# Patient Record
Sex: FEMALE | Race: BLACK OR AFRICAN AMERICAN | NOT HISPANIC OR LATINO | Employment: PART TIME | URBAN - METROPOLITAN AREA
[De-identification: names, ages, dates, MRNs, and addresses within clinical notes are randomized per-mention and may not be internally consistent; named-entity substitution may affect disease eponyms.]

---

## 2017-03-21 ENCOUNTER — TRANSFERRED RECORDS (OUTPATIENT)
Dept: HEALTH INFORMATION MANAGEMENT | Facility: CLINIC | Age: 32
End: 2017-03-21

## 2017-08-22 ENCOUNTER — OFFICE VISIT (OUTPATIENT)
Dept: FAMILY MEDICINE | Facility: CLINIC | Age: 32
End: 2017-08-22

## 2017-08-22 VITALS
SYSTOLIC BLOOD PRESSURE: 123 MMHG | OXYGEN SATURATION: 98 % | WEIGHT: 170 LBS | HEIGHT: 68 IN | TEMPERATURE: 98.4 F | BODY MASS INDEX: 25.76 KG/M2 | HEART RATE: 70 BPM | DIASTOLIC BLOOD PRESSURE: 85 MMHG

## 2017-08-22 DIAGNOSIS — J45.20 MILD INTERMITTENT ASTHMA WITHOUT COMPLICATION: ICD-10-CM

## 2017-08-22 DIAGNOSIS — Z01.419 ENCOUNTER FOR GYNECOLOGICAL EXAMINATION WITHOUT ABNORMAL FINDING: ICD-10-CM

## 2017-08-22 DIAGNOSIS — Z97.5 IUD CONTRACEPTION: ICD-10-CM

## 2017-08-22 DIAGNOSIS — F41.1 GAD (GENERALIZED ANXIETY DISORDER): ICD-10-CM

## 2017-08-22 DIAGNOSIS — F33.1 MAJOR DEPRESSIVE DISORDER, RECURRENT EPISODE, MODERATE (H): ICD-10-CM

## 2017-08-22 DIAGNOSIS — Z12.4 SCREENING FOR CERVICAL CANCER: ICD-10-CM

## 2017-08-22 DIAGNOSIS — I10 BENIGN ESSENTIAL HYPERTENSION: Primary | ICD-10-CM

## 2017-08-22 PROBLEM — E55.9 VITAMIN D DEFICIENCY: Status: ACTIVE | Noted: 2017-08-22

## 2017-08-22 RX ORDER — ALBUTEROL SULFATE 90 UG/1
2 AEROSOL, METERED RESPIRATORY (INHALATION) EVERY 6 HOURS
Status: ON HOLD | COMMUNITY
End: 2019-12-10

## 2017-08-22 RX ORDER — LISINOPRIL/HYDROCHLOROTHIAZIDE 10-12.5 MG
1 TABLET ORAL DAILY
Qty: 90 TABLET | Refills: 1 | Status: SHIPPED | OUTPATIENT
Start: 2017-08-22 | End: 2017-09-29

## 2017-08-22 RX ORDER — CITALOPRAM HYDROBROMIDE 20 MG/1
20 TABLET ORAL DAILY
Qty: 90 TABLET | Refills: 1 | Status: SHIPPED | OUTPATIENT
Start: 2017-08-22 | End: 2017-09-29

## 2017-08-22 RX ORDER — LISINOPRIL AND HYDROCHLOROTHIAZIDE 12.5; 2 MG/1; MG/1
1 TABLET ORAL DAILY
COMMUNITY
End: 2017-08-22

## 2017-08-22 ASSESSMENT — ANXIETY QUESTIONNAIRES
5. BEING SO RESTLESS THAT IT IS HARD TO SIT STILL: NOT AT ALL
GAD7 TOTAL SCORE: 5
IF YOU CHECKED OFF ANY PROBLEMS ON THIS QUESTIONNAIRE, HOW DIFFICULT HAVE THESE PROBLEMS MADE IT FOR YOU TO DO YOUR WORK, TAKE CARE OF THINGS AT HOME, OR GET ALONG WITH OTHER PEOPLE: NOT DIFFICULT AT ALL
3. WORRYING TOO MUCH ABOUT DIFFERENT THINGS: SEVERAL DAYS
6. BECOMING EASILY ANNOYED OR IRRITABLE: MORE THAN HALF THE DAYS
1. FEELING NERVOUS, ANXIOUS, OR ON EDGE: SEVERAL DAYS
7. FEELING AFRAID AS IF SOMETHING AWFUL MIGHT HAPPEN: SEVERAL DAYS
2. NOT BEING ABLE TO STOP OR CONTROL WORRYING: NOT AT ALL

## 2017-08-22 ASSESSMENT — PAIN SCALES - GENERAL: PAINLEVEL: NO PAIN (0)

## 2017-08-22 ASSESSMENT — PATIENT HEALTH QUESTIONNAIRE - PHQ9
5. POOR APPETITE OR OVEREATING: NOT AT ALL
SUM OF ALL RESPONSES TO PHQ QUESTIONS 1-9: 4

## 2017-08-22 NOTE — LETTER
August 29, 2017       TO: Vivivishal Govea  [address] [facility] [address]       Hi Vivi,    Below are your recent PAP and HPV results and everything look GREAT!! The PAP indicated a small amount of yeast cells.  This does not need to be treated unless you are having symptoms of a yeast infection.  You can use OTC vaginal yeast cream for this.  If you are having symptoms and would like an oral pill prescribed for this please call the clinic and speak with the nurse.    Your next PAP test can be in 5 years.  Please let me know if you have any questions.    Please let me know if you have any questions.  Thank you for allowing me to participate in your care.  It was my pleasure meeting you.    All the best!        Nicole Mcclellan PA-C          Results for orders placed or performed in visit on 08/22/17   Pap imaged thin layer screen with HPV - recommended age 30 - 65   Result Value Ref Range    PAP NIL     Copath Report         Acc#: [id number]   Signed: 8/25/2017 08:49   MR#: [id number]    SPECIMEN/STAIN PROCESS:  Pap imaged thin layer prep screening (Surepath, FocalPoint with guided  screening)       Pap-Cyto x 1, HPV ordered x 1    SOURCE: Cervical, endocervical  ----------------------------------------------------------------   Pap imaged thin layer prep screening (Surepath, FocalPoint with guided  screening)  SPECIMEN ADEQUACY:  Satisfactory for evaluation.  -Transformation zone component present.    CYTOLOGIC INTERPRETATION:    Negative for intraepithelial lesion or malignancy         Organism(s):  -Fungal organisms morphologically consistent with Candida spp.    Electronically signed by:  CRISTOBAL Vasques (ASCP)    CLINICAL HISTORY:  LMP: 7/29/2017    Papanicolaou Test Limitations:  Cervical cytology is a screening test  with limited sensitivity; regular screening is critical for cancer  prevention; Pap tests are primarily effective for the  diagnosis/prevention of squamous cell carcinoma, not  adenoca rcinomas or  other cancers.  TESTING LAB LOCATION:  [address] [address], [phone]  Processed and screened at [facility]     HPV High Risk Types DNA Cervical   Result Value Ref Range    HPV 16 DNA Negative NEG^Negative    HPV 18 DNA Negative NEG^Negative    Other HR HPV Negative NEG^Negative    Final Diagnosis This patient's sample is negative for HPV DNA.     Specimen Description Cervical Cells

## 2017-08-22 NOTE — LETTER
My Asthma Action Plan  Name: Vivi Govea   YOB: 1985  Date: 8/22/2017   My doctor: Jaycee Mcclellan PA-C   My clinic: [facility]        My Control Medicine: none  My Rescue Medicine: Albuterol (Proair/Ventolin/Proventil) inhaler 2 puffs every 4-6 PRN    Has used symbicort intermittently with exacerbation My Asthma Severity: intermittent  Avoid your asthma triggers: upper respiratory infections, pollens and mold               GREEN ZONE   Good Control    I feel good    No cough or wheeze    Can work, sleep and play without asthma symptoms       Take your asthma control medicine every day.     1. If exercise triggers your asthma, take your rescue medication    15 minutes before exercise or sports, and    During exercise if you have asthma symptoms  2. Spacer to use with inhaler: If you have a spacer, make sure to use it with your inhaler             YELLOW ZONE Getting Worse  I have ANY of these:    I do not feel good    Cough or wheeze    Chest feels tight    Wake up at night   1. Keep taking your Green Zone medications  2. Start taking your rescue medicine:    every 20 minutes for up to 1 hour. Then every 4 hours for 24-48 hours.  3. If you stay in the Yellow Zone for more than 12-24 hours, contact your doctor.  4. If you do not return to the Green Zone in 12-24 hours or you get worse, start taking your oral steroid medicine if prescribed by your provider.           RED ZONE Medical Alert - Get Help  I have ANY of these:    I feel awful    Medicine is not helping    Breathing getting harder    Trouble walking or talking    Nose opens wide to breathe       1. Take your rescue medicine NOW  2. If your provider has prescribed an oral steroid medicine, start taking it NOW  3. Call your doctor NOW  4. If you are still in the Red Zone after 20 minutes and you have not reached your doctor:    Take your rescue medicine again and    Call 911 or go to the emergency room right away    See your  regular doctor within 2 weeks of an Emergency Room or Urgent Care visit for follow-up treatment.        Electronically signed by: Jaycee Mcclellan, August 22, 2017    Annual Reminders:  Meet with Asthma Educator,  Flu Shot in the Fall, consider Pneumonia Vaccination for patients with asthma (aged 19 and older).    Pharmacy: [facility] [id number] [place] [address]                    Asthma Triggers  How To Control Things That Make Your Asthma Worse    Triggers are things that make your asthma worse.  Look at the list below to help you find your triggers and what you can do about them.  You can help prevent asthma flare-ups by staying away from your triggers.      Trigger                                                          What you can do   Cigarette Smoke  Tobacco smoke can make asthma worse. Do not allow smoking in your home, car or around you.  Be sure no one smokes at a child s day care or school.  If you smoke, ask your health care provider for ways to help you quit.  Ask family members to quit too.  Ask your health care provider for a referral to Quit Plan to help you quit smoking, or call 1-939-096-PLAN.     Colds, Flu, Bronchitis  These are common triggers of asthma. Wash your hands often.  Don t touch your eyes, nose or mouth.  Get a flu shot every year.     Dust Mites  These are tiny bugs that live in cloth or carpet. They are too small to see. Wash sheets and blankets in hot water every week.   Encase pillows and mattress in dust mite proof covers.  Avoid having carpet if you can. If you have carpet, vacuum weekly.   Use a dust mask and HEPA vacuum.   Pollen and Outdoor Mold  Some people are allergic to trees, grass, or weed pollen, or molds. Try to keep your windows closed.  Limit time out doors when pollen count is high.   Ask you health care provider about taking medicine during allergy season.     Animal Dander  Some people are allergic to skin flakes, urine or saliva from  pets with fur or feathers. Keep pets with fur or feathers out of your home.    If you can t keep the pet outdoors, then keep the pet out of your bedroom.  Keep the bedroom door closed.  Keep pets off cloth furniture and away from stuffed toys.     Mice, Rats, and Cockroaches  Some people are allergic to the waste from these pests.   Cover food and garbage.  Clean up spills and food crumbs.  Store grease in the refrigerator.   Keep food out of the bedroom.   Indoor Mold  This can be a trigger if your home has high moisture. Fix leaking faucets, pipes, or other sources of water.   Clean moldy surfaces.  Dehumidify basement if it is damp and smelly.   Smoke, Strong Odors, and Sprays  These can reduce air quality. Stay away from strong odors and sprays, such as perfume, powder, hair spray, paints, smoke incense, paint, cleaning products, candles and new carpet.   Exercise or Sports  Some people with asthma have this trigger. Be active!  Ask your doctor about taking medicine before sports or exercise to prevent symptoms.    Warm up for 5-10 minutes before and after sports or exercise.     Other Triggers of Asthma  Cold air:  Cover your nose and mouth with a scarf.  Sometimes laughing or crying can be a trigger.  Some medicines and food can trigger asthma.

## 2017-08-22 NOTE — PROGRESS NOTES
SUBJECTIVE:   Vivi Govea is a 32 year old female new patient to our clinic who presents to clinic today for the following health issues:    Establish care with primary care clinic    Cervical cancer screening:  She had a physical 2017 in [place] but a PAP and breast exam  was not done and patient is due.    Periods: regular, lasting 7days.  Flow described as heavy with the PARAGARD present for @6 years  yes dysmenorrhea, No Dysparuneia  Currently sexually active: yes    Contraception: IUD  Patient's last menstrual period was 2017 (exact date).   Vaginal symptoms: no  Concern for STD: no    Accepts/requests STD testing: declined      Hypertension Follow-up:  Has been on medication for the past 8 years. Tolerating well.  Denies symptoms of headache, blurred vision, chest pain and palpitations.  Had labs done 3/2017.  She brings in reports and all are normal.  Will be scanned to EMR.      Outpatient blood pressures are being checked at home.  Results are normal in the 120/80 range.    Low Salt Diet: no added salt    Depression and Anxiety Follow-Up:  Doing well on citalopram.  Long history of depression and anxiety. Has been on medication for the past several years.  She feels under good control.  She has never been hospitalized and denies homicidal and suicidal ideation now nor in the past.  Strong family history of  Depression.  Mom tried to commit suicide .  Patient has been in counseling in the past.    Status since last visit: NA    Other associated symptoms:None    Complicating factors: family hx    Significant life event: Yes-  Recent move from [facility].     Current substance abuse: None    PHQ-9 SCORE 2017   Total Score 4     ROCHELLE-7 SCORE 2017   Total Score 5       PHQ-9  English  PHQ-9   Any Language  GAD7  Asthma Follow-Up:  Mild Intermittent.  Uses albuterol only as needed. AAP updated.    Was ACT completed today?  Yes    ACT Total Scores 2017   ACT TOTAL SCORE (Goal  Greater than or Equal to 20) 25   In the past 12 months, how many times did you visit the emergency room for your asthma without being admitted to the hospital? 0   In the past 12 months, how many times were you hospitalized overnight because of your asthma? 0       Recent asthma triggers that patient is dealing with: None      Amount of exercise or physical activity: 4-5 days/week for an average of greater than 60 minutes    Problems taking medications regularly: No    Medication side effects: none  Diet: low salt    Problem list and histories reviewed & adjusted, as indicated.  Additional history: as documented    Patient Active Problem List    Diagnosis Date Noted     Benign essential hypertension 08/22/2017     Priority: Medium     ROCHELLE (generalized anxiety disorder) 08/22/2017     Priority: Medium     Major depressive disorder, recurrent episode, moderate (H) 08/22/2017     Priority: Medium     Mild intermittent asthma without complication 08/22/2017     Priority: Medium     Vitamin D deficiency 08/22/2017     Priority: Medium     IUD contraception 08/22/2017     Priority: Medium     Paragard         Past Medical History:   Diagnosis Date     Asthma      C. difficile diarrhea 2013     Depression      Generalized anxiety disorder      Hypertension        Past Surgical History:   Procedure Laterality Date     GYN SURGERY  2003    Laser treatment of HPV     ORTHOPEDIC SURGERY Left 07/21/2017    ganglion cyst removal     SURGICAL HISTORY OF -       Nerve entrapment release       Family History   Problem Relation Age of Onset     DIABETES Mother      Type 2 Diabetes Maternal Grandfather        Social History   Substance Use Topics     Smoking status: Never Smoker     Smokeless tobacco: Never Used     Alcohol use Yes      Comment: 6 drinks a week mainly wine       Social History     Social History Narrative    Lives with fiance. One dog.  Recently moved from [place].  Has performed at the [employer] in the past. Is getting   in @ 1 year.        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          Has a good support system.    Feels safe in all environments.    Wears seatbelt 100% of the time    Wears helmet while biking.    Denies history of abuse, past or present, physical, sexual or emotional.    08/22/17    Nicole Mcclellan PA-C           Current Outpatient Prescriptions   Medication Sig Dispense Refill     VITAMIN D, CHOLECALCIFEROL, PO Take 5,000 Units by mouth daily       LORazepam (ATIVAN PO) Take 1 mg by mouth       albuterol (PROAIR HFA/PROVENTIL HFA/VENTOLIN HFA) 108 (90 BASE) MCG/ACT Inhaler Inhale 2 puffs into the lungs every 6 hours       lisinopril-hydrochlorothiazide (PRINZIDE/ZESTORETIC) 10-12.5 MG per tablet Take 1 tablet by mouth daily 90 tablet 1     citalopram (CELEXA) 20 MG tablet Take 1 tablet (20 mg) by mouth daily 90 tablet 1     [DISCONTINUED] lisinopril-hydrochlorothiazide (PRINZIDE/ZESTORETIC) 10-6.25 mg per half-tab Take 1 tablet by mouth daily       [DISCONTINUED] CITALOPRAM HYDROBROMIDE PO Take 20 mg by mouth daily           Reviewed and updated as needed this visit by clinical staff  Tobacco  Allergies  Meds  Problems  Med Hx  Surg Hx  Fam Hx  Soc Hx        Reviewed and updated as needed this visit by Provider  Tobacco  Allergies  Meds  Problems  Med Hx  Surg Hx  Fam Hx  Soc Hx          ROS:  C: NEGATIVE for fever, chills, change in weight  I: NEGATIVE for worrisome rashes, moles or lesions  E: NEGATIVE for vision changes or irritation  E/M: NEGATIVE for ear, mouth and throat problems  R: NEGATIVE for significant cough or SOB  B: NEGATIVE for masses, tenderness or discharge  CV: NEGATIVE for chest pain, palpitations or peripheral edema  GI: NEGATIVE for nausea, abdominal pain, heartburn, or change in bowel habits  : NEGATIVE for frequency, dysuria, or hematuria  M: NEGATIVE for significant arthralgias or myalgia  N: NEGATIVE for weakness, dizziness or paresthesias  E:  "NEGATIVE for temperature intolerance, skin/hair changes  H: NEGATIVE for bleeding problems  P: NEGATIVE for changes in mood or affect, as above    OBJECTIVE:     /85 (BP Location: Left arm, Patient Position: Chair, Cuff Size: Adult Regular)  Pulse 70  Temp 98.4  F (36.9  C) (Oral)  Ht 5' 8.27\" (173.4 cm)  Wt 170 lb (77.1 kg)  LMP 07/29/2017 (Exact Date)  SpO2 98%  Breastfeeding? No  BMI 25.65 kg/m2  Body mass index is 25.65 kg/(m^2).  GENERAL: healthy, alert and no distress  EYES: Eyes grossly normal to inspection, PERRL and conjunctivae and sclerae normal  HENT: ear canals and TM's normal, nose and mouth without ulcers or lesions  NECK: no adenopathy, no asymmetry, masses, or scars and thyroid normal to palpation.  No bruits  RESP: lungs clear to auscultation - no rales, rhonchi or wheezes  BREAST: normal without masses, tenderness or nipple discharge and no palpable axillary masses or adenopathy  CV: regular rate and rhythm, normal S1 S2, no S3 or S4, no murmur, click or rub, no peripheral edema and peripheral pulses strong  ABDOMEN: soft, nontender, no hepatosplenomegaly, no masses and bowel sounds normal   (female): normal female external genitalia, normal urethral meatus, vaginal mucosa pink, moist, well rugated, and normal cervix/adnexa/uterus without masses or discharge  MS: no gross musculoskeletal defects noted, no edema. no clubbing, edema or cyanosis of extremities.  Pulses = and appropriate bilaterally to DP and PT  SKIN: no suspicious lesions or rashes  NEURO: Normal strength and tone, mentation intact and speech normal  PSYCH: well dressed and groomed.  Good eye contact and is cooperative. Thoughts linear.  No delusions, compulsions or paranoia.  Affect flat.  Patient denies homicidal and suicidal ideation as well as no thoughts or actions of self-harm.    LYMPH: no cervical, supraclavicular, axillary, or inguinal adenopathy      ASSESSMENT/PLAN:       ICD-10-CM    1. Benign essential " hypertension I10 lisinopril-hydrochlorothiazide (PRINZIDE/ZESTORETIC) 10-12.5 MG per tablet   2. Encounter for gynecological examination without abnormal finding Z01.419 Pap imaged thin layer screen with HPV - recommended age 30 - 65     HPV High Risk Types DNA Cervical   3. Major depressive disorder, recurrent episode, moderate (H) F33.1 citalopram (CELEXA) 20 MG tablet   4. ROCHELLE (generalized anxiety disorder) F41.1 LORazepam (ATIVAN PO)     citalopram (CELEXA) 20 MG tablet   5. Screening for cervical cancer Z12.4 Pap imaged thin layer screen with HPV - recommended age 30 - 65     HPV High Risk Types DNA Cervical   6. Mild intermittent asthma without complication J45.20 albuterol (PROAIR HFA/PROVENTIL HFA/VENTOLIN HFA) 108 (90 BASE) MCG/ACT Inhaler     Asthma Action Plan (AAP)   7. IUD contraception Z97.5      Labs reviewed and will be scanned to local EMR.  PAP and GYN exam done today    meds refilled for 6 months  Continue checking BP at home.  Make appointment when consistently above 1140/90.   DASH diet    Follow up appointment in 6 months we will recheck labs at that time.    Jaycee Mcclellan PA-C  [facility]

## 2017-08-22 NOTE — LETTER
[employer] Customer Service  [facility]  [address], [address]  [place] [address]  Fax: [phone]  Phone: [phone]      2017      Vivi Govea  [address] [place] [id number]        Dear Vivi,    Thank you for your interest in becoming a [employer] user!    Your access code is: [email or web address]  Expires: 2017 10:46 AM     Please access the [employer] website at [email or web address].  Below the ID and password fields, select the  Sign Up Now  as New User.  You will be prompted to enter the access code listed above as well as additional personal information.  Please follow the directions carefully when creating your username and password.    If you allow your access code to , or if you have any questions please call a [employer] Representative at [phone] during normal clinic hours.     Sincerely,      [employer] Customer Service  [facility]

## 2017-08-22 NOTE — PATIENT INSTRUCTIONS
Labs reviewed and will be scanned to local EMR.    meds refilled for 6 months  Continue checking BP at home.  Make appointment when consistently above 1140/90.     Follow up appointment in 6 months we will recheck labs at that time.    Please let me know if you have any questions.  Thank you for allowing me to participate in your care.  It was my pleasure meeting you.  Nicole Mcclellan PA-C        Preventive Health Recommendations  Female Ages 26 - 39  Yearly exam:   See your health care provider every year in order to    Review health changes.     Discuss preventive care.      Review your medicines if you your doctor has prescribed any.    Until age 30: Get a Pap test every three years (more often if you have had an abnormal result).    After age 30: Talk to your doctor about whether you should have a Pap test every 3 years or have a Pap test with HPV screening every 5 years.   You do not need a Pap test if your uterus was removed (hysterectomy) and you have not had cancer.  You should be tested each year for STDs (sexually transmitted diseases), if you're at risk.   Talk to your provider about how often to have your cholesterol checked.  If you are at risk for diabetes, you should have a diabetes test (fasting glucose).  Shots: Get a flu shot each year. Get a tetanus shot every 10 years.   Nutrition:     Eat at least 5 servings of fruits and vegetables each day.    Eat whole-grain bread, whole-wheat pasta and brown rice instead of white grains and rice.    Talk to your provider about Calcium and Vitamin D.     Lifestyle    Exercise at least 150 minutes a week (30 minutes a day, 5 days of the week). This will help you control your weight and prevent disease.    Limit alcohol to one drink per day.    No smoking.     Wear sunscreen to prevent skin cancer.    See your dentist every six months for an exam and cleaning.

## 2017-08-22 NOTE — MR AVS SNAPSHOT
After Visit Summary   8/22/2017    Vivi Govea    MRN: [id number]           Patient Information     Date Of Birth          1985        Visit Information        Provider Department      8/22/2017 8:00 AM Jaycee Mcclellan PA-C [facility]        Today's Diagnoses     Benign essential hypertension    -  1    Major depressive disorder, recurrent episode, moderate (H)        ROCHELLE (generalized anxiety disorder)        Screening for cervical cancer        Screening for lipid disorders        Mild intermittent asthma without complication        Encounter for gynecological examination without abnormal finding        Vitamin D deficiency          Care Instructions      Preventive Health Recommendations  Female Ages 26 - 39  Yearly exam:   See your health care provider every year in order to    Review health changes.     Discuss preventive care.      Review your medicines if you your doctor has prescribed any.    Until age 30: Get a Pap test every three years (more often if you have had an abnormal result).    After age 30: Talk to your doctor about whether you should have a Pap test every 3 years or have a Pap test with HPV screening every 5 years.   You do not need a Pap test if your uterus was removed (hysterectomy) and you have not had cancer.  You should be tested each year for STDs (sexually transmitted diseases), if you're at risk.   Talk to your provider about how often to have your cholesterol checked.  If you are at risk for diabetes, you should have a diabetes test (fasting glucose).  Shots: Get a flu shot each year. Get a tetanus shot every 10 years.   Nutrition:     Eat at least 5 servings of fruits and vegetables each day.    Eat whole-grain bread, whole-wheat pasta and brown rice instead of white grains and rice.    Talk to your provider about Calcium and Vitamin D.     Lifestyle    Exercise at least 150 minutes a week (30 minutes a day, 5 days of the week). This will help you  "control your weight and prevent disease.    Limit alcohol to one drink per day.    No smoking.     Wear sunscreen to prevent skin cancer.    See your dentist every six months for an exam and cleaning.            Follow-ups after your visit        Who to contact     Please call your clinic at [phone] to:    Ask questions about your health    Make or cancel appointments    Discuss your medicines    Learn about your test results    Speak to your doctor   If you have compliments or concerns about an experience at your clinic, or if you wish to file a complaint, please contact [facility] Patient Relations at [phone] or email us at [email or web address]         Additional Information About Your Visit        "[employer]hart Information     Branching [employer]s is an electronic gateway that provides easy, online access to your medical records. With [employer], you can request a clinic appointment, read your test results, renew a prescription or communicate with your care team.     To sign up for [employer] visit the website at [email or web address]   You will be asked to enter the access code listed below, as well as some personal information. Please follow the directions to create your username and password.     Your access code is: [email or web address]  Expires: 2017 10:46 AM     Your access code will  in 90 days. If you need help or a new code, please contact your [facility] or call [phone] for assistance.        Care EveryWhere ID     This is your Care EveryWhere ID. This could be used by other organizations to access your [place] medical records  [id number]        Your Vitals Were     Pulse Temperature Height Last Period Pulse Oximetry Breastfeeding?    70 98.4  F (36.9  C) (Oral) 5' 8.27\" (173.4 cm) 2017 (Exact Date) 98% No    BMI (Body Mass Index)                   25.65 kg/m2            Blood Pressure from Last 3 Encounters:   17 123/85 "    Weight from Last 3 Encounters:   08/22/17 170 lb (77.1 kg)              We Performed the Following     Asthma Action Plan (AAP)     HPV High Risk Types DNA Cervical     Pap imaged thin layer screen with HPV - recommended age 30 - 65          Today's Medication Changes          These changes are accurate as of: 8/22/17  9:40 AM.  If you have any questions, ask your nurse or doctor.               These medicines have changed or have updated prescriptions.        Dose/Directions    citalopram 20 MG tablet   Commonly known as:  celeXA   This may have changed:  medication strength   Used for:  Major depressive disorder, recurrent episode, moderate (H), ROCHELLE (generalized anxiety disorder)   Changed by:  Jaycee Mcclellan PA-C        Dose:  20 mg   Take 1 tablet (20 mg) by mouth daily   Quantity:  90 tablet   Refills:  1       lisinopril-hydrochlorothiazide 10-12.5 MG per tablet   Commonly known as:  PRINZIDE/ZESTORETIC   This may have changed:  medication strength   Used for:  Benign essential hypertension   Changed by:  aJycee Mcclellan PA-C        Dose:  1 tablet   Take 1 tablet by mouth daily   Quantity:  90 tablet   Refills:  1            Where to get your medicines      These medications were sent to [place] [address] [place] [address]  [address] [facility] [address]     Phone:  [phone]     citalopram 20 MG tablet    lisinopril-hydrochlorothiazide 10-12.5 MG per tablet                Primary Care Provider    None       No address on file        Equal Access to Services     [facility] : Hadii heydi eason hadleesao Soann, waaxda luqadaha, qaybta kaalmada adeegyada, hiro delgado . So [facility] [phone].    ATENCIÓN: Si habla español, tiene a damon disposición servicios gratuitos de asistencia lingüística. Llame al [phone].    We comply with applicable federal civil rights laws and [place] laws. We do not discriminate on the basis of race, color,  national origin, age, disability sex, sexual orientation or gender identity.            Thank you!     Thank you for choosing [facility]  for your care. Our goal is always to provide you with excellent care. Hearing back from our patients is one way we can continue to improve our services. Please take a few minutes to complete the written survey that you may receive in the mail after your visit with us. Thank you!             Your Updated Medication List - Protect others around you: Learn how to safely use, store and throw away your medicines at www.disposemymeds.org.          This list is accurate as of: 8/22/17  9:40 AM.  Always use your most recent med list.                   Brand Name Dispense Instructions for use Diagnosis    albuterol 108 (90 BASE) MCG/ACT Inhaler    PROAIR HFA/PROVENTIL HFA/VENTOLIN HFA     Inhale 2 puffs into the lungs every 6 hours    Mild intermittent asthma without complication       ATIVAN PO      Take 1 mg by mouth    ROCHELLE (generalized anxiety disorder)       citalopram 20 MG tablet    celeXA    90 tablet    Take 1 tablet (20 mg) by mouth daily    Major depressive disorder, recurrent episode, moderate (H), ROCHELLE (generalized anxiety disorder)       lisinopril-hydrochlorothiazide 10-12.5 MG per tablet    PRINZIDE/ZESTORETIC    90 tablet    Take 1 tablet by mouth daily    Benign essential hypertension       VITAMIN D (CHOLECALCIFEROL) PO      Take 5,000 Units by mouth daily

## 2017-08-23 ASSESSMENT — ASTHMA QUESTIONNAIRES: ACT_TOTALSCORE: 25

## 2017-08-23 ASSESSMENT — ANXIETY QUESTIONNAIRES: GAD7 TOTAL SCORE: 5

## 2017-08-25 LAB
COPATH REPORT: NORMAL
PAP: NORMAL

## 2017-08-28 LAB
FINAL DIAGNOSIS: NORMAL
HPV HR 12 DNA CVX QL NAA+PROBE: NEGATIVE
HPV16 DNA SPEC QL NAA+PROBE: NEGATIVE
HPV18 DNA SPEC QL NAA+PROBE: NEGATIVE
SPECIMEN DESCRIPTION: NORMAL

## 2017-09-27 ENCOUNTER — OFFICE VISIT (OUTPATIENT)
Dept: FAMILY MEDICINE | Facility: CLINIC | Age: 32
End: 2017-09-27

## 2017-09-27 VITALS
DIASTOLIC BLOOD PRESSURE: 81 MMHG | WEIGHT: 172 LBS | SYSTOLIC BLOOD PRESSURE: 123 MMHG | OXYGEN SATURATION: 98 % | BODY MASS INDEX: 25.95 KG/M2 | HEART RATE: 76 BPM

## 2017-09-27 DIAGNOSIS — M25.472 LEFT ANKLE SWELLING: ICD-10-CM

## 2017-09-27 DIAGNOSIS — Z23 NEEDS FLU SHOT: Primary | ICD-10-CM

## 2017-09-27 ASSESSMENT — PAIN SCALES - GENERAL: PAINLEVEL: MILD PAIN (2)

## 2017-09-27 NOTE — NURSING NOTE
Chief Complaint   Patient presents with     Foot Pain     left foot from an injury in June, flairing up again     32 year old      Blood pressure 123/81, pulse 76, weight 172 lb (78 kg), SpO2 98 %, not currently breastfeeding. Body mass index is 25.95 kg/(m^2).    Wt Readings from Last 2 Encounters:   09/27/17 172 lb (78 kg)   08/22/17 170 lb (77.1 kg)     BP Readings from Last 3 Encounters:   09/27/17 123/81   08/22/17 123/85       Patient Active Problem List   Diagnosis     Benign essential hypertension     ROCHELLE (generalized anxiety disorder)     Major depressive disorder, recurrent episode, moderate (H)     Mild intermittent asthma without complication     Vitamin D deficiency     IUD contraception       Current Outpatient Prescriptions   Medication Sig Dispense Refill     VITAMIN D, CHOLECALCIFEROL, PO Take 5,000 Units by mouth daily       LORazepam (ATIVAN PO) Take 1 mg by mouth       albuterol (PROAIR HFA/PROVENTIL HFA/VENTOLIN HFA) 108 (90 BASE) MCG/ACT Inhaler Inhale 2 puffs into the lungs every 6 hours       lisinopril-hydrochlorothiazide (PRINZIDE/ZESTORETIC) 10-12.5 MG per tablet Take 1 tablet by mouth daily 90 tablet 1     citalopram (CELEXA) 20 MG tablet Take 1 tablet (20 mg) by mouth daily 90 tablet 1       Social History   Substance Use Topics     Smoking status: Never Smoker     Smokeless tobacco: Never Used     Alcohol use Yes      Comment: 6 drinks a week mainly wine         Health Maintenance Due   Topic Date Due     TETANUS IMMUNIZATION (SYSTEM ASSIGNED)  07/14/2002     INFLUENZA VACCINE (SYSTEM ASSIGNED)  09/01/2017       ELIECER HEREDIA CMA  September 27, 2017 4:40 PM

## 2017-09-27 NOTE — PATIENT INSTRUCTIONS
"ASSESSMENT:  -LEFT ankle lateral pain ? Chronic sprain vs talar injury    PLAN:  - MRI of LEFT ankle. I will send results via 'EMR\"  - Referral to PT. Asked Vivi to look into where insurance covers. Specific names given  - Return if no improvement.   "

## 2017-09-27 NOTE — PROGRESS NOTES
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   Had an X-ray in July, 2 months ago and that was reportedly negative. She was put in a post-surgery shoe.     In the interim, had left wrist ganglion wrist surgery and so was resting.     Recently has tried to get back into dance, but is developing LEFT dorsal hindfoot pain on lateral aspect when ankledorsiflexes and externally rotates.     Review Of Systems:  Has otherwise been in usual state of health, e.g.   Cardiovascular: negative  Respiratory: No shortness of breath, dyspnea on exertion, cough, or hemoptysis  Gastrointestinal: negative  Genitourinary: negative    Problem list per EMR:  Patient Active Problem List   Diagnosis     Benign essential hypertension     ROCHELLE (generalized anxiety disorder)     Major depressive disorder, recurrent episode, moderate (H)     Mild intermittent asthma without complication     Vitamin D deficiency     IUD contraception       Current Outpatient Prescriptions   Medication Sig Dispense Refill     VITAMIN D, CHOLECALCIFEROL, PO Take 5,000 Units by mouth daily       LORazepam (ATIVAN PO) Take 1 mg by mouth       albuterol (PROAIR HFA/PROVENTIL HFA/VENTOLIN HFA) 108 (90 BASE) MCG/ACT Inhaler Inhale 2 puffs into the lungs every 6 hours       lisinopril-hydrochlorothiazide (PRINZIDE/ZESTORETIC) 10-12.5 MG per tablet Take 1 tablet by mouth daily 90 tablet 1     citalopram (CELEXA) 20 MG tablet Take 1 tablet (20 mg) by mouth daily 90 tablet 1       Allergies   Allergen Reactions     Amoxicillin      Codeine      Penicillins         Social:   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 Moved to [facility] from [facility] within the past year.     EXAM    Vitals: /81 (BP Location: Right arm, Patient  "Position: Sitting, Cuff Size: Adult Regular)  Pulse 76  Wt 172 lb (78 kg)  SpO2 98%  BMI 25.95 kg/m2  BMI= Body mass index is 25.95 kg/(m^2).  Appears well. Gait is normal.  LEFT foot/ankle has pain over the lateral ankle joint. Small amount of swelling there also. No pain over base of the 5th MT, nor over the tibial side of the foot.   Normal pulses.   Normal strength of peroneal tendons.         ASSESSMENT:  -LEFT ankle lateral pain ? Chronic sprain vs talar injury in a dancer. Symptoms present for several months. Has had x-rays.     PLAN:  - MRI of LEFT ankle. I will send results via 'EMR\"  - Referral to PT. Asked Vivi to look into where insurance covers. Specific names given  - Return if no improvement.         --Alfred Diez MD  [facility], Department of Family Medicine and Community Health    "

## 2017-09-27 NOTE — MR AVS SNAPSHOT
"              After Visit Summary   9/27/2017    Vivi Govea    MRN: [id number]           Patient Information     Date Of Birth          1985        Visit Information        Provider Department      9/27/2017 4:40 PM Alfred Diez MD [facility]        Today's Diagnoses     Needs flu shot    -  1    Left ankle swelling          Care Instructions    ASSESSMENT:  -LEFT ankle lateral pain ? Chronic sprain vs talar injury    PLAN:  - MRI of LEFT ankle. I will send results via 'EMR\"  - Referral to PT. Asked Vivi to look into where insurance covers. Specific names given  - Return if no improvement.           Follow-ups after your visit        Additional Services     PHYSICAL THERAPY REFERRAL       PT Referral for LEFT ankle pain in a dancer      Treatment: Evaluation & Treatment      Please be aware that coverage of these services is subject to the terms and limitations of your health insurance plan.  Call member services at your health plan with any benefit or coverage questions.      **Note to Provider:  If you are referring outside of [place] for the therapy appointment, please list the name of the location in the \"special instructions\" above, print the referral and give to the patient to schedule the appointment.   Call for dance specialists                  Future tests that were ordered for you today     Open Future Orders        Priority Expected Expires Ordered    MR Low Ext Joint Lt w/o Contrast Routine  9/27/2018 9/27/2017            Who to contact     Please call your clinic at [phone] to:    Ask questions about your health    Make or cancel appointments    Discuss your medicines    Learn about your test results    Speak to your doctor   If you have compliments or concerns about an experience at your clinic, or if you wish to file a complaint, please contact [facility] Patient Relations at [phone] or email us at [email or web address]   "       Additional Information About Your Visit        [facility] Information     Acc[employer]ade gives you secure access to your electronic health record. If you see a primary care provider, you can also send messages to your care team and make appointments. If you have questions, please call your primary care clinic.  If you do not have a primary care provider, please call [phone] and they will assist you.      Acc[employer] is an electronic gateway that provides easy, online access to your medical records. With [employer], you can request a clinic appointment, read your test results, renew a prescription or communicate with your care team.     To access your existing account, please contact your [facility] or call [phone] for assistance.        Care EveryWhere ID     This is your Care EveryWhere ID. This could be used by other organizations to access your [place] medical records  [id number]        Your Vitals Were     Pulse Pulse Oximetry BMI (Body Mass Index)             76 98% 25.95 kg/m2          Blood Pressure from Last 3 Encounters:   09/27/17 123/81   08/22/17 123/85    Weight from Last 3 Encounters:   09/27/17 172 lb (78 kg)   08/22/17 170 lb (77.1 kg)              We Performed the Following     HC FLU VAC PRESRV FREE QUAD SPLIT VIR 3+YRS IM     INJECTION INTRAMUSCULAR OR SUB-Q     PHYSICAL THERAPY REFERRAL        Primary Care Provider Office Phone # Fax #    Jaycee Mcclellan PA-C [phone]       [address] [place] [address]        Equal Access to Services     [employer] : Hadii heydi medranoo Escobar, waaxda luqadaha, qaybta kaalmada dawna, hiro delgado . So [facility] [phone].    ATENCIÓN: Si habla español, tiene a damon disposición servicios gratuitos de asistencia lingüística. Llame al [phone].    We comply with applicable federal civil rights laws and [place] laws. We do not discriminate on the basis of race,  color, national origin, age, disability sex, sexual orientation or gender identity.            Thank you!     Thank you for choosing [facility]  for your care. Our goal is always to provide you with excellent care. Hearing back from our patients is one way we can continue to improve our services. Please take a few minutes to complete the written survey that you may receive in the mail after your visit with us. Thank you!             Your Updated Medication List - Protect others around you: Learn how to safely use, store and throw away your medicines at www.disposemymeds.org.          This list is accurate as of: 9/27/17  5:05 PM.  Always use your most recent med list.                   Brand Name Dispense Instructions for use Diagnosis    albuterol 108 (90 BASE) MCG/ACT Inhaler    PROAIR HFA/PROVENTIL HFA/VENTOLIN HFA     Inhale 2 puffs into the lungs every 6 hours    Mild intermittent asthma without complication       ATIVAN PO      Take 1 mg by mouth    ROCHELLE (generalized anxiety disorder)       citalopram 20 MG tablet    celeXA    90 tablet    Take 1 tablet (20 mg) by mouth daily    Major depressive disorder, recurrent episode, moderate (H), ROCHELLE (generalized anxiety disorder)       lisinopril-hydrochlorothiazide 10-12.5 MG per tablet    PRINZIDE/ZESTORETIC    90 tablet    Take 1 tablet by mouth daily    Benign essential hypertension       VITAMIN D (CHOLECALCIFEROL) PO      Take 5,000 Units by mouth daily

## 2017-09-29 DIAGNOSIS — F33.1 MAJOR DEPRESSIVE DISORDER, RECURRENT EPISODE, MODERATE (H): ICD-10-CM

## 2017-09-29 DIAGNOSIS — I10 BENIGN ESSENTIAL HYPERTENSION: ICD-10-CM

## 2017-09-29 DIAGNOSIS — F41.1 GAD (GENERALIZED ANXIETY DISORDER): ICD-10-CM

## 2017-09-29 RX ORDER — CITALOPRAM HYDROBROMIDE 20 MG/1
20 TABLET ORAL DAILY
Qty: 90 TABLET | Refills: 1 | Status: SHIPPED | OUTPATIENT
Start: 2017-09-29 | End: 2018-02-20

## 2017-09-29 RX ORDER — LISINOPRIL/HYDROCHLOROTHIAZIDE 10-12.5 MG
1 TABLET ORAL DAILY
Qty: 90 TABLET | Refills: 1 | Status: SHIPPED | OUTPATIENT
Start: 2017-09-29 | End: 2018-02-26

## 2017-09-29 ASSESSMENT — ANXIETY QUESTIONNAIRES
GAD7 TOTAL SCORE: 3
5. BEING SO RESTLESS THAT IT IS HARD TO SIT STILL: NOT AT ALL
3. WORRYING TOO MUCH ABOUT DIFFERENT THINGS: NOT AT ALL
1. FEELING NERVOUS, ANXIOUS, OR ON EDGE: SEVERAL DAYS
7. FEELING AFRAID AS IF SOMETHING AWFUL MIGHT HAPPEN: NOT AT ALL
2. NOT BEING ABLE TO STOP OR CONTROL WORRYING: NOT AT ALL
6. BECOMING EASILY ANNOYED OR IRRITABLE: SEVERAL DAYS
IF YOU CHECKED OFF ANY PROBLEMS ON THIS QUESTIONNAIRE, HOW DIFFICULT HAVE THESE PROBLEMS MADE IT FOR YOU TO DO YOUR WORK, TAKE CARE OF THINGS AT HOME, OR GET ALONG WITH OTHER PEOPLE: NOT DIFFICULT AT ALL

## 2017-09-29 ASSESSMENT — PATIENT HEALTH QUESTIONNAIRE - PHQ9: 5. POOR APPETITE OR OVEREATING: SEVERAL DAYS

## 2017-09-29 NOTE — TELEPHONE ENCOUNTER
To provider to auth, mail order.      agnes Rivera had to change to mail order per her insurance( see Mychart) , so basically just transferring these over to you,  You refilled for 6 months from your est care visit with her in August 22, 2017.  Her labs are good, BP good, and PHQ9 of 4      Sloane Anthony RN  September 29, 2017 11:36 AM

## 2017-09-30 ASSESSMENT — ANXIETY QUESTIONNAIRES: GAD7 TOTAL SCORE: 3

## 2017-10-20 ENCOUNTER — ALLIED HEALTH/NURSE VISIT (OUTPATIENT)
Dept: FAMILY MEDICINE | Facility: CLINIC | Age: 32
End: 2017-10-20

## 2017-10-20 DIAGNOSIS — Z23 NEED FOR TDAP VACCINATION: Primary | ICD-10-CM

## 2017-10-20 DIAGNOSIS — Z23 NEEDS FLU SHOT: ICD-10-CM

## 2017-10-20 NOTE — MR AVS SNAPSHOT
After Visit Summary   10/20/2017    Vivi Govea    MRN: [id number]           Patient Information     Date Of Birth          1985        Visit Information        Provider Department      10/20/2017 3:15 PM Nurse, Mi [facility]        Today's Diagnoses     Need for Tdap vaccination    -  1    Needs flu shot           Follow-ups after your visit        Who to contact     Please call your clinic at [phone] to:    Ask questions about your health    Make or cancel appointments    Discuss your medicines    Learn about your test results    Speak to your doctor   If you have compliments or concerns about an experience at your clinic, or if you wish to file a complaint, please contact [facility] Patient Relations at [phone] or email us at [email or web address]         Additional Information About Your Visit        MyChart Information     Mobile [employer] gives you secure access to your electronic health record. If you see a primary care provider, you can also send messages to your care team and make appointments. If you have questions, please call your primary care clinic.  If you do not have a primary care provider, please call [phone] and they will assist you.      Dabble is an electronic gateway that provides easy, online access to your medical records. With D[employer]ble, you can request a clinic appointment, read your test results, renew a prescription or communicate with your care team.     To access your existing account, please contact your [facility] or call [phone] for assistance.        Care EveryWhere ID     This is your Care EveryWhere ID. This could be used by other organizations to access your [place] medical records  [id number]         Blood Pressure from Last 3 Encounters:   09/27/17 123/81   08/22/17 123/85    Weight from Last 3 Encounters:   09/27/17 172 lb (78 kg)   08/22/17 170 lb (77.1 kg)               We Performed the Following     ADMIN INFLUENZA VIRUS VACCINE     HC FLU VAC PRESRV FREE QUAD SPLIT VIR 3+YRS IM     TDAP VACCINE (BOOSTRIX)     VACCINE ADMINISTRATION, INITIAL        Primary Care Provider Office Phone # Fax #    Jaycee Mcclellan PA-C [phone]       [address] [place] [address]        Equal Access to Services     [facility]: Hadii aad ku hadasho Soomaali, waaxda luqadaha, qaybta kaalmada adeegyada, waxay alexandrain hayaan adedaniella vidalkiarajoey delgado . So [facility] [phone].    ATENCIÓN: Si habla español, tiene a damon disposición servicios gratuitos de asistencia lingüística. [facility] [phone].    We comply with applicable federal civil rights laws and [place] laws. We do not discriminate on the basis of race, color, national origin, age, disability, sex, sexual orientation, or gender identity.            Thank you!     Thank you for choosing [facility]  for your care. Our goal is always to provide you with excellent care. Hearing back from our patients is one way we can continue to improve our services. Please take a few minutes to complete the written survey that you may receive in the mail after your visit with us. Thank you!             Your Updated Medication List - Protect others around you: Learn how to safely use, store and throw away your medicines at www.disposemymeds.org.          This list is accurate as of: 10/20/17  3:50 PM.  Always use your most recent med list.                   Brand Name Dispense Instructions for use Diagnosis    albuterol 108 (90 BASE) MCG/ACT Inhaler    PROAIR HFA/PROVENTIL HFA/VENTOLIN HFA     Inhale 2 puffs into the lungs every 6 hours    Mild intermittent asthma without complication       ATIVAN PO      Take 1 mg by mouth    ROCHELLE (generalized anxiety disorder)       citalopram 20 MG tablet    celeXA    90 tablet    Take 1 tablet (20 mg) by mouth daily    Major depressive disorder, recurrent episode, moderate (H), ROCHELLE  (generalized anxiety disorder)       lisinopril-hydrochlorothiazide 10-12.5 MG per tablet    PRINZIDE/ZESTORETIC    90 tablet    Take 1 tablet by mouth daily    Benign essential hypertension       VITAMIN D (CHOLECALCIFEROL) PO      Take 5,000 Units by mouth daily

## 2017-10-20 NOTE — NURSING NOTE
Vivi Govea comes into clinic today at the request of Dr. Diez Ordering Provider for Flu shot and Tdap.          Rula Skinner    Screening Questionnaire for Adult Immunization    Are you sick today?   No   Do you have allergies to medications, food, a vaccine component or latex?   No   Have you ever had a serious reaction after receiving a vaccination?   No   Do you have a long-term health problem with heart disease, lung disease, asthma, kidney disease, metabolic disease (e.g. diabetes), anemia, or other blood disorder?   No   Do you have cancer, leukemia, HIV/AIDS, or any other immune system problem?   No   In the past 3 months, have you taken medications that affect  your immune system, such as prednisone, other steroids, or anticancer drugs; drugs for the treatment of rheumatoid arthritis, Crohn s disease, or psoriasis; or have you had radiation treatments?   No   Have you had a seizure, or a brain or other nervous system problem?   No   During the past year, have you received a transfusion of blood or blood     products, or been given immune (gamma) globulin or antiviral drug?   No   For women: Are you pregnant or is there a chance you could become        pregnant during the next month?   No   Have you received any vaccinations in the past 4 weeks?   No     Immunization questionnaire answers were all negative.        Per orders of Dr. Diez, injection of Tdap given by Rula Skinner. Patient instructed to remain in clinic for 15 minutes afterwards, and to report any adverse reaction to me immediately.       Screening performed by Rula Skinner on 10/20/2017 at 3:49 PM.    Injectable Influenza Immunization Documentation    1.  Has the patient received the information for the injectable influenza vaccine? YES     2. Is the patient 6 months of age or older? YES     3. Does the patient have any of the following contraindications?         Severe allergy to eggs? No     Severe allergic reaction to previous influenza  "vaccines? No   Severe allergy to latex? No       History of Guillain-[place] syndrome? No     Currently have a temperature greater than 100.4F? No        4.  Severely egg allergic patients should have flu vaccine eligibility assessed by an MD, RN, or pharmacist, and those who received flu vaccine should be observed for 15 min by an MD, RN, Pharmacist, Medical Technician, or member of clinic staff.\": YES    5. Latex-allergic patients should be given latex-free influenza vaccine Yes. Please reference the Vaccine latex table to determine if your clinic s product is latex-containing.       Vaccination given by Rula Skinner CMA  October 20, 2017 3:50 PM                "

## 2018-02-20 DIAGNOSIS — F41.1 GAD (GENERALIZED ANXIETY DISORDER): ICD-10-CM

## 2018-02-20 DIAGNOSIS — I10 BENIGN ESSENTIAL HYPERTENSION: ICD-10-CM

## 2018-02-20 DIAGNOSIS — F33.1 MAJOR DEPRESSIVE DISORDER, RECURRENT EPISODE, MODERATE (H): ICD-10-CM

## 2018-02-20 NOTE — TELEPHONE ENCOUNTER
Last office visit: 09/27/2017, future appointment 02/26/2018.    Medication is being filled for 1 time refill only due to:  Patient needs to be seen because needs PHQ9.

## 2018-02-21 RX ORDER — CITALOPRAM HYDROBROMIDE 20 MG/1
20 TABLET ORAL DAILY
Qty: 90 TABLET | Refills: 0 | Status: SHIPPED | OUTPATIENT
Start: 2018-02-21 | End: 2018-02-26

## 2018-02-26 ENCOUNTER — OFFICE VISIT (OUTPATIENT)
Dept: FAMILY MEDICINE | Facility: CLINIC | Age: 33
End: 2018-02-26
Payer: COMMERCIAL

## 2018-02-26 VITALS
DIASTOLIC BLOOD PRESSURE: 78 MMHG | SYSTOLIC BLOOD PRESSURE: 124 MMHG | BODY MASS INDEX: 25.76 KG/M2 | HEIGHT: 68 IN | HEART RATE: 80 BPM | TEMPERATURE: 98.8 F | OXYGEN SATURATION: 97 % | WEIGHT: 170 LBS

## 2018-02-26 DIAGNOSIS — F33.1 MAJOR DEPRESSIVE DISORDER, RECURRENT EPISODE, MODERATE (H): ICD-10-CM

## 2018-02-26 DIAGNOSIS — D17.1 BENIGN LIPOMATOUS NEOPLASM OF SKIN AND SUBCUTANEOUS TISSUE OF TRUNK: ICD-10-CM

## 2018-02-26 DIAGNOSIS — F41.1 GAD (GENERALIZED ANXIETY DISORDER): ICD-10-CM

## 2018-02-26 DIAGNOSIS — I10 BENIGN ESSENTIAL HYPERTENSION: ICD-10-CM

## 2018-02-26 DIAGNOSIS — J45.20 MILD INTERMITTENT ASTHMA WITHOUT COMPLICATION: ICD-10-CM

## 2018-02-26 DIAGNOSIS — Z31.69 PRE-CONCEPTION COUNSELING: Primary | ICD-10-CM

## 2018-02-26 RX ORDER — CITALOPRAM HYDROBROMIDE 20 MG/1
20 TABLET ORAL DAILY
Qty: 90 TABLET | Refills: 2 | Status: SHIPPED | OUTPATIENT
Start: 2018-02-26 | End: 2019-03-08

## 2018-02-26 RX ORDER — ACETAMINOPHEN 160 MG
2000 TABLET,DISINTEGRATING ORAL
Status: ON HOLD | COMMUNITY
Start: 2017-07-17 | End: 2019-12-30

## 2018-02-26 RX ORDER — LISINOPRIL/HYDROCHLOROTHIAZIDE 10-12.5 MG
1 TABLET ORAL DAILY
Qty: 90 TABLET | Refills: 2 | Status: SHIPPED | OUTPATIENT
Start: 2018-02-26 | End: 2018-11-26

## 2018-02-26 NOTE — PATIENT INSTRUCTIONS
Lipoma, No Treatment  A lipoma is a local overgrowth of fatty tissue. It appears as a soft raised area, usually less than 2 inches across. It is a benign condition (not cancer).   Home care  General information regarding lipoma includes:  1. No special care is needed for a lipoma.  2. You can consider removal for cosmetic reasons.  3. Sometimes lipomas are uncomfortable because they put pressure on surrounding tissues. This is also a reason to have a lipoma removed.  Follow-up care  Follow up with your healthcare provider, or as advised if you want to have the lipoma removed at a later time.  When to seek medical advice  Call your healthcare provider right away if any of the following occur:    Redness, pain, tenderness, or drainage from the lipoma    Lipoma begins to enlarge or change shape    Changes in the color of the skin over the lipoma  Date Last Reviewed: 6/1/2016 2000-2017 [employer]. [address] [place] [id number]. All rights reserved. This information is not intended as a substitute for professional medical care. Always follow your healthcare professional's instructions.

## 2018-02-26 NOTE — PROGRESS NOTES
SUBJECTIVE:   Vivi Govea is a 32 year old female who presents to clinic today for the following health issues:    Skin problem:  About two weeks ago patient noted a soft nodule in the upper lateral right thigh. There is no associated redness, pain or swelling. And no discoloration of the overlying skin. She feels like the nodule has actually gotten a bit smaller in the past week.      Description:   Location: Upper lateral right thigh  Character: Round and soft  Itching (Pruritis): no     Progression of Symptoms:  improving    Accompanying Signs & Symptoms:  Fever: no   Body aches or joint pain: no   Sore throat symptoms: no   Recent cold symptoms: no     History:   Previous similar issue: no      Alleviating factors:  na    Therapies Tried and outcome: nONE    Pre-conception counseling: Ele would like to talk about preconception planning. She will be getting  in September 2018 to her longtime partner. And she is interested in trying preconception soon after the wedding. She currently is using an IUD for contraception.   She has questions regarding the use of her citalopram for control of anxiety and depression. See below   Additionally she has a history of hypertension and currently is taking lisinopril - HCTZ for control. See below  She eats a generally healthy diet and exercises on a regular basis.    Hypertension Follow-up: Patient is been on medication for the past eight years. Generally doing well. No symptoms associated with hypertension. She occasionally checks her blood pressure on an outpatient basis and her readings are usually below 120/80.    Outpatient blood pressures are being checked at home.  Results are  >120/80.    Low Salt Diet: no added salt    BP Readings from Last 6 Encounters:   02/26/18 124/78   09/27/17 123/81   08/22/17 123/85     Wt Readings from Last 2 Encounters:   02/26/18 170 lb (77.1 kg)   09/27/17 172 lb (78 kg)       Depression and Anxiety Follow-Up    Status  since last visit: No change    Other associated symptoms:None    Complicating factors:     Significant life event: No     Current substance abuse: None    PHQ-9 8/22/2017 9/29/2017 2/27/2018   Total Score 4 - 3   Q9: Suicide Ideation Not at all Not at all Not at all     ROCHELLE-7 SCORE 8/22/2017 9/29/2017 2/27/2018   Total Score 5 3 0     In the past two weeks have you had thoughts of suicide or self-harm?  No.    Do you have concerns about your personal safety or the safety of others?   No  PHQ-9  English  PHQ-9   Any Language  ROCHELLE-7  Suicide Assessment Five-step Evaluation and Treatment (SAFE-T)    Problem list and histories reviewed & adjusted, as indicated.  Additional history: as documented    Patient Active Problem List   Diagnosis     Benign essential hypertension     ROCHELLE (generalized anxiety disorder)     Major depressive disorder, recurrent episode, moderate (H)     Mild intermittent asthma without complication     Vitamin D deficiency     IUD contraception     Past Surgical History:   Procedure Laterality Date     GYN SURGERY  2003    Laser treatment of HPV     ORTHOPEDIC SURGERY Left 07/21/2017    ganglion cyst removal     SURGICAL HISTORY OF -       Nerve entrapment release       Social History   Substance Use Topics     Smoking status: Never Smoker     Smokeless tobacco: Never Used     Alcohol use Yes      Comment: 6 drinks a week mainly wine     Family History   Problem Relation Age of Onset     DIABETES Mother      Type 2 Diabetes Maternal Grandfather          Current Outpatient Prescriptions   Medication Sig Dispense Refill     Cholecalciferol (VITAMIN D3) 2000 UNITS CAPS Take 2,000 Units by mouth       citalopram (CELEXA) 20 MG tablet Take 1 tablet (20 mg) by mouth daily 90 tablet 2     lisinopril-hydrochlorothiazide (PRINZIDE/ZESTORETIC) 10-12.5 MG per tablet Take 1 tablet by mouth daily 90 tablet 2     albuterol (PROAIR HFA/PROVENTIL HFA/VENTOLIN HFA) 108 (90 BASE) MCG/ACT Inhaler Inhale 2 puffs into  "the lungs every 6 hours       Allergies   Allergen Reactions     Amoxicillin      Codeine      Penicillins        Reviewed and updated as needed this visit by clinical staff  Tobacco  Allergies  Meds  Problems  Med Hx  Surg Hx  Soc Hx      Reviewed and updated as needed this visit by Provider  Tobacco  Allergies  Meds  Problems  Med Hx  Surg Hx  Soc Hx        ROS:  Constitutional, HEENT, cardiovascular, pulmonary, gi and gu systems are negative, except as otherwise noted.    OBJECTIVE:     /78  Pulse 80  Temp 98.8  F (37.1  C) (Oral)  Ht 5' 8.27\" (173.4 cm)  Wt 170 lb (77.1 kg)  SpO2 97%  BMI 25.65 kg/m2  Body mass index is 25.65 kg/(m^2).  GENERAL: healthy, alert and no distress  NECK: no adenopathy, no asymmetry, masses, or scars and thyroid normal to palpation  RESP: lungs clear to auscultation - no rales, rhonchi or wheezes  CV: regular rate and rhythm, normal S1 S2, no S3 or S4, no murmur, click or rub, no peripheral edema and peripheral pulses strong  MS: no gross musculoskeletal defects noted, no edema  SKIN: 1-2cm soft subcutaneous nodule right upper outer thigh.  No tenderness.  PSYCH: well dressed and groomed.  Good eye contact and is cooperative. Thoughts linear.  No delusions, compulsions or paranoia.  Affect appropriate.  Patient denies homicidal and suicidal ideation as well as no thoughts or actions of self-harm.        ASSESSMENT/PLAN:      Diagnosis Comments   1. Pre-conception counseling  Reviewed physiology of conception.  Prenatal vits started 3 months prior to attempting conception.  Can normally take 6-12 months to conceive.  Return for IUD removal when ready.  Continue with current medication BUT  change to a different BP medication when the IUD is removed  Consider DC SSRI but can be continued if needed.  To be discussed with OB.  Diet and exercise discussed. 1200mg calcium daily to increase to 1500mg with pregnancy   2. Benign essential hypertension  " lisinopril-hydrochlorothiazide (PRINZIDE/ZESTORETIC) 10-12.5 MG per tablet Well controlled continue for now   3. Major depressive disorder, recurrent episode, moderate (H)  DEPRESSION ACTION PLAN (DAP), citalopram (CELEXA) 20 MG tablet   Well controlled.  Continue for now.  Consider DC when pregnant     4. Benign lipomatous neoplasm of skin and subcutaneous tissue of trunk  Lipomatous subcutaneous nodule.  Continue to monitor. Benign nature discussed.   5. ROCHELLE (generalized anxiety disorder)  citalopram (CELEXA) 20 MG tablet    6. Mild intermittent asthma without complication  Well controlled       Follow up as needed.    LUIS NuñezC  [facility]

## 2018-02-26 NOTE — MR AVS SNAPSHOT
After Visit Summary   2/26/2018    Vivi Govea    MRN: [id number]           Patient Information     Date Of Birth          1985        Visit Information        Provider Department      2/26/2018 11:00 AM Jaycee Mcclellan PA-C [facility]        Today's Diagnoses     Skin nodule    -  1    Benign essential hypertension        ROCHELLE (generalized anxiety disorder)        Major depressive disorder, recurrent episode, moderate (H)        Mild intermittent asthma without complication          Care Instructions      Lipoma, No Treatment  A lipoma is a local overgrowth of fatty tissue. It appears as a soft raised area, usually less than 2 inches across. It is a benign condition (not cancer).   Home care  General information regarding lipoma includes:  1. No special care is needed for a lipoma.  2. You can consider removal for cosmetic reasons.  3. Sometimes lipomas are uncomfortable because they put pressure on surrounding tissues. This is also a reason to have a lipoma removed.  Follow-up care  Follow up with your healthcare provider, or as advised if you want to have the lipoma removed at a later time.  When to seek medical advice  Call your healthcare provider right away if any of the following occur:    Redness, pain, tenderness, or drainage from the lipoma    Lipoma begins to enlarge or change shape    Changes in the color of the skin over the lipoma  Date Last Reviewed: 6/1/2016 2000-2017 [employer]. [address] [place] [address]. All rights reserved. This information is not intended as a substitute for professional medical care. Always follow your healthcare professional's instructions.                Follow-ups after your visit        Who to contact     Please call your clinic at [phone] to:    Ask questions about your health    Make or cancel appointments    Discuss your medicines    Learn about your test results    Speak to your doctor             "Additional Information About Your Visit        [employer] Information     [employer] gives you secure access to your electronic health record. If you see a primary care provider, you can also send messages to your care team and make appointments. If you have questions, please call your primary care clinic.  If you do not have a primary care provider, please call [phone] and they will assist you.      [employer] is an electronic gateway that provides easy, online access to your medical records. With [employer], you can request a clinic appointment, read your test results, renew a prescription or communicate with your care team.     To access your existing account, please contact your [facility] or call [phone] for assistance.        Care EveryWhere ID     This is your Care EveryWhere ID. This could be used by other organizations to access your [facility] medical records  [id number]        Your Vitals Were     Pulse Temperature Height Pulse Oximetry BMI (Body Mass Index)       80 98.8  F (37.1  C) (Oral) 5' 8.27\" (173.4 cm) 97% 25.65 kg/m2        Blood Pressure from Last 3 Encounters:   02/26/18 124/78   09/27/17 123/81   08/22/17 123/85    Weight from Last 3 Encounters:   02/26/18 170 lb (77.1 kg)   09/27/17 172 lb (78 kg)   08/22/17 170 lb (77.1 kg)              We Performed the Following     Asthma Action Plan (AAP)     DEPRESSION ACTION PLAN (DAP)        Primary Care Provider Office Phone # Fax #    Jaycee Mcclellan PA-C [phone]        [address] [place] [id number]        Equal Access to Services     [facility] campusSUSIE : Hadii aad ku hadasho Soomaali, waaxda luqadaha, qaybta kaalmada dawna, hiro delgado . So [facility] [phone].    ATENCIÓN: Si habla español, tiene a damon disposición servicios gratuitos de asistencia lingüística. Llame al [phone].    We comply with applicable federal civil rights laws and [place] laws. We do " not discriminate on the basis of race, color, national origin, age, disability, sex, sexual orientation, or gender identity.            Thank you!     Thank you for choosing [facility]  for your care. Our goal is always to provide you with excellent care. Hearing back from our patients is one way we can continue to improve our services. Please take a few minutes to complete the written survey that you may receive in the mail after your visit with us. Thank you!             Your Updated Medication List - Protect others around you: Learn how to safely use, store and throw away your medicines at www.disposemymeds.org.          This list is accurate as of 2/26/18 11:33 AM.  Always use your most recent med list.                   Brand Name Dispense Instructions for use Diagnosis    albuterol 108 (90 BASE) MCG/ACT Inhaler    PROAIR HFA/PROVENTIL HFA/VENTOLIN HFA     Inhale 2 puffs into the lungs every 6 hours    Mild intermittent asthma without complication       ATIVAN PO      Take 1 mg by mouth    ROCHELLE (generalized anxiety disorder)       citalopram 20 MG tablet    celeXA    90 tablet    Take 1 tablet (20 mg) by mouth daily    Major depressive disorder, recurrent episode, moderate (H), ROCHELLE (generalized anxiety disorder)       lisinopril-hydrochlorothiazide 10-12.5 MG per tablet    PRINZIDE/ZESTORETIC    90 tablet    Take 1 tablet by mouth daily    Benign essential hypertension       * VITAMIN D (CHOLECALCIFEROL) PO      Take 5,000 Units by mouth daily        * vitamin D3 2000 UNITS Caps      Take 2,000 Units by mouth        * Notice:  This list has 2 medication(s) that are the same as other medications prescribed for you. Read the directions carefully, and ask your doctor or other care provider to review them with you.

## 2018-02-26 NOTE — NURSING NOTE
"32 year old  Chief Complaint   Patient presents with     Mass     pt reports a lump on her right upper thight by her bottom area       Blood pressure 124/78, pulse 80, temperature 98.8  F (37.1  C), temperature source Oral, height 5' 8.27\" (173.4 cm), weight 170 lb (77.1 kg), SpO2 97 %, not currently breastfeeding. Body mass index is 25.65 kg/(m^2).  Patient Active Problem List   Diagnosis     Benign essential hypertension     ROCHELLE (generalized anxiety disorder)     Major depressive disorder, recurrent episode, moderate (H)     Mild intermittent asthma without complication     Vitamin D deficiency     IUD contraception       Wt Readings from Last 2 Encounters:   02/26/18 170 lb (77.1 kg)   09/27/17 172 lb (78 kg)     BP Readings from Last 3 Encounters:   02/26/18 124/78   09/27/17 123/81   08/22/17 123/85         Current Outpatient Prescriptions   Medication     citalopram (CELEXA) 20 MG tablet     lisinopril-hydrochlorothiazide (PRINZIDE/ZESTORETIC) 10-12.5 MG per tablet     VITAMIN D, CHOLECALCIFEROL, PO     LORazepam (ATIVAN PO)     albuterol (PROAIR HFA/PROVENTIL HFA/VENTOLIN HFA) 108 (90 BASE) MCG/ACT Inhaler     No current facility-administered medications for this visit.        Social History   Substance Use Topics     Smoking status: Never Smoker     Smokeless tobacco: Never Used     Alcohol use Yes      Comment: 6 drinks a week mainly wine       Health Maintenance Due   Topic Date Due     DEPRESSION ACTION PLAN Q1 YR  07/14/2002     ASTHMA CONTROL TEST Q6 MOS  02/22/2018     PHQ-9 Q6 MONTHS  02/22/2018       Lab Results   Component Value Date    PAP NIL 08/22/2017 February 26, 2018 10:58 AM  "

## 2018-02-26 NOTE — NURSING NOTE
"32 year old  Chief Complaint   Patient presents with     Mass     pt reports a lump on her right upper thight by her bottom area     Medication Question     pt is wanting to get pregnant soon and was wondering if celexa would contradict that in any way       Blood pressure 124/78, pulse 80, temperature 98.8  F (37.1  C), temperature source Oral, height 5' 8.27\" (173.4 cm), weight 170 lb (77.1 kg), SpO2 97 %, not currently breastfeeding. Body mass index is 25.65 kg/(m^2).  Patient Active Problem List   Diagnosis     Benign essential hypertension     ROCHELLE (generalized anxiety disorder)     Major depressive disorder, recurrent episode, moderate (H)     Mild intermittent asthma without complication     Vitamin D deficiency     IUD contraception       Wt Readings from Last 2 Encounters:   02/26/18 170 lb (77.1 kg)   09/27/17 172 lb (78 kg)     BP Readings from Last 3 Encounters:   02/26/18 124/78   09/27/17 123/81   08/22/17 123/85         Current Outpatient Prescriptions   Medication     citalopram (CELEXA) 20 MG tablet     lisinopril-hydrochlorothiazide (PRINZIDE/ZESTORETIC) 10-12.5 MG per tablet     VITAMIN D, CHOLECALCIFEROL, PO     LORazepam (ATIVAN PO)     albuterol (PROAIR HFA/PROVENTIL HFA/VENTOLIN HFA) 108 (90 BASE) MCG/ACT Inhaler     No current facility-administered medications for this visit.        Social History   Substance Use Topics     Smoking status: Never Smoker     Smokeless tobacco: Never Used     Alcohol use Yes      Comment: 6 drinks a week mainly wine       Health Maintenance Due   Topic Date Due     DEPRESSION ACTION PLAN Q1 YR  07/14/2002     ASTHMA CONTROL TEST Q6 MOS  02/22/2018     PHQ-9 Q6 MONTHS  02/22/2018       Lab Results   Component Value Date    PAP NIL 08/22/2017 February 26, 2018 11:00 AM  "

## 2018-02-26 NOTE — LETTER
My Depression Action Plan  Name: Vivi Govea   Date of Birth 1985  Date: 2/26/2018    My doctor: Jaycee Mcclellan   My clinic: [address] [address]  [phone]          GREEN    ZONE   Good Control    What it looks like:     Things are going generally well. You have normal up s and down s. You may even feel depressed from time to time, but bad moods usually last less than a day.   What you need to do:  1. Continue to care for yourself (see self care plan)  2. Check your depression survival kit and update it as needed  3. Follow your physician s recommendations including any medication.  4. Do not stop taking medication unless you consult with your physician first.           YELLOW         ZONE Getting Worse    What it looks like:     Depression is starting to interfere with your life.     It may be hard to get out of bed; you may be starting to isolate yourself from others.    Symptoms of depression are starting to last most all day and this has happened for several days.     You may have suicidal thoughts but they are not constant.   What you need to do:     1. Call your care team, your response to treatment will improve if you keep your care team informed of your progress. Yellow periods are signs an adjustment may need to be made.     2. Continue your self-care, even if you have to fake it!    3. Talk to someone in your support network    4. Open up your depression survival kit           RED    ZONE Medical Alert - Get Help    What it looks like:     Depression is seriously interfering with your life.     You may experience these or other symptoms: You can t get out of bed most days, can t work or engage in other necessary activities, you have trouble taking care of basic hygiene, or basic responsibilities, thoughts of suicide or death that will not go away, self-injurious behavior.     What you need to  do:  1. Call your care team and request a same-day appointment. If they are not available (weekends or after hours) call your local crisis line, emergency room or 911.      Electronically signed by: Jaycee Mcclellan, February 26, 2018    Depression Self Care Plan / Survival Kit    Self-Care for Depression  Here s the deal. Your body and mind are really not as separate as most people think.  What you do and think affects how you feel and how you feel influences what you do and think. This means if you do things that people who feel good do, it will help you feel better.  Sometimes this is all it takes.  There is also a place for medication and therapy depending on how severe your depression is, so be sure to consult with your medical provider and/ or Behavioral Health Consultant if your symptoms are worsening or not improving.     In order to better manage my stress, I will:    Exercise  Get some form of exercise, every day. This will help reduce pain and release endorphins, the  feel good  chemicals in your brain. This is almost as good as taking antidepressants!  This is not the same as joining a gym and then never going! (they count on that by the way ) It can be as simple as just going for a walk or doing some gardening, anything that will get you moving.      Hygiene   Maintain good hygiene (Get out of bed in the morning, Make your bed, Brush your teeth, Take a shower, and Get dressed like you were going to work, even if you are unemployed).  If your clothes don't fit try to get ones that do.    Diet  I will strive to eat foods that are good for me, drink plenty of water, and avoid excessive sugar, caffeine, alcohol, and other mood-altering substances.  Some foods that are helpful in depression are: complex carbohydrates, B vitamins, flaxseed, fish or fish oil, fresh fruits and vegetables.    Psychotherapy  I agree to participate in Individual Therapy (if recommended).    Medication  If prescribed  medications, I agree to take them.  Missing doses can result in serious side effects.  I understand that drinking alcohol, or other illicit drug use, may cause potential side effects.  I will not stop my medication abruptly without first discussing it with my provider.    Staying Connected With Others  I will stay in touch with my friends, family members, and my primary care provider/team.    Use your imagination  Be creative.  We all have a creative side; it doesn t matter if it s oil painting, sand castles, or mud pies! This will also kick up the endorphins.    Witness Beauty  (AKA stop and smell the roses) Take a look outside, even in mid-winter. Notice colors, textures. Watch the squirrels and birds.     Service to others  Be of service to others.  There is always someone else in need.  By helping others we can  get out of ourselves  and remember the really important things.  This also provides opportunities for practicing all the other parts of the program.    Humor  Laugh and be silly!  Adjust your TV habits for less news and crime-drama and more comedy.    Control your stress  Try breathing deep, massage therapy, biofeedback, and meditation. Find time to relax each day.     My support system    Clinic Contact:  Phone number:    Contact 1:  Phone number:    Contact 2:  Phone number:    [facility]/:  Phone number:    Therapist:  Phone number:    [facility] crisis center:    Phone number:    Other community support:  Phone number:

## 2018-02-27 ASSESSMENT — ANXIETY QUESTIONNAIRES
2. NOT BEING ABLE TO STOP OR CONTROL WORRYING: NOT AT ALL
3. WORRYING TOO MUCH ABOUT DIFFERENT THINGS: NOT AT ALL
GAD7 TOTAL SCORE: 0
7. FEELING AFRAID AS IF SOMETHING AWFUL MIGHT HAPPEN: NOT AT ALL
IF YOU CHECKED OFF ANY PROBLEMS ON THIS QUESTIONNAIRE, HOW DIFFICULT HAVE THESE PROBLEMS MADE IT FOR YOU TO DO YOUR WORK, TAKE CARE OF THINGS AT HOME, OR GET ALONG WITH OTHER PEOPLE: NOT DIFFICULT AT ALL
5. BEING SO RESTLESS THAT IT IS HARD TO SIT STILL: NOT AT ALL
1. FEELING NERVOUS, ANXIOUS, OR ON EDGE: NOT AT ALL
6. BECOMING EASILY ANNOYED OR IRRITABLE: NOT AT ALL

## 2018-02-27 ASSESSMENT — PATIENT HEALTH QUESTIONNAIRE - PHQ9: 5. POOR APPETITE OR OVEREATING: NOT AT ALL

## 2018-02-28 ASSESSMENT — ASTHMA QUESTIONNAIRES: ACT_TOTALSCORE: 25

## 2018-02-28 ASSESSMENT — ANXIETY QUESTIONNAIRES: GAD7 TOTAL SCORE: 0

## 2018-02-28 ASSESSMENT — PATIENT HEALTH QUESTIONNAIRE - PHQ9: SUM OF ALL RESPONSES TO PHQ QUESTIONS 1-9: 3

## 2018-07-30 ENCOUNTER — OFFICE VISIT (OUTPATIENT)
Dept: FAMILY MEDICINE | Facility: CLINIC | Age: 33
End: 2018-07-30
Payer: COMMERCIAL

## 2018-07-30 VITALS
HEART RATE: 70 BPM | SYSTOLIC BLOOD PRESSURE: 110 MMHG | TEMPERATURE: 97.9 F | WEIGHT: 176 LBS | RESPIRATION RATE: 14 BRPM | DIASTOLIC BLOOD PRESSURE: 72 MMHG | HEIGHT: 68 IN | OXYGEN SATURATION: 99 % | BODY MASS INDEX: 26.67 KG/M2

## 2018-07-30 DIAGNOSIS — F33.1 MAJOR DEPRESSIVE DISORDER, RECURRENT EPISODE, MODERATE (H): ICD-10-CM

## 2018-07-30 DIAGNOSIS — Z31.69 ENCOUNTER FOR PRECONCEPTION CONSULTATION: ICD-10-CM

## 2018-07-30 DIAGNOSIS — F41.1 GAD (GENERALIZED ANXIETY DISORDER): ICD-10-CM

## 2018-07-30 DIAGNOSIS — I10 BENIGN ESSENTIAL HYPERTENSION: Primary | ICD-10-CM

## 2018-07-30 DIAGNOSIS — Z97.5 IUD CONTRACEPTION: ICD-10-CM

## 2018-07-30 DIAGNOSIS — E55.9 VITAMIN D DEFICIENCY: ICD-10-CM

## 2018-07-30 DIAGNOSIS — J45.20 MILD INTERMITTENT ASTHMA WITHOUT COMPLICATION: ICD-10-CM

## 2018-07-30 PROCEDURE — 99214 OFFICE O/P EST MOD 30 MIN: CPT | Performed by: FAMILY MEDICINE

## 2018-07-30 RX ORDER — LORAZEPAM 1 MG/1
1 TABLET ORAL EVERY 6 HOURS PRN
COMMUNITY
End: 2018-07-30

## 2018-07-30 RX ORDER — LORAZEPAM 1 MG/1
1 TABLET ORAL EVERY 8 HOURS PRN
Qty: 30 TABLET | Refills: 0 | Status: SHIPPED | OUTPATIENT
Start: 2018-07-30 | End: 2019-05-29

## 2018-07-30 ASSESSMENT — PATIENT HEALTH QUESTIONNAIRE - PHQ9
5. POOR APPETITE OR OVEREATING: NOT AT ALL
10. IF YOU CHECKED OFF ANY PROBLEMS, HOW DIFFICULT HAVE THESE PROBLEMS MADE IT FOR YOU TO DO YOUR WORK, TAKE CARE OF THINGS AT HOME, OR GET ALONG WITH OTHER PEOPLE: NOT DIFFICULT AT ALL
SUM OF ALL RESPONSES TO PHQ QUESTIONS 1-9: 4
SUM OF ALL RESPONSES TO PHQ QUESTIONS 1-9: 4

## 2018-07-30 ASSESSMENT — ANXIETY QUESTIONNAIRES
2. NOT BEING ABLE TO STOP OR CONTROL WORRYING: NOT AT ALL
3. WORRYING TOO MUCH ABOUT DIFFERENT THINGS: SEVERAL DAYS
6. BECOMING EASILY ANNOYED OR IRRITABLE: SEVERAL DAYS
GAD7 TOTAL SCORE: 4
1. FEELING NERVOUS, ANXIOUS, OR ON EDGE: SEVERAL DAYS
5. BEING SO RESTLESS THAT IT IS HARD TO SIT STILL: NOT AT ALL
IF YOU CHECKED OFF ANY PROBLEMS ON THIS QUESTIONNAIRE, HOW DIFFICULT HAVE THESE PROBLEMS MADE IT FOR YOU TO DO YOUR WORK, TAKE CARE OF THINGS AT HOME, OR GET ALONG WITH OTHER PEOPLE: NOT DIFFICULT AT ALL
7. FEELING AFRAID AS IF SOMETHING AWFUL MIGHT HAPPEN: SEVERAL DAYS

## 2018-07-30 NOTE — PROGRESS NOTES
SUBJECTIVE:   Vivi Mccall is a 33 year old female who presents to clinic today for the following health issues:        Hypertension Follow-up      Outpatient blood pressures are not being checked.    Low Salt Diet: no added salt    Depression and Anxiety Follow-Up    Status since last visit: Stable    Other associated symptoms:Anxiety    Complicating factors:     Significant life event: Yes-  Marriage in September 2018     Current substance abuse: None    PHQ-9 9/29/2017 2/27/2018 7/30/2018   Total Score - 3 4   Q9: Suicide Ideation Not at all Not at all Not at all     ROCHELLE-7 SCORE 9/29/2017 2/27/2018 7/30/2018   Total Score 3 0 4       PHQ-9  English  PHQ-9   Any Language  ROCHELLE-7  Suicide Assessment Five-step Evaluation and Treatment (SAFE-T)    Amount of exercise or physical activity: 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      Problem list and histories reviewed & adjusted, as indicated.  Additional history: as documented    Labs reviewed in EPIC    Reviewed and updated as needed this visit by clinical staff  Tobacco  Allergies  Meds  Problems  Med Hx  Surg Hx  Fam Hx  Soc Hx        Reviewed and updated as needed this visit by Provider  Allergies  Meds  Problems         ROS:  CONSTITUTIONAL: NEGATIVE for fever, chills, change in weight  INTEGUMENTARY/SKIN: NEGATIVE for worrisome rashes, moles or lesions  EYES: NEGATIVE for vision changes or irritation  ENT/MOUTH: NEGATIVE for ear, mouth and throat problems  RESP: NEGATIVE for significant cough or SOB  CV: NEGATIVE for chest pain, palpitations or peripheral edema  GI: NEGATIVE for nausea, abdominal pain, heartburn, or change in bowel habits  : NEGATIVE for frequency, dysuria, or hematuria  MUSCULOSKELETAL: NEGATIVE for significant arthralgias or myalgia  NEURO: NEGATIVE for weakness, dizziness or  "paresthesias  HEME: NEGATIVE for bleeding problems  PSYCHIATRIC: NEGATIVE for changes in mood or affect    OBJECTIVE:     /72 (BP Location: Right arm, Patient Position: Sitting, Cuff Size: Adult Regular)  Pulse 70  Temp 97.9  F (36.6  C) (Tympanic)  Resp 14  Ht 5' 8.25\" (1.734 m)  Wt 176 lb (79.8 kg)  LMP 07/16/2018  SpO2 99%  Breastfeeding? No  BMI 26.57 kg/m2  Body mass index is 26.57 kg/(m^2).   GENERAL: healthy, alert and no distress  EYES: Eyes grossly normal to inspection, PERRL and conjunctivae and sclerae normal  HENT: ear canals and TM's normal, nose and mouth without ulcers or lesions  NECK: no adenopathy, no asymmetry, masses, or scars and thyroid normal to palpation  RESP: lungs clear to auscultation - no rales, rhonchi or wheezes  CV: regular rate and rhythm, normal S1 S2, no S3 or S4, no murmur, click or rub, no peripheral edema and peripheral pulses strong  ABDOMEN: soft, nontender, no hepatosplenomegaly, no masses and bowel sounds normal  MS: no gross musculoskeletal defects noted, no edema  SKIN: no suspicious lesions or rashes  NEURO: Normal strength and tone, mentation intact and speech normal  PSYCH: mentation appears normal, affect normal/bright    Diagnostic Test Results:  none     ASSESSMENT/PLAN:     Problem List Items Addressed This Visit        SPECIALTY PROBLEM LIST    IUD contraception       Other    Benign essential hypertension - Primary    Relevant Orders    OB/GYN REFERRAL    ROCHELLE (generalized anxiety disorder)    Relevant Medications    LORazepam (ATIVAN) 1 MG tablet    Other Relevant Orders    OB/GYN REFERRAL    Major depressive disorder, recurrent episode, moderate (H)    Relevant Medications    LORazepam (ATIVAN) 1 MG tablet    Mild intermittent asthma without complication    Relevant Orders    OB/GYN REFERRAL    Vitamin D deficiency      Other Visit Diagnoses     Encounter for preconception consultation        Relevant Orders    OB/GYN REFERRAL         Referral " ordered to OBGYN for pre-conception counseling and the need to transition meds with less teratogenic effects.  Currently has IUD placed and would like to take hat out as well.  PHQ-9 and ROCHELLE-7 shows that mood is very well controlled on Celexa but may need to switch to Zoloft.  Uses Lorazepam sparingly--typically before flying on airplanes and when she cannot calm herself down due to panic attacks; Knows that she will need to avoid using Lorazepam during pregnancy.  Will defer to OBGYN  BP well controlled on Lisinopril-hydrochlorothiazide though may even do well on a lower dose.  May consider switching to Labetalol or Methyldopa.  Will defer to OBGYN as well.   Asthma (mild-intermittent with seasonal allergies) is very well-controlled.  ACT completed today.  Will RTC as needed.      Jaycee Vences, DO  [facility]  Answers for HPI/ROS submitted by the patient on 7/30/2018   If you checked off any problems, how difficult have these problems made it for you to do your work, take care of things at home, or get along with other people?: Not difficult at all  PHQ9 TOTAL SCORE: 4

## 2018-07-30 NOTE — MR AVS SNAPSHOT
After Visit Summary   7/30/2018    Vivi Mccall    MRN: [id number]           Patient Information     Date Of Birth          1985        Visit Information        Provider Department      7/30/2018 11:30 AM Jaycee Vences DO [facility]        Today's Diagnoses     Benign essential hypertension    -  1    ROCHELLE (generalized anxiety disorder)        Major depressive disorder, recurrent episode, moderate (H)        Mild intermittent asthma without complication        Vitamin D deficiency        IUD contraception        Encounter for preconception consultation           Follow-ups after your visit        Additional Services     OB/GYN REFERRAL       Your provider has referred you to:  FM[facility]: [facility] [phone]   [email or web address]  [facility]: [facility] [phone]  [email or web address]    Please be aware that coverage of these services is subject to the terms and limitations of your health insurance plan.  Call member services at your health plan with any benefit or coverage questions.      Please bring the following with you to your appointment:    (1) Any X-Rays, CTs or MRIs which have been performed.  Contact the facility where they were done to arrange for  prior to your scheduled appointment.   (2) List of current medications   (3) This referral request   (4) Any documents/labs given to you for this referral                  Follow-up notes from your care team     Return if symptoms worsen or fail to improve.      Who to contact     If you have questions or need follow up information about today's clinic visit or your schedule please contact [facility] directly at [phone].  Normal or non-critical lab and imaging results will be communicated to you by MyChart, letter or phone within 4 business  "days after the clinic has received the results. If you do not hear from us within 7 days, please contact the clinic through Trov or phone. If you have a critical or abnormal lab result, we will notify you by phone as soon as possible.  Submit refill requests through Trov or call your pharmacy and they will forward the refill request to us. Please allow 3 business days for your refill to be completed.          Additional Information About Your Visit        Trov Information     Trov gives you secure access to your electronic health record. If you see a primary care provider, you can also send messages to your care team and make appointments. If you have questions, please call your primary care clinic.  If you do not have a primary care provider, please call [phone] and they will assist you.        Care EveryWhere ID     This is your Care EveryWhere ID. This could be used by other organizations to access your [facility] medical records  [id number]        Your Vitals Were     Pulse Temperature Respirations Height Last Period Pulse Oximetry    70 97.9  F (36.6  C) (Tympanic) 14 5' 8.25\" (1.734 m) 07/16/2018 99%    Breastfeeding? BMI (Body Mass Index)                No 26.57 kg/m2           Blood Pressure from Last 3 Encounters:   07/30/18 110/72   02/26/18 124/78   09/27/17 123/81    Weight from Last 3 Encounters:   07/30/18 176 lb (79.8 kg)   02/26/18 170 lb (77.1 kg)   09/27/17 172 lb (78 kg)              We Performed the Following     OB/GYN REFERRAL          Today's Medication Changes          These changes are accurate as of 7/30/18 12:02 PM.  If you have any questions, ask your nurse or doctor.               These medicines have changed or have updated prescriptions.        Dose/Directions    LORazepam 1 MG tablet   Commonly known as:  ATIVAN   This may have changed:  when to take this   Used for:  ROCHELLE (generalized anxiety disorder)   Changed by:  Jaycee Vences, DO        Dose:  1 mg   Take " 1 tablet (1 mg) by mouth every 8 hours as needed for anxiety (Takes 1/2 to 1 tab prn)   Quantity:  30 tablet   Refills:  0            Where to get your medicines      Some of these will need a paper prescription and others can be bought over the counter.  Ask your nurse if you have questions.     Bring a paper prescription for each of these medications     LORazepam 1 MG tablet                Primary Care Provider Office Phone # Fax #    [facility] [phone] [address] [facility] [place] [address]        Equal Access to Services     LONG CONNORS : Hadii aad ku hadasho Soomaali, waaxda luqadaha, qaybta kaalmada adeegyada, ihro delgado . So [facility] [phone].    ATENCIÓN: Si habla español, tiene a damon disposición servicios gratuitos de asistencia lingüística. Llame al [phone].    We comply with applicable federal civil rights laws and [place] laws. We do not discriminate on the basis of race, color, national origin, age, disability, sex, sexual orientation, or gender identity.            Thank you!     Thank you for choosing [facility]  for your care. Our goal is always to provide you with excellent care. Hearing back from our patients is one way we can continue to improve our services. Please take a few minutes to complete the written survey that you may receive in the mail after your visit with us. Thank you!             Your Updated Medication List - Protect others around you: Learn how to safely use, store and throw away your medicines at www.disposemymeds.org.          This list is accurate as of 7/30/18 12:02 PM.  Always use your most recent med list.                   Brand Name Dispense Instructions for use Diagnosis    albuterol 108 (90 Base) MCG/ACT Inhaler    PROAIR HFA/PROVENTIL HFA/VENTOLIN HFA     Inhale 2 puffs into the lungs every 6 hours    Mild intermittent asthma without  complication       citalopram 20 MG tablet    celeXA    90 tablet    Take 1 tablet (20 mg) by mouth daily    Major depressive disorder, recurrent episode, moderate (H), ROCHELLE (generalized anxiety disorder)       lisinopril-hydrochlorothiazide 10-12.5 MG per tablet    PRINZIDE/ZESTORETIC    90 tablet    Take 1 tablet by mouth daily    Benign essential hypertension       LORazepam 1 MG tablet    ATIVAN    30 tablet    Take 1 tablet (1 mg) by mouth every 8 hours as needed for anxiety (Takes 1/2 to 1 tab prn)    ROCHELLE (generalized anxiety disorder)       vitamin D3 2000 units Caps      Take 2,000 Units by mouth

## 2018-07-31 ASSESSMENT — PATIENT HEALTH QUESTIONNAIRE - PHQ9: SUM OF ALL RESPONSES TO PHQ QUESTIONS 1-9: 4

## 2018-07-31 ASSESSMENT — ANXIETY QUESTIONNAIRES: GAD7 TOTAL SCORE: 4

## 2018-07-31 ASSESSMENT — ASTHMA QUESTIONNAIRES: ACT_TOTALSCORE: 24

## 2018-08-07 ENCOUNTER — OFFICE VISIT (OUTPATIENT)
Dept: OBGYN | Facility: CLINIC | Age: 33
End: 2018-08-07
Payer: COMMERCIAL

## 2018-08-07 VITALS
BODY MASS INDEX: 26.46 KG/M2 | HEIGHT: 68 IN | WEIGHT: 174.6 LBS | DIASTOLIC BLOOD PRESSURE: 72 MMHG | SYSTOLIC BLOOD PRESSURE: 110 MMHG

## 2018-08-07 DIAGNOSIS — Z31.69 ENCOUNTER FOR PRECONCEPTION CONSULTATION: Primary | ICD-10-CM

## 2018-08-07 DIAGNOSIS — Z30.432 ENCOUNTER FOR IUD REMOVAL: ICD-10-CM

## 2018-08-07 PROCEDURE — 99202 OFFICE O/P NEW SF 15 MIN: CPT | Mod: 25 | Performed by: OBSTETRICS & GYNECOLOGY

## 2018-08-07 PROCEDURE — 58301 REMOVE INTRAUTERINE DEVICE: CPT | Performed by: OBSTETRICS & GYNECOLOGY

## 2018-08-07 NOTE — PROGRESS NOTES
SUBJECTIVE:                                                   Vivi Mccall is a 33 year old female who presents to clinic today for the following health issue(s):  Patient presents with:  IUD: patient has had IUD Paragard for 7 years now, just got  and would like to start a family.        HPI:  Patient wants iud removed, done today  Has family history of breast cancer in [facility] and possible ovarian cancer in another relative, none have had genetic testing    Plans to conceive, starting pnv  History of htN, will need to change once pregnant      Patient's last menstrual period was 2018 (exact date)..   Patient is sexually active, .  Using IUD for contraception.    reports that she has never smoked. She has never used smokeless tobacco.    STD testing offered?  Declined    Health maintenance updated:  yes    Today's PHQ-2 Score:   PHQ-2 (  [employer]) 2017   Q1: Little interest or pleasure in doing things 0   Q2: Feeling down, depressed or hopeless 1   PHQ-2 Score 1     Today's PHQ-9 Score:   PHQ-9 SCORE 2018   Total Score MyChart 4 (Minimal depression)   Total Score 4     Today's ROCHELLE-7 Score:   ROCHELLE-7 SCORE 2018   Total Score 4       Problem list and histories reviewed & adjusted, as indicated.  Additional history: as documented.    Patient Active Problem List   Diagnosis     Benign essential hypertension     ROCHELLE (generalized anxiety disorder)     Major depressive disorder, recurrent episode, moderate (H)     Mild intermittent asthma without complication     Vitamin D deficiency     IUD contraception     Past Surgical History:   Procedure Laterality Date     GYN SURGERY      Laser treatment of HPV     ORTHOPEDIC SURGERY Left 2017    ganglion cyst removal     SURGICAL HISTORY OF -       Nerve entrapment release      Social History   Substance Use Topics     Smoking status: Never Smoker     Smokeless tobacco: Never Used     Alcohol use Yes      Comment: 6 drinks a week  "mainly wine      Problem (# of Occurrences) Relation (Name,Age of Onset)    Bipolar Disorder (1) Mother    Diabetes (1) Mother    Type 2 Diabetes (1) Maternal Grandfather    Unknown/Adopted (1) Father            Current Outpatient Prescriptions   Medication Sig     albuterol (PROAIR HFA/PROVENTIL HFA/VENTOLIN HFA) 108 (90 BASE) MCG/ACT Inhaler Inhale 2 puffs into the lungs every 6 hours     Cholecalciferol (VITAMIN D3) 2000 UNITS CAPS Take 2,000 Units by mouth     citalopram (CELEXA) 20 MG tablet Take 1 tablet (20 mg) by mouth daily     lisinopril-hydrochlorothiazide (PRINZIDE/ZESTORETIC) 10-12.5 MG per tablet Take 1 tablet by mouth daily     LORazepam (ATIVAN) 1 MG tablet Take 1 tablet (1 mg) by mouth every 8 hours as needed for anxiety (Takes 1/2 to 1 tab prn)     No current facility-administered medications for this visit.      Allergies   Allergen Reactions     Amoxicillin      Codeine      Penicillins        ROS:  12 point review of systems negative other than symptoms noted below.  Psychiatric: Anxiety    OBJECTIVE:     /72  Ht 5' 8\" (1.727 m)  Wt 174 lb 9.6 oz (79.2 kg)  LMP 07/14/2018 (Exact Date)  BMI 26.55 kg/m2  Body mass index is 26.55 kg/(m^2).    Exam:  Constitutional:  Appearance: Well nourished, well developed alert, in no acute distress  Chest:  Respiratory Effort:  Breathing unlabored  Neurologic/Psychiatric:  Mental Status:  Oriented X3   Pelvic Exam:  External Genitalia:     Normal appearance for age, no discharge present, no tenderness present, no inflammatory lesions present, color normal  Vagina:    Normal vaginal vault without central or paravaginal defects, no discharge present, no inflammatory lesions present, no masses present  Bladder:     Nontender to palpation  Urethra:   Urethral Body:  Urethra palpation normal, urethra structural support normal   Urethral Meatus:  No erythema or lesions present  Cervix:     Appearance healthy, no lesions present, nontender to palpation, no " bleeding present, string present  Uterus:     Nontender to palpation, no masses present, position anteflexed, mobility: normal  Adnexa:     No adnexal tenderness present, no adnexal masses present  Perineum:     Perineum within normal limits, no evidence of trauma, no rashes or skin lesions present  Anus:     Anus within normal limits, no hemorrhoids present  Inguinal Lymph Nodes:     No lymphadenopathy present  Pubic Hair:     Normal pubic hair distribution for age  Genitalia and Groin:     No rashes present, no lesions present, no areas of discoloration, no masses present       In-Clinic Test Results:  No results found for this or any previous visit (from the past 24 hour(s)).    ASSESSMENT/PLAN:                                                        ICD-10-CM    1. Encounter for IUD removal Z30.432 REMOVE INTRAUTERINE DEVICE       There are no Patient Instructions on file for this visit.    iud out  Discussed pnv and keeping track of menses  Have internist change her htn med with pregnancy  Baby ASA after first trimester  Discussed family history   Discussed risks of celexa in pregnancy, patient wants to continue, was much more tired on zoloft  Face to face 20 minute, greater than 50% counceling    Tata Moncada MD  [facility] [place]          INDICATIONS:                                                      Is a pregnancy test required: No.  Was a consent obtained?  Yes    Vivi Mccall is a 33 year old female,, Patient's last menstrual period was 2018 (exact date). who presents today for IUD removal. Her current IUD was placed 7 years ago. She has not had problems with the IUD. She requests removal of the IUD because she desires to conceive    Today's PHQ-2 Score:   PHQ-2 (  [employer]) 2017   Q1: Little interest or pleasure in doing things 0   Q2: Feeling down, depressed or hopeless 1   PHQ-2 Score 1       PROCEDURE:                                                      A  speculum exam was performed and the cervix was visualized. The IUD string was visualized. Using ring forceps, the string  was grasped and the IUD removed intact.    POST PROCEDURE:                                                      The patient tolerated the procedure well with minimal discomfort. Patient was discharged in stable condition.    Call if bleeding, pain or fever occur. and Birth control counseling given.    Tata Moncada MD

## 2018-08-07 NOTE — MR AVS SNAPSHOT
"              After Visit Summary   8/7/2018    Vivi Mccall    MRN: [id number]           Patient Information     Date Of Birth          1985        Visit Information        Provider Department      8/7/2018 3:40 PM Tata Moncada MD [facility]         Follow-ups after your visit        Who to contact     If you have questions or need follow up information about today's clinic visit or your schedule please contact [facility] directly at [phone].  Normal or non-critical lab and imaging results will be communicated to you by MyChart, letter or phone within 4 business days after the clinic has received the results. If you do not hear from us within 7 days, please contact the clinic through [employer] or phone. If you have a critical or abnormal lab result, we will notify you by phone as soon as possible.  Submit refill requests through [employer] or call your pharmacy and they will forward the refill request to us. Please allow 3 business days for your refill to be completed.          Additional Information About Your Visit        MyChart Information     [employer] gives you secure access to your electronic health record. If you see a primary care provider, you can also send messages to your care team and make appointments. If you have questions, please call your primary care clinic.  If you do not have a primary care provider, please call [phone] and they will assist you.        Care EveryWhere ID     This is your Care EveryWhere ID. This could be used by other organizations to access your [place] medical records  [id number]        Your Vitals Were     Height Last Period BMI (Body Mass Index)             5' 8\" (1.727 m) 07/14/2018 (Exact Date) 26.55 kg/m2          Blood Pressure from Last 3 Encounters:   08/07/18 110/72   07/30/18 110/72   02/26/18 124/78    Weight from Last 3 Encounters:   08/07/18 174 lb 9.6 oz (79.2 kg)   07/30/18 176 lb (79.8 kg) "   02/26/18 170 lb (77.1 kg)              Today, you had the following     No orders found for display       Primary Care Provider Office Phone # Fax #    [place] [facility] [phone] [address] [facility] [address]        Equal Access to Services     LONG CONNORS : Hadii aad ku hadasho Soomaali, waaxda luqadaha, qaybta kaalmada adeegyada, waxay idiin hayaan adeeg kharash la'aan . So [facility] [phone].    ATENCIÓN: Si habla español, tiene a damon disposición servicios gratuitos de asistencia lingüística. Llame al [phone].    We comply with applicable federal civil rights laws and [place] laws. We do not discriminate on the basis of race, color, national origin, age, disability, sex, sexual orientation, or gender identity.            Thank you!     Thank you for choosing [facility] [place]  for your care. Our goal is always to provide you with excellent care. Hearing back from our patients is one way we can continue to improve our services. Please take a few minutes to complete the written survey that you may receive in the mail after your visit with us. Thank you!             Your Updated Medication List - Protect others around you: Learn how to safely use, store and throw away your medicines at www.disposemymeds.org.          This list is accurate as of 8/7/18  4:27 PM.  Always use your most recent med list.                   Brand Name Dispense Instructions for use Diagnosis    albuterol 108 (90 Base) MCG/ACT Inhaler    PROAIR HFA/PROVENTIL HFA/VENTOLIN HFA     Inhale 2 puffs into the lungs every 6 hours    Mild intermittent asthma without complication       citalopram 20 MG tablet    celeXA    90 tablet    Take 1 tablet (20 mg) by mouth daily    Major depressive disorder, recurrent episode, moderate (H), ROCHELLE (generalized anxiety disorder)       lisinopril-hydrochlorothiazide 10-12.5 MG per tablet    PRINZIDE/ZESTORETIC    90 tablet    Take 1  tablet by mouth daily    Benign essential hypertension       LORazepam 1 MG tablet    ATIVAN    30 tablet    Take 1 tablet (1 mg) by mouth every 8 hours as needed for anxiety (Takes 1/2 to 1 tab prn)    ROCHELLE (generalized anxiety disorder)       vitamin D3 2000 units Caps      Take 2,000 Units by mouth

## 2018-10-22 ENCOUNTER — HOSPITAL ENCOUNTER (EMERGENCY)
Facility: CLINIC | Age: 33
Discharge: HOME | End: 2018-10-22
Attending: EMERGENCY MEDICINE | Admitting: EMERGENCY MEDICINE
Payer: COMMERCIAL

## 2018-10-22 ENCOUNTER — APPOINTMENT (OUTPATIENT)
Dept: CT IMAGING | Facility: CLINIC | Age: 33
End: 2018-10-22
Attending: EMERGENCY MEDICINE
Payer: COMMERCIAL

## 2018-10-22 VITALS
BODY MASS INDEX: 26.37 KG/M2 | SYSTOLIC BLOOD PRESSURE: 135 MMHG | OXYGEN SATURATION: 99 % | DIASTOLIC BLOOD PRESSURE: 86 MMHG | HEIGHT: 68 IN | WEIGHT: 174 LBS | RESPIRATION RATE: 16 BRPM | TEMPERATURE: 98.8 F

## 2018-10-22 DIAGNOSIS — K52.9 GASTROENTERITIS: ICD-10-CM

## 2018-10-22 LAB
ALBUMIN SERPL-MCNC: 3.9 G/DL (ref 3.4–5)
ALP SERPL-CCNC: 60 U/L (ref 40–150)
ALT SERPL W P-5'-P-CCNC: 22 U/L (ref 0–50)
ANION GAP SERPL CALCULATED.3IONS-SCNC: 7 MMOL/L (ref 3–14)
AST SERPL W P-5'-P-CCNC: 21 U/L (ref 0–45)
BASOPHILS # BLD AUTO: 0 10E9/L (ref 0–0.2)
BASOPHILS NFR BLD AUTO: 0.3 %
BILIRUB SERPL-MCNC: 0.8 MG/DL (ref 0.2–1.3)
BUN SERPL-MCNC: 9 MG/DL (ref 7–30)
CALCIUM SERPL-MCNC: 8.4 MG/DL (ref 8.5–10.1)
CHLORIDE SERPL-SCNC: 101 MMOL/L (ref 94–109)
CO2 SERPL-SCNC: 30 MMOL/L (ref 20–32)
CREAT SERPL-MCNC: 0.81 MG/DL (ref 0.52–1.04)
DIFFERENTIAL METHOD BLD: NORMAL
EOSINOPHIL # BLD AUTO: 0.2 10E9/L (ref 0–0.7)
EOSINOPHIL NFR BLD AUTO: 1.9 %
ERYTHROCYTE [DISTWIDTH] IN BLOOD BY AUTOMATED COUNT: 12.7 % (ref 10–15)
GFR SERPL CREATININE-BSD FRML MDRD: 81 ML/MIN/1.7M2
GLUCOSE SERPL-MCNC: 95 MG/DL (ref 70–99)
HCG SERPL QL: NEGATIVE
HCT VFR BLD AUTO: 40.1 % (ref 35–47)
HGB BLD-MCNC: 13.7 G/DL (ref 11.7–15.7)
IMM GRANULOCYTES # BLD: 0 10E9/L (ref 0–0.4)
IMM GRANULOCYTES NFR BLD: 0.1 %
LIPASE SERPL-CCNC: 99 U/L (ref 73–393)
LYMPHOCYTES # BLD AUTO: 2.7 10E9/L (ref 0.8–5.3)
LYMPHOCYTES NFR BLD AUTO: 35.1 %
MCH RBC QN AUTO: 31.3 PG (ref 26.5–33)
MCHC RBC AUTO-ENTMCNC: 34.2 G/DL (ref 31.5–36.5)
MCV RBC AUTO: 92 FL (ref 78–100)
MONOCYTES # BLD AUTO: 0.5 10E9/L (ref 0–1.3)
MONOCYTES NFR BLD AUTO: 7 %
NEUTROPHILS # BLD AUTO: 4.3 10E9/L (ref 1.6–8.3)
NEUTROPHILS NFR BLD AUTO: 55.6 %
PLATELET # BLD AUTO: 302 10E9/L (ref 150–450)
POTASSIUM SERPL-SCNC: 3.3 MMOL/L (ref 3.4–5.3)
PROT SERPL-MCNC: 8.1 G/DL (ref 6.8–8.8)
RBC # BLD AUTO: 4.38 10E12/L (ref 3.8–5.2)
SODIUM SERPL-SCNC: 138 MMOL/L (ref 133–144)
WBC # BLD AUTO: 7.7 10E9/L (ref 4–11)

## 2018-10-22 PROCEDURE — 96361 HYDRATE IV INFUSION ADD-ON: CPT

## 2018-10-22 PROCEDURE — 83690 ASSAY OF LIPASE: CPT | Performed by: EMERGENCY MEDICINE

## 2018-10-22 PROCEDURE — 25000125 ZZHC RX 250: Performed by: EMERGENCY MEDICINE

## 2018-10-22 PROCEDURE — 25000128 H RX IP 250 OP 636: Performed by: EMERGENCY MEDICINE

## 2018-10-22 PROCEDURE — 99285 EMERGENCY DEPT VISIT HI MDM: CPT | Mod: 25

## 2018-10-22 PROCEDURE — 96375 TX/PRO/DX INJ NEW DRUG ADDON: CPT

## 2018-10-22 PROCEDURE — 96374 THER/PROPH/DIAG INJ IV PUSH: CPT | Mod: 59

## 2018-10-22 PROCEDURE — 74177 CT ABD & PELVIS W/CONTRAST: CPT

## 2018-10-22 PROCEDURE — 84703 CHORIONIC GONADOTROPIN ASSAY: CPT | Performed by: EMERGENCY MEDICINE

## 2018-10-22 PROCEDURE — 25000132 ZZH RX MED GY IP 250 OP 250 PS 637: Performed by: EMERGENCY MEDICINE

## 2018-10-22 PROCEDURE — 80053 COMPREHEN METABOLIC PANEL: CPT | Performed by: EMERGENCY MEDICINE

## 2018-10-22 PROCEDURE — 85025 COMPLETE CBC W/AUTO DIFF WBC: CPT | Performed by: EMERGENCY MEDICINE

## 2018-10-22 RX ORDER — IOPAMIDOL 755 MG/ML
85 INJECTION, SOLUTION INTRAVASCULAR ONCE
Status: COMPLETED | OUTPATIENT
Start: 2018-10-22 | End: 2018-10-22

## 2018-10-22 RX ORDER — ONDANSETRON 4 MG/1
4 TABLET, ORALLY DISINTEGRATING ORAL EVERY 6 HOURS PRN
Qty: 10 TABLET | Refills: 0 | Status: SHIPPED | OUTPATIENT
Start: 2018-10-22 | End: 2018-10-25

## 2018-10-22 RX ORDER — HYOSCYAMINE SULFATE 0.125 MG
0.125-0.25 TABLET ORAL EVERY 6 HOURS PRN
Qty: 40 TABLET | Refills: 1 | Status: SHIPPED | OUTPATIENT
Start: 2018-10-22 | End: 2018-11-26

## 2018-10-22 RX ORDER — HYOSCYAMINE SULFATE 0.38 MG/1
375 TABLET, EXTENDED RELEASE ORAL ONCE
Status: COMPLETED | OUTPATIENT
Start: 2018-10-22 | End: 2018-10-22

## 2018-10-22 RX ORDER — HYDROMORPHONE HYDROCHLORIDE 1 MG/ML
0.5 INJECTION, SOLUTION INTRAMUSCULAR; INTRAVENOUS; SUBCUTANEOUS
Status: DISCONTINUED | OUTPATIENT
Start: 2018-10-22 | End: 2018-10-22 | Stop reason: HOSPADM

## 2018-10-22 RX ORDER — LOPERAMIDE HCL 2 MG
4 CAPSULE ORAL ONCE
Status: COMPLETED | OUTPATIENT
Start: 2018-10-22 | End: 2018-10-22

## 2018-10-22 RX ORDER — ONDANSETRON 2 MG/ML
4 INJECTION INTRAMUSCULAR; INTRAVENOUS ONCE
Status: COMPLETED | OUTPATIENT
Start: 2018-10-22 | End: 2018-10-22

## 2018-10-22 RX ADMIN — Medication 0.5 MG: at 00:59

## 2018-10-22 RX ADMIN — SODIUM CHLORIDE 1000 ML: 9 INJECTION, SOLUTION INTRAVENOUS at 02:30

## 2018-10-22 RX ADMIN — LOPERAMIDE HYDROCHLORIDE 4 MG: 2 CAPSULE ORAL at 00:59

## 2018-10-22 RX ADMIN — SODIUM CHLORIDE 1000 ML: 9 INJECTION, SOLUTION INTRAVENOUS at 00:43

## 2018-10-22 RX ADMIN — ONDANSETRON 4 MG: 2 INJECTION INTRAMUSCULAR; INTRAVENOUS at 00:46

## 2018-10-22 RX ADMIN — IOPAMIDOL 85 ML: 755 INJECTION, SOLUTION INTRAVENOUS at 01:55

## 2018-10-22 RX ADMIN — SODIUM CHLORIDE, PRESERVATIVE FREE 66 ML: 5 INJECTION INTRAVENOUS at 01:55

## 2018-10-22 RX ADMIN — HYOSCYAMINE SULFATE 375 MCG: 0.38 TABLET, EXTENDED RELEASE ORAL at 02:41

## 2018-10-22 ASSESSMENT — ENCOUNTER SYMPTOMS
VOMITING: 1
ABDOMINAL PAIN: 1
NAUSEA: 1
FEVER: 0
DIARRHEA: 1

## 2018-10-22 NOTE — DISCHARGE INSTRUCTIONS
Viral Gastroenteritis (Adult)    Gastroenteritis is commonly called the stomach flu. It is most often caused by a virus that affects the stomach and intestinal tract and usually lasts from 2 to 7 days. Common viruses causing gastroenteritis include norovirus, rotavirus, and hepatitis A. Non-viral causes of gastroenteritis include bacteria, parasites, and toxins.  The danger from repeated vomiting or diarrhea is dehydration. This is the loss of too much fluid from the body. When this occurs, body fluids must be replaced. Antibiotics do not help with this illness because it is usually viral.Simple home treatment will be helpful.  Symptoms of viral gastroenteritis may include:    Watery, loose stools    Stomach pain or abdominal cramps    Fever and chills    Nausea and vomiting    Loss of bowel control    Headache  Home care  Gastroenteritis is transmitted by contact with the stool or vomit of an infected person. This can occur from person to person or from contact with a contaminated surface.  Follow these guidelines when caring for yourself at home:    If symptoms are severe, rest at home for the next 24 hours or until you are feeling better.    Wash your hands with soap and water or use alcohol-based  to prevent the spread of infection. Wash your hands after touching anyone who is sick.    Wash your hands or use alcohol-based  after using the toilet and before meals. Clean the toilet after each use.  Remember these tips when preparing food:    People with diarrhea should not prepare or serve food to others. When preparing foods, wash your hands before and after.    Wash your hands after using cutting boards, countertops, knives, or utensils that have been in contact with raw food.    Keep uncooked meats away from cooked and ready-to-eat foods.  Medicine  You may use acetaminophen or NSAID medicines like ibuprofen or naproxen to control fever unless another medicine was given. If you have chronic  liver or kidney disease, talk with your healthcare provider before using these medicines. Also talk with your provider if you've had a stomach ulcer or gastrointestinal bleeding. Don't give aspirin to anyone under 18 years of age who is ill with a fever. It may cause severe liver damage. Don't use NSAIDS is you are already taking one for another condition (like arthritis) or are on aspirin (such as for heart disease or after a stroke).  If medicine for vomiting or diarrhea are prescribed, take these only as directed. Do not take over-the-counter medicines for vomiting or diarrhea unless instructed by your healthcare provider.  Diet  Follow these guidelines for food:    Water and liquids are important so you don't get dehydrated. Drink a small amount at a time or suck on ice chips if you are vomiting.    If you eat, avoid fatty, greasy, spicy, or fried foods.    Don't eat dairy if you have diarrhea. This can make diarrhea worse.    Avoid tobacco, alcohol, and caffeine which may worsen symptoms.  During the first 24 hours (the first full day), follow the diet below:    Beverages. Sports drinks, soft drinks without caffeine, ginger ale, mineral water (plain or flavored), decaffeinated tea and coffee. If you are very dehydrated, sports drinks aren't a good choice. They have too much sugar and not enough electrolytes. In this case, commercially available products called oral rehydration solutions, are best.    Soups. Eat clear broth, consommé, and bouillon.    Desserts. Eat gelatin, popsicles, and fruit juice bars.  During the next 24 hours (the second day), you may add the following to the above:    Hot cereal, plain toast, bread, rolls, and crackers    Plain noodles, rice, mashed potatoes, chicken noodle or rice soup    Unsweetened canned fruit (avoid pineapple), bananas    Limit fat intake to less than 15 grams per day. Do this by avoiding margarine, butter, oils, mayonnaise, sauces, gravies, fried foods, peanut  butter, meat, poultry, and fish.    Limit fiber and avoid raw or cooked vegetables, fresh fruits (except bananas), and bran cereals.    Limit caffeine and chocolate. Don't use spices or seasonings other than salt.    Limit dairy products.    Avoid alcohol.  During the next 24 hours:    Gradually resume a normal diet as you feel better and your symptoms improve.    If at any time it starts getting worse again, go back to clear liquids until you feel better.  Follow-up care  Follow up with your healthcare provider, or as advised. Call your provider if you don't get better within 24 hours or if diarrhea lasts more than a week. Also follow up if you are unable to keep down liquids and get dehydrated. If a stool (diarrhea) sample was taken, call as directed for the results.  Call 911  Call 911 if any of these occur:    Trouble breathing    Chest pain    Confused    Severe drowsiness or trouble awakening    Fainting or loss of consciousness    Rapid heart rate    Seizure    Stiff neck  When to seek medical advice  Call your healthcare provider right away if any of these occur:    Abdominal pain that gets worse    Continued vomiting (unable to keep liquids down)    Frequent diarrhea (more than 5 times a day)    Blood in vomit or stool (black or red color)    Dark urine, reduced urine output, or extreme thirst    Weakness or dizziness    Drowsiness    Fever of 100.4 F (38 C) or higher, or as directed by your healthcare provider    New rash  Date Last Reviewed: 1/3/2016    8452-1820 [employer]. [address] [place] [address], [place] [address]. All rights reserved. This information is not intended as a substitute for professional medical care. Always follow your healthcare professional's instructions.

## 2018-10-22 NOTE — ED AVS SNAPSHOT
Emergency Department    [address] [facility] [address]    Phone:  [phone]    Fax:  [phone]                                       Vivi Mccall   MRN: [id number]    Department:   Emergency Department   Date of Visit:  10/22/2018           Patient Information     Date Of Birth          1985        Your diagnoses for this visit were:     Gastroenteritis        You were seen by Laura Alexis MD.      Follow-up Information     Follow up with Clinic, [facility].    Why:  As needed, If symptoms worsen    Contact information:    [address]  [facility] [id number] [phone]          Follow up with  Emergency Department.    Specialty:  EMERGENCY MEDICINE    Why:  As needed, If symptoms worsen    Contact information:    [address] [facility] [id number] [phone]        Discharge Instructions         Viral Gastroenteritis (Adult)    Gastroenteritis is commonly called the stomach flu. It is most often caused by a virus that affects the stomach and intestinal tract and usually lasts from 2 to 7 days. Common viruses causing gastroenteritis include norovirus, rotavirus, and hepatitis A. Non-viral causes of gastroenteritis include bacteria, parasites, and toxins.  The danger from repeated vomiting or diarrhea is dehydration. This is the loss of too much fluid from the body. When this occurs, body fluids must be replaced. Antibiotics do not help with this illness because it is usually viral.Simple home treatment will be helpful.  Symptoms of viral gastroenteritis may include:    Watery, loose stools    Stomach pain or abdominal cramps    Fever and chills    Nausea and vomiting    Loss of bowel control    Headache  Home care  Gastroenteritis is transmitted by contact with the stool or vomit of an infected person. This can occur from person to person or from contact with a contaminated surface.  Follow these guidelines  when caring for yourself at home:    If symptoms are severe, rest at home for the next 24 hours or until you are feeling better.    Wash your hands with soap and water or use alcohol-based  to prevent the spread of infection. Wash your hands after touching anyone who is sick.    Wash your hands or use alcohol-based  after using the toilet and before meals. Clean the toilet after each use.  Remember these tips when preparing food:    People with diarrhea should not prepare or serve food to others. When preparing foods, wash your hands before and after.    Wash your hands after using cutting boards, countertops, knives, or utensils that have been in contact with raw food.    Keep uncooked meats away from cooked and ready-to-eat foods.  Medicine  You may use acetaminophen or NSAID medicines like ibuprofen or naproxen to control fever unless another medicine was given. If you have chronic liver or kidney disease, talk with your healthcare provider before using these medicines. Also talk with your provider if you've had a stomach ulcer or gastrointestinal bleeding. Don't give aspirin to anyone under 18 years of age who is ill with a fever. It may cause severe liver damage. Don't use NSAIDS is you are already taking one for another condition (like arthritis) or are on aspirin (such as for heart disease or after a stroke).  If medicine for vomiting or diarrhea are prescribed, take these only as directed. Do not take over-the-counter medicines for vomiting or diarrhea unless instructed by your healthcare provider.  Diet  Follow these guidelines for food:    Water and liquids are important so you don't get dehydrated. Drink a small amount at a time or suck on ice chips if you are vomiting.    If you eat, avoid fatty, greasy, spicy, or fried foods.    Don't eat dairy if you have diarrhea. This can make diarrhea worse.    Avoid tobacco, alcohol, and caffeine which may worsen symptoms.  During the first 24  hours (the first full day), follow the diet below:    Beverages. Sports drinks, soft drinks without caffeine, ginger ale, mineral water (plain or flavored), decaffeinated tea and coffee. If you are very dehydrated, sports drinks aren't a good choice. They have too much sugar and not enough electrolytes. In this case, commercially available products called oral rehydration solutions, are best.    Soups. Eat clear broth, consommé, and bouillon.    Desserts. Eat gelatin, popsicles, and fruit juice bars.  During the next 24 hours (the second day), you may add the following to the above:    Hot cereal, plain toast, bread, rolls, and crackers    Plain noodles, rice, mashed potatoes, chicken noodle or rice soup    Unsweetened canned fruit (avoid pineapple), bananas    Limit fat intake to less than 15 grams per day. Do this by avoiding margarine, butter, oils, mayonnaise, sauces, gravies, fried foods, peanut butter, meat, poultry, and fish.    Limit fiber and avoid raw or cooked vegetables, fresh fruits (except bananas), and bran cereals.    Limit caffeine and chocolate. Don't use spices or seasonings other than salt.    Limit dairy products.    Avoid alcohol.  During the next 24 hours:    Gradually resume a normal diet as you feel better and your symptoms improve.    If at any time it starts getting worse again, go back to clear liquids until you feel better.  Follow-up care  Follow up with your healthcare provider, or as advised. Call your provider if you don't get better within 24 hours or if diarrhea lasts more than a week. Also follow up if you are unable to keep down liquids and get dehydrated. If a stool (diarrhea) sample was taken, call as directed for the results.  Call 911  Call 911 if any of these occur:    Trouble breathing    Chest pain    Confused    Severe drowsiness or trouble awakening    Fainting or loss of consciousness    Rapid heart rate    Seizure    Stiff neck  When to seek medical advice  Call your  healthcare provider right away if any of these occur:    Abdominal pain that gets worse    Continued vomiting (unable to keep liquids down)    Frequent diarrhea (more than 5 times a day)    Blood in vomit or stool (black or red color)    Dark urine, reduced urine output, or extreme thirst    Weakness or dizziness    Drowsiness    Fever of 100.4 F (38 C) or higher, or as directed by your healthcare provider    New rash  Date Last Reviewed: 1/3/2016    2002-9973 [employer]. [address] [place] [address]. All rights reserved. This information is not intended as a substitute for professional medical care. Always follow your healthcare professional's instructions.          24 Hour Appointment Hotline       To make an appointment at any [facility], call [phone] ([phone]). If you don't have a family doctor or clinic, we will help you find one. [facility] are conveniently located to serve the needs of you and your family.             Review of your medicines      START taking        Dose / Directions Last dose taken    hyoscyamine 0.125 MG tablet   Commonly known as:  ANASPAZ/LEVSIN   Dose:  0.125-0.25 mg   Quantity:  40 tablet        Take 1-2 tablets (125-250 mcg) by mouth every 6 hours as needed for cramping   Refills:  1        ondansetron 4 MG ODT tab   Commonly known as:  ZOFRAN ODT   Dose:  4 mg   Quantity:  10 tablet        Take 1 tablet (4 mg) by mouth every 6 hours as needed for nausea   Refills:  0          Our records show that you are taking the medicines listed below. If these are incorrect, please call your family doctor or clinic.        Dose / Directions Last dose taken    albuterol 108 (90 Base) MCG/ACT inhaler   Commonly known as:  PROAIR HFA/PROVENTIL HFA/VENTOLIN HFA   Dose:  2 puff        Inhale 2 puffs into the lungs every 6 hours   Refills:  0        citalopram 20 MG tablet   Commonly known as:  celeXA   Dose:  20 mg   Quantity:  90 tablet         Take 1 tablet (20 mg) by mouth daily   Refills:  2        lisinopril-hydrochlorothiazide 10-12.5 MG per tablet   Commonly known as:  PRINZIDE/ZESTORETIC   Dose:  1 tablet   Quantity:  90 tablet        Take 1 tablet by mouth daily   Refills:  2        LORazepam 1 MG tablet   Commonly known as:  ATIVAN   Dose:  1 mg   Quantity:  30 tablet        Take 1 tablet (1 mg) by mouth every 8 hours as needed for anxiety (Takes 1/2 to 1 tab prn)   Refills:  0        vitamin D3 2000 units Caps   Dose:  2000 Units        Take 2,000 Units by mouth   Refills:  0                Prescriptions were sent or printed at these locations (2 Prescriptions)                   Other Prescriptions                Printed at Department/Unit printer (2 of 2)         hyoscyamine (ANASPAZ/LEVSIN) 0.125 MG tablet               ondansetron (ZOFRAN ODT) 4 MG ODT tab                Procedures and tests performed during your visit     CBC with platelets differential    CT Abdomen Pelvis w Contrast    Comprehensive metabolic panel    HCG QUALitative pregnancy (blood)    Lipase      Orders Needing Specimen Collection     None      Pending Results     No orders found from 10/20/2018 to 10/23/2018.            Pending Culture Results     No orders found from 10/20/2018 to 10/23/2018.            Pending Results Instructions     If you had any lab results that were not finalized at the time of your Discharge, you can call the ED Lab Result RN at [phone]. You will be contacted by this team for any positive Lab results or changes in treatment. The nurses are available 7 days a week from 10A to 6:30P.  You can leave a message 24 hours per day and they will return your call.        Test Results From Your Hospital Stay        10/22/2018 12:57 AM      Component Results     Component Value Ref Range & Units Status    WBC 7.7 4.0 - 11.0 10e9/L Final    RBC Count 4.38 3.8 - 5.2 10e12/L Final    Hemoglobin 13.7 11.7 - 15.7 g/dL Final    Hematocrit 40.1 35.0 - 47.0 %  Final    MCV 92 78 - 100 fl Final    MCH 31.3 26.5 - 33.0 pg Final    MCHC 34.2 31.5 - 36.5 g/dL Final    RDW 12.7 10.0 - 15.0 % Final    Platelet Count 302 150 - 450 10e9/L Final    Diff Method Automated Method  Final    % Neutrophils 55.6 % Final    % Lymphocytes 35.1 % Final    % Monocytes 7.0 % Final    % Eosinophils 1.9 % Final    % Basophils 0.3 % Final    % Immature Granulocytes 0.1 % Final    Absolute Neutrophil 4.3 1.6 - 8.3 10e9/L Final    Absolute Lymphocytes 2.7 0.8 - 5.3 10e9/L Final    Absolute Monocytes 0.5 0.0 - 1.3 10e9/L Final    Absolute Eosinophils 0.2 0.0 - 0.7 10e9/L Final    Absolute Basophils 0.0 0.0 - 0.2 10e9/L Final    Abs Immature Granulocytes 0.0 0 - 0.4 10e9/L Final         10/22/2018  1:14 AM      Component Results     Component Value Ref Range & Units Status    Sodium 138 133 - 144 mmol/L Final    Potassium 3.3 (L) 3.4 - 5.3 mmol/L Final    Chloride 101 94 - 109 mmol/L Final    Carbon Dioxide 30 20 - 32 mmol/L Final    Anion Gap 7 3 - 14 mmol/L Final    Glucose 95 70 - 99 mg/dL Final    Urea Nitrogen 9 7 - 30 mg/dL Final    Creatinine 0.81 0.52 - 1.04 mg/dL Final    GFR Estimate 81 >60 mL/min/1.7m2 Final    Non  GFR Calc    GFR Estimate If Black >90 >60 mL/min/1.7m2 Final    African American GFR Calc    Calcium 8.4 (L) 8.5 - 10.1 mg/dL Final    Bilirubin Total 0.8 0.2 - 1.3 mg/dL Final    Albumin 3.9 3.4 - 5.0 g/dL Final    Protein Total 8.1 6.8 - 8.8 g/dL Final    Alkaline Phosphatase 60 40 - 150 U/L Final    ALT 22 0 - 50 U/L Final    AST 21 0 - 45 U/L Final         10/22/2018  1:12 AM      Component Results     Component Value Ref Range & Units Status    Lipase 99 73 - 393 U/L Final         10/22/2018  1:18 AM      Component Results     Component Value Ref Range & Units Status    HCG Qualitative Serum Negative NEG^Negative Final    This test is for screening purposes.  Results should be interpreted along with   the clinical picture.  Confirmation testing is  available if warranted by   ordering QDC428, HCG Quantitative Pregnancy.           10/22/2018  2:15 AM      Narrative     CT ABDOMEN/PELVIS WITH CONTRAST  10/22/2018 2:00 AM     HISTORY: Right upper quadrant and right lower quadrant TTP.      TECHNIQUE: Volumetric acquisition through abdomen and pelvis with  IV  contrast,  85 mL Isovue-370.  Radiation dose for this scan was reduced  using automated exposure control, adjustment of the mA and/or kV  according to patient size, or iterative reconstruction technique.    COMPARISON: None.    FINDINGS: The liver, gallbladder, spleen, pancreas, adrenal glands and  kidneys demonstrate no worrisome findings. Stomach is mildly distended  with fluid and gas.    Visualized lung bases are clear.      Uterus is present. Small amount of nonspecific free pelvic fluid. No  suspicious adnexal masses. Moderate fluid in distal small bowel loops,  which are nondilated. No appendiceal enlargement. No bowel wall  thickening or focal inflammatory changes demonstrated. No free air.        Impression     IMPRESSION:  1. Trace free fluid in the pelvis, within normal physiologic limits.  2. Moderate small bowel fluid, but no evidence of bowel obstruction or  other acute findings.    ILYA DELGADILLO MD                Clinical Quality Measure: Blood Pressure Screening     Your blood pressure was checked while you were in the emergency department today. The last reading we obtained was  BP: 135/86 . Please read the guidelines below about what these numbers mean and what you should do about them.  If your systolic blood pressure (the top number) is less than 120 and your diastolic blood pressure (the bottom number) is less than 80, then your blood pressure is normal. There is nothing more that you need to do about it.  If your systolic blood pressure (the top number) is 120-139 or your diastolic blood pressure (the bottom number) is 80-89, your blood pressure may be higher than it should be. You  should have your blood pressure rechecked within a year by a primary care provider.  If your systolic blood pressure (the top number) is 140 or greater or your diastolic blood pressure (the bottom number) is 90 or greater, you may have high blood pressure. High blood pressure is treatable, but if left untreated over time it can put you at risk for heart attack, stroke, or kidney failure. You should have your blood pressure rechecked by a primary care provider within the next 4 weeks.  If your provider in the emergency department today gave you specific instructions to follow-up with your doctor or provider even sooner than that, you should follow that instruction and not wait for up to 4 weeks for your follow-up visit.        Thank you for choosing [facility]       Thank you for choosing [facility] for your care. Our goal is always to provide you with excellent care. Hearing back from our patients is one way we can continue to improve our services. Please take a few minutes to complete the written survey that you may receive in the mail after you visit with us. Thank you!        "[employer] Information     [employer] gives you secure access to your electronic health record. If you see a primary care provider, you can also send messages to your care team and make appointments. If you have questions, please call your primary care clinic.  If you do not have a primary care provider, please call [phone] and they will assist you.        Care EveryWhere ID     This is your Care EveryWhere ID. This could be used by other organizations to access your [facility] medical records  [id number]        Equal Access to Services     LONG CONNORS : Hadii heydi Cody, waaxda luquique, qaybta kaalmada hiro toney . So [facility] [place] [phone].    ATENCIÓN: Si habla español, tiene a damon disposición servicios gratuitos de asistencia lingüística. Llame al [phone].    We comply with applicable  federal civil rights laws and [place] laws. We do not discriminate on the basis of race, color, national origin, age, disability, sex, sexual orientation, or gender identity.            After Visit Summary       This is your record. Keep this with you and show to your community pharmacist(s) and doctor(s) at your next visit.

## 2018-10-22 NOTE — ED AVS SNAPSHOT
Emergency Department    [address] [place] [address]    Phone:  [phone]    Fax:  [phone]                                       Vivi Mccall   MRN: [id number]    Department:   Emergency Department   Date of Visit:  10/22/2018           After Visit Summary Signature Page     I have received my discharge instructions, and my questions have been answered. I have discussed any challenges I see with this plan with the nurse or doctor.    ..........................................................................................................................................  Patient/Patient Representative Signature      ..........................................................................................................................................  Patient Representative Print Name and Relationship to Patient    ..................................................               ................................................  Date                                   Time    ..........................................................................................................................................  Reviewed by Signature/Title    ...................................................              ..............................................  Date                                               Time          22EPIC Rev 08/18

## 2018-10-22 NOTE — LETTER
October 22, 2018      To Whom It May Concern:      Vivi Mccall was seen in our Emergency Department today, 10/22/18.  I expect her condition to improve over the next one day.  She may return to work/school when improved.    Sincerely,        NICO Payton

## 2018-10-22 NOTE — ED PROVIDER NOTES
"  History     Chief Complaint:    Nausea, Vomiting, & Diarrhea       HPI   Vivi Mccall is a 33 year old female with a history of hypertension who presents to the ED for evaluation of nausea, vomiting, and diarrhea. The patient states that she went to bed tonight and woke up shortly after with diffuse epigastric abdominal pain. She states that she then went to the bathroom and had an episode of diarrhea which exacerbated her abdominal pain. She then became nauseous and started vomiting and subsequently presented to the ED. She is afebrile here. Of note, she states that her period is almost a week late.     Allergies:  Amoxicillin  Codeine  Penicillins     Medications:    Celexa  Prinzide  Albuterol     Past Medical History:    HTN  ROCHELLE  Depression  Asthma    Past Surgical History:    Laser treatment of HPV  Ganglion cyst removal  Nerve entrapment release    Family History:    Diabetes  Bipolar disorder    Social History:  Negative for tobacco use.  Positive for alcohol use.   Marital Status:   [2]     Review of Systems   Constitutional: Negative for fever.   Gastrointestinal: Positive for abdominal pain, diarrhea, nausea and vomiting.   All other systems reviewed and are negative.      Physical Exam   First Vitals:  BP: (!) 161/96  Heart Rate: 100  Temp: 98.8  F (37.1  C)  Resp: 16  Height: 172.7 cm (5' 8\")  Weight: 78.9 kg (174 lb)  SpO2: 100 %      Physical Exam  General/Appearance: appears stated age, well-groomed, appears to be in moderate pain  Eyes: EOMI, no scleral injection, no icterus  ENT: MMM  Neck: supple, nl ROM, no stiffness  Cardiovascular: tachy but regular, nl S1S2, no m/r/g, 2+ pulses in all 4 extremities, cap refill <2sec  Respiratory: CTAB, good air movement throughout, no wheezes/rhonchi/rales, no increased WOB, no retractions  Back: no lesions  GI: abd soft, non-distended, significant epigastric/RUQ/RLQ ttp,  no HSM, no rebound, no guarding, nl BS  MSK: PARADA, good tone, no bony " abnormality  Skin: warm and well-perfused, no rash, no edema, no ecchymosis, nl turgor  Neuro: GCS 15, alert and oriented, no gross focal neuro deficits  Psych: interacts appropriately  Heme: no petechia, no purpura, no active bleeding        Emergency Department Course   Imaging:  Radiographic findings were communicated with the patient who voiced understanding of the findings.  CT Abdomen pelvis with contrast:   1. Trace free fluid in the pelvis, within normal physiologic limits.  2. Moderate small bowel fluid, but no evidence of bowel obstruction or  other acute findings as per radiology.      Laboratory:  CBC: WBC: 7.7, HGB: 13.7, PLT: 302  CMP: Glucose 95, Potassium 3.3 (L), Calcium 8.4 (L), o/w WNL (Creatinine: 0.81)    Lipase: 99  HCG: negative      Interventions:  0043 NS 1,000 mLs IV  0046 Zofran 4 mg IV  0059 Imodium 4 mg PO   Dilaudid 0.5 mg IV  0230 NS 1,000 mLs IV  0241 Levbid 375 mcg PO      Emergency Department Course:  Nursing notes and vitals reviewed. (0047) I performed an exam of the patient as documented above.     IV inserted. Medicine administered as documented above. Blood drawn. This was sent to the lab for further testing, results above.     The patient was sent for a abdomen pelvis CT while in the emergency department, findings above.     0300 I rechecked the patient and discussed the results of her workup thus far.     Findings and plan explained to the Patient. Patient discharged home with instructions regarding supportive care, medications, and reasons to return. The importance of close follow-up was reviewed. The patient was prescribed Zofran and Levsin.    I personally reviewed the laboratory results with the Patient and answered all related questions prior to discharge.     Impression & Plan    Medical Decision Making:  This pt presents for evaluation of nausea, vomiting and diarrhea with minimal abdominal pain in a nonfocal abdominal exam. The differential diagnosis of vomiting and  diarrhea is broad and includes such etiologies as viral gastroenteritis, bacterial infection of the large intestine (salmonella, shigella, campylobacter, e coli, etc), bowel obstruction, intra-abdominal infection such as colitis, cholecystitis, UTI, pyelonephritis, appendicitis, etc.  There are no signs of worrisome intra-abdominal pathologies detected during the visit today, especially with neg CT.  The patient has a completely benign abdominal exam without rebound, guarding, or marked tenderness to palpation.  Laboratory studies were unremarkable.  The patient improved with iv fluids and anti-emetics. Supportive outpatient management is therefore indicated.  Abdominal pain precautions are given for home.   No indication for stool studies at this time.  It was discussed with the patient to return to the ED for blood in stool, increasing pain, or fevers more than 102.   Feels much improved after interventions in ED.         Diagnosis:    ICD-10-CM    1. Gastroenteritis K52.9        Disposition:  discharged to home    Discharge Medications:  New Prescriptions    HYOSCYAMINE (ANASPAZ/LEVSIN) 0.125 MG TABLET    Take 1-2 tablets (125-250 mcg) by mouth every 6 hours as needed for cramping    ONDANSETRON (ZOFRAN ODT) 4 MG ODT TAB    Take 1 tablet (4 mg) by mouth every 6 hours as needed for nausea     Scribe Disclosure:  I,  Selvin Key, am serving as a scribe on 10/22/2018 at 12:47 AM to personally document services performed by Laura Alexis* based on my observations and the provider's statements to me.          Selvin Key  10/22/2018    EMERGENCY DEPARTMENT       Laura Alexis MD  10/22/18 0312

## 2018-11-26 ENCOUNTER — TELEPHONE (OUTPATIENT)
Dept: OBGYN | Facility: CLINIC | Age: 33
End: 2018-11-26

## 2018-11-26 ENCOUNTER — OFFICE VISIT (OUTPATIENT)
Dept: FAMILY MEDICINE | Facility: CLINIC | Age: 33
End: 2018-11-26
Payer: COMMERCIAL

## 2018-11-26 VITALS
BODY MASS INDEX: 27.14 KG/M2 | OXYGEN SATURATION: 100 % | RESPIRATION RATE: 16 BRPM | SYSTOLIC BLOOD PRESSURE: 112 MMHG | HEART RATE: 115 BPM | WEIGHT: 178.5 LBS | TEMPERATURE: 98.7 F | DIASTOLIC BLOOD PRESSURE: 88 MMHG

## 2018-11-26 DIAGNOSIS — I10 BENIGN ESSENTIAL HYPERTENSION: Primary | ICD-10-CM

## 2018-11-26 DIAGNOSIS — F33.1 MAJOR DEPRESSIVE DISORDER, RECURRENT EPISODE, MODERATE (H): ICD-10-CM

## 2018-11-26 DIAGNOSIS — F41.1 GAD (GENERALIZED ANXIETY DISORDER): ICD-10-CM

## 2018-11-26 DIAGNOSIS — J45.20 MILD INTERMITTENT ASTHMA WITHOUT COMPLICATION: ICD-10-CM

## 2018-11-26 DIAGNOSIS — N91.2 AMENORRHEA: ICD-10-CM

## 2018-11-26 LAB — BETA HCG QUAL IFA URINE: POSITIVE

## 2018-11-26 PROCEDURE — 84703 CHORIONIC GONADOTROPIN ASSAY: CPT | Performed by: FAMILY MEDICINE

## 2018-11-26 PROCEDURE — 99214 OFFICE O/P EST MOD 30 MIN: CPT | Performed by: FAMILY MEDICINE

## 2018-11-26 RX ORDER — METHYLDOPA 250 MG/1
125 TABLET, FILM COATED ORAL 3 TIMES DAILY
Qty: 90 TABLET | Refills: 0 | Status: SHIPPED | OUTPATIENT
Start: 2018-11-26 | End: 2018-11-27

## 2018-11-26 RX ORDER — METHYLDOPA 250 MG/1
250 TABLET, FILM COATED ORAL 3 TIMES DAILY
Qty: 90 TABLET | Refills: 0 | Status: SHIPPED | OUTPATIENT
Start: 2018-11-26 | End: 2018-11-26

## 2018-11-26 NOTE — MR AVS SNAPSHOT
After Visit Summary   11/26/2018    Vivi Mccall    MRN: [id number]           Patient Information     Date Of Birth          1985        Visit Information        Provider Department      11/26/2018 12:50 PM Jaycee Vences DO [facility]        Today's Diagnoses     Benign essential hypertension    -  1    Amenorrhea        Mild intermittent asthma without complication        Major depressive disorder, recurrent episode, moderate (H)        ROCHELLE (generalized anxiety disorder)           Follow-ups after your visit        Follow-up notes from your care team     Return in about 4 weeks (around 12/24/2018) for med check.      Who to contact     If you have questions or need follow up information about today's clinic visit or your schedule please contact [facility] directly at [phone].  Normal or non-critical lab and imaging results will be communicated to you by MyChart, letter or phone within 4 business days after the clinic has received the results. If you do not hear from us within 7 days, please contact the clinic through [employer] or phone. If you have a critical or abnormal lab result, we will notify you by phone as soon as possible.  Submit refill requests through [employer] or call your pharmacy and they will forward the refill request to us. Please allow 3 business days for your refill to be completed.          Additional Information About Your Visit        MyChart Information     [employer] gives you secure access to your electronic health record. If you see a primary care provider, you can also send messages to your care team and make appointments. If you have questions, please call your primary care clinic.  If you do not have a primary care provider, please call [phone] and they will assist you.        Care EveryWhere ID     This is your Care EveryWhere ID. This could be used by other organizations to access your  [facility] medical records  [id number]        Your Vitals Were     Pulse Temperature Respirations Last Period Pulse Oximetry Breastfeeding?    115 98.7  F (37.1  C) (Temporal) 16 10/27/2018 (Exact Date) 100% No    BMI (Body Mass Index)                   27.14 kg/m2            Blood Pressure from Last 3 Encounters:   11/26/18 112/88   10/22/18 135/86   08/07/18 110/72    Weight from Last 3 Encounters:   11/26/18 178 lb 8 oz (81 kg)   10/22/18 174 lb (78.9 kg)   08/07/18 174 lb 9.6 oz (79.2 kg)              We Performed the Following     Beta HCG Qual, Urine - [facility] ([id number])          Today's Medication Changes          These changes are accurate as of 11/26/18  1:19 PM.  If you have any questions, ask your nurse or doctor.               Start taking these medicines.        Dose/Directions    methyldopa 250 MG tablet   Commonly known as:  ALDOMET   Used for:  Benign essential hypertension   Started by:  Jaycee Vences DO        Dose:  250 mg   Take 1 tablet (250 mg) by mouth 3 times daily   Quantity:  90 tablet   Refills:  0         Stop taking these medicines if you haven't already. Please contact your care team if you have questions.     hyoscyamine 0.125 MG tablet   Commonly known as:  ANASPAZ/LEVSIN   Stopped by:  Jaycee Vences DO           lisinopril-hydrochlorothiazide 10-12.5 MG per tablet   Commonly known as:  PRINZIDE/ZESTORETIC   Stopped by:  Jaycee Vences DO                Where to get your medicines      These medications were sent to [facility] [address] [place] [address] AT [address] [place] [id number]     Phone:  [phone]     methyldopa 250 MG tablet                Primary Care Provider Office Phone # Fax #    [facility] [phone] [address] [facility] [address]        Equal Access to Services     LONG CONNORS AH: Julia eason  eliane Cody, waleobardoda luqadaha, qaybta kaalmada johnjamey, hiro alexandrain hayaanunu lopezdaniella youngkiarajoey delgado jesus. So [facility] [phone].    ATENCIÓN: Si rogeriola matt, tiene a damon disposición servicios gratuitos de asistencia lingüística. Antonia al [phone].    We comply with applicable federal civil rights laws and [place] laws. We do not discriminate on the basis of race, color, national origin, age, disability, sex, sexual orientation, or gender identity.            Thank you!     Thank you for choosing [facility]  for your care. Our goal is always to provide you with excellent care. Hearing back from our patients is one way we can continue to improve our services. Please take a few minutes to complete the written survey that you may receive in the mail after your visit with us. Thank you!             Your Updated Medication List - Protect others around you: Learn how to safely use, store and throw away your medicines at www.disposemymeds.org.          This list is accurate as of 11/26/18  1:19 PM.  Always use your most recent med list.                   Brand Name Dispense Instructions for use Diagnosis    albuterol 108 (90 Base) MCG/ACT inhaler    PROAIR HFA/PROVENTIL HFA/VENTOLIN HFA     Inhale 2 puffs into the lungs every 6 hours    Mild intermittent asthma without complication       citalopram 20 MG tablet    celeXA    90 tablet    Take 1 tablet (20 mg) by mouth daily    Major depressive disorder, recurrent episode, moderate (H), ROCHELLE (generalized anxiety disorder)       LORazepam 1 MG tablet    ATIVAN    30 tablet    Take 1 tablet (1 mg) by mouth every 8 hours as needed for anxiety (Takes 1/2 to 1 tab prn)    ROCHELLE (generalized anxiety disorder)       methyldopa 250 MG tablet    ALDOMET    90 tablet    Take 1 tablet (250 mg) by mouth 3 times daily    Benign essential hypertension       vitamin D3 2000 units Caps      Take 2,000 Units by mouth

## 2018-11-26 NOTE — PROGRESS NOTES
SUBJECTIVE:   Vivi Mccall is a 33 year old female who presents to clinic today for the following health issues:      Concern - Pregnancy Test  Onset: NA    Description:   Took 2 tests this morning. One came up positive, the other one somewhat unclear. LMP 10/27/2018    Intensity: mild    Progression of Symptoms:  NA    Accompanying Signs & Symptoms:  Congestion, ear pain    Previous history of similar problem:   none    Precipitating factors:   Worsened by: NA    Alleviating factors:  Improved by: NA    Therapies Tried and outcome: none        Problem list and histories reviewed & adjusted, as indicated.  Additional history: as documented    Labs reviewed in EPIC    Reviewed and updated as needed this visit by clinical staff  Tobacco  Allergies  Meds  Problems  Med Hx  Surg Hx  Fam Hx  Soc Hx        Reviewed and updated as needed this visit by Provider  Allergies  Meds  Problems         ROS:  CONSTITUTIONAL: NEGATIVE for fever, chills.  INTEGUMENTARY/SKIN: NEGATIVE for worrisome rashes, moles or lesions  EYES: NEGATIVE for vision changes or irritation  ENT/MOUTH: NEGATIVE for ear, mouth and throat problems  RESP: NEGATIVE for significant cough or SOB  CV: NEGATIVE for chest pain, palpitations or peripheral edema  GI: NEGATIVE for nausea, abdominal pain, heartburn, or change in bowel habits  : NEGATIVE for frequency, dysuria, or hematuria  MUSCULOSKELETAL: NEGATIVE for significant arthralgias or myalgia  NEURO: NEGATIVE for weakness, dizziness or paresthesias  HEME: NEGATIVE for bleeding problems    OBJECTIVE:     /88 (BP Location: Left arm, Patient Position: Sitting, Cuff Size: Adult Regular)  Pulse 115  Temp 98.7  F (37.1  C) (Temporal)  Resp 16  Wt 178 lb 8 oz (81 kg)  LMP 10/27/2018 (Exact Date)  SpO2 100%  Breastfeeding? No  BMI 27.14 kg/m2  Body mass index is 27.14 kg/(m^2).   GENERAL: healthy, alert and no distress  EYES: Eyes grossly normal to inspection, PERRL and  conjunctivae and sclerae normal  HENT:Nose and mouth without ulcers or lesions  NECK: no adenopathy, no asymmetry, masses, or scars and thyroid normal to palpation  RESP: lungs clear to auscultation - no rales, rhonchi or wheezes  CV: regular rate and rhythm, normal S1 S2, no S3 or S4, no murmur, click or rub, no peripheral edema and peripheral pulses strong  ABDOMEN: soft, nontender, and bowel sounds normal  MS: no gross musculoskeletal defects noted, no edema  SKIN: no suspicious lesions or rashes  NEURO: Normal strength and tone, mentation intact and speech normal  PSYCH: mentation appears normal, affect normal/bright     Diagnostic Test Results:  Urine pregnancy    ASSESSMENT/PLAN:     Problem List Items Addressed This Visit     Benign essential hypertension - Primary    Relevant Medications    methyldopa (ALDOMET) 125 mg half-tab    ROCHELLE (generalized anxiety disorder)    Major depressive disorder, recurrent episode, moderate (H)    Mild intermittent asthma without complication      Other Visit Diagnoses     Amenorrhea        Relevant Orders    Beta HCG Qual, Urine - [facility] ([id number]) (Completed)         --Referral ordered to OBGYN after last OV for pre-conception counseling and the need to transition meds with less teratogenic effects.    IUD was taken out but no medication changes were made.  Urine Pregnancy test is POSITIVE today.  LMP 10/27/18  --PHQ-9 and ROCHELLE-7 last OV showed that mood was very well controlled on Celexa.  Agreeable to consider switching to Zoloft, possibly to Zoloft  mg daily.  Uses Lorazepam only sparingly--typically before flying on airplanes and when she cannot calm herself down due to panic attacks; Knows that she will need to avoid using Lorazepam during pregnancy.  --BP has been well controlled on Lisinopril-Hydrochlorothiazide but now that she is pregnant we are going to switch her to Methyldopa starting at 125 mg TID and will adjust dose as needed.  --Asthma  (mild-intermittent with seasonal allergies) is very well-controlled.   --Continue PNV's    Will RTC in 1-2 weeks for BP and med re-check (or may re-check with OBGYN).        Jaycee Vences, DO  [facility]

## 2018-11-26 NOTE — TELEPHONE ENCOUNTER
Pt calling with LMP: 10/27/18 @ 4w2d  Saw her PCP today and she recommended pt be seen asap by the OB  HTN meds changed today to Methyldopa (Aldomet) and there was a concern of pt taking Celexa in pregnancy.  Pt asking if she needs to be seen earlier or when she should start OB care    Consulted Dr. Moncada about questions above.  Aldomet is safe in pregnancy and is not concerned about the celexa in pregnancy. If pt wants to discuss Celexa and make changes, she recommend pt come in to discuss medication change but she can wait until 8 weeks to be seen for her 1st OB visit.    Pt is comfortable with the above recommendations per Dr Moncada. She will make apt for 8 weeks.    Transferred to scheduling for her 1st OB    Jael Nelson RN on 11/26/2018 at 3:00 PM

## 2018-11-27 ENCOUNTER — TELEPHONE (OUTPATIENT)
Dept: FAMILY MEDICINE | Facility: CLINIC | Age: 33
End: 2018-11-27

## 2018-11-27 DIAGNOSIS — I10 ESSENTIAL HYPERTENSION: Primary | ICD-10-CM

## 2018-11-27 RX ORDER — LABETALOL 100 MG/1
100 TABLET, FILM COATED ORAL 2 TIMES DAILY
Qty: 60 TABLET | Refills: 1 | Status: SHIPPED | OUTPATIENT
Start: 2018-11-27 | End: 2019-01-17

## 2018-11-27 NOTE — TELEPHONE ENCOUNTER
Patient has not been able to get the new high blood pressure pill as she has tried several pharmacies and was told that it is not being made anymore. She is very concerned about her elevated b/p and being pregnant.Would like something else prescribed asap.

## 2018-11-27 NOTE — TELEPHONE ENCOUNTER
[facility] pharmacy and they are not able to get aldomet.  They said that pt have been switched to labetalol. I informed pt that I was sending the message to Dr Vences

## 2018-11-27 NOTE — TELEPHONE ENCOUNTER
Reason for Call:  Other     Detailed comments: in 11/26/18 to see Dr. Vences.  Blood pressure medication she put her on methyldopa (ALDOMET) 125 mg half-tab is being discontinued.  What is the next plan    Phone Number Patient can be reached at: Home number on file [phone] (home)    Best Time: today    Can we leave a detailed message on this number? YES    Call taken on 11/27/2018 at 12:09 PM by INOCENCIO SKINNER

## 2018-11-27 NOTE — TELEPHONE ENCOUNTER
Reason for Call:  Other call back    Detailed comments: Due to being out of blood pressure medication, Vivi says her blood pressure is increasing.  Today it was 126/92. She is 4 weeks pregnant and is concerned that her BP is increasing.  She requests a nurse call and advise her.    Phone Number Patient can be reached at: Cell number on file:    Telephone Information:   Mobile [phone]       Best Time: any    Can we leave a detailed message on this number? YES    Call taken on 11/27/2018 at 2:36 PM by Christelle Oconnor

## 2018-11-29 ENCOUNTER — APPOINTMENT (OUTPATIENT)
Dept: ULTRASOUND IMAGING | Facility: CLINIC | Age: 33
End: 2018-11-29
Attending: EMERGENCY MEDICINE
Payer: COMMERCIAL

## 2018-11-29 ENCOUNTER — HOSPITAL ENCOUNTER (EMERGENCY)
Facility: CLINIC | Age: 33
Discharge: HOME | End: 2018-11-29
Attending: EMERGENCY MEDICINE | Admitting: EMERGENCY MEDICINE
Payer: COMMERCIAL

## 2018-11-29 ENCOUNTER — NURSE TRIAGE (OUTPATIENT)
Dept: NURSING | Facility: CLINIC | Age: 33
End: 2018-11-29

## 2018-11-29 ENCOUNTER — TELEPHONE (OUTPATIENT)
Dept: OBGYN | Facility: CLINIC | Age: 33
End: 2018-11-29

## 2018-11-29 ENCOUNTER — TELEPHONE (OUTPATIENT)
Dept: FAMILY MEDICINE | Facility: CLINIC | Age: 33
End: 2018-11-29

## 2018-11-29 VITALS
RESPIRATION RATE: 18 BRPM | HEART RATE: 70 BPM | HEIGHT: 68 IN | DIASTOLIC BLOOD PRESSURE: 89 MMHG | TEMPERATURE: 98.2 F | BODY MASS INDEX: 26.8 KG/M2 | SYSTOLIC BLOOD PRESSURE: 132 MMHG | WEIGHT: 176.8 LBS | OXYGEN SATURATION: 99 %

## 2018-11-29 DIAGNOSIS — O03.9 COMPLETE MISCARRIAGE: ICD-10-CM

## 2018-11-29 LAB
ALBUMIN UR-MCNC: 10 MG/DL
ANION GAP SERPL CALCULATED.3IONS-SCNC: 6 MMOL/L (ref 3–14)
APPEARANCE UR: ABNORMAL
B-HCG SERPL-ACNC: 2 IU/L (ref 0–5)
BASOPHILS # BLD AUTO: 0 10E9/L (ref 0–0.2)
BASOPHILS NFR BLD AUTO: 0.6 %
BILIRUB UR QL STRIP: NEGATIVE
BUN SERPL-MCNC: 7 MG/DL (ref 7–30)
CALCIUM SERPL-MCNC: 7.9 MG/DL (ref 8.5–10.1)
CHLORIDE SERPL-SCNC: 107 MMOL/L (ref 94–109)
CO2 SERPL-SCNC: 26 MMOL/L (ref 20–32)
COLOR UR AUTO: YELLOW
CREAT SERPL-MCNC: 0.63 MG/DL (ref 0.52–1.04)
DIFFERENTIAL METHOD BLD: NORMAL
EOSINOPHIL # BLD AUTO: 0.3 10E9/L (ref 0–0.7)
EOSINOPHIL NFR BLD AUTO: 3.9 %
ERYTHROCYTE [DISTWIDTH] IN BLOOD BY AUTOMATED COUNT: 12.6 % (ref 10–15)
GFR SERPL CREATININE-BSD FRML MDRD: >90 ML/MIN/1.7M2
GLUCOSE SERPL-MCNC: 97 MG/DL (ref 70–99)
GLUCOSE UR STRIP-MCNC: NEGATIVE MG/DL
HCT VFR BLD AUTO: 36.7 % (ref 35–47)
HGB BLD-MCNC: 12.5 G/DL (ref 11.7–15.7)
HGB UR QL STRIP: ABNORMAL
IMM GRANULOCYTES # BLD: 0 10E9/L (ref 0–0.4)
IMM GRANULOCYTES NFR BLD: 0.1 %
KETONES UR STRIP-MCNC: NEGATIVE MG/DL
LEUKOCYTE ESTERASE UR QL STRIP: ABNORMAL
LYMPHOCYTES # BLD AUTO: 2.5 10E9/L (ref 0.8–5.3)
LYMPHOCYTES NFR BLD AUTO: 37 %
MCH RBC QN AUTO: 31.4 PG (ref 26.5–33)
MCHC RBC AUTO-ENTMCNC: 34.1 G/DL (ref 31.5–36.5)
MCV RBC AUTO: 92 FL (ref 78–100)
MONOCYTES # BLD AUTO: 0.7 10E9/L (ref 0–1.3)
MONOCYTES NFR BLD AUTO: 9.7 %
MUCOUS THREADS #/AREA URNS LPF: PRESENT /LPF
NEUTROPHILS # BLD AUTO: 3.2 10E9/L (ref 1.6–8.3)
NEUTROPHILS NFR BLD AUTO: 48.7 %
NITRATE UR QL: NEGATIVE
NRBC # BLD AUTO: 0 10*3/UL
NRBC BLD AUTO-RTO: 0 /100
PH UR STRIP: 5.5 PH (ref 5–7)
PLATELET # BLD AUTO: 335 10E9/L (ref 150–450)
POTASSIUM SERPL-SCNC: 4 MMOL/L (ref 3.4–5.3)
RBC # BLD AUTO: 3.98 10E12/L (ref 3.8–5.2)
RBC #/AREA URNS AUTO: 2 /HPF (ref 0–2)
SODIUM SERPL-SCNC: 139 MMOL/L (ref 133–144)
SOURCE: ABNORMAL
SP GR UR STRIP: 1.02 (ref 1–1.03)
SQUAMOUS #/AREA URNS AUTO: 5 /HPF (ref 0–1)
UROBILINOGEN UR STRIP-MCNC: NORMAL MG/DL (ref 0–2)
WBC # BLD AUTO: 6.7 10E9/L (ref 4–11)
WBC #/AREA URNS AUTO: 8 /HPF (ref 0–5)

## 2018-11-29 PROCEDURE — 81001 URINALYSIS AUTO W/SCOPE: CPT | Performed by: EMERGENCY MEDICINE

## 2018-11-29 PROCEDURE — 80048 BASIC METABOLIC PNL TOTAL CA: CPT | Performed by: EMERGENCY MEDICINE

## 2018-11-29 PROCEDURE — 84702 CHORIONIC GONADOTROPIN TEST: CPT | Performed by: EMERGENCY MEDICINE

## 2018-11-29 PROCEDURE — 76801 OB US < 14 WKS SINGLE FETUS: CPT

## 2018-11-29 PROCEDURE — 99284 EMERGENCY DEPT VISIT MOD MDM: CPT | Mod: 25

## 2018-11-29 PROCEDURE — 85025 COMPLETE CBC W/AUTO DIFF WBC: CPT | Performed by: EMERGENCY MEDICINE

## 2018-11-29 PROCEDURE — 87086 URINE CULTURE/COLONY COUNT: CPT | Performed by: EMERGENCY MEDICINE

## 2018-11-29 NOTE — TELEPHONE ENCOUNTER
"Pt calling with two concerns one is her BP which has been high. Pt's PCP is managing this and switched pt to labetolol 100 mg BID (pt has CHTN)   Pt states she had a HA yesterday that has now improved and BP's today are 130/86 and 129/88. Advised pt at this time her PCP should continue to manage her CHTN.    Second concern: pt is 4w 5d pregnant. Noticed bright red spotting today and is having some dull, aching, abd cramping. Pt has had a panty liner on for 30 minutes and has noticed \"a few drops\" of bright red spotting.   Informed pt to stay hydrated, rest and monitor bleeding. Pt will call back if spotting increases, she is passing clots, or her cramping gets worse.     Will route to Dr. Moncada as FYI or to advise if any further action is needed.     Soco Barbosa RN on 11/29/2018 at 3:24 PM               "

## 2018-11-29 NOTE — ED AVS SNAPSHOT
Emergency Department    [address] [place] [id number]    Phone:  [phone]    Fax:  [phone]                                       Vivi Mccall   MRN: [id number]    Department:   Emergency Department   Date of Visit:  11/29/2018           After Visit Summary Signature Page     I have received my discharge instructions, and my questions have been answered. I have discussed any challenges I see with this plan with the nurse or doctor.    ..........................................................................................................................................  Patient/Patient Representative Signature      ..........................................................................................................................................  Patient Representative Print Name and Relationship to Patient    ..................................................               ................................................  Date                                   Time    ..........................................................................................................................................  Reviewed by Signature/Title    ...................................................              ..............................................  Date                                               Time          22EPIC Rev 08/18

## 2018-11-29 NOTE — ED AVS SNAPSHOT
Emergency Department    [address] [facility] [address]    Phone:  [phone]    Fax:  [phone]                                       Vivi Mccall   MRN: [id number]    Department:   Emergency Department   Date of Visit:  11/29/2018           Patient Information     Date Of Birth          1985        Your diagnoses for this visit were:     Complete miscarriage        You were seen by Fabiana Renae MD.      Follow-up Information     Follow up with Jaycee Vences DO. Schedule an appointment as soon as possible for a visit in 2 days.    Specialty:  Family Practice    Contact information:    [address]  [facility] [address] [phone]          Follow up with  Emergency Department.    Specialty:  EMERGENCY MEDICINE    Why:  As needed, If symptoms worsen    Contact information:    [address] [facility] [address] [phone]        Discharge Instructions       Taking Care of Yourself after Miscarriage     Call your provider right away if:    You soak a pad (sanitary napkin) with blood within 1 hour. Or you see blood clots larger than a golf ball.    You have discharge from your vagina that smells bad.    You have a fever of 100.4  F (38 C) and above, with or without chills.    You have severe pain, cramping or tenderness in your lower belly.    You have redness, swelling or pain around a blood vessel in your leg.    You have problems coping with sadness, anxiety, or depression.    You have chest pain and a cough or are gasping for air.    You have questions or concerns.    The blues and grief   You may have hormone changes and strong emotions. You may find yourself easily upset, tearful or angry. In addition, you will be grieving for your own loss. You may feel terribly tired and may not want to face friends, family or new babies.   Your feelings will be hard to predict during this time. You may have many ups and downs. Be kind and  patient with yourself.  No two people grieve the same way. This is even true of spouses and partners. Try to have open communication, but don't depend solely on each other for support. Reach out to friends, family, clergy and your health care providers. You don't have to handle this alone.  Normal grief after a pregnancyloss often lasts for weeks or months. Over time, you will have more good days than bad ones.  At first, your sadness or anxiety may keep you from sleeping, eating, being with others or getting out of the house. If, after a week, you aren't able to take care of basic daily tasks, please call your care provider right away. It could be more serious than the blues or grief.        Your next 10 appointments already scheduled     Jan 02, 2019  2:30 PM CST   US OB TRANSVAGINAL ONLY with WEUS1   [facility] ([facility])    [address] [place] [address] [phone]           How do I prepare for my exam? (Food and drink instructions) Drink four 8-ounce glasses of fluid. Finish drinking an hour before your exam, so you have a full bladder. If you need to empty your bladder before your exam, try to release only a little urine. Then, drink another glass of fluid.  How do I prepare for my exam? (Other instructions) You may have up to two family members in the exam room. If you bring a small child, an adult must be there to care for him or her. No video or camera photography during the procedure.  What should I wear: Wear comfortable clothes.  How long does the exam take: Most ultrasounds take 30 to 60 minutes.  What should I bring: Bring a list of your medicines, including vitamins, minerals and over-the-counter drugs. It is safest to leave personal items at home.  Do I need a :  No  is needed.  What do I need to tell my doctor: Tell your doctor about any allergies you may have.  What should I do after the exam: No restrictions,  you may resume normal activities.  What is this test: An ultrasound uses sound waves to make pictures of the body. Sound waves do not cause pain. The only discomfort may be the pressure of the wand against your skin or full bladder.  Who should I call with questions: If you have any questions, please call the Imaging Department where you will have your exam. Directions, parking instructions, and other information are available on our website, [email or web address].            Jan 02, 2019  3:00 PM CST   New Prenatal with Tata Moncada MD, WE TRIAGE   [facility] ([facility])    [address] [address] [phone]              24 Hour Appointment Hotline       To make an appointment at any [facility] clinic, call [phone] ([phone]). If you don't have a family doctor or clinic, we will help you find one. [facility] clinics are conveniently located to serve the needs of you and your family.             Review of your medicines      Our records show that you are taking the medicines listed below. If these are incorrect, please call your family doctor or clinic.        Dose / Directions Last dose taken    albuterol 108 (90 Base) MCG/ACT inhaler   Commonly known as:  PROAIR HFA/PROVENTIL HFA/VENTOLIN HFA   Dose:  2 puff        Inhale 2 puffs into the lungs every 6 hours   Refills:  0        citalopram 20 MG tablet   Commonly known as:  celeXA   Dose:  20 mg   Quantity:  90 tablet        Take 1 tablet (20 mg) by mouth daily   Refills:  2        labetalol 100 MG tablet   Commonly known as:  NORMODYNE   Dose:  100 mg   Quantity:  60 tablet        Take 1 tablet (100 mg) by mouth 2 times daily   Refills:  1        LORazepam 1 MG tablet   Commonly known as:  ATIVAN   Dose:  1 mg   Quantity:  30 tablet        Take 1 tablet (1 mg) by mouth every 8 hours as needed for anxiety (Takes 1/2 to 1 tab prn)   Refills:  0        vitamin D3 2000 units  Caps   Dose:  2000 Units        Take 2,000 Units by mouth   Refills:  0                Procedures and tests performed during your visit     Basic metabolic panel    CBC with platelets differential    HCG quantitative pregnancy (blood)    UA with Microscopic    US OB <14 Weeks W Transvaginal    Urine Culture      Orders Needing Specimen Collection     None      Pending Results     Date and Time Order Name Status Description    11/29/2018 2038 Urine Culture In process             Pending Culture Results     Date and Time Order Name Status Description    11/29/2018 2038 Urine Culture In process             Pending Results Instructions     If you had any lab results that were not finalized at the time of your Discharge, you can call the ED Lab Result RN at [phone]. You will be contacted by this team for any positive Lab results or changes in treatment. The nurses are available 7 days a week from 10A to 6:30P.  You can leave a message 24 hours per day and they will return your call.        Test Results From Your Hospital Stay        11/29/2018  9:49 PM      Component Results     Component Value Ref Range & Units Status    WBC 6.7 4.0 - 11.0 10e9/L Final    RBC Count 3.98 3.8 - 5.2 10e12/L Final    Hemoglobin 12.5 11.7 - 15.7 g/dL Final    Hematocrit 36.7 35.0 - 47.0 % Final    MCV 92 78 - 100 fl Final    MCH 31.4 26.5 - 33.0 pg Final    MCHC 34.1 31.5 - 36.5 g/dL Final    RDW 12.6 10.0 - 15.0 % Final    Platelet Count 335 150 - 450 10e9/L Final    Diff Method Automated Method  Final    % Neutrophils 48.7 % Final    % Lymphocytes 37.0 % Final    % Monocytes 9.7 % Final    % Eosinophils 3.9 % Final    % Basophils 0.6 % Final    % Immature Granulocytes 0.1 % Final    Nucleated RBCs 0 0 /100 Final    Absolute Neutrophil 3.2 1.6 - 8.3 10e9/L Final    Absolute Lymphocytes 2.5 0.8 - 5.3 10e9/L Final    Absolute Monocytes 0.7 0.0 - 1.3 10e9/L Final    Absolute Eosinophils 0.3 0.0 - 0.7 10e9/L Final    Absolute Basophils 0.0  0.0 - 0.2 10e9/L Final    Abs Immature Granulocytes 0.0 0 - 0.4 10e9/L Final    Absolute Nucleated RBC 0.0  Final         11/29/2018 10:05 PM      Component Results     Component Value Ref Range & Units Status    Sodium 139 133 - 144 mmol/L Final    Potassium 4.0 3.4 - 5.3 mmol/L Final    Chloride 107 94 - 109 mmol/L Final    Carbon Dioxide 26 20 - 32 mmol/L Final    Anion Gap 6 3 - 14 mmol/L Final    Glucose 97 70 - 99 mg/dL Final    Urea Nitrogen 7 7 - 30 mg/dL Final    Creatinine 0.63 0.52 - 1.04 mg/dL Final    GFR Estimate >90 >60 mL/min/1.7m2 Final    Non  GFR Calc    GFR Estimate If Black >90 >60 mL/min/1.7m2 Final    African American GFR Calc    Calcium 7.9 (L) 8.5 - 10.1 mg/dL Final         11/29/2018 10:09 PM      Component Results     Component Value Ref Range & Units Status    HCG Quantitative Serum 2 0 - 5 IU/L Final         11/29/2018  9:41 PM      Component Results     Component Value Ref Range & Units Status    Color Urine Yellow  Final    Appearance Urine Slightly Cloudy  Final    Glucose Urine Negative NEG^Negative mg/dL Final    Bilirubin Urine Negative NEG^Negative Final    Ketones Urine Negative NEG^Negative mg/dL Final    Specific Gravity Urine 1.016 1.003 - 1.035 Final    Blood Urine Large (A) NEG^Negative Final    pH Urine 5.5 5.0 - 7.0 pH Final    Protein Albumin Urine 10 (A) NEG^Negative mg/dL Final    Urobilinogen mg/dL Normal 0.0 - 2.0 mg/dL Final    Nitrite Urine Negative NEG^Negative Final    Leukocyte Esterase Urine Large (A) NEG^Negative Final    Source Midstream Urine  Final    WBC Urine 8 (H) 0 - 5 /HPF Final    RBC Urine 2 0 - 2 /HPF Final    Squamous Epithelial /HPF Urine 5 (H) 0 - 1 /HPF Final    Mucous Urine Present (A) NEG^Negative /LPF Final         11/29/2018  9:34 PM         11/29/2018 10:59 PM      Narrative     ULTRASOUND OBSTETRIC FIRST TRIMESTER WITH TRANSVAGINAL  11/29/2018  10:52 PM    HISTORY: Pregnant, vaginal bleeding.    TECHNIQUE: Transabdominal and  transvaginal imaging were performed.  Transvaginal imaging was performed to better evaluate the uterus and  gestational sac.    COMPARISON:  None.    FINDINGS:      No intrauterine products of conception demonstrated.  Endometrial stripe: 0.8 cm. Incidental note is made of small fibroids  measuring 1.2 and 0.9 cm in the posterior myometrium and subserosal  anteriorly.    Right ovary: Unremarkable.  Left ovary: Unremarkable.  Adnexal mass: None.  Free pelvic fluid: None.        Impression     IMPRESSION: No convincing evidence of intrauterine products of  conception. Differential includes spontaneous  and normal  pregnancy with incorrect dates. Ectopic pregnancy is not excluded.  Followup as clinically indicated. No evidence for hemorrhage.    CHELE GASCA MD                Clinical Quality Measure: Blood Pressure Screening     Your blood pressure was checked while you were in the emergency department today. The last reading we obtained was  BP: (!) 156/108 . Please read the guidelines below about what these numbers mean and what you should do about them.  If your systolic blood pressure (the top number) is less than 120 and your diastolic blood pressure (the bottom number) is less than 80, then your blood pressure is normal. There is nothing more that you need to do about it.  If your systolic blood pressure (the top number) is 120-139 or your diastolic blood pressure (the bottom number) is 80-89, your blood pressure may be higher than it should be. You should have your blood pressure rechecked within a year by a primary care provider.  If your systolic blood pressure (the top number) is 140 or greater or your diastolic blood pressure (the bottom number) is 90 or greater, you may have high blood pressure. High blood pressure is treatable, but if left untreated over time it can put you at risk for heart attack, stroke, or kidney failure. You should have your blood pressure rechecked by a primary care  provider within the next 4 weeks.  If your provider in the emergency department today gave you specific instructions to follow-up with your doctor or provider even sooner than that, you should follow that instruction and not wait for up to 4 weeks for your follow-up visit.        Thank you for choosing [facility]       Thank you for choosing [facility] for your care. Our goal is always to provide you with excellent care. Hearing back from our patients is one way we can continue to improve our services. Please take a few minutes to complete the written survey that you may receive in the mail after you visit with us. Thank you!        [facility] Information     [employer] gives you secure access to your electronic health record. If you see a primary care provider, you can also send messages to your care team and make appointments. If you have questions, please call your primary care clinic.  If you do not have a primary care provider, please call [phone] and they will assist you.        Care EveryWhere ID     This is your Care EveryWhere ID. This could be used by other organizations to access your [facility] medical records  [id number]        Equal Access to Services     LONG CONNORS : Hadii heydi medranoo Soann, waaxda luqadaha, qaybta kaalmada adejamey, hiro delgado . So [facility] [phone].    ATENCIÓN: Si habla español, tiene a damon disposición servicios gratuitos de asistencia lingüística. Llame al [phone].    We comply with applicable federal civil rights laws and [place] laws. We do not discriminate on the basis of race, color, national origin, age, disability, sex, sexual orientation, or gender identity.            After Visit Summary       This is your record. Keep this with you and show to your community pharmacist(s) and doctor(s) at your next visit.

## 2018-11-29 NOTE — TELEPHONE ENCOUNTER
Pt called reporting unusual vaginal bleeding (spotting). Denies pelvic pain, blood,clots, soaking pads, fever or dizziness. She expressed concern because recently had a positive pregnancy test. She is also concerned of elevated BP reading 130/86, 129/88 while on new BP medication. Pt has seen OB. Advised she contact OB/gyn with symptom update and asked further directives. Explained that during implantation small spotting may be normal but it is best that she check with OB/Gyn if they recommend evaluation at their office or ED. Pt verbalized understanding and agreement with plan.

## 2018-11-30 LAB
BACTERIA SPEC CULT: NORMAL
BACTERIA SPEC CULT: NORMAL
Lab: NORMAL
SPECIMEN SOURCE: NORMAL

## 2018-11-30 NOTE — DISCHARGE INSTRUCTIONS
Taking Care of Yourself after Miscarriage     Call your provider right away if:    You soak a pad (sanitary napkin) with blood within 1 hour. Or you see blood clots larger than a golf ball.    You have discharge from your vagina that smells bad.    You have a fever of 100.4  F (38 C) and above, with or without chills.    You have severe pain, cramping or tenderness in your lower belly.    You have redness, swelling or pain around a blood vessel in your leg.    You have problems coping with sadness, anxiety, or depression.    You have chest pain and a cough or are gasping for air.    You have questions or concerns.    The blues and grief   You may have hormone changes and strong emotions. You may find yourself easily upset, tearful or angry. In addition, you will be grieving for your own loss. You may feel terribly tired and may not want to face friends, family or new babies.   Your feelings will be hard to predict during this time. You may have many ups and downs. Be kind and patient with yourself.  No two people grieve the same way. This is even true of spouses and partners. Try to have open communication, but don't depend solely on each other for support. Reach out to friends, family, clergy and your health care providers. You don't have to handle this alone.  Normal grief after a pregnancyloss often lasts for weeks or months. Over time, you will have more good days than bad ones.  At first, your sadness or anxiety may keep you from sleeping, eating, being with others or getting out of the house. If, after a week, you aren't able to take care of basic daily tasks, please call your care provider right away. It could be more serious than the blues or grief.

## 2018-11-30 NOTE — TELEPHONE ENCOUNTER
"Pt went to the ED yesterday evening (see chart review). Diagnosed with complete miscarriage. Pt states her bleeding this morning is similar to her \"heavy day\" during her period. Discussed s/s that would warrant evaluation. Pt is tearful, she has support from her  Bud who is home with her today. Encouraged pt to call with any questions or concerns.   Soco Barbosa RN on 11/30/2018 at 9:08 AM      "

## 2018-11-30 NOTE — TELEPHONE ENCOUNTER
Reason for Disposition    [1] Constant abdominal pain AND [2] present > 2 hours    Additional Information    Negative: Shock suspected (e.g., cold/pale/clammy skin, too weak to stand, low BP, rapid pulse)    Negative: Difficult to awaken or acting confused  (e.g., disoriented, slurred speech)    Negative: Passed out (i.e., lost consciousness, collapsed and was not responding)    Negative: Sounds like a life-threatening emergency to the triager    Negative: [1] Vaginal bleeding AND [2] pregnant > 20 weeks    Negative: Not pregnant or pregnancy status unknown    Negative: SEVERE abdominal pain    Negative: [1] SEVERE vaginal bleeding (i.e., soaking 2 pads / hour, large blood clots) AND [2] present 2 or more hours    Negative: [1] MODERATE vaginal bleeding (i.e., soaking 1 pad / hour; clots) AND [2] present > 6 hours    Negative: [1] MODERATE vaginal bleeding (i.e., soaking 1 pad / hour; clots) AND [2] pregnant > 12 weeks    Negative: Passed tissue (e.g., gray-white)    Negative: Shoulder pain    Negative: Pale skin (pallor) of new onset or worsening    Negative: Patient sounds very sick or weak to the triager    Protocols used: PREGNANCY - VAGINAL BLEEDING LESS THAN 20 WEEKS EGA-ADULT-  Caller states she is 4 weeks pregnant and is having some vaginal bleeding and spotting. Caller states she is passing some small clots. Triage guidelines recommend to see provider within 4 hours. Caller verbalized and understands directives.

## 2018-11-30 NOTE — ED PROVIDER NOTES
"  History     Chief Complaint:  Vaginal Bleeding       HPI   Vivi Mccall is a 33 year old female, who underwent a positive pregnancy test two days ago, who presents with vaginal bleeding which began as spotting around 1430 today. She now has some lower abdominal cramping which she reports as a 4/10 on the pain scale. Tonight she noted clots around 1815. She was told by the nurse line to come into the ED. Last known menstrual period was 10/27. Patient notes the pregnancy was intended and no hormonal therapy was used. She has not been pregnant before. Patient reports she was recently struggling with her blood pressure while changing up medications since getting news of her positive pregnancy test. Her pharmacy stopped carrying her blood pressure medication. Her highest blood pressure was about 156/100 which is very high for her. Patient denies nausea, leg pain and leg swelling.     Allergies:  Amoxicillin  Codeine  Penicillin     Medications:    Vitamin D  Celexa  Labetalol  Albuterol  Ativan     Past Medical History:    Asthma  C. Diff   Depression  ROCHELLE  HTN    Past Surgical History:    Laser treatment of HPV  Ganglion cyst removal   Nerve entrapment release    Family History:    Diabetes  Bipolar disorder    Social History:  Presents to the ED with her .   Tobacco Use: Never  Alcohol Use: 6 drinks weekly; Not while pregnant  PCP: Jaycee Vences   Patient is a dance teacher.     Review of Systems  10 point review of systems performed and is negative except as above and in HPI.    Physical Exam   First Vitals:  BP: (!) 156/108  Pulse: 73  Heart Rate: 73  Temp: 98.2  F (36.8  C)  Resp: 18  Height: 172.7 cm (5' 8\")  Weight: 80.2 kg (176 lb 12.8 oz)  SpO2: 98 %      Physical Exam  General: Resting on the gurney, appears comfortable, appropriately tearful.   Head:  The scalp, face, and head appear normal  Mouth/Throat: Mucus membranes are moist  CV:  Regular rate    Normal S1 and S2  No pathological " murmur   Resp:  Breath sounds clear and equal bilaterally    Non-labored, no retractions or accessory muscle use    No coarseness    No wheezing   GI:  Abdomen is soft, no rigidity    Minimal suprapubic tenderness to palpation. No guarding or rebound.   MS:  Normal motor assessment of all extremities.    Good capillary refill noted.  Skin:  No rash or lesions noted.  Neuro:  Speech is normal and fluent. No apparent deficit.  Psych:  Awake. Alert.  Normal affect.      Appropriate interactions.    Emergency Department Course     Imaging:  Radiographic findings were communicated with the patient who voiced understanding of the findings.    US OB <14 Weeks W Transvaginal   Final Result   IMPRESSION: No convincing evidence of intrauterine products of   conception. Differential includes spontaneous  and normal   pregnancy with incorrect dates. Ectopic pregnancy is not excluded.   Followup as clinically indicated. No evidence for hemorrhage.      CHELE GASCA MD        Laboratory:  CBC:  WBC 6.7, HGB 12.5, , otherwise WNL  BMP: Calcium 7.9 (L), otherwise WNL (Creatinine 0.63)  HCG blood: 2  UA: Slightly cloudy yellow urine, large blood, protein 10, large leukocyte esterase, WBC 8 (H), squamous epithelial 5 (H), mucous present, otherwise WNL  Urine culture: Pending    Emergency Department Course:  The patient arrived in triage where vitals were measured and recorded.   The patient was then escorted back to the emergency department.   The patient's medical records were reviewed.  Nursing notes and vitals were reviewed.  2124: I performed an exam of the patient as documented above.  The above workup was undertaken.  2245: I rechecked the patient and discussed results.    Findings and plan explained to the Patient. Patient discharged home, status improved, with instructions regarding supportive care, medications, and reasons to return as well as the importance of close follow-up was reviewed.     Impression &  Plan      Medical Decision Making:  Vivi Mccall is a 33 year old  who presents to the ED for evaluation of vaginal bleeding and abdominal cramping.  The patient is currently pregnant based on pregnancy test done two days ago. Ultrasound was obtained and shows no convincing evidence of IUP.  Ectopic pregnancy highly unlikely as HCG negatve. These findings were discussed with the patient and miscarriage precautions and instructions were provided.  There is no evidence of retained products of conception.  The patient has established care with OB and will follow up as instructed.  Return precautions given.       Diagnosis:    ICD-10-CM    1. Complete miscarriage O03.9        Disposition:  Discharged to home.         Mellisa GASPAR, am serving as a scribe on 2018 at 9:24 PM to personally document services performed by Dr. Renae based on my observations and the provider's statements to me.    EMERGENCY DEPARTMENT       Fabiana Renae MD  [phone]

## 2018-12-02 ENCOUNTER — MYC MEDICAL ADVICE (OUTPATIENT)
Dept: FAMILY MEDICINE | Facility: CLINIC | Age: 33
End: 2018-12-02

## 2018-12-03 ENCOUNTER — OFFICE VISIT (OUTPATIENT)
Dept: OBGYN | Facility: CLINIC | Age: 33
End: 2018-12-03
Payer: COMMERCIAL

## 2018-12-03 VITALS
HEART RATE: 88 BPM | SYSTOLIC BLOOD PRESSURE: 118 MMHG | BODY MASS INDEX: 26.37 KG/M2 | WEIGHT: 173.4 LBS | DIASTOLIC BLOOD PRESSURE: 86 MMHG

## 2018-12-03 DIAGNOSIS — O03.9 COMPLETE ABORTION WITHOUT COMPLICATION: Primary | ICD-10-CM

## 2018-12-03 PROCEDURE — 99213 OFFICE O/P EST LOW 20 MIN: CPT | Performed by: OBSTETRICS & GYNECOLOGY

## 2018-12-03 NOTE — PROGRESS NOTES
SUBJECTIVE:                                                   Vivi Mccall is a 33 year old female who presents to clinic today for the following health issue(s):  Patient presents with:  Follow Up For: ER visit due to miscarriage        HPI:  Patient had recent complete AB, was seen in Emergency Room  Was very early, only abou 4 1/2 wks  QHCG was back to negative by time in Emergency Room  Bleeding has stopped    No LMP recorded..   Patient is sexually active, .  Using none for contraception.    reports that she has never smoked. She has never used smokeless tobacco.    STD testing offered?  Declined    Health maintenance updated:  yes    Today's PHQ-2 Score:   PHQ-2 (  [employer]) 2017   Q1: Little interest or pleasure in doing things 0   Q2: Feeling down, depressed or hopeless 1   PHQ-2 Score 1     Today's PHQ-9 Score:   PHQ-9 SCORE 2018   PHQ-9 Total Score MyChart 4 (Minimal depression)   PHQ-9 Total Score 4     Today's ROCHELLE-7 Score:   ROCHELLE-7 SCORE 2018   Total Score 4       Problem list and histories reviewed & adjusted, as indicated.  Additional history: as documented.    Patient Active Problem List   Diagnosis     Benign essential hypertension     ROCHELLE (generalized anxiety disorder)     Major depressive disorder, recurrent episode, moderate (H)     Mild intermittent asthma without complication     Vitamin D deficiency     IUD contraception     Past Surgical History:   Procedure Laterality Date     GYN SURGERY      Laser treatment of HPV     ORTHOPEDIC SURGERY Left 2017    ganglion cyst removal     SURGICAL HISTORY OF -       Nerve entrapment release      Social History   Substance Use Topics     Smoking status: Never Smoker     Smokeless tobacco: Never Used     Alcohol use No      Comment: 6 drinks a week mainly wine      Problem (# of Occurrences) Relation (Name,Age of Onset)    Bipolar Disorder (1) Mother    Diabetes (1) Mother    Type 2 Diabetes (1) Maternal  Grandfather    Unknown/Adopted (1) Father            Current Outpatient Prescriptions   Medication Sig     albuterol (PROAIR HFA/PROVENTIL HFA/VENTOLIN HFA) 108 (90 BASE) MCG/ACT Inhaler Inhale 2 puffs into the lungs every 6 hours     Cholecalciferol (VITAMIN D3) 2000 UNITS CAPS Take 2,000 Units by mouth     citalopram (CELEXA) 20 MG tablet Take 1 tablet (20 mg) by mouth daily     labetalol (NORMODYNE) 100 MG tablet Take 1 tablet (100 mg) by mouth 2 times daily     LORazepam (ATIVAN) 1 MG tablet Take 1 tablet (1 mg) by mouth every 8 hours as needed for anxiety (Takes 1/2 to 1 tab prn)     No current facility-administered medications for this visit.      Allergies   Allergen Reactions     Amoxicillin      Codeine      Penicillins        ROS:  12 point review of systems negative other than symptoms noted below.  Head: Nasal Congestion  Breast: Tenderness  Gastrointestinal: Bloating    OBJECTIVE:     /86 (BP Location: Right arm, Patient Position: Sitting, Cuff Size: Adult Regular)  Pulse 88  Wt 173 lb 6.4 oz (78.7 kg)  Breastfeeding? No  BMI 26.37 kg/m2  Body mass index is 26.37 kg/(m^2).    Exam:  Constitutional:  Appearance: Well nourished, well developed alert, in no acute distress  Chest:  Respiratory Effort:  Breathing unlabored  Neurologic/Psychiatric:  Mental Status:  Oriented X3      In-Clinic Test Results:  No results found for this or any previous visit (from the past 24 hour(s)).    ASSESSMENT/PLAN:                                                        ICD-10-CM    1. Complete  without complication O03.9        There are no Patient Instructions on file for this visit.    Keep track of periods  Continue pnv  Plan ultrasound at 6 1/2 wks with next pregnancy    Tata Moncada MD  [facility] [place]

## 2018-12-03 NOTE — MR AVS SNAPSHOT
After Visit Summary   12/3/2018    Vivi Mccall    MRN: [id number]           Patient Information     Date Of Birth          1985        Visit Information        Provider Department      12/3/2018 9:50 AM Tata Moncada MD [facility]        Today's Diagnoses     Complete  without complication    -  1       Follow-ups after your visit        Who to contact     If you have questions or need follow up information about today's clinic visit or your schedule please contact [facility] directly at [phone].  Normal or non-critical lab and imaging results will be communicated to you by Eqalixhart, letter or phone within 4 business days after the clinic has received the results. If you do not hear from us within 7 days, please contact the clinic through ClickFoxt or phone. If you have a critical or abnormal lab result, we will notify you by phone as soon as possible.  Submit refill requests through [email or web address] or call your pharmacy and they will forward the refill request to us. Please allow 3 business days for your refill to be completed.          Additional Information About Your Visit        MyChart Information     [email or web address] gives you secure access to your electronic health record. If you see a primary care provider, you can also send messages to your care team and make appointments. If you have questions, please call your primary care clinic.  If you do not have a primary care provider, please call [phone] and they will assist you.        Care EveryWhere ID     This is your Care EveryWhere ID. This could be used by other organizations to access your [facility] medical records  [id number]        Your Vitals Were     Pulse Breastfeeding? BMI (Body Mass Index)             88 No 26.37 kg/m2          Blood Pressure from Last 3 Encounters:   18 118/86   18 132/89   18 112/88    Weight from Last 3 Encounters:   18 173 lb 6.4 oz  (78.7 kg)   11/29/18 176 lb 12.8 oz (80.2 kg)   11/26/18 178 lb 8 oz (81 kg)              Today, you had the following     No orders found for display       Primary Care Provider Office Phone # Fax #    Jaycee Vences [phone]       [address]  [facility] [address]        Equal Access to Services     LONG CONNORS : Hadii aad ku hadasho Soomaali, waaxda luqadaha, qaybta kaalmada adeegyada, waxay idiin hayaan adeeg kharash la'raúl . So [facility] [phone].    ATENCIÓN: Si habla español, tiene a damon disposición servicios gratuitos de asistencia lingüística. Llame al [phone].    We comply with applicable federal civil rights laws and [place] laws. We do not discriminate on the basis of race, color, national origin, age, disability, sex, sexual orientation, or gender identity.            Thank you!     Thank you for choosing [facility]  for your care. Our goal is always to provide you with excellent care. Hearing back from our patients is one way we can continue to improve our services. Please take a few minutes to complete the written survey that you may receive in the mail after your visit with us. Thank you!             Your Updated Medication List - Protect others around you: Learn how to safely use, store and throw away your medicines at www.disposemymeds.org.          This list is accurate as of 12/3/18 10:17 AM.  Always use your most recent med list.                   Brand Name Dispense Instructions for use Diagnosis    albuterol 108 (90 Base) MCG/ACT inhaler    PROAIR HFA/PROVENTIL HFA/VENTOLIN HFA     Inhale 2 puffs into the lungs every 6 hours    Mild intermittent asthma without complication       citalopram 20 MG tablet    celeXA    90 tablet    Take 1 tablet (20 mg) by mouth daily    Major depressive disorder, recurrent episode, moderate (H), ROCHELLE (generalized anxiety disorder)       labetalol 100 MG tablet    NORMODYNE    60 tablet    Take 1  tablet (100 mg) by mouth 2 times daily    Essential hypertension       LORazepam 1 MG tablet    ATIVAN    30 tablet    Take 1 tablet (1 mg) by mouth every 8 hours as needed for anxiety (Takes 1/2 to 1 tab prn)    ROCHELLE (generalized anxiety disorder)       vitamin D3 2000 units Caps      Take 2,000 Units by mouth

## 2019-01-17 ENCOUNTER — OFFICE VISIT (OUTPATIENT)
Dept: FAMILY MEDICINE | Facility: CLINIC | Age: 34
End: 2019-01-17
Payer: COMMERCIAL

## 2019-01-17 VITALS
WEIGHT: 178 LBS | HEART RATE: 77 BPM | BODY MASS INDEX: 26.98 KG/M2 | DIASTOLIC BLOOD PRESSURE: 82 MMHG | SYSTOLIC BLOOD PRESSURE: 134 MMHG | HEIGHT: 68 IN | OXYGEN SATURATION: 98 % | RESPIRATION RATE: 14 BRPM | TEMPERATURE: 99 F

## 2019-01-17 DIAGNOSIS — F33.1 MAJOR DEPRESSIVE DISORDER, RECURRENT EPISODE, MODERATE (H): ICD-10-CM

## 2019-01-17 DIAGNOSIS — J45.20 MILD INTERMITTENT ASTHMA WITHOUT COMPLICATION: ICD-10-CM

## 2019-01-17 DIAGNOSIS — F41.1 GAD (GENERALIZED ANXIETY DISORDER): ICD-10-CM

## 2019-01-17 DIAGNOSIS — I10 BENIGN ESSENTIAL HYPERTENSION: Primary | ICD-10-CM

## 2019-01-17 PROCEDURE — 84443 ASSAY THYROID STIM HORMONE: CPT | Performed by: FAMILY MEDICINE

## 2019-01-17 PROCEDURE — 99214 OFFICE O/P EST MOD 30 MIN: CPT | Performed by: FAMILY MEDICINE

## 2019-01-17 PROCEDURE — 36415 COLL VENOUS BLD VENIPUNCTURE: CPT | Performed by: FAMILY MEDICINE

## 2019-01-17 RX ORDER — LABETALOL 200 MG/1
200 TABLET, FILM COATED ORAL 2 TIMES DAILY
Qty: 60 TABLET | Refills: 1 | Status: SHIPPED | OUTPATIENT
Start: 2019-01-17 | End: 2019-04-11

## 2019-01-17 ASSESSMENT — PATIENT HEALTH QUESTIONNAIRE - PHQ9
SUM OF ALL RESPONSES TO PHQ QUESTIONS 1-9: 3
10. IF YOU CHECKED OFF ANY PROBLEMS, HOW DIFFICULT HAVE THESE PROBLEMS MADE IT FOR YOU TO DO YOUR WORK, TAKE CARE OF THINGS AT HOME, OR GET ALONG WITH OTHER PEOPLE: NOT DIFFICULT AT ALL
SUM OF ALL RESPONSES TO PHQ QUESTIONS 1-9: 3

## 2019-01-17 ASSESSMENT — MIFFLIN-ST. JEOR: SCORE: 1555.9

## 2019-01-17 NOTE — PATIENT INSTRUCTIONS
Patient Education     Established High Blood Pressure    High blood pressure (hypertension) is a chronic disease. Often, healthcare providers don t know what causes it. But it can be caused by certain health conditions and medicines.  If you have high blood pressure, you may not have any symptoms. If you do have symptoms, they may include headache, dizziness, changes in your vision, chest pain, and shortness of breath. But even without symptoms, high blood pressure that s not treated raises your risk for heart attack, heart failure, and stroke. High blood pressure is a serious health risk and shouldn t be ignored.  Blood pressure measurements are given as 2 numbers. Systolic blood pressure is the upper number. This is the pressure when the heart contracts. Diastolic blood pressure is the lower number. This is the pressure when the heart relaxes between beats. You will see your blood pressure readings written together. For example, a person with a systolic pressure of 118 and a diastolic pressure of 78 will have 118/78 written in the medical record.  Blood pressure is categorized as normal, elevated, or stage 1 or stage 2 high blood pressure:    Normal blood pressure is systolic of less than 120 and diastolic of less than 80 (120/80)    Elevated blood pressure is systolic of 120 to 129 and diastolic less than 80    Stage 1 high blood pressure is systolic is 130 to 139 or diastolic between 80 to 89    Stage 2 high blood pressure is when systolic is 140 or higher or the diastolic is 90 or higher  Home care  If you have high blood pressure, follow these home care guidelines to help lower your blood pressure. If you are taking medicines for high blood pressure, these methods may reduce or end your need for medicines in the future.    Start a weight-loss program if you are overweight.    Cut back on how much salt you get in your diet. Here s how to do this:  ? Don t eat foods that have a lot of salt. These include  olives, pickles, smoked meats, and salted potato chips.  ? Don t add salt to your food at the table.  ? Use only small amounts of salt when cooking.    Start an exercise program. Talk with your healthcare provider about the type of exercise program that would be best for you. It doesn't have to be hard. Even brisk walking for 20 minutes 3 times a week is a good form of exercise.    Don t take medicines that stimulate the heart. This includes many over-the-counter cold and sinus decongestant pills and sprays, as well as diet pills. Check the warnings about high blood pressure on the label. Before buying any over-the-counter medicines or supplements, always ask the pharmacist about the product's potential interaction with your high blood pressure and your high blood pressure medicines.    Stimulants such as amphetamine or cocaine could be deadly for someone with high blood pressure. Never take these.    Limit how much caffeine you get in your diet. Switch to caffeine-free products.    Stop smoking. If you are a long-time smoker, this can be hard. Talk to your healthcare provider about medicines and nicotine replacement options to help you. Also, enroll in a stop-smoking program to make it more likely that you will quit for good.    Learn how to handle stress. This is an important part of any program to lower blood pressure. Learn about relaxation methods like meditation, yoga, or biofeedback.    If your provider prescribed medicines, take them exactly as directed. Missing doses may cause your blood pressure get out of control.    If you miss a dose or doses, check with your healthcare provider or pharmacist about what to do.    Consider buying an automatic blood pressure machine to check your blood pressure at home. Ask your provider for a recommendation. You can get one of these at most pharmacies.     The American Heart Association recommends the following guidelines for home blood pressure monitoring:    Don't  smoke or drink coffee for 30 minutes before taking your blood pressure.    Go to the bathroom before the test.    Relax for 5 minutes before taking the measurement.    Sit with your back supported (don't sit on a couch or soft chair); keep your feet on the floor uncrossed. Place your arm on a solid flat surface (like a table) with the upper part of the arm at heart level. Place the middle of the cuff directly above the bend of the elbow. Check the monitor's instruction manual for an illustration.    Take multiple readings. When you measure, take 2 to 3 readings one minute apart and record all of the results.    Take your blood pressure at the same time every day, or as your healthcare provider recommends.    Record the date, time, and blood pressure reading.    Take the record with you to your next medical appointment. If your blood pressure monitor has a built-in memory, simply take the monitor with you to your next appointment.    Call your provider if you have several high readings. Don't be frightened by a single high blood pressure reading, but if you get several high readings, check in with your healthcare provider.    Note: When blood pressure reaches a systolic (top number) of 180 or higher OR diastolic (bottom number) of 110 or higher, seek emergency medical treatment.  Follow-up care  You will need to see your healthcare provider regularly. This is to check your blood pressure and to make changes to your medicines. Make a follow-up appointment as directed. Bring the record of your home blood pressure readings to the appointment.  When to seek medical advice  Call your healthcare provider right away if any of these occur:    Blood pressure reaches a systolic (upper number) of 180 or higher OR a diastolic (bottom number) of 110 or higher    Chest pain or shortness of breath    Severe headache    Throbbing or rushing sound in the ears    Nosebleed    Sudden severe pain in your belly (abdomen)    Extreme  drowsiness, confusion, or fainting    Dizziness or spinning sensation (vertigo)    Weakness of an arm or leg or one side of the face    You have problems speaking or seeing   Date Last Reviewed: 12/1/2016 2000-2018 [employer]. [address] [place] [address]. All rights reserved. This information is not intended as a substitute for professional medical care. Always follow your healthcare professional's instructions.

## 2019-01-17 NOTE — PROGRESS NOTES
SUBJECTIVE:   Vivi Mccall is a 33 year old female who presents to clinic today for the following health issues:      Hypertension Follow-up      Outpatient blood pressures are being checked at home.  Results are see patient flow sheet. Reading at home are consistently high    Low Salt Diet: low salt    Amount of exercise or physical activity: 6-7 days/week for an average of greater than 60 minutes    Problems taking medications regularly: No    Medication side effects: shaky, nauseated, nervous    Diet: low salt      Has BP cuff at home.  BP seems to be reading high at home.   Still taking labetalol 100 mg twice daily.   Not currently pregnant.  LMP 12/27/18.  Was told that she could start trying to get pregnant again after one normal cycle following her recent miscarriage earlier this year.       Answers for HPI/ROS submitted by the patient on 1/17/2019   If you checked off any problems, how difficult have these problems made it for you to do your work, take care of things at home, or get along with other people?: Not difficult at all  PHQ9 TOTAL SCORE: 3      Problem list and histories reviewed & adjusted, as indicated.  Additional history: as documented    Labs reviewed in EPIC    Reviewed and updated as needed this visit by clinical staff  Tobacco  Allergies  Meds  Problems  Med Hx  Surg Hx  Fam Hx  Soc Hx        Reviewed and updated as needed this visit by Provider  Tobacco  Allergies  Meds  Problems  Med Hx  Surg Hx  Fam Hx         ROS:  CONSTITUTIONAL: NEGATIVE for fever, chills, change in weight  INTEGUMENTARY/SKIN: NEGATIVE for worrisome rashes, moles or lesions  EYES: NEGATIVE for vision changes or irritation  ENT/MOUTH: NEGATIVE for ear, mouth and throat problems  RESP: NEGATIVE for significant cough or SOB  CV: NEGATIVE for chest pain, palpitations or peripheral edema  GI: NEGATIVE for nausea, abdominal pain, heartburn, or change in bowel habits  : NEGATIVE for frequency, dysuria,  "or hematuria  MUSCULOSKELETAL: NEGATIVE for significant arthralgias or myalgia  NEURO: NEGATIVE for weakness, dizziness or paresthesias  HEME: NEGATIVE for bleeding problems    OBJECTIVE:     /82   Pulse 77   Temp 99  F (37.2  C) (Tympanic)   Resp 14   Ht 1.727 m (5' 8\")   Wt 80.7 kg (178 lb)   LMP 12/27/2018   SpO2 98%   Breastfeeding? No   BMI 27.06 kg/m    Body mass index is 27.06 kg/m .   GENERAL: healthy, alert and no distress  EYES: Eyes grossly normal to inspection, PERRL and conjunctivae and sclerae normal  HENT: Nose and mouth without ulcers or lesions  NECK: no adenopathy, no asymmetry, masses, or scars and thyroid normal to palpation  RESP: lungs clear to auscultation - no rales, rhonchi or wheezes  CV: regular rate and rhythm, normal S1 S2, no S3 or S4, no murmur, click or rub, no peripheral edema and peripheral pulses strong  ABDOMEN: soft, nontender, no hepatosplenomegaly, no masses and bowel sounds normal  MS: no gross musculoskeletal defects noted, no edema  SKIN: no suspicious lesions or rashes  NEURO: Normal strength and tone, mentation intact and speech normal  PSYCH: mentation appears normal, affect normal/bright    Diagnostic Test Results:  TSH/T4  ASSESSMENT/PLAN:     Problem List Items Addressed This Visit     Benign essential hypertension - Primary    Relevant Medications    labetalol (NORMODYNE) 200 MG tablet    Other Relevant Orders    TSH with free T4 reflex (Completed)    ROCHELLE (generalized anxiety disorder)    Major depressive disorder, recurrent episode, moderate (H)    Mild intermittent asthma without complication         --We are going to see if increasing her Labetalol will help control her BP better.  TSH/T4 ordered to help r/o thyroid issue.   --Will continue Celexa.  Mood is well controlled.   --ACT completed today as well; score 25.  --Will RTC in about 4 weeks or sooner if needed to re-check BP.  Will also bring in BP log and machine at f/u exam     Jaycee COLLADO" Ru, DO  [facility]  Answers for HPI/ROS submitted by the patient on 1/17/2019   If you checked off any problems, how difficult have these problems made it for you to do your work, take care of things at home, or get along with other people?: Not difficult at all  PHQ9 TOTAL SCORE: 3

## 2019-01-17 NOTE — LETTER
My Asthma Action Plan  Name: Vivi Mccall   YOB: 1985  Date: 1/17/2019   My doctor: Jaycee Vences, DO   My clinic: [facility]        My Control Medicine: { :325554}  My Rescue Medicine: { :669910}  {AAP include Oral Steroid:582131} My Asthma Severity: { :364171}  Avoid your asthma triggers: { :982407}  None     {Is patient a child or adult?:290214}       GREEN ZONE   Good Control    I feel good    No cough or wheeze    Can work, sleep and play without asthma symptoms       Take your asthma control medicine every day.     1. If exercise triggers your asthma, take your rescue medication    15 minutes before exercise or sports, and    During exercise if you have asthma symptoms  2. Spacer to use with inhaler: If you have a spacer, make sure to use it with your inhaler             YELLOW ZONE Getting Worse  I have ANY of these:    I do not feel good    Cough or wheeze    Chest feels tight    Wake up at night   1. Keep taking your Green Zone medications  2. Start taking your rescue medicine:    every 20 minutes for up to 1 hour. Then every 4 hours for 24-48 hours.  3. If you stay in the Yellow Zone for more than 12-24 hours, contact your doctor.  4. If you do not return to the Green Zone in 12-24 hours or you get worse, start taking your oral steroid medicine if prescribed by your provider.           RED ZONE Medical Alert - Get Help  I have ANY of these:    I feel awful    Medicine is not helping    Breathing getting harder    Trouble walking or talking    Nose opens wide to breathe       1. Take your rescue medicine NOW  2. If your provider has prescribed an oral steroid medicine, start taking it NOW  3. Call your doctor NOW  4. If you are still in the Red Zone after 20 minutes and you have not reached your doctor:    Take your rescue medicine again and    Call 911 or go to the emergency room right away    See your regular doctor within 2 weeks of an Emergency  Room or Urgent Care visit for follow-up treatment.          Annual Reminders:  Meet with Asthma Educator,  Flu Shot in the Fall, consider Pneumonia Vaccination for patients with asthma (aged 19 and older).    Pharmacy: [facility] [address] [place] [address] AT [address] [place] [address]                      Asthma Triggers  How To Control Things That Make Your Asthma Worse    Triggers are things that make your asthma worse.  Look at the list below to help you find your triggers and what you can do about them.  You can help prevent asthma flare-ups by staying away from your triggers.      Trigger                                                          What you can do   Cigarette Smoke  Tobacco smoke can make asthma worse. Do not allow smoking in your home, car or around you.  Be sure no one smokes at a child s day care or school.  If you smoke, ask your health care provider for ways to help you quit.  Ask family members to quit too.  Ask your health care provider for a referral to Quit Plan to help you quit smoking, or call 5-905-568-PLAN.     Colds, Flu, Bronchitis  These are common triggers of asthma. Wash your hands often.  Don t touch your eyes, nose or mouth.  Get a flu shot every year.     Dust Mites  These are tiny bugs that live in cloth or carpet. They are too small to see. Wash sheets and blankets in hot water every week.   Encase pillows and mattress in dust mite proof covers.  Avoid having carpet if you can. If you have carpet, vacuum weekly.   Use a dust mask and HEPA vacuum.   Pollen and Outdoor Mold  Some people are allergic to trees, grass, or weed pollen, or molds. Try to keep your windows closed.  Limit time out doors when pollen count is high.   Ask you health care provider about taking medicine during allergy season.     Animal Dander  Some people are allergic to skin flakes, urine or saliva from pets with fur or feathers. Keep pets with fur or feathers out of your  home.    If you can t keep the pet outdoors, then keep the pet out of your bedroom.  Keep the bedroom door closed.  Keep pets off cloth furniture and away from stuffed toys.     Mice, Rats, and Cockroaches  Some people are allergic to the waste from these pests.   Cover food and garbage.  Clean up spills and food crumbs.  Store grease in the refrigerator.   Keep food out of the bedroom.   Indoor Mold  This can be a trigger if your home has high moisture. Fix leaking faucets, pipes, or other sources of water.   Clean moldy surfaces.  Dehumidify basement if it is damp and smelly.   Smoke, Strong Odors, and Sprays  These can reduce air quality. Stay away from strong odors and sprays, such as perfume, powder, hair spray, paints, smoke incense, paint, cleaning products, candles and new carpet.   Exercise or Sports  Some people with asthma have this trigger. Be active!  Ask your doctor about taking medicine before sports or exercise to prevent symptoms.    Warm up for 5-10 minutes before and after sports or exercise.     Other Triggers of Asthma  Cold air:  Cover your nose and mouth with a scarf.  Sometimes laughing or crying can be a trigger.  Some medicines and food can trigger asthma.

## 2019-01-18 LAB — TSH SERPL DL<=0.005 MIU/L-ACNC: 0.98 MU/L (ref 0.4–4)

## 2019-01-18 ASSESSMENT — ASTHMA QUESTIONNAIRES: ACT_TOTALSCORE: 25

## 2019-01-18 ASSESSMENT — PATIENT HEALTH QUESTIONNAIRE - PHQ9: SUM OF ALL RESPONSES TO PHQ QUESTIONS 1-9: 3

## 2019-02-07 ENCOUNTER — OFFICE VISIT (OUTPATIENT)
Dept: OPHTHALMOLOGY | Facility: CLINIC | Age: 34
End: 2019-02-07

## 2019-02-07 DIAGNOSIS — H52.13 MYOPIA OF BOTH EYES: Primary | ICD-10-CM

## 2019-02-07 ASSESSMENT — REFRACTION_WEARINGRX
OS_SPHERE: -7.50
OD_AXIS: 116
OS_CYLINDER: +0.25
OS_AXIS: 058
SPECS_TYPE: SVL
OD_CYLINDER: +0.50
OD_SPHERE: -8.25

## 2019-02-07 ASSESSMENT — EXTERNAL EXAM - RIGHT EYE: OD_EXAM: NORMAL

## 2019-02-07 ASSESSMENT — VISUAL ACUITY
OD_CC: 20/20
METHOD: SNELLEN - LINEAR
OD_CC+: -1
OS_CC: 20/20
OS_CC+: -1
CORRECTION_TYPE: GLASSES

## 2019-02-07 ASSESSMENT — REFRACTION_MANIFEST
OD_CYLINDER: +0.50
OD_SPHERE: -8.00
OS_AXIS: 022
OD_AXIS: 034
OS_SPHERE: -7.75
OS_CYLINDER: +0.75

## 2019-02-07 ASSESSMENT — REFRACTION_CURRENTRX
OD_BRAND: ACUVUE 1 DAY MOIST
OS_BRAND: ACUVUE 1 DAY MOIST
OD_BASECURVE: 8.5
OS_BASECURVE: 8.5
OD_DIAMETER: 14.5
OD_SPHERE: -7.00
OS_DIAMETER: 14.5
OS_SPHERE: -6.50

## 2019-02-07 ASSESSMENT — CONF VISUAL FIELD
OD_NORMAL: 1
OS_NORMAL: 1
METHOD: COUNTING FINGERS

## 2019-02-07 ASSESSMENT — TONOMETRY
OD_IOP_MMHG: 15
OS_IOP_MMHG: 12
IOP_METHOD: ICARE

## 2019-02-07 ASSESSMENT — SLIT LAMP EXAM - LIDS
COMMENTS: NORMAL
COMMENTS: NORMAL

## 2019-02-07 ASSESSMENT — CUP TO DISC RATIO
OS_RATIO: 0.40
OD_RATIO: 0.45

## 2019-02-07 ASSESSMENT — EXTERNAL EXAM - LEFT EYE: OS_EXAM: NORMAL

## 2019-02-07 NOTE — NURSING NOTE
Chief Complaints and History of Present Illnesses   Patient presents with     Annual Eye Exam     Chief Complaint(s) and History of Present Illness(es)     Annual Eye Exam     Laterality: both eyes    Onset: years ago    Quality: blurred vision    Severity: mild    Frequency: constantly    Timing: throughout the day    Context: distance vision    Course: stable    Associated symptoms: Negative for eye pain    Treatments tried: no treatments    Pain scale: 0/10              Comments     Blurred vision distance slowly over time. Also wears CLs. Patient denies eye pain or irritation. Does not use any eye drops.    Using Edvin CLs josé miguel -7.00 OD -6.5 OS    Serena Mario COT 12:14 PM February 7, 2019

## 2019-02-07 NOTE — PROGRESS NOTES
A/P  1.) Myopia each eye  -Fairly stable spec Rx, updated today  -Dilated ocular health unremarkable each eye  -Reviewed flashes/floaters and need for stat eye eval should they occur    Monitor 1-2 years routine, sooner prn    I have confirmed the patient's CC, HPI and reviewed Past Medical History, Past Surgical History, Social History, Family History, Problem List, Medication List and agree with Tech note.     Joseline Key, OD FAAO FSLS

## 2019-02-28 ENCOUNTER — TELEPHONE (OUTPATIENT)
Dept: OPTOMETRY | Facility: CLINIC | Age: 34
End: 2019-02-28

## 2019-02-28 NOTE — TELEPHONE ENCOUNTER
SWP. Dryness/discomfort with AV 1 Day Moist. Good vision    Will mail Dailies Total 1 and AV Oasys 1 Day trials for pt to compare

## 2019-02-28 NOTE — TELEPHONE ENCOUNTER
Note to Dr. Yesi Lynn RN 11:54 AM 02/28/19         [facility]    Phone Message    May a detailed message be left on voicemail: yes    Reason for Call: Other: .  pt tried new types of contacts and is not working for her, would like to discuss what options she has.    Action Taken: Message routed to:  Other: eye clinic

## 2019-03-08 DIAGNOSIS — F41.1 GAD (GENERALIZED ANXIETY DISORDER): ICD-10-CM

## 2019-03-08 DIAGNOSIS — F33.1 MAJOR DEPRESSIVE DISORDER, RECURRENT EPISODE, MODERATE (H): ICD-10-CM

## 2019-03-08 RX ORDER — CITALOPRAM HYDROBROMIDE 20 MG/1
20 TABLET ORAL DAILY
Qty: 90 TABLET | Refills: 0 | Status: SHIPPED | OUTPATIENT
Start: 2019-03-08 | End: 2019-06-13

## 2019-03-08 NOTE — TELEPHONE ENCOUNTER
"Requested Prescriptions   Pending Prescriptions Disp Refills     citalopram (CELEXA) 20 MG tablet  Last Written Prescription Date:  2/26/2018  Last Fill Quantity: 90 tablet,  # refills: 2   Last office visit: 1/17/2019 with prescribing provider:  Ru   Future Office Visit:     90 tablet 2     Sig: Take 1 tablet (20 mg) by mouth daily    SSRIs Protocol Passed - 3/8/2019 11:59 AM       Passed - PHQ-9 score less than 5 in past 6 months    Please review last PHQ-9 score.   PHQ-9 SCORE 2/27/2018 7/30/2018 1/17/2019   PHQ-9 Total Score MyChart - 4 (Minimal depression) 3 (Minimal depression)   PHQ-9 Total Score 3 4 3     ROCHELLE-7 SCORE 9/29/2017 2/27/2018 7/30/2018   Total Score 3 0 4            Passed - Medication is active on med list       Passed - Patient is age 18 or older       Passed - No active pregnancy on record       Passed - No positive pregnancy test in last 12 months       Passed - Recent (6 mo) or future (30 days) visit within the authorizing provider's specialty    Patient had office visit in the last 6 months or has a visit in the next 30 days with authorizing provider or within the authorizing provider's specialty.  See \"Patient Info\" tab in inbasket, or \"Choose Columns\" in Meds & Orders section of the refill encounter.               "

## 2019-04-11 DIAGNOSIS — I10 BENIGN ESSENTIAL HYPERTENSION: ICD-10-CM

## 2019-04-11 RX ORDER — LABETALOL 200 MG/1
200 TABLET, FILM COATED ORAL 2 TIMES DAILY
Qty: 180 TABLET | Refills: 2 | Status: SHIPPED | OUTPATIENT
Start: 2019-04-11 | End: 2019-05-29

## 2019-04-11 NOTE — TELEPHONE ENCOUNTER
"Requested Prescriptions   Pending Prescriptions Disp Refills     labetalol (NORMODYNE) 200 MG tablet 60 tablet 1     Sig: Take 1 tablet (200 mg) by mouth 2 times daily    Last Written Prescription Date:  1/17/2019  Last Fill Quantity: 60 tablet,  # refills: 1   Last office visit: 1/17/2019 with prescribing provider:  Ru   Future Office Visit:           Beta-Blockers Protocol Passed - 4/11/2019  4:09 PM        Passed - Blood pressure under 140/90 in past 12 months     BP Readings from Last 3 Encounters:   01/17/19 134/82   12/03/18 118/86   11/29/18 132/89                 Passed - Patient is age 6 or older        Passed - Recent (12 mo) or future (30 days) visit within the authorizing provider's specialty     Patient had office visit in the last 12 months or has a visit in the next 30 days with authorizing provider or within the authorizing provider's specialty.  See \"Patient Info\" tab in inbasket, or \"Choose Columns\" in Meds & Orders section of the refill encounter.              Passed - Medication is active on med list           "

## 2019-04-11 NOTE — TELEPHONE ENCOUNTER
Prescription approved per [facility] Refill Protocol for 12 months of refills since last appointment, which was 1/17/2019.

## 2019-04-26 ENCOUNTER — TELEPHONE (OUTPATIENT)
Dept: OPTOMETRY | Facility: CLINIC | Age: 34
End: 2019-04-26

## 2019-04-26 NOTE — TELEPHONE ENCOUNTER
Left message with pt after review with Dr. Key  Reviewed Dr. Key able to recheck glasses Rx-- no charge repeat refraction, but unsure if would have visit copay (pt currently scheduled next Thursday with Dr. Key)  Reviewed also may see tech only for glasses recheck-- no charge refraction and should not have copay since not seeing provider    Provided direct number to review how would like to proceed  Lamonte Lynn RN 10:11 AM 04/26/19          Will review request/plan with Dr. Key and call back  Lamonte Lynn RN 9:03 AM 04/26/19        [facility]    Phone Message    May a detailed message be left on voicemail: yes    Reason for Call: Other: Pt old glasses rx is much better than the new one. Pt doesn't want to come back in an pay the co pay. Pt would just like her old rx if possible. Please call pt back to advise.    Action Taken: Message routed to:  [facility]

## 2019-04-29 ENCOUNTER — TELEPHONE (OUTPATIENT)
Dept: OBGYN | Facility: CLINIC | Age: 34
End: 2019-04-29

## 2019-05-02 ENCOUNTER — OFFICE VISIT (OUTPATIENT)
Dept: OPHTHALMOLOGY | Facility: CLINIC | Age: 34
End: 2019-05-02

## 2019-05-02 DIAGNOSIS — H52.13 MYOPIA OF BOTH EYES: Primary | ICD-10-CM

## 2019-05-02 ASSESSMENT — REFRACTION_CURRENTRX
OS_DIAMETER: 14.3
OD_DIAMETER: 14.3
OS_BASECURVE: 8.5
OD_SPHERE: -7.00
OS_SPHERE: -7.00
OD_BRAND: ACUVUE OASYS 1 DAY
OD_BASECURVE: 8.5
OS_BRAND: ACUVUE OASYS 1 DAY

## 2019-05-02 ASSESSMENT — REFRACTION_MANIFEST
OD_CYLINDER: +0.50
OD_SPHERE: -8.25
OD_AXIS: 100
OS_SPHERE: -7.50
OS_CYLINDER: SPHERE

## 2019-05-02 ASSESSMENT — VISUAL ACUITY
OD_CC+: -1
OS_CC: 20/20
OS_CC+: -1
METHOD: SNELLEN - LINEAR
OD_CC: 20/20
CORRECTION_TYPE: GLASSES

## 2019-05-02 ASSESSMENT — SLIT LAMP EXAM - LIDS
COMMENTS: NORMAL
COMMENTS: NORMAL

## 2019-05-02 ASSESSMENT — REFRACTION_WEARINGRX
OS_AXIS: 058
OD_CYLINDER: +0.50
OS_CYLINDER: +0.25
OD_SPHERE: -8.25
SPECS_TYPE: SVL
OS_SPHERE: -7.50
OD_AXIS: 116

## 2019-05-02 ASSESSMENT — EXTERNAL EXAM - LEFT EYE: OS_EXAM: NORMAL

## 2019-05-02 ASSESSMENT — EXTERNAL EXAM - RIGHT EYE: OD_EXAM: NORMAL

## 2019-05-02 NOTE — PROGRESS NOTES
A/P  1.) Myopia each eye  -Rx recheck today. Numbers similar to last habitual  -Rechecked CLRx too - bump left eye up to -7.00. Likes Di 1 day better  -Scripts updated today    I have confirmed where necessary the patient's CC, HPI and reviewed Past Medical History, Past Surgical History, Social History, Family History, Problem List, Medication List and agree with Tech note.     Joseline Key, OD FAAO FSLS

## 2019-05-15 ENCOUNTER — PRENATAL OFFICE VISIT (OUTPATIENT)
Dept: OBGYN | Facility: CLINIC | Age: 34
End: 2019-05-15
Payer: COMMERCIAL

## 2019-05-15 ENCOUNTER — ANCILLARY PROCEDURE (OUTPATIENT)
Dept: ULTRASOUND IMAGING | Facility: CLINIC | Age: 34
End: 2019-05-15
Payer: COMMERCIAL

## 2019-05-15 VITALS
DIASTOLIC BLOOD PRESSURE: 80 MMHG | HEART RATE: 76 BPM | HEIGHT: 68 IN | WEIGHT: 175.8 LBS | BODY MASS INDEX: 26.64 KG/M2 | SYSTOLIC BLOOD PRESSURE: 122 MMHG

## 2019-05-15 DIAGNOSIS — Z13.79 GENETIC SCREENING: ICD-10-CM

## 2019-05-15 DIAGNOSIS — O09.91 SUPERVISION OF HIGH RISK PREGNANCY IN FIRST TRIMESTER: Primary | ICD-10-CM

## 2019-05-15 DIAGNOSIS — O36.80X0 PREGNANCY WITH INCONCLUSIVE FETAL VIABILITY: ICD-10-CM

## 2019-05-15 DIAGNOSIS — O36.80X0 PREGNANCY WITH INCONCLUSIVE FETAL VIABILITY: Primary | ICD-10-CM

## 2019-05-15 PROBLEM — O09.90 SUPERVISION OF HIGH-RISK PREGNANCY: Status: ACTIVE | Noted: 2019-05-15

## 2019-05-15 LAB
ABO + RH BLD: NORMAL
ABO + RH BLD: NORMAL
ALBUMIN UR-MCNC: NEGATIVE MG/DL
APPEARANCE UR: CLEAR
BACTERIA #/AREA URNS HPF: ABNORMAL /HPF
BILIRUB UR QL STRIP: NEGATIVE
BLD GP AB SCN SERPL QL: NORMAL
BLOOD BANK CMNT PATIENT-IMP: NORMAL
COLOR UR AUTO: YELLOW
ERYTHROCYTE [DISTWIDTH] IN BLOOD BY AUTOMATED COUNT: 11.8 % (ref 10–15)
GLUCOSE UR STRIP-MCNC: NEGATIVE MG/DL
HCT VFR BLD AUTO: 37.3 % (ref 35–47)
HGB BLD-MCNC: 12.7 G/DL (ref 11.7–15.7)
HGB UR QL STRIP: NEGATIVE
KETONES UR STRIP-MCNC: NEGATIVE MG/DL
LEUKOCYTE ESTERASE UR QL STRIP: ABNORMAL
MCH RBC QN AUTO: 31.8 PG (ref 26.5–33)
MCHC RBC AUTO-ENTMCNC: 34 G/DL (ref 31.5–36.5)
MCV RBC AUTO: 93 FL (ref 78–100)
MUCOUS THREADS #/AREA URNS LPF: PRESENT /LPF
NITRATE UR QL: NEGATIVE
PH UR STRIP: 7 PH (ref 5–7)
PLATELET # BLD AUTO: 406 10E9/L (ref 150–450)
RBC # BLD AUTO: 4 10E12/L (ref 3.8–5.2)
RBC #/AREA URNS AUTO: ABNORMAL /HPF
SOURCE: ABNORMAL
SP GR UR STRIP: 1.01 (ref 1–1.03)
SPECIMEN EXP DATE BLD: NORMAL
UROBILINOGEN UR STRIP-ACNC: 0.2 EU/DL (ref 0.2–1)
WBC # BLD AUTO: 5.5 10E9/L (ref 4–11)
WBC #/AREA URNS AUTO: ABNORMAL /HPF

## 2019-05-15 PROCEDURE — 86780 TREPONEMA PALLIDUM: CPT | Performed by: OBSTETRICS & GYNECOLOGY

## 2019-05-15 PROCEDURE — 76817 TRANSVAGINAL US OBSTETRIC: CPT | Performed by: OBSTETRICS & GYNECOLOGY

## 2019-05-15 PROCEDURE — 87086 URINE CULTURE/COLONY COUNT: CPT | Performed by: OBSTETRICS & GYNECOLOGY

## 2019-05-15 PROCEDURE — 86762 RUBELLA ANTIBODY: CPT | Performed by: OBSTETRICS & GYNECOLOGY

## 2019-05-15 PROCEDURE — 86850 RBC ANTIBODY SCREEN: CPT | Performed by: OBSTETRICS & GYNECOLOGY

## 2019-05-15 PROCEDURE — 86901 BLOOD TYPING SEROLOGIC RH(D): CPT | Performed by: OBSTETRICS & GYNECOLOGY

## 2019-05-15 PROCEDURE — 87340 HEPATITIS B SURFACE AG IA: CPT | Performed by: OBSTETRICS & GYNECOLOGY

## 2019-05-15 PROCEDURE — 86900 BLOOD TYPING SEROLOGIC ABO: CPT | Performed by: OBSTETRICS & GYNECOLOGY

## 2019-05-15 PROCEDURE — 87389 HIV-1 AG W/HIV-1&-2 AB AG IA: CPT | Performed by: OBSTETRICS & GYNECOLOGY

## 2019-05-15 PROCEDURE — 85027 COMPLETE CBC AUTOMATED: CPT | Performed by: OBSTETRICS & GYNECOLOGY

## 2019-05-15 PROCEDURE — 36415 COLL VENOUS BLD VENIPUNCTURE: CPT | Performed by: OBSTETRICS & GYNECOLOGY

## 2019-05-15 PROCEDURE — 99207 ZZC FIRST OB VISIT: CPT | Performed by: OBSTETRICS & GYNECOLOGY

## 2019-05-15 PROCEDURE — 81001 URINALYSIS AUTO W/SCOPE: CPT | Performed by: OBSTETRICS & GYNECOLOGY

## 2019-05-15 ASSESSMENT — ANXIETY QUESTIONNAIRES
5. BEING SO RESTLESS THAT IT IS HARD TO SIT STILL: NOT AT ALL
2. NOT BEING ABLE TO STOP OR CONTROL WORRYING: NOT AT ALL
IF YOU CHECKED OFF ANY PROBLEMS ON THIS QUESTIONNAIRE, HOW DIFFICULT HAVE THESE PROBLEMS MADE IT FOR YOU TO DO YOUR WORK, TAKE CARE OF THINGS AT HOME, OR GET ALONG WITH OTHER PEOPLE: NOT DIFFICULT AT ALL
GAD7 TOTAL SCORE: 0
1. FEELING NERVOUS, ANXIOUS, OR ON EDGE: NOT AT ALL
7. FEELING AFRAID AS IF SOMETHING AWFUL MIGHT HAPPEN: NOT AT ALL
3. WORRYING TOO MUCH ABOUT DIFFERENT THINGS: NOT AT ALL
6. BECOMING EASILY ANNOYED OR IRRITABLE: NOT AT ALL

## 2019-05-15 ASSESSMENT — MIFFLIN-ST. JEOR: SCORE: 1545.92

## 2019-05-15 ASSESSMENT — PATIENT HEALTH QUESTIONNAIRE - PHQ9
SUM OF ALL RESPONSES TO PHQ QUESTIONS 1-9: 0
5. POOR APPETITE OR OVEREATING: NOT AT ALL

## 2019-05-15 NOTE — PROGRESS NOTES
inatal at 10 wks  AMA, patient will be 34 in July  Level II at 18 -20 wks  Will need growth scans later in pregnancy since patient has chronic hypertension  Has been feeling light headed after taking med recently, after they raised labetalol dose to 200  We will have her go back down to 100mg and keep a diary for our next visit  Primary care manages her HTN    Discussed celexa in pregnancy, needs to continue due to her underlying depression history

## 2019-05-15 NOTE — PROGRESS NOTES
SUBJECTIVE:     HPI:    This is a 33 year old female patient,  who presents for her first obstetrical visit.    NOHEMI: 2020, by Last Menstrual Period.  She is 7w0d weeks.  Her cycles are regular.  Her last menstrual period was normal.   Since her LMP, she has experienced  nausea, emesis, fatigue, loss of appetite, constipation and tenderness, lightheadedness, shortness of breath, diarrhea, night sweats).   She denies abdominal pain, headache, vaginal discharge, dysuria, pelvic pain, urinary urgency, lightheadedness, urinary frequency, vaginal bleeding and hemorrhoids.    Additional History: prior miscarriage  Patient will be 34 in July so genetic screening is indicated, wants to do inatal    Have you travelled during the pregnancy? NO  Have your sexual partner(s) travelled during the pregnancy?No      HISTORY:   Planned Pregnancy: Yes  Marital Status:   Occupation: Dance Teacher and performer  Living in Household: Spouse    Past History:  Her past medical history   Past Medical History:   Diagnosis Date     Asthma      C. difficile diarrhea      Depression      Depressive disorder      Generalized anxiety disorder      Hypertension    .      She has a history of  SAB 2018    Since her last LMP she denies use of alcohol, tobacco and street drugs.    Past medical, surgical, social and family history were reviewed and updated in [facility].        Current Outpatient Medications   Medication     albuterol (PROAIR HFA/PROVENTIL HFA/VENTOLIN HFA) 108 (90 BASE) MCG/ACT Inhaler     Cholecalciferol (VITAMIN D3) 2000 UNITS CAPS     citalopram (CELEXA) 20 MG tablet     labetalol (NORMODYNE) 200 MG tablet     Prenatal Multivit-Min-Fe-FA (PRENATAL VITAMINS PO)     LORazepam (ATIVAN) 1 MG tablet     No current facility-administered medications for this visit.        ROS:   12 point review of systems negative other than symptoms noted below.  Constitutional: Fatigue and Loss of Appetite  Breast:  "Tenderness  Cardiovascular: Lightheadedness  Respiratory: Shortness of Breath  Gastrointestinal: Bloating, Constipation, Diarrhea, Nausea and Vomiting  Genitourinary: Night Sweats      OBJECTIVE:     EXAM:  /80 (BP Location: Left arm, Patient Position: Sitting, Cuff Size: Adult Regular)   Pulse 76   Ht 1.727 m (5' 8\")   Wt 79.7 kg (175 lb 12.8 oz)   LMP 2019   BMI 26.73 kg/m   Body mass index is 26.73 kg/m .    GENERAL: healthy, alert and no distress  EYES: Eyes grossly normal to inspection, PERRL and conjunctivae and sclerae normal  SKIN: no suspicious lesions or rashes  NEURO: Normal strength and tone, mentation intact and speech normal  PSYCH: mentation appears normal, affect normal/bright    ASSESSMENT/PLAN:       ICD-10-CM    1. Supervision of high risk pregnancy in first trimester O09.91 ABO/Rh type and screen     Hepatitis B surface antigen     CBC with platelets     HIV Antigen Antibody Combo     Rubella Antibody IgG Quantitative     Treponema Abs w Reflex to RPR and Titer     Urine Culture Aerobic Bacterial     *UA reflex to Microscopic     Urine Microscopic   2. Genetic screening Z13.79 Non Invasive Prenatal Test Cell Free DNA       33 year old , 7w0d weeks of pregnancy with NOHEMI of 2020, by Last Menstrual Period    Discussed as follows:disc genetic screening    inatal at 10 wks  AMA, patient will be 34 in July  Level II at 18 -20 wks  Will need growth scans later in pregnancy since patient has chronic hypertension  Has been feeling light headed after taking med recently, after they raised labetalol dose to 200  We will have her go back down to 100mg and keep a diary for our next visit  Primary care manages her HTN    Discussed celexa in pregnancy, needs to continue due to her underlying depression history    Counseling given:   - Follow up in 4-6 weeks for return OB visit.  - Recommended weight gain for pregnancy: 25-35 lbs.         PLAN/PATIENT INSTRUCTIONS:    level II at " 18-20 wks for ama  Return 3 wks for inatal  Consent signed for that  See me 4 wks    Reduce labetalol back to 100 mg due to her dizzy spells after she takes it, patient checks home bp and says her pressures are too low after she takes 200 mg    labs          Tata Moncada MD  Important Information for Provider:     Prenatal OB Questionnaire      Allergies as of 5/15/2019:    Allergies as of 05/15/2019 - Reviewed 05/15/2019   Allergen Reaction Noted     Amoxicillin  08/22/2017     Codeine  08/22/2017     Penicillins  08/22/2017       Current medications are:  Current Outpatient Medications   Medication Sig Dispense Refill     albuterol (PROAIR HFA/PROVENTIL HFA/VENTOLIN HFA) 108 (90 BASE) MCG/ACT Inhaler Inhale 2 puffs into the lungs every 6 hours       Cholecalciferol (VITAMIN D3) 2000 UNITS CAPS Take 2,000 Units by mouth       citalopram (CELEXA) 20 MG tablet Take 1 tablet (20 mg) by mouth daily 90 tablet 0     labetalol (NORMODYNE) 200 MG tablet Take 1 tablet (200 mg) by mouth 2 times daily 180 tablet 2     Prenatal Multivit-Min-Fe-FA (PRENATAL VITAMINS PO) Take 1 tablet by mouth       LORazepam (ATIVAN) 1 MG tablet Take 1 tablet (1 mg) by mouth every 8 hours as needed for anxiety (Takes 1/2 to 1 tab prn) (Patient not taking: Reported on 5/15/2019) 30 tablet 0         Early ultrasound screening tool:    Does patient have irregular periods?  No  Did patient use hormonal birth control in the three months prior to positive urine pregnancy test? No  Is the patient breastfeeding?  No  Is the patient 10 weeks or greater at time of education visit?  No    Viable IUP in today's US

## 2019-05-16 LAB
HBV SURFACE AG SERPL QL IA: NONREACTIVE
HIV 1+2 AB+HIV1 P24 AG SERPL QL IA: NONREACTIVE
RUBV IGG SERPL IA-ACNC: 40 IU/ML
T PALLIDUM AB SER QL: NONREACTIVE

## 2019-05-16 ASSESSMENT — ANXIETY QUESTIONNAIRES: GAD7 TOTAL SCORE: 0

## 2019-05-17 LAB
BACTERIA SPEC CULT: NORMAL
Lab: NORMAL
SPECIMEN SOURCE: NORMAL

## 2019-05-29 ENCOUNTER — OFFICE VISIT (OUTPATIENT)
Dept: FAMILY MEDICINE | Facility: CLINIC | Age: 34
End: 2019-05-29
Payer: COMMERCIAL

## 2019-05-29 VITALS
SYSTOLIC BLOOD PRESSURE: 118 MMHG | RESPIRATION RATE: 16 BRPM | HEIGHT: 68 IN | TEMPERATURE: 99.1 F | BODY MASS INDEX: 27.04 KG/M2 | DIASTOLIC BLOOD PRESSURE: 68 MMHG | HEART RATE: 81 BPM | OXYGEN SATURATION: 99 % | WEIGHT: 178.4 LBS

## 2019-05-29 DIAGNOSIS — I10 BENIGN ESSENTIAL HYPERTENSION: Primary | ICD-10-CM

## 2019-05-29 DIAGNOSIS — J45.20 MILD INTERMITTENT ASTHMA WITHOUT COMPLICATION: ICD-10-CM

## 2019-05-29 PROCEDURE — 99214 OFFICE O/P EST MOD 30 MIN: CPT | Performed by: FAMILY MEDICINE

## 2019-05-29 RX ORDER — PYRIDOXINE HCL (VITAMIN B6) 25 MG
25 TABLET ORAL DAILY
Status: ON HOLD | COMMUNITY
End: 2019-12-30

## 2019-05-29 RX ORDER — LABETALOL 100 MG/1
100 TABLET, FILM COATED ORAL 2 TIMES DAILY
Qty: 60 TABLET | Refills: 3 | COMMUNITY
Start: 2019-05-29 | End: 2019-07-05

## 2019-05-29 ASSESSMENT — MIFFLIN-ST. JEOR: SCORE: 1557.72

## 2019-05-29 NOTE — PROGRESS NOTES
"Subjective     Vivi Mccall is a 33 year old female who presents to clinic today for the following health issues:    HPI   Hypertension Follow-up      Do you check your blood pressure regularly outside of the clinic? Yes     Are you following a low salt diet? Yes    Are your blood pressures ever more than 140 on the top number (systolic) OR more   than 90 on the bottom number (diastolic), for example 140/90? No    Amount of exercise or physical activity: 4-5 days/week for an average of 45-60 minutes    Problems taking medications regularly: No    Medication side effects: none    Diet: low salt    Was seen by her OBGYN and noted to have low BP readings with dizziness, so Labetalol was decreased.   BP a lot better on the lower dose.   Will sometimes skip a dose though is BP reading is too low.      Reviewed and updated as needed this visit by Provider  Tobacco  Allergies  Meds  Problems  Med Hx  Surg Hx  Fam Hx         Review of Systems   ROS COMP: CONSTITUTIONAL: NEGATIVE for fever or chills.  INTEGUMENTARY/SKIN: NEGATIVE for worrisome rashes, moles or lesions  EYES: NEGATIVE for vision changes or irritation  ENT/MOUTH: NEGATIVE for ear, mouth and throat problems  RESP: NEGATIVE for significant cough or SOB  CV: NEGATIVE for chest pain, palpitations or peripheral edema  GI: NEGATIVE for nausea, abdominal pain, heartburn, or change in bowel habits  : NEGATIVE for frequency, dysuria, or hematuria  MUSCULOSKELETAL: NEGATIVE for significant arthralgias or myalgia  NEURO: NEGATIVE for weakness, dizziness or paresthesias  HEME: NEGATIVE for bleeding problems      Objective    /68 (BP Location: Left arm, Patient Position: Sitting, Cuff Size: Adult Regular)   Pulse 81   Temp 99.1  F (37.3  C) (Tympanic)   Resp 16   Ht 1.727 m (5' 8\")   Wt 80.9 kg (178 lb 6.4 oz)   LMP 03/27/2019   SpO2 99%   Breastfeeding? No   BMI 27.13 kg/m    Body mass index is 27.13 kg/m .  Physical Exam   GENERAL: Alert and " "no distress  EYES: Eyes grossly normal to inspection, PERRL and conjunctivae and sclerae normal  HENT: ear canals and TM's normal, nose and mouth without ulcers or lesions  NECK: no adenopathy, no asymmetry, masses, or scars and thyroid normal to palpation  RESP: lungs clear to auscultation - no rales, rhonchi or wheezes  CV: regular rate and rhythm, normal S1 S2, no S3 or S4, no murmur, click or rub, no peripheral edema and peripheral pulses strong  ABDOMEN: soft, nontender, and bowel sounds normal  MS: no gross musculoskeletal defects noted, no edema  SKIN: no suspicious lesions or rashes  NEURO: Normal strength and tone, mentation intact and speech normal  PSYCH: appears somewhat anxious, poor eye contact.  No depression.    Diagnostic Test Results:  Labs reviewed in Epic  none         Assessment & Plan   Problem List Items Addressed This Visit     Benign essential hypertension - Primary    Relevant Medications    labetalol (NORMODYNE) 100 MG tablet    Mild intermittent asthma without complication         Will decrease Labetolol to 100 mg po BID.  May skip a dose if BP <100/60.  If she is consistently skipping doses, may consider decreasing Labetolol to 50 mg po BID.  Will RTC in 1-2 weeks to re-check BP.   Will also bring in BP log and BP machine to check for accuracy.   Asthma well controlled, ACT is 25.  Continue Albuterol PRN cough/wheezing.    BMI:   Estimated body mass index is 27.13 kg/m  as calculated from the following:    Height as of this encounter: 1.727 m (5' 8\").    Weight as of this encounter: 80.9 kg (178 lb 6.4 oz).       See Patient Instructions  Return in about 2 weeks (around 6/12/2019) for BP Recheck.    Jaycee Vences, DO  [facility]      "

## 2019-05-30 ASSESSMENT — ASTHMA QUESTIONNAIRES: ACT_TOTALSCORE: 25

## 2019-06-05 DIAGNOSIS — Z13.79 GENETIC SCREENING: ICD-10-CM

## 2019-06-05 PROCEDURE — 99000 SPECIMEN HANDLING OFFICE-LAB: CPT | Performed by: OBSTETRICS & GYNECOLOGY

## 2019-06-05 PROCEDURE — 40000791 ZZHCL STATISTIC VERIFI PRENATAL TRISOMY 21,18,13: Mod: 90 | Performed by: OBSTETRICS & GYNECOLOGY

## 2019-06-05 PROCEDURE — 36415 COLL VENOUS BLD VENIPUNCTURE: CPT | Performed by: OBSTETRICS & GYNECOLOGY

## 2019-06-10 ENCOUNTER — TELEPHONE (OUTPATIENT)
Dept: OBGYN | Facility: CLINIC | Age: 34
End: 2019-06-10

## 2019-06-10 DIAGNOSIS — F41.1 GAD (GENERALIZED ANXIETY DISORDER): ICD-10-CM

## 2019-06-10 DIAGNOSIS — F33.1 MAJOR DEPRESSIVE DISORDER, RECURRENT EPISODE, MODERATE (H): ICD-10-CM

## 2019-06-10 NOTE — TELEPHONE ENCOUNTER
Pt calling inquiring on gender  Informed having a baby GIRL.    Pt verbalized understanding, in agreement with plan, and voiced no further questions.

## 2019-06-10 NOTE — TELEPHONE ENCOUNTER
Innatal results: negative    TEST RESULT INTERPRETATION   Chromosome 21 No aneuploidy detected  Results consistent with two copies of chromosome 21   Chromosome 18 No aneuploidy detected  Results consistent with two copies of chromosome 18   Chromosome 13 No aneuploidy detected  Results consistent with two copies of chromosome 13   Sex Chromosome No aneuploidy detected  Results consistent with two sex chromosomes:  female     Called pt with results. Left detailed vm with negative results. Encouraged pt to call with questions or inquire on the sex of baby.

## 2019-06-11 LAB — LAB SCANNED RESULT: NORMAL

## 2019-06-13 RX ORDER — CITALOPRAM HYDROBROMIDE 20 MG/1
20 TABLET ORAL DAILY
Qty: 90 TABLET | Refills: 1 | Status: SHIPPED | OUTPATIENT
Start: 2019-06-13 | End: 2019-07-23

## 2019-06-18 ENCOUNTER — TELEPHONE (OUTPATIENT)
Dept: OBGYN | Facility: CLINIC | Age: 34
End: 2019-06-18

## 2019-06-18 NOTE — TELEPHONE ENCOUNTER
11w6d, NOHEMI 1/1/20 Pt had some bleeding from rectum today. Has a history of hemorrhoids. Pt does not feel that she has been straining or having problems with constipation. Informed pt she can use witch hazel pad, preparation H or Anusol to reduce the swelling. Informed of eating high fiber diet and staying well hydrated. Can use fiber supplement and colace to help with keeping stools soft and keeping regular. Pt has appointment schedule for tomorrow with Dr. Park.

## 2019-06-19 ENCOUNTER — TRANSCRIBE ORDERS (OUTPATIENT)
Dept: MATERNAL FETAL MEDICINE | Facility: CLINIC | Age: 34
End: 2019-06-19

## 2019-06-19 ENCOUNTER — PRENATAL OFFICE VISIT (OUTPATIENT)
Dept: OBGYN | Facility: CLINIC | Age: 34
End: 2019-06-19
Payer: COMMERCIAL

## 2019-06-19 VITALS — BODY MASS INDEX: 26.61 KG/M2 | SYSTOLIC BLOOD PRESSURE: 126 MMHG | DIASTOLIC BLOOD PRESSURE: 80 MMHG | WEIGHT: 175 LBS

## 2019-06-19 DIAGNOSIS — O26.90 PREGNANCY RELATED CONDITION: Primary | ICD-10-CM

## 2019-06-19 DIAGNOSIS — O10.919 CHRONIC HYPERTENSION IN PREGNANCY: ICD-10-CM

## 2019-06-19 DIAGNOSIS — Z3A.12 12 WEEKS GESTATION OF PREGNANCY: Primary | ICD-10-CM

## 2019-06-19 DIAGNOSIS — O09.91 SUPERVISION OF HIGH RISK PREGNANCY IN FIRST TRIMESTER: ICD-10-CM

## 2019-06-19 PROCEDURE — 99207 ZZC PRENATAL VISIT: CPT | Performed by: OBSTETRICS & GYNECOLOGY

## 2019-06-19 NOTE — PROGRESS NOTES
Doing well today.  Still a bit fatigued by the end of the day  No nausea/vomiting; good appetite  No vb/spotting  +hx hemorrhoids --has had some bleeding with bowel movements; discussed importance of avoiding constipation or straining; discussed OTC agents  Feeling better on different dosing of labetolol --doing 100mg BID currently  Will arrange Level II us at 18-20wks --order placed  RTC 4wks --AFP only at that time

## 2019-07-05 ENCOUNTER — OFFICE VISIT (OUTPATIENT)
Dept: FAMILY MEDICINE | Facility: CLINIC | Age: 34
End: 2019-07-05
Payer: COMMERCIAL

## 2019-07-05 VITALS
WEIGHT: 175.5 LBS | HEIGHT: 68 IN | BODY MASS INDEX: 26.6 KG/M2 | DIASTOLIC BLOOD PRESSURE: 72 MMHG | SYSTOLIC BLOOD PRESSURE: 102 MMHG | RESPIRATION RATE: 14 BRPM | HEART RATE: 92 BPM | OXYGEN SATURATION: 100 % | TEMPERATURE: 98.4 F

## 2019-07-05 DIAGNOSIS — B36.9 FUNGAL RASH OF TORSO: ICD-10-CM

## 2019-07-05 DIAGNOSIS — I10 BENIGN ESSENTIAL HYPERTENSION: Primary | ICD-10-CM

## 2019-07-05 PROCEDURE — 99214 OFFICE O/P EST MOD 30 MIN: CPT | Performed by: FAMILY MEDICINE

## 2019-07-05 RX ORDER — LABETALOL 100 MG/1
50 TABLET, FILM COATED ORAL 2 TIMES DAILY
Qty: 30 TABLET | Refills: 1 | Status: SHIPPED | OUTPATIENT
Start: 2019-07-05 | End: 2019-08-20

## 2019-07-05 RX ORDER — NYSTATIN 100000 [USP'U]/G
POWDER TOPICAL
Qty: 30 G | Refills: 1 | Status: ON HOLD | OUTPATIENT
Start: 2019-07-05 | End: 2019-12-10

## 2019-07-05 ASSESSMENT — MIFFLIN-ST. JEOR: SCORE: 1544.56

## 2019-07-05 NOTE — PROGRESS NOTES
"Subjective     Vivi Mccall is a 33 year old female who presents to clinic today for the following health issues:    HPI   Hypertension Follow-up      Do you check your blood pressure regularly outside of the clinic? Yes     Are you following a low salt diet? Yes    Are your blood pressures ever more than 140 on the top number (systolic) OR more   than 90 on the bottom number (diastolic), for example 140/90? 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 Patient is skipping does of medication if b/p is 120 or below    Medication side effects: See above    Diet: low salt    Check rash under the right breast--has had this in the past.  Usually flares up during the summer.    Reviewed and updated as needed this visit by Provider  Tobacco  Allergies  Meds  Problems  Med Hx  Surg Hx  Fam Hx         Review of Systems   ROS COMP: CONSTITUTIONAL: NEGATIVE for fever or chills.  EYES: NEGATIVE for vision changes or irritation  ENT/MOUTH: NEGATIVE for ear, mouth and throat problems  RESP: NEGATIVE for significant cough or SOB  CV: NEGATIVE for chest pain, palpitations or peripheral edema  GI: NEGATIVE for nausea, abdominal pain, heartburn, or change in bowel habits  : NEGATIVE for frequency, dysuria, or hematuria  MUSCULOSKELETAL: NEGATIVE for significant arthralgias or myalgia  NEURO: NEGATIVE for weakness, dizziness or paresthesias  HEME: NEGATIVE for bleeding problems      Objective    /72 (BP Location: Right arm, Patient Position: Sitting, Cuff Size: Adult Regular)   Pulse 92   Temp 98.4  F (36.9  C) (Tympanic)   Resp 14   Ht 1.727 m (5' 8\")   Wt 79.6 kg (175 lb 8 oz)   LMP 03/27/2019   SpO2 100%   Breastfeeding? No   BMI 26.68 kg/m    Body mass index is 26.68 kg/m .  Physical Exam   GENERAL: Alert and no acute distress  EYES: Eyes grossly normal to inspection, PERRL and conjunctivae and sclerae normal  HENT: Nose " "and mouth without ulcers or lesions  NECK: no adenopathy, no asymmetry, masses, or scars and thyroid normal to palpation  RESP: lungs clear to auscultation - no rales, rhonchi or wheezes  CV: regular rate and rhythm, normal S1 S2, no S3 or S4, no murmur, click or rub, no peripheral edema and peripheral pulses strong  MS: no gross musculoskeletal defects noted, no edema  SKIN: +erythematous/scaly rash underneath the right breast   NEURO: Normal strength and tone, mentation intact and speech normal  PSYCH: mentation appears normal, affect normal/bright    Diagnostic Test Results:  Labs reviewed in Epic        Assessment & Plan   Problem List Items Addressed This Visit     Benign essential hypertension - Primary    Relevant Medications    labetalol (NORMODYNE) 100 MG tablet      Other Visit Diagnoses     Fungal rash of torso        Relevant Medications    nystatin (MYCOSTATIN) 044973 UNIT/GM external powder           --Will decrease Labetolol to 50 mg po BID.  May skip a dose if BP <100/60.  If she is consistently skipping doses, may consider decreasing Labetolol to 25 mg po BID.  Will RTC in 2-3 weeks to re-check BP.        --Rx Nystatin powder for fungal/rash infection.  Keep area dry/clean and do not wear any tight clothes/bras.  RTC in 2-3 weeks to get this re-checked.      BMI:   Estimated body mass index is 26.61 kg/m  as calculated from the following:    Height as of 5/29/19: 1.727 m (5' 8\").    Weight as of 6/19/19: 79.4 kg (175 lb).           See Patient Instructions  Return in about 3 weeks (around 7/26/2019) for rash (if still present or worsening).    Jaycee Vences, DO  [facility]      "

## 2019-07-16 ENCOUNTER — PRENATAL OFFICE VISIT (OUTPATIENT)
Dept: OBGYN | Facility: CLINIC | Age: 34
End: 2019-07-16
Payer: COMMERCIAL

## 2019-07-16 VITALS — WEIGHT: 176 LBS | SYSTOLIC BLOOD PRESSURE: 108 MMHG | BODY MASS INDEX: 26.76 KG/M2 | DIASTOLIC BLOOD PRESSURE: 68 MMHG

## 2019-07-16 DIAGNOSIS — Z36.1 NEED FOR MATERNAL SERUM ALPHA-PROTEIN (MSAFP) SCREENING: ICD-10-CM

## 2019-07-16 DIAGNOSIS — O10.919 CHRONIC HYPERTENSION IN PREGNANCY: ICD-10-CM

## 2019-07-16 DIAGNOSIS — O09.92 SUPERVISION OF HIGH RISK PREGNANCY IN SECOND TRIMESTER: Primary | ICD-10-CM

## 2019-07-16 DIAGNOSIS — Z3A.15 15 WEEKS GESTATION OF PREGNANCY: ICD-10-CM

## 2019-07-16 PROCEDURE — 99000 SPECIMEN HANDLING OFFICE-LAB: CPT | Performed by: OBSTETRICS & GYNECOLOGY

## 2019-07-16 PROCEDURE — 36415 COLL VENOUS BLD VENIPUNCTURE: CPT | Performed by: OBSTETRICS & GYNECOLOGY

## 2019-07-16 PROCEDURE — 99207 ZZC PRENATAL VISIT: CPT | Performed by: OBSTETRICS & GYNECOLOGY

## 2019-07-16 PROCEDURE — 82105 ALPHA-FETOPROTEIN SERUM: CPT | Mod: 90 | Performed by: OBSTETRICS & GYNECOLOGY

## 2019-07-16 NOTE — PROGRESS NOTES
Doing well today.  No concerns  Had 2d of lower pelvic pain/cramping over the weekend; no vb/spotting and now resolved  Occ constipation --uses natural calm to help movement  Continues to dance and perform --no issues  AFP only today; normal innatal  PCP lowered dose of labetolol to 50mg BID --had not been taking her meds due to low blood pressures; lowered dosage 2 weeks ago and now home BPs ranging from 116-120s/60-70s; taking her meds daily  Has level II us scheduled with ABBY on 8/1  RTC 4wks

## 2019-07-17 ENCOUNTER — TELEPHONE (OUTPATIENT)
Dept: OBGYN | Facility: CLINIC | Age: 34
End: 2019-07-17

## 2019-07-17 NOTE — TELEPHONE ENCOUNTER
LMTCB to inform of message.       I meant to discuss starting a daily ASA with this patient today and forgot.  Due to her AMA status and chronic hypertension, the current recommendation is to take a daily ASA (81mg) for the remainder of her pregnancy to lower her risk of pre-eclampsia.  I would like her to start this now and continue through the remainder of her pregnancy.  We can discuss further at her next visit but please reassure her that this is very safe and important given her risk factors of AMA, CHTN and nulliparity.   Thanks!   ec

## 2019-07-17 NOTE — TELEPHONE ENCOUNTER
Informed pt of Dr. Park recommendations to begin daily ASA 81 mg.   Pt agrees to plan.  Soco Barbosa RN on 7/17/2019 at 12:05 PM

## 2019-07-18 LAB
# FETUSES US: NORMAL
# FETUSES: 1
AFP ADJ MOM AMN: 0.86
AFP SERPL-MCNC: 27 NG/ML
AGE - REPORTED: 35.5 YR
CURRENT SMOKER: NO
CURRENT SMOKER: NO
DIABETES STATUS PATIENT: NO
FAMILY MEMBER DISEASES HX: NO
FAMILY MEMBER DISEASES HX: NO
GA METHOD: NORMAL
GA METHOD: NORMAL
GA: NORMAL WK
IDDM PATIENT QL: NO
INTEGRATED SCN PATIENT-IMP: NORMAL
LMP START DATE: NORMAL
MONOCHORIONIC TWINS: NO
SERVICE CMNT-IMP: NO
SPECIMEN DRAWN SERPL: NORMAL
VALPROIC/CARBAMAZEPINE STATUS: NO
WEIGHT UNITS: NORMAL

## 2019-07-22 DIAGNOSIS — F33.1 MAJOR DEPRESSIVE DISORDER, RECURRENT EPISODE, MODERATE (H): ICD-10-CM

## 2019-07-22 DIAGNOSIS — F41.1 GAD (GENERALIZED ANXIETY DISORDER): ICD-10-CM

## 2019-07-22 NOTE — TELEPHONE ENCOUNTER
"Requested Prescriptions   Pending Prescriptions Disp Refills     citalopram (CELEXA) 20 MG tablet  Last Written Prescription Date:  6/13/2019  Last Fill Quantity: 90 tablet,  # refills: 1   Last office visit: 7/5/2019 with prescribing provider:  Ru   Future Office Visit:   Next 5 appointments (look out 90 days)    Aug 20, 2019  9:50 AM CDT  ESTABLISHED PRENATAL with Anamaria Park MD  [facility] ([facility]) [address] [place] [id number]  [phone]          90 tablet 1     Sig: Take 1 tablet (20 mg) by mouth daily       SSRIs Protocol Failed - 7/22/2019  4:09 PM        Failed - No active pregnancy on record        Passed - PHQ-9 score less than 5 in past 6 months     Please review last PHQ-9 score.   PHQ-9 SCORE 7/30/2018 1/17/2019 5/15/2019   PHQ-9 Total Score MyChart 4 (Minimal depression) 3 (Minimal depression) -   PHQ-9 Total Score 4 3 0     ROCHELLE-7 SCORE 2/27/2018 7/30/2018 5/15/2019   Total Score 0 4 0             Passed - Medication is active on med list        Passed - Patient is age 18 or older        Passed - No positive pregnancy test in last 12 months        Passed - Recent (6 mo) or future (30 days) visit within the authorizing provider's specialty     Patient had office visit in the last 6 months or has a visit in the next 30 days with authorizing provider or within the authorizing provider's specialty.  See \"Patient Info\" tab in inbasket, or \"Choose Columns\" in Meds & Orders section of the refill encounter.               "

## 2019-07-23 ENCOUNTER — MYC MEDICAL ADVICE (OUTPATIENT)
Dept: FAMILY MEDICINE | Facility: CLINIC | Age: 34
End: 2019-07-23

## 2019-07-23 RX ORDER — CITALOPRAM HYDROBROMIDE 20 MG/1
20 TABLET ORAL DAILY
Qty: 90 TABLET | Refills: 1 | Status: SHIPPED | OUTPATIENT
Start: 2019-07-23 | End: 2019-11-09

## 2019-07-23 NOTE — TELEPHONE ENCOUNTER
Noticed patient has rx on file with refills. Contacting pharmacy to verify rx on file.     New rx sent and confirmed that pharmacy has received.

## 2019-07-25 ENCOUNTER — PRE VISIT (OUTPATIENT)
Dept: MATERNAL FETAL MEDICINE | Facility: CLINIC | Age: 34
End: 2019-07-25

## 2019-07-30 DIAGNOSIS — I10 BENIGN ESSENTIAL HYPERTENSION: ICD-10-CM

## 2019-07-30 NOTE — TELEPHONE ENCOUNTER
"labetalol (NORMODYNE) 100 MG tablet  Last Written Prescription Date:  07/05/2019  Last Fill Quantity: 30,  # refills: 1   Last office visit: 7/5/2019 with prescribing provider:  07/05/2019   Future Office Visit:   Next 5 appointments (look out 90 days)    Aug 20, 2019  9:50 AM CDT  ESTABLISHED PRENATAL with Anamaria Park MD  [facility] ([facility]) [address] [place] [id number]  [phone]         Requested Prescriptions   Pending Prescriptions Disp Refills     labetalol (NORMODYNE) 100 MG tablet 30 tablet 1     Sig: Take 0.5 tablets (50 mg) by mouth 2 times daily       Beta-Blockers Protocol Passed - 7/30/2019 10:41 AM        Passed - Blood pressure under 140/90 in past 12 months     BP Readings from Last 3 Encounters:   07/16/19 108/68   07/05/19 102/72   06/19/19 126/80                 Passed - Patient is age 6 or older        Passed - Recent (12 mo) or future (30 days) visit within the authorizing provider's specialty     Patient had office visit in the last 12 months or has a visit in the next 30 days with authorizing provider or within the authorizing provider's specialty.  See \"Patient Info\" tab in inbasket, or \"Choose Columns\" in Meds & Orders section of the refill encounter.              Passed - Medication is active on med list          "

## 2019-07-31 RX ORDER — LABETALOL 100 MG/1
50 TABLET, FILM COATED ORAL 2 TIMES DAILY
Qty: 30 TABLET | Refills: 1 | OUTPATIENT
Start: 2019-07-31

## 2019-07-31 NOTE — TELEPHONE ENCOUNTER
Declined  Asking too eearly  1/2 tablet    30 tablet 1 7/5/2019     Should have one refill    Zelda Sage RN- Triage [employer]

## 2019-08-01 ENCOUNTER — OFFICE VISIT (OUTPATIENT)
Dept: MATERNAL FETAL MEDICINE | Facility: CLINIC | Age: 34
End: 2019-08-01
Attending: OBSTETRICS & GYNECOLOGY
Payer: COMMERCIAL

## 2019-08-01 ENCOUNTER — TELEPHONE (OUTPATIENT)
Dept: OBGYN | Facility: CLINIC | Age: 34
End: 2019-08-01

## 2019-08-01 ENCOUNTER — HOSPITAL ENCOUNTER (OUTPATIENT)
Dept: ULTRASOUND IMAGING | Facility: CLINIC | Age: 34
Discharge: HOME | End: 2019-08-01
Attending: OBSTETRICS & GYNECOLOGY | Admitting: OBSTETRICS & GYNECOLOGY
Payer: COMMERCIAL

## 2019-08-01 DIAGNOSIS — O26.90 PREGNANCY RELATED CONDITION: ICD-10-CM

## 2019-08-01 DIAGNOSIS — O09.522 AMA (ADVANCED MATERNAL AGE) MULTIGRAVIDA 35+, SECOND TRIMESTER: Primary | ICD-10-CM

## 2019-08-01 DIAGNOSIS — O09.522 SUPERVISION OF ELDERLY MULTIGRAVIDA IN SECOND TRIMESTER: Primary | ICD-10-CM

## 2019-08-01 DIAGNOSIS — O10.919 CHRONIC HYPERTENSION AFFECTING PREGNANCY: ICD-10-CM

## 2019-08-01 PROCEDURE — 76811 OB US DETAILED SNGL FETUS: CPT

## 2019-08-01 PROCEDURE — 96040 ZZH GENETIC COUNSELING, EACH 30 MINUTES: CPT | Mod: ZF | Performed by: GENETIC COUNSELOR, MS

## 2019-08-01 NOTE — NURSING NOTE
Vivi was seen in clinic today for  US due to CHTN and AMA. She is currently taking Labetalol. Dr. Skelton reviewed recent BP's in clinic and noted lower BP readings. She would like patient to stop Labetalol but wanted her to talk with Dr. Yoder to make sure she was agreeable to plan before stopping. Soco CAMARGO RN was called with recommendations and it was also noted that this would be recommended in the US report. Soco will pass information to Dr. Yoder and they will call patient with plan. It was also noted that we recommend patient be seen a couple days after stopping medication to recheck BP.

## 2019-08-01 NOTE — PROGRESS NOTES
"Please see \"Imaging\" tab under \"Chart Review\" for details of today's visit.    Leann Skelton    "

## 2019-08-01 NOTE — TELEPHONE ENCOUNTER
Called pt and left a detailed vm with instructions from Dr Park below:  Instructed to stop labetalol as recommended by [facility] doctor but needs to take BP's at home two times daily and bring readings to her prenatal visits. Call if consistently over 140/90. IF needs a BP auto cuff Rx, call and we can send Rx. Call with any other questions and ask to speak with a triage nurse.    [employer] instructions also sent and pt read.

## 2019-08-01 NOTE — PROGRESS NOTES
[facility] [place]  Genetic Counseling Consult    Patient:  Vivi Mccall YOB: 1985   Date of Service:  19      Vivi Mccall was seen at the [facility] [place] for genetic consultation as part of her appointment for comprehensive ultrasound.  The indication for genetic counseling is advanced maternal age. She was accompanied to today's visit by her partner, Bud.       Impression/Plan:   1. Vivi had a cell-free fetal DNA test earlier in pregnancy, which was normal.    2. Vivi had a comprehensive (level II) ultrasound today.  Please see the ultrasound report for further details.    3. The patient declines genetic amniocentesis and additional maternal serum screening today.    Pregnancy History:   /Parity:    Age at Delivery: 34 year old  NOHEMI: 2020, by Last Menstrual Period  Gestational Age: 18w1d    No significant complications or exposures were reported in the current pregnancy.    She is currently taking labetalol and Celexa. Both of these medications are being managed by her OB provider. Vivi has unfortunately experienced episodes of low blood pressure during this pregnancy.    Vivi armstrong pregnancy history is significant for:  o 2018: SAB at 4-5 weeks gestation    Medical History:   Vivi armstrong reported medical history is not expected to impact pregnancy management or risks to fetal development.       Family History:   A three-generation pedigree was obtained, and is scanned under the  Media  tab.   The reported family history is negative for multiple miscarriages, stillbirths, birth defects, mental retardation, known genetic conditions, and consanguinity.       Risk Assessment for Chromosome Conditions:   We explained that the risk for fetal chromosome abnormalities increases with maternal age. We discussed specific features of common chromosome abnormalities, including Down syndrome, trisomy 13,  trisomy 18, and sex chromosome trisomies.      - At age 34 at midtrimester, the risk to have a baby with Down syndrome is 1 in 274.     - At age 34 at midtrimester, the risk to have a baby with any chromosome abnormality is 1 in 135.       Vivi had maternal serum screening earlier in pregnancy.     Non-invasive Prenatal Testing (NIPT)    Maternal plasma cell-free DNA testing    Screens for fetal trisomy 21, trisomy 13, trisomy 18, and sex chromosome aneuploidy    First trimester ultrasound with nuchal translucency and nasal bone assessment was not performed in this pregnancy, to our knowledge.    Vivi had an Innatal test earlier in pregnancy; we reviewed the results today, which are normal for chromosome 13, chromosome 18 and chromosome 21 (no aneuploidy detected)    Given the accuracy of this test, these results greatly decrease the chance for certain fetal chromosome abnormalities    We discussed the limitations of normal NIPT results    MSAFP (after 15 weeks for open neural tube defect screening) results were within normal limits, which decreased the risk of an open neural tube defect in the pregnancy to less than 1 in 10,000.    Testing Options:   We discussed the following options:   Non-invasive Prenatal Testing (NIPT)    Maternal plasma cell-free DNA testing; first trimester ultrasound with nuchal translucency and nasal bone assessment is recommended, when appropriate    Screens for fetal trisomy 21, trisomy 13, trisomy 18, and sex chromosome aneuploidy    Cannot screen for open neural tube defects; maternal serum AFP after 15 weeks is recommended     Comprehensive (Level II) ultrasound: Detailed ultrasound performed between 18-22 weeks gestation to screen for major birth defects and markers for aneuploidy.      We reviewed the benefits and limitations of this testing.  Screening tests provide a risk assessment specific to the pregnancy for certain fetal chromosome abnormalities, but cannot definitively  diagnose or exclude a fetal chromosome abnormality.  Follow-up genetic counseling and consideration of diagnostic testing is recommended with any abnormal screening result. Diagnostic tests carry inherent risks- including risk of miscarriage- that require careful consideration.  These tests can detect fetal chromosome abnormalities with greater than 99% certainty.  Results can be compromised by maternal cell contamination or mosaicism, and are limited by the resolution of cytogenetic G-banding technology.  There is no screening nor diagnostic test that can detect all forms of birth defects or mental disability.    It was a pleasure to be involved with Vivi s care. Face-to-face time of the meeting was 25 minutes.      Shawna Yang MS, [facility]  Maternal Fetal Medicine  [facility]  Ph: [phone]  [email or web address]

## 2019-08-01 NOTE — TELEPHONE ENCOUNTER
Bee WALSH from [facility] calling regarding pt's labetalol  Dr. Skelton is recommending pt discontinue her latetalol due to having lower bp's last two readings  108/68 102/72    Dr. Skelton requesting Dr. Park be consulted regarding medication. Will need to call the pt if she needs to stop this medication.  Also recommending f/u bp check after medication is discontinued if Dr. Park is in agreement    Soco Barbosa RN on 8/1/2019 at 2:04 PM

## 2019-08-01 NOTE — TELEPHONE ENCOUNTER
I am fine with that --she has been lowering her blood pressure medication with her PCP throughout this pregnancy.  WE can stop it and ask that she take home BPs --trying to do twice daily.  If she needs, we can send a rx for an automatic digital home blood pressure cuff.  Please ask her to bring her readings to her appointments.  We would need her to call us if she was consistently having blood pressures >140/90

## 2019-08-06 ENCOUNTER — HOSPITAL ENCOUNTER (OUTPATIENT)
Facility: CLINIC | Age: 34
Discharge: HOME | End: 2019-08-07
Attending: OBSTETRICS & GYNECOLOGY | Admitting: OBSTETRICS & GYNECOLOGY
Payer: COMMERCIAL

## 2019-08-06 VITALS — TEMPERATURE: 98.3 F | DIASTOLIC BLOOD PRESSURE: 85 MMHG | SYSTOLIC BLOOD PRESSURE: 128 MMHG

## 2019-08-06 PROBLEM — Z36.89 ENCOUNTER FOR TRIAGE IN PREGNANT PATIENT: Status: ACTIVE | Noted: 2019-08-06

## 2019-08-06 LAB
ALBUMIN UR-MCNC: 10 MG/DL
APPEARANCE UR: CLEAR
BILIRUB UR QL STRIP: NEGATIVE
COLOR UR AUTO: YELLOW
GLUCOSE UR STRIP-MCNC: NEGATIVE MG/DL
HGB UR QL STRIP: NEGATIVE
KETONES UR STRIP-MCNC: NEGATIVE MG/DL
LEUKOCYTE ESTERASE UR QL STRIP: ABNORMAL
MUCOUS THREADS #/AREA URNS LPF: PRESENT /LPF
NITRATE UR QL: NEGATIVE
PH UR STRIP: 6.5 PH (ref 5–7)
RBC #/AREA URNS AUTO: 1 /HPF (ref 0–2)
SOURCE: ABNORMAL
SP GR UR STRIP: 1.03 (ref 1–1.03)
SPECIMEN SOURCE: ABNORMAL
SQUAMOUS #/AREA URNS AUTO: 1 /HPF (ref 0–1)
UROBILINOGEN UR STRIP-MCNC: NORMAL MG/DL (ref 0–2)
WBC #/AREA URNS AUTO: 1 /HPF (ref 0–5)
WET PREP SPEC: ABNORMAL

## 2019-08-06 PROCEDURE — G0463 HOSPITAL OUTPT CLINIC VISIT: HCPCS

## 2019-08-06 PROCEDURE — 81001 URINALYSIS AUTO W/SCOPE: CPT | Performed by: OBSTETRICS & GYNECOLOGY

## 2019-08-06 PROCEDURE — 87210 SMEAR WET MOUNT SALINE/INK: CPT | Performed by: OBSTETRICS & GYNECOLOGY

## 2019-08-06 PROCEDURE — 87086 URINE CULTURE/COLONY COUNT: CPT | Performed by: OBSTETRICS & GYNECOLOGY

## 2019-08-06 RX ORDER — ASPIRIN 81 MG/1
81 TABLET, CHEWABLE ORAL DAILY
Status: ON HOLD | COMMUNITY
End: 2019-12-30

## 2019-08-07 PROCEDURE — G0463 HOSPITAL OUTPT CLINIC VISIT: HCPCS

## 2019-08-07 NOTE — DISCHARGE INSTRUCTIONS
Discharge Instruction for Undelivered Patients      You were seen for: Pain and pressure  We Consulted: Dr. Park  You had (Test or Medicine):External Monitoring, Urinalysis, Wet Prep     Diet:   Drink 8 to 12 glasses of liquids (milk, juice, water) every day.  You may eat meals and snacks.     Activity:    Call your provider if you notice:  Swelling in your face or increased swelling in your hands or legs.  Headaches that are not relieved by Tylenol (acetaminophen).  Changes in your vision (blurring: seeing spots or stars.)  Nausea (sick to your stomach) and vomiting (throwing up).   Weight gain of 5 pounds or more per week.  Heartburn that doesn't go away.  Signs of bladder infection: pain when you urinate (use the toilet), need to go more often and more urgently.  Bright red blood in your underwear.  Abdominal (lower belly) or stomach pain.  For first baby: Contractions (tightening) less than 5 minutes apart for one hour or more.  *If less than 34 weeks: Contractions (tightenings) more than 6 times in one hour.  Increase or change in vaginal discharge (note the color and amount)  Other: Miconazole 2% cream at [facility] in [place]. Dr. Park will follow up with you is something shows up in the urine. If you do not hear from her, no news is good news.    Follow-up:  As scheduled in the clinic with Dr. Park

## 2019-08-07 NOTE — PLAN OF CARE
2300:SBAR report from Barbara HAN RN. Will assume all cares for this patient.  2330: Patient resting in bed, spouse at bedside. Updated on plan of care.  0000: Dr. Park in department, reviewing lab results. Instructions for Miconazole 2% cream to be called to [facility] in [place], patient to follow up with Dr. Park as planned. External monitors removed.  0005: Discharge instructions discussed with patient and spouse. All questions answered. All belongings gathered by spouse.  0010: Patient and spouse to home ambulatory.

## 2019-08-07 NOTE — PLAN OF CARE
2200:  presenting to triage with c/o excess discharge as well as pain in lower groin area and unable to stand up straight.  : UA collected and sent to lab.  0: Verbal consent for EUM. Honor applies. Doppler used to find FHTs. Audible at 140s-150s. US applied at 221. VSS. History reviewed. History significant for hypertension, but blood pressure has been lower with pregnancy. Is taking baby ASA. Patient's history also significant for asthma, has not had to use her rescue inhaler in over a year. Patient also has anxiety and depression, well controlled with medications. Patient denies problems with this pregnancy. Is getting BPPs due to chronic hypertension. States she has history of yeast infections. Last night states she had itchy, inflamed vaginal area and thicker discharge than usual. States she noticed a greenish discharge today when changing her pad. Also states she started to have pain radiating from her back to her pelvis tonight that has made it difficult to walk or stand up straight.  2230: Dr. Park in department. Updated MD on patient's arrival and primary complaints. Orders received from MD at this time.  2255: Wet prep collected and sent to lab.  2300: Report to ALEKSANDR Rosado RN to assume care at this time.

## 2019-08-07 NOTE — PROVIDER NOTIFICATION
08/07/19 0000   Provider Notification   Provider Name/Title Dr. Park   Method of Notification In Department   Request Evaluate in Person   Notification Reason Pain;Lab/Diagnostic Study;Status Update   Order received for Miconazole 2% prescription to be called to [facility]. Discharge instructions received.

## 2019-08-08 LAB
BACTERIA SPEC CULT: NORMAL
Lab: NORMAL
SPECIMEN SOURCE: NORMAL

## 2019-08-20 ENCOUNTER — PRENATAL OFFICE VISIT (OUTPATIENT)
Dept: OBGYN | Facility: CLINIC | Age: 34
End: 2019-08-20
Payer: COMMERCIAL

## 2019-08-20 VITALS — BODY MASS INDEX: 27.67 KG/M2 | DIASTOLIC BLOOD PRESSURE: 62 MMHG | WEIGHT: 182 LBS | SYSTOLIC BLOOD PRESSURE: 122 MMHG

## 2019-08-20 DIAGNOSIS — O09.92 SUPERVISION OF HIGH RISK PREGNANCY IN SECOND TRIMESTER: ICD-10-CM

## 2019-08-20 DIAGNOSIS — Z3A.20 20 WEEKS GESTATION OF PREGNANCY: Primary | ICD-10-CM

## 2019-08-20 DIAGNOSIS — O10.919 CHRONIC HYPERTENSION IN PREGNANCY: ICD-10-CM

## 2019-08-20 PROCEDURE — 99207 ZZC PRENATAL VISIT: CPT | Performed by: OBSTETRICS & GYNECOLOGY

## 2019-08-20 NOTE — PROGRESS NOTES
Doing well today.  +FM  No cramping/vb/spotting/lof  Was seen in MAC last week with yeast infection and pressure --now resolved  Had level II us with MFM earlier this month --all normal; has since stopped her BP meds as pressures were running lower --good again today and reports normal readings at home  Will repeat growth us in 4 weeks with MFM  RTC 4wks

## 2019-09-05 ENCOUNTER — OFFICE VISIT (OUTPATIENT)
Dept: MATERNAL FETAL MEDICINE | Facility: CLINIC | Age: 34
End: 2019-09-05
Attending: OBSTETRICS & GYNECOLOGY
Payer: COMMERCIAL

## 2019-09-05 ENCOUNTER — HOSPITAL ENCOUNTER (OUTPATIENT)
Dept: ULTRASOUND IMAGING | Facility: CLINIC | Age: 34
Discharge: HOME | End: 2019-09-05
Attending: OBSTETRICS & GYNECOLOGY | Admitting: OBSTETRICS & GYNECOLOGY
Payer: COMMERCIAL

## 2019-09-05 DIAGNOSIS — O10.919 CHRONIC HYPERTENSION AFFECTING PREGNANCY: Primary | ICD-10-CM

## 2019-09-05 DIAGNOSIS — O10.919 CHRONIC HYPERTENSION AFFECTING PREGNANCY: ICD-10-CM

## 2019-09-05 PROCEDURE — 76816 OB US FOLLOW-UP PER FETUS: CPT

## 2019-09-05 NOTE — PROGRESS NOTES
Please see full imaging report from [employer] program under imaging tab.      Fabi Calabrese MD  Maternal Fetal Medicine

## 2019-09-13 ENCOUNTER — TELEPHONE (OUTPATIENT)
Dept: OBGYN | Facility: CLINIC | Age: 34
End: 2019-09-13

## 2019-09-13 DIAGNOSIS — B37.31 YEAST VAGINITIS: Primary | ICD-10-CM

## 2019-09-13 RX ORDER — TERCONAZOLE 0.4 %
1 CREAM WITH APPLICATOR VAGINAL AT BEDTIME
Qty: 45 G | Refills: 0 | Status: SHIPPED | OUTPATIENT
Start: 2019-09-13 | End: 2019-11-13

## 2019-09-13 NOTE — TELEPHONE ENCOUNTER
I sent in an rx for terazole. Lets see if a different azole works better and stronger and for longer.  If continues to be recurrent, may want to discuss with Dr. Park

## 2019-09-13 NOTE — TELEPHONE ENCOUNTER
Informed pt of rx sent to pharmacy for Terconazole.  Clarified with Dr Yoder - for 5 days    Pt verbalized understanding, in agreement with plan, and voiced no further questions.  Jael Nelson RN on 9/13/2019 at 1:38 PM

## 2019-09-13 NOTE — TELEPHONE ENCOUNTER
Returned pt call  24w2d    8/6/19 - yeast infection diagnosed in MAC and tx'd with Monistat vaginal for 7 day course.  No sx's for two weeks  Sx's returned and performed another 7d course of OTC Monistat  Resolved and now again.  Frustrated and aware it is related to pregnancy but any recommendations appreciated.    Now - Sx's returning with itching on the labia, and near the urethra  Increase in white vaginal discharge.    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  Has totally changed her habits: cotton underwear/clothing, changing sweating, all the recommendations.    Routing to on call provider for advice in Dr Park absence.  Next OV 9/17/19 for routing prenatal.    Jael Nelson RN on 9/13/2019 at 12:47 PM

## 2019-09-17 ENCOUNTER — PRENATAL OFFICE VISIT (OUTPATIENT)
Dept: OBGYN | Facility: CLINIC | Age: 34
End: 2019-09-17
Payer: COMMERCIAL

## 2019-09-17 VITALS — WEIGHT: 189 LBS | DIASTOLIC BLOOD PRESSURE: 76 MMHG | BODY MASS INDEX: 28.74 KG/M2 | SYSTOLIC BLOOD PRESSURE: 124 MMHG

## 2019-09-17 DIAGNOSIS — Z3A.24 24 WEEKS GESTATION OF PREGNANCY: Primary | ICD-10-CM

## 2019-09-17 DIAGNOSIS — O10.919 CHRONIC HYPERTENSION IN PREGNANCY: ICD-10-CM

## 2019-09-17 DIAGNOSIS — O09.92 SUPERVISION OF HIGH RISK PREGNANCY IN SECOND TRIMESTER: ICD-10-CM

## 2019-09-17 PROCEDURE — 99207 ZZC PRENATAL VISIT: CPT | Performed by: OBSTETRICS & GYNECOLOGY

## 2019-09-17 NOTE — PROGRESS NOTES
Doing well today.  +FM  No ctx/cramping/vb/lof  Blood pressures continue to be normal at home and in the office  Being treated for yeast infection with topical treatment per AJ  RTC 3wks --glucola, hgb, Tdap and flu shot at that visit --declines today as she is getting over a URI  Has repeat us with MFM 10/3 due to hx HTN

## 2019-09-25 DIAGNOSIS — Z36.9 ENCOUNTER FOR ANTENATAL SCREENING OF MOTHER: Primary | ICD-10-CM

## 2019-09-25 DIAGNOSIS — Z23 NEED FOR TDAP VACCINATION: ICD-10-CM

## 2019-09-30 ENCOUNTER — TELEPHONE (OUTPATIENT)
Dept: OBGYN | Facility: CLINIC | Age: 34
End: 2019-09-30

## 2019-09-30 NOTE — TELEPHONE ENCOUNTER
Patient has been treated empirically the last two times and symptoms have returned. She has burning and itching  I am declining to prescribe a third time over the phone. We can work her in to clinic tomorrow for wet smear, gram stain and yeast culture with sensitvities   She may be having BV instead of yeast or she could have an unusual type of yeast that is resistent to the usual meds we prescribe.   I discussed this with patient

## 2019-09-30 NOTE — TELEPHONE ENCOUNTER
26w5d  Hx of frequent yeast infections    Feeling sx's again - will be the 4th one in her pregnancy  Last tx was stronger: 9/13 with terconazole cream   Sx's resolve but now very uncomfortable, painful, red, itching.  Tried warm water with baking soda soak and now experiencing extreme discomfort and feeling embarrassed and frustrated.  Did take a plain water soak and it is getting better  Pt is tearful and frustrated.  Next visit 10/2/19 with Xavier    Denies cramping or contractions, or LOF  Experiencing active FM    Routing to on call provider Dr Moncada in Dr Park absence.  Call pt asap.    Jael Nelson, RN on 9/30/2019 at 1:59 PM

## 2019-10-01 ENCOUNTER — PRENATAL OFFICE VISIT (OUTPATIENT)
Dept: OBGYN | Facility: CLINIC | Age: 34
End: 2019-10-01
Payer: COMMERCIAL

## 2019-10-01 VITALS — WEIGHT: 194 LBS | BODY MASS INDEX: 29.5 KG/M2

## 2019-10-01 DIAGNOSIS — N89.8 VAGINAL DISCHARGE: Primary | ICD-10-CM

## 2019-10-01 LAB
GRAM STN SPEC: NORMAL
Lab: NORMAL
SPECIMEN SOURCE: ABNORMAL
SPECIMEN SOURCE: NORMAL
WET PREP SPEC: ABNORMAL

## 2019-10-01 PROCEDURE — 87205 SMEAR GRAM STAIN: CPT | Performed by: OBSTETRICS & GYNECOLOGY

## 2019-10-01 PROCEDURE — 99213 OFFICE O/P EST LOW 20 MIN: CPT | Performed by: OBSTETRICS & GYNECOLOGY

## 2019-10-01 PROCEDURE — 87106 FUNGI IDENTIFICATION YEAST: CPT | Performed by: OBSTETRICS & GYNECOLOGY

## 2019-10-01 PROCEDURE — 87210 SMEAR WET MOUNT SALINE/INK: CPT | Performed by: OBSTETRICS & GYNECOLOGY

## 2019-10-01 PROCEDURE — 87102 FUNGUS ISOLATION CULTURE: CPT | Performed by: OBSTETRICS & GYNECOLOGY

## 2019-10-01 RX ORDER — MICONAZOLE NITRATE 2 %
7 CREAM WITH APPLICATOR VAGINAL AT BEDTIME
Qty: 45 G | Refills: 1 | Status: SHIPPED | OUTPATIENT
Start: 2019-10-01 | End: 2019-11-13

## 2019-10-01 NOTE — PATIENT INSTRUCTIONS
Will repeat treatment for yeast infection with 7d course monistat/miconazole cream.    Cannot stay today for glucola so will return as scheduled tomorrow AM for 28wk visit.

## 2019-10-01 NOTE — PROGRESS NOTES
SUBJECTIVE:                                                   Vivi Mccall is a 34 year old female who presents to clinic today for the following health issue(s):  Patient presents with:  Vaginal Problem: vaginal itching, burning, white discharge, keeps getting every 2 weeks after abx treatment is completed sx's return. +cramps, denies urinary sx's        HPI:  Here today for vaginal infection symptoms.  Has had longstanding history of vaginitis --had first yeast infection at age 9.  Has now been treated on three different occasions for yeast with her current pregnancy.  Last treatment was 2 weeks ago with 5d terazole cream.  Symptoms always improve but then seem to return after ~2wks.  Guadalupe has been having itching and irritation since last Friday.  +external itching/burning.  +discharge --both watery and white clumpy discharge.  No vb/spotting.  Mild cramping  No dysuria --moreso pain when urine touches the irritated tissues.  No self treatment; did try baking soda bath yesterday which only made symptoms worse  +FM  No cramping/abdominal pain    Patient's last menstrual period was 2019..     Patient is sexually active, .  Using none for contraception.    reports that she has never smoked. She has never used smokeless tobacco.    STD testing offered?  Declined    Health maintenance updated:  yes    Today's PHQ-2 Score:   PHQ-2 (  [employer]) 2019   Q1: Little interest or pleasure in doing things 0   Q2: Feeling down, depressed or hopeless 0   PHQ-2 Score 0   Q1: Little interest or pleasure in doing things Not at all   Q2: Feeling down, depressed or hopeless Not at all   PHQ-2 Score 0     Today's PHQ-9 Score:   PHQ-9 SCORE 5/15/2019   PHQ-9 Total Score MyChart -   PHQ-9 Total Score 0     Today's ROCHELLE-7 Score:   ROCHELLE-7 SCORE 5/15/2019   Total Score 0       Problem list and histories reviewed & adjusted, as indicated.  Additional history: as documented.    Patient Active Problem List    Diagnosis     Benign essential hypertension     ROCHELLE (generalized anxiety disorder)     Major depressive disorder, recurrent episode, moderate (H)     Mild intermittent asthma without complication     Vitamin D deficiency     IUD contraception     Supervision of high-risk pregnancy     Encounter for triage in pregnant patient     Past Surgical History:   Procedure Laterality Date     GYN SURGERY  2003    Laser treatment of HPV     HC RELEASE FOOT/TOE NERVE  2011     ORTHOPEDIC SURGERY Left 07/21/2017    ganglion cyst removal     SURGICAL HISTORY OF -       Nerve entrapment release      Social History     Tobacco Use     Smoking status: Never Smoker     Smokeless tobacco: Never Used   Substance Use Topics     Alcohol use: Not Currently     Comment: 6 drinks a week mainly wine      Problem (# of Occurrences) Relation (Name,Age of Onset)    Bipolar Disorder (1) Mother    Breast Cancer (1) Maternal Grandmother    Coronary Artery Disease (3) Mother, Maternal Grandfather, Paternal Grandfather    Diabetes (2) Mother, Maternal Grandfather    Diabetes Type 2  (1) Maternal Grandfather    Glaucoma (1) Maternal Grandfather    Hypertension (6) Mother, Father, Maternal Grandmother, Maternal Grandfather, Paternal Grandmother, Maternal Aunt    Ovarian Cancer (1) Other    Unknown/Adopted (1) Father       Negative family history of: Macular Degeneration            Current Outpatient Medications   Medication Sig     albuterol (PROAIR HFA/PROVENTIL HFA/VENTOLIN HFA) 108 (90 BASE) MCG/ACT Inhaler Inhale 2 puffs into the lungs every 6 hours     aspirin (ASA) 81 MG chewable tablet Take 81 mg by mouth daily     Cholecalciferol (VITAMIN D3) 2000 UNITS CAPS Take 2,000 Units by mouth     citalopram (CELEXA) 20 MG tablet Take 1 tablet (20 mg) by mouth daily     miconazole (MICONAZOLE 7) 2 % cream Place 7 applicators vaginally At Bedtime     nystatin (MYCOSTATIN) 866028 UNIT/GM external powder Apply to affected area 2 to 3 times daily      Prenatal Multivit-Min-Fe-FA (PRENATAL VITAMINS PO) Take 1 tablet by mouth     vitamin B6 (VITAMIN  B-6) 25 MG tablet Take 25 mg by mouth daily     terconazole (TERAZOL 7) 0.4 % vaginal cream Place 1 applicator vaginally At Bedtime (Patient not taking: Reported on 10/1/2019)     No current facility-administered medications for this visit.      Allergies   Allergen Reactions     Amoxicillin      Codeine      Penicillins        ROS:  12 point review of systems negative other than symptoms noted below.  Genitourinary: Vaginal Discharge and Vaginal Itching    OBJECTIVE:     Wt 88 kg (194 lb)   LMP 03/27/2019   Breastfeeding? No   BMI 29.50 kg/m    Body mass index is 29.5 kg/m .    Exam:  Constitutional:  Appearance: Well nourished, well developed alert, in no acute distress  Gastrointestinal:  Abdominal Examination:  Abdomen nontender to palpation, tone normal without rigidity or guarding, no masses present, umbilicus without lesions; Liver/Spleen:  No hepatomegaly present, liver nontender to palpation; Hernias:  No hernias present  Neurologic:  Mental Status:  Oriented X3.  Normal strength and tone, sensory exam grossly normal, mentation intact and speech normal.    Psychiatric:  Mentation appears normal and affect normal/bright.  Pelvic Exam:  External Genitalia:     Normal appearance for age, no discharge present, no tenderness present, no inflammatory lesions present, color normal  Vagina:     Normal vaginal vault without central or paravaginal defects, no discharge present, no inflammatory lesions present, no masses present; +DISCHARGE; WATERY/WHITE DISCHARGE WITH OCC CLUMPING NOTED  Bladder:     Nontender to palpation  Urethra:   Urethral Body:  Urethra palpation normal, urethra structural support normal   Urethral Meatus:  No erythema or lesions present  Cervix:     Appearance healthy, no lesions present, nontender to palpation, no bleeding present  Uterus:     Uterus: firm, normal sized and nontender, midplane  in position.   Adnexa:     No adnexal tenderness present, no adnexal masses present  Perineum:     Perineum within normal limits, no evidence of trauma, no rashes or skin lesions present  Anus:     Anus within normal limits, no hemorrhoids present  Inguinal Lymph Nodes:     No lymphadenopathy present  Pubic Hair:     Normal pubic hair distribution for age  Genitalia and Groin:     No rashes present, no lesions present, no areas of discoloration, no masses present       In-Clinic Test Results:  Results for orders placed or performed in visit on 10/01/19 (from the past 24 hour(s))   Wet  Prep   Result Value Ref Range    Specimen Description Vagina     Wet Prep Yeast seen  Few   (A)     Wet Prep No clue cells seen     Wet Prep No Trichomonas seen     Wet Prep No WBC's seen        ASSESSMENT/PLAN:                                                        ICD-10-CM    1. Vaginal discharge N89.8 Wet  Prep     Yeast culture     Gram stain     miconazole (MICONAZOLE 7) 2 % cream       Patient Instructions   Will repeat treatment for yeast infection with 7d course monistat/miconazole cream.    Cannot stay today for [facility] so will return as scheduled tomorrow AM for 28wk visit.      Anamaria Park MD  [facility] [place]

## 2019-10-02 ENCOUNTER — PRENATAL OFFICE VISIT (OUTPATIENT)
Dept: OBGYN | Facility: CLINIC | Age: 34
End: 2019-10-02
Payer: COMMERCIAL

## 2019-10-02 VITALS — DIASTOLIC BLOOD PRESSURE: 72 MMHG | BODY MASS INDEX: 29.65 KG/M2 | WEIGHT: 195 LBS | SYSTOLIC BLOOD PRESSURE: 122 MMHG

## 2019-10-02 DIAGNOSIS — O09.93 SUPERVISION OF HIGH RISK PREGNANCY IN THIRD TRIMESTER: ICD-10-CM

## 2019-10-02 DIAGNOSIS — Z3A.27 27 WEEKS GESTATION OF PREGNANCY: Primary | ICD-10-CM

## 2019-10-02 DIAGNOSIS — Z23 NEED FOR TDAP VACCINATION: ICD-10-CM

## 2019-10-02 DIAGNOSIS — Z36.9 ENCOUNTER FOR ANTENATAL SCREENING OF MOTHER: ICD-10-CM

## 2019-10-02 DIAGNOSIS — Z23 NEED FOR PROPHYLACTIC VACCINATION AND INOCULATION AGAINST INFLUENZA: Primary | ICD-10-CM

## 2019-10-02 DIAGNOSIS — O10.919 CHRONIC HYPERTENSION IN PREGNANCY: ICD-10-CM

## 2019-10-02 LAB
ERYTHROCYTE [DISTWIDTH] IN BLOOD BY AUTOMATED COUNT: 12.8 % (ref 10–15)
GLUCOSE 1H P 50 G GLC PO SERPL-MCNC: 96 MG/DL (ref 60–129)
HCT VFR BLD AUTO: 31.9 % (ref 35–47)
HGB BLD-MCNC: 10.5 G/DL (ref 11.7–15.7)
MCH RBC QN AUTO: 31.8 PG (ref 26.5–33)
MCHC RBC AUTO-ENTMCNC: 32.9 G/DL (ref 31.5–36.5)
MCV RBC AUTO: 97 FL (ref 78–100)
PLATELET # BLD AUTO: 277 10E9/L (ref 150–450)
RBC # BLD AUTO: 3.3 10E12/L (ref 3.8–5.2)
WBC # BLD AUTO: 9.2 10E9/L (ref 4–11)

## 2019-10-02 PROCEDURE — 99207 ZZC PRENATAL VISIT: CPT | Performed by: OBSTETRICS & GYNECOLOGY

## 2019-10-02 PROCEDURE — 90471 IMMUNIZATION ADMIN: CPT

## 2019-10-02 PROCEDURE — 82950 GLUCOSE TEST: CPT | Performed by: OBSTETRICS & GYNECOLOGY

## 2019-10-02 PROCEDURE — 90472 IMMUNIZATION ADMIN EACH ADD: CPT

## 2019-10-02 PROCEDURE — 36415 COLL VENOUS BLD VENIPUNCTURE: CPT | Performed by: OBSTETRICS & GYNECOLOGY

## 2019-10-02 PROCEDURE — 90686 IIV4 VACC NO PRSV 0.5 ML IM: CPT

## 2019-10-02 PROCEDURE — 85027 COMPLETE CBC AUTOMATED: CPT | Performed by: OBSTETRICS & GYNECOLOGY

## 2019-10-02 PROCEDURE — 90715 TDAP VACCINE 7 YRS/> IM: CPT

## 2019-10-02 NOTE — PROGRESS NOTES
Doing well today.  +FM  No ctx/cramping/vb/lof  Started her yeast treatment last night --still a bit sore  Glucola, hgb, Tdap and Flu shot today  -has growth us with MFM tomorrow for hx CHTN --still doing well off her meds; will plan for weekly NSTs at 32wks  RTC 3wks

## 2019-10-03 ENCOUNTER — HOSPITAL ENCOUNTER (OUTPATIENT)
Dept: ULTRASOUND IMAGING | Facility: CLINIC | Age: 34
Discharge: HOME | End: 2019-10-03
Attending: OBSTETRICS & GYNECOLOGY | Admitting: OBSTETRICS & GYNECOLOGY
Payer: COMMERCIAL

## 2019-10-03 ENCOUNTER — OFFICE VISIT (OUTPATIENT)
Dept: MATERNAL FETAL MEDICINE | Facility: CLINIC | Age: 34
End: 2019-10-03
Attending: OBSTETRICS & GYNECOLOGY
Payer: COMMERCIAL

## 2019-10-03 DIAGNOSIS — O10.919 CHRONIC HYPERTENSION AFFECTING PREGNANCY: ICD-10-CM

## 2019-10-03 DIAGNOSIS — O35.9XX0 SUSPECTED FETAL ABNORMALITY AFFECTING MANAGEMENT OF MOTHER, SINGLE OR UNSPECIFIED FETUS: ICD-10-CM

## 2019-10-03 DIAGNOSIS — O10.919 CHRONIC HYPERTENSION AFFECTING PREGNANCY: Primary | ICD-10-CM

## 2019-10-03 PROCEDURE — 76816 OB US FOLLOW-UP PER FETUS: CPT

## 2019-10-03 NOTE — PROGRESS NOTES
Please see ultrasound report under imaging tab for details on ultrasound performed today.    Vi Kirk MD  , OB/GYN  Maternal-Fetal Medicine  [email or web address]  [phone] (Academic office)  [phone] (Pager)

## 2019-10-04 LAB
Lab: ABNORMAL
SPECIMEN SOURCE: ABNORMAL
YEAST SPEC QL CULT: ABNORMAL

## 2019-10-18 ENCOUNTER — TELEPHONE (OUTPATIENT)
Dept: OBGYN | Facility: CLINIC | Age: 34
End: 2019-10-18

## 2019-10-18 ENCOUNTER — HOSPITAL ENCOUNTER (OUTPATIENT)
Dept: CARDIOLOGY | Facility: CLINIC | Age: 34
Discharge: HOME | End: 2019-10-18
Attending: OBSTETRICS & GYNECOLOGY | Admitting: OBSTETRICS & GYNECOLOGY
Payer: COMMERCIAL

## 2019-10-18 DIAGNOSIS — O35.9XX0 SUSPECTED FETAL ABNORMALITY AFFECTING MANAGEMENT OF MOTHER, SINGLE OR UNSPECIFIED FETUS: ICD-10-CM

## 2019-10-18 DIAGNOSIS — O10.919 CHRONIC HYPERTENSION AFFECTING PREGNANCY: ICD-10-CM

## 2019-10-18 PROCEDURE — 76825 ECHO EXAM OF FETAL HEART: CPT

## 2019-10-18 NOTE — TELEPHONE ENCOUNTER
Called patient and informed that she could do abrevia or the topical medications that she has used in the past.  If that doesn't work for her she will call back for the valtrex rx.  Pt verbalized understanding, in agreement with plan, and voiced no further questions.  Koki Cruz RN on 10/18/2019 at 1:12 PM

## 2019-10-18 NOTE — TELEPHONE ENCOUNTER
Yep --already got the echo report.  For cold sores --can either do the topical treatments she has done in the past or can do 2g valtrex PO q12h x 2 doses per outbreak

## 2019-10-18 NOTE — TELEPHONE ENCOUNTER
Cold sores as a child and now since age 20 she gets one a year typically.  Usually uses abrevia or another tropical OTC. Wondering what would be safe for baby.    As a side note, had the Echocardiogram today and that doctor saw a small thickening of right ventricle and said they should not worry but recommended no more tylenol for this pregnancy.  Just wanted to to know.    Routing to provider to advise.  Koki Cruz RN on 10/18/2019 at 12:29 PM

## 2019-10-23 ENCOUNTER — PRENATAL OFFICE VISIT (OUTPATIENT)
Dept: OBGYN | Facility: CLINIC | Age: 34
End: 2019-10-23
Payer: COMMERCIAL

## 2019-10-23 VITALS — DIASTOLIC BLOOD PRESSURE: 80 MMHG | BODY MASS INDEX: 29.95 KG/M2 | SYSTOLIC BLOOD PRESSURE: 120 MMHG | WEIGHT: 197 LBS

## 2019-10-23 DIAGNOSIS — O09.93 SUPERVISION OF HIGH RISK PREGNANCY IN THIRD TRIMESTER: ICD-10-CM

## 2019-10-23 DIAGNOSIS — Z3A.30 30 WEEKS GESTATION OF PREGNANCY: ICD-10-CM

## 2019-10-23 DIAGNOSIS — O10.919 CHRONIC HYPERTENSION IN PREGNANCY: Primary | ICD-10-CM

## 2019-10-23 PROCEDURE — 99207 ZZC PRENATAL VISIT: CPT | Performed by: OBSTETRICS & GYNECOLOGY

## 2019-10-23 NOTE — PROGRESS NOTES
Doing well today.  +FM  Occ cramping; body feels much different this week  No vb/spotting/lof  Had fetal echo last week for question of ventricular wall thickening --normal echo; no follow up needed  Has growth us with MFM next week for hx CHTN  Will start weekly visits with BPPs at 32wks with close monitoring of blood pressures  RTC 2wks

## 2019-10-25 ENCOUNTER — TELEPHONE (OUTPATIENT)
Dept: OBGYN | Facility: CLINIC | Age: 34
End: 2019-10-25

## 2019-10-28 NOTE — TELEPHONE ENCOUNTER
Message left for patient to let her know it is ready. Spouse needs to fill out his portion.    Copy made and scanned to [facility] 10/28/19

## 2019-10-31 ENCOUNTER — OFFICE VISIT (OUTPATIENT)
Dept: MATERNAL FETAL MEDICINE | Facility: CLINIC | Age: 34
End: 2019-10-31
Attending: OBSTETRICS & GYNECOLOGY
Payer: COMMERCIAL

## 2019-10-31 ENCOUNTER — HOSPITAL ENCOUNTER (OUTPATIENT)
Dept: ULTRASOUND IMAGING | Facility: CLINIC | Age: 34
Discharge: HOME | End: 2019-10-31
Attending: OBSTETRICS & GYNECOLOGY | Admitting: OBSTETRICS & GYNECOLOGY
Payer: COMMERCIAL

## 2019-10-31 VITALS — HEART RATE: 79 BPM | SYSTOLIC BLOOD PRESSURE: 130 MMHG | DIASTOLIC BLOOD PRESSURE: 80 MMHG

## 2019-10-31 DIAGNOSIS — O09.93 SUPERVISION OF HIGH RISK PREGNANCY IN THIRD TRIMESTER: ICD-10-CM

## 2019-10-31 DIAGNOSIS — O35.9XX0 SUSPECTED FETAL ABNORMALITY AFFECTING MANAGEMENT OF MOTHER, SINGLE OR UNSPECIFIED FETUS: ICD-10-CM

## 2019-10-31 DIAGNOSIS — O10.919 CHRONIC HYPERTENSION AFFECTING PREGNANCY: Primary | ICD-10-CM

## 2019-10-31 DIAGNOSIS — O10.919 CHRONIC HYPERTENSION AFFECTING PREGNANCY: ICD-10-CM

## 2019-10-31 PROCEDURE — 76816 OB US FOLLOW-UP PER FETUS: CPT

## 2019-10-31 NOTE — NURSING NOTE
BP taken - see flowsheet. Patient states when she feels off, it's usually when her BP is higher. Patient will continue to monitor and call MD if she has further concerns.

## 2019-11-05 ENCOUNTER — TELEPHONE (OUTPATIENT)
Dept: OBGYN | Facility: CLINIC | Age: 34
End: 2019-11-05

## 2019-11-05 NOTE — TELEPHONE ENCOUNTER
Pt not feeling good yesterday  Not on any medications for BP  Today took BP/HR at 1139:  Heart Rate elevated in the 100's  /83 today at 1139  Feeling some dizziness and sob  Fingers swollen today  Denies HA's or epigastric pain  Denies contractions, LOF, or VB  Reports active FM    Routing to provider to advise.    Jael Nelson RN on 11/5/2019 at 12:34 PM

## 2019-11-05 NOTE — TELEPHONE ENCOUNTER
Encouraged pt to take it easy today, lay on sides/rest and monitor at home  If develops any sx's of PreE: HA's, visual changes, N/V, epigastric pain (explained in detail) to call otherwise will evaluate at tomorrows apt. If develops sx's, call and will be evaluated in MAC.    Pt verbalized understanding, in agreement with plan, and voiced no further questions.  Jael Nelson RN on 11/5/2019 at 12:58 PM

## 2019-11-05 NOTE — TELEPHONE ENCOUNTER
Let's have her take it easy today and we can see where she is at tomorrow.  Would need to be seen in MAC if feeling worse or any symptoms of pre-eclampsia (headaches, vision changes, n/v, etc)

## 2019-11-06 ENCOUNTER — PRENATAL OFFICE VISIT (OUTPATIENT)
Dept: OBGYN | Facility: CLINIC | Age: 34
End: 2019-11-06
Attending: OBSTETRICS & GYNECOLOGY
Payer: COMMERCIAL

## 2019-11-06 ENCOUNTER — ANCILLARY PROCEDURE (OUTPATIENT)
Dept: ULTRASOUND IMAGING | Facility: CLINIC | Age: 34
End: 2019-11-06
Attending: OBSTETRICS & GYNECOLOGY
Payer: COMMERCIAL

## 2019-11-06 VITALS — SYSTOLIC BLOOD PRESSURE: 112 MMHG | DIASTOLIC BLOOD PRESSURE: 78 MMHG | BODY MASS INDEX: 30.26 KG/M2 | WEIGHT: 199 LBS

## 2019-11-06 DIAGNOSIS — O10.919 CHRONIC HYPERTENSION IN PREGNANCY: Primary | ICD-10-CM

## 2019-11-06 DIAGNOSIS — O09.93 SUPERVISION OF HIGH RISK PREGNANCY IN THIRD TRIMESTER: ICD-10-CM

## 2019-11-06 DIAGNOSIS — O10.919 CHRONIC HYPERTENSION IN PREGNANCY: ICD-10-CM

## 2019-11-06 LAB
BASOPHILS # BLD AUTO: 0 10E9/L (ref 0–0.2)
BASOPHILS NFR BLD AUTO: 0.3 %
CREAT UR-MCNC: 114 MG/DL
DIFFERENTIAL METHOD BLD: ABNORMAL
EOSINOPHIL # BLD AUTO: 0.4 10E9/L (ref 0–0.7)
EOSINOPHIL NFR BLD AUTO: 4.1 %
ERYTHROCYTE [DISTWIDTH] IN BLOOD BY AUTOMATED COUNT: 12.9 % (ref 10–15)
HCT VFR BLD AUTO: 32.8 % (ref 35–47)
HGB BLD-MCNC: 10.9 G/DL (ref 11.7–15.7)
LYMPHOCYTES # BLD AUTO: 1.9 10E9/L (ref 0.8–5.3)
LYMPHOCYTES NFR BLD AUTO: 20.4 %
MCH RBC QN AUTO: 32.1 PG (ref 26.5–33)
MCHC RBC AUTO-ENTMCNC: 33.2 G/DL (ref 31.5–36.5)
MCV RBC AUTO: 97 FL (ref 78–100)
MONOCYTES # BLD AUTO: 0.9 10E9/L (ref 0–1.3)
MONOCYTES NFR BLD AUTO: 9.5 %
NEUTROPHILS # BLD AUTO: 6 10E9/L (ref 1.6–8.3)
NEUTROPHILS NFR BLD AUTO: 65.7 %
PLATELET # BLD AUTO: 238 10E9/L (ref 150–450)
PROT UR-MCNC: 0.15 G/L
PROT/CREAT 24H UR: 0.13 G/G CR (ref 0–0.2)
RBC # BLD AUTO: 3.4 10E12/L (ref 3.8–5.2)
WBC # BLD AUTO: 9.1 10E9/L (ref 4–11)

## 2019-11-06 PROCEDURE — 36415 COLL VENOUS BLD VENIPUNCTURE: CPT | Performed by: OBSTETRICS & GYNECOLOGY

## 2019-11-06 PROCEDURE — 85025 COMPLETE CBC W/AUTO DIFF WBC: CPT | Performed by: OBSTETRICS & GYNECOLOGY

## 2019-11-06 PROCEDURE — 84460 ALANINE AMINO (ALT) (SGPT): CPT | Performed by: OBSTETRICS & GYNECOLOGY

## 2019-11-06 PROCEDURE — 84156 ASSAY OF PROTEIN URINE: CPT | Performed by: OBSTETRICS & GYNECOLOGY

## 2019-11-06 PROCEDURE — 76819 FETAL BIOPHYS PROFIL W/O NST: CPT | Performed by: OBSTETRICS & GYNECOLOGY

## 2019-11-06 PROCEDURE — 82565 ASSAY OF CREATININE: CPT | Performed by: OBSTETRICS & GYNECOLOGY

## 2019-11-06 PROCEDURE — 99207 ZZC PRENATAL VISIT: CPT | Performed by: OBSTETRICS & GYNECOLOGY

## 2019-11-06 PROCEDURE — 84450 TRANSFERASE (AST) (SGOT): CPT | Performed by: OBSTETRICS & GYNECOLOGY

## 2019-11-06 NOTE — PROGRESS NOTES
BPP 8/8   Mean vertical pocket fluid normal  Blood pressure normal today  Wt gain 2 lbs over 2 wks   Patient said yesterday she didn't feel good, some dizziness and home BP check was elevated around 140s/88 but it returned to normal when she rechecked and it is good today  Not currently on labetalol    Will check baseline Pre E labs today  Return 1 week for BPP  Call if any repeated elevated pressures

## 2019-11-06 NOTE — Clinical Note
I sent a Pre E lab panel for baseline since she had one elevated BP at home yesterday but normal today

## 2019-11-07 LAB
ALT SERPL W P-5'-P-CCNC: 12 U/L (ref 0–50)
AST SERPL W P-5'-P-CCNC: 9 U/L (ref 0–45)
CREAT SERPL-MCNC: 0.54 MG/DL (ref 0.52–1.04)
GFR SERPL CREATININE-BSD FRML MDRD: >90 ML/MIN/{1.73_M2}

## 2019-11-09 DIAGNOSIS — F33.1 MAJOR DEPRESSIVE DISORDER, RECURRENT EPISODE, MODERATE (H): ICD-10-CM

## 2019-11-09 DIAGNOSIS — F41.1 GAD (GENERALIZED ANXIETY DISORDER): ICD-10-CM

## 2019-11-09 NOTE — TELEPHONE ENCOUNTER
Requested Prescriptions   Pending Prescriptions Disp Refills     citalopram (CELEXA) 20 MG tablet [Pharmacy Med Name: CITALOPRAM HBR 20 MG TABLET]    Last Written Prescription Date:  07/23/2019  Last Fill Quantity: 90 tablet,  # refills: 1   Last office visit: 7/5/2019 with prescribing provider:  Ru   Future Office Visit:     Next 5 appointments (look out 90 days)    Nov 13, 2019  9:50 AM CST  ESTABLISHED PRENATAL with Anamaria Park MD  [facility] [place] ([facility] [place]) [address] [place] [address] [address]  [place] [address]  [phone]   Nov 20, 2019 10:10 AM CST  ESTABLISHED PRENATAL with Anamaria Park MD  [facility] [place] ([facility] [place]) [address] [place], [place] [address]a [place] [address]  [phone]   Nov 27, 2019  9:50 AM CST  ESTABLISHED PRENATAL with Anamaria Park MD  [facility] [place] ([facility] [place]) [address] [place] [address] [address]  [place] [address]  [phone]   Dec 04, 2019  9:30 AM CST  ESTABLISHED PRENATAL with Anamaria Park MD  [facility] [place] ([facility] [place]) [address] [place] [address]  [place] [address]  [phone]   Dec 11, 2019  9:30 AM CST  ESTABLISHED PRENATAL with Anamaria Park MD  [facility] [place] ([facility] [place]) [address] [place], [place] [address] [place] [address]  [phone]          90 tablet 1     Sig: TAKE 1 TABLET BY MOUTH EVERY DAY       SSRIs Protocol Failed - 11/9/2019 11:35 AM        Failed - No active pregnancy on record        Passed - PHQ-9 score less than 5 in past 6 months     Please review last PHQ-9 score.   PHQ-9 SCORE 7/30/2018 1/17/2019 5/15/2019   PHQ-9 Total Score MyChart 4 (Minimal depression) 3 (Minimal depression) -   PHQ-9 Total Score 4 3 0               Passed - Medication is active on med list        Passed - Patient is age 18 or older         "Passed - No positive pregnancy test in last 12 months        Passed - Recent (6 mo) or future (30 days) visit within the authorizing provider's specialty     Patient had office visit in the last 6 months or has a visit in the next 30 days with authorizing provider or within the authorizing provider's specialty.  See \"Patient Info\" tab in inbasket, or \"Choose Columns\" in Meds & Orders section of the refill encounter.               "

## 2019-11-12 NOTE — TELEPHONE ENCOUNTER
Patient Contact    Attempt # 1    Was call answered?  No.  Left message on voicemail with information to call triage back.    Upon callback, does patient need refill? Per chart review, she should have enough medication.

## 2019-11-13 ENCOUNTER — ANCILLARY PROCEDURE (OUTPATIENT)
Dept: ULTRASOUND IMAGING | Facility: CLINIC | Age: 34
End: 2019-11-13
Payer: COMMERCIAL

## 2019-11-13 ENCOUNTER — PRENATAL OFFICE VISIT (OUTPATIENT)
Dept: OBGYN | Facility: CLINIC | Age: 34
End: 2019-11-13
Payer: COMMERCIAL

## 2019-11-13 VITALS — WEIGHT: 204 LBS | SYSTOLIC BLOOD PRESSURE: 122 MMHG | BODY MASS INDEX: 31.02 KG/M2 | DIASTOLIC BLOOD PRESSURE: 68 MMHG

## 2019-11-13 DIAGNOSIS — Z3A.33 33 WEEKS GESTATION OF PREGNANCY: ICD-10-CM

## 2019-11-13 DIAGNOSIS — O09.93 SUPERVISION OF HIGH RISK PREGNANCY IN THIRD TRIMESTER: Primary | ICD-10-CM

## 2019-11-13 DIAGNOSIS — O10.919 CHRONIC HYPERTENSION AFFECTING PREGNANCY: ICD-10-CM

## 2019-11-13 PROCEDURE — 99207 ZZC PRENATAL VISIT: CPT | Performed by: OBSTETRICS & GYNECOLOGY

## 2019-11-13 PROCEDURE — 76819 FETAL BIOPHYS PROFIL W/O NST: CPT | Performed by: OBSTETRICS & GYNECOLOGY

## 2019-11-13 NOTE — PROGRESS NOTES
Doing well today.  +FM  More contractions with teaching; also some cramping  No vb/spotting/lof  BPP today normal, MVP 6.9cm, 8/8  Blood pressure normal today; labs normal last week  Breast pump rx given today  RTC 1wk --BPP and visit at that time

## 2019-11-15 RX ORDER — CITALOPRAM HYDROBROMIDE 20 MG/1
TABLET ORAL
Qty: 90 TABLET | Refills: 1 | Status: SHIPPED | OUTPATIENT
Start: 2019-11-15 | End: 2020-05-18

## 2019-11-15 NOTE — TELEPHONE ENCOUNTER
Called and left voicemail asking patient to call clinic back regarding this request so we can update her records. Also gave her the option to send message via [employer].    Princess TREASURE Kenny CMA

## 2019-11-15 NOTE — TELEPHONE ENCOUNTER
PHQ-9 SCORE 7/30/2018 1/17/2019 5/15/2019   PHQ-9 Total Score MyChart 4 (Minimal depression) 3 (Minimal depression) -   PHQ-9 Total Score 4 3 0     Routing refill request to provider for review/approval because:  PHQ-9 updated.   Current pregnancy  Provider review needed.

## 2019-11-20 ENCOUNTER — ANCILLARY PROCEDURE (OUTPATIENT)
Dept: ULTRASOUND IMAGING | Facility: CLINIC | Age: 34
End: 2019-11-20
Payer: COMMERCIAL

## 2019-11-20 ENCOUNTER — PRENATAL OFFICE VISIT (OUTPATIENT)
Dept: OBGYN | Facility: CLINIC | Age: 34
End: 2019-11-20
Payer: COMMERCIAL

## 2019-11-20 VITALS — BODY MASS INDEX: 30.87 KG/M2 | DIASTOLIC BLOOD PRESSURE: 72 MMHG | SYSTOLIC BLOOD PRESSURE: 122 MMHG | WEIGHT: 203 LBS

## 2019-11-20 DIAGNOSIS — O09.93 SUPERVISION OF HIGH RISK PREGNANCY IN THIRD TRIMESTER: Primary | ICD-10-CM

## 2019-11-20 DIAGNOSIS — Z3A.34 34 WEEKS GESTATION OF PREGNANCY: ICD-10-CM

## 2019-11-20 DIAGNOSIS — I10 CHRONIC HYPERTENSION: ICD-10-CM

## 2019-11-20 DIAGNOSIS — O10.919 CHRONIC HYPERTENSION AFFECTING PREGNANCY: ICD-10-CM

## 2019-11-20 PROCEDURE — 76819 FETAL BIOPHYS PROFIL W/O NST: CPT | Performed by: OBSTETRICS & GYNECOLOGY

## 2019-11-20 PROCEDURE — 99207 ZZC PRENATAL VISIT: CPT | Performed by: OBSTETRICS & GYNECOLOGY

## 2019-11-20 PROCEDURE — 87086 URINE CULTURE/COLONY COUNT: CPT | Performed by: OBSTETRICS & GYNECOLOGY

## 2019-11-20 NOTE — PROGRESS NOTES
"Doing well today.  +FM  No ctx/cramping/vb/lof  Feels like baby \"slipped into her pelvis last week\"  No bowel/bladder issues  Blood pressures good --occ elevated at home but improves with rest on repeat  BPP today 8/8; MVP 4.9cm  RTC 1wk --repeat BPP at that time  "

## 2019-11-21 LAB
BACTERIA SPEC CULT: NO GROWTH
Lab: NORMAL
SPECIMEN SOURCE: NORMAL

## 2019-11-25 ENCOUNTER — OFFICE VISIT (OUTPATIENT)
Dept: FAMILY MEDICINE | Facility: CLINIC | Age: 34
End: 2019-11-25
Payer: COMMERCIAL

## 2019-11-25 ENCOUNTER — TELEPHONE (OUTPATIENT)
Dept: FAMILY MEDICINE | Facility: CLINIC | Age: 34
End: 2019-11-25

## 2019-11-25 VITALS
DIASTOLIC BLOOD PRESSURE: 76 MMHG | WEIGHT: 205.4 LBS | HEART RATE: 93 BPM | OXYGEN SATURATION: 100 % | SYSTOLIC BLOOD PRESSURE: 126 MMHG | HEIGHT: 66 IN | BODY MASS INDEX: 33.01 KG/M2 | TEMPERATURE: 98.5 F

## 2019-11-25 DIAGNOSIS — O09.93 SUPERVISION OF HIGH RISK PREGNANCY IN THIRD TRIMESTER: ICD-10-CM

## 2019-11-25 DIAGNOSIS — I10 BENIGN ESSENTIAL HYPERTENSION: Primary | ICD-10-CM

## 2019-11-25 DIAGNOSIS — F33.1 MAJOR DEPRESSIVE DISORDER, RECURRENT EPISODE, MODERATE (H): ICD-10-CM

## 2019-11-25 PROCEDURE — 99214 OFFICE O/P EST MOD 30 MIN: CPT | Performed by: FAMILY MEDICINE

## 2019-11-25 ASSESSMENT — PATIENT HEALTH QUESTIONNAIRE - PHQ9
5. POOR APPETITE OR OVEREATING: NOT AT ALL
SUM OF ALL RESPONSES TO PHQ QUESTIONS 1-9: 1

## 2019-11-25 ASSESSMENT — MIFFLIN-ST. JEOR: SCORE: 1643.44

## 2019-11-25 ASSESSMENT — ANXIETY QUESTIONNAIRES
2. NOT BEING ABLE TO STOP OR CONTROL WORRYING: NOT AT ALL
5. BEING SO RESTLESS THAT IT IS HARD TO SIT STILL: NOT AT ALL
7. FEELING AFRAID AS IF SOMETHING AWFUL MIGHT HAPPEN: SEVERAL DAYS
1. FEELING NERVOUS, ANXIOUS, OR ON EDGE: SEVERAL DAYS
3. WORRYING TOO MUCH ABOUT DIFFERENT THINGS: NOT AT ALL
GAD7 TOTAL SCORE: 3
6. BECOMING EASILY ANNOYED OR IRRITABLE: SEVERAL DAYS
IF YOU CHECKED OFF ANY PROBLEMS ON THIS QUESTIONNAIRE, HOW DIFFICULT HAVE THESE PROBLEMS MADE IT FOR YOU TO DO YOUR WORK, TAKE CARE OF THINGS AT HOME, OR GET ALONG WITH OTHER PEOPLE: NOT DIFFICULT AT ALL

## 2019-11-25 NOTE — TELEPHONE ENCOUNTER
Patient forgot to fill out ROCHELLE and PHQ-9. Called and went over on the phone. Completed.     Janae Pino MA 12:16 PM November 25, 2019

## 2019-11-25 NOTE — PROGRESS NOTES
Subjective     Vivi Mccall is a 34 year old female who presents to clinic today for the following health issues:    HPI   New Patient:       34+5 WGA  Sees OBGY[facility] at [place]  Interested in setting up peds care for baby  Would like to come here  Has has HTN prior to pregnancy - currently not requiring any meds  Following fetal growth - measuring bigger  Recent labs reviewed    Has hd depression and anxiety - much better with meds feels this is increasing now as pregnancy advances more tearful and slightly more anxious    PHQ-9 SCORE 2019 5/15/2019 2019   PHQ-9 Total Score MyChart 3 (Minimal depression) - -   PHQ-9 Total Score 3 0 1     ROCHELLE-7 SCORE 2018 5/15/2019 2019   Total Score 4 0 3           Patient Active Problem List   Diagnosis     Benign essential hypertension     ROCHELLE (generalized anxiety disorder)     Major depressive disorder, recurrent episode, moderate (H)     Mild intermittent asthma without complication     Vitamin D deficiency     IUD contraception     Supervision of high-risk pregnancy     Encounter for triage in pregnant patient     Past Surgical History:   Procedure Laterality Date     GYN SURGERY      Laser treatment of HPV     HC RELEASE FOOT/TOE NERVE  2011     ORTHOPEDIC SURGERY Left 2017    ganglion cyst removal     SURGICAL HISTORY OF -       Nerve entrapment release       Social History     Tobacco Use     Smoking status: Never Smoker     Smokeless tobacco: Never Used   Substance Use Topics     Alcohol use: Not Currently     Comment: 6 drinks a week mainly wine     Family History   Problem Relation Age of Onset     Diabetes Mother      Bipolar Disorder Mother      Hypertension Mother      Coronary Artery Disease Mother      Unknown/Adopted Father      Hypertension Father      Breast Cancer Maternal Grandmother      Hypertension Maternal Grandmother      Diabetes Type 2  Maternal Grandfather      Glaucoma Maternal Grandfather      Hypertension Maternal  "Grandfather      Coronary Artery Disease Maternal Grandfather      Diabetes Maternal Grandfather      Hypertension Paternal Grandmother      Coronary Artery Disease Paternal Grandfather      Ovarian Cancer Other      Hypertension Maternal Aunt      Macular Degeneration No family hx of          Current Outpatient Medications   Medication Sig Dispense Refill     albuterol (PROAIR HFA/PROVENTIL HFA/VENTOLIN HFA) 108 (90 BASE) MCG/ACT Inhaler Inhale 2 puffs into the lungs every 6 hours       aspirin (ASA) 81 MG chewable tablet Take 81 mg by mouth daily       Cholecalciferol (VITAMIN D3) 2000 UNITS CAPS Take 2,000 Units by mouth       citalopram (CELEXA) 20 MG tablet TAKE 1 TABLET BY MOUTH EVERY DAY 90 tablet 1     Prenatal Multivit-Min-Fe-FA (PRENATAL VITAMINS PO) Take 1 tablet by mouth       nystatin (MYCOSTATIN) 515340 UNIT/GM external powder Apply to affected area 2 to 3 times daily (Patient not taking: Reported on 11/25/2019) 30 g 1     order for DME Equipment being ordered: Double Sided Electric Breast Pump (Patient not taking: Reported on 11/20/2019) 1 Device 0     vitamin B6 (VITAMIN  B-6) 25 MG tablet Take 25 mg by mouth daily           Reviewed and updated as needed this visit by Provider         Review of Systems   ROS COMP: Constitutional, HEENT, cardiovascular, pulmonary, gi and gu systems are negative, except as otherwise noted.      Objective    /76   Pulse 93   Temp 98.5  F (36.9  C) (Oral)   Ht 1.676 m (5' 6\")   Wt 93.2 kg (205 lb 6.4 oz)   LMP 03/27/2019   SpO2 100%   BMI 33.15 kg/m    Body mass index is 33.15 kg/m .  Physical Exam   GENERAL: healthy, alert and no distress  ABDOMEN: soft, nontender, without hepatosplenomegaly or masses    Diagnostic Test Results:  Labs reviewed in Epic             Assessment & Plan     1. Benign essential hypertension  At goal stable creat and no proteinuria    2. Major depressive disorder, recurrent episode, moderate (H)  Good control meds are " "helping  Getting little more anxious and tearful  Discussed self cares and suppport    3. Supervision of high risk pregnancy in third trimester   per OBGYN       BMI:   Estimated body mass index is 33.15 kg/m  as calculated from the following:    Height as of this encounter: 1.676 m (5' 6\").    Weight as of this encounter: 93.2 kg (205 lb 6.4 oz).           See Patient Instructions - see me post partum or as needed    No follow-ups on file.    Vi Baldwin MD  [facility]        "

## 2019-11-26 ASSESSMENT — ANXIETY QUESTIONNAIRES: GAD7 TOTAL SCORE: 3

## 2019-11-27 ENCOUNTER — PRENATAL OFFICE VISIT (OUTPATIENT)
Dept: OBGYN | Facility: CLINIC | Age: 34
End: 2019-11-27
Payer: COMMERCIAL

## 2019-11-27 ENCOUNTER — ANCILLARY PROCEDURE (OUTPATIENT)
Dept: ULTRASOUND IMAGING | Facility: CLINIC | Age: 34
End: 2019-11-27
Payer: COMMERCIAL

## 2019-11-27 VITALS — WEIGHT: 208 LBS | DIASTOLIC BLOOD PRESSURE: 80 MMHG | SYSTOLIC BLOOD PRESSURE: 116 MMHG | BODY MASS INDEX: 33.57 KG/M2

## 2019-11-27 DIAGNOSIS — O10.919 CHRONIC HYPERTENSION AFFECTING PREGNANCY: ICD-10-CM

## 2019-11-27 DIAGNOSIS — I10 CHRONIC HYPERTENSION: ICD-10-CM

## 2019-11-27 DIAGNOSIS — Z3A.35 35 WEEKS GESTATION OF PREGNANCY: Primary | ICD-10-CM

## 2019-11-27 DIAGNOSIS — O09.93 SUPERVISION OF HIGH RISK PREGNANCY IN THIRD TRIMESTER: ICD-10-CM

## 2019-11-27 PROCEDURE — 99207 ZZC PRENATAL VISIT: CPT | Performed by: OBSTETRICS & GYNECOLOGY

## 2019-11-27 PROCEDURE — 76819 FETAL BIOPHYS PROFIL W/O NST: CPT | Performed by: OBSTETRICS & GYNECOLOGY

## 2019-11-27 NOTE — PROGRESS NOTES
Doing well today.  +FM  +cramping/ctx with dance teaching; nothing painful or regular  BPP normal today; 8/8; MVP 4.8cm  Blood pressure normal  RTC 1wk --growth us, BPP and first cervix check

## 2019-12-04 ENCOUNTER — PRENATAL OFFICE VISIT (OUTPATIENT)
Dept: OBGYN | Facility: CLINIC | Age: 34
End: 2019-12-04
Payer: COMMERCIAL

## 2019-12-04 ENCOUNTER — ANCILLARY PROCEDURE (OUTPATIENT)
Dept: ULTRASOUND IMAGING | Facility: CLINIC | Age: 34
End: 2019-12-04
Payer: COMMERCIAL

## 2019-12-04 VITALS — WEIGHT: 207 LBS | BODY MASS INDEX: 33.41 KG/M2 | SYSTOLIC BLOOD PRESSURE: 130 MMHG | DIASTOLIC BLOOD PRESSURE: 84 MMHG

## 2019-12-04 DIAGNOSIS — Z3A.36 36 WEEKS GESTATION OF PREGNANCY: ICD-10-CM

## 2019-12-04 DIAGNOSIS — O09.93 SUPERVISION OF HIGH RISK PREGNANCY IN THIRD TRIMESTER: Primary | ICD-10-CM

## 2019-12-04 DIAGNOSIS — I10 CHRONIC HYPERTENSION: ICD-10-CM

## 2019-12-04 DIAGNOSIS — O10.919 CHRONIC HYPERTENSION AFFECTING PREGNANCY: ICD-10-CM

## 2019-12-04 PROCEDURE — 76819 FETAL BIOPHYS PROFIL W/O NST: CPT | Performed by: OBSTETRICS & GYNECOLOGY

## 2019-12-04 PROCEDURE — 87653 STREP B DNA AMP PROBE: CPT | Performed by: OBSTETRICS & GYNECOLOGY

## 2019-12-04 PROCEDURE — 87186 SC STD MICRODIL/AGAR DIL: CPT | Performed by: OBSTETRICS & GYNECOLOGY

## 2019-12-04 PROCEDURE — 76816 OB US FOLLOW-UP PER FETUS: CPT | Performed by: OBSTETRICS & GYNECOLOGY

## 2019-12-04 PROCEDURE — 99207 ZZC PRENATAL VISIT: CPT | Performed by: OBSTETRICS & GYNECOLOGY

## 2019-12-04 NOTE — PROGRESS NOTES
Doing well today.  +FM  Still occ ctx; no vb/spotting/lof  No headaches, blurred vision, n/v  Had one elevated blood pressure over the weekend --nothing since and normal today  Growth us today wnl; vtx, EFW 2899g/6-6# 53%ile; MVP 6.1cm; BPP 8/8  Will continue expectant management at this time unless blood pressures were to dictate otherwise  GBS collected  Labor precautions reviewed  RTC 1wk

## 2019-12-05 LAB
GP B STREP DNA SPEC QL NAA+PROBE: POSITIVE
SPECIMEN SOURCE: ABNORMAL

## 2019-12-07 ENCOUNTER — NURSE TRIAGE (OUTPATIENT)
Dept: NURSING | Facility: CLINIC | Age: 34
End: 2019-12-07

## 2019-12-07 NOTE — TELEPHONE ENCOUNTER
S: Calling about vaginal discharge.  B: Is 36 weeks pregnant. Around 1530 noticed in pantie liner yellowish, greenish and black discharge. Was awaken early this morning with intermittent cramping and pelvic pressure. Went to work today.    A: Paged Dr. Yoder at 1550, who is on call for Dr. Park, to call patient on cell phone [phone].  R: Writer told patient to call back if she does not hear back from provider in 30 minutes.  Joy Fenton RN, [facility]    Additional Information    Negative: Passed out (i.e., lost consciousness, collapsed and was not responding)    Negative: Shock suspected (e.g., cold/pale/clammy skin, too weak to stand, low BP, rapid pulse)    Negative: Difficult to awaken or acting confused (e.g., disoriented, slurred speech)    Negative: SEVERE vaginal bleeding (e.g., continuous red blood from vagina, large blood clots)    Negative: [1] SEVERE abdominal pain (e.g., excruciating) AND [2] constant AND [3] present > 1 hour    Negative: Sounds like a life-threatening emergency to the triager    Negative: MILD-MODERATE vaginal bleeding (i.e., small to medium clots; like mild menstrual period)    Negative: Abdominal pain or having contractions    Negative: Leakage of fluid from vagina (or caller thinks she has ruptured her bag of giordano)    Negative: Baby moving less today (e.g., kick count < 5 in 1 hour or < 10 in 2 hours)    Negative: [1] Pregnant 20-23 weeks AND [2] pinkish or brownish mucous discharge    [1] Pregnant > 36 weeks AND [2] pinkish or brownish mucous discharge    Negative: [1] Pregnant 24-36 weeks () AND [2] pinkish or brownish mucous discharge    Protocols used: PREGNANCY - VAGINAL BLEEDING GREATER THAN 20 WEEKS [facility]-

## 2019-12-08 LAB
BACTERIA SPEC CULT: ABNORMAL
SPECIMEN SOURCE: ABNORMAL

## 2019-12-09 NOTE — RESULT ENCOUNTER NOTE
Please inform of results --GBS positive --will need abx in labor; we can discuss at her visit this week

## 2019-12-10 ENCOUNTER — HOSPITAL ENCOUNTER (OUTPATIENT)
Facility: CLINIC | Age: 34
Discharge: HOME | End: 2019-12-11
Attending: OBSTETRICS & GYNECOLOGY | Admitting: OBSTETRICS & GYNECOLOGY
Payer: COMMERCIAL

## 2019-12-10 ENCOUNTER — NURSE TRIAGE (OUTPATIENT)
Dept: NURSING | Facility: CLINIC | Age: 34
End: 2019-12-10

## 2019-12-10 LAB
ALBUMIN SERPL-MCNC: 2.5 G/DL (ref 3.4–5)
ALP SERPL-CCNC: 190 U/L (ref 40–150)
ALT SERPL W P-5'-P-CCNC: 14 U/L (ref 0–50)
ANION GAP SERPL CALCULATED.3IONS-SCNC: 5 MMOL/L (ref 3–14)
AST SERPL W P-5'-P-CCNC: 17 U/L (ref 0–45)
BILIRUB SERPL-MCNC: 0.5 MG/DL (ref 0.2–1.3)
BUN SERPL-MCNC: 5 MG/DL (ref 7–30)
CALCIUM SERPL-MCNC: 8.1 MG/DL (ref 8.5–10.1)
CHLORIDE SERPL-SCNC: 107 MMOL/L (ref 94–109)
CO2 SERPL-SCNC: 23 MMOL/L (ref 20–32)
CREAT SERPL-MCNC: 0.56 MG/DL (ref 0.52–1.04)
CREAT UR-MCNC: 70 MG/DL
GFR SERPL CREATININE-BSD FRML MDRD: >90 ML/MIN/{1.73_M2}
GLUCOSE SERPL-MCNC: 106 MG/DL (ref 70–99)
POTASSIUM SERPL-SCNC: 3.6 MMOL/L (ref 3.4–5.3)
PROT SERPL-MCNC: 6.7 G/DL (ref 6.8–8.8)
PROT UR-MCNC: <0.05 G/L
PROT/CREAT 24H UR: NORMAL G/G CR (ref 0–0.2)
SODIUM SERPL-SCNC: 135 MMOL/L (ref 133–144)

## 2019-12-10 PROCEDURE — G0463 HOSPITAL OUTPT CLINIC VISIT: HCPCS | Mod: 25

## 2019-12-10 PROCEDURE — 84156 ASSAY OF PROTEIN URINE: CPT | Performed by: OBSTETRICS & GYNECOLOGY

## 2019-12-10 PROCEDURE — 80053 COMPREHEN METABOLIC PANEL: CPT | Performed by: OBSTETRICS & GYNECOLOGY

## 2019-12-10 PROCEDURE — 59025 FETAL NON-STRESS TEST: CPT

## 2019-12-10 PROCEDURE — 85027 COMPLETE CBC AUTOMATED: CPT | Performed by: OBSTETRICS & GYNECOLOGY

## 2019-12-10 PROCEDURE — 59025 FETAL NON-STRESS TEST: CPT | Mod: 26 | Performed by: OBSTETRICS & GYNECOLOGY

## 2019-12-10 PROCEDURE — 36415 COLL VENOUS BLD VENIPUNCTURE: CPT | Performed by: OBSTETRICS & GYNECOLOGY

## 2019-12-10 RX ORDER — ONDANSETRON 2 MG/ML
4 INJECTION INTRAMUSCULAR; INTRAVENOUS EVERY 6 HOURS PRN
Status: DISCONTINUED | OUTPATIENT
Start: 2019-12-10 | End: 2019-12-11 | Stop reason: HOSPADM

## 2019-12-10 ASSESSMENT — ACTIVITIES OF DAILY LIVING (ADL)
COGNITION: 0 - NO COGNITION ISSUES REPORTED
BATHING: 0-->INDEPENDENT
DRESS: 0-->INDEPENDENT
RETIRED_EATING: 0-->INDEPENDENT
RETIRED_COMMUNICATION: 0-->UNDERSTANDS/COMMUNICATES WITHOUT DIFFICULTY
AMBULATION: 0-->INDEPENDENT
TOILETING: 0-->INDEPENDENT
TRANSFERRING: 0-->INDEPENDENT
SWALLOWING: 0-->SWALLOWS FOODS/LIQUIDS WITHOUT DIFFICULTY
FALL_HISTORY_WITHIN_LAST_SIX_MONTHS: NO

## 2019-12-11 ENCOUNTER — PRENATAL OFFICE VISIT (OUTPATIENT)
Dept: OBGYN | Facility: CLINIC | Age: 34
End: 2019-12-11
Payer: COMMERCIAL

## 2019-12-11 ENCOUNTER — ANCILLARY PROCEDURE (OUTPATIENT)
Dept: ULTRASOUND IMAGING | Facility: CLINIC | Age: 34
End: 2019-12-11
Payer: COMMERCIAL

## 2019-12-11 VITALS — DIASTOLIC BLOOD PRESSURE: 80 MMHG | WEIGHT: 210 LBS | SYSTOLIC BLOOD PRESSURE: 118 MMHG | BODY MASS INDEX: 33.89 KG/M2

## 2019-12-11 VITALS — TEMPERATURE: 98.6 F

## 2019-12-11 DIAGNOSIS — O10.919 CHRONIC HYPERTENSION AFFECTING PREGNANCY: Primary | ICD-10-CM

## 2019-12-11 DIAGNOSIS — O10.919 CHRONIC HYPERTENSION AFFECTING PREGNANCY: ICD-10-CM

## 2019-12-11 DIAGNOSIS — Z3A.37 37 WEEKS GESTATION OF PREGNANCY: ICD-10-CM

## 2019-12-11 DIAGNOSIS — O09.93 SUPERVISION OF HIGH RISK PREGNANCY IN THIRD TRIMESTER: ICD-10-CM

## 2019-12-11 LAB
ERYTHROCYTE [DISTWIDTH] IN BLOOD BY AUTOMATED COUNT: 13.1 % (ref 10–15)
HCT VFR BLD AUTO: 34.5 % (ref 35–47)
HGB BLD-MCNC: 11.7 G/DL (ref 11.7–15.7)
MCH RBC QN AUTO: 31.7 PG (ref 26.5–33)
MCHC RBC AUTO-ENTMCNC: 33.9 G/DL (ref 31.5–36.5)
MCV RBC AUTO: 94 FL (ref 78–100)
PLATELET # BLD AUTO: 238 10E9/L (ref 150–450)
RBC # BLD AUTO: 3.69 10E12/L (ref 3.8–5.2)
WBC # BLD AUTO: 9.5 10E9/L (ref 4–11)

## 2019-12-11 PROCEDURE — 76819 FETAL BIOPHYS PROFIL W/O NST: CPT | Performed by: OBSTETRICS & GYNECOLOGY

## 2019-12-11 PROCEDURE — 99207 ZZC PRENATAL VISIT: CPT | Performed by: OBSTETRICS & GYNECOLOGY

## 2019-12-11 NOTE — PLAN OF CARE
2242  Patient arrived to maternal assessment center ambulatory with spouse, Bud  .    Patient reports reason for visit is blood pressures have been high since 4 am.  Patient to bathroom to void (and collect urine specimen) then to MAC room to change into gown.      G 2 P 1     36 weeks 6 days gestation    Prenatal record reviewed.        Verbal consent for EFM.     EFM applied for fetal well being with elevated blood pressures.    Uterine assessment completed, non-tender and palpates soft.      Triage/Arrival assessment completed.     Patient reports fetal movement is present.  Patient denies backache, vaginal discharge, pelvic pressure, UTI symptoms, GI problems, bloody show, vaginal bleeding, edema, headache, visual disturbances, epigastric or URQ pain, and/or rupture of membranes.      Cervical exam closed/50/-3 & posterior.       0045  Dr Soni paged with return call received.  Update included but not limited to: Patients arrival, patient presents reporting elevated blood pressures at home since 4 am.  Patient is a  36 6/7 gestation.  Fetus very active on arrival and fetal tracing displayed this.  Now have tracing with normal baseline with moderate variability accelerations present and no decelerations.  Contraction pattern on arrival every 1.5 to 4 minutes that patient denied feeling and now every 1.5 to 2 minutes she reports as cramping.  SVE done, result closed/50/-3 & posterior.      0050  Patient given discharge instructions verbalizes understanding and patient signed after visit summary signature page.  Patient and spouse, Bud in agreement with plan.    Patient instructed to report change in fetal movement, vaginal leaking of fluid or bleeding, abdominal pain, or any concerns related to the pregnancy to her physician or midwife.  Monitors removed for patient to dress.     0055 Discharged home, undelivered, ambulatory at 0055.

## 2019-12-11 NOTE — DISCHARGE INSTRUCTIONS
Discharge Instruction for Undelivered Patients      You were seen for: blood pressure evaluation  We Consulted: Dr TANIA Soni  You had (Test or Medicine):Fetal monitoring (non-stress test), labs - blood & urine, cervical exam     Diet:   Drink 8 to 12 glasses of liquids (milk, juice, water) every day.  You may eat meals and snacks.     Activity:  Call your doctor or nurse midwife if your baby is moving less than usual.     Call your provider if you notice:  Swelling in your face or increased swelling in your hands or legs.  Headaches that are not relieved by Tylenol (acetaminophen).  Changes in your vision (blurring: seeing spots or stars.)  Nausea (sick to your stomach) and vomiting (throwing up).   Weight gain of 5 pounds or more per week.  Heartburn that doesn't go away.  Signs of bladder infection: pain when you urinate (use the toilet), need to go more often and more urgently.  The bag of giordano (rupture of membranes) breaks, or you notice leaking in your underwear.  Bright red blood in your underwear.  Abdominal (lower belly) or stomach pain.  For first baby: Contractions (tightening) less than 5 minutes apart for one hour or more.  Increase or change in vaginal discharge (note the color and amount)      Follow-up:  As scheduled in the clinic

## 2019-12-11 NOTE — PROGRESS NOTES
Doing okay this AM --was seen in MAC last night for elevated blood pressures throughout the day  140s-150s/90s per patient report; in MAC blood pressures were normal and labs all wnl  +contractions; no vb/spotting/lof  More discharge noted  GBS positive --discussed antibiotics; allergy/sensitivity to penicillins and amoxicillin --will use clindamycin  Labor precautions reviewed  PreE precautions reviewed  RTC 1wk

## 2019-12-16 ENCOUNTER — TELEPHONE (OUTPATIENT)
Dept: OBGYN | Facility: CLINIC | Age: 34
End: 2019-12-16

## 2019-12-16 ENCOUNTER — HOSPITAL ENCOUNTER (OUTPATIENT)
Facility: CLINIC | Age: 34
Discharge: HOME | End: 2019-12-16
Attending: OBSTETRICS & GYNECOLOGY | Admitting: OBSTETRICS & GYNECOLOGY
Payer: COMMERCIAL

## 2019-12-16 VITALS
RESPIRATION RATE: 16 BRPM | TEMPERATURE: 98.6 F | DIASTOLIC BLOOD PRESSURE: 84 MMHG | SYSTOLIC BLOOD PRESSURE: 134 MMHG | BODY MASS INDEX: 33.89 KG/M2 | HEIGHT: 66 IN

## 2019-12-16 LAB
ALT SERPL W P-5'-P-CCNC: 13 U/L (ref 0–50)
AST SERPL W P-5'-P-CCNC: 14 U/L (ref 0–45)
CREAT SERPL-MCNC: 0.54 MG/DL (ref 0.52–1.04)
CREAT UR-MCNC: 145 MG/DL
ERYTHROCYTE [DISTWIDTH] IN BLOOD BY AUTOMATED COUNT: 13.1 % (ref 10–15)
GFR SERPL CREATININE-BSD FRML MDRD: >90 ML/MIN/{1.73_M2}
HCT VFR BLD AUTO: 33.4 % (ref 35–47)
HGB BLD-MCNC: 11.3 G/DL (ref 11.7–15.7)
MCH RBC QN AUTO: 31.6 PG (ref 26.5–33)
MCHC RBC AUTO-ENTMCNC: 33.8 G/DL (ref 31.5–36.5)
MCV RBC AUTO: 93 FL (ref 78–100)
PLATELET # BLD AUTO: 221 10E9/L (ref 150–450)
PROT UR-MCNC: 0.14 G/L
PROT/CREAT 24H UR: 0.1 G/G CR (ref 0–0.2)
RBC # BLD AUTO: 3.58 10E12/L (ref 3.8–5.2)
URATE SERPL-MCNC: 4 MG/DL (ref 2.6–6)
WBC # BLD AUTO: 7.9 10E9/L (ref 4–11)

## 2019-12-16 PROCEDURE — 84156 ASSAY OF PROTEIN URINE: CPT | Performed by: OBSTETRICS & GYNECOLOGY

## 2019-12-16 PROCEDURE — 82565 ASSAY OF CREATININE: CPT | Performed by: OBSTETRICS & GYNECOLOGY

## 2019-12-16 PROCEDURE — 84450 TRANSFERASE (AST) (SGOT): CPT | Performed by: OBSTETRICS & GYNECOLOGY

## 2019-12-16 PROCEDURE — 84550 ASSAY OF BLOOD/URIC ACID: CPT | Performed by: OBSTETRICS & GYNECOLOGY

## 2019-12-16 PROCEDURE — 84460 ALANINE AMINO (ALT) (SGPT): CPT | Performed by: OBSTETRICS & GYNECOLOGY

## 2019-12-16 PROCEDURE — 59025 FETAL NON-STRESS TEST: CPT

## 2019-12-16 PROCEDURE — 36415 COLL VENOUS BLD VENIPUNCTURE: CPT | Performed by: OBSTETRICS & GYNECOLOGY

## 2019-12-16 PROCEDURE — 59025 FETAL NON-STRESS TEST: CPT | Mod: 26 | Performed by: OBSTETRICS & GYNECOLOGY

## 2019-12-16 PROCEDURE — G0463 HOSPITAL OUTPT CLINIC VISIT: HCPCS | Mod: 25

## 2019-12-16 PROCEDURE — 85027 COMPLETE CBC AUTOMATED: CPT | Performed by: OBSTETRICS & GYNECOLOGY

## 2019-12-16 RX ORDER — ONDANSETRON 2 MG/ML
4 INJECTION INTRAMUSCULAR; INTRAVENOUS EVERY 6 HOURS PRN
Status: DISCONTINUED | OUTPATIENT
Start: 2019-12-16 | End: 2019-12-16 | Stop reason: HOSPADM

## 2019-12-16 NOTE — TELEPHONE ENCOUNTER
Left detailed  instructions to go to [facility] to be evaluated for pre-e. She may head right there when she gets the message and/or call and inform triage nurse she received message.    [facility] RN informed pt coming and call Dr Yoder for orders.    Pt returned call at 1306 - her  is on his way home and they will head into MAC for evaluation.    Jael Nelson, RN on 12/16/2019 at 12:42 PM

## 2019-12-16 NOTE — TELEPHONE ENCOUNTER
I feel like there's no down side to her going in to MAC, having labs and serial BPs there to know for sure.  She's 37 weeks and AMA so higher risk. Her BPs in office were also 120-130s/70-80s so not that far off but even a few >140/90 are grounds for pre-e.

## 2019-12-16 NOTE — PROVIDER NOTIFICATION
12/16/19 1500   Provider Notification   Provider Name/Title Dr. Yoder   Method of Notification Phone   Request Evaluate - Remote   Notification Reason Status Update  (pre-eclampsia labs and BP ranges)   Telephone call to Dr. Yoder. Updated on pre-eclampsia labs, fetal heart rate of moderate variability with accels. No decels. BP's ranging from 120's/70's-130's/70s-80s. Patients reports of dizziness and not feeling well. Patient denies any headache, vision changes or RUQ pain. Verbal orders to discharge patient to home. She has an appt scheduled with Dr. Park on 12/18/19.

## 2019-12-16 NOTE — TELEPHONE ENCOUNTER
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  /100 at 2230  Repeated BP after bath/waiting - came down a little bit    Throughout Sunday high 130's over high 80's; rested at home all day with feet elevated  Highest /94 - didn't feel well and went to bed.    Has had dizziness and SOB before and increase in swelling as her main concerns. Not on bedrest.    Today 0600 118/72 - just feels tight swelling/sore in LE's; no HA, visual changes dizziness, SOB at this time.     Instructed pt to take it easy - avoid work, rest on sides, elevate LE's and monitor for sx's: she is to call/be seen immediately if HA, dizziness, visual changes, epigastric pain, SOB or doesn't feel well. Keep monitoring BP's and inform if >140/90.    Pt tearful and reassured she is taking care of her and baby as number 1.    Routing to Dr Park as EPIFANIO and Dr Yoder as on call as Dr Park out of the office today. Call pt with instructions if different from above.    Pt verbalized understanding, in agreement with plan, and voiced no further questions.  Jael Nelson RN on 12/16/2019 at 10:05 AM

## 2019-12-16 NOTE — DISCHARGE INSTRUCTIONS
Discharge Instruction for Undelivered Patients      You were seen for: Pre-eclampsia evaluation  We Consulted: Dr Yoder  You had (Test or Medicine):Serial blood pressures, pre-eclampsia labs, fetal and uterine monitoring     Diet:   Drink 8 to 12 glasses of liquids (milk, juice, water) every day.     Activity:  Call your doctor or nurse midwife if your baby is moving less than usual.     Call your provider if you notice:  Swelling in your face or increased swelling in your hands or legs.  Headaches that are not relieved by Tylenol (acetaminophen).  Changes in your vision (blurring: seeing spots or stars.)  Nausea (sick to your stomach) and vomiting (throwing up).   Weight gain of 5 pounds or more per week.  Heartburn that doesn't go away.  Signs of bladder infection: pain when you urinate (use the toilet), need to go more often and more urgently.  The bag of giordano (rupture of membranes) breaks, or you notice leaking in your underwear.  Bright red blood in your underwear.  Abdominal (lower belly) or stomach pain.  For first baby: Contractions (tightening) less than 5 minutes apart for one hour or more.  Increase or change in vaginal discharge (note the color and amount)      Follow-up:  As scheduled in the clinic

## 2019-12-16 NOTE — PLAN OF CARE
Patient presents to [facility] for pre-eclampsia evaluation. Orders received from Dr. Yoder via telephone. Verbal permission given to apply external monitors. History and vital signs taken. Serial BP's running.    Patient reports that she has been taking her blood pressures at home and they have been in the 150s systolic. States that she hasn't been feeling well, and has been dizzy. Noticed more swelling in her ankles the past couple days. Denies a headache, RUQ pain or vision changes. Denies any leaking or bleeding. Reports + fetal movement.

## 2019-12-17 NOTE — PROGRESS NOTES
NST Review  33 yo  presented for swelling and elevated blood pressures at home.  NST with initial baseline of 145 then 150 with accels, no decels and no ctx. Category 1    Patient d/c'd to home

## 2019-12-18 ENCOUNTER — ANCILLARY PROCEDURE (OUTPATIENT)
Dept: ULTRASOUND IMAGING | Facility: CLINIC | Age: 34
End: 2019-12-18
Payer: COMMERCIAL

## 2019-12-18 ENCOUNTER — PRENATAL OFFICE VISIT (OUTPATIENT)
Dept: OBGYN | Facility: CLINIC | Age: 34
End: 2019-12-18
Payer: COMMERCIAL

## 2019-12-18 VITALS — SYSTOLIC BLOOD PRESSURE: 132 MMHG | WEIGHT: 216 LBS | DIASTOLIC BLOOD PRESSURE: 84 MMHG | BODY MASS INDEX: 34.86 KG/M2

## 2019-12-18 DIAGNOSIS — O09.93 SUPERVISION OF HIGH RISK PREGNANCY IN THIRD TRIMESTER: Primary | ICD-10-CM

## 2019-12-18 DIAGNOSIS — Z3A.38 38 WEEKS GESTATION OF PREGNANCY: ICD-10-CM

## 2019-12-18 DIAGNOSIS — O10.919 CHRONIC HYPERTENSION AFFECTING PREGNANCY: ICD-10-CM

## 2019-12-18 PROCEDURE — 76819 FETAL BIOPHYS PROFIL W/O NST: CPT | Performed by: OBSTETRICS & GYNECOLOGY

## 2019-12-18 PROCEDURE — 99207 ZZC PRENATAL VISIT: CPT | Performed by: OBSTETRICS & GYNECOLOGY

## 2019-12-18 NOTE — PROGRESS NOTES
Doing okay today --tired and ready to be done  +contractions; no vb/spotting/lof  Was seen in MAC on Monday night with normal pressures and normal labs; intermittently elevated at home  No headaches, vision changes, n/v  BPP today 8/8; MVP 4.2cm  Labor and pre-eclampsia precautions reviewed  RTC 1wk --will discuss IOL at that time

## 2019-12-20 ENCOUNTER — HOSPITAL ENCOUNTER (OUTPATIENT)
Facility: CLINIC | Age: 34
Discharge: HOME | End: 2019-12-20
Attending: OBSTETRICS & GYNECOLOGY | Admitting: OBSTETRICS & GYNECOLOGY
Payer: COMMERCIAL

## 2019-12-20 VITALS
SYSTOLIC BLOOD PRESSURE: 132 MMHG | RESPIRATION RATE: 16 BRPM | DIASTOLIC BLOOD PRESSURE: 85 MMHG | WEIGHT: 215.98 LBS | BODY MASS INDEX: 34.71 KG/M2 | HEIGHT: 66 IN | HEART RATE: 72 BPM

## 2019-12-20 LAB
ALT SERPL W P-5'-P-CCNC: 12 U/L (ref 0–50)
AST SERPL W P-5'-P-CCNC: 15 U/L (ref 0–45)
BUN SERPL-MCNC: 8 MG/DL (ref 7–30)
CREAT SERPL-MCNC: 0.71 MG/DL (ref 0.52–1.04)
CREAT UR-MCNC: 194 MG/DL
ERYTHROCYTE [DISTWIDTH] IN BLOOD BY AUTOMATED COUNT: 13.3 % (ref 10–15)
GFR SERPL CREATININE-BSD FRML MDRD: >90 ML/MIN/{1.73_M2}
HCT VFR BLD AUTO: 32.8 % (ref 35–47)
HGB BLD-MCNC: 11 G/DL (ref 11.7–15.7)
MCH RBC QN AUTO: 31.3 PG (ref 26.5–33)
MCHC RBC AUTO-ENTMCNC: 33.5 G/DL (ref 31.5–36.5)
MCV RBC AUTO: 93 FL (ref 78–100)
PLATELET # BLD AUTO: 224 10E9/L (ref 150–450)
PROT UR-MCNC: 0.13 G/L
PROT/CREAT 24H UR: 0.07 G/G CR (ref 0–0.2)
RBC # BLD AUTO: 3.51 10E12/L (ref 3.8–5.2)
URATE SERPL-MCNC: 4.2 MG/DL (ref 2.6–6)
WBC # BLD AUTO: 8.6 10E9/L (ref 4–11)

## 2019-12-20 PROCEDURE — 36415 COLL VENOUS BLD VENIPUNCTURE: CPT | Performed by: OBSTETRICS & GYNECOLOGY

## 2019-12-20 PROCEDURE — 82565 ASSAY OF CREATININE: CPT | Performed by: OBSTETRICS & GYNECOLOGY

## 2019-12-20 PROCEDURE — G0463 HOSPITAL OUTPT CLINIC VISIT: HCPCS | Mod: 25

## 2019-12-20 PROCEDURE — 84450 TRANSFERASE (AST) (SGOT): CPT | Performed by: OBSTETRICS & GYNECOLOGY

## 2019-12-20 PROCEDURE — 59025 FETAL NON-STRESS TEST: CPT | Mod: 26 | Performed by: OBSTETRICS & GYNECOLOGY

## 2019-12-20 PROCEDURE — 84460 ALANINE AMINO (ALT) (SGPT): CPT | Performed by: OBSTETRICS & GYNECOLOGY

## 2019-12-20 PROCEDURE — 84156 ASSAY OF PROTEIN URINE: CPT | Performed by: OBSTETRICS & GYNECOLOGY

## 2019-12-20 PROCEDURE — 84520 ASSAY OF UREA NITROGEN: CPT | Performed by: OBSTETRICS & GYNECOLOGY

## 2019-12-20 PROCEDURE — 84550 ASSAY OF BLOOD/URIC ACID: CPT | Performed by: OBSTETRICS & GYNECOLOGY

## 2019-12-20 PROCEDURE — 85027 COMPLETE CBC AUTOMATED: CPT | Performed by: OBSTETRICS & GYNECOLOGY

## 2019-12-20 PROCEDURE — 59025 FETAL NON-STRESS TEST: CPT

## 2019-12-20 RX ORDER — ONDANSETRON 2 MG/ML
4 INJECTION INTRAMUSCULAR; INTRAVENOUS EVERY 6 HOURS PRN
Status: DISCONTINUED | OUTPATIENT
Start: 2019-12-20 | End: 2019-12-20 | Stop reason: HOSPADM

## 2019-12-20 ASSESSMENT — MIFFLIN-ST. JEOR: SCORE: 1691.2

## 2019-12-20 NOTE — PLAN OF CARE
Dr Park notified of patients lab results and FHT. ORders received to discharge to home and follow up in the clinic on Tuesday. PT understands instructions and will call sooner with any questions or concerns.

## 2019-12-20 NOTE — DISCHARGE INSTRUCTIONS
Discharge Instruction for Undelivered Patients      You were seen for: pre eclampsia eval  We Consulted: Dr Park  You had (Test or Medicine):Non stress test and Labs     Diet:   Drink 8 to 12 glasses of liquids (milk, juice, water) every day.     Activity:  Call your doctor or nurse midwife if your baby is moving less than usual.     Call your provider if you notice:  Swelling in your face or increased swelling in your hands or legs.  Headaches that are not relieved by Tylenol (acetaminophen).  Changes in your vision (blurring: seeing spots or stars.)  Nausea (sick to your stomach) and vomiting (throwing up).   Weight gain of 5 pounds or more per week.  Heartburn that doesn't go away.  Signs of bladder infection: pain when you urinate (use the toilet), need to go more often and more urgently.  The bag of giordano (rupture of membranes) breaks, or you notice leaking in your underwear.  Bright red blood in your underwear.  Abdominal (lower belly) or stomach pain.  For first baby: Contractions (tightening) less than 5 minutes apart for one hour or more.  Second (plus) baby: Contractions (tightening) less than 10 minutes apart and getting stronger.  *If less than 34 weeks: Contractions (tightenings) more than 6 times in one hour.  Increase or change in vaginal discharge (note the color and amount)  Other: Call MD for any questions or concerns.    Follow-up:  As scheduled in the clinic on Tuesday.

## 2019-12-20 NOTE — PLAN OF CARE
"PT arrived from home with c/o increased BP. PT has been taking it at home and was told to come in if elevated. PT states she was on hold for MD and decided to just come in. Prenatal record available and reviewed. PT was in [facility] on Monday and saw MD on Wednesday. PT has flat affact. Denies HA but states she has blurred vision and is \"seeing stars\". Dr Park notified of patients arrival and complaints. ORders received. PT states there has been no change to her history or medications since Monday.  "

## 2019-12-21 NOTE — PROGRESS NOTES
OB NST     33yo  seen in MAC at 38+2wks for PIH workup.  Known chronic hypertension on no meds.  PIH/Pre-eclampsia workup negative and BPs normal.    NST: baseline 130s; mod variability, +accels, no decels; Cat I tracing  Garnet:  Irritability    Dispo: discharge to home    Anamaria Park MD

## 2019-12-24 ENCOUNTER — PRENATAL OFFICE VISIT (OUTPATIENT)
Dept: OBGYN | Facility: CLINIC | Age: 34
End: 2019-12-24
Payer: COMMERCIAL

## 2019-12-24 ENCOUNTER — ANCILLARY PROCEDURE (OUTPATIENT)
Dept: ULTRASOUND IMAGING | Facility: CLINIC | Age: 34
End: 2019-12-24
Payer: COMMERCIAL

## 2019-12-24 VITALS — SYSTOLIC BLOOD PRESSURE: 126 MMHG | DIASTOLIC BLOOD PRESSURE: 82 MMHG | WEIGHT: 219 LBS | BODY MASS INDEX: 35.36 KG/M2

## 2019-12-24 DIAGNOSIS — Z3A.38 38 WEEKS GESTATION OF PREGNANCY: Primary | ICD-10-CM

## 2019-12-24 DIAGNOSIS — O10.919 CHRONIC HYPERTENSION AFFECTING PREGNANCY: ICD-10-CM

## 2019-12-24 DIAGNOSIS — O09.93 SUPERVISION OF HIGH RISK PREGNANCY IN THIRD TRIMESTER: ICD-10-CM

## 2019-12-24 PROCEDURE — 99207 ZZC PRENATAL VISIT: CPT | Performed by: OBSTETRICS & GYNECOLOGY

## 2019-12-24 PROCEDURE — 76819 FETAL BIOPHYS PROFIL W/O NST: CPT | Performed by: OBSTETRICS & GYNECOLOGY

## 2019-12-24 RX ORDER — ACETAMINOPHEN 325 MG/1
650 TABLET ORAL EVERY 4 HOURS PRN
Status: CANCELLED | OUTPATIENT
Start: 2019-12-24

## 2019-12-24 RX ORDER — IBUPROFEN 200 MG
800 TABLET ORAL
Status: CANCELLED | OUTPATIENT
Start: 2019-12-24

## 2019-12-24 RX ORDER — CARBOPROST TROMETHAMINE 250 UG/ML
250 INJECTION, SOLUTION INTRAMUSCULAR
Status: CANCELLED | OUTPATIENT
Start: 2019-12-24

## 2019-12-24 RX ORDER — OXYCODONE AND ACETAMINOPHEN 5; 325 MG/1; MG/1
1 TABLET ORAL
Status: CANCELLED | OUTPATIENT
Start: 2019-12-24

## 2019-12-24 RX ORDER — NALOXONE HYDROCHLORIDE 0.4 MG/ML
.1-.4 INJECTION, SOLUTION INTRAMUSCULAR; INTRAVENOUS; SUBCUTANEOUS
Status: CANCELLED | OUTPATIENT
Start: 2019-12-24

## 2019-12-24 RX ORDER — LIDOCAINE 40 MG/G
CREAM TOPICAL
Status: CANCELLED | OUTPATIENT
Start: 2019-12-24

## 2019-12-24 RX ORDER — ONDANSETRON 2 MG/ML
4 INJECTION INTRAMUSCULAR; INTRAVENOUS EVERY 6 HOURS PRN
Status: CANCELLED | OUTPATIENT
Start: 2019-12-24

## 2019-12-24 RX ORDER — SODIUM CHLORIDE, SODIUM LACTATE, POTASSIUM CHLORIDE, CALCIUM CHLORIDE 600; 310; 30; 20 MG/100ML; MG/100ML; MG/100ML; MG/100ML
INJECTION, SOLUTION INTRAVENOUS CONTINUOUS
Status: CANCELLED | OUTPATIENT
Start: 2019-12-24

## 2019-12-24 RX ORDER — OXYTOCIN/0.9 % SODIUM CHLORIDE 30/500 ML
1-24 PLASTIC BAG, INJECTION (ML) INTRAVENOUS CONTINUOUS
Status: CANCELLED | OUTPATIENT
Start: 2019-12-24

## 2019-12-24 RX ORDER — OXYTOCIN 10 [USP'U]/ML
10 INJECTION, SOLUTION INTRAMUSCULAR; INTRAVENOUS
Status: CANCELLED | OUTPATIENT
Start: 2019-12-24

## 2019-12-24 RX ORDER — OXYTOCIN/0.9 % SODIUM CHLORIDE 30/500 ML
100-340 PLASTIC BAG, INJECTION (ML) INTRAVENOUS CONTINUOUS PRN
Status: CANCELLED | OUTPATIENT
Start: 2019-12-24

## 2019-12-24 RX ORDER — METHYLERGONOVINE MALEATE 0.2 MG/ML
200 INJECTION INTRAVENOUS
Status: CANCELLED | OUTPATIENT
Start: 2019-12-24

## 2019-12-24 NOTE — PROGRESS NOTES
Doing well this AM  +ctx this week --more in her back; no vb/spotting.  +mucus plug yesterday  More hip pain  Was seen in [facility] on Friday with normal pressures and labwork  Labor precautions reviewed  IOL scheduled with me on Friday for CHTN  GBS positive --clindamycin sensitive

## 2019-12-27 ENCOUNTER — ANESTHESIA (OUTPATIENT)
Dept: OBGYN | Facility: CLINIC | Age: 34
End: 2019-12-27
Payer: COMMERCIAL

## 2019-12-27 ENCOUNTER — ANESTHESIA EVENT (OUTPATIENT)
Dept: OBGYN | Facility: CLINIC | Age: 34
End: 2019-12-27
Payer: COMMERCIAL

## 2019-12-27 ENCOUNTER — HOSPITAL ENCOUNTER (INPATIENT)
Facility: CLINIC | Age: 34
LOS: 3 days | Discharge: HOME | End: 2019-12-30
Attending: OBSTETRICS & GYNECOLOGY | Admitting: OBSTETRICS & GYNECOLOGY
Payer: COMMERCIAL

## 2019-12-27 DIAGNOSIS — D62 ANEMIA DUE TO BLOOD LOSS, ACUTE: Primary | ICD-10-CM

## 2019-12-27 PROBLEM — O10.919 CHRONIC HYPERTENSION IN PREGNANCY: Status: ACTIVE | Noted: 2019-12-27

## 2019-12-27 LAB
ABO + RH BLD: NORMAL
ABO + RH BLD: NORMAL
ALT SERPL W P-5'-P-CCNC: 10 U/L (ref 0–50)
AST SERPL W P-5'-P-CCNC: 16 U/L (ref 0–45)
BASOPHILS # BLD AUTO: 0 10E9/L (ref 0–0.2)
BASOPHILS NFR BLD AUTO: 0.3 %
BLD GP AB SCN SERPL QL: NORMAL
BLOOD BANK CMNT PATIENT-IMP: NORMAL
CREAT SERPL-MCNC: 0.74 MG/DL (ref 0.52–1.04)
DIFFERENTIAL METHOD BLD: ABNORMAL
EOSINOPHIL # BLD AUTO: 0.3 10E9/L (ref 0–0.7)
EOSINOPHIL NFR BLD AUTO: 2.9 %
ERYTHROCYTE [DISTWIDTH] IN BLOOD BY AUTOMATED COUNT: 13.4 % (ref 10–15)
GFR SERPL CREATININE-BSD FRML MDRD: >90 ML/MIN/{1.73_M2}
HCT VFR BLD AUTO: 32.2 % (ref 35–47)
HGB BLD-MCNC: 10.9 G/DL (ref 11.7–15.7)
IMM GRANULOCYTES # BLD: 0 10E9/L (ref 0–0.4)
IMM GRANULOCYTES NFR BLD: 0.4 %
LYMPHOCYTES # BLD AUTO: 1.4 10E9/L (ref 0.8–5.3)
LYMPHOCYTES NFR BLD AUTO: 13.9 %
MCH RBC QN AUTO: 31.6 PG (ref 26.5–33)
MCHC RBC AUTO-ENTMCNC: 33.9 G/DL (ref 31.5–36.5)
MCV RBC AUTO: 93 FL (ref 78–100)
MONOCYTES # BLD AUTO: 1.2 10E9/L (ref 0–1.3)
MONOCYTES NFR BLD AUTO: 12 %
NEUTROPHILS # BLD AUTO: 6.8 10E9/L (ref 1.6–8.3)
NEUTROPHILS NFR BLD AUTO: 70.5 %
NRBC # BLD AUTO: 0 10*3/UL
NRBC BLD AUTO-RTO: 0 /100
PLATELET # BLD AUTO: 229 10E9/L (ref 150–450)
RBC # BLD AUTO: 3.45 10E12/L (ref 3.8–5.2)
SPECIMEN EXP DATE BLD: NORMAL
T PALLIDUM AB SER QL: NONREACTIVE
URATE SERPL-MCNC: 4.2 MG/DL (ref 2.6–6)
WBC # BLD AUTO: 9.7 10E9/L (ref 4–11)

## 2019-12-27 PROCEDURE — 86901 BLOOD TYPING SEROLOGIC RH(D): CPT | Performed by: OBSTETRICS & GYNECOLOGY

## 2019-12-27 PROCEDURE — 25800030 ZZH RX IP 258 OP 636: Performed by: ANESTHESIOLOGY

## 2019-12-27 PROCEDURE — 36415 COLL VENOUS BLD VENIPUNCTURE: CPT | Performed by: OBSTETRICS & GYNECOLOGY

## 2019-12-27 PROCEDURE — 84550 ASSAY OF BLOOD/URIC ACID: CPT | Performed by: OBSTETRICS & GYNECOLOGY

## 2019-12-27 PROCEDURE — 84450 TRANSFERASE (AST) (SGOT): CPT | Performed by: OBSTETRICS & GYNECOLOGY

## 2019-12-27 PROCEDURE — 25800030 ZZH RX IP 258 OP 636: Performed by: OBSTETRICS & GYNECOLOGY

## 2019-12-27 PROCEDURE — 25000125 ZZHC RX 250: Performed by: OBSTETRICS & GYNECOLOGY

## 2019-12-27 PROCEDURE — 84460 ALANINE AMINO (ALT) (SGPT): CPT | Performed by: OBSTETRICS & GYNECOLOGY

## 2019-12-27 PROCEDURE — 12000000 ZZH R&B MED SURG/OB

## 2019-12-27 PROCEDURE — 25000128 H RX IP 250 OP 636: Performed by: ANESTHESIOLOGY

## 2019-12-27 PROCEDURE — 82565 ASSAY OF CREATININE: CPT | Performed by: OBSTETRICS & GYNECOLOGY

## 2019-12-27 PROCEDURE — 85025 COMPLETE CBC W/AUTO DIFF WBC: CPT | Performed by: OBSTETRICS & GYNECOLOGY

## 2019-12-27 PROCEDURE — 86900 BLOOD TYPING SEROLOGIC ABO: CPT | Performed by: OBSTETRICS & GYNECOLOGY

## 2019-12-27 PROCEDURE — 86780 TREPONEMA PALLIDUM: CPT | Performed by: OBSTETRICS & GYNECOLOGY

## 2019-12-27 PROCEDURE — 37000011 ZZH ANESTHESIA WARD SERVICE

## 2019-12-27 PROCEDURE — 86850 RBC ANTIBODY SCREEN: CPT | Performed by: OBSTETRICS & GYNECOLOGY

## 2019-12-27 PROCEDURE — 25000128 H RX IP 250 OP 636: Performed by: OBSTETRICS & GYNECOLOGY

## 2019-12-27 RX ORDER — OXYCODONE AND ACETAMINOPHEN 5; 325 MG/1; MG/1
1 TABLET ORAL
Status: DISCONTINUED | OUTPATIENT
Start: 2019-12-27 | End: 2019-12-28

## 2019-12-27 RX ORDER — FENTANYL/BUPIVACAINE/NS/PF 2-1250MCG
12 PLASTIC BAG, INJECTION (ML) INJECTION CONTINUOUS
Status: DISCONTINUED | OUTPATIENT
Start: 2019-12-27 | End: 2019-12-28

## 2019-12-27 RX ORDER — EPHEDRINE SULFATE 50 MG/ML
5 INJECTION, SOLUTION INTRAMUSCULAR; INTRAVENOUS; SUBCUTANEOUS
Status: DISCONTINUED | OUTPATIENT
Start: 2019-12-27 | End: 2019-12-28

## 2019-12-27 RX ORDER — NALOXONE HYDROCHLORIDE 0.4 MG/ML
.1-.4 INJECTION, SOLUTION INTRAMUSCULAR; INTRAVENOUS; SUBCUTANEOUS
Status: DISCONTINUED | OUTPATIENT
Start: 2019-12-27 | End: 2019-12-28

## 2019-12-27 RX ORDER — SODIUM CHLORIDE, SODIUM LACTATE, POTASSIUM CHLORIDE, CALCIUM CHLORIDE 600; 310; 30; 20 MG/100ML; MG/100ML; MG/100ML; MG/100ML
INJECTION, SOLUTION INTRAVENOUS CONTINUOUS
Status: DISCONTINUED | OUTPATIENT
Start: 2019-12-27 | End: 2019-12-28

## 2019-12-27 RX ORDER — ROPIVACAINE HYDROCHLORIDE 2 MG/ML
10 INJECTION, SOLUTION EPIDURAL; INFILTRATION; PERINEURAL ONCE
Status: COMPLETED | OUTPATIENT
Start: 2019-12-27 | End: 2019-12-27

## 2019-12-27 RX ORDER — ONDANSETRON 2 MG/ML
4 INJECTION INTRAMUSCULAR; INTRAVENOUS EVERY 6 HOURS PRN
Status: DISCONTINUED | OUTPATIENT
Start: 2019-12-27 | End: 2019-12-28

## 2019-12-27 RX ORDER — OXYTOCIN 10 [USP'U]/ML
10 INJECTION, SOLUTION INTRAMUSCULAR; INTRAVENOUS
Status: DISCONTINUED | OUTPATIENT
Start: 2019-12-27 | End: 2019-12-28

## 2019-12-27 RX ORDER — ACETAMINOPHEN 325 MG/1
650 TABLET ORAL EVERY 4 HOURS PRN
Status: DISCONTINUED | OUTPATIENT
Start: 2019-12-27 | End: 2019-12-28

## 2019-12-27 RX ORDER — FENTANYL CITRATE 50 UG/ML
100 INJECTION, SOLUTION INTRAMUSCULAR; INTRAVENOUS ONCE
Status: COMPLETED | OUTPATIENT
Start: 2019-12-27 | End: 2019-12-27

## 2019-12-27 RX ORDER — METHYLERGONOVINE MALEATE 0.2 MG/ML
200 INJECTION INTRAVENOUS
Status: DISCONTINUED | OUTPATIENT
Start: 2019-12-27 | End: 2019-12-28

## 2019-12-27 RX ORDER — OXYTOCIN/0.9 % SODIUM CHLORIDE 30/500 ML
100-340 PLASTIC BAG, INJECTION (ML) INTRAVENOUS CONTINUOUS PRN
Status: COMPLETED | OUTPATIENT
Start: 2019-12-27 | End: 2019-12-28

## 2019-12-27 RX ORDER — CLINDAMYCIN PHOSPHATE 900 MG/50ML
900 INJECTION, SOLUTION INTRAVENOUS EVERY 8 HOURS
Status: DISCONTINUED | OUTPATIENT
Start: 2019-12-27 | End: 2019-12-28

## 2019-12-27 RX ORDER — NALBUPHINE HYDROCHLORIDE 10 MG/ML
2.5-5 INJECTION, SOLUTION INTRAMUSCULAR; INTRAVENOUS; SUBCUTANEOUS EVERY 6 HOURS PRN
Status: DISCONTINUED | OUTPATIENT
Start: 2019-12-27 | End: 2019-12-28

## 2019-12-27 RX ORDER — IBUPROFEN 400 MG/1
800 TABLET, FILM COATED ORAL
Status: DISCONTINUED | OUTPATIENT
Start: 2019-12-27 | End: 2019-12-28

## 2019-12-27 RX ORDER — OXYTOCIN/0.9 % SODIUM CHLORIDE 30/500 ML
1-24 PLASTIC BAG, INJECTION (ML) INTRAVENOUS CONTINUOUS
Status: DISCONTINUED | OUTPATIENT
Start: 2019-12-27 | End: 2019-12-28

## 2019-12-27 RX ORDER — LIDOCAINE 40 MG/G
CREAM TOPICAL
Status: DISCONTINUED | OUTPATIENT
Start: 2019-12-27 | End: 2019-12-28

## 2019-12-27 RX ORDER — ONDANSETRON 4 MG/1
4 TABLET, ORALLY DISINTEGRATING ORAL EVERY 6 HOURS PRN
Status: DISCONTINUED | OUTPATIENT
Start: 2019-12-27 | End: 2019-12-28

## 2019-12-27 RX ORDER — CARBOPROST TROMETHAMINE 250 UG/ML
250 INJECTION, SOLUTION INTRAMUSCULAR
Status: DISCONTINUED | OUTPATIENT
Start: 2019-12-27 | End: 2019-12-28

## 2019-12-27 RX ADMIN — CLINDAMYCIN PHOSPHATE 900 MG: 900 INJECTION, SOLUTION INTRAVENOUS at 17:04

## 2019-12-27 RX ADMIN — BUPIVACAINE HYDROCHLORIDE 12 ML/HR: 5 INJECTION, SOLUTION EPIDURAL; INTRACAUDAL at 12:33

## 2019-12-27 RX ADMIN — SODIUM CHLORIDE, POTASSIUM CHLORIDE, SODIUM LACTATE AND CALCIUM CHLORIDE: 600; 310; 30; 20 INJECTION, SOLUTION INTRAVENOUS at 12:47

## 2019-12-27 RX ADMIN — FENTANYL CITRATE 100 MCG: 50 INJECTION, SOLUTION INTRAMUSCULAR; INTRAVENOUS at 12:41

## 2019-12-27 RX ADMIN — ROPIVACAINE HYDROCHLORIDE 10 ML: 2 INJECTION, SOLUTION EPIDURAL; INFILTRATION at 12:41

## 2019-12-27 RX ADMIN — Medication 2 MILLI-UNITS/MIN: at 08:30

## 2019-12-27 RX ADMIN — CLINDAMYCIN PHOSPHATE 900 MG: 900 INJECTION, SOLUTION INTRAVENOUS at 08:46

## 2019-12-27 RX ADMIN — SODIUM CHLORIDE, POTASSIUM CHLORIDE, SODIUM LACTATE AND CALCIUM CHLORIDE 1000 ML: 600; 310; 30; 20 INJECTION, SOLUTION INTRAVENOUS at 12:00

## 2019-12-27 RX ADMIN — ONDANSETRON 4 MG: 2 INJECTION INTRAMUSCULAR; INTRAVENOUS at 21:22

## 2019-12-27 RX ADMIN — BUPIVACAINE HYDROCHLORIDE 12 ML/HR: 5 INJECTION, SOLUTION EPIDURAL; INTRACAUDAL at 19:38

## 2019-12-27 ASSESSMENT — ACTIVITIES OF DAILY LIVING (ADL)
BATHING: 0-->INDEPENDENT
RETIRED_COMMUNICATION: 0-->UNDERSTANDS/COMMUNICATES WITHOUT DIFFICULTY
TRANSFERRING: 0-->INDEPENDENT
SWALLOWING: 0-->SWALLOWS FOODS/LIQUIDS WITHOUT DIFFICULTY
FALL_HISTORY_WITHIN_LAST_SIX_MONTHS: NO
RETIRED_EATING: 0-->INDEPENDENT
DRESS: 0-->INDEPENDENT
COGNITION: 0 - NO COGNITION ISSUES REPORTED
AMBULATION: 0-->INDEPENDENT
TOILETING: 0-->INDEPENDENT

## 2019-12-27 NOTE — ANESTHESIA PREPROCEDURE EVALUATION
"Anesthesia Pre-Procedure Evaluation    Patient: Vivi Mccall   MRN: [id number] : 1985          Preoperative Diagnosis: * No surgery found *        Past Medical History:   Diagnosis Date     Asthma     seasonal and environmental     C. difficile diarrhea      Depression      Depressive disorder      Generalized anxiety disorder      Hypertension      Past Surgical History:   Procedure Laterality Date     GYN SURGERY      Laser treatment of HPV     HC RELEASE FOOT/TOE NERVE  2011     ORTHOPEDIC SURGERY Left 2017    ganglion cyst removal     ORTHOPEDIC SURGERY Left     nerve release on left leg/ankle      SURGICAL HISTORY OF -       Nerve entrapment release                        Lab Results   Component Value Date    WBC 9.7 2019    HGB 10.9 (L) 2019    HCT 32.2 (L) 2019     2019     12/10/2019    POTASSIUM 3.6 12/10/2019    CHLORIDE 107 12/10/2019    CO2 23 12/10/2019    BUN 8 2019    CR 0.74 2019     (H) 12/10/2019    BARRY 8.1 (L) 12/10/2019    ALBUMIN 2.5 (L) 12/10/2019    PROTTOTAL 6.7 (L) 12/10/2019    ALT 10 2019    AST 16 2019    ALKPHOS 190 (H) 12/10/2019    BILITOTAL 0.5 12/10/2019    LIPASE 99 10/22/2018    TSH 0.98 2019    HCGS Negative 10/22/2018       Preop Vitals  BP Readings from Last 3 Encounters:   19 (!) 133/92   19 126/82   19 132/85    Pulse Readings from Last 3 Encounters:   19 72   19 93   10/31/19 79      Resp Readings from Last 3 Encounters:   19 16   19 16   19 16    SpO2 Readings from Last 3 Encounters:   19 100%   19 100%   19 99%      Temp Readings from Last 1 Encounters:   19 36.8  C (98.2  F) (Axillary)    Ht Readings from Last 1 Encounters:   19 1.676 m (5' 5.98\")      Wt Readings from Last 1 Encounters:   19 99.3 kg (219 lb)    Estimated body mass index is 35.36 kg/m  as calculated from the " "following:    Height as of 12/20/19: 1.676 m (5' 5.98\").    Weight as of 12/24/19: 99.3 kg (219 lb).       Anesthesia Plan      History & Physical Review      ASA Status:  2 .             Postoperative Care      Consents                 Christelle Caruso MD, MD  "

## 2019-12-27 NOTE — ANESTHESIA PROCEDURE NOTES
Peripheral nerve/Neuraxial procedure note : epidural catheter  Pre-Procedure  Performed by Christelle Caruso MD  Location: OB      Pre-Anesthestic Checklist: patient identified, IV checked, risks and benefits discussed, informed consent, monitors and equipment checked, pre-op evaluation and at physician/surgeon's request    Timeout  Correct Patient: Yes   Correct Procedure: Yes   Correct Site: Yes   Correct Laterality: N/A   Correct Position: Yes   Site Marked: N/A   .   Procedure Documentation    .    Procedure: epidural catheter, .   Patient Position:sitting Insertion Site:L3-4  (midline approach) Injection technique: LORT saline   Local skin infiltrated with 3 mL of 1% lidocaine.  RAPHAEL at 5 cm    Patient Prep/Sterile Barriers; mask, sterile gloves, povidone-iodine 7.5% surgical scrub, patient draped.  .  Needle: [employer] needle   Needle Gauge: 17.    Needle Length (Inches) 3.5   # of attempts: 1 and # of redirects: : 0. .    Catheter: 19 G . .  Catheter threaded easily  4 cm epidural space.  9 cm at skin.   .    Assessment/Narrative  Paresthesias: No.  .  .  Aspiration negative for heme or CSF  . Test dose of 3 mL lidocaine 1.5% w/ 1:200,000 epinephrine at. Test dose negative for signs of intravascular, subdural or intrathecal injection. Comments:  Pt tolerated well.   Immediately to supine with ZAKIYA.   FHTs stable post-procedure.   No complications.

## 2019-12-27 NOTE — H&P
No significant change in general health status based on examination of the patient, review of Nursing Admission Database and prenatal record.  35yo  admitted for induction of labor due to chronic hypertension at 39+2wks.  Antihypertensive medications discontinued at 18wks with good control throughout the remainder of pregnancy.  Normal PIH labs on several occasions.  GBS positive.  IV clindamycin due to pcn allergy.  AROM for clear fluid.  Will start pitocin within 2hrs if no labor.  Epidural if/when desired.    EFW: 7lb 8oz-8lbs    Anamaria Park MD

## 2019-12-27 NOTE — PLAN OF CARE
Data: Patient admitted to room 218 at 0735. Patient is a . Prenatal record reviewed.   OB History    Para Term  AB Living   2 0 0 0 1 0   SAB TAB Ectopic Multiple Live Births   0 0 0 0 0      # Outcome Date GA Lbr Han/2nd Weight Sex Delivery Anes PTL Lv   2 Current            1 AB            .  Medical History:   Past Medical History:   Diagnosis Date     Asthma     seasonal and environmental     C. difficile diarrhea      Depression      Depressive disorder      Generalized anxiety disorder      Hypertension    .  Gestational age 39w2d. Vital signs per doc flowsheet. Fetal movement present. Patient reports Induction Of Labor   as reason for admission. Support persons Bud present.  Action:  Care of patient assumed at 0735. Verbal consent for EFM, external fetal monitors applied. Admission assessment completed. Patient and support persons educated on labor process. Patient instructed to report change in fetal movement, contractions, vaginal leaking of fluid or bleeding, abdominal pain, or any concerns related to the pregnancy to her nurse/physician. Patient oriented to room, call light in reach.   Response: Dr. Park informed of arrival. Plan per provider is to AROM, start pit if no labor in 2hrs, ok to get epidural when pt requests. Patient verbalized understanding of education and verbalized agreement with plan. Patient coping with labor via spouse and .       19 0805   Provider Notification   Provider Name/Title Dr Park    Method of Notification At Bedside   Request Evaluate - Remote   Notification Reason SVE  (SVE, AROM, plan of care reviewed )      19 0805   Vaginal Exam   Method sterile exam per physician   Vaginal Bleeding not present   Cervical Dilation (cm) 2   Cervical Effacement 70-80%      19 1002   Vaginal Exam   Method sterile exam per RN   Cervical Dilation (cm) 2  (no change, pit started )   Cervical Effacement 70-80%   Fetal Station -2       12/27/19 1030   Provider Notification   Provider Name/Title Dr Park    Method of Notification Phone   Request Evaluate - Remote   Notification Reason Status Update  (update given, pit @ 4, pt and baby tolerating )       12/27/19 1220   Provider Notification   Provider Name/Title ILDA    Method of Notification Phone   Request Evaluate - Remote   Notification Reason Pain  (request for epidural )      12/27/19 1230   Provider Notification   Provider Name/Title Dr Caruso   Method of Notification At Bedside   Request Evaluate in Person   Notification Reason Pain  (placement of epidural )      12/27/19 1245   Vaginal Exam   Method sterile exam per RN   Cervical Dilation (cm) 3-4   Cervical Effacement 80-90%   Fetal Station -1      12/27/19 1247   Provider Notification   Provider Name/Title Dr Park    Method of Notification At Bedside   Request Evaluate in Person   Notification Reason Status Update  (updated on pt: comfortable, sve, pit@4 )      12/27/19 1445   Vaginal Exam   Method sterile exam per RN   Cervical Dilation (cm) 6   Cervical Effacement 80-90%   Fetal Station -1      12/27/19 1507   Provider Notification   Provider Name/Title Dr Park    Method of Notification Electronic Page   Request Evaluate - Remote   Notification Reason SVE;Labor Status  (6/80-90/-1, mother comfortable and baby looks good )     1500: Handoff to Carlos, RN

## 2019-12-28 PROCEDURE — 0HQ9XZZ REPAIR PERINEUM SKIN, EXTERNAL APPROACH: ICD-10-PCS | Performed by: OBSTETRICS & GYNECOLOGY

## 2019-12-28 PROCEDURE — 25000125 ZZHC RX 250: Performed by: OBSTETRICS & GYNECOLOGY

## 2019-12-28 PROCEDURE — 25000132 ZZH RX MED GY IP 250 OP 250 PS 637: Performed by: OBSTETRICS & GYNECOLOGY

## 2019-12-28 PROCEDURE — 10907ZC DRAINAGE OF AMNIOTIC FLUID, THERAPEUTIC FROM PRODUCTS OF CONCEPTION, VIA NATURAL OR ARTIFICIAL OPENING: ICD-10-PCS | Performed by: OBSTETRICS & GYNECOLOGY

## 2019-12-28 PROCEDURE — 72200001 ZZH LABOR CARE VAGINAL DELIVERY SINGLE

## 2019-12-28 PROCEDURE — 12000035 ZZH R&B POSTPARTUM

## 2019-12-28 PROCEDURE — 59400 OBSTETRICAL CARE: CPT | Performed by: OBSTETRICS & GYNECOLOGY

## 2019-12-28 RX ORDER — NALOXONE HYDROCHLORIDE 0.4 MG/ML
.1-.4 INJECTION, SOLUTION INTRAMUSCULAR; INTRAVENOUS; SUBCUTANEOUS
Status: DISCONTINUED | OUTPATIENT
Start: 2019-12-28 | End: 2019-12-30 | Stop reason: HOSPADM

## 2019-12-28 RX ORDER — HYDROCORTISONE 2.5 %
CREAM (GRAM) TOPICAL 3 TIMES DAILY PRN
Status: DISCONTINUED | OUTPATIENT
Start: 2019-12-28 | End: 2019-12-30 | Stop reason: HOSPADM

## 2019-12-28 RX ORDER — OXYCODONE HYDROCHLORIDE 5 MG/1
5 TABLET ORAL EVERY 4 HOURS PRN
Status: DISCONTINUED | OUTPATIENT
Start: 2019-12-28 | End: 2019-12-30 | Stop reason: HOSPADM

## 2019-12-28 RX ORDER — AMOXICILLIN 250 MG
1 CAPSULE ORAL 2 TIMES DAILY
Status: DISCONTINUED | OUTPATIENT
Start: 2019-12-28 | End: 2019-12-30 | Stop reason: HOSPADM

## 2019-12-28 RX ORDER — IBUPROFEN 400 MG/1
800 TABLET, FILM COATED ORAL EVERY 6 HOURS PRN
Status: DISCONTINUED | OUTPATIENT
Start: 2019-12-28 | End: 2019-12-30 | Stop reason: HOSPADM

## 2019-12-28 RX ORDER — OXYTOCIN 10 [USP'U]/ML
10 INJECTION, SOLUTION INTRAMUSCULAR; INTRAVENOUS
Status: DISCONTINUED | OUTPATIENT
Start: 2019-12-28 | End: 2019-12-30 | Stop reason: HOSPADM

## 2019-12-28 RX ORDER — OXYTOCIN/0.9 % SODIUM CHLORIDE 30/500 ML
100 PLASTIC BAG, INJECTION (ML) INTRAVENOUS CONTINUOUS
Status: DISCONTINUED | OUTPATIENT
Start: 2019-12-28 | End: 2019-12-30 | Stop reason: HOSPADM

## 2019-12-28 RX ORDER — OXYTOCIN/0.9 % SODIUM CHLORIDE 30/500 ML
340 PLASTIC BAG, INJECTION (ML) INTRAVENOUS CONTINUOUS PRN
Status: DISCONTINUED | OUTPATIENT
Start: 2019-12-28 | End: 2019-12-30 | Stop reason: HOSPADM

## 2019-12-28 RX ORDER — LANOLIN 100 %
OINTMENT (GRAM) TOPICAL
Status: DISCONTINUED | OUTPATIENT
Start: 2019-12-28 | End: 2019-12-30 | Stop reason: HOSPADM

## 2019-12-28 RX ORDER — AMOXICILLIN 250 MG
2 CAPSULE ORAL 2 TIMES DAILY
Status: DISCONTINUED | OUTPATIENT
Start: 2019-12-28 | End: 2019-12-30 | Stop reason: HOSPADM

## 2019-12-28 RX ORDER — BISACODYL 10 MG
10 SUPPOSITORY, RECTAL RECTAL DAILY PRN
Status: DISCONTINUED | OUTPATIENT
Start: 2019-12-30 | End: 2019-12-30 | Stop reason: HOSPADM

## 2019-12-28 RX ORDER — ACETAMINOPHEN 325 MG/1
650 TABLET ORAL EVERY 4 HOURS PRN
Status: DISCONTINUED | OUTPATIENT
Start: 2019-12-28 | End: 2019-12-30 | Stop reason: HOSPADM

## 2019-12-28 RX ORDER — CITALOPRAM HYDROBROMIDE 20 MG/1
20 TABLET ORAL DAILY
Status: DISCONTINUED | OUTPATIENT
Start: 2019-12-28 | End: 2019-12-30 | Stop reason: HOSPADM

## 2019-12-28 RX ADMIN — ACETAMINOPHEN 650 MG: 325 TABLET, FILM COATED ORAL at 20:19

## 2019-12-28 RX ADMIN — IBUPROFEN 800 MG: 400 TABLET ORAL at 02:25

## 2019-12-28 RX ADMIN — SENNOSIDES AND DOCUSATE SODIUM 1 TABLET: 8.6; 5 TABLET ORAL at 20:19

## 2019-12-28 RX ADMIN — ACETAMINOPHEN 650 MG: 325 TABLET, FILM COATED ORAL at 06:22

## 2019-12-28 RX ADMIN — LIDOCAINE HYDROCHLORIDE 20 ML: 10 INJECTION, SOLUTION INFILTRATION; PERINEURAL at 00:24

## 2019-12-28 RX ADMIN — CITALOPRAM HYDROBROMIDE 20 MG: 20 TABLET ORAL at 08:35

## 2019-12-28 RX ADMIN — SENNOSIDES AND DOCUSATE SODIUM 1 TABLET: 8.6; 5 TABLET ORAL at 08:35

## 2019-12-28 RX ADMIN — ACETAMINOPHEN 650 MG: 325 TABLET, FILM COATED ORAL at 02:25

## 2019-12-28 RX ADMIN — ACETAMINOPHEN 650 MG: 325 TABLET, FILM COATED ORAL at 14:36

## 2019-12-28 RX ADMIN — IBUPROFEN 800 MG: 400 TABLET ORAL at 14:36

## 2019-12-28 RX ADMIN — IBUPROFEN 800 MG: 400 TABLET ORAL at 20:19

## 2019-12-28 RX ADMIN — Medication 340 ML/HR: at 00:24

## 2019-12-28 RX ADMIN — IBUPROFEN 800 MG: 400 TABLET ORAL at 08:35

## 2019-12-28 NOTE — PLAN OF CARE
Spontaneous vaginal delivery of viable female infant; spontaneous vaginal delivery of placenta.  FF U/1, light rubra noted.  Bilateral labial and first degree perineal lacerations repaired; ice applied.  Pt delivered with labor epidural; was able to feel pressure.   and  present and supportive.

## 2019-12-28 NOTE — PROVIDER NOTIFICATION
Updated Dr. Emerson via phone on SVE, fetal position / station. Cat 1 strip. FHT currently 150-155, had been 135. Last 2 maternal core temps 100.3 and 101.1. Maternal pulse 95. Pt continues to be anxious. She would like us to increase Pitocin, recheck SVE in 1 hour and to update. No further orders. Report given to Buffy DOVE RN.

## 2019-12-28 NOTE — PLAN OF CARE
Vital signs stable. Pain adequately managed with Tylenol and Ibuprofen. Denies nausea. Working on breastfeeding every 2-3 hours. Tolerating regular diet. Voiding spontaneously. Up independently ambulating in room. No acute events. Will continue POC and notify MD with changes.

## 2019-12-28 NOTE — PROGRESS NOTES
S: Pt doing well. Infant is being . Pain is well controlled.    O: /87 (BP Location: Left arm)   Pulse 70   Temp 98  F (36.7  C) (Oral)   Resp 18   LMP 03/27/2019   SpO2 99%   Breastfeeding Unknown              Hemoglobin   Date Value Ref Range Status   12/27/2019 10.9 (L) 11.7 - 15.7 g/dL Final            Lab Results   Component Value Date    RH Pos 12/27/2019       A:  Post-partum day #0 s/p Normal spontaneous vaginal delivery    P: Continue PP Cares      Plan discharge Monday

## 2019-12-28 NOTE — PLAN OF CARE
Vital signs stable, scant rubra flow. Patient is up ad jackie, pain well controlled with with infant.

## 2019-12-28 NOTE — L&D DELIVERY NOTE
of viable female at 0015   Baby's weight not yet recorded   Apgars 8, 9     Placenta delivered spontaneously and intact   Bilateral sulcal tears repaired; 1st degree perineal laceration repaired   EBL: 1000mL --most from left sulcal tear    Primary OB: Park

## 2019-12-28 NOTE — LACTATION NOTE
Initial Lactation visit with Vivi, significant other Bud & baby girl. Vivi reports feeding is going well so far. She has noted baby girl was sleepier during the day today, but at time of visit infant was starting to wake for feeding. Reviewed typical early infant feeding patterns. Encouraged continued feeding as infant shows signs, and encouraged skin to skin if infant is too sleepy to breastfeed.    Recommend unlimited, frequent breast feedings: At least 8 - 12 times every 24 hours. Recommended rooming in. Instructed in hand expression. Avoid pacifiers and supplementation with formula unless medically indicated. Explained benefits of holding baby skin on skin to help promote better breastfeeding outcomes. Vivi has a pump for home use. Vivi & Bud appreciative of visit. Will revisit as needed.    Tala Brody, BSN, PHN, RN-C, MNN, IBCLC

## 2019-12-28 NOTE — PLAN OF CARE
Assumed care of patient at 1930. SVE recheck at approx 2010, 9.5/90/-1. Pitocin increased per unit protocol. Pt repositioned with peanut ball. Temp 99.6 orally. FHTs moderate variability, no decels, baseline 150s. Dr Park updated. Pt uncomfortable, but declines need for second epidural rebolus. SVE completed hourly, along with temperature monitoring. Pt progressed to complete at 2215 and started pushing at 2230. Oates catheter removed, pt set up to begin pushing. At 2230, FHTs tachycardic, moderate variability with some periods of minimal variability, +accels, variables noted at bedside during pushing efforts. Report to NICO Suarez at 2300. Dr Park called for delivery. Cont to monitor and assess.

## 2019-12-28 NOTE — PLAN OF CARE
Data: Vivi Mccall transferred to [facility] via wheelchair at 0254. Baby transferred via parent's arms.  Action: Receiving unit notified of transfer: Yes. Patient and family notified of room change. Report given to JARED Shields RN at 0254. Belongings sent to receiving unit. Accompanied by Registered Nurse. Oriented patient to surroundings. Call light within reach. ID bands double-checked with receiving RN.  Response: Patient tolerated transfer and is stable.

## 2019-12-28 NOTE — L&D DELIVERY NOTE
Delivery Date:  2019      Vivi is a 34-year-old  who was brought to Labor and Delivery at 39+2 weeks' gestation for elective induction of labor secondary to chronic hypertension.  Vivi's pregnancy was followed by myself.  Prenatal laboratories were within normal limits including blood type O positive, rubella status immune, Glucola within normal limits and GBS positive.  Vivi has MARION and opted for  screen which was normal as well as alpha fetoprotein testing and level 2 ultrasound.  She also had an echocardiogram with Maternal Fetal Medicine, which was within normal limits.  Vivi has a history of chronic hypertension and was on labetalol 50 mg b.i.d. prior to and through 18 weeks of pregnancy.  Due to persistently low blood pressures, decision was made to discontinue her labetalol with close monitoring throughout the remainder of pregnancy.  She was followed with monthly growth ultrasounds, all of which were within normal limits as well as weekly biophysical profiles beginning at 32 weeks gestation.  Vivi was [facility] on [facility] for elevated blood pressures at home but had completely negative workup on several occasions.  Blood pressures were normal.  Preeclampsia labs were all within normal limits including protein to creatinine ratio.  Vivi received both pertussis and flu vaccinations during this pregnancy.  Estimated fetal weight is 7-1/2 to 8 pounds.      On arrival to Labor and Delivery, Vivi was found to be 1-2 cm dilated, 70% effaced and -2 station.  Artificial rupture of membranes was performed for clear fluid.  IV clindamycin was started for group beta strep positive status as well as PENICILLIN ALLERGY.  Pitocin was eventually started for augmentation.  Vivi made slow steady progress throughout the day.  Vivi received an epidural for pain control after which she was found to be 4 cm dilated.  Vivi had a couple of low-grade  temperatures throughout the day, including at the end of her second stage of labor with a max temperature of 101.5 approximately 40 minutes prior to delivery.  Fetal heart tones were originally in the 130s to 140s with moderate variability and were in the 160s still with moderate variability at time of delivery.  Fetal tracing was reassuring throughout the labor process.  Upon becoming complete due to maternal pressure, Vivi did begin pushing immediately.  With excellent maternal effort, she delivered a female infant in the left occiput anterior position.  No nuchal cords were noted.  Remainder of infant was delivered and placed on the maternal abdomen.  Cord clamping was delayed by 1 minute.  Placenta was delivered spontaneously and intact.  Bilateral sulcal tears were sustained with fairly brisk bleeding noted from the left sulcal tear.  She also had a small first-degree perineal laceration which was similarly reapproximated.  Fundus was firm and uterine bleeding was minimal throughout the postpartum period.  Estimated blood loss immediately following delivery was approximately 1000 mL, most of which was felt to be from the left sulcal tear.  Blood pressures were mildly elevated from  approximately 6 p.m. until delivery, we will continue to monitor closely.  Both mother and infant were doing very well following delivery and are stable for recovery.      DELIVERY TIME:  Not yet recorded.      FINAL DIAGNOSES:   1.  Successful induction of labor with delivery of 39+3 week female infant with a weight not yet recorded and Apgars of 8 and 9.   2.  Advanced maternal age.   3.  Chronic hypertensive, on no medications and well controlled.   4.  Epidural for pain control.   5.  Group B Streptococcus positive status, which was adequately treated with IV clindamycin.   6.  Bilateral sulcal tears as well as first-degree perineal laceration.   7.  Postpartum hemorrhage with estimated blood loss of 1000 mL.         RISHI COLLADO  MD JUAN             D: 2019   T: 2019   MT: LINDA      Name:     ADELA LIVINGSTON   MRN:      [id number]        Account:        [id number]   :      1985        Delivery Date:  2019               Document: [id number]       cc: Edith Sigala MD

## 2019-12-29 LAB — HGB BLD-MCNC: 8.5 G/DL (ref 11.7–15.7)

## 2019-12-29 PROCEDURE — 85018 HEMOGLOBIN: CPT | Performed by: OBSTETRICS & GYNECOLOGY

## 2019-12-29 PROCEDURE — 25000132 ZZH RX MED GY IP 250 OP 250 PS 637: Performed by: OBSTETRICS & GYNECOLOGY

## 2019-12-29 PROCEDURE — 12000035 ZZH R&B POSTPARTUM

## 2019-12-29 PROCEDURE — 25800030 ZZH RX IP 258 OP 636: Performed by: OBSTETRICS & GYNECOLOGY

## 2019-12-29 PROCEDURE — 25000128 H RX IP 250 OP 636: Performed by: OBSTETRICS & GYNECOLOGY

## 2019-12-29 PROCEDURE — 36415 COLL VENOUS BLD VENIPUNCTURE: CPT | Performed by: OBSTETRICS & GYNECOLOGY

## 2019-12-29 RX ORDER — DIPHENHYDRAMINE HYDROCHLORIDE 50 MG/ML
50 INJECTION INTRAMUSCULAR; INTRAVENOUS
Status: DISCONTINUED | OUTPATIENT
Start: 2019-12-29 | End: 2019-12-30 | Stop reason: HOSPADM

## 2019-12-29 RX ORDER — METHYLPREDNISOLONE SODIUM SUCCINATE 125 MG/2ML
125 INJECTION, POWDER, LYOPHILIZED, FOR SOLUTION INTRAMUSCULAR; INTRAVENOUS
Status: DISCONTINUED | OUTPATIENT
Start: 2019-12-29 | End: 2019-12-30 | Stop reason: HOSPADM

## 2019-12-29 RX ADMIN — IRON SUCROSE 300 MG: 20 INJECTION, SOLUTION INTRAVENOUS at 11:23

## 2019-12-29 RX ADMIN — ACETAMINOPHEN 650 MG: 325 TABLET, FILM COATED ORAL at 20:50

## 2019-12-29 RX ADMIN — IBUPROFEN 800 MG: 400 TABLET ORAL at 02:43

## 2019-12-29 RX ADMIN — ACETAMINOPHEN 650 MG: 325 TABLET, FILM COATED ORAL at 02:43

## 2019-12-29 RX ADMIN — IBUPROFEN 800 MG: 400 TABLET ORAL at 08:13

## 2019-12-29 RX ADMIN — IBUPROFEN 800 MG: 400 TABLET ORAL at 14:51

## 2019-12-29 RX ADMIN — SENNOSIDES AND DOCUSATE SODIUM 1 TABLET: 8.6; 5 TABLET ORAL at 08:14

## 2019-12-29 RX ADMIN — ACETAMINOPHEN 650 MG: 325 TABLET, FILM COATED ORAL at 14:51

## 2019-12-29 RX ADMIN — IBUPROFEN 800 MG: 400 TABLET ORAL at 20:50

## 2019-12-29 RX ADMIN — CITALOPRAM HYDROBROMIDE 20 MG: 20 TABLET ORAL at 08:14

## 2019-12-29 RX ADMIN — SENNOSIDES AND DOCUSATE SODIUM 1 TABLET: 8.6; 5 TABLET ORAL at 20:50

## 2019-12-29 RX ADMIN — ACETAMINOPHEN 650 MG: 325 TABLET, FILM COATED ORAL at 08:13

## 2019-12-29 NOTE — PLAN OF CARE
Spoke w/ Dr. Moncada to report pt's last several blood pressures. She states she plans to assess tomorrow with after the patient is able to further diurese.

## 2019-12-29 NOTE — PLAN OF CARE
Pt w/ history of chronic hypertension. VSS w/ exception of increased BPs which Dr. Moncada is aware of. Bleeding wnl. Voiding spontaneously. Perineal discomfort well managed w/ tylenol and ibuprofen. Caring for self and infant independently.  present and supportive.

## 2019-12-29 NOTE — PROGRESS NOTES
S: Pt doing well. Infant is being . Pain is well controlled.    O: BP (!) 132/91   Pulse 70   Temp 98.6  F (37  C)   Resp 18   LMP 03/27/2019   SpO2 99%   Breastfeeding Unknown         ABD:  Uterine fundus is firm, non-tender and at the level of the umbilicus       Anemia noted, hgb 8.5         Hemoglobin   Date Value Ref Range Status   12/29/2019 8.5 (L) 11.7 - 15.7 g/dL Final            Lab Results   Component Value Date    RH Pos 12/27/2019       A:  Post-partum day #1 s/p Normal spontaneous vaginal delivery  Acute blood loss anemia    P: Continue PP Cares      Plan to start iv iron today and tomorrow      Discussed anemia and iron with patient

## 2019-12-30 VITALS
SYSTOLIC BLOOD PRESSURE: 136 MMHG | TEMPERATURE: 98.8 F | DIASTOLIC BLOOD PRESSURE: 91 MMHG | OXYGEN SATURATION: 99 % | RESPIRATION RATE: 16 BRPM | HEART RATE: 75 BPM

## 2019-12-30 LAB — HGB BLD-MCNC: 9 G/DL (ref 11.7–15.7)

## 2019-12-30 PROCEDURE — 85018 HEMOGLOBIN: CPT | Performed by: OBSTETRICS & GYNECOLOGY

## 2019-12-30 PROCEDURE — 36415 COLL VENOUS BLD VENIPUNCTURE: CPT | Performed by: OBSTETRICS & GYNECOLOGY

## 2019-12-30 PROCEDURE — 25000128 H RX IP 250 OP 636: Performed by: OBSTETRICS & GYNECOLOGY

## 2019-12-30 PROCEDURE — 25000132 ZZH RX MED GY IP 250 OP 250 PS 637: Performed by: OBSTETRICS & GYNECOLOGY

## 2019-12-30 PROCEDURE — 25800030 ZZH RX IP 258 OP 636: Performed by: OBSTETRICS & GYNECOLOGY

## 2019-12-30 RX ORDER — AMOXICILLIN 250 MG
1 CAPSULE ORAL 2 TIMES DAILY PRN
Qty: 30 TABLET | Refills: 0 | Status: SHIPPED | OUTPATIENT
Start: 2019-12-30 | End: 2020-02-04

## 2019-12-30 RX ORDER — FERROUS SULFATE 325(65) MG
325 TABLET ORAL 2 TIMES DAILY
Qty: 60 TABLET | Refills: 0 | Status: SHIPPED | OUTPATIENT
Start: 2019-12-30 | End: 2020-02-04

## 2019-12-30 RX ORDER — IBUPROFEN 800 MG/1
800 TABLET, FILM COATED ORAL EVERY 6 HOURS PRN
Qty: 30 TABLET | Refills: 0 | Status: SHIPPED | OUTPATIENT
Start: 2019-12-30 | End: 2020-01-14

## 2019-12-30 RX ADMIN — IBUPROFEN 800 MG: 400 TABLET ORAL at 04:31

## 2019-12-30 RX ADMIN — CITALOPRAM HYDROBROMIDE 20 MG: 20 TABLET ORAL at 08:31

## 2019-12-30 RX ADMIN — OXYCODONE HYDROCHLORIDE 5 MG: 5 TABLET ORAL at 05:58

## 2019-12-30 RX ADMIN — ACETAMINOPHEN 650 MG: 325 TABLET, FILM COATED ORAL at 10:57

## 2019-12-30 RX ADMIN — IRON SUCROSE 300 MG: 20 INJECTION, SOLUTION INTRAVENOUS at 10:48

## 2019-12-30 RX ADMIN — IBUPROFEN 800 MG: 400 TABLET ORAL at 10:58

## 2019-12-30 RX ADMIN — OXYCODONE HYDROCHLORIDE 5 MG: 5 TABLET ORAL at 01:46

## 2019-12-30 RX ADMIN — SENNOSIDES AND DOCUSATE SODIUM 1 TABLET: 8.6; 5 TABLET ORAL at 08:31

## 2019-12-30 RX ADMIN — ACETAMINOPHEN 650 MG: 325 TABLET, FILM COATED ORAL at 04:31

## 2019-12-30 NOTE — LACTATION NOTE
Routine visit with Vivi, FOB and baby. Getting ready for discharge.  Plan: Watch for feeding cues and feed every 2-3 hours and/or on demand. Continue to use feeding log to track intake and appropriate voids and stools. Take feeding log to first follow up appointment or weight check. Encourage skin to skin to promote frequent feedings, thermoregulation and bonding. Follow-up with healthcare provider or lactation consultant for questions or concerns.   Instructed on signs/symptoms of engorgement/ plugged ducts and mastitis.  Instructed on comfort measures and when to call MD.  No further questions at this time. Will follow as needed. Alejandra Morris BSN, RN, PHN, RNC-MNN, IBCLC

## 2019-12-30 NOTE — PROVIDER NOTIFICATION
12/29/19 2021   Provider Notification   Provider Name/Title Dr Moncada   Method of Notification Phone   Request Evaluate-Remote   Notification Reason Status Update       Dr Moncada updated regarding patient's persistent dizziness/lightheaded. Telephone readback order received for AM Hgb at 0800. Will continue to monitor.

## 2019-12-30 NOTE — PROGRESS NOTES
S: Pt doing well. Infant is being . Pain is well controlled.Pt has felt dizzy last couple days quite often   Received first dose of venofer yesterday    O: /81   Pulse 75   Temp 98  F (36.7  C)   Resp 16   LMP 03/27/2019   SpO2 99%   Breastfeeding Unknown         ABD:  Uterine fundus is firm, non-tender and at the level of the umbilicus                Hemoglobin   Date Value Ref Range Status   12/29/2019 8.5 (L) 11.7 - 15.7 g/dL Final            Lab Results   Component Value Date    RH Pos 12/27/2019       A:  Post-partum day #2 s/p Normal spontaneous vaginal delivery      Acute blood loss enemia    P: Continue PP Cares       Second dose of venofer today- takes awhile to work for anemia       Discharge later today       Repeat hgb today

## 2019-12-30 NOTE — PLAN OF CARE
BPs remain elevated in the 130s-150s/80-90s. Denies headache, vision changes, epigastric pain. Edema +3 to lower extremities, elevated overnight. Patient reports having moderate to severe generalized achiness/soreness as well as perineum soreness. Using Tylenol/Motrin as well as Oxy, with good relief. Ice packs and hot packs encouraged for comfort. Patient reports that dizziness/lightheadedness has improved slightly. Patient was tearful overnight, she reports feeling better after she was able to take some naps. Will continue to monitor.

## 2019-12-30 NOTE — PLAN OF CARE
Patient doing well, excited to go home.  Independent with self and infant cares.  -140's/80's-90's.  Denies headache, vision changes, and epigastric pain.  IV venofer given, IV infiltrated in right hand, had another IV placed in left AC to finish infusion.  IV discontinued post infusion.  Patient following up in 1 week with OB or sooner if concerns.  Discharge instructions explained to patient and all questions/concerns addressed.  Discharge prescriptions given.  Patient denies need for breast pump.  Patient escorted out by volunteer via wheelchair to go home.

## 2020-01-03 ENCOUNTER — NURSE TRIAGE (OUTPATIENT)
Dept: NURSING | Facility: CLINIC | Age: 35
End: 2020-01-03

## 2020-01-04 NOTE — TELEPHONE ENCOUNTER
Patient calling back again for on call provider.   BP fluctuating 140-165/.    Paged on call provider Dr. Shin to [phone] via answering service.    Roxann Chatterjee RN  [facility]

## 2020-01-04 NOTE — TELEPHONE ENCOUNTER
Delayed response due to patient care. In an Emergency surgery. Vivi was called back and was counseled to start the Labetalol 200mg BID. If her blood pressures do not respond or she has headaches, vision changes or chest pain or shortness of breath she was asked to present to the ED for evaluation as she may need to be evaluated for readmission and superimposed preeclampsia needing 24 hours of IV Magnesium.  Jessica Shin MD

## 2020-01-04 NOTE — TELEPHONE ENCOUNTER
"Patient states is Post Partum delivered 12/28/19 \"by induction.\" History of hypertension prior to pregnancy by report.    States checked her blood pressure now and reports /104.  States since 12/31/19 has had constant headache and dizziness off and on.  Describes headache as \"5\" on a 1-10 pain scale.  Dizziness is off and on and \"3-4.\"    Currently blood pressure rechecked and is 146/93.  Denies stiff neck.   Denies blurred vision or changes.  Denies nausea. No vomiting.  States swelling has significantly decreased in feet and legs and hands and face.  States is tolerating fluids in large amounts.  Voiding as usual.  Breast feeding is going well by report.  Temperature now 99.5 (Tympanic).    Patient's primary provider is Dr. Park at the [facility]. Dr. Shin is on call for this clinic and was paged at 7:30pm via [facility] page  [phone] to call Patient at [phone].  Patient was advised to call back if they have not heard from the provider within 20 minutes.    Protocol-  Post Partum - Headache- Adult  Care advice reviewed.   Disposition- Paged the On Call Provider  Caller states understanding of the recommended disposition.   Advised to call back if further questions or concerns.      AGUILA Siu RN  [facility]     Reason for Disposition    Patient sounds very sick or weak to the triager    Additional Information    Negative: Difficult to awaken or acting confused (e.g., disoriented, slurred speech)    Negative: [1] Weakness of the face, arm or leg on one side of the body AND [2] new onset    Negative: [1] Numbness of the face, arm or leg on one side of the body AND [2] new onset    Negative: [1] Loss of speech or garbled speech AND [2] new onset    Negative: Sounds like a life-threatening emergency to the triager    Negative: Unable to walk, or can only walk with assistance (e.g., requires support)    Negative: Stiff neck " "(can't touch chin to chest)    Negative: Severe pain in one eye    Negative: [1] Other family members (or roommates) with headaches AND [2] possibility of carbon monoxide exposure    Negative: [1] SEVERE headache (e.g., excruciating) AND [2] \"worst headache\" of life    Negative: [1] SEVERE headache AND [2] sudden-onset (i.e., reaching maximum intensity within seconds)    Negative: [1] SEVERE headache AND [2] fever    Negative: [1] SEVERE headache AND [2]  < 1 week since delivery    Negative: Loss of vision or double vision (Exception: similar to previous migraines)    Negative: New blurred vision or vision changes    Negative: New hand or face swelling    Negative: [1] Fever > 100.0 F (37.8 C) AND [2] diabetes mellitus or weak immune system (e.g., HIV positive, cancer chemo, splenectomy, chronic steroids)    Protocols used: POSTPARTUM - HEADACHE-A-AH    "

## 2020-01-04 NOTE — TELEPHONE ENCOUNTER
[phone]    Third call to [facility] needing to speak with on call provider for  [facility];   Called [facility] and  Provider is not in house   Re paged via   @ 9:03 PM   to call patient ; reached by cell phone and will return call now      Micaela Peralta RN  [facility]      Reason for Disposition    [1] Follow-up call to recent contact AND [2] information only call, no triage required    Protocols used: INFORMATION ONLY CALL-A-AH

## 2020-01-13 PROBLEM — Z36.89 ENCOUNTER FOR TRIAGE IN PREGNANT PATIENT: Status: RESOLVED | Noted: 2019-08-06 | Resolved: 2020-01-13

## 2020-01-14 ENCOUNTER — OFFICE VISIT (OUTPATIENT)
Dept: OBGYN | Facility: CLINIC | Age: 35
End: 2020-01-14
Payer: COMMERCIAL

## 2020-01-14 VITALS
HEIGHT: 66 IN | SYSTOLIC BLOOD PRESSURE: 114 MMHG | WEIGHT: 186 LBS | DIASTOLIC BLOOD PRESSURE: 80 MMHG | BODY MASS INDEX: 29.89 KG/M2

## 2020-01-14 PROCEDURE — 99212 OFFICE O/P EST SF 10 MIN: CPT | Mod: 24 | Performed by: OBSTETRICS & GYNECOLOGY

## 2020-01-14 RX ORDER — LABETALOL 200 MG/1
200 TABLET, FILM COATED ORAL 2 TIMES DAILY
COMMUNITY
End: 2020-02-20 | Stop reason: DRUGHIGH

## 2020-01-14 ASSESSMENT — MIFFLIN-ST. JEOR: SCORE: 1555.44

## 2020-01-14 NOTE — PATIENT INSTRUCTIONS
Will continue on current dose of labetolol (200mg BID) and continue monitoring blood pressures at home.  If persistently >160/110 or <110/70s, will call for medication adjustment.  Return to clinic in 4 weeks for routine PP visit.

## 2020-01-14 NOTE — PROGRESS NOTES
SUBJECTIVE:                                                   Vivi Mccall is a 34 year old female who presents to clinic today for the following health issue(s):  Patient presents with:  Postpartum Care: 2 week follow-up for hyptertension. Restarted the Lobetalol 10 days ago after experiening high readings at home, headaches and dizziness.      HPI:  Here today for blood pressure check and follow up to initiation of labetolol.  Had uncomplicated vaginal delivery with me on .  Hx CHTN but was off BP meds during pregnancy from 18wks and on.  Intermittently elevated blood pressures.  Had similarily mild elevated blood pressures while on PP.  After discharge, called in to on call MD with BPs in 140s-160s/90s-100s.  Was feeling poorly --blurred vision, dizziness, weakness.  Was started on labetolol 200mg BID at that time and has felt much better.  Vivi reports home BPs of 130s-140s/80s-90s.  Baby Therese is doing great!!  Breast and bottle feeding expressed milk.  Moods pretty good --some blues initially but doing well today    -had been on hydrochlorothiazide/lisinopril combination pill prior to TTC; was then switched to labetolol    Patient's last menstrual period was 2019..   Patient is currently not sexually active, , recent pregnancy.  Using nothing for contraception.    reports that she has never smoked. She has never used smokeless tobacco.  Health maintenance updated:  yes    Today's PHQ-2 Score:   PHQ-2 (  [employer]) 2019   Q1: Little interest or pleasure in doing things 0   Q2: Feeling down, depressed or hopeless 0   PHQ-2 Score 0   Q1: Little interest or pleasure in doing things Not at all   Q2: Feeling down, depressed or hopeless Not at all   PHQ-2 Score 0     Today's PHQ-9 Score:   PHQ-9 SCORE 2019   PHQ-9 Total Score MyChart -   PHQ-9 Total Score 1     Today's ROCHELLE-7 Score:   ROCHELLE-7 SCORE 2019   Total Score 3       Problem list and histories reviewed &  adjusted, as indicated.  Additional history: as documented.    Patient Active Problem List   Diagnosis     Benign essential hypertension     ROCHELLE (generalized anxiety disorder)     Major depressive disorder, recurrent episode, moderate (H)     Mild intermittent asthma without complication     Vitamin D deficiency     IUD contraception     Supervision of high-risk pregnancy     Chronic hypertension in pregnancy     Vaginal delivery     Past Surgical History:   Procedure Laterality Date     GYN SURGERY  2003    Laser treatment of HPV     HC RELEASE FOOT/TOE NERVE  2011     ORTHOPEDIC SURGERY Left 07/21/2017    ganglion cyst removal     ORTHOPEDIC SURGERY Left     nerve release on left leg/ankle      SURGICAL HISTORY OF -       Nerve entrapment release      Social History     Tobacco Use     Smoking status: Never Smoker     Smokeless tobacco: Never Used   Substance Use Topics     Alcohol use: Not Currently     Comment: 6 drinks a week mainly wine      Problem (# of Occurrences) Relation (Name,Age of Onset)    Bipolar Disorder (1) Mother    Breast Cancer (1) Maternal Grandmother    Coronary Artery Disease (3) Mother, Maternal Grandfather, Paternal Grandfather    Diabetes (2) Mother, Maternal Grandfather    Diabetes Type 2  (1) Maternal Grandfather    Glaucoma (1) Maternal Grandfather    Hypertension (6) Mother, Father, Maternal Grandmother, Maternal Grandfather, Paternal Grandmother, Maternal Aunt    Ovarian Cancer (1) Other    Unknown/Adopted (1) Father       Negative family history of: Macular Degeneration            Current Outpatient Medications   Medication Sig     citalopram (CELEXA) 20 MG tablet TAKE 1 TABLET BY MOUTH EVERY DAY     ferrous sulfate (FEROSUL) 325 (65 Fe) MG tablet Take 1 tablet (325 mg) by mouth 2 times daily     labetalol (NORMODYNE) 200 MG tablet Take 200 mg by mouth 2 times daily     Prenatal Multivit-Min-Fe-FA (PRENATAL VITAMINS PO) Take 1 tablet by mouth     senna-docusate  "(SENOKOT-S/PERICOLACE) 8.6-50 MG tablet Take 1 tablet by mouth 2 times daily as needed for constipation     No current facility-administered medications for this visit.      Allergies   Allergen Reactions     Amoxicillin      Codeine      Penicillins        ROS:  12 point review of systems negative other than symptoms noted below or in the HPI.  Neurologic: Dizziness and Headaches  No urinary frequency or dysuria, bladder or kidney problems      OBJECTIVE:     /80   Ht 1.676 m (5' 6\")   Wt 84.4 kg (186 lb)   LMP 03/27/2019   BMI 30.02 kg/m    Body mass index is 30.02 kg/m .    Exam:  Constitutional:  Appearance: Well nourished, well developed alert, in no acute distress  Neurologic:  Mental Status:  Oriented X3.  Normal strength and tone, sensory exam grossly normal, mentation intact and speech normal.    Psychiatric:  Mentation appears normal and affect normal/bright.     In-Clinic Test Results:  No results found for this or any previous visit (from the past 24 hour(s)).    ASSESSMENT/PLAN:                                                        ICD-10-CM    1. Postpartum hypertension O16.5        Patient Instructions   Will continue on current dose of labetolol (200mg BID) and continue monitoring blood pressures at home.  If persistently >160/110 or <110/70s, will call for medication adjustment.  Return to clinic in 4 weeks for routine PP visit.      Anamaria Park MD  [facility]  "

## 2020-02-03 PROBLEM — O10.919 CHRONIC HYPERTENSION IN PREGNANCY: Status: RESOLVED | Noted: 2019-12-27 | Resolved: 2020-02-03

## 2020-02-03 PROBLEM — Z97.5 IUD CONTRACEPTION: Status: RESOLVED | Noted: 2017-08-22 | Resolved: 2020-02-03

## 2020-02-03 NOTE — PROGRESS NOTES
"    SUBJECTIVE:                                                   Vivi Mccall is a 34 year old female who presents to clinic today for the following health issue(s):  No chief complaint on file.      Additional information: ***    HPI:  ***    Patient's last menstrual period was 2019..     Patient {is/is not:192785::\"is\"} sexually active, .  Using {[facility] CONTRACEPTION:964383} for contraception.    reports that she has never smoked. She has never used smokeless tobacco.  {Tobacco Cessation -- Delete if patient is a non-smoker:443795}  STD testing offered?  {PLC GC/CHLAMYDIA:710511}    Health maintenance updated:  {yes no:075506}    Today's PHQ-2 Score:   PHQ-2 (  [employer]) 2019   Q1: Little interest or pleasure in doing things 0   Q2: Feeling down, depressed or hopeless 0   PHQ-2 Score 0   Q1: Little interest or pleasure in doing things Not at all   Q2: Feeling down, depressed or hopeless Not at all   PHQ-2 Score 0     Today's PHQ-9 Score:   PHQ-9 SCORE 2019   PHQ-9 Total Score MyChart -   PHQ-9 Total Score 1     Today's ROCHELLE-7 Score:   ROCHELLE-7 SCORE 2019   Total Score 3       Problem list and histories reviewed & adjusted, as indicated.  Additional history: as documented.    Patient Active Problem List   Diagnosis     Benign essential hypertension     ROCHELLE (generalized anxiety disorder)     Major depressive disorder, recurrent episode, moderate (H)     Mild intermittent asthma without complication     Vitamin D deficiency     IUD contraception     Supervision of high-risk pregnancy     Chronic hypertension in pregnancy     Vaginal delivery     Past Surgical History:   Procedure Laterality Date     GYN SURGERY      Laser treatment of HPV     HC RELEASE FOOT/TOE NERVE  2011     ORTHOPEDIC SURGERY Left 2017    ganglion cyst removal     ORTHOPEDIC SURGERY Left     nerve release on left leg/ankle      SURGICAL HISTORY OF -       Nerve entrapment release      Social History " "    Tobacco Use     Smoking status: Never Smoker     Smokeless tobacco: Never Used   Substance Use Topics     Alcohol use: Not Currently     Comment: 6 drinks a week mainly wine      Problem (# of Occurrences) Relation (Name,Age of Onset)    Bipolar Disorder (1) Mother    Breast Cancer (1) Maternal Grandmother    Coronary Artery Disease (3) Mother, Maternal Grandfather, Paternal Grandfather    Diabetes (2) Mother, Maternal Grandfather    Diabetes Type 2  (1) Maternal Grandfather    Glaucoma (1) Maternal Grandfather    Hypertension (6) Mother, Father, Maternal Grandmother, Maternal Grandfather, Paternal Grandmother, Maternal Aunt    Ovarian Cancer (1) Other    Unknown/Adopted (1) Father       Negative family history of: Macular Degeneration            Current Outpatient Medications   Medication Sig     citalopram (CELEXA) 20 MG tablet TAKE 1 TABLET BY MOUTH EVERY DAY     ferrous sulfate (FEROSUL) 325 (65 Fe) MG tablet Take 1 tablet (325 mg) by mouth 2 times daily     labetalol (NORMODYNE) 200 MG tablet Take 200 mg by mouth 2 times daily     Prenatal Multivit-Min-Fe-FA (PRENATAL VITAMINS PO) Take 1 tablet by mouth     senna-docusate (SENOKOT-S/PERICOLACE) 8.6-50 MG tablet Take 1 tablet by mouth 2 times daily as needed for constipation     No current facility-administered medications for this visit.      Allergies   Allergen Reactions     Amoxicillin      Codeine      Penicillins        ROS:  {[facility] ROSGYN:242365::\"12 point review of systems negative other than symptoms noted below or in the HPI.\"}  {ROS - :167949::\"No urinary frequency or dysuria, bladder or kidney problems\"}      OBJECTIVE:     LMP 03/27/2019   There is no height or weight on file to calculate BMI.    Exam:  {[facility] EXAM CHOICES:347248}     In-Clinic Test Results:  No results found for this or any previous visit (from the past 24 hour(s)).    ASSESSMENT/PLAN:                                                      No diagnosis found.    There are no " Patient Instructions on file for this visit.    ***    Anamaria Park MD  [facility]

## 2020-02-04 ENCOUNTER — OFFICE VISIT (OUTPATIENT)
Dept: OBGYN | Facility: CLINIC | Age: 35
End: 2020-02-04
Payer: COMMERCIAL

## 2020-02-04 VITALS
SYSTOLIC BLOOD PRESSURE: 120 MMHG | BODY MASS INDEX: 27.65 KG/M2 | WEIGHT: 182.4 LBS | HEIGHT: 68 IN | DIASTOLIC BLOOD PRESSURE: 70 MMHG

## 2020-02-04 DIAGNOSIS — I10 CHRONIC HYPERTENSION: ICD-10-CM

## 2020-02-04 DIAGNOSIS — D62 ANEMIA DUE TO BLOOD LOSS, ACUTE: ICD-10-CM

## 2020-02-04 PROBLEM — O09.90 SUPERVISION OF HIGH-RISK PREGNANCY: Status: RESOLVED | Noted: 2019-05-15 | Resolved: 2020-02-04

## 2020-02-04 LAB
BASOPHILS # BLD AUTO: 0 10E9/L (ref 0–0.2)
BASOPHILS NFR BLD AUTO: 0.5 %
DIFFERENTIAL METHOD BLD: NORMAL
EOSINOPHIL # BLD AUTO: 0.3 10E9/L (ref 0–0.7)
EOSINOPHIL NFR BLD AUTO: 5 %
ERYTHROCYTE [DISTWIDTH] IN BLOOD BY AUTOMATED COUNT: 12.4 % (ref 10–15)
HCT VFR BLD AUTO: 36.4 % (ref 35–47)
HGB BLD-MCNC: 12.1 G/DL (ref 11.7–15.7)
LYMPHOCYTES # BLD AUTO: 2.3 10E9/L (ref 0.8–5.3)
LYMPHOCYTES NFR BLD AUTO: 41.8 %
MCH RBC QN AUTO: 30.9 PG (ref 26.5–33)
MCHC RBC AUTO-ENTMCNC: 33.2 G/DL (ref 31.5–36.5)
MCV RBC AUTO: 93 FL (ref 78–100)
MONOCYTES # BLD AUTO: 0.5 10E9/L (ref 0–1.3)
MONOCYTES NFR BLD AUTO: 8.6 %
NEUTROPHILS # BLD AUTO: 2.5 10E9/L (ref 1.6–8.3)
NEUTROPHILS NFR BLD AUTO: 44.1 %
PLATELET # BLD AUTO: 310 10E9/L (ref 150–450)
RBC # BLD AUTO: 3.91 10E12/L (ref 3.8–5.2)
WBC # BLD AUTO: 5.6 10E9/L (ref 4–11)

## 2020-02-04 PROCEDURE — 99207 ZZC POST PARTUM EXAM: CPT | Performed by: OBSTETRICS & GYNECOLOGY

## 2020-02-04 PROCEDURE — 36415 COLL VENOUS BLD VENIPUNCTURE: CPT | Performed by: OBSTETRICS & GYNECOLOGY

## 2020-02-04 PROCEDURE — 85025 COMPLETE CBC W/AUTO DIFF WBC: CPT | Performed by: OBSTETRICS & GYNECOLOGY

## 2020-02-04 ASSESSMENT — PATIENT HEALTH QUESTIONNAIRE - PHQ9
5. POOR APPETITE OR OVEREATING: NOT AT ALL
SUM OF ALL RESPONSES TO PHQ QUESTIONS 1-9: 0

## 2020-02-04 ASSESSMENT — ANXIETY QUESTIONNAIRES
IF YOU CHECKED OFF ANY PROBLEMS ON THIS QUESTIONNAIRE, HOW DIFFICULT HAVE THESE PROBLEMS MADE IT FOR YOU TO DO YOUR WORK, TAKE CARE OF THINGS AT HOME, OR GET ALONG WITH OTHER PEOPLE: NOT DIFFICULT AT ALL
6. BECOMING EASILY ANNOYED OR IRRITABLE: SEVERAL DAYS
7. FEELING AFRAID AS IF SOMETHING AWFUL MIGHT HAPPEN: NOT AT ALL
3. WORRYING TOO MUCH ABOUT DIFFERENT THINGS: NOT AT ALL
GAD7 TOTAL SCORE: 1
5. BEING SO RESTLESS THAT IT IS HARD TO SIT STILL: NOT AT ALL
1. FEELING NERVOUS, ANXIOUS, OR ON EDGE: NOT AT ALL
2. NOT BEING ABLE TO STOP OR CONTROL WORRYING: NOT AT ALL

## 2020-02-04 ASSESSMENT — MIFFLIN-ST. JEOR: SCORE: 1570.86

## 2020-02-04 NOTE — PROGRESS NOTES
"  SUBJECTIVE:  Vivi Mccall,  is here for a postpartum visit.  She had a  on 2019 delivering a healthy baby girl, named Therese weighing 8 lbs 2.5 oz at term.      HPI:  Here today for postpartum visit --doing well.  Baby Therese has been very good baby.  Good eater and fairly good sleeper.  Usually going 3hrs between nighttime feedings.  Exclusively breast feeding  Vivi is doing well.  Bleeding stopped after 3 weeks.  Eating/drinking well.  No bowel/bladder issues.  No leaking.   Has not resumed SA.  Has used minipill in the past due to HTN issues and gained a fair amount of weight.  Will use condoms for now.  Considering #2 in a couple of years  Moods good --feeling much better than while she was pregnant  -returns to work/dance next week  -will be meeting new PCP hopefully next week at [facility] --will continue on labetolol 200mg BID until that time; home BPs normal at 120s/70s and feeling good    Last PHQ-9 score on record=   PHQ-9 SCORE 2020   PHQ-9 Total Score MyChart -   PHQ-9 Total Score 0     ROCHELLE-7 SCORE 5/15/2019 2019 2020   Total Score 0 3 1       Pap:   Lab Results   Component Value Date    PAP NIL, HPV- 2017       Delivery complications:  No  Breast feeding:  Yes.  Bladder problems:  No  Bowel problems/hemorrhoids:  No  Episiotomy/laceration/incision healed? Yes.  Vaginal flow:  None  [place]:  No  Contraception: none and unsure  Emotional adjustment:  doing well and happy  Back to work:  NO    12 point review of systems negative other than symptoms noted below or in the HPI.  Genitourinary: Cramps    OBJECTIVE:  Vitals: /70   Ht 1.727 m (5' 8\")   Wt 82.7 kg (182 lb 6.4 oz)   LMP 2019   Breastfeeding Yes   BMI 27.73 kg/m    BMI= Body mass index is 27.73 kg/m .  General - pleasant female in no acute distress.  Breast -  deferred  Abdomen - No incision  Pelvic - EG: normal adult female, BUS: within normal limits, Vagina: well rugated, " no discharge, Cervix: no lesions or CMT, Uterus: firm, normal sized and nontender, anteverted in position. Adnexae: no masses or tenderness.  Rectovaginal - deferred.    ASSESSMENT:    ICD-10-CM    1. Encounter for postpartum visit Z39.2    2. Anemia due to blood loss, acute D62 CBC with platelets differential   3. Chronic hypertension I10        PLAN:  May resume normal activities without restrictions.  Pap smear was not done today.    Full counseling was provided, and all questions were answered.   Return to clinic in one year for an annual visit.     Patient Instructions   Follow up with your primary care provider for your other medical problems including follow up to hypertension and blood pressure medications.  Continue self breast exam.  Increase physical activity and exercise.  Lab results will be called to the patient.  Usual safety and preventative measures counseling done.  Last pap smear (2017) was normal and negative for the DNA of high risk HPV subtypes.  No pap was obtained this year.  This was discussed with the patient and she agrees with the plan.  May resume all normal activities.    Anamaria Park MD

## 2020-02-04 NOTE — PATIENT INSTRUCTIONS
Follow up with your primary care provider for your other medical problems including follow up to hypertension and blood pressure medications.  Continue self breast exam.  Increase physical activity and exercise.  Lab results will be called to the patient.  Usual safety and preventative measures counseling done.  Last pap smear (2017) was normal and negative for the DNA of high risk HPV subtypes.  No pap was obtained this year.  This was discussed with the patient and she agrees with the plan.  May resume all normal activities.

## 2020-02-05 ASSESSMENT — ANXIETY QUESTIONNAIRES: GAD7 TOTAL SCORE: 1

## 2020-02-08 ENCOUNTER — MYC MEDICAL ADVICE (OUTPATIENT)
Dept: OBGYN | Facility: CLINIC | Age: 35
End: 2020-02-08

## 2020-02-20 ENCOUNTER — OFFICE VISIT (OUTPATIENT)
Dept: FAMILY MEDICINE | Facility: CLINIC | Age: 35
End: 2020-02-20
Payer: COMMERCIAL

## 2020-02-20 VITALS
SYSTOLIC BLOOD PRESSURE: 112 MMHG | DIASTOLIC BLOOD PRESSURE: 80 MMHG | HEIGHT: 68 IN | TEMPERATURE: 98.2 F | WEIGHT: 184 LBS | BODY MASS INDEX: 27.89 KG/M2 | HEART RATE: 80 BPM

## 2020-02-20 DIAGNOSIS — M25.531 PAIN IN BOTH WRISTS: ICD-10-CM

## 2020-02-20 DIAGNOSIS — M79.642 PAIN IN BOTH HANDS: ICD-10-CM

## 2020-02-20 DIAGNOSIS — Z30.09 ENCOUNTER FOR OTHER GENERAL COUNSELING OR ADVICE ON CONTRACEPTION: ICD-10-CM

## 2020-02-20 DIAGNOSIS — I10 BENIGN ESSENTIAL HYPERTENSION: Primary | ICD-10-CM

## 2020-02-20 DIAGNOSIS — M79.641 PAIN IN BOTH HANDS: ICD-10-CM

## 2020-02-20 DIAGNOSIS — M25.532 PAIN IN BOTH WRISTS: ICD-10-CM

## 2020-02-20 PROCEDURE — 99214 OFFICE O/P EST MOD 30 MIN: CPT | Performed by: FAMILY MEDICINE

## 2020-02-20 RX ORDER — LABETALOL 100 MG/1
100 TABLET, FILM COATED ORAL 2 TIMES DAILY
Qty: 60 TABLET | COMMUNITY
Start: 2020-02-20 | End: 2020-05-18

## 2020-02-20 ASSESSMENT — MIFFLIN-ST. JEOR: SCORE: 1578.12

## 2020-02-20 NOTE — PROGRESS NOTES
Subjective     Vivi Mccall is a 34 year old female who presents to clinic today for the following health issues:    HPI   Musculoskeletal problem/pain      Duration: 8 weeks    Description  Location: both wrists, left is worse. Also painful bump on right hand after IV    Intensity:  moderate    Accompanying signs and symptoms: none    History  Previous similar problem: no   Previous evaluation:  none    Precipitating or alleviating factors:  Trauma or overuse: YES- labor  Aggravating factors include: overuse, worse in am    Therapies tried and outcome: Ibuprofen      HTN:  Had this prior to pregnancy  Strong family history of hypertension in both parents and grandparents and at younger ages just under 40s.  She started blood pressure medications in her 20s   Was taking labetalol 100 mg bid prior to pregnancy- stopped this in pregnancy since pressures improved.  No concern for preeclampsia  Had induction and vaginal delivery without complication  Restarted labetalol 200mg bid  1 week after delivery    Has had one headaches - stress related  Works fulltime  No leg swelling  No chest pain  BP Readings from Last 6 Encounters:   02/20/20 112/80   02/04/20 120/70   01/14/20 114/80   12/30/19 (!) 136/91   12/24/19 126/82   12/20/19 132/85       Wt Readings from Last 4 Encounters:   02/20/20 83.5 kg (184 lb)   02/04/20 82.7 kg (182 lb 6.4 oz)   01/14/20 84.4 kg (186 lb)   12/24/19 99.3 kg (219 lb)       Mood is good she feels good support has a new moms group that she really likes good support from her  and  provider  PHQ 5/15/2019 11/25/2019 2/4/2020   PHQ-9 Total Score 0 1 0   Q9: Thoughts of better off dead/self-harm past 2 weeks Not at all Not at all Not at all       Thinking about IUD for an option.  Did have ParaGard IUD in the past and wonders about hormonal IUD.  Discussed risks and benefits to this I think this would be excellent option for her.  Did review waiting a little longer until she  is closer to 10 to 12 weeks postpartum before insertion        Patient Active Problem List   Diagnosis     Benign essential hypertension     ROCHELLE (generalized anxiety disorder)     Major depressive disorder, recurrent episode, moderate (H)     Mild intermittent asthma without complication     Vitamin D deficiency     Pain in both hands     Pain in both wrists     Past Surgical History:   Procedure Laterality Date     GYN SURGERY  2003    Laser treatment of HPV     HC RELEASE FOOT/TOE NERVE  2011     ORTHOPEDIC SURGERY Left 07/21/2017    ganglion cyst removal     ORTHOPEDIC SURGERY Left     nerve release on left leg/ankle      SURGICAL HISTORY OF -       Nerve entrapment release       Social History     Tobacco Use     Smoking status: Never Smoker     Smokeless tobacco: Never Used   Substance Use Topics     Alcohol use: Yes     Comment: Rare      Family History   Problem Relation Age of Onset     Diabetes Mother      Bipolar Disorder Mother      Hypertension Mother      Coronary Artery Disease Mother      Unknown/Adopted Father      Hypertension Father      Breast Cancer Maternal Grandmother      Hypertension Maternal Grandmother      Diabetes Type 2  Maternal Grandfather      Glaucoma Maternal Grandfather      Hypertension Maternal Grandfather      Coronary Artery Disease Maternal Grandfather      Diabetes Maternal Grandfather      Hypertension Paternal Grandmother      Coronary Artery Disease Paternal Grandfather      Ovarian Cancer Other      Hypertension Maternal Aunt      Macular Degeneration No family hx of          Current Outpatient Medications   Medication Sig Dispense Refill     citalopram (CELEXA) 20 MG tablet TAKE 1 TABLET BY MOUTH EVERY DAY 90 tablet 1     labetalol 100 MG PO tablet Take 1 tablet (100 mg) by mouth 2 times daily 60 tablet      Prenatal Multivit-Min-Fe-FA (PRENATAL VITAMINS PO) Take 1 tablet by mouth       VITAMIN D, ERGOCALCIFEROL, PO            Reviewed and updated as needed this visit by  "Provider         Review of Systems   ROS COMP: Constitutional, HEENT, cardiovascular, pulmonary, gi and gu systems are negative, except as otherwise noted.      Objective    /80   Pulse 80   Temp 98.2  F (36.8  C) (Oral)   Ht 1.727 m (5' 8\")   Wt 83.5 kg (184 lb)   Breastfeeding Yes   BMI 27.98 kg/m    Body mass index is 27.98 kg/m .  Physical Exam   GENERAL: healthy, alert and no distress  RESP: lungs clear to auscultation - no rales, rhonchi or wheezes  CV: regular rate and rhythm, normal S1 S2, no S3 or S4, no murmur, click or rub, no peripheral edema and peripheral pulses strong  MS: She has tenderness over the base of the left thumb consistent with possible de Quervain's tendinitis and has tenderness with abducting her thumb mostly on her left hand but slightly on her right hand.  Tinel tapping is negative normal  strength normal thenar prominences bilaterally  PSYCH: mentation appears normal, affect normal/bright    Diagnostic Test Results:  Labs reviewed in Epic        Assessment & Plan     1. Benign essential hypertension  Her last creatinine was normal her pressures have been excellent in the last few months will taper down to 100 mg twice daily of the labetalol and recheck pressures in 3 to 4 weeks.  Discussed DASH diet and working towards losing the last 10 pounds from pregnancy if she can.  She is breast-feeding and tolerating medications well.    2. Pain in both hands  I think she definitely has an element of deQuervain's tendonopathy discussed trying ice and gentle massage to the tender areas and can do physical therapy if needed certainly could have an element of carpal tunnel as well  - [facility]; Future    3. Pain in both wrists    - [facility]; Future     4.  Contraception: We discussed risks and benefits to IUD options and I think she would be an excellent candidate for a progesterone IUD she is more interested in Jyoti " gave her a brochure and discussed risks and benefits.    See Patient Instructions    No follow-ups on file.    Vi Baldwin MD  [facility]       Consent (Temporal Branch)/Introductory Paragraph: The rationale for Mohs was explained to the patient and consent was obtained. The risks, benefits and alternatives to therapy were discussed in detail. Specifically, the risks of damage to the temporal branch of the facial nerve, infection, scarring, bleeding, prolonged wound healing, incomplete removal, allergy to anesthesia, and recurrence were addressed. Prior to the procedure, the treatment site was clearly identified and confirmed by the patient. All components of Universal Protocol/PAUSE Rule completed.

## 2020-02-21 ASSESSMENT — ASTHMA QUESTIONNAIRES: ACT_TOTALSCORE: 25

## 2020-03-07 DIAGNOSIS — F33.1 MAJOR DEPRESSIVE DISORDER, RECURRENT EPISODE, MODERATE (H): ICD-10-CM

## 2020-03-07 DIAGNOSIS — F41.1 GAD (GENERALIZED ANXIETY DISORDER): ICD-10-CM

## 2020-03-07 NOTE — TELEPHONE ENCOUNTER
"Requested Prescriptions   Pending Prescriptions Disp Refills     citalopram (CELEXA) 20 MG tablet [Pharmacy Med Name: CITALOPRAM HBR 20 MG TABLET]    Last Written Prescription Date:  11/15/2019  Last Fill Quantity: 90 tablet,  # refills: 1   Last office visit: 7/5/2019 with prescribing provider:  Ru  Future Office Visit:     90 tablet 1     Sig: TAKE 1 TABLET BY MOUTH EVERY DAY       SSRIs Protocol Passed - 3/7/2020 10:34 AM        Passed - PHQ-9 score less than 5 in past 6 months     Please review last PHQ-9 score.   PHQ 5/15/2019 11/25/2019 2/4/2020   PHQ-9 Total Score 0 1 0   Q9: Thoughts of better off dead/self-harm past 2 weeks Not at all Not at all Not at all               Passed - Medication is active on med list        Passed - Patient is age 18 or older        Passed - No active pregnancy on record        Passed - No positive pregnancy test in last 12 months        Passed - Recent (6 mo) or future (30 days) visit within the authorizing provider's specialty     Patient had office visit in the last 6 months or has a visit in the next 30 days with authorizing provider or within the authorizing provider's specialty.  See \"Patient Info\" tab in inbasket, or \"Choose Columns\" in Meds & Orders section of the refill encounter.               "

## 2020-03-09 RX ORDER — CITALOPRAM HYDROBROMIDE 20 MG/1
TABLET ORAL
Qty: 90 TABLET | Refills: 1 | OUTPATIENT
Start: 2020-03-09

## 2020-04-07 ENCOUNTER — TELEPHONE (OUTPATIENT)
Dept: OCCUPATIONAL THERAPY | Facility: CLINIC | Age: 35
End: 2020-04-07

## 2020-04-29 ENCOUNTER — TRANSFERRED RECORDS (OUTPATIENT)
Dept: HEALTH INFORMATION MANAGEMENT | Facility: CLINIC | Age: 35
End: 2020-04-29

## 2020-05-01 ENCOUNTER — VIRTUAL VISIT (OUTPATIENT)
Dept: OCCUPATIONAL THERAPY | Facility: CLINIC | Age: 35
End: 2020-05-01
Attending: FAMILY MEDICINE
Payer: COMMERCIAL

## 2020-05-01 DIAGNOSIS — M25.532 PAIN IN BOTH WRISTS: Primary | ICD-10-CM

## 2020-05-01 DIAGNOSIS — M25.531 PAIN IN BOTH WRISTS: Primary | ICD-10-CM

## 2020-05-01 DIAGNOSIS — M79.642 PAIN IN BOTH HANDS: ICD-10-CM

## 2020-05-01 DIAGNOSIS — M79.641 PAIN IN BOTH HANDS: ICD-10-CM

## 2020-05-01 PROCEDURE — 97110 THERAPEUTIC EXERCISES: CPT | Mod: GT | Performed by: OCCUPATIONAL THERAPIST

## 2020-05-01 PROCEDURE — 97165 OT EVAL LOW COMPLEX 30 MIN: CPT | Mod: GT | Performed by: OCCUPATIONAL THERAPIST

## 2020-05-01 PROCEDURE — 97535 SELF CARE MNGMENT TRAINING: CPT | Mod: GT | Performed by: OCCUPATIONAL THERAPIST

## 2020-05-01 NOTE — PROGRESS NOTES
"Occupational/Hand Therapy Virtual Initial Visit      The patient has been notified of following:     \"This virtual visit will be conducted between you and your provider. We have found that certain health care needs can be provided without the need for physical presence.  This service lets us provide the care you need with a virtual visit.\"    Due to external, as well as internal [facility] management of the COVID-19 Virus, Vivi Mccall was not seen in our clinic.  As a substitution, we implemented a virtual visit to manage this patient's condition utilizing the [employer] virtual visit platform via the patient s existing code.  The provider, Buffy Sadler, reviewed the patient's chart, PTRx prescription, and spoke with the patient to determine the following telemedicine visit is appropriate and effective for the patient's care.    The following type of visit was completed:   Video Visit:  The [employer] platform uses a synchronous HIPAA compliant video stream for this patient encounter.       Hand Therapy Initial Evaluation    Current Date:  5/1/2020    Diagnosis: B hand and wrist pain ([employer])  DOI: 12/27/19    Precautions: Pt had a baby girl, Therese (first baby) Dec 27    Subjective:  Vivi Mccall is a 34 year old female.    Patient reports symptoms of the bilateral wrists (L>R) which occurred due to onset around delivery of her baby. Since onset symptoms are Unchanged  General health as reported by patient is good.  Pertinent medical history includes:see chart  Medical allergies:none.  Surgical history: other: ganglion cyst removal without return. L index finger surgical repair of tendon lacerations.  Medication history: None.    Current occupation is dance instructor/  Job Tasks: Computer Work, Repetitive Tasks    Occupational Profile Information:  Right hand dominant  Prior functional level:  no limitations  Patient reports symptoms of pain, weakness/loss of strength, tingling " for a short time  Special tests:  none.    Previous treatment:   Barriers include:none  Mobility: No difficulty  Transportation: bicycles  Currently working in normal job without restrictions    Objective:  Pain Level (Scale 0-10):   5/1/2020   At Rest L always some pain, R at times minimal   With Use 10     Pain Description:  Date 5/1/2020   Location B radial wrists, L >R   Pain Quality Aching, Sharp. Feels like it catches. Very striking pain   Frequency constant     Pain is worst  daytime, nighttime or worse in the morning   Exacerbated by  has tried splints, pain worsened.   Splints - wrist based, from [employer]   Relieved by Icing helps short term   Progression Not improving     Sensation   Short tingling at times    Edema   - none  + mild    ++ moderate    +++ severe    5/1/2020   1st DC None on R, mild on the L   Radial Styloid None on R, mild on the L      ROM  Formal measurements deferred    Resisted Testing  Pain Report: - none  + mild    ++ moderate    +++ severe    5/1/2020  R   L   APL  Sore with AROM   EPB No pain Sore with AROM   EPL     FCR       Special Tests   Deferred, having trouble gripping and pinching with B hands, mike the L    Assessment:  Patient presents with symptoms consistent with diagnosis of bilateral wrist and hand pain, with symptoms of DeQuervain's tenosynovitis  , with conservative intervention.     Patient's limitations or Problem List includes:  Pain, Increased edema and Weakness of the bilateral wrist and hand which interferes with the patient's ability to perform Self Care Tasks (dressing), Work Tasks, Sleep Patterns, Recreational Activities, Household Chores and Driving  as compared to previous level of function.    Rehab Potential:  Good - Return to full activity, some limitations    Patient will benefit from skilled Occupational Therapy to increase ROM and overall strength and decrease pain and edema to return to previous activity level and resume normal daily tasks and to  reach their rehab potential.    Barriers to Learning:  No barrier    Communication Issues:  Patient appears to be able to clearly communicate and understand verbal and written communication and follow directions correctly.    Chart Review: Brief history including review of medical and/or therapy records relating to the presenting problem and Detailed history review with patient    Identified Performance Deficits: dressing, child rearing, home establishment and management and work    Assessment of Occupational Performance:  1-3 Performance Deficits    Clinical Decision Making (Complexity): Low complexity    Treatment Explanation:  The following has been discussed with the patient:  RX ordered/plan of care  Anticipated outcomes  Possible risks and side effects    Plan:  Frequency:  2 X a month, once daily  Duration:  for 3 months    Treatment Plan:    Modalities:    Heat, cold   Therapeutic Exercise:    AROM, AAROM, PROM, Tendon Gliding, Contract Relax, Extensor Tracking, Isotonics, Isometrics and Stabilization  Neuromuscular re-ed:   Proprioceptive Training, Posture and Kinesiotaping  Manual Techniques:   Coordination/Dexterity, Friction massage, Myofascial release and Manual edema mobilization  Orthotic Fabrication:    Static orthosis and Forearm based orthosis  Self Care:    Self Care Tasks, Ergonomic Considerations and Work Tasks    Discharge Plan:  Achieve all LTG.  Independent in home treatment program.  Reach maximal therapeutic benefit.    Discharge Plan:    Achieve all LTG.  Independent in home treatment program.  Reach maximal therapeutic benefit.    Home Exercise Program:  DeQ exercises - in ptrx (not all with L hand - too sore)  Consider OTC small wrist thumb splint - discussed link/type with pt  Sock under and over splint, may even use sock for compression/comfort  Ice  KT along wrist, around wrist    Next Visit:  F/up in 1 month via video to check symptoms  Gentle friction or decongestive massage to 1st  compartment possibly      Virtual visit contact time    Time of service began: 11:55 AM  Time of service ended: 12:50 AM  Total Time for set up, visit, and documentation: 55 minutes    Payor: PREFERREDONE / Plan: [employer] [place] [employer]AGE / Product Type: PPO /     Procedure Code/s   Therapeutic Exercise (17037): 10 minutes  DeQ exercises  Self Care / Home Management Training (43446): 25 minutes  Teaching in anatomy as it relates to function, baby care and self care tips, hand/wrist ergonomics, symptom mgmt (taping, cold)  Evaluation: 20 minutes    I have reviewed the note as documented above.  This accurately captures the substance of my conversation with the patient.  Provider location: [place], [place] (City/State)  Patient location: home    ___________________________________________________

## 2020-05-18 ENCOUNTER — VIRTUAL VISIT (OUTPATIENT)
Dept: FAMILY MEDICINE | Facility: CLINIC | Age: 35
End: 2020-05-18
Payer: COMMERCIAL

## 2020-05-18 DIAGNOSIS — F41.1 GAD (GENERALIZED ANXIETY DISORDER): ICD-10-CM

## 2020-05-18 DIAGNOSIS — F33.1 MAJOR DEPRESSIVE DISORDER, RECURRENT EPISODE, MODERATE (H): ICD-10-CM

## 2020-05-18 PROCEDURE — 99214 OFFICE O/P EST MOD 30 MIN: CPT | Mod: GT | Performed by: FAMILY MEDICINE

## 2020-05-18 RX ORDER — CITALOPRAM HYDROBROMIDE 20 MG/1
30 TABLET ORAL DAILY
Qty: 135 TABLET | Refills: 1 | Status: SHIPPED | OUTPATIENT
Start: 2020-05-18 | End: 2020-12-21

## 2020-05-18 RX ORDER — LORAZEPAM 0.5 MG/1
0.5 TABLET ORAL EVERY 6 HOURS PRN
Qty: 30 TABLET | Refills: 1 | Status: SHIPPED | OUTPATIENT
Start: 2020-05-18 | End: 2021-09-13

## 2020-05-18 RX ORDER — LABETALOL 100 MG/1
100 TABLET, FILM COATED ORAL 2 TIMES DAILY
Qty: 180 TABLET | Refills: 1 | Status: SHIPPED | OUTPATIENT
Start: 2020-05-18 | End: 2020-10-16

## 2020-05-18 ASSESSMENT — ANXIETY QUESTIONNAIRES
5. BEING SO RESTLESS THAT IT IS HARD TO SIT STILL: SEVERAL DAYS
2. NOT BEING ABLE TO STOP OR CONTROL WORRYING: MORE THAN HALF THE DAYS
GAD7 TOTAL SCORE: 13
3. WORRYING TOO MUCH ABOUT DIFFERENT THINGS: MORE THAN HALF THE DAYS
7. FEELING AFRAID AS IF SOMETHING AWFUL MIGHT HAPPEN: MORE THAN HALF THE DAYS
6. BECOMING EASILY ANNOYED OR IRRITABLE: SEVERAL DAYS
1. FEELING NERVOUS, ANXIOUS, OR ON EDGE: NEARLY EVERY DAY
4. TROUBLE RELAXING: MORE THAN HALF THE DAYS
GAD7 TOTAL SCORE: 13
7. FEELING AFRAID AS IF SOMETHING AWFUL MIGHT HAPPEN: MORE THAN HALF THE DAYS
GAD7 TOTAL SCORE: 13

## 2020-05-18 ASSESSMENT — PATIENT HEALTH QUESTIONNAIRE - PHQ9
SUM OF ALL RESPONSES TO PHQ QUESTIONS 1-9: 8
SUM OF ALL RESPONSES TO PHQ QUESTIONS 1-9: 8
10. IF YOU CHECKED OFF ANY PROBLEMS, HOW DIFFICULT HAVE THESE PROBLEMS MADE IT FOR YOU TO DO YOUR WORK, TAKE CARE OF THINGS AT HOME, OR GET ALONG WITH OTHER PEOPLE: NOT DIFFICULT AT ALL

## 2020-05-18 NOTE — PROGRESS NOTES
"Vivi Mccall is a 34 year old female who is being evaluated via a billable video visit.      The patient has been notified of following:     \"This video visit will be conducted via a call between you and your physician/provider. We have found that certain health care needs can be provided without the need for an in-person physical exam.  This service lets us provide the care you need with a video conversation.  If a prescription is necessary we can send it directly to your pharmacy.  If lab work is needed we can place an order for that and you can then stop by our lab to have the test done at a later time.    Video visits are billed at different rates depending on your insurance coverage.  Please reach out to your insurance provider with any questions.    If during the course of the call the physician/provider feels a video visit is not appropriate, you will not be charged for this service.\"    Patient has given verbal consent for Video visit? Yes    How would you like to obtain your AVS? [employer]    Patient would like the video invitation sent by: Text to cell phone: [phone]    Will anyone else be joining your video visit? No    Subjective     Vivi Mccall is a 34 year old female who presents today via video visit for the following health issues:    HPI  Depression and Anxiety Follow-Up    How are you doing with your depression since your last visit? Worsened starting seeing a therapist 2 visits done this feels good    Considering readjusting meds now is 5 months post partum    COVD has really increased her symptoms.    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  Worsened      Are you having other symptoms that might be associated with depression or anxiety? Yes:  possible panic attacks    Have you had a significant life event? OTHER: Covid related worries, also 4 months post partum     Do you have any concerns with your use of alcohol or other drugs? No    Social History     Tobacco Use     Smoking status: Never Smoker     Smokeless tobacco: Never Used   Substance Use Topics     Alcohol use: Yes     Comment: Rare      Drug use: No     PHQ 11/25/2019 2/4/2020 5/18/2020   PHQ-9 Total Score 1 0 8   Q9: Thoughts of better off dead/self-harm past 2 weeks Not at all Not at all Several days   F/U: Thoughts of suicide or self-harm - - No   F/U: Safety concerns - - No     ROCHELLE-7 SCORE 11/25/2019 2/4/2020 5/18/2020   Total Score - - 13 (moderate anxiety)   Total Score 3 1 13     Last PHQ-9 5/18/2020   1.  Little interest or pleasure in doing things 1   2.  Feeling down, depressed, or hopeless 2   3.  Trouble falling or staying asleep, or sleeping too much 1   4.  Feeling tired or having little energy 1   5.  Poor appetite or overeating 0   6.  Feeling bad about yourself 1   7.  Trouble concentrating 1   8.  Moving slowly or restless 0   Q9: Thoughts of better off dead/self-harm past 2 weeks 1   PHQ-9 Total Score 8   Difficulty at work, home, or with people -   In the past two weeks have you had thoughts of suicide or self harm? No   Do you have concerns about your personal safety or the safety of others? No     ROCHELLE-7  5/18/2020   1. Feeling nervous, anxious, or on edge 3   2. Not being able to stop or control worrying 2   3. Worrying too much about different things 2   4. Trouble relaxing 2   5. Being so restless that it is  hard to sit still 1   6. Becoming easily annoyed or irritable 1   7. Feeling afraid, as if something awful might happen 2   ROCHELLE-7 Total Score 13   If you checked any problems, how difficult have they made it for you to do your work, take care of things at home, or get along with other people? -         Suicide Assessment Five-step Evaluation and Treatment (SAFE-T)      How many servings of fruits and vegetables do you eat daily?  2-3    On average, how many sweetened beverages do you drink each day (Examples: soda, juice, sweet tea, etc.  Do NOT count diet or artificially sweetened beverages)?   2    How many days per week do you exercise enough to make your heart beat faster? 3 or less    How many minutes a day do you exercise enough to make your heart beat faster? 20 - 29  How many days per week do you miss taking your medication? 1    What makes it hard for you to take your medications?  remembering to take         Video Start Time: 1:35        Patient Active Problem List   Diagnosis     Benign essential hypertension     ROCHELLE (generalized anxiety disorder)     Major depressive disorder, recurrent episode, moderate (H)     Mild intermittent asthma without complication     Vitamin D deficiency     Pain in both hands     Pain in both wrists     Past Surgical History:   Procedure Laterality Date     GYN SURGERY  2003    Laser treatment of HPV     HC RELEASE FOOT/TOE NERVE  2011     ORTHOPEDIC SURGERY Left 07/21/2017    ganglion cyst removal     ORTHOPEDIC SURGERY Left     nerve release on left leg/ankle      SURGICAL HISTORY OF -       Nerve entrapment release       Social History     Tobacco Use     Smoking status: Never Smoker     Smokeless tobacco: Never Used   Substance Use Topics     Alcohol use: Yes     Comment: Rare      Family History   Problem Relation Age of Onset     Diabetes Mother      Bipolar Disorder Mother      Hypertension Mother      Coronary Artery Disease Mother      Unknown/Adopted Father       "Hypertension Father      Breast Cancer Maternal Grandmother      Hypertension Maternal Grandmother      Diabetes Type 2  Maternal Grandfather      Glaucoma Maternal Grandfather      Hypertension Maternal Grandfather      Coronary Artery Disease Maternal Grandfather      Diabetes Maternal Grandfather      Hypertension Paternal Grandmother      Coronary Artery Disease Paternal Grandfather      Ovarian Cancer Other      Hypertension Maternal Aunt      Macular Degeneration No family hx of          Current Outpatient Medications   Medication Sig Dispense Refill     citalopram (CELEXA) 20 MG tablet Take 1.5 tablets (30 mg) by mouth daily 135 tablet 1     labetalol (NORMODYNE) 100 MG tablet Take 1 tablet (100 mg) by mouth 2 times daily 180 tablet 1     LORazepam (ATIVAN) 0.5 MG tablet Take 1 tablet (0.5 mg) by mouth every 6 hours as needed for anxiety 30 tablet 1     Prenatal Multivit-Min-Fe-FA (PRENATAL VITAMINS PO) Take 1 tablet by mouth       VITAMIN D, ERGOCALCIFEROL, PO          Reviewed and updated as needed this visit by Provider         Review of Systems   Constitutional, HEENT, cardiovascular, pulmonary, gi and gu systems are negative, except as otherwise noted.      Objective    There were no vitals taken for this visit.  Estimated body mass index is 27.98 kg/m  as calculated from the following:    Height as of 2/20/20: 1.727 m (5' 8\").    Weight as of 2/20/20: 83.5 kg (184 lb).  Physical Exam     GENERAL: Healthy, alert and no distress  EYES: Eyes grossly normal to inspection.  No discharge or erythema, or obvious scleral/conjunctival abnormalities.  RESP: No audible wheeze, cough, or visible cyanosis.  No visible retractions or increased work of breathing.    SKIN: Visible skin clear. No significant rash, abnormal pigmentation or lesions.  NEURO: Cranial nerves grossly intact.  Mentation and speech appropriate for age.  PSYCH: Mentation appears normal, affect normal/bright, judgement and insight intact, normal " "speech and appearance well-groomed.      Diagnostic Test Results:  Labs reviewed in Epic        Assessment & Plan     1. Major depressive disorder, recurrent episode, moderate (H)  Will try higher dose pof medication  Talk therapy just started  Exercise  Stress reduction - getting outside  - LORazepam (ATIVAN) 0.5 MG tablet; Take 1 tablet (0.5 mg) by mouth every 6 hours as needed for anxiety  Dispense: 30 tablet; Refill: 1  - citalopram (CELEXA) 20 MG tablet; Take 1.5 tablets (30 mg) by mouth daily  Dispense: 135 tablet; Refill: 1  - labetalol (NORMODYNE) 100 MG tablet; Take 1 tablet (100 mg) by mouth 2 times daily  Dispense: 180 tablet; Refill: 1    2. ROCHELLE (generalized anxiety disorder)  As noted above  OK to use PRN xanax  Reviewed avoiding daily use for sleep but if anxiety worsening at night this might be OK to use for short period 1-2 weeks until higher dose celexa is effective  - LORazepam (ATIVAN) 0.5 MG tablet; Take 1 tablet (0.5 mg) by mouth every 6 hours as needed for anxiety  Dispense: 30 tablet; Refill: 1  - citalopram (CELEXA) 20 MG tablet; Take 1.5 tablets (30 mg) by mouth daily  Dispense: 135 tablet; Refill: 1  - labetalol (NORMODYNE) 100 MG tablet; Take 1 tablet (100 mg) by mouth 2 times daily  Dispense: 180 tablet; Refill: 1     BMI:   Estimated body mass index is 27.98 kg/m  as calculated from the following:    Height as of 2/20/20: 1.727 m (5' 8\").    Weight as of 2/20/20: 83.5 kg (184 lb).           4 weeks  Recheck phq and rochelle    No follow-ups on file.    Vi Baldwin MD  [facility]      Video-Visit Details    Type of service:  Video Visit    Video End Time:2:00    Originating Location (pt. Location): Home    Distant Location (provider location):  [facility]     Platform used for Video Visit: [employer]    No follow-ups on file.       Vi Baldwin MD      "

## 2020-05-19 ASSESSMENT — ANXIETY QUESTIONNAIRES: GAD7 TOTAL SCORE: 13

## 2020-05-27 ENCOUNTER — VIRTUAL VISIT (OUTPATIENT)
Dept: OCCUPATIONAL THERAPY | Facility: CLINIC | Age: 35
End: 2020-05-27
Payer: COMMERCIAL

## 2020-05-27 ENCOUNTER — TELEPHONE (OUTPATIENT)
Dept: OCCUPATIONAL THERAPY | Facility: CLINIC | Age: 35
End: 2020-05-27

## 2020-05-27 DIAGNOSIS — M79.642 PAIN IN BOTH HANDS: ICD-10-CM

## 2020-05-27 DIAGNOSIS — M25.531 PAIN IN BOTH WRISTS: Primary | ICD-10-CM

## 2020-05-27 DIAGNOSIS — M25.532 PAIN IN BOTH WRISTS: Primary | ICD-10-CM

## 2020-05-27 DIAGNOSIS — M79.641 PAIN IN BOTH HANDS: ICD-10-CM

## 2020-05-27 PROCEDURE — 97535 SELF CARE MNGMENT TRAINING: CPT | Mod: GT | Performed by: OCCUPATIONAL THERAPIST

## 2020-05-27 PROCEDURE — 97110 THERAPEUTIC EXERCISES: CPT | Mod: GT | Performed by: OCCUPATIONAL THERAPIST

## 2020-05-27 NOTE — PROGRESS NOTES
"Occupational/Hand Therapy Virtual Follow Up Visit      The patient has been notified of following:     \"This virtual visit will be conducted between you and your provider. We have found that certain health care needs can be provided without the need for physical presence.  This service lets us provide the care you need with a virtual visit.\"    Due to external, as well as internal [facility] management of the COVID-19 Virus, Vivi Mccall was not seen in our clinic.  As a substitution, we implemented a virtual visit to manage this patient's condition utilizing the [employer] virtual visit platform via the patient s existing code.  The provider, Buffy Sadler, reviewed the patient's chart, PTRx prescription, and spoke with the patient to determine the following telemedicine visit is appropriate and effective for the patient's care.    The following type of visit was completed:   Video Visit:  The [employer] platform uses a synchronous HIPAA compliant video stream for this patient encounter.        S: Vivi Mccall is a 34 year old female. Connected virtually on the [employer] platform to discuss their condition/progress. They are doing about the same. The brace helps if she knows she will do things that are active. It is sore after using the brace. The tape makes it itchy. \"I am doing my best, I don't know if its going to get me healed. 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  The R is a bit better.  Dtr will be 5 months tomorrow.      Pain Level (Scale 0-10):   5/27/2020   At Rest Always something there, even a 1/10.   With Use 7-8/10     Pain Description:  Date 5/27/2020   Location Mainly L wrist   Pain Quality Always some pain   Frequency constant     Pain is worst     Exacerbated by  use   Relieved by Ice helps temporarily " "  Progression Not improving       O:   Education/discussion re: positioning and baby care.   She is trying to respect wrist positioning. Has been very careful with thumb and wrist position whenever possible.  Recommend trying to reach/very gently stretch the whole L UE up overhead but not to a quick sharp stretch. Try when arm is warm ie with shower.    Reviewed Exercises:  L UE: wrist AROM all planes is tolerable, does have a catch with RD.  Thumb ROM: Flexion is sore, can get to the RF with opposition. Palmar Abd is okay.  R hand/wrist: tolerating exercises well.    Edema: visible mild edema persists to the L radial wrist.    PTRx Content from today's visit:  Continue futuro thumb brace  kinesiotape - has been itchy  Exercise Name: Wrist Active Range of Motion Extension and Flexion Combined, Sets: 3x/day - Reps: 10, Notes: slowly  Exercise Name: Wrist Active Range of Motion Radial and Ulnar Deviation for deQuervains, Sets: 3x/day - Reps: 10, Notes: begin without thumb \"parked\" if too painful  Exercise Name: Thumb Active Range of Motion Composite Flexion, Sets: 3x/day - Reps: 10, Notes: try with L hand  Exercise Name: Thumb Active Range of Motion Palmar Adbuction, Sets: 3x/day - Reps: 10, Notes: maybe only R hand  Exercise Name: Thumb Active Range of Motion Opposition, Sets: 3x/day - Reps: 10,     A:   Pt has not been able to make meaningful functional progress. She is tolerating gentle exercises to keep the tendons moving at the wrist/thumb.  She is willing to continue her home program. She is interested however in potential MD assessment.    P: Patient will continue with the exercise program assigned on their PTRx code .  Plan: will reach out to MD clinic to see what their current operations are. Will let pt know what options may be available.    Home Exercise Program:  DeQ exercises - in ptrx (not all with L hand - too sore)   OTC small wrist thumb splint -   Sock under and over splint, may even use sock for " compression/comfort  Ice  KT along wrist, around wrist    Next Visit:  F/u with HEP as indicated  Will contact pt re: further recommendations for care.    Virtual visit contact time    Time of service began: 1000 AM  Time of service ended: 1025 AM  Total Time for set up, visit, and documentation: 30 minutes    Payor: [employer] / Plan: [employer] [place] [employer] / Product Type: PPO /   .  Procedure Code/s   Therapeutic Exercise (83813): 12 minutes  Self Care / Home Management Training (12063): 13 minutes    I have reviewed the note as documented above.  This accurately captures the substance of my conversation with the patient.  Provider location: [place], [place] ([facility])  Patient location: home

## 2020-06-05 ENCOUNTER — TELEPHONE (OUTPATIENT)
Dept: FAMILY MEDICINE | Facility: CLINIC | Age: 35
End: 2020-06-05

## 2020-06-05 DIAGNOSIS — F41.1 GAD (GENERALIZED ANXIETY DISORDER): ICD-10-CM

## 2020-06-05 DIAGNOSIS — F33.1 MAJOR DEPRESSIVE DISORDER, RECURRENT EPISODE, MODERATE (H): ICD-10-CM

## 2020-06-05 NOTE — TELEPHONE ENCOUNTER
Reason for call:  Patient reporting a symptom    Symptom or request: hypertension concerns     Duration (how long have symptoms been present): 5 months post partum     Have you been treated for this before? Yes    Additional comments:   Pt is calling in concerned about her recent blood pressure readings.  She has booked a virtual appt with Dr. Lebron on Monday 05/08/2020, however she is concerned about her hypertension and what numbers she should be closely watching over the weekend.  She is VERY concerned about stroke.    Pt is 5 months postpartum and has been working with Dr. Lebron for care.    Pt did not state numeric values.      Phone Number patient can be reached at:  Home number on file [phone] (home)    Best Time:  Any     Can we leave a detailed message on this number:  YES    Call taken on 6/5/2020 at 12:01 PM by Jordyn Emerson

## 2020-06-05 NOTE — TELEPHONE ENCOUNTER
Spoke with patient.  Verbalizes understanding to at least go to UC if not ER    Patient verbalizes understanding.  Nicole Caal RN

## 2020-06-05 NOTE — TELEPHONE ENCOUNTER
"LS,   Please advise in SN's absence    Pt calling about hypertension     Pt is 5 months post partum  At end of pregnancy B/P spiked so they delivered  They wanted pt's B/P at less than 160/100 while pregnant  Restarted Propranolol 100mg BID after giving birth  Has been back on med for 5 months    Mentally and emotionally has things going on right now making B/P high  B/P 163/110  Has bad headache on (L) side  It's not going away with OTC meds    Denies vision issues (double vision, blurriness, seeing spots, etc)  Admits to \"a little bit\" of dizziness  Denies stroke symptoms at the moment    Do you want to add/adjust meds over the weekend?  Pt worried about having a stroke and B/P getting too high  Please advise  Pended pharmacy if needed  Thanks,  Claudia PIPER RN      "

## 2020-06-08 ENCOUNTER — VIRTUAL VISIT (OUTPATIENT)
Dept: FAMILY MEDICINE | Facility: CLINIC | Age: 35
End: 2020-06-08
Payer: COMMERCIAL

## 2020-06-08 DIAGNOSIS — F41.1 GAD (GENERALIZED ANXIETY DISORDER): ICD-10-CM

## 2020-06-08 DIAGNOSIS — I10 BENIGN ESSENTIAL HYPERTENSION: Primary | ICD-10-CM

## 2020-06-08 DIAGNOSIS — F33.1 MAJOR DEPRESSIVE DISORDER, RECURRENT EPISODE, MODERATE (H): ICD-10-CM

## 2020-06-08 PROCEDURE — 99214 OFFICE O/P EST MOD 30 MIN: CPT | Mod: GT | Performed by: FAMILY MEDICINE

## 2020-06-08 RX ORDER — LABETALOL 200 MG/1
200 TABLET, FILM COATED ORAL 2 TIMES DAILY
Qty: 60 TABLET | Refills: 1 | Status: SHIPPED | OUTPATIENT
Start: 2020-06-08 | End: 2020-10-12

## 2020-06-16 ENCOUNTER — TELEPHONE (OUTPATIENT)
Dept: OBGYN | Facility: CLINIC | Age: 35
End: 2020-06-16

## 2020-06-16 NOTE — TELEPHONE ENCOUNTER
Exclusively breast feeding and got her period today.  Worried about milk supply.  Reassured pt this should not play a huge role, do not worry about it and continue to breast feed as she has been doing. Baby is now 5 months and milk supply should be well established and baby will be the one who determines the supply - supply and demand.    Did mention now that she is cycling, she should plan birth control to prevent pregnancy if not planning to conceive. Pt wants IUD. Informed pt she can schedule that apt now with Covid restrictions being lifted.    Pt will call back to schedule. Pt verbalized understanding, in agreement with plan, and voiced no further questions.  Jael Nelson RN on 6/16/2020 at 11:57 AM

## 2020-06-23 NOTE — TELEPHONE ENCOUNTER
DIAGNOSIS: Left Hand Tendon issue/swelling per patient surgery: yes ganglion cyst removed 2018 @[facility] orthopedics, and Tendon repair on index finger 1999 @ [facility], Imaging: Patient don't remember per patient Send Invite Via Email: [email or web address]   APPOINTMENT DATE: 6/24   NOTES STATUS DETAILS   OFFICE NOTE from referring provider N/A    OFFICE NOTE from other specialist Care Everywhere [facility]  Pending O records  Pending [facility] records   DISCHARGE SUMMARY from hospital N/A    DISCHARGE REPORT from the ER N/A    OPERATIVE REPORT In process    MEDICATION LIST In process    MRI     CT SCAN     XRAYS (IMAGES & REPORTS)           6/23/20  Request sent to [facility] for records. Tried calling [facility]

## 2020-06-24 ENCOUNTER — PRE VISIT (OUTPATIENT)
Dept: ORTHOPEDICS | Facility: CLINIC | Age: 35
End: 2020-06-24

## 2020-06-24 ENCOUNTER — VIRTUAL VISIT (OUTPATIENT)
Dept: ORTHOPEDICS | Facility: CLINIC | Age: 35
End: 2020-06-24
Payer: COMMERCIAL

## 2020-06-24 DIAGNOSIS — M65.4 DE QUERVAIN'S TENOSYNOVITIS, LEFT: Primary | ICD-10-CM

## 2020-06-24 NOTE — PROGRESS NOTES
"Vivi Mccall is a 34 year old female who is being evaluated via a billable video visit.      The patient has been notified of following:     \"This video visit will be conducted via a call between you and your physician/provider. We have found that certain health care needs can be provided without the need for an in-person physical exam.  This service lets us provide the care you need with a video conversation.  If a prescription is necessary we can send it directly to your pharmacy.  If lab work is needed we can place an order for that and you can then stop by our lab to have the test done at a later time.    Video visits are billed at different rates depending on your insurance coverage.  Please reach out to your insurance provider with any questions.    If during the course of the call the physician/provider feels a video visit is not appropriate, you will not be charged for this service.\"    Patient has given verbal consent for Video visit? Yes    Will anyone else be joining your video visit? No      Video-Visit Details  Pt is a RHD 35 yo AA female with PMhx of recent delivery of baby girl 6 mo ago.  Pt reports playing tug of war with nursing during delivery, 2 weeks of L>R wrist pain.  Not improving, actually worsening and she's been compliant with Hand therapy x 1 mo.  Right wrist does seem to be improving.  Getting used to the pain but difficulty with work at times.  Dancer, teaches dance and performs.  Compensation is enough to now be affecting left shoulder.  Seems to be travelling up the arm.  Baby is now over 16 lbs.  Diaper changes, scooping her out of the crib, opening bags and pinching is the worst.  Had a ganglion cyst on left wrist in 2018, tried K-T tape     Exam: +Finkelstein's    A/P: 35 yo AA female with deQuervain's tenosynovitis 6 months postpartum    1. Left wrist DeQuervain's tenosynovitis-  Has been through hand therapy  Consider US guided deQuervain's injection  Type of service:  " Video Visit    Video Start Time: 4:15 p.m.  Video End Time: 4:28 pm    Originating Location (pt. Location): Home    Distant Location (provider location):  [facility]     Platform used for Video Visit: Rambo Saldaña MD

## 2020-06-24 NOTE — LETTER
"6/24/2020       RE: Vivi Mccall  [address] [address]     Dear Colleague,    Thank you for referring your patient, Vivi Mccall, to the [facility] SP[facility]TS MEDICINE at [facility]. Please see a copy of my visit note below.    Vivi Mccall is a 34 year old female who is being evaluated via a billable video visit.      The patient has been notified of following:     \"This video visit will be conducted via a call between you and your physician/provider. We have found that certain health care needs can be provided without the need for an in-person physical exam.  This service lets us provide the care you need with a video conversation.  If a prescription is necessary we can send it directly to your pharmacy.  If lab work is needed we can place an order for that and you can then stop by our lab to have the test done at a later time.    Video visits are billed at different rates depending on your insurance coverage.  Please reach out to your insurance provider with any questions.    If during the course of the call the physician/provider feels a video visit is not appropriate, you will not be charged for this service.\"    Patient has given verbal consent for Video visit? Yes    Will anyone else be joining your video visit? No      Video-Visit Details  Pt is a RHD 33 yo AA female with PMhx of recent delivery of baby girl 6 mo ago.  Pt reports playing tug of war with nursing during delivery, 2 weeks of L>R wrist pain.  Not improving, actually worsening and she's been compliant with Hand therapy x 1 mo.  Right wrist does seem to be improving.  Getting used to the pain but difficulty with work at times.  Dancer, teaches dance and performs.  Compensation is enough to now be affecting left shoulder.  Seems to be travelling up the arm.  Baby is now over 16 lbs.  Diaper changes, scooping her out of the crib, opening bags and pinching is the " worst.  Had a ganglion cyst on left wrist in 2018, tried K-T tape     Exam: +Finkelstein's    A/P: 33 yo AA female with deQuervain's tenosynovitis 6 months postpartum    1. Left wrist DeQuervain's tenosynovitis-  Has been through hand therapy  Consider US guided deQuervain's injection  Type of service:  Video Visit    Video Start Time: 4:15 p.m.  Video End Time: 4:28 pm    Originating Location (pt. Location): Home    Distant Location (provider location):  [facility] SPORTS MEDICINE     Platform used for Video Visit: Rambo Saldaña MD

## 2020-06-24 NOTE — LETTER
"  6/24/2020      RE: Vivi Mccall  [address] [address]       Vivi Mccall is a 34 year old female who is being evaluated via a billable video visit.      The patient has been notified of following:     \"This video visit will be conducted via a call between you and your physician/provider. We have found that certain health care needs can be provided without the need for an in-person physical exam.  This service lets us provide the care you need with a video conversation.  If a prescription is necessary we can send it directly to your pharmacy.  If lab work is needed we can place an order for that and you can then stop by our lab to have the test done at a later time.    Video visits are billed at different rates depending on your insurance coverage.  Please reach out to your insurance provider with any questions.    If during the course of the call the physician/provider feels a video visit is not appropriate, you will not be charged for this service.\"    Patient has given verbal consent for Video visit? Yes    Will anyone else be joining your video visit? No      Video-Visit Details  Pt is a RHD 35 yo AA female with PMhx of recent delivery of baby girl 6 mo ago.  Pt reports playing tug of war with nursing during delivery, 2 weeks of L>R wrist pain.  Not improving, actually worsening and she's been compliant with Hand therapy x 1 mo.  Right wrist does seem to be improving.  Getting used to the pain but difficulty with work at times.  Dancer, teaches dance and performs.  Compensation is enough to now be affecting left shoulder.  Seems to be travelling up the arm.  Baby is now over 16 lbs.  Diaper changes, scooping her out of the crib, opening bags and pinching is the worst.  Had a ganglion cyst on left wrist in 2018, tried K-T tape     Exam: +Finkelstein's    A/P: 35 yo AA female with deQuervain's tenosynovitis 6 months postpartum    1. Left wrist DeQuervain's " tenosynovitis-  Has been through hand therapy  Consider US guided deQuervain's injection  Type of service:  Video Visit    Video Start Time: 4:15 p.m.  Video End Time: 4:28 pm    Originating Location (pt. Location): Home    Distant Location (provider location):  [facility]IC[facility]     Platform used for Video Visit: MD Buffy Clarke MD

## 2020-06-30 DIAGNOSIS — I10 BENIGN ESSENTIAL HYPERTENSION: ICD-10-CM

## 2020-07-01 RX ORDER — LABETALOL 200 MG/1
TABLET, FILM COATED ORAL
Start: 2020-07-01

## 2020-08-12 DIAGNOSIS — I10 BENIGN ESSENTIAL HYPERTENSION: ICD-10-CM

## 2020-08-12 RX ORDER — LABETALOL 200 MG/1
TABLET, FILM COATED ORAL
Refills: 0
Start: 2020-08-12

## 2020-08-13 ENCOUNTER — TELEPHONE (OUTPATIENT)
Dept: OPTOMETRY | Facility: CLINIC | Age: 35
End: 2020-08-13

## 2020-08-13 NOTE — TELEPHONE ENCOUNTER
Pt left message on triage line  Pt would like to trial new pair of contacts with what Dr. Key recommend.    I left message would ask Dr. Key to mail out the Acuvue Oasys daily lenses and asked pt to contact us after receiving for any further assistance.    Lamonte Lynn RN 1:12 PM 08/14/20    ---    Last script was for an Acuvue Oasys 1 Day (May 2019). I can send her trials of those again if she wants to test again.     Otherwise if needing to update to a new contact Rx would recommend rechecking in office to check fit/vision.     Above per Dr. Key    Left message at 0824 with information and direct number to review options    Lamonte Lynn RN 8:25 AM 08/14/20        [facility]    Phone Message    May a detailed message be left on voicemail: yes     Reason for Call: Other: Pt calling in asking for Dr Key's suggestion for a new contact lens brand. She has been using Edvin but was advised against it. Is having a hard time finding another band that she likes. Please advise    Action Taken: Message routed to:  [facility] EYE    Travel Screening: Not Applicable

## 2020-08-31 PROBLEM — M25.532 PAIN IN BOTH WRISTS: Status: RESOLVED | Noted: 2020-02-20 | Resolved: 2020-08-31

## 2020-08-31 PROBLEM — M25.531 PAIN IN BOTH WRISTS: Status: RESOLVED | Noted: 2020-02-20 | Resolved: 2020-08-31

## 2020-08-31 PROBLEM — M79.642 PAIN IN BOTH HANDS: Status: RESOLVED | Noted: 2020-02-20 | Resolved: 2020-08-31

## 2020-08-31 PROBLEM — M79.641 PAIN IN BOTH HANDS: Status: RESOLVED | Noted: 2020-02-20 | Resolved: 2020-08-31

## 2020-08-31 NOTE — PROGRESS NOTES
Discharge Summary - Hand Therapy    8/31/2020    Patient did not return to therapy.   This episode of care will be resolved.  Plan: Discharge from hand therapy.    Buffy Sadler MS, OTR/L

## 2020-09-30 ENCOUNTER — VIRTUAL VISIT (OUTPATIENT)
Dept: OBGYN | Facility: CLINIC | Age: 35
End: 2020-09-30
Payer: COMMERCIAL

## 2020-09-30 DIAGNOSIS — I10 CHRONIC HYPERTENSION: ICD-10-CM

## 2020-09-30 DIAGNOSIS — Z30.09 GENERAL COUNSELING AND ADVICE ON CONTRACEPTIVE MANAGEMENT: Primary | ICD-10-CM

## 2020-09-30 PROCEDURE — 99213 OFFICE O/P EST LOW 20 MIN: CPT | Mod: TEL | Performed by: OBSTETRICS & GYNECOLOGY

## 2020-09-30 NOTE — PATIENT INSTRUCTIONS
Long phone disucssion regarding different IUD options.  Discussed the hormonal vs non-hormonal IUDs and pros and cons of each.  Discussed duration of action, effectiveness, effects on menses/bleeding profile, side effects, etc.   Will call with upcoming menses and schedule placement with next cycle.  Leaning towards Jyoti IUD

## 2020-09-30 NOTE — PROGRESS NOTES
"Vivi Mccall is a 35 year old female who is being evaluated via a billable telephone visit.      The patient has been notified of following:     \"This telephone visit will be conducted via a call between you and your physician/provider. We have found that certain health care needs can be provided without the need for a physical exam.  This service lets us provide the care you need with a short phone conversation.  If a prescription is necessary we can send it directly to your pharmacy.  If lab work is needed we can place an order for that and you can then stop by our lab to have the test done at a later time.    Telephone visits are billed at different rates depending on your insurance coverage. During this emergency period, for some insurers they may be billed the same as an in-person visit.  Please reach out to your insurance provider with any questions.    If during the course of the call the physician/provider feels a telephone visit is not appropriate, you will not be charged for this service.\"    Patient has given verbal consent for Telephone visit?  Yes    What phone number would you like to be contacted at? [phone]    How would you like to obtain your AVS? Xyot    Phone call duration: 21 minutes    Anamaria Park MD        SUBJECTIVE:                                                   Vivi Mccall is a 35 year old female who presents to clinic today for the following health issue(s):  Patient presents with:  Consult  IUD        HPI:  Phone visit today to discuss contraceptive options.  Interested in IUDs and had discussed Jyoti IUD with her PCP.  Having some ongoing blood pressure issues.   Has tried minipill in the past but had weight gain.  Had paragard IUD x 7yrs prior to having Therese.    Currently weaning breast feeding.  Menses started up again in July.  Usually 28-32d cycles with 3d bleeding.  Not having significant cramping.  Does remember periods being short but quite " heavy with paragard in the past.  Has had some more recent mood issues since resuming periods.  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 (Menstrual status: Breast Feeding)..     Patient is sexually active, .  Using none for contraception.    reports that she has never smoked. She has never used smokeless tobacco.    STD testing offered?  Declined    Health maintenance updated:  yes    Today's PHQ-2 Score:   PHQ-2 (  [employer]) 2020   Q1: Little interest or pleasure in doing things 0   Q2: Feeling down, depressed or hopeless 0   PHQ-2 Score 0   Q1: Little interest or pleasure in doing things -   Q2: Feeling down, depressed or hopeless -   PHQ-2 Score -     Today's PHQ-9 Score:   PHQ-9 SCORE 2020   PHQ-9 Total Score MyChart 8 (Mild depression)   PHQ-9 Total Score 8     Today's ROCHELLE-7 Score:   ROCHELLE-7 SCORE 2020   Total Score 13 (moderate anxiety)   Total Score 13       Problem list and histories reviewed & adjusted, as indicated.  Additional history: as documented.    Patient Active Problem List   Diagnosis     Benign essential hypertension     ROCHELLE (generalized anxiety disorder)     Major depressive disorder, recurrent episode, moderate (H)     Mild intermittent asthma without complication     Vitamin D deficiency     Past Surgical History:   Procedure Laterality Date     GYN SURGERY      Laser treatment of HPV     HC RELEASE FOOT/TOE NERVE  2011     ORTHOPEDIC SURGERY Left 2017    ganglion cyst removal     ORTHOPEDIC SURGERY Left     nerve release on left leg/ankle      SURGICAL HISTORY OF -       Nerve entrapment release      Social History     Tobacco Use     Smoking status: Never Smoker     Smokeless tobacco: Never Used   Substance Use Topics     Alcohol use:  Yes     Comment: Rare       Problem (# of Occurrences) Relation (Name,Age of Onset)    Bipolar Disorder (1) Mother    Breast Cancer (1) Maternal Grandmother    Coronary Artery Disease (3) Mother, Maternal Grandfather, Paternal Grandfather    Diabetes (2) Mother, Maternal Grandfather    Diabetes Type 2  (1) Maternal Grandfather    Glaucoma (1) Maternal Grandfather    Hypertension (6) Mother, Father, Maternal Grandmother, Maternal Grandfather, Paternal Grandmother, Maternal Aunt    Ovarian Cancer (1) Other    Unknown/Adopted (1) Father       Negative family history of: Macular Degeneration            Current Outpatient Medications   Medication Sig     citalopram (CELEXA) 20 MG tablet Take 1.5 tablets (30 mg) by mouth daily     labetalol (NORMODYNE) 200 MG tablet Take 1 tablet (200 mg) by mouth 2 times daily     Prenatal Multivit-Min-Fe-FA (PRENATAL VITAMINS PO) Take 1 tablet by mouth     VITAMIN D, ERGOCALCIFEROL, PO      labetalol (NORMODYNE) 100 MG tablet Take 1 tablet (100 mg) by mouth 2 times daily     LORazepam (ATIVAN) 0.5 MG tablet Take 1 tablet (0.5 mg) by mouth every 6 hours as needed for anxiety (Patient not taking: Reported on 9/30/2020)     No current facility-administered medications for this visit.      Allergies   Allergen Reactions     Amoxicillin      Codeine      Penicillins        ROS:  12 point review of systems negative other than symptoms noted below or in the HPI.  No urinary frequency or dysuria, bladder or kidney problems      OBJECTIVE:     There were no vitals taken for this visit.  There is no height or weight on file to calculate BMI.    Exam:  NO EXAM DONE WITH PHONE VISIT BUT CONVERSED EASILY AND ASKED APPROPRIATE QUESTIONS; GOOD MOOD    In-Clinic Test Results:  No results found for this or any previous visit (from the past 24 hour(s)).    ASSESSMENT/PLAN:                                                        ICD-10-CM    1. General counseling and advice on contraceptive management   Z30.09    2. Chronic hypertension  I10        Patient Instructions   Long phone disucssion regarding different IUD options.  Discussed the hormonal vs non-hormonal IUDs and pros and cons of each.  Discussed duration of action, effectiveness, effects on menses/bleeding profile, side effects, etc.   Will call with upcoming menses and schedule placement with next cycle.  Leaning towards Jyoti IUD      Anamaria Park MD  [facility]

## 2020-10-12 ENCOUNTER — E-VISIT (OUTPATIENT)
Dept: FAMILY MEDICINE | Facility: CLINIC | Age: 35
End: 2020-10-12
Payer: COMMERCIAL

## 2020-10-12 DIAGNOSIS — Z53.9 ERRONEOUS ENCOUNTER--DISREGARD: Primary | ICD-10-CM

## 2020-10-15 NOTE — TELEPHONE ENCOUNTER
Provider E-Visit time total (minutes): 0    I think she should do a VV for this - might be best to see the rash or come in person does anyone have openings this week?    SN

## 2020-10-16 ENCOUNTER — OFFICE VISIT (OUTPATIENT)
Dept: OBGYN | Facility: CLINIC | Age: 35
End: 2020-10-16
Payer: COMMERCIAL

## 2020-10-16 ENCOUNTER — ANCILLARY PROCEDURE (OUTPATIENT)
Dept: GENERAL RADIOLOGY | Facility: CLINIC | Age: 35
End: 2020-10-16
Attending: FAMILY MEDICINE
Payer: COMMERCIAL

## 2020-10-16 ENCOUNTER — OFFICE VISIT (OUTPATIENT)
Dept: FAMILY MEDICINE | Facility: CLINIC | Age: 35
End: 2020-10-16
Payer: COMMERCIAL

## 2020-10-16 VITALS — DIASTOLIC BLOOD PRESSURE: 86 MMHG | BODY MASS INDEX: 28.43 KG/M2 | SYSTOLIC BLOOD PRESSURE: 136 MMHG | WEIGHT: 187 LBS

## 2020-10-16 VITALS
BODY MASS INDEX: 28.34 KG/M2 | HEART RATE: 74 BPM | SYSTOLIC BLOOD PRESSURE: 145 MMHG | HEIGHT: 68 IN | WEIGHT: 187 LBS | DIASTOLIC BLOOD PRESSURE: 99 MMHG | OXYGEN SATURATION: 97 % | TEMPERATURE: 98.3 F

## 2020-10-16 DIAGNOSIS — Z23 NEED FOR PROPHYLACTIC VACCINATION AND INOCULATION AGAINST INFLUENZA: ICD-10-CM

## 2020-10-16 DIAGNOSIS — B00.1 HERPES LABIALIS: ICD-10-CM

## 2020-10-16 DIAGNOSIS — Z01.812 PRE-PROCEDURE LAB EXAM: ICD-10-CM

## 2020-10-16 DIAGNOSIS — G89.29 CHRONIC LEFT SHOULDER PAIN: ICD-10-CM

## 2020-10-16 DIAGNOSIS — Z30.430 ENCOUNTER FOR INSERTION OF INTRAUTERINE CONTRACEPTIVE DEVICE: Primary | ICD-10-CM

## 2020-10-16 DIAGNOSIS — I10 BENIGN ESSENTIAL HYPERTENSION: Primary | ICD-10-CM

## 2020-10-16 DIAGNOSIS — M25.512 CHRONIC LEFT SHOULDER PAIN: ICD-10-CM

## 2020-10-16 LAB — HCG UR QL: NEGATIVE

## 2020-10-16 PROCEDURE — 90471 IMMUNIZATION ADMIN: CPT | Performed by: FAMILY MEDICINE

## 2020-10-16 PROCEDURE — 73030 X-RAY EXAM OF SHOULDER: CPT | Mod: LT | Performed by: RADIOLOGY

## 2020-10-16 PROCEDURE — 81025 URINE PREGNANCY TEST: CPT | Performed by: OBSTETRICS & GYNECOLOGY

## 2020-10-16 PROCEDURE — 58300 INSERT INTRAUTERINE DEVICE: CPT | Performed by: OBSTETRICS & GYNECOLOGY

## 2020-10-16 PROCEDURE — 99214 OFFICE O/P EST MOD 30 MIN: CPT | Mod: 25 | Performed by: FAMILY MEDICINE

## 2020-10-16 PROCEDURE — 90686 IIV4 VACC NO PRSV 0.5 ML IM: CPT | Performed by: FAMILY MEDICINE

## 2020-10-16 RX ORDER — CARVEDILOL PHOSPHATE 20 MG/1
20 CAPSULE, EXTENDED RELEASE ORAL DAILY
Qty: 30 CAPSULE | Refills: 0 | Status: SHIPPED | OUTPATIENT
Start: 2020-10-16 | End: 2020-10-29

## 2020-10-16 RX ORDER — LISINOPRIL AND HYDROCHLOROTHIAZIDE 20; 25 MG/1; MG/1
1 TABLET ORAL DAILY
Status: CANCELLED | OUTPATIENT
Start: 2020-10-16

## 2020-10-16 RX ORDER — VALACYCLOVIR HYDROCHLORIDE 1 G/1
2000 TABLET, FILM COATED ORAL 2 TIMES DAILY
Qty: 4 TABLET | Refills: 0 | Status: SHIPPED | OUTPATIENT
Start: 2020-10-16 | End: 2022-03-18

## 2020-10-16 ASSESSMENT — MIFFLIN-ST. JEOR: SCORE: 1586.73

## 2020-10-16 NOTE — PROGRESS NOTES
IUD Insertion:  CONSULT:    Is a pregnancy test required: Yes.  Was it positive or negative?  Negative  Was a consent obtained?  Yes    Subjective: Vivi Mccall is a 35 year old  presents for IUD and desires Jyoti type IUD.    Patient has been given the opportunity to ask questions about all forms of birth control, including all options appropriate for Vivi Mccall. Discussed that no method of birth control, except abstinence is 100% effective against pregnancy or sexually transmitted infection.     Vivi Mccall understands she may have the IUD removed at any time. IUD should be removed by a health care provider.    The entire insertion procedure was reviewed with the patient, including care after placement.    Patient's last menstrual period was 10/11/2020.  No allergy to betadine or shellfish. Patient declines STD screening  HCG Qual Urine   Date Value Ref Range Status   10/16/2020 Negative NEG^Negative Final     Comment:     This test is for screening purposes.  Results should be interpreted along with   the clinical picture.  Confirmation testing is available if warranted by   ordering [id number], HCG Quantitative Pregnancy.           /86   Wt 84.8 kg (187 lb)   LMP 10/11/2020   Breastfeeding Yes   BMI 28.43 kg/m      Pelvic Exam:   EG/BUS: normal genital architecture without lesions, erythema or abnormal secretions.   Vagina: moist, pink, rugae with physiologic discharge and secretions  Cervix: multiparous no lesions and pink, moist, closed, without lesion or CMT  Uterus: slightly retroverted position, mobile, no pain  Adnexa: within normal limits and no masses, nodularity, tenderness    PROCEDURE NOTE: -- IUD Insertion    Reason for Insertion: contraception    Premedicated with ibuprofen.  Under sterile technique, cervix was visualized with speculum and prepped with Betadine solution swab x 3. Tenaculum was placed for stability. The uterus was gently straightened  and sounded to 7.0 cm. IUD prepared for placement, and IUD inserted according to 's instructions without difficulty or significant resitance, and deployed at the fundus. The strings were visualized and trimmed to 3.0 cm from the external os. Tenaculum was removed and hemostasis noted. Speculum removed.  Patient tolerated procedure well.    Lot # [id number]   Exp: JAN 2023  NDC: 85164-898-73    EBL: minimal    Complications: none    ASSESSMENT:     ICD-10-CM    1. Encounter for insertion of intrauterine contraceptive device  Z30.430 levonorgestrel (ALLEN) 13.5 MG IUD     levonorgestrel (ALLEN) 13.5 MG IUD 13.5 mg     INSERTION INTRAUTERINE DEVICE   2. Pre-procedure lab exam  Z01.812 HCG Qual, Urine ([id number])        PLAN:    Given 's handouts, including when to have IUD removed, list of danger s/sx, side effects and follow up recommended. Encouraged condom use for prevention of STD. Back up contraception advised for 7 days if progestin method. Advised to call for any fever, for prolonged or severe pain or bleeding, abnormal vaginal discharge, or unable to palpate strings. She was advised to use pain medications (ibuprofen) as needed for mild to moderate pain. Advised to follow-up in clinic in 4-6 weeks for IUD string check if unable to find strings or as directed by provider.     Patient Instructions   Allen placed today without issues.  Will return for IUD check in 6 weeks.  Back up contraception for first week of IUD.      Anamaria Park MD

## 2020-10-16 NOTE — PROGRESS NOTES
Subjective     Vivi Mccall is a 35 year old female who presents to clinic today for the following health issues:    HPI         Rash  Onset/Duration: 1 week  Description  Location: chin area.  Character: round, raised  Itching: severe  Intensity:  severe  Progression of Symptoms:  Improving  With abreva  Accompanying signs and symptoms:   Fever: no  Body aches or joint pain: no  Sore throat symptoms: no  Recent cold symptoms: no  History:           Previous episodes of similar rash: None  New exposures:  None  Recent travel: no  Exposure to similar rash: no  Precipitating or alleviating factors: none  Therapies tried and outcome: hydrocortisone cream -  Effective for itching, but abreva works best     The patient states that lesions on her shin were similar to lesions on her upper lip.  She has a history of cold sores since childhood.  She clearly states that it is not acne.  She has been using Abreva on both lesions and it helped resolve most of them.  She has has a 9-month-old infant increases the baby often.  Desires to make sure that she does not have an outbreak.    Also, she has been complaining of left shoulder pain for the past 3 years.  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  Patient has not had any evaluation in the past for left shoulder pain.  She denies any numbness or tingling.  Does not recall any specific injury other than lifting heavy boxes from a car a few years ago.    Also, the patient has a history of gestational hypertension.  She also continues to have mildly elevated blood pressure after delivering.  She was taking labetalol during pregnancy and did not have any complaints.  She is currently not able to take labetalol twice daily to control her blood pressure.  Desires to be on a medication once a day.  Patient continues to breast-feed.    New Patient/Transfer of Care    Review of Systems   Constitutional, HEENT, cardiovascular,  "pulmonary, gi and gu systems are negative, except as otherwise noted.      Objective    BP (!) 145/99   Pulse 74   Temp 98.3  F (36.8  C) (Tympanic)   Ht 1.727 m (5' 8\")   Wt 84.8 kg (187 lb)   SpO2 97%   BMI 28.43 kg/m    Body mass index is 28.43 kg/m .  Physical Exam   GENERAL: healthy, alert and no distress  NECK: no adenopathy, no asymmetry, masses, or scars and thyroid normal to palpation  RESP: lungs clear to auscultation - no rales, rhonchi or wheezes  CV: regular rate and rhythm, normal S1 S2, no S3 or S4, no murmur, click or rub, no peripheral edema and peripheral pulses strong  HERPES: Rash noted on chin.  Has erythematous base and vesicular lesions in cluster.  No evidence for secondary infection.  MS: RUE exam shows normal strength and muscle mass, no deformities, no erythema, induration, or nodules, no evidence of joint effusion, ROM of all joints is normal and no evidence of joint instability and LUE exam shows normal strength and muscle mass, no deformities, no erythema, induration, or nodules, no evidence of joint effusion and ROM of all joints is normal.     Results for orders placed or performed in visit on 10/16/20   HCG Qual, Urine ([id number])     Status: None   Result Value Ref Range    HCG Qual Urine Negative NEG^Negative   Results for orders placed or performed in visit on 10/16/20   XR Shoulder Left G/E 3 Views     Status: None    Narrative    XR SHOULDER LT G/E 3 VW   10/16/2020 12:36 PM     HISTORY:  Chronic left shoulder pain.      Impression    IMPRESSION: Unremarkable exam.    RAPHAEL GRAY MD           Assessment & Plan     Benign essential hypertension  Assessment: The patient had elevated blood pressure pregnancy and she was taking labetalol 202 times daily.  She states that she is not compliant with labetalol because she is not able to take it 3 times a day.  Desires to be on a medication that she takes once a day.  She is currently breast-feeding and wants to be on a medication " "that is safe for lactation.  She was previously taking lisinopril hydrochlorothizide but according to [employer] lisinopril \"risk cannot be ruled out\" for lactating mothers.  Therefore, she was started on carvedilol today.  She was advised to schedule a visit with her PCP for blood pressure recheck.  Plan:  - carvedilol ER (COREG CR) 20 MG 24 hr capsule; Take 1 capsule (20 mg) by mouth daily    Herpes labialis  The patient has a long history of herpes labialis.  She states that clusters on her chin are the same blisters that she normally gets on her upper lip.  Blisters on her upper lip as well as on her chin resolved with topical Abreva.  She was given a prescription for Valtrex today.  - valACYclovir (VALTREX) 1000 mg tablet; Take 2 tablets (2,000 mg) by mouth 2 times daily for 1 day    Chronic left shoulder pain  She has a long history of left shoulder pain.  Has never been evaluated in the past.  - XR Shoulder Left G/E 3 Views; Future  - [facility] AND CHIROPRACTIC REFERRAL; Future    Need for prophylactic vaccination and inoculation against influenza  - FLU VACCINE, 3 YRS +, IM (FLUZONE)  - VACCINE ADMINISTRATION, INITIAL      Return in about 1 month (around 11/16/2020) for Follow-up visit. BP checked with Dr. Vi Longoria MD  [facility]    "

## 2020-10-17 ASSESSMENT — ASTHMA QUESTIONNAIRES: ACT_TOTALSCORE: 24

## 2020-10-27 ENCOUNTER — THERAPY VISIT (OUTPATIENT)
Dept: PHYSICAL THERAPY | Facility: CLINIC | Age: 35
End: 2020-10-27
Payer: COMMERCIAL

## 2020-10-27 DIAGNOSIS — G89.29 CHRONIC LEFT SHOULDER PAIN: ICD-10-CM

## 2020-10-27 DIAGNOSIS — M25.512 SHOULDER PAIN, LEFT: ICD-10-CM

## 2020-10-27 DIAGNOSIS — M25.512 CHRONIC LEFT SHOULDER PAIN: ICD-10-CM

## 2020-10-27 PROCEDURE — 97161 PT EVAL LOW COMPLEX 20 MIN: CPT | Mod: GP | Performed by: PHYSICAL THERAPIST

## 2020-10-27 PROCEDURE — 97110 THERAPEUTIC EXERCISES: CPT | Mod: GP | Performed by: PHYSICAL THERAPIST

## 2020-10-27 NOTE — PROGRESS NOTES
[facility] Initial Evaluation  Subjective:  Patient presents to PT with left shoulder pain which began in April of 2017 when she was loading a truck after a dance performance.  She complains of left lateral shoulder pain which is worse with reaching overhead and out to the side and lifting her 10 month old.  Symptoms feel better with massage. Pain can go up to 9/10 with certain movements.  Patient works as a performer and dance teacher.    The history is provided by the patient.   Patient Health History  Vivi Mccall being seen for Left shoulder.     Problem began: 4/16/2017.   Problem occurred: Loading a truck for work   Pain is reported as 4/10 and 5/10 on pain scale.  General health as reported by patient is good.  Pertinent medical history includes: high blood pressure.        Surgeries include:  Orthopedic surgery.    Current medications:  Anti-depressants and high blood pressure medication.    Current occupation is Performer/ Dance instructor.   Primary job tasks include:  Computer work, lifting/carrying and other.   Other job/home tasks details: Teaching dance, lifting my infant.                                  Objective:  Standing Alignment:    Cervical/Thoracic:  Flat thoracic upper spine and cervical lordosis decreased                                         Shoulder Evaluation:  ROM:  AROM:  normal                            PROM:  normal                                Strength:    Flexion: Left:5/5    Pain: ++    Right: 5/5     Pain:   Extension:  Left: 5/5     Pain:-    Right: 5/5     Pain:-  Abduction:  Left: 4/5   Pain:++    Right: 5/5      Pain:-    Internal Rotation:  Left:5/5      Pain:-    Right: 5/5      Pain:-  External Rotation:   Left:4+/5      Pain:-/+   Right:5/5      Pain:-            Stability Testing:  normal      Special Tests:    Left shoulder positive for the following special tests:  Impingement    Palpation:  Palpation assessed shoulder: increased  muscle holding left levator scapula.                                         General   High Beighton laxity score  ROS    Assessment/Plan:    Patient is a 35 year old female with left side shoulder complaints.    Patient has the following significant findings with corresponding treatment plan.                Diagnosis 1:  Left shoulder pain  Pain -  self management, education and home program  Decreased strength - therapeutic exercise and therapeutic activities  Decreased function - therapeutic activities    Therapy Evaluation Codes:   1) History comprised of:   Personal factors that impact the plan of care:      None.    Comorbidity factors that impact the plan of care are:      None.     Medications impacting care: None.  2) Examination of Body Systems comprised of:   Body structures and functions that impact the plan of care:      Shoulder.   Activity limitations that impact the plan of care are:      Dressing and Lifting.  3) Clinical presentation characteristics are:   Stable/Uncomplicated.  4) Decision-Making    Low complexity using standardized patient assessment instrument and/or measureable assessment of functional outcome.  Cumulative Therapy Evaluation is: Low complexity.    Previous and current functional limitations:  (See Goal Flow Sheet for this information)    Short term and Long term goals: (See Goal Flow Sheet for this information)     Communication ability:  Patient appears to be able to clearly communicate and understand verbal and written communication and follow directions correctly.  Treatment Explanation - The following has been discussed with the patient:   RX ordered/plan of care  Anticipated outcomes  Possible risks and side effects  This patient would benefit from PT intervention to resume normal activities.   Rehab potential is good.    Frequency:  1 X week, once daily  Duration:  for 6-8 visits  Discharge Plan:  Achieve all LTG.  Independent in home treatment program.  Reach maximal  therapeutic benefit.    Please refer to the daily flowsheet for treatment today, total treatment time and time spent performing 1:1 timed codes.

## 2020-10-29 ENCOUNTER — VIRTUAL VISIT (OUTPATIENT)
Dept: PHARMACY | Facility: CLINIC | Age: 35
End: 2020-10-29
Payer: COMMERCIAL

## 2020-10-29 DIAGNOSIS — I10 BENIGN ESSENTIAL HYPERTENSION: Primary | ICD-10-CM

## 2020-10-29 PROCEDURE — 99605 MTMS BY PHARM NP 15 MIN: CPT | Mod: TEL | Performed by: PHARMACIST

## 2020-10-29 RX ORDER — LABETALOL 200 MG/1
100 TABLET, FILM COATED ORAL 2 TIMES DAILY
Qty: 60 TABLET | Refills: 0
Start: 2020-10-29 | End: 2020-11-16

## 2020-10-29 RX ORDER — NIFEDIPINE 30 MG
30 TABLET, EXTENDED RELEASE ORAL DAILY
Qty: 30 TABLET | Refills: 0 | Status: SHIPPED | OUTPATIENT
Start: 2020-10-29 | End: 2020-11-09 | Stop reason: SINTOL

## 2020-10-29 NOTE — PROGRESS NOTES
"MTM ENCOUNTER  SUBJECTIVE/OBJECTIVE:                           Vivi Mccall is a 35 year old female called for an initial visit. She was referred to me from Dr. Baldwin.      Reason for visit: Hypertension therapy during lactation.    Allergies/ADRs: Reviewed in chart  Tobacco: She reports that she has never smoked. She has never used smokeless tobacco.  Alcohol: not currently using    Medication Adherence/Access: no issues reported    Hypertension: Patient is currently taking labetalol 200 mg BID. She states \"I would be lying if I said I was great about taking it twice a day\". However, she says she has been taking it twice a day every day since Monday because her blood pressure \"was on the high end\" that day. She states it was about 166/78, and she was not feeling well. She mentions vision problems and a headache. She says she has never felt \"stable\" on the labetalol, even when she is taking it twice a day.    She was originally started on labetalol around Aug 2018 when she was trying to conceive. In the middle of her pregnancy (2019), she was able to decrease and stop labetalol, but then it was restarted at the end of the pregnancy. Since giving birth, she attempted to switch from labetalol BID to carvedilol extended release once daily to simplify the regimen. However, this was not affordable so she continued labetalol. Nifedipine was prescribed on Monday by Dr. Baldwin. Vivi has not picked up the Nifedipine from the pharmacy, but confirms it will be affordable if she gets it from [facility].    ASSESSMENT:                            Medication Adherence: No issues identified - however she would benefit from once daily dosing as discussed below.     Hypertension: Nifedipine as prescribed by Dr. Baldwin is preferred in pregnancy and lactation, and would be beneficial from a compliance standpoint since it is once daily administration. Labetalol should not be abruptly discontinued, so will " decrease the dose by half while initiating nifedipine. Would prefer to avoid carvedilol during lactation. Will re-evaluate in 1 week.     PLAN:                            1. Start taking nifedipine 30mg once daily in the morning. Vivi will send a new prescription to [facility].   2. Change your labetalol dose - Start taking half of your labetalol tablet (100mg) twice daily.   3. Check your blood pressure once a day and record your numbers so we can discuss at your visit on 11/5.   4. Call Vivi at [phone] if you have questions or concerns before our next visit (or can send [employer] message).     I spent 12 minutes with this patient today. All changes were made via collaborative practice agreement with Dr. Baldwin. A copy of the visit note was provided to the patient's primary care provider.    Will follow up in 1 week. Appt scheduled for 11/5 at 10:30am.    The patient was sent via [employer] a summary of these recommendations.     Renu LozoyaD  Medication Therapy Management Resident  Vivi Calderón PharmD, MANJIT, BCACP   Medication Therapy Management Pharmacist    Patient consented to a telehealth visit: yes  Telemedicine Visit Details  Type of service:  Telephone visit  Start Time: 10:22 AM  End Time: 10:34 AM  Originating Location (patient location): Home  Distant Location (provider location):  [facility]  Mode of Communication:  Telephone

## 2020-10-29 NOTE — PATIENT INSTRUCTIONS
Recommendations from today's MTM visit:                                                    MTM (medication therapy management) is a service provided by a clinical pharmacist designed to help you get the most of out of your medicines.     1. Start taking nifedipine 30mg once daily in the morning. Vivi will send a new prescription to [facility].   2. Change your labetalol dose - Start taking half of your labetalol tablet (100mg) twice daily.   3. Check your blood pressure once a day and record your numbers so we can discuss at your visit on 11/5.   4. Call Vivi at [phone] if you have questions or concerns before our next visit (or can send [employer] message).     It was great to speak with you today.  I value your experience and would be very thankful for your time with providing feedback on our clinic survey. You may receive a survey via email or text message in the next few days.     Next MTM visit: 11/5 at 10:30am     To schedule another MTM appointment, please call the clinic directly or you may call the MTM scheduling line at [phone] or toll-free at [phone].     My Clinical Pharmacist's contact information:                                                      It was a pleasure talking with you today!  Please feel free to contact me with any questions or concerns you have.      Cece Franz PharmD  Medication Therapy Management Resident, [facility]  Pager: [phone]  Email: [email or web address]    Vivi Calderón, Pharm.D., M.B.A., BCA  Medication Therapy Management Pharmacist, [facility]  Pager: [phone]  Email: [email or web address]

## 2020-11-05 ENCOUNTER — VIRTUAL VISIT (OUTPATIENT)
Dept: PHARMACY | Facility: CLINIC | Age: 35
End: 2020-11-05
Attending: FAMILY MEDICINE
Payer: COMMERCIAL

## 2020-11-05 DIAGNOSIS — I10 BENIGN ESSENTIAL HYPERTENSION: Primary | ICD-10-CM

## 2020-11-05 PROCEDURE — 99606 MTMS BY PHARM EST 15 MIN: CPT | Mod: TEL | Performed by: PHARMACIST

## 2020-11-05 NOTE — PATIENT INSTRUCTIONS
Recommendations from today's MTM visit:                                                      1. Continue taking labetalol 200 mg twice daily. Continue checking blood pressure and pulse daily.     It was great to speak with you today.  I value your experience and would be very thankful for your time with providing feedback on our clinic survey. You may receive a survey via email or text message in the next few days.     Next MTM visit: 11/10 9:30am    To schedule another MTM appointment, please call the clinic directly or you may call the MTM scheduling line at [phone] or toll-free at [phone].     My Clinical Pharmacist's contact information:                                                      It was a pleasure talking with you today!  Please feel free to contact me with any questions or concerns you have.      Cece Franz PharmD  Medication Therapy Management Resident, [facility]  Pager: [phone]  Email: [email or web address]    Renu Esparza.D., M.B.A., BCACP  Medication Therapy Management Pharmacist, [facility]  Pager: [phone]  Email: [email or web address]

## 2020-11-05 NOTE — PROGRESS NOTES
"MTM ENCOUNTER  SUBJECTIVE/OBJECTIVE:                           Vivi Mccall is a 35 year old female called for a follow-up visit. She was referred to me from Dr. Baldwin.  Today's visit is a follow-up MTM visit from 10/29/20.     Reason for visit: follow-up on dose decrease of labetalol and new nifedipine.    Allergies/ADRs: Reviewed in chart  Tobacco: She reports that she has never smoked. She has never used smokeless tobacco.  Alcohol: not currently using    Medication Adherence/Access: She does have trouble remembering to take labetalol twice daily.     Hypertension: She is currently taking labetalol 200 mg twice daily. Plan after last visit was to start nifedipine ER 30mg daily and decrease labetalol to 100 mg twice daily. She took the nifedipine for three days, and reports that it \"did not go so great\". She started getting \"massive\" headaches every evening. She states she \"couldn't open her eyes\". The headache would get better in the morning when she took Excedrin migraine, but then come back every evening. Acetaminophen or ibuprofen was not helpful. During that time, she notes her BP was 144/80's and pulse around 95. After those three days she went back to labetalol 200 mg twice daily. Since then her BP has been 140's/80-90's with pulse 65-70. She checks it right now and it is \"very high\", but she notes she is very anxious because she is dropping her baby off at  for the first time today. She is also wondering if having a low pulse can cause her to be out of breath. 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     ASSESSMENT:                            Medication Adherence: No issues identified    Hypertension: She prefers to try to be adherent to the labetalol twice daily and see how that goes before making any changes. She will continue to monitor her BP and pulse.      PLAN:                            1. Continue taking " labetalol 200 mg twice daily. Continue checking blood pressure and pulse daily.     I spent 7 minutes with this patient today. All changes were made via collaborative practice agreement with Dr. Baldwin. A copy of the visit note was provided to the patient's primary care provider.    Will follow up on 11/10 at 9:30 am.    The patient was sent via [employer] a summary of these recommendations.     Cece Franz PharmD  Medication Therapy Management Resident  Vivi Calderón PharmD, MANJIT, BCACP   Medication Therapy Management Pharmacist    Patient consented to a telehealth visit: yes  Telemedicine Visit Details  Type of service:  Telephone visit  Start Time: 10:33  End Time: 10:40 AM  Originating Location (patient location): Home  Distant Location (provider location):  [facility]  Mode of Communication:  Telephone

## 2020-11-08 DIAGNOSIS — I10 BENIGN ESSENTIAL HYPERTENSION: ICD-10-CM

## 2020-11-09 ENCOUNTER — VIRTUAL VISIT (OUTPATIENT)
Dept: FAMILY MEDICINE | Facility: CLINIC | Age: 35
End: 2020-11-09
Payer: COMMERCIAL

## 2020-11-09 DIAGNOSIS — I10 BENIGN ESSENTIAL HYPERTENSION: Primary | ICD-10-CM

## 2020-11-09 PROCEDURE — 99214 OFFICE O/P EST MOD 30 MIN: CPT | Mod: 95 | Performed by: FAMILY MEDICINE

## 2020-11-09 NOTE — PROGRESS NOTES
"Vivi Mccall is a 35 year old female who is being evaluated via a billable video visit.      The patient has been notified of following:     \"This video visit will be conducted via a call between you and your physician/provider. We have found that certain health care needs can be provided without the need for an in-person physical exam.  This service lets us provide the care you need with a video conversation.  If a prescription is necessary we can send it directly to your pharmacy.  If lab work is needed we can place an order for that and you can then stop by our lab to have the test done at a later time.    Video visits are billed at different rates depending on your insurance coverage.  Please reach out to your insurance provider with any questions.    If during the course of the call the physician/provider feels a video visit is not appropriate, you will not be charged for this service.\"    Patient has given verbal consent for Video visit? Yes  How would you like to obtain your AVS? [employer]  If you are dropped from the video visit, the video invite should be resent to: Other e-mail: in [facility] waiting room  Will anyone else be joining your video visit? No     Subjective     Vivi Mccall is a 35 year old female who presents today via video visit for the following health issues:    HPI     Hypertension Follow-up      Do you check your blood pressure regularly outside of the clinic? Yes     Are you following a low salt diet? Yes    Are your blood pressures ever more than 140 on the top number (systolic) OR more   than 90 on the bottom number (diastolic), for example 140/90? Yes      How many servings of fruits and vegetables do you eat daily?  4 or more    On average, how many sweetened beverages do you drink each day (Examples: soda, juice, sweet tea, etc.  Do NOT count diet or artificially sweetened beverages)?   2    How many days per week do you exercise enough to make your heart beat " faster? 5    How many minutes a day do you exercise enough to make your heart beat faster? 60 or more  How many days per week do you miss taking your medication? 0 trying to be more diligent about medications     struggling with BP - taking labetalol twice daily  Spoke to Neel[facility] and they advised Nifedipine - took it in morning did this while still taking 100 mg labetalol twice daily  7-8 pm she would get massive headache really bad  Pulse would rise to95  /80    Resumed 200 mg labetalol bid  BP still in 140s/90s but no headaches  Stopped nifedipine  Taking labetalol 200 mg bid  Has not had headaches  Pulse 65-70  Feels dyspnea with this and this is scary but no other concerns  Feels hard to perform with labetalol.    Prior to pregnancy  She was on lisinopril hydrochlorothiazide and was under good control  In pregnancy had great BP and needed to stop all meds.  Then at the end of pregnancy needed meds again.           Video Start Time: 10:24        Review of Systems   Constitutional, HEENT, cardiovascular, pulmonary, GI, , musculoskeletal, neuro, skin, endocrine and psych systems are negative, except as otherwise noted.      Objective           Vitals:  No vitals were obtained today due to virtual visit.    Physical Exam     GENERAL: Healthy, alert and no distress  EYES: Eyes grossly normal to inspection.  No discharge or erythema, or obvious scleral/conjunctival abnormalities.  RESP: No audible wheeze, cough, or visible cyanosis.  No visible retractions or increased work of breathing.    SKIN: Visible skin clear. No significant rash, abnormal pigmentation or lesions.  NEURO: Cranial nerves grossly intact.  Mentation and speech appropriate for age.  PSYCH: Mentation appears normal, affect normal/bright, judgement and insight intact, normal speech and appearance well-groomed.      Office Visit on 10/16/2020   Component Date Value Ref Range Status     HCG Qual Urine 10/16/2020 Negative  NEG^Negative Final  "   Comment: This test is for screening purposes.  Results should be interpreted along with   the clinical picture.  Confirmation testing is available if warranted by   ordering HPO790, HCG Quantitative Pregnancy.               Assessment & Plan     Benign essential hypertension  Patient struggles with side effects of high-dose propranolol but does feel that her headaches are gone and that her pressures are improved.  She would like to try and see if she can work on exercise and being better at taking her medication twice daily to see if this helps lower her numbers further.  We did discuss that she should be under 140/80 ideally.  It sounds like the nifedipine did have some side effects possibly the headaches that she was experiencing.  Initially I thought she is having headaches because of withdrawal from labetalol but it sounds like she never stopped it completely.  In reviewing other medications it does look like we could start hydrochlorothiazide as a Plan B if this is not working.  She did have a very good response to that and that is considered safe in breast-feeding according to the World Health Organization.  She is aware of this and will consider       BMI:   Estimated body mass index is 28.43 kg/m  as calculated from the following:    Height as of 10/16/20: 1.727 m (5' 8\").    Weight as of 10/16/20: 84.8 kg (187 lb).            See Patient Instructions    No follow-ups on file.    Vi Baldwin MD  [facility]      Video-Visit Details    Type of service:  Video Visit    Video End Time:10:51    Originating Location (pt. Location): Home    Distant Location (provider location):  [facility]     Platform used for Video Visit: Rambo        "

## 2020-11-12 ENCOUNTER — VIRTUAL VISIT (OUTPATIENT)
Dept: PHARMACY | Facility: CLINIC | Age: 35
End: 2020-11-12
Payer: COMMERCIAL

## 2020-11-12 DIAGNOSIS — I10 BENIGN ESSENTIAL HYPERTENSION: Primary | ICD-10-CM

## 2020-11-12 PROCEDURE — 99606 MTMS BY PHARM EST 15 MIN: CPT | Mod: TEL | Performed by: PHARMACIST

## 2020-11-12 NOTE — PATIENT INSTRUCTIONS
Recommendations from today's MTM visit:                                                      1. Retry nifedipine 30mg daily     2. Continue labetalol 200mg twice daily    It was great to speak with you today.  I value your experience and would be very thankful for your time with providing feedback on our clinic survey. You may receive a survey via email or text message in the next few days.     Next MTM visit: I'll check in with you on Monday    To schedule another MTM appointment, please call the clinic directly or you may call the MTM scheduling line at [phone] or toll-free at [phone].     My Clinical Pharmacist's contact information:                                                      It was a pleasure talking with you today!  Please feel free to contact me with any questions or concerns you have.      Vivi Calderón, Pharm.D., M.B.A., [facility]  [facility] Pharmacist, [facility]  Pager: [phone]  Email: [email or web address]

## 2020-11-12 NOTE — PROGRESS NOTES
MTM ENCOUNTER  SUBJECTIVE/OBJECTIVE:                           Vivi Mccall is a 35 year old female called for a follow-up visit. She was referred to me from Dr. Baldwin.  Today's visit is a follow-up MTM visit from 11/5     Reason for visit: F/u on HTN.    Allergies/ADRs: Reviewed in chart  Tobacco: She reports that she has never smoked. She has never used smokeless tobacco.  Alcohol: not currently using  Social: Right now, continuing to breast feed is the most important concern that she has.     Medication Adherence/Access: no issues reported    Hypertension: She is currently taking labetalol 200 mg twice daily. BP is sill running above goal of <130/80. Has been 140-160/ (highest systolic is 162). This is with very good compliance of labetalol (which has been difficult in the past. Today on the phone she checks her BP twice - 154/91 and 140/89.      ASSESSMENT:                            Medication Adherence: No issues identified    Hypertension: Discussed options - retrial of nifedipine (hoping that headaches were due to labetalol withdrawal), hydrochlorothiazide, hydralazine. She would prefer to re-try the nifedipine before adding a medication that may affect her milk supply.     PLAN:                            1. Retry nifedipine 30mg daily   2. Continue labetalol 200mg twice daily    I spent 15 minutes with this patient today. All changes were made via collaborative practice agreement with Dr. Baldwin. A copy of the visit note was provided to the patient's primary care provider.    Will follow up by phone on Monday.    The patient was sent via [employer] a summary of these recommendations.     Vivi Calderón, Pharm.D., M.B.A., BCACP  MTM Pharmacist, [facility]    Patient consented to a telehealth visit: yes  Telemedicine Visit Details  Type of service:  Telephone visit  Start Time: 9:00 AM  End Time: 9:15 AM  Originating Location (patient location): Home  Distant Location  (provider location):  [facility]  Mode of Communication:  Telephone

## 2020-11-16 ENCOUNTER — THERAPY VISIT (OUTPATIENT)
Dept: PHYSICAL THERAPY | Facility: CLINIC | Age: 35
End: 2020-11-16
Payer: COMMERCIAL

## 2020-11-16 ENCOUNTER — TELEPHONE (OUTPATIENT)
Dept: PHARMACY | Facility: CLINIC | Age: 35
End: 2020-11-16

## 2020-11-16 DIAGNOSIS — M25.512 SHOULDER PAIN, LEFT: ICD-10-CM

## 2020-11-16 DIAGNOSIS — I10 BENIGN ESSENTIAL HYPERTENSION: Primary | ICD-10-CM

## 2020-11-16 PROCEDURE — 97110 THERAPEUTIC EXERCISES: CPT | Mod: 95 | Performed by: PHYSICAL THERAPIST

## 2020-11-16 RX ORDER — LABETALOL 200 MG/1
200 TABLET, FILM COATED ORAL 2 TIMES DAILY
Qty: 180 TABLET | Refills: 0 | OUTPATIENT
Start: 2020-11-16

## 2020-11-16 RX ORDER — LABETALOL 200 MG/1
200 TABLET, FILM COATED ORAL 2 TIMES DAILY
Qty: 180 TABLET | Refills: 0 | Status: SHIPPED | OUTPATIENT
Start: 2020-11-16 | End: 2021-02-22

## 2020-11-16 RX ORDER — HYDROCHLOROTHIAZIDE 25 MG/1
12.5 TABLET ORAL DAILY
Qty: 15 TABLET | Refills: 1 | Status: SHIPPED | OUTPATIENT
Start: 2020-11-16 | End: 2021-01-18

## 2020-11-16 NOTE — TELEPHONE ENCOUNTER
See TE with MTM on 11/16 - will refill and start hydrochlorothiazide as well. Pt request this goes to [facility] [address], not [facility].   Vivi Calderón, RenuD, MANJIT, BCACP  MTM Pharmacist, [facility]

## 2020-11-16 NOTE — TELEPHONE ENCOUNTER
"Called pt to see how weekend went after starting nifedipine again. No answer, left message for pt to return call.    Discussed case with MTM pharmacist at [facility] who supports starting hydrochlorothiazide at 12.5mg once daily, but recommends not escalating the dose beyond 25mg daily.  Unliekly to affect milk supply unless patient is over diuresed.  Additionally she offered these thoughts -   \"I would really reassure about risk of decreased milk supply -- if milk supply is well established, with an older infant, mike this is really low risk.  Does she have any unique circumstances, such as a  infant, infant in NICU, h/o poor milk supply, etc?   Then I might be more concerned.  But, if her infant is older and she's had a good supply, I would focus on reassurance\".    If pt has not been doing well on nifedipine (continued HA) will see if pt would be amenable to starting hydrochlorothiazide 12.5mg daily.          Vivi Calderón, RenuD, MANJIT, BCACP  MTM Pharmacist, [facility]   "

## 2020-11-16 NOTE — TELEPHONE ENCOUNTER
Received call back from patient - she took one dose of nifedipine and had the same experience - incapacitating HA that started about 4 hrs after taking the medication and lasted most of the evening. She has not taken any additional medication. Her BP at the time was 124/78. She made no changes in labetalol, and continues 200mg BID.     While nifedipine had good efficacy, it's causing intolerable SE.  We discussed alternatives, and information about hydrochlorothiazide on milk supply. She is willing to try this. Discussed staying well hydrated,monitoring her milk supply and calling with any questions.     Will f/u with pt in 3-4 days.     Vivi Calderón, RenuD, MANJIT, BCACP  MTM Pharmacist, [facility]

## 2020-11-20 ENCOUNTER — TELEPHONE (OUTPATIENT)
Dept: PHARMACY | Facility: CLINIC | Age: 35
End: 2020-11-20

## 2020-11-20 NOTE — TELEPHONE ENCOUNTER
Was able to  hydrochlorothiazide and started 1/2 tablet (12.5mg) daily yesterday. She reports she feels great, feels like she used to (less short of breath, felt more normal, a little bit more energy). Denies hypotension sx. Has been urinating more, but this is OK so far.  No issues with milk supply. Hasn't checked her BP yet.     Plan: No changes, will f/u on Tuesday for review of home BP readings.   Vivi Calderón, RenuD, MANJIT, BCACP  MTM Pharmacist, [facility]

## 2020-11-23 PROBLEM — Z97.5 IUD (INTRAUTERINE DEVICE) IN PLACE: Status: ACTIVE | Noted: 2020-10-16

## 2020-11-23 NOTE — PROGRESS NOTES
SUBJECTIVE:                                                   Vivi Mccall is a 35 year old female who presents to clinic today for the following health issue(s):  Patient presents with:  IUD: CHECK           HPI:  Here today for IUD check.  Had Jyoti placed with me on 10/16.  Had 2 days of acute back pain after placement but no bleeding.  No cramping or pain since.  Had first menses 3wks after placement --lighter bleeding but lasted a bit longer  +SA --no issues/concerns for she or her   Overall feeling well  Will be due for annual exam with me in 2021    Patient's last menstrual period was 2020..     Patient is sexually active, .  Using IUD for contraception.    reports that she has never smoked. She has never used smokeless tobacco.    STD testing offered?  Declined    Health maintenance updated:  yes    Today's PHQ-2 Score:   PHQ-2 (  [employer]) 2020   Q1: Little interest or pleasure in doing things 0   Q2: Feeling down, depressed or hopeless 0   PHQ-2 Score 0   Q1: Little interest or pleasure in doing things -   Q2: Feeling down, depressed or hopeless -   PHQ-2 Score -     Today's PHQ-9 Score:   PHQ-9 SCORE 2020   PHQ-9 Total Score MyChart 8 (Mild depression)   PHQ-9 Total Score 8     Today's ROCHELLE-7 Score:   ROCHELLE-7 SCORE 2020   Total Score 13 (moderate anxiety)   Total Score 13       Problem list and histories reviewed & adjusted, as indicated.  Additional history: as documented.    Patient Active Problem List   Diagnosis     Benign essential hypertension     ROCHELLE (generalized anxiety disorder)     Major depressive disorder, recurrent episode, moderate (H)     Mild intermittent asthma without complication     Vitamin D deficiency     Shoulder pain, left     IUD (intrauterine device) in place     Past Surgical History:   Procedure Laterality Date     GYN SURGERY  2003    Laser treatment of HPV     HC RELEASE FOOT/TOE NERVE  2011     ORTHOPEDIC SURGERY Left  07/21/2017    ganglion cyst removal     ORTHOPEDIC SURGERY Left     nerve release on left leg/ankle      SURGICAL HISTORY OF -       Nerve entrapment release      Social History     Tobacco Use     Smoking status: Never Smoker     Smokeless tobacco: Never Used   Substance Use Topics     Alcohol use: Yes     Comment: Rare       Problem (# of Occurrences) Relation (Name,Age of Onset)    Anxiety Disorder (1) Mother (Margarita)    Bipolar Disorder (1) Mother (Margarita)    Breast Cancer (1) Maternal Grandmother (Nettie)    Coronary Artery Disease (3) Mother (Margarita), Maternal Grandfather (Saqib Sr.), Paternal Grandfather (Magdy)    Depression (3) Mother (Margarita), Maternal Grandmother (Nettie), Maternal Grandfather (Saqib Sr.)    Diabetes (2) Mother (Margarita), Maternal Grandfather (Saqib Sr.)    Diabetes Type 2  (1) Maternal Grandfather (Saqib Sr.)    Glaucoma (1) Maternal Grandfather (Saqib Sr.)    Hypertension (6) Mother (Margarita), Father (Parker), Maternal Grandmother (Nettie), Maternal Grandfather (Saqib Sr.), Paternal Grandmother (Zoe), Maternal Aunt    Mental Illness (1) Mother (Margarita)    Ovarian Cancer (1) Other    Unknown/Adopted (1) Father (Parker)       Negative family history of: Macular Degeneration            Current Outpatient Medications   Medication Sig     citalopram (CELEXA) 20 MG tablet Take 1.5 tablets (30 mg) by mouth daily     hydrochlorothiazide (HYDRODIURIL) 25 MG tablet Take 0.5 tablets (12.5 mg) by mouth daily     labetalol (NORMODYNE) 200 MG tablet Take 1 tablet (200 mg) by mouth 2 times daily     LORazepam (ATIVAN) 0.5 MG tablet Take 1 tablet (0.5 mg) by mouth every 6 hours as needed for anxiety     Prenatal Multivit-Min-Fe-FA (PRENATAL VITAMINS PO) Take 1 tablet by mouth     VITAMIN D, ERGOCALCIFEROL, PO      levonorgestrel (ALLEN) 13.5 MG IUD 1 each (13.5 mg) by Intrauterine route once     valACYclovir (VALTREX) 1000 mg tablet Take 2 tablets (2,000 mg) by mouth 2 times daily for 1 day  "    No current facility-administered medications for this visit.      Allergies   Allergen Reactions     Amoxicillin      Codeine      Penicillins        ROS:  12 point review of systems negative other than symptoms noted below or in the HPI.  No urinary frequency or dysuria, bladder or kidney problems      OBJECTIVE:     /78   Pulse 74   Ht 1.727 m (5' 8\")   Wt 85.3 kg (188 lb)   LMP 11/09/2020   BMI 28.59 kg/m    Body mass index is 28.59 kg/m .    Exam:  Constitutional:  Appearance: Well nourished, well developed alert, in no acute distress  Neurologic:  Mental Status:  Oriented X3.  Normal strength and tone, sensory exam grossly normal, mentation intact and speech normal.    Psychiatric:  Mentation appears normal and affect normal/bright.  Pelvic Exam:  External Genitalia:     Normal appearance for age, no discharge present, no tenderness present, no inflammatory lesions present, color normal  Vagina:    Normal vaginal vault without central or paravaginal defects, no discharge present, no inflammatory lesions present, no masses present  Bladder:     Nontender to palpation  Urethra:   Urethral Body:  Urethra palpation normal, urethra structural support normal   Urethral Meatus:  No erythema or lesions present  Cervix:     Appearance healthy, no lesions present, nontender to palpation, no bleeding present, string present  Uterus:     Nontender to palpation, no masses present, position anteflexed, mobility: normal  Adnexa:     No adnexal tenderness present, no adnexal masses present  Perineum:     Perineum within normal limits, no evidence of trauma, no rashes or skin lesions present  Anus:     Anus within normal limits, no hemorrhoids present  Inguinal Lymph Nodes:     No lymphadenopathy present  Pubic Hair:     Normal pubic hair distribution for age  Genitalia and Groin:     No rashes present, no lesions present, no areas of discoloration, no masses present       In-Clinic Test Results:  No results " found for this or any previous visit (from the past 24 hour(s)).    ASSESSMENT/PLAN:                                                        ICD-10-CM    1. IUD check up  Z30.431        Patient Instructions   IUD strings seen easily today.  Will return for annual exam in February 2021.        Anamaria Park MD  [facility] FOR [facility]

## 2020-11-24 ENCOUNTER — TELEPHONE (OUTPATIENT)
Dept: PHARMACY | Facility: CLINIC | Age: 35
End: 2020-11-24

## 2020-11-24 ENCOUNTER — OFFICE VISIT (OUTPATIENT)
Dept: OBGYN | Facility: CLINIC | Age: 35
End: 2020-11-24
Payer: COMMERCIAL

## 2020-11-24 VITALS
SYSTOLIC BLOOD PRESSURE: 116 MMHG | BODY MASS INDEX: 28.49 KG/M2 | WEIGHT: 188 LBS | HEART RATE: 74 BPM | HEIGHT: 68 IN | DIASTOLIC BLOOD PRESSURE: 78 MMHG

## 2020-11-24 DIAGNOSIS — Z30.431 IUD CHECK UP: Primary | ICD-10-CM

## 2020-11-24 PROCEDURE — 99213 OFFICE O/P EST LOW 20 MIN: CPT | Performed by: OBSTETRICS & GYNECOLOGY

## 2020-11-24 ASSESSMENT — MIFFLIN-ST. JEOR: SCORE: 1591.26

## 2020-11-24 NOTE — TELEPHONE ENCOUNTER
Called pt to f/u on BP. No answer, message left. She had an in-clinic appointment today and her BP looked great.     Vivi Calderón, PharmD, MANJIT, BCACP  MTM Pharmacist, [facility]

## 2020-11-25 NOTE — TELEPHONE ENCOUNTER
Pt called and left MTM a VM - doing well, but needs refills on labetalol. Called pt back, no answer, but message left for her letting her know there is a new script at her pharmacy (I see triage also sent her a mychart message as well).     Vivi Calderón, RenuD, MANJIT, BCACP  MTM Pharmacist, [facility]

## 2020-12-17 ENCOUNTER — TELEPHONE (OUTPATIENT)
Dept: PHARMACY | Facility: CLINIC | Age: 35
End: 2020-12-17

## 2020-12-17 DIAGNOSIS — F41.1 GAD (GENERALIZED ANXIETY DISORDER): ICD-10-CM

## 2020-12-17 DIAGNOSIS — I10 BENIGN ESSENTIAL HYPERTENSION: Primary | ICD-10-CM

## 2020-12-17 DIAGNOSIS — F33.1 MAJOR DEPRESSIVE DISORDER, RECURRENT EPISODE, MODERATE (H): ICD-10-CM

## 2020-12-17 NOTE — TELEPHONE ENCOUNTER
Patient is indicated for BMP due to new hydrochlorothiazide started on 11/16/20. Discussed this with Dr. Baldwin who gives verbal approval. Future order placed for BMP.     Called patient to update her and check on BP readings. Left voicemail requesting she call back at [phone] to discuss.     MTM will call again next week if haven't heard from patient sooner.     Cece Franz, PharmD  MTM Pharmacy Resident

## 2020-12-21 RX ORDER — CITALOPRAM HYDROBROMIDE 20 MG/1
30 TABLET ORAL DAILY
Qty: 45 TABLET | Refills: 0 | Status: SHIPPED | OUTPATIENT
Start: 2020-12-21 | End: 2020-12-31

## 2020-12-21 NOTE — TELEPHONE ENCOUNTER
She is currently taking hydrochlorothiazide 12.5 mg daily in addition to labetalol 200 mg twice daily. She denies side effects. She is not having headaches.     She has been checking her blood pressure at home. Highest readings was around 130/70's mmHg. Lowest was 116/77 mmHg.     She will get the BMP lab scheduled using MyChart. She also notes that she is due to follow up with Dr. Carrasquillo and plans to schedule that as well. She is running low on citalopram and will request a refill of that on MyChart.     Cece Franz, PharmD  MTM Pharmacy Resident

## 2020-12-21 NOTE — TELEPHONE ENCOUNTER
Refill for 30 day supply of citalopram sent to [facility] per approval from Dr. Baldwin. Left voicemail to update patient.     MTM will follow up after BMP results.     Cece Franz, PharmD  MTM Pharmacy Resident

## 2020-12-29 DIAGNOSIS — I10 BENIGN ESSENTIAL HYPERTENSION: ICD-10-CM

## 2020-12-29 PROCEDURE — 80048 BASIC METABOLIC PNL TOTAL CA: CPT | Performed by: FAMILY MEDICINE

## 2020-12-29 PROCEDURE — 36415 COLL VENOUS BLD VENIPUNCTURE: CPT | Performed by: FAMILY MEDICINE

## 2020-12-30 LAB
ANION GAP SERPL CALCULATED.3IONS-SCNC: 6 MMOL/L (ref 3–14)
BUN SERPL-MCNC: 8 MG/DL (ref 7–30)
CALCIUM SERPL-MCNC: 8.5 MG/DL (ref 8.5–10.1)
CHLORIDE SERPL-SCNC: 105 MMOL/L (ref 94–109)
CO2 SERPL-SCNC: 29 MMOL/L (ref 20–32)
CREAT SERPL-MCNC: 0.67 MG/DL (ref 0.52–1.04)
GFR SERPL CREATININE-BSD FRML MDRD: >90 ML/MIN/{1.73_M2}
GLUCOSE SERPL-MCNC: 104 MG/DL (ref 70–99)
POTASSIUM SERPL-SCNC: 3.5 MMOL/L (ref 3.4–5.3)
SODIUM SERPL-SCNC: 140 MMOL/L (ref 133–144)

## 2020-12-31 ENCOUNTER — OFFICE VISIT (OUTPATIENT)
Dept: FAMILY MEDICINE | Facility: CLINIC | Age: 35
End: 2020-12-31
Payer: COMMERCIAL

## 2020-12-31 VITALS
BODY MASS INDEX: 29.18 KG/M2 | OXYGEN SATURATION: 99 % | HEIGHT: 68 IN | HEART RATE: 75 BPM | DIASTOLIC BLOOD PRESSURE: 90 MMHG | SYSTOLIC BLOOD PRESSURE: 134 MMHG | WEIGHT: 192.5 LBS | RESPIRATION RATE: 20 BRPM | TEMPERATURE: 97.7 F

## 2020-12-31 DIAGNOSIS — F41.1 GAD (GENERALIZED ANXIETY DISORDER): ICD-10-CM

## 2020-12-31 DIAGNOSIS — M79.89 SOFT TISSUE MASS: Primary | ICD-10-CM

## 2020-12-31 DIAGNOSIS — L84 CALLUS: ICD-10-CM

## 2020-12-31 DIAGNOSIS — F33.1 MAJOR DEPRESSIVE DISORDER, RECURRENT EPISODE, MODERATE (H): ICD-10-CM

## 2020-12-31 PROCEDURE — 99214 OFFICE O/P EST MOD 30 MIN: CPT | Performed by: FAMILY MEDICINE

## 2020-12-31 RX ORDER — CITALOPRAM HYDROBROMIDE 20 MG/1
30 TABLET ORAL DAILY
Qty: 135 TABLET | Refills: 3 | Status: SHIPPED | OUTPATIENT
Start: 2020-12-31 | End: 2021-01-08

## 2020-12-31 ASSESSMENT — ANXIETY QUESTIONNAIRES
5. BEING SO RESTLESS THAT IT IS HARD TO SIT STILL: NOT AT ALL
GAD7 TOTAL SCORE: 4
7. FEELING AFRAID AS IF SOMETHING AWFUL MIGHT HAPPEN: NOT AT ALL
2. NOT BEING ABLE TO STOP OR CONTROL WORRYING: NOT AT ALL
3. WORRYING TOO MUCH ABOUT DIFFERENT THINGS: NOT AT ALL
6. BECOMING EASILY ANNOYED OR IRRITABLE: MORE THAN HALF THE DAYS
1. FEELING NERVOUS, ANXIOUS, OR ON EDGE: MORE THAN HALF THE DAYS
IF YOU CHECKED OFF ANY PROBLEMS ON THIS QUESTIONNAIRE, HOW DIFFICULT HAVE THESE PROBLEMS MADE IT FOR YOU TO DO YOUR WORK, TAKE CARE OF THINGS AT HOME, OR GET ALONG WITH OTHER PEOPLE: NOT DIFFICULT AT ALL

## 2020-12-31 ASSESSMENT — PATIENT HEALTH QUESTIONNAIRE - PHQ9
5. POOR APPETITE OR OVEREATING: NOT AT ALL
SUM OF ALL RESPONSES TO PHQ QUESTIONS 1-9: 5

## 2020-12-31 ASSESSMENT — MIFFLIN-ST. JEOR: SCORE: 1611.67

## 2020-12-31 NOTE — PROGRESS NOTES
Subjective     Vivi Mccall is a 35 year old female who presents to clinic today for the following health issues:    HPI          Lump/Mass      Duration: since 2017    Description (location/character/radiation): soft / fluid-filled lump - right buttocks     Intensity:  mild    Accompanying signs and symptoms: aches at times; has been growing    History (similar episodes/previous evaluation): yes - was told it was fatty tissue     Precipitating or alleviating factors: None    Therapies tried and outcome: None        Medication Followup of citalopram     Taking Medication as prescribed: yes feels that this is helpful.  Did try occasionally going up in the dose to 40 mg a few days when she was feeling particularly anxious.  Is not sure that she really needs to do this.  Feels that she has some situational stressors such as weaning her daughter from breast-feeding which are weighing on her.  She is currently not working and this is also a stressor but she is working through this.  She has good support from her spouse.    Side Effects:  None    Medication Helping Symptoms:  Yes      Depression and Anxiety Follow-Up    How are you doing with your depression since your last visit? No change    How are you doing with your anxiety since your last visit?  Worsened     Are you having other symptoms that might be associated with depression or anxiety? Yes:  chest pressure / anxiety attacks     Have you had a significant life event? OTHER: daughter turning 1 - pt would like to discuss weaning, loss of job      Do you have any concerns with your use of alcohol or other drugs? No    Social History     Tobacco Use     Smoking status: Never Smoker     Smokeless tobacco: Never Used   Substance Use Topics     Alcohol use: Yes     Comment: Rare      Drug use: No     PHQ 2/4/2020 5/18/2020 12/31/2020   PHQ-9 Total Score 0 8 5   Q9: Thoughts of better off dead/self-harm past 2 weeks Not at all Several days Not at all   F/U:  "Thoughts of suicide or self-harm - No -   F/U: Safety concerns - No -     ROCHELLE-7 SCORE 2/4/2020 5/18/2020 12/31/2020   Total Score - 13 (moderate anxiety) -   Total Score 1 13 4         Suicide Assessment Five-step Evaluation and Treatment (SAFE-T)      How many servings of fruits and vegetables do you eat daily?  2-3    On average, how many sweetened beverages do you drink each day (Examples: soda, juice, sweet tea, etc.  Do NOT count diet or artificially sweetened beverages)?   1    How many days per week do you exercise enough to make your heart beat faster? 3 or less    How many minutes a day do you exercise enough to make your heart beat faster? 9 or less  How many days per week do you miss taking your medication? 1    What makes it hard for you to take your medications?  remembering to take       Blood pressure also has been much better under control with current regimen.  She does feel that addressing some of her stressors is causing pressure to go up.  Otherwise she is tolerating medications well and usually gets much lower numbers than this    BP Readings from Last 6 Encounters:   12/31/20 (!) 134/90   11/24/20 116/78   10/16/20 136/86   10/16/20 (!) 145/99   02/20/20 112/80   02/04/20 120/70       Review of Systems   Constitutional, HEENT, cardiovascular, pulmonary, gi and gu systems are negative, except as otherwise noted.      Objective    BP (!) 134/90 (Patient Position: Sitting, Cuff Size: Adult Regular)   Pulse 75   Temp 97.7  F (36.5  C) (Temporal)   Resp 20   Ht 1.727 m (5' 8\")   Wt 87.3 kg (192 lb 8 oz)   LMP 12/11/2020 (Exact Date)   SpO2 99%   BMI 29.27 kg/m    Body mass index is 29.27 kg/m .  Physical Exam   GENERAL: healthy, alert and no distress  CV: regular rate and rhythm, normal S1 S2, no S3 or S4, no murmur, click or rub, no peripheral edema and peripheral pulses strong  SKIN: no suspicious lesions or rashes there is a soft deep nodule about 1 cm in diameter at her right lateral " buttock.  There is no skin changes overlying it.  It does appear to be deep but round and flexible and likely consistent with a lipoma.  Not fixed  Has a rough patch along the medial side of her finger on the right hand.  Is currently flat but does have a slightly rough appearance to it.  She sometimes picks at it  PSYCH: mentation appears normal, affect normal/bright    No results found for this or any previous visit (from the past 24 hour(s)).        Assessment & Plan     Major depressive disorder, recurrent episode, moderate (H)  We will keep medications at their certain level  - citalopram (CELEXA) 20 MG tablet  Dispense: 135 tablet; Refill: 3    ROCHELLE (generalized anxiety disorder)  Can increase medications further as needed to 40 mg.  - citalopram (CELEXA) 20 MG tablet  Dispense: 135 tablet; Refill: 3    Soft tissue mass  Encouraged her to talk with general surgery specially if she feels that this is getting bigger.  Discussed that this likely is a lipoma but if it does seem to be changing would have this evaluated further.  - GENERAL SURG ADULT REFERRAL    Callus  Discussed having her talk with dermatology about the patch which could be a wart.  She is treated in the past with over-the-counter treatments but would like to see dermatology because of it is persistently picture.  - DERMATOLOGY ADULT REFERRAL              Return in about 4 weeks (around 1/28/2021) for BP Recheck.    Vi Baldwin MD  [facility]

## 2020-12-31 NOTE — Clinical Note
Her BP was still a little elevated.  Please have her check it at home over the next week and call us with her numbers.    Thanks

## 2020-12-31 NOTE — PROGRESS NOTES
Sent pt ATEME message to update us with BP readings over the next week - was unable to reach by phone.    Erica Urban MA on 12/31/2020 at 2:58 PM

## 2021-01-01 ASSESSMENT — ANXIETY QUESTIONNAIRES: GAD7 TOTAL SCORE: 4

## 2021-01-11 ENCOUNTER — OFFICE VISIT (OUTPATIENT)
Dept: SURGERY | Facility: CLINIC | Age: 36
End: 2021-01-11
Payer: COMMERCIAL

## 2021-01-11 VITALS
RESPIRATION RATE: 12 BRPM | WEIGHT: 182 LBS | DIASTOLIC BLOOD PRESSURE: 92 MMHG | BODY MASS INDEX: 27.58 KG/M2 | HEIGHT: 68 IN | OXYGEN SATURATION: 98 % | SYSTOLIC BLOOD PRESSURE: 144 MMHG | HEART RATE: 79 BPM

## 2021-01-11 DIAGNOSIS — R22.41 MASS OF RIGHT THIGH: Primary | ICD-10-CM

## 2021-01-11 PROCEDURE — 99204 OFFICE O/P NEW MOD 45 MIN: CPT | Performed by: SURGERY

## 2021-01-11 ASSESSMENT — MIFFLIN-ST. JEOR: SCORE: 1564.05

## 2021-01-11 NOTE — PROGRESS NOTES
[facility] Consultation    CHIEF COMPLAINT:  Chief Complaint   Patient presents with     Consult     soft tissue mass on leg/buttock       HISTORY OF PRESENT ILLNESS:  Vivi Mccall is a 35 year old female who is seen in consultation at the request of Dr. Baldwin for evaluation of enlarging and painful right posterior thigh nodule.  For a long time Mrs. Mccall has been aware of a nodule on the posterior proximal right thigh but this was not bothersome in any way.  Since her pregnancy over a year ago, and the associated weight gain, the nodule become larger and easily irritable.  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  There has been no skin breakdown nor drainage.  No other areas of concern.    REVIEW OF SYSTEMS:  Constitutional:  Negative for chills, fatigue, fever and weight change.  Neuro: No extremity, nor facial weakness  Psych:  No unexpected changes in mood  Eyes:  Negative for new vision problems.  ENT:  Negative for ENT pain.  Cardiovascular:  Negative for chest pain, palpitations.  Respiratory:  Negative for cough, dyspnea.  Gastrointestinal:  Negative for abdominal pain.    Musculoskeletal:  Negative for new arthralgias or myalgias.  Integumentary: See HPI    Past Medical History:   Diagnosis Date     Asthma     seasonal and environmental     C. difficile diarrhea 2013     Depression      Depressive disorder      Encounter for IUD insertion 10/16/2020    Jyoti inserted by Dr Park     Generalized anxiety disorder      Hypertension      Pain in both hands 02/20/2020       Past Surgical History:   Procedure Laterality Date     GYN SURGERY  2003    Laser treatment of HPV     HC RELEASE FOOT/TOE NERVE  2011     ORTHOPEDIC SURGERY Left 07/21/2017    ganglion cyst removal     ORTHOPEDIC SURGERY Left     nerve release on left leg/ankle      SURGICAL  "HISTORY OF -       Nerve entrapment release       Family History has been reviewed.    Social History     Tobacco Use     Smoking status: Never Smoker     Smokeless tobacco: Never Used   Substance Use Topics     Alcohol use: Yes     Comment: Rare        Patient Active Problem List   Diagnosis     Benign essential hypertension     ROCHELLE (generalized anxiety disorder)     Major depressive disorder, recurrent episode, moderate (H)     Mild intermittent asthma without complication     Vitamin D deficiency     Shoulder pain, left     IUD (intrauterine device) in place       Allergies   Allergen Reactions     Amoxicillin      Codeine      Penicillins        Current Outpatient Medications   Medication Sig Dispense Refill     citalopram (CELEXA) 20 MG tablet TAKE 1 AND 1/2 TABLETS (30 MG) BY MOUTH DAILY 135 tablet 3     hydrochlorothiazide (HYDRODIURIL) 25 MG tablet Take 0.5 tablets (12.5 mg) by mouth daily 15 tablet 1     labetalol (NORMODYNE) 200 MG tablet Take 1 tablet (200 mg) by mouth 2 times daily 180 tablet 0     levonorgestrel (ALLEN) 13.5 MG IUD 1 each (13.5 mg) by Intrauterine route once       LORazepam (ATIVAN) 0.5 MG tablet Take 1 tablet (0.5 mg) by mouth every 6 hours as needed for anxiety 30 tablet 1     Prenatal Multivit-Min-Fe-FA (PRENATAL VITAMINS PO) Take 1 tablet by mouth       VITAMIN D, ERGOCALCIFEROL, PO        valACYclovir (VALTREX) 1000 mg tablet Take 2 tablets (2,000 mg) by mouth 2 times daily for 1 day 4 tablet 0       Vitals: BP (!) 144/92 (BP Location: Left arm, Patient Position: Sitting, Cuff Size: Adult Regular)   Pulse 79   Resp 12   Ht 1.727 m (5' 8\")   Wt 82.6 kg (182 lb)   SpO2 98%   BMI 27.67 kg/m    BMI= Body mass index is 27.67 kg/m .    EXAM:  GENERAL: healthy, alert and no distress     HEENT: moist mucus membranes, no scleral icterus,   CARDIOVASCULAR:  RRR, No JVD  NEURO:  Alert; well oriented to time, place and person.  RESPIRATORY: non labored breathing  NECK: Neck supple. No " noticeable masses.  No lymphadenopathy noted.  EXTREMITIES: warm and well perfused, no edema  SKIN: There is a small (1-1/2 to 2 cm nipple-like protrusion in her posterior right thigh at the junction with the buttocks.  With deep palpation I can sense some nodularity underneath, but I cannot notice a definitive mass.    LABS/Imaging: None at this time    ASSESSMENT:  Vivi Mccall suffers from 1. Mass of right thigh    PLAN:  Ultrasound of the area of concern followed by excision as indicated.    In the case we do proceed with excision, Mrs. Mccall understands the risk, benefits, hopeful outcomes, and possible complications, both in the short and in the long term.  All her questions answered, she will like to proceed with the propose procedure in the near future.    It is my pleasure to participate in the care of iVvi Mccall. Thank you for this consultation.     If you have any questions please give me a call.    Best regards,  Patrick Davenport MD    Please route or send letter to:  Primary Care Provider (PCP), Referring Provider and Include Progress Note    Total time with patient visit: 25 minutes more than half spent in counseling, explanation of procedures and coordination of care.

## 2021-01-13 ENCOUNTER — HOSPITAL ENCOUNTER (OUTPATIENT)
Dept: ULTRASOUND IMAGING | Facility: CLINIC | Age: 36
Discharge: HOME | End: 2021-01-13
Attending: SURGERY | Admitting: SURGERY
Payer: COMMERCIAL

## 2021-01-13 DIAGNOSIS — R22.41 MASS OF RIGHT THIGH: ICD-10-CM

## 2021-01-13 PROCEDURE — 76882 US LMTD JT/FCL EVL NVASC XTR: CPT | Mod: RT

## 2021-01-18 RX ORDER — ACETAMINOPHEN 325 MG/1
975 TABLET ORAL ONCE
Status: CANCELLED | OUTPATIENT
Start: 2021-01-18 | End: 2021-01-18

## 2021-02-20 DIAGNOSIS — I10 BENIGN ESSENTIAL HYPERTENSION: ICD-10-CM

## 2021-02-24 DIAGNOSIS — I10 BENIGN ESSENTIAL HYPERTENSION: ICD-10-CM

## 2021-02-24 RX ORDER — LABETALOL 100 MG/1
TABLET, FILM COATED ORAL
Qty: 120 TABLET | Refills: 0 | Status: SHIPPED | OUTPATIENT
Start: 2021-02-24 | End: 2021-04-07

## 2021-02-24 NOTE — TELEPHONE ENCOUNTER
SN,  Left VM #2 for patient  See below messages  No response from patient to our outreach  Please advise on further refill  Thanks,  Claudia PIPER RN

## 2021-02-25 RX ORDER — LABETALOL 100 MG/1
TABLET, FILM COATED ORAL
Start: 2021-02-25

## 2021-02-25 NOTE — TELEPHONE ENCOUNTER
Pharmacy requesting 90 day supply. One month only, provider would like to see patient. Pharmacy informed

## 2021-03-06 ENCOUNTER — HEALTH MAINTENANCE LETTER (OUTPATIENT)
Age: 36
End: 2021-03-06

## 2021-03-08 ENCOUNTER — OFFICE VISIT (OUTPATIENT)
Dept: FAMILY MEDICINE | Facility: CLINIC | Age: 36
End: 2021-03-08
Payer: COMMERCIAL

## 2021-03-08 ENCOUNTER — ANCILLARY PROCEDURE (OUTPATIENT)
Dept: GENERAL RADIOLOGY | Facility: CLINIC | Age: 36
End: 2021-03-08
Attending: FAMILY MEDICINE
Payer: COMMERCIAL

## 2021-03-08 VITALS
OXYGEN SATURATION: 95 % | HEART RATE: 72 BPM | TEMPERATURE: 96.7 F | SYSTOLIC BLOOD PRESSURE: 134 MMHG | HEIGHT: 68 IN | WEIGHT: 189.5 LBS | RESPIRATION RATE: 20 BRPM | BODY MASS INDEX: 28.72 KG/M2 | DIASTOLIC BLOOD PRESSURE: 93 MMHG

## 2021-03-08 DIAGNOSIS — R07.9 CHEST PAIN, UNSPECIFIED TYPE: ICD-10-CM

## 2021-03-08 DIAGNOSIS — R07.9 CHEST PAIN, UNSPECIFIED TYPE: Primary | ICD-10-CM

## 2021-03-08 DIAGNOSIS — J45.20 MILD INTERMITTENT ASTHMA WITHOUT COMPLICATION: ICD-10-CM

## 2021-03-08 DIAGNOSIS — F33.1 MAJOR DEPRESSIVE DISORDER, RECURRENT EPISODE, MODERATE (H): ICD-10-CM

## 2021-03-08 DIAGNOSIS — I10 BENIGN ESSENTIAL HYPERTENSION: ICD-10-CM

## 2021-03-08 LAB
ERYTHROCYTE [DISTWIDTH] IN BLOOD BY AUTOMATED COUNT: 12.4 % (ref 10–15)
HCT VFR BLD AUTO: 38.7 % (ref 35–47)
HGB BLD-MCNC: 13 G/DL (ref 11.7–15.7)
MCH RBC QN AUTO: 31.7 PG (ref 26.5–33)
MCHC RBC AUTO-ENTMCNC: 33.6 G/DL (ref 31.5–36.5)
MCV RBC AUTO: 94 FL (ref 78–100)
PLATELET # BLD AUTO: 379 10E9/L (ref 150–450)
RBC # BLD AUTO: 4.1 10E12/L (ref 3.8–5.2)
WBC # BLD AUTO: 6 10E9/L (ref 4–11)

## 2021-03-08 PROCEDURE — 80053 COMPREHEN METABOLIC PANEL: CPT | Performed by: FAMILY MEDICINE

## 2021-03-08 PROCEDURE — 99215 OFFICE O/P EST HI 40 MIN: CPT | Performed by: FAMILY MEDICINE

## 2021-03-08 PROCEDURE — 71046 X-RAY EXAM CHEST 2 VIEWS: CPT | Mod: FY | Performed by: RADIOLOGY

## 2021-03-08 PROCEDURE — 93000 ELECTROCARDIOGRAM COMPLETE: CPT | Performed by: FAMILY MEDICINE

## 2021-03-08 PROCEDURE — 85027 COMPLETE CBC AUTOMATED: CPT | Performed by: FAMILY MEDICINE

## 2021-03-08 PROCEDURE — 36415 COLL VENOUS BLD VENIPUNCTURE: CPT | Performed by: FAMILY MEDICINE

## 2021-03-08 RX ORDER — ALBUTEROL SULFATE 90 UG/1
2 AEROSOL, METERED RESPIRATORY (INHALATION) EVERY 6 HOURS
Qty: 1 INHALER | Refills: 3 | Status: SHIPPED | OUTPATIENT
Start: 2021-03-08 | End: 2023-06-24

## 2021-03-08 RX ORDER — LISINOPRIL AND HYDROCHLOROTHIAZIDE 20; 25 MG/1; MG/1
1 TABLET ORAL DAILY
Qty: 90 TABLET | Refills: 1 | Status: SHIPPED | OUTPATIENT
Start: 2021-03-08 | End: 2021-04-07

## 2021-03-08 RX ORDER — LISINOPRIL 20 MG/1
20 TABLET ORAL DAILY
COMMUNITY
End: 2021-03-08

## 2021-03-08 ASSESSMENT — MIFFLIN-ST. JEOR: SCORE: 1598.07

## 2021-03-08 NOTE — PATIENT INSTRUCTIONS
Taper off labetalol:    100 mg twice daily for 2 days then 50 mg twice daily for 2 days then stop.    Add Lisinopril/hctz combination tablet once daily     See me in 1 month

## 2021-03-08 NOTE — LETTER
March 11, 2021      Vivi Mccall  [address]  [facility] [address]          Hello,     Great news, your lab  results were normal.     Vi Baldwin MD

## 2021-03-08 NOTE — PROGRESS NOTES
Assessment & Plan     Chest pain, unspecified type  Chest x-ray reviewed by me and no concerning findings noted.  We will certainly contact her with radiology's interpretation.  EKG also looks relatively benign however there were some subtle flattening and inversions of her T waves in V1 and V2 this coupled with hypertension and a strong family history of hypertension heart disease we will send her for stress echo    Certainly based on her exam this could be chest wall pain she does have some reproducible left and right tenderness along the left sternal border that somewhat mimics her pain.  Discussed having her try ibuprofen twice daily for a week with food to see if this improves  Will also work on improved blood pressure management.  She has been tolerating labetalol but has not had a great response to this.  In the past she did well with lisinopril and hydrochlorothiazide together.  We will restart this now that she is no longer breast-feeding and recheck her blood pressure in 1 month discussed that she can taper off the labetalol and I reviewed this in her after visit summary with her.  - EKG 12-lead complete w/read - Clinics  - XR Chest 2 Views; Future  - CBC with platelets  - Comprehensive metabolic panel (BMP + Alb, Alk Phos, ALT, AST, Total. Bili, TP)    Major depressive disorder, recurrent episode, moderate (H)  - Patient utilizes citalopram and ativan for her mood symptoms and feels that this controls her symptoms well.  Does feel that anxiety is better and she is managing it well      45 minutes spent on the date of the encounter doing chart review, history and exam, documentation and further activities as noted above            No follow-ups on file.    Vi Baldwin MD  [facility]    Juliette Trinidad is a 35 year old who presents for the following health issues     HPI     Medication Followup of hydrochlorothiazide, labetalol     Taking Medication as prescribed:  yes    Side Effects:  None    Medication Helping Symptoms:  NO        Hypertension Follow-up  Blood pressure still not at goal.  She had been taking 200 mg of labetalol twice daily along with hydrochlorothiazide.  We have been avoiding lisinopril due to breast-feeding but she is no longer breast-feeding and reports that this combination of lisinopril hydrochlorothiazide worked well for her prior to pregnancy.  She has a strong family history of hypertension and a history of some heart disease on both sides of her family    Do you check your blood pressure regularly outside of the clinic? Yes     Are you following a low salt diet? Yes    Are your blood pressures ever more than 140 on the top number (systolic) OR more   than 90 on the bottom number (diastolic), for example 140/90? Yes     Pt also having fatigue, nausea, and episodes of left chest tightness for the past month. The chest pain is occasionally associated with SOB and aching that radiates down her left arm.  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  Calming down and slowing her breath usually helps this  The tenderness does feel like an ache along the left side of her chest and sometimes it into her arm.  She has no diaphoresis however and is able to exercise without chest pain or symptoms.    Pt did see surgeon about growth - would like to discuss.     Reports that the area along her right buttock is indeed a lipoma however there is a small tear beneath it and the recommendation is to have this corrected under  general anesthesia she wonders if she can postpone this with this needs to be done urgently.    Pt would like a refill of her inhaler for chronic asthma that is exacerbated during the transition to spring.  Pt plans on getting the COVID-19 vaccine on 3/10.      How many servings of fruits and vegetables do you eat daily?  2-3    On average, how many sweetened beverages do  "you drink each day (Examples: soda, juice, sweet tea, etc.  Do NOT count diet or artificially sweetened beverages)?   2    How many days per week do you exercise enough to make your heart beat faster? 5    How many minutes a day do you exercise enough to make your heart beat faster? 60 or more    How many days per week do you miss taking your medication? 0        Review of Systems   Constitutional, HEENT, cardiovascular, pulmonary, gi and gu systems are negative, except as otherwise noted.      Objective    BP (!) 134/93 (Patient Position: Sitting, Cuff Size: Adult Regular)   Pulse 72   Temp 96.7  F (35.9  C) (Tympanic)   Resp 20   Ht 1.727 m (5' 8\")   Wt 86 kg (189 lb 8 oz)   SpO2 95%   BMI 28.81 kg/m    Body mass index is 28.81 kg/m .  Physical Exam   GENERAL: healthy, alert and no distress  NECK: no adenopathy, no asymmetry, masses, or scars and thyroid normal to palpation  RESP: lungs clear to auscultation - no rales, rhonchi or wheezes  CV: regular rate and rhythm, normal S1 S2, no S3 or S4, no murmur, click or rub, no peripheral edema and peripheral pulses strong  ABDOMEN: soft, nontender, no hepatosplenomegaly, no masses and bowel sounds normal  MS: Tenderness to palpation of costosternal joints, particularly the articular regions of the inferior sternal body    CXR - Reviewed and interpreted by me Normal- no infiltrates, effusions, pneumothoraces, cardiomegaly or masses  EKG - Reviewed and interpreted by me appears normal, NSR, no LVH by voltage criteria, nonspecific ST-T changes, in V1 V2    ----- Services Performed by a MEDICAL STUDENT in Presence of ATTENDING Physician-------        "

## 2021-03-09 LAB
ALBUMIN SERPL-MCNC: 3.7 G/DL (ref 3.4–5)
ALP SERPL-CCNC: 85 U/L (ref 40–150)
ALT SERPL W P-5'-P-CCNC: 31 U/L (ref 0–50)
ANION GAP SERPL CALCULATED.3IONS-SCNC: 6 MMOL/L (ref 3–14)
AST SERPL W P-5'-P-CCNC: 20 U/L (ref 0–45)
BILIRUB SERPL-MCNC: 0.5 MG/DL (ref 0.2–1.3)
BUN SERPL-MCNC: 8 MG/DL (ref 7–30)
CALCIUM SERPL-MCNC: 8.6 MG/DL (ref 8.5–10.1)
CHLORIDE SERPL-SCNC: 105 MMOL/L (ref 94–109)
CO2 SERPL-SCNC: 27 MMOL/L (ref 20–32)
CREAT SERPL-MCNC: 0.81 MG/DL (ref 0.52–1.04)
GFR SERPL CREATININE-BSD FRML MDRD: >90 ML/MIN/{1.73_M2}
GLUCOSE SERPL-MCNC: 80 MG/DL (ref 70–99)
POTASSIUM SERPL-SCNC: 3.8 MMOL/L (ref 3.4–5.3)
PROT SERPL-MCNC: 7.7 G/DL (ref 6.8–8.8)
SODIUM SERPL-SCNC: 138 MMOL/L (ref 133–144)

## 2021-04-07 ENCOUNTER — OFFICE VISIT (OUTPATIENT)
Dept: FAMILY MEDICINE | Facility: CLINIC | Age: 36
End: 2021-04-07
Payer: COMMERCIAL

## 2021-04-07 VITALS
SYSTOLIC BLOOD PRESSURE: 135 MMHG | HEIGHT: 68 IN | HEART RATE: 72 BPM | WEIGHT: 189 LBS | DIASTOLIC BLOOD PRESSURE: 91 MMHG | TEMPERATURE: 97.4 F | OXYGEN SATURATION: 98 % | RESPIRATION RATE: 16 BRPM | BODY MASS INDEX: 28.64 KG/M2

## 2021-04-07 DIAGNOSIS — J30.1 SEASONAL ALLERGIC RHINITIS DUE TO POLLEN: ICD-10-CM

## 2021-04-07 DIAGNOSIS — I10 BENIGN ESSENTIAL HYPERTENSION: ICD-10-CM

## 2021-04-07 DIAGNOSIS — J45.20 MILD INTERMITTENT ASTHMA WITHOUT COMPLICATION: Primary | ICD-10-CM

## 2021-04-07 DIAGNOSIS — J45.20 MILD INTERMITTENT ASTHMA WITHOUT COMPLICATION: ICD-10-CM

## 2021-04-07 PROCEDURE — 99214 OFFICE O/P EST MOD 30 MIN: CPT | Performed by: FAMILY MEDICINE

## 2021-04-07 RX ORDER — MONTELUKAST SODIUM 10 MG/1
10 TABLET ORAL AT BEDTIME
Qty: 30 TABLET | Refills: 3 | Status: SHIPPED | OUTPATIENT
Start: 2021-04-07 | End: 2021-09-13

## 2021-04-07 RX ORDER — MONTELUKAST SODIUM 10 MG/1
TABLET ORAL
Start: 2021-04-07

## 2021-04-07 RX ORDER — LISINOPRIL AND HYDROCHLOROTHIAZIDE 20; 25 MG/1; MG/1
1.5 TABLET ORAL DAILY
Qty: 135 TABLET | Refills: 1 | Status: SHIPPED | OUTPATIENT
Start: 2021-04-07 | End: 2021-09-13

## 2021-04-07 ASSESSMENT — MIFFLIN-ST. JEOR: SCORE: 1595.8

## 2021-04-07 NOTE — PROGRESS NOTES
Assessment & Plan     Mild intermittent asthma without complication  We will see if montelukast helps improve the asthma symptoms which seem to be impacted by the allergies.  - montelukast (SINGULAIR) 10 MG tablet; Take 1 tablet (10 mg) by mouth At Bedtime    Seasonal allergic rhinitis due to pollen  As noted above continue Flonase and Zyrtec  - montelukast (SINGULAIR) 10 MG tablet; Take 1 tablet (10 mg) by mouth At Bedtime    Benign essential hypertension  I like her to try 1-1/2 tablets daily to see if this helps get blood pressures under a goal of 130/90.  She is young and I feel that this is achievable.  Certainly her pressures could be elevated by the Sudafed and the Benadryl that she is been on and we can have her keep an eye on this after the allergy season.  We will have her return for a blood pressure check in 4 weeks  - lisinopril-hydrochlorothiazide (ZESTORETIC) 20-25 MG tablet; Take 1.5 tablets by mouth daily      20 minutes spent on the date of the encounter doing chart review, history and exam, documentation and further activities per the note           Return in about 4 weeks (around 5/5/2021) for  , BP Recheck this can be a nurse only visit.    Vi Baldwin MD  [facility]    Juliette Trinidad is a 35 year old who presents for the following health issues     HPI     Hypertension Follow-up    She feels well.  Has been off the labetalol and is feeling much more energy.  Is now taking hydrochlorothiazide and lisinopril combination tablet and this is working well for her.  Blood pressure still is not quite at goal.    She also reports that she has been using Sudafed and Benadryl because of allergies and that this also has affected her asthma see notes below.      Do you check your blood pressure regularly outside of the clinic? Yes     Are you following a low salt diet? Yes    Are your blood pressures ever more than 140 on the top number (systolic) OR more   than 90  "on the bottom number (diastolic), for example 140/90? No      How many servings of fruits and vegetables do you eat daily?  2-3    On average, how many sweetened beverages do you drink each day (Examples: soda, juice, sweet tea, etc.  Do NOT count diet or artificially sweetened beverages)?   2 coffee and tea with sugar    How many days per week do you exercise enough to make your heart beat faster? 7    How many minutes a day do you exercise enough to make your heart beat faster? 60 or more    How many days per week do you miss taking your medication? 0    Seasonal allergies to pollen are definitely affecting her nose causing runny nose congestion and also some tightness in her chest.  She sometimes feels it hard to get a full deep breath in.  She is using albuterol intermittently for this at her asthma control test is scored 21.  She is also using Flonase and Zyrtec for allergy management.    She is no longer breast-feeding    Review of Systems   Constitutional, HEENT, cardiovascular, pulmonary, gi and gu systems are negative, except as otherwise noted.      Objective    BP (!) 135/91   Pulse 72   Temp 97.4  F (36.3  C) (Skin)   Resp 16   Ht 1.727 m (5' 8\")   Wt 85.7 kg (189 lb)   SpO2 98%   Breastfeeding No   BMI 28.74 kg/m    Body mass index is 28.74 kg/m .  Physical Exam   GENERAL: healthy, alert and no distress  CV: regular rate and rhythm, normal S1 S2, no S3 or S4, no murmur, click or rub, no peripheral edema and peripheral pulses strong                "

## 2021-04-08 ENCOUNTER — OFFICE VISIT (OUTPATIENT)
Dept: OPHTHALMOLOGY | Facility: CLINIC | Age: 36
End: 2021-04-08
Payer: COMMERCIAL

## 2021-04-08 DIAGNOSIS — H52.13 MYOPIA OF BOTH EYES: Primary | ICD-10-CM

## 2021-04-08 PROCEDURE — 92014 COMPRE OPH EXAM EST PT 1/>: CPT | Performed by: OPTOMETRIST

## 2021-04-08 PROCEDURE — 92015 DETERMINE REFRACTIVE STATE: CPT | Performed by: OPTOMETRIST

## 2021-04-08 ASSESSMENT — VISUAL ACUITY
CORRECTION_TYPE: GLASSES
OS_CC: 20/20
OD_CC: 20/20
OD_CC+: -2
OS_CC+: -1
METHOD: SNELLEN - LINEAR

## 2021-04-08 ASSESSMENT — REFRACTION_WEARINGRX
OS_AXIS: 058
OD_CYLINDER: +0.50
SPECS_TYPE: SVL
OS_SPHERE: -7.50
OD_AXIS: 116
OS_CYLINDER: +0.25
OD_SPHERE: -8.25

## 2021-04-08 ASSESSMENT — CUP TO DISC RATIO
OS_RATIO: 0.40
OD_RATIO: 0.45

## 2021-04-08 ASSESSMENT — REFRACTION_MANIFEST
OS_SPHERE: -8.00
OD_CYLINDER: +0.50
OS_CYLINDER: SPHERE
OD_SPHERE: -8.75
OD_AXIS: 100

## 2021-04-08 ASSESSMENT — REFRACTION
OS_CYLINDER: SPHERE
OD_SPHERE: -8.25
OD_CYLINDER: +0.50
OS_SPHERE: -7.75
OD_AXIS: 100

## 2021-04-08 ASSESSMENT — REFRACTION_CURRENTRX
OS_SPHERE: -7.00
OS_BASECURVE: 8.5
OD_SPHERE: -7.00
OD_BRAND: ACUVUE OASYS 1 DAY
OD_DIAMETER: 14.3
OS_DIAMETER: 14.3
OS_BRAND: ACUVUE OASYS 1 DAY
OD_BASECURVE: 8.5

## 2021-04-08 ASSESSMENT — CONF VISUAL FIELD
METHOD: COUNTING FINGERS
OS_NORMAL: 1
OD_NORMAL: 1

## 2021-04-08 ASSESSMENT — SLIT LAMP EXAM - LIDS
COMMENTS: NORMAL
COMMENTS: NORMAL

## 2021-04-08 ASSESSMENT — TONOMETRY
OD_IOP_MMHG: 19
IOP_METHOD: TONOPEN
OS_IOP_MMHG: 18

## 2021-04-08 ASSESSMENT — EXTERNAL EXAM - LEFT EYE: OS_EXAM: NORMAL

## 2021-04-08 ASSESSMENT — EXTERNAL EXAM - RIGHT EYE: OD_EXAM: NORMAL

## 2021-04-08 NOTE — NURSING NOTE
Chief Complaints and History of Present Illnesses   Patient presents with     Yearly Exam     Chief Complaint(s) and History of Present Illness(es)     Yearly Exam     Laterality: both eyes    Onset: years ago    Location: States va is the same since last visit      Associated symptoms: Negative for dryness, redness and tearing    Treatments tried: no treatments    Pain scale: 0/10              Comments       Jyotsna Díaz COT 9:33 AM April 8, 2021

## 2021-04-08 NOTE — PROGRESS NOTES
A/P  1.) Myopia each eye  -Largely stable spec Rx, updated today. Does not need significant change, avoid overminusing  -Doing well in current CL's - continue  -Dilated ocular health unremarkable each eye  -Pt interested in refractive surgery - reviewed LASIK vs PRK and factors in determining which is better. She may elect to get a consult at any time    Monitor 1-2 years routine, sooner prn    I have confirmed where necessary the patient's CC, HPI and reviewed Past Medical History, Past Surgical History, Social History, Family History, Problem List, Medication List and agree with Tech note.     Joseline Key, OD FAAO [facility]SL[facility]

## 2021-04-13 ASSESSMENT — ASTHMA QUESTIONNAIRES: ACT_TOTALSCORE: 21

## 2021-05-11 ENCOUNTER — OFFICE VISIT (OUTPATIENT)
Dept: DERMATOLOGY | Facility: CLINIC | Age: 36
End: 2021-05-11
Attending: FAMILY MEDICINE
Payer: COMMERCIAL

## 2021-05-11 VITALS — HEART RATE: 73 BPM | SYSTOLIC BLOOD PRESSURE: 131 MMHG | OXYGEN SATURATION: 98 % | DIASTOLIC BLOOD PRESSURE: 90 MMHG

## 2021-05-11 DIAGNOSIS — L81.0 POST-INFLAMMATORY HYPERPIGMENTATION: Primary | ICD-10-CM

## 2021-05-11 DIAGNOSIS — L84 CLAVUS: ICD-10-CM

## 2021-05-11 PROCEDURE — 99204 OFFICE O/P NEW MOD 45 MIN: CPT | Performed by: PHYSICIAN ASSISTANT

## 2021-05-11 RX ORDER — UREA 40 %
CREAM (GRAM) TOPICAL
Qty: 85 G | Refills: 3 | Status: SHIPPED | OUTPATIENT
Start: 2021-05-11 | End: 2022-04-29

## 2021-05-11 RX ORDER — HYDROQUINONE 40 MG/G
CREAM TOPICAL
Qty: 30 G | Refills: 5 | Status: SHIPPED | OUTPATIENT
Start: 2021-05-11 | End: 2022-03-18

## 2021-05-11 NOTE — PROGRESS NOTES
HPI:   Chief complaints: Vivi Mccall is a pleasant 35 year old female who presents for evaluation of a bump on the finger. The bump has been there for awhile. She has tried to treat the area with OTC wart treatment, but this did not help.   Additionally, she has dark spots on her cheeks from old picking/acne. They have been persistent. She is using a daily sunscreen but this has not helped.     Shx: she has a 16 mo old daughter. 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     PHYSICAL EXAM:    BP (!) 131/90   Pulse 73   SpO2 98%   Skin exam performed as follows: Type 5 skin. Mood appropriate  Alert and Oriented X 3. Well developed, well nourished in no distress.  General appearance: Normal  Head including face: Normal  Eyes: conjunctiva and lids: Normal  Mouth: Lips, teeth, gums: Normal  Neck: Normal  Chest-breast/axillae: Normal  Back: Normal  Spleen and liver: Normal  Cardiovascular: Exam of peripheral vascular system by observation for swelling, varicosities, edema: Normal  Genitalia: groin, buttocks: Normal  Extremities: digits/nails (clubbing): Normal  Eccrine and Apocrine glands: Normal  Right upper extremity: Normal  Left upper extremity: Normal  Right lower extremity: Normal  Left lower extremity: Normal  Skin: Scalp and body hair: See below    1. Hyperkeratotic papule on the right 2nd PIP joint  2. Dark macules on bilateral cheeks     ASSESSMENT/PLAN:     1. Clavus on the right 2nd PIP joint. Area pared with 15 blade to confirm clavus.   --Start urea daily and cover with a bandage. If urea is not covered can do OTC AmLactin, OTC Wart/corn treatment but advised to keep area covered to avoid trauma.   2. Post inflammatory hyperpigmentation on the face - she does wear SPF diligently.   --Start HQ 4% cream BID x 4-6 months then stop.           Follow-up: PRN  CC:   Scribed By: Monie Rossi, MS, PA-C

## 2021-05-11 NOTE — PATIENT INSTRUCTIONS
For the finger, I favor a corn (clavus)    Start apply urea cream daily and keep the area covered with a bandage at all times for the next 2 months     If urea is not covered, try OTC corn pad (17% sal acid), AmLactin cream or Gold Bond Rough and Bumpy     For the face, start using hydroquinone twice per day for 4-6 months, then stop.     Alternatively, you can try Cysteamine cream (get online) - apply to unwashed skin for 10-15 min once daily and wash off. It smells terrible.

## 2021-05-11 NOTE — LETTER
5/11/2021         RE: Vivi Mccall  [address]  [facility] [address]        Dear Colleague,    Thank you for referring your patient, Vivi Mccall, to the [facility]. Please see a copy of my visit note below.    HPI:   Chief complaints: Vivi Mccall is a pleasant 35 year old female who presents for evaluation of a bump on the finger. The bump has been there for awhile. She has tried to treat the area with OTC wart treatment, but this did not help.   Additionally, she has dark spots on her cheeks from old picking/acne. They have been persistent. She is using a daily sunscreen but this has not helped.     Shx: she has a 16 mo old daughter. 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     PHYSICAL EXAM:    BP (!) 131/90   Pulse 73   SpO2 98%   Skin exam performed as follows: Type 5 skin. Mood appropriate  Alert and Oriented X 3. Well developed, well nourished in no distress.  General appearance: Normal  Head including face: Normal  Eyes: conjunctiva and lids: Normal  Mouth: Lips, teeth, gums: Normal  Neck: Normal  Chest-breast/axillae: Normal  Back: Normal  Spleen and liver: Normal  Cardiovascular: Exam of peripheral vascular system by observation for swelling, varicosities, edema: Normal  Genitalia: groin, buttocks: Normal  Extremities: digits/nails (clubbing): Normal  Eccrine and Apocrine glands: Normal  Right upper extremity: Normal  Left upper extremity: Normal  Right lower extremity: Normal  Left lower extremity: Normal  Skin: Scalp and body hair: See below    1. Hyperkeratotic papule on the right 2nd PIP joint  2. Dark macules on bilateral cheeks     ASSESSMENT/PLAN:     1. Clavus on the right 2nd PIP joint. Area pared with 15 blade to confirm clavus.   --Start urea daily and cover with a bandage. If urea is not covered can do OTC AmLactin, OTC Wart/corn treatment but advised to keep area covered  to avoid trauma.   2. Post inflammatory hyperpigmentation on the face - she does wear SPF diligently.   --Start HQ 4% cream BID x 4-6 months then stop.           Follow-up: PRN  CC:   Scribed By: Monie Rossi, MS, PAAngelinaC          Again, thank you for allowing me to participate in the care of your patient.        Sincerely,        Monie Rossi PA-C

## 2021-08-03 ENCOUNTER — MYC MEDICAL ADVICE (OUTPATIENT)
Dept: FAMILY MEDICINE | Facility: CLINIC | Age: 36
End: 2021-08-03

## 2021-08-10 ENCOUNTER — OFFICE VISIT (OUTPATIENT)
Dept: OPTOMETRY | Facility: CLINIC | Age: 36
End: 2021-08-10
Payer: COMMERCIAL

## 2021-08-10 DIAGNOSIS — H43.812 PVD (POSTERIOR VITREOUS DETACHMENT), LEFT: Primary | ICD-10-CM

## 2021-08-10 PROCEDURE — 99213 OFFICE O/P EST LOW 20 MIN: CPT | Performed by: OPTOMETRIST

## 2021-08-10 ASSESSMENT — REFRACTION_WEARINGRX
OS_SPHERE: -7.50
OD_SPHERE: -8.25
SPECS_TYPE: SVL
OS_CYLINDER: +0.25
OD_AXIS: 116
OS_AXIS: 058
OD_CYLINDER: +0.50

## 2021-08-10 ASSESSMENT — VISUAL ACUITY
OS_CC: 20/20-2
OD_SC: 20/40
OS_CC: 20/25
OS_SC: 20/30-2
CORRECTION_TYPE: GLASSES
OD_CC: 20/20-1
OD_SC: CF @ 3'
OS_CC+: -2
OD_CC: 20/20
OD_CC+: -1
OS_SC: CF @ 3'
METHOD: SNELLEN - LINEAR

## 2021-08-10 ASSESSMENT — CONF VISUAL FIELD
OD_NORMAL: 1
METHOD: COUNTING FINGERS
OS_NORMAL: 1

## 2021-08-10 ASSESSMENT — TONOMETRY
OS_IOP_MMHG: 15
OD_IOP_MMHG: 17
IOP_METHOD: APPLANATION

## 2021-08-10 ASSESSMENT — CUP TO DISC RATIO
OS_RATIO: 0.40
OD_RATIO: 0.45

## 2021-08-10 ASSESSMENT — EXTERNAL EXAM - LEFT EYE: OS_EXAM: NORMAL

## 2021-08-10 ASSESSMENT — SLIT LAMP EXAM - LIDS
COMMENTS: NORMAL
COMMENTS: NORMAL

## 2021-08-10 ASSESSMENT — EXTERNAL EXAM - RIGHT EYE: OD_EXAM: NORMAL

## 2021-08-10 NOTE — PROGRESS NOTES
"Chief Complaint   Patient presents with     Cloudy Vision Left Eye     For 1.5 weeks, she has been noticing a \"film\" in the shape of a crescent in her temporal periphery of the left eye. She woke up with it and it has been stable since. It is there constantly. Moderate discomfort. No loss of vision. No flashes. Does not seem to move in her vision; does at times seem to extend more centrally in her vision but then it will regress.     Symptoms seem to be worse when she has her contacts in. Notices the \"film\" more and the lens seems to be uncomfortable, like the contact lens is folded.     Used TheraTears for one day which did not help.     (-)Warm compress, (-)Cold compress      Do you wear contact lenses? Yes - Acuvue Oasys 1-Day      Ania Pro     See Review Of Systems   Eyes: \"crescent film\" in peripheral vision left eye  Constitutional: No fevers, chills, or weight changes.    Medical, surgical and family histories reviewed and updated 8/10/2021.         OBJECTIVE: See Ophthalmology exam    ASSESSMENT:    ICD-10-CM    1. PVD (posterior vitreous detachment), left  H43.812       PLAN:    Patient Instructions   You have a PVD- posterior vitreous detachment which is due to the gel of the eye shrinking and clumping together.  This can sometimes cause holes or tears in the retina.  The signs of a retinal detachment are flashes of light or a \"curtain veil\" coming over your vision. If you notice any of these changes return to clinic for re-evaluation.     Return for follow-up in 4 weeks, or sooner if needed.  Scheduled for 9/15/21 @ 2:30pm.       Alfred Clarke OD  [facility]  [address] [place] [address]. [place]  [address]    [phone]       "

## 2021-08-10 NOTE — PATIENT INSTRUCTIONS
"You have a PVD- posterior vitreous detachment which is due to the gel of the eye shrinking and clumping together.  This can sometimes cause holes or tears in the retina.  The signs of a retinal detachment are flashes of light or a \"curtain veil\" coming over your vision. If you notice any of these changes return to clinic for re-evaluation.     Return for follow-up in 4 weeks, or sooner if needed.  Scheduled for 9/15/21 @ 2:30pm.       Alfred Clarke OD  [address]  [place]  [address]    [phone]    "

## 2021-08-10 NOTE — LETTER
"    8/10/2021         RE: Vivi Mccall  [address] [facility] [address]        Dear Colleague,    Thank you for referring your patient, Vivi Mccall, to the [facility]. Please see a copy of my visit note below.    Chief Complaint   Patient presents with     Cloudy Vision Left Eye     For 1.5 weeks, she has been noticing a \"film\" in the shape of a crescent in her temporal periphery of the left eye. She woke up with it and it has been stable since. It is there constantly. Moderate discomfort. No loss of vision. No flashes. Does not seem to move in her vision; does at times seem to extend more centrally in her vision but then it will regress.     Symptoms seem to be worse when she has her contacts in. Notices the \"film\" more and the lens seems to be uncomfortable, like the contact lens is folded.     Used TheraTears for one day which did not help.     (-)Warm compress, (-)Cold compress      Do you wear contact lenses? Yes - Acuvue Oasys 1-Day      Ania Pro     See Review Of Systems   Eyes: \"crescent film\" in peripheral vision left eye  Constitutional: No fevers, chills, or weight changes.    Medical, surgical and family histories reviewed and updated 8/10/2021.         OBJECTIVE: See Ophthalmology exam    ASSESSMENT:    ICD-10-CM    1. PVD (posterior vitreous detachment), left  H43.812       PLAN:    Patient Instructions   You have a PVD- posterior vitreous detachment which is due to the gel of the eye shrinking and clumping together.  This can sometimes cause holes or tears in the retina.  The signs of a retinal detachment are flashes of light or a \"curtain veil\" coming over your vision. If you notice any of these changes return to clinic for re-evaluation.     Return for follow-up in 4 weeks, or sooner if needed.  Scheduled for 9/15/21 @ 2:30pm.       Alfred Clarke, BARBER  [facility]  [address] [facility] [address] [facility] [address] [place]  [address]    [phone]     "       Again, thank you for allowing me to participate in the care of your patient.        Sincerely,        Alfred Clarke OD

## 2021-08-30 ENCOUNTER — OFFICE VISIT (OUTPATIENT)
Dept: URGENT CARE | Facility: URGENT CARE | Age: 36
End: 2021-08-30
Payer: COMMERCIAL

## 2021-08-30 ENCOUNTER — MYC MEDICAL ADVICE (OUTPATIENT)
Dept: FAMILY MEDICINE | Facility: CLINIC | Age: 36
End: 2021-08-30

## 2021-08-30 VITALS
OXYGEN SATURATION: 100 % | DIASTOLIC BLOOD PRESSURE: 96 MMHG | BODY MASS INDEX: 28.74 KG/M2 | TEMPERATURE: 98.3 F | HEART RATE: 87 BPM | SYSTOLIC BLOOD PRESSURE: 157 MMHG | WEIGHT: 189 LBS

## 2021-08-30 DIAGNOSIS — J01.00 ACUTE NON-RECURRENT MAXILLARY SINUSITIS: Primary | ICD-10-CM

## 2021-08-30 PROCEDURE — 99213 OFFICE O/P EST LOW 20 MIN: CPT | Performed by: PHYSICIAN ASSISTANT

## 2021-08-30 RX ORDER — SULFAMETHOXAZOLE/TRIMETHOPRIM 800-160 MG
1 TABLET ORAL 2 TIMES DAILY
Qty: 14 TABLET | Refills: 0 | Status: SHIPPED | OUTPATIENT
Start: 2021-08-30 | End: 2021-09-03 | Stop reason: ALTCHOICE

## 2021-08-30 RX ORDER — PREDNISONE 20 MG/1
40 TABLET ORAL DAILY
Qty: 10 TABLET | Refills: 0 | Status: SHIPPED | OUTPATIENT
Start: 2021-08-30 | End: 2021-09-04

## 2021-08-31 NOTE — PROGRESS NOTES
Assessment & Plan     Acute non-recurrent maxillary sinusitis  Not improving with OTC. Start on antibiotic and prednisone. Chose Bactrim due to history of c. Diff.  - predniSONE (DELTASONE) 20 MG tablet; Take 2 tablets (40 mg) by mouth daily for 5 days  - sulfamethoxazole-trimethoprim (BACTRIM DS) 800-160 MG tablet; Take 1 tablet by mouth 2 times daily for 7 days    Prescription drug management    Leah Leahy PA-C  [facility] ROBERT Trinidad is a 36 year old who presents for the following health issues    HPI     Acute Illness  Acute illness concerns: Sinus pain and pressure  Onset/Duration: 2 weeks  Symptoms:  Headache (location?): YES  Sinus Pressure: YES  Conjunctivitis:  YES- eyes swollen  Ear Pain: no  Rhinorrhea: YES  Congestion: YES  Sore Throat: no  Cough: YES  Vomiting: no- post tussive  Diarrhea: no  Sick/Strep Exposure: YES- daughter recently sick with RSV  Therapies tried and outcome: Sinus rinses, Tylenol severe cold and flu, Sudafed, cough syrup, humidifeyr  COVID testing negative.    Review of Systems   GENERAL:  No fevers        Objective    BP (!) 157/96 (BP Location: Left arm, Patient Position: Sitting, Cuff Size: Adult Regular)   Pulse 87   Temp 98.3  F (36.8  C) (Tympanic)   Wt 85.7 kg (189 lb)   SpO2 100%   Breastfeeding No   BMI 28.74 kg/m    Body mass index is 28.74 kg/m .  Physical Exam   GENERAL: No acute distress  HEENT: Normocephalic, PERRL, Canals patent, bilateral TM's non-erythematous and non-bulging. Turbinates normal in appearance bilaterally. Posterior oropharynx non-erythematous and without exudate. Tenderness over maxillary sinuses.  NECK: No cervical or supraclavicular lymphadenopathy.  NEURO: Alert and non-focal

## 2021-09-01 ENCOUNTER — OFFICE VISIT (OUTPATIENT)
Dept: URGENT CARE | Facility: URGENT CARE | Age: 36
End: 2021-09-01
Payer: COMMERCIAL

## 2021-09-01 VITALS
BODY MASS INDEX: 28.74 KG/M2 | SYSTOLIC BLOOD PRESSURE: 132 MMHG | DIASTOLIC BLOOD PRESSURE: 93 MMHG | OXYGEN SATURATION: 98 % | HEART RATE: 96 BPM | TEMPERATURE: 99.4 F | WEIGHT: 189 LBS

## 2021-09-01 DIAGNOSIS — R11.11 VOMITING WITHOUT NAUSEA, INTRACTABILITY OF VOMITING NOT SPECIFIED, UNSPECIFIED VOMITING TYPE: Primary | ICD-10-CM

## 2021-09-01 PROCEDURE — 99213 OFFICE O/P EST LOW 20 MIN: CPT

## 2021-09-01 RX ORDER — ONDANSETRON 4 MG/1
4 TABLET, ORALLY DISINTEGRATING ORAL EVERY 8 HOURS PRN
Qty: 20 TABLET | Refills: 0 | Status: SHIPPED | OUTPATIENT
Start: 2021-09-01 | End: 2021-09-11

## 2021-09-03 ENCOUNTER — TELEPHONE (OUTPATIENT)
Dept: FAMILY MEDICINE | Facility: CLINIC | Age: 36
End: 2021-09-03

## 2021-09-03 ENCOUNTER — HOSPITAL ENCOUNTER (EMERGENCY)
Facility: CLINIC | Age: 36
Discharge: HOME | End: 2021-09-03
Attending: PHYSICIAN ASSISTANT | Admitting: PHYSICIAN ASSISTANT
Payer: COMMERCIAL

## 2021-09-03 VITALS
HEART RATE: 76 BPM | WEIGHT: 198 LBS | TEMPERATURE: 98.8 F | BODY MASS INDEX: 30.01 KG/M2 | RESPIRATION RATE: 18 BRPM | DIASTOLIC BLOOD PRESSURE: 90 MMHG | HEIGHT: 68 IN | OXYGEN SATURATION: 100 % | SYSTOLIC BLOOD PRESSURE: 138 MMHG

## 2021-09-03 DIAGNOSIS — R11.10 VOMITING: ICD-10-CM

## 2021-09-03 DIAGNOSIS — J01.90 ACUTE SINUS INFECTION: ICD-10-CM

## 2021-09-03 LAB
ALBUMIN SERPL-MCNC: 3.3 G/DL (ref 3.4–5)
ALP SERPL-CCNC: 62 U/L (ref 40–150)
ALT SERPL W P-5'-P-CCNC: 22 U/L (ref 0–50)
ANION GAP SERPL CALCULATED.3IONS-SCNC: 1 MMOL/L (ref 3–14)
AST SERPL W P-5'-P-CCNC: 14 U/L (ref 0–45)
B-HCG FREE SERPL-ACNC: <5 IU/L (ref 0–5)
BASOPHILS # BLD AUTO: 0.1 10E3/UL (ref 0–0.2)
BASOPHILS NFR BLD AUTO: 2 %
BILIRUB SERPL-MCNC: 0.6 MG/DL (ref 0.2–1.3)
BUN SERPL-MCNC: 11 MG/DL (ref 7–30)
CALCIUM SERPL-MCNC: 8.5 MG/DL (ref 8.5–10.1)
CHLORIDE BLD-SCNC: 104 MMOL/L (ref 94–109)
CO2 SERPL-SCNC: 33 MMOL/L (ref 20–32)
CREAT SERPL-MCNC: 0.95 MG/DL (ref 0.52–1.04)
EOSINOPHIL # BLD AUTO: 0.1 10E3/UL (ref 0–0.7)
EOSINOPHIL NFR BLD AUTO: 2 %
ERYTHROCYTE [DISTWIDTH] IN BLOOD BY AUTOMATED COUNT: 12.3 % (ref 10–15)
GFR SERPL CREATININE-BSD FRML MDRD: 77 ML/MIN/1.73M2
GLUCOSE BLD-MCNC: 76 MG/DL (ref 70–99)
HCT VFR BLD AUTO: 38.6 % (ref 35–47)
HGB BLD-MCNC: 12.6 G/DL (ref 11.7–15.7)
IMM GRANULOCYTES # BLD: 0 10E3/UL
IMM GRANULOCYTES NFR BLD: 0 %
LIPASE SERPL-CCNC: 59 U/L (ref 73–393)
LYMPHOCYTES # BLD AUTO: 2.2 10E3/UL (ref 0.8–5.3)
LYMPHOCYTES NFR BLD AUTO: 37 %
MCH RBC QN AUTO: 30.6 PG (ref 26.5–33)
MCHC RBC AUTO-ENTMCNC: 32.6 G/DL (ref 31.5–36.5)
MCV RBC AUTO: 94 FL (ref 78–100)
MONOCYTES # BLD AUTO: 0.5 10E3/UL (ref 0–1.3)
MONOCYTES NFR BLD AUTO: 9 %
NEUTROPHILS # BLD AUTO: 3 10E3/UL (ref 1.6–8.3)
NEUTROPHILS NFR BLD AUTO: 50 %
NRBC # BLD AUTO: 0 10E3/UL
NRBC BLD AUTO-RTO: 0 /100
PLATELET # BLD AUTO: 484 10E3/UL (ref 150–450)
POTASSIUM BLD-SCNC: 3.7 MMOL/L (ref 3.4–5.3)
PROT SERPL-MCNC: 8 G/DL (ref 6.8–8.8)
RBC # BLD AUTO: 4.12 10E6/UL (ref 3.8–5.2)
SODIUM SERPL-SCNC: 138 MMOL/L (ref 133–144)
WBC # BLD AUTO: 6 10E3/UL (ref 4–11)

## 2021-09-03 PROCEDURE — 96361 HYDRATE IV INFUSION ADD-ON: CPT

## 2021-09-03 PROCEDURE — 83690 ASSAY OF LIPASE: CPT | Performed by: PHYSICIAN ASSISTANT

## 2021-09-03 PROCEDURE — 80053 COMPREHEN METABOLIC PANEL: CPT | Performed by: PHYSICIAN ASSISTANT

## 2021-09-03 PROCEDURE — 36415 COLL VENOUS BLD VENIPUNCTURE: CPT | Performed by: PHYSICIAN ASSISTANT

## 2021-09-03 PROCEDURE — 250N000011 HC RX IP 250 OP 636: Performed by: PHYSICIAN ASSISTANT

## 2021-09-03 PROCEDURE — 84702 CHORIONIC GONADOTROPIN TEST: CPT

## 2021-09-03 PROCEDURE — 96374 THER/PROPH/DIAG INJ IV PUSH: CPT

## 2021-09-03 PROCEDURE — 85025 COMPLETE CBC W/AUTO DIFF WBC: CPT | Performed by: PHYSICIAN ASSISTANT

## 2021-09-03 PROCEDURE — 258N000003 HC RX IP 258 OP 636: Performed by: PHYSICIAN ASSISTANT

## 2021-09-03 PROCEDURE — 99284 EMERGENCY DEPT VISIT MOD MDM: CPT | Mod: 25

## 2021-09-03 RX ORDER — DOXYCYCLINE 100 MG/1
100 CAPSULE ORAL 2 TIMES DAILY
Qty: 14 CAPSULE | Refills: 0 | Status: SHIPPED | OUTPATIENT
Start: 2021-09-03 | End: 2021-09-10

## 2021-09-03 RX ORDER — ONDANSETRON 2 MG/ML
4 INJECTION INTRAMUSCULAR; INTRAVENOUS ONCE
Status: COMPLETED | OUTPATIENT
Start: 2021-09-03 | End: 2021-09-03

## 2021-09-03 RX ADMIN — ONDANSETRON 4 MG: 2 INJECTION INTRAMUSCULAR; INTRAVENOUS at 11:46

## 2021-09-03 RX ADMIN — SODIUM CHLORIDE 1000 ML: 9 INJECTION, SOLUTION INTRAVENOUS at 11:37

## 2021-09-03 ASSESSMENT — MIFFLIN-ST. JEOR: SCORE: 1631.62

## 2021-09-03 ASSESSMENT — ENCOUNTER SYMPTOMS
VOMITING: 1
COUGH: 1
FACIAL SWELLING: 1
FEVER: 0
RHINORRHEA: 1
NAUSEA: 1
SINUS PRESSURE: 1
DIARRHEA: 0

## 2021-09-03 NOTE — ED PROVIDER NOTES
History   Chief Complaint:  Vomiting       HPI   Vivi Mccall is a 36 year old female who presents with vomiting. The patient has been experiencing vomiting for about 3 days. She also reports decreased urine and difficulty keeping food or water down.  The patient has tried Zofran with no relief due to vomiting it back up. The patient denies fever, bloody emesis, dysuria, hematuria, flank pain, diarrhea, or abdominal pain.  Patient was recently diagnosed with a sinus infection with facial pain, swelling, rhinorrhea, sneezing, congestion and pressure.  She was seen in urgent care and treated with Bactrim and prednisone which she notes has improved those symptoms significantly however she has had issues with vomiting since.  She took 2 days of the Bactrim and prednisone.  No vision changes, neck stiffness, or pain with eye movements.  She is not diabetic or immune compromise.    Review of Systems   Constitutional: Negative for fever.   HENT: Positive for congestion, facial swelling, rhinorrhea, sinus pressure and sneezing.    Respiratory: Positive for cough.    Gastrointestinal: Positive for nausea and vomiting. Negative for diarrhea.   Genitourinary: Positive for decreased urine volume.   All other systems reviewed and are negative.      Allergies:  Amoxicillin  Codeine  Penicillins    Medications:  Albuterol  Celexa  Levonorgestrel  Zestoretic  Lorazepam  Singulair  Zofran  Prednisone  Bactrim  Valtrex    Past Medical History:    Asthma  C. Difficile diarrhea  Depressive disorder  ROCHELLE  Hypertension  IUD placement  Vitamin D deficiency     Past Surgical History:    Gyn surgery  Foot/toe nerve release  Left ganglion cyst removal  Nerve release on left leg/ankle  Soft tissue surgery     Family History:    Diabetes  Bipolar Disorder  Hypertension  CAD  Depression  Anxiety disorder  Mental illness    Social History:  Arrives to the emergency department with her       Physical Exam     Patient Vitals for  "the past 24 hrs:   BP Temp Temp src Pulse Resp SpO2 Height Weight   09/03/21 1432 (!) 138/90 -- -- 76 18 100 % -- --   09/03/21 1400 (!) 138/90 -- -- 76 -- -- -- --   09/03/21 1330 131/88 -- -- 76 -- -- -- --   09/03/21 1300 131/88 -- -- 68 -- -- -- --   09/03/21 1230 133/89 -- -- 66 -- -- -- --   09/03/21 1200 136/86 -- -- 67 -- -- -- --   09/03/21 0929 (!) 132/90 98.8  F (37.1  C) Oral 75 16 100 % 1.727 m (5' 8\") 89.8 kg (198 lb)       Physical Exam  General: Awake, alert, pleasant, non-toxic.  Head:  Scalp is NC/AT  Eyes:  Conjunctiva normal, PERRL  ENT:  The external nose and ears are normal.     The oropharynx is normal and without erythema or tonsillar swelling. Uvula midline, no submandibular swelling, sublingual swelling, trismus.  TM's normal BL, no mastoid swelling or tenderness.  External canals normal, no tragal ttp. Pinna and helical structures normal.  Neck:  Normal range of motion without rigidity.  CV:  Regular rate and rhythm    No pathologic murmur, rubs, or gallops.  Resp:  Breath sounds are clear bilaterally.  No crackles, wheezes, rhonchi, stridor.    Non-labored, no retractions or accessory muscle use  Abdomen: Abdomen is soft, no distension, no tenderness, no masses. No CVA tenderness.  MS:  No lower extremity edema or asymmetric calf swelling. Normal ROM in all joints without effusions.    No midline cervical, thoracic, or lumbar tenderness  Skin:  Warm and dry, No rash or lesions noted. 2+ peripheral pulses in all extremities  Neuro: Alert and oriented x3.  5/5 strength BL in UE and LE, normal sensation to touch.  Cranial nerves 2-12 intact.  Psych: Awake. Alert. Normal affect. Appropriate interactions.      Emergency Department Course     Laboratory:  CBC: WBC 6.0, HGB 12.6,  (H)     CMP: CO2 33 (H), Anion gap 1 (L), Albumin 3.3 (L) o/w WNL (Creatinine 0.95)     Lipase: 59 (L)    iStat HCG Quantitative Preganancy, POCT: < 5.0    Emergency Department Course:    Reviewed:  I reviewed " nursing notes, vitals and past medical history    Assessments:  1049 I obtained history and examined the patient as noted above.   1411 I rechecked the patient. She felt better and passed the PO challenge.    Interventions:  1137 NS bolus 1L IV  1146 Zofran 4mg IV    Disposition:  The patient was discharged to home.     Impression & Plan     CMS Diagnoses: None    Medical Decision Making:  Vivi Mccall is a 36 year old female who presents with vomiting. She has a benign abdominal exam without focal RLQ or LLQ tenderness to suggest diverticulitis or appendicitis, respectively, or other acute abdominal process. I did discuss the sometimes vague initial constellation of symptoms early in the course of acute abdominal processes, and the need for immediate return should pain or other concerning symptoms develop.  Symptoms are improved after IV fluids and symptomatic treatment. The patient is not pregnant. There is no evidence of urinary tract infection. there has been no hematemesis or BRBPR/melena.  Vital signs have remained stable throughout the ED course, and the abdominal re-exam remains benign. No symptoms to suggest referred pain from chest or atypical ACS.     Patient was recently treated for a sinus infection with Bactrim and prednisone though notes her symptoms have improved significantly.  There is no evidence of increased intracranial pressure, cavernous sinus thrombosis, orbital cellulitis, meningitis, abscess, osteomyelitis, or other complication of sinusitis.  Bactrim is somewhat of an odd choice for a sinus infection and given that she only completed 2 days of therapy I think it is reasonable to prescribe doxycycline.  She will return if fever, severe headache, vision changes, return of facial swelling, abdominal pain, intractable vomiting, bloody vomit or other new or worsening symptoms.    Diagnosis:    ICD-10-CM    1. Vomiting  R11.10    2. Acute sinus infection  J01.90        Discharge  Medications:  Discharge Medication List as of 9/3/2021  2:26 PM      START taking these medications    Details   doxycycline hyclate (VIBRAMYCIN) 100 MG capsule Take 1 capsule (100 mg) by mouth 2 times daily for 7 days, Disp-14 capsule, R-0, E-Prescribe             Scribe Disclosure:  HUBERT, Sumit Cervantes, am serving as a scribe at 10:47 AM on 9/3/2021 to document services personally performed by Saeid Hughes PA-C based on my observations and the provider's statements to me.           Saeid Hughes PA-C  09/03/21 1742

## 2021-09-03 NOTE — TELEPHONE ENCOUNTER
Reason for call:  Patient reporting a symptom    Symptom or request: symptoms    Duration (how long have symptoms been present): since Tuesday night    Have you been treated for this before? Yes    Additional comments: basically this all start from a sinus infection    Phone Number patient can be reached at:  Home number on file [phone] (home)    Best Time:      Can we leave a detailed message on this number:  YES    Call taken on 9/3/2021 at 7:39 AM by Christelle Lopez

## 2021-09-03 NOTE — TELEPHONE ENCOUNTER
Pt calling has been sick since 8/13.  Was rxd axb and steroid on 8/30 from  for sinus infections per pt.  Hx of C-diff per pt.  Had vomiting and nausea after 8/30 visit.  Went back to  9/1 and was told to hold meds for a day for stomach rest.  Did that and symptoms continuing.  Not able to keep anything down and has been on Zofran since Wednesday.  She says she is dehydrated.    Advised ER now.  Pt agreeable.    Encouraged calling if any questions/concerns and she is aware of nurse line during after hours.    Scheduled her in for OV 9/30- soonest OV available. She will let us know if she needs to be seen sooner too after ER visit.    Catia Lundy RN

## 2021-09-03 NOTE — DISCHARGE INSTRUCTIONS
Discharge Instructions  Vomiting    You have been seen today for vomiting (throwing up). This is usually caused by a virus, but some bacteria, parasites, medicines or other medical conditions can cause similar symptoms. At this time your provider does not find that your vomiting is a sign of anything dangerous or life-threatening. However, sometimes the signs of serious illness do not show up right away. If you have new or worse symptoms, you may need to be seen again in the Emergency Department or by your primary provider. Remember that serious problems like appendicitis can start as vomiting.    Generally, every Emergency Department visit should have a follow-up clinic visit with either a primary or a specialty clinic/provider. Please follow-up as instructed by your emergency provider today.    Return to the Emergency Department if:  You keep vomiting and you are not able to keep liquids down.   You feel you are getting dehydrated, such as being very thirsty, not urinating (peeing) at least every 8-12 hours, or feeling faint or lightheaded.   You develop a new fever, or your fever continues for more than 2 days.   You have abdominal (belly pain) that seems worse than cramps, is in one spot, or is getting worse over time. Appendicitis usually causes pain in the right lower abdomen (to the right and below your belly button) so watch for pain in this location.  You have blood in your vomit or stools.   You feel very weak.  You are not starting to improve within 24 hours of your visit here.     What can I do to help myself?  The most important thing to do is to drink clear liquids. If you have been vomiting a lot, it is best to have only small, frequent sips of liquids. Drinking too much at once may cause more vomiting. If you are vomiting often, you must replace minerals, sodium and potassium lost with your illness. Pedialyte  is the best available rehydration liquid but some find that it doesn t taste good so sports  drinks are an alterative. You can also drink clear liquids such as water, weak tea, apple juice, and 7-Up . Avoid acid liquids (orange), caffeine (coffee) or alcohol. Do not drink milk until you no longer have diarrhea (loose stools).   After liquids are staying down, you may start eating mild foods. Soda crackers, toast, plain noodles, gelatin, applesauce and bananas are good first choices. Avoid foods that have acid, are spicy, fatty or have a lot of fiber (such as meats, coarse grains, vegetables). You may start eating these foods again in about 3 days when you are better.   Sometimes treatment includes prescription medicine to prevent nausea (sick to your stomach) and vomiting. If your provider prescribes these for you, take them as directed.   Do not take ibuprofen, naproxen, or other nonsteroidal anti-inflammatory (NSAID) medicines without checking with your healthcare provider.     If you were given a prescription for medicine here today, be sure to read all of the information (including the package insert) that comes with your prescription.  This will include important information about the medicine, its side effects, and any warnings that you need to know about.  The pharmacist who fills the prescription can provide more information and answer questions you may have about the medicine.  If you have questions or concerns that the pharmacist cannot address, please call or return to the Emergency Department.     Remember that you can always come back to the Emergency Department if you are not able to see your regular provider in the amount of time listed above, if you get any new symptoms, or if there is anything that worries you.  Discharge Instructions  Sinus Infection    You have acute sinusitis, or an infection of the sinuses. The sinuses are the hollow areas within the facial bones that are connected to the nasal opening. The most common cause of acute sinusitis is a virus infection associated with the  common cold. Bacterial sinusitis occurs much less commonly, usually as a complication of viral sinusitis. Experts say that most sinusitis is caused by a virus within the first 7-10 days of illness. Antibiotics do nothing to help with virus infections, so most people do not need antibiotics for acute sinusitis.     Generally, every Emergency Department visit should have a follow-up clinic visit with either a primary or a specialty clinic/provider. Please follow-up as instructed by your emergency provider today.    Return to the Emergency Department if:  Your vision changes.  You are confused or have difficulty thinking clearly.  You have swelling around your eye.  You develop a severe headache or neck stiffness.  Your symptoms get worse and you are unable to see your primary provider.      Treatment:  Pain relief -- Non-prescription pain medications, such as Tylenol  (acetaminophen) or Motrin  or Advil  (ibuprofen) are recommended for pain.  Do not use a medicine that you are allergic to, or if your provider has told you not to use it.     Nasal irrigation -- Flushing the nose and sinuses with a saline solution several times per day can help to decrease pain caused by congestion.  Nasal decongestants -- Nasal decongestant sprays, including Afrin  (oxymetazoline) and Jose-Synephrine  (phenylephrine) can be used to temporarily treat congestion. However, these sprays should not be used for more than two to three days due to the risk of rebound congestion (when the nose is congested constantly unless the medication is used repeatedly).  Nasal glucocorticoids -- These are prescription steroids delivered by a nasal spray that can help to reduce swelling inside the nose, usually within two to three days. These drugs have few side effects and dramatically relieve symptoms in most people.  If you use these in conjunction with Afrin  you will need to use at least 15 minutes prior to the nasal decongestant.    Antibiotic? --  Rarely antibiotics are used along with the above treatments.    If you were given a prescription for medicine here today, be sure to read all of the information (including the package insert) that comes with your prescription.  This will include important information about the medicine, its side effects, and any warnings that you need to know about.  The pharmacist who fills the prescription can provide more information and answer questions you may have about the medicine.  If you have questions or concerns that the pharmacist cannot address, please call or return to the Emergency Department.   Remember that you can always come back to the Emergency Department if you are not able to see your regular provider in the amount of time listed above, if you get any new symptoms, or if there is anything that worries you.

## 2021-09-03 NOTE — TELEPHONE ENCOUNTER
Pt says she has been not feeling well since 8/13.    Currently has nausea and vomiting.  Was rxd steroid and axb at  8/30 for sinus infection per pt..  Has Hx of c-diff per pt.  Had nausea and vomiting- went back to  9/1 and was told to hold meds for a day to rest stomach.  She did that and symotms continuing.  Not able to keep down any thing.  She says she is dehydrated.  Taking Zofran since wednesday.  Advised ER now. Pt agreeable.    She also scheduled OV in first available for f/u 9/30.    She will call after ER if she needs a f/u sooner, VVs are available. encouraged calling if any questions/concerns also and let her know nurse line available after hrs also.    Catia Lundy RN

## 2021-09-03 NOTE — ED TRIAGE NOTES
Pt has been to UC a couple times last week for sinus infection and vomiting that she relates to the treatment. Pt comes in today stating she feels dehydrated and is continuing to vomit.

## 2021-09-08 ENCOUNTER — TELEPHONE (OUTPATIENT)
Dept: FAMILY MEDICINE | Facility: CLINIC | Age: 36
End: 2021-09-08

## 2021-09-08 NOTE — TELEPHONE ENCOUNTER
Reason for Call:  Other appointment    Detailed comments: Pt states per ED needs to be seen by Dr. Lebron in the next 2-3 days. She was seen in the [facility] ED on 9/3/2021 for acute sinus issues and vomiting. She states her symptoms have plateau. She does have an upcoming appt with Dr. Lebron on 9/30/2021 at 11:45AM.     Phone Number Patient can be reached at: Home number on file [phone] (home)    Best Time: anytime     Can we leave a detailed message on this number? YES    Call taken on 9/8/2021 at 8:28 AM by Ayala Oconnor

## 2021-09-09 NOTE — TELEPHONE ENCOUNTER
Sinus symptoms have improved since finishing doxycycline, still having facial pressure and some post nasal drip.     Did mention is crying a lot d/t pt's grandfather passing away. So difficult to determine which are truly sinus symptoms.    abdominal pain and nausea have resolved    States adenoids are swollen with some throbbing pain.    Informed pt it is reassuring symptoms have improved and that abx will continue to work even after completion.   Scheduled with SN on Monday 9/13 for f/u.    Call back to clinic if fever, worsening of symptoms or other concerns.    Pt agrees with plan.    Oralia Lopez RN

## 2021-09-10 NOTE — PROGRESS NOTES
Assessment & Plan     Moderate persistent asthma with acute exacerbation  Discussed starting a controller inhaler and likely may need this until the fall seasonal allergens are gone  - fluticasone (FLOVENT HFA) 220 MCG/ACT inhaler; Inhale 1 puff into the lungs 2 times daily    Benign essential hypertension  She has been taking 2 tablets daily of lisinopril hydrochlorothiazide and this does seem to be working well we discussed potential risk for electrolyte disorders and will follow these closely but if she is tolerating this well we will keep her on this dose  - lisinopril-hydrochlorothiazide (ZESTORETIC) 20-25 MG tablet; Take 2 tablets by mouth daily  - Comprehensive metabolic panel (BMP + Alb, Alk Phos, ALT, AST, Total. Bili, TP); Future  - Comprehensive metabolic panel (BMP + Alb, Alk Phos, ALT, AST, Total. Bili, TP)    Major depressive disorder, recurrent episode, moderate (H)  Discussed Celexa for mood and 40 mg might work better for her.  Also discussed lorazepam as needed her anxiety she has been having more of this lately for unclear reasons.  - LORazepam (ATIVAN) 0.5 MG tablet; Take 1 tablet (0.5 mg) by mouth every 6 hours as needed for anxiety  - citalopram (CELEXA) 40 MG tablet; Take 1 tablet (40 mg) by mouth daily    ROCHELLE (generalized anxiety disorder)    - LORazepam (ATIVAN) 0.5 MG tablet; Take 1 tablet (0.5 mg) by mouth every 6 hours as needed for anxiety  - citalopram (CELEXA) 40 MG tablet; Take 1 tablet (40 mg) by mouth daily  - EKG 12-lead complete w/read - Clinics    Nausea and vomiting, intractability of vomiting not specified, unspecified vomiting type  No longer nausea or vomiting but still Would like to have refills.  - ondansetron (ZOFRAN-ODT) 4 MG ODT tab; Take 1 tablet (4 mg) by mouth every 8 hours as needed for nausea      18 minutes spent on the date of the encounter doing chart review, history and exam, documentation and further activities per the note        BMI:   Estimated body mass  "index is 28.97 kg/m  as calculated from the following:    Height as of this encounter: 1.727 m (5' 8\").    Weight as of this encounter: 86.4 kg (190 lb 8 oz).       Would like to see how she is doing with the controller medication in the next 6 weeks    Return in about 6 weeks (around 10/25/2021) for asthma check.    Vi Baldwin MD  [facility] UPTOTORRI Trinidad is a 36 year old who presents for the following health issues     HPI     Answers for HPI/ROS submitted by the patient on 9/13/2021  If you checked off any problems, how difficult have these problems made it for you to do your work, take care of things at home, or get along with other people?: Not difficult at all  PHQ9 TOTAL SCORE: 4        ED/UC Followup:    Facility:  [facility]   Date of visit: 9/3/2021  Reason for visit: Sinus Infection and Vomiting  Current Status: Improved   See recent ER note.  Has had significant worsening of asthma and allergy symptoms.  Prior to going to the ER the patient had been vomiting for about 3 days unable to keep fluids down.  Tried Zofran and continue to have vomiting and emesis.  She was diagnosed just prior to this with a sinus infection facial pain runny nose congestion and was treated with Bactrim and prednisone which she does feel has helped the symptoms however no improvement of vomiting.  She took 2 days of the Bactrim before heading into the emergency room.  While there she did have some IV fluids she had a normal abdominal examination symptoms improved and it was discharged home.  She had her antibiotic switched to doxycycline for more appropriate coverage and is here today for follow-up.  She seems to be tolerating this fairly well  Unfortunately asthma still is not well controlled      Review of Systems   Constitutional, HEENT, cardiovascular, pulmonary, gi and gu systems are negative, except as otherwise noted.      Objective    /84   Pulse 76   " "Temp 98  F (36.7  C)   Resp 16   Ht 1.727 m (5' 8\")   Wt 86.4 kg (190 lb 8 oz)   SpO2 97%   BMI 28.97 kg/m    Body mass index is 28.97 kg/m .  Physical Exam   GENERAL: Mild distress and fatigued  HENT: ear canals and TM's normal, nose and mouth without ulcers or lesions  RESP: lungs clear to auscultation - no rales, rhonchi or wheezes  CV: regular rate and rhythm, normal S1 S2, no S3 or S4, no murmur, click or rub, no peripheral edema and peripheral pulses strong  ABDOMEN: soft, nontender, no hepatosplenomegaly, no masses and bowel sounds normal    Admission on 09/03/2021, Discharged on 09/03/2021   Component Date Value Ref Range Status     Sodium 09/03/2021 138  133 - 144 mmol/L Final     Potassium 09/03/2021 3.7  3.4 - 5.3 mmol/L Final     Chloride 09/03/2021 104  94 - 109 mmol/L Final     Carbon Dioxide (CO2) 09/03/2021 33* 20 - 32 mmol/L Final     Anion Gap 09/03/2021 1* 3 - 14 mmol/L Final     Urea Nitrogen 09/03/2021 11  7 - 30 mg/dL Final     Creatinine 09/03/2021 0.95  0.52 - 1.04 mg/dL Final     Calcium 09/03/2021 8.5  8.5 - 10.1 mg/dL Final     Glucose 09/03/2021 76  70 - 99 mg/dL Final     Alkaline Phosphatase 09/03/2021 62  40 - 150 U/L Final     AST 09/03/2021 14  0 - 45 U/L Final     ALT 09/03/2021 22  0 - 50 U/L Final     Protein Total 09/03/2021 8.0  6.8 - 8.8 g/dL Final     Albumin 09/03/2021 3.3* 3.4 - 5.0 g/dL Final     Bilirubin Total 09/03/2021 0.6  0.2 - 1.3 mg/dL Final     GFR Estimate 09/03/2021 77  >60 mL/min/1.73m2 Final    As of July 11, 2021, eGFR is calculated by the CKD-EPI creatinine equation, without race adjustment. eGFR can be influenced by muscle mass, exercise, and diet. The reported eGFR is an estimation only and is only applicable if the renal function is stable.     Lipase 09/03/2021 59* 73 - 393 U/L Final     WBC Count 09/03/2021 6.0  4.0 - 11.0 10e3/uL Final     RBC Count 09/03/2021 4.12  3.80 - 5.20 10e6/uL Final     Hemoglobin 09/03/2021 12.6  11.7 - 15.7 g/dL Final "     Hematocrit 09/03/2021 38.6  35.0 - 47.0 % Final     MCV 09/03/2021 94  78 - 100 fL Final     MCH 09/03/2021 30.6  26.5 - 33.0 pg Final     MCHC 09/03/2021 32.6  31.5 - 36.5 g/dL Final     RDW 09/03/2021 12.3  10.0 - 15.0 % Final     Platelet Count 09/03/2021 484* 150 - 450 10e3/uL Final     % Neutrophils 09/03/2021 50  % Final     % Lymphocytes 09/03/2021 37  % Final     % Monocytes 09/03/2021 9  % Final     % Eosinophils 09/03/2021 2  % Final     % Basophils 09/03/2021 2  % Final     % Immature Granulocytes 09/03/2021 0  % Final     NRBCs per 100 WBC 09/03/2021 0  <1 /100 Final     Absolute Neutrophils 09/03/2021 3.0  1.6 - 8.3 10e3/uL Final     Absolute Lymphocytes 09/03/2021 2.2  0.8 - 5.3 10e3/uL Final     Absolute Monocytes 09/03/2021 0.5  0.0 - 1.3 10e3/uL Final     Absolute Eosinophils 09/03/2021 0.1  0.0 - 0.7 10e3/uL Final     Absolute Basophils 09/03/2021 0.1  0.0 - 0.2 10e3/uL Final     Absolute Immature Granulocytes 09/03/2021 0.0  <=0.0 10e3/uL Final     Absolute NRBCs 09/03/2021 0.0  10e3/uL Final     HCG QUANTITATIVE POCT 09/03/2021 <5.0  0.0 - 5.0 IU/L Final

## 2021-09-13 ENCOUNTER — OFFICE VISIT (OUTPATIENT)
Dept: FAMILY MEDICINE | Facility: CLINIC | Age: 36
End: 2021-09-13
Payer: COMMERCIAL

## 2021-09-13 VITALS
SYSTOLIC BLOOD PRESSURE: 125 MMHG | RESPIRATION RATE: 16 BRPM | HEIGHT: 68 IN | OXYGEN SATURATION: 97 % | TEMPERATURE: 98 F | HEART RATE: 76 BPM | WEIGHT: 190.5 LBS | DIASTOLIC BLOOD PRESSURE: 84 MMHG | BODY MASS INDEX: 28.87 KG/M2

## 2021-09-13 DIAGNOSIS — F41.1 GAD (GENERALIZED ANXIETY DISORDER): ICD-10-CM

## 2021-09-13 DIAGNOSIS — F33.1 MAJOR DEPRESSIVE DISORDER, RECURRENT EPISODE, MODERATE (H): ICD-10-CM

## 2021-09-13 DIAGNOSIS — I10 BENIGN ESSENTIAL HYPERTENSION: ICD-10-CM

## 2021-09-13 DIAGNOSIS — R11.2 NAUSEA AND VOMITING, INTRACTABILITY OF VOMITING NOT SPECIFIED, UNSPECIFIED VOMITING TYPE: ICD-10-CM

## 2021-09-13 DIAGNOSIS — J45.41 MODERATE PERSISTENT ASTHMA WITH ACUTE EXACERBATION: Primary | ICD-10-CM

## 2021-09-13 PROCEDURE — 99214 OFFICE O/P EST MOD 30 MIN: CPT | Performed by: FAMILY MEDICINE

## 2021-09-13 PROCEDURE — 93000 ELECTROCARDIOGRAM COMPLETE: CPT | Performed by: FAMILY MEDICINE

## 2021-09-13 PROCEDURE — 96127 BRIEF EMOTIONAL/BEHAV ASSMT: CPT | Performed by: FAMILY MEDICINE

## 2021-09-13 PROCEDURE — 80053 COMPREHEN METABOLIC PANEL: CPT | Performed by: FAMILY MEDICINE

## 2021-09-13 PROCEDURE — 36415 COLL VENOUS BLD VENIPUNCTURE: CPT | Performed by: FAMILY MEDICINE

## 2021-09-13 RX ORDER — LORAZEPAM 0.5 MG/1
0.5 TABLET ORAL EVERY 6 HOURS PRN
Qty: 30 TABLET | Refills: 1 | Status: SHIPPED | OUTPATIENT
Start: 2021-09-13 | End: 2022-07-18

## 2021-09-13 RX ORDER — FLUTICASONE PROPIONATE 220 UG/1
1 AEROSOL, METERED RESPIRATORY (INHALATION) 2 TIMES DAILY
Qty: 12 G | Refills: 1 | Status: SHIPPED | OUTPATIENT
Start: 2021-09-13 | End: 2023-05-22

## 2021-09-13 RX ORDER — ONDANSETRON 4 MG/1
4 TABLET, ORALLY DISINTEGRATING ORAL EVERY 8 HOURS PRN
Qty: 20 TABLET | Refills: 4 | Status: SHIPPED | OUTPATIENT
Start: 2021-09-13 | End: 2022-03-18

## 2021-09-13 RX ORDER — LISINOPRIL AND HYDROCHLOROTHIAZIDE 20; 25 MG/1; MG/1
2 TABLET ORAL DAILY
Qty: 180 TABLET | Refills: 3 | Status: SHIPPED | OUTPATIENT
Start: 2021-09-13 | End: 2022-05-09

## 2021-09-13 RX ORDER — CITALOPRAM HYDROBROMIDE 40 MG/1
40 TABLET ORAL DAILY
Qty: 90 TABLET | Refills: 3 | Status: SHIPPED | OUTPATIENT
Start: 2021-09-13 | End: 2022-08-08

## 2021-09-13 ASSESSMENT — PATIENT HEALTH QUESTIONNAIRE - PHQ9
10. IF YOU CHECKED OFF ANY PROBLEMS, HOW DIFFICULT HAVE THESE PROBLEMS MADE IT FOR YOU TO DO YOUR WORK, TAKE CARE OF THINGS AT HOME, OR GET ALONG WITH OTHER PEOPLE: NOT DIFFICULT AT ALL
SUM OF ALL RESPONSES TO PHQ QUESTIONS 1-9: 4
SUM OF ALL RESPONSES TO PHQ QUESTIONS 1-9: 4

## 2021-09-13 ASSESSMENT — MIFFLIN-ST. JEOR: SCORE: 1597.6

## 2021-09-14 LAB
ALBUMIN SERPL-MCNC: 3.4 G/DL (ref 3.4–5)
ALP SERPL-CCNC: 66 U/L (ref 40–150)
ALT SERPL W P-5'-P-CCNC: 29 U/L (ref 0–50)
ANION GAP SERPL CALCULATED.3IONS-SCNC: 3 MMOL/L (ref 3–14)
AST SERPL W P-5'-P-CCNC: 16 U/L (ref 0–45)
BILIRUB SERPL-MCNC: 0.8 MG/DL (ref 0.2–1.3)
BUN SERPL-MCNC: 11 MG/DL (ref 7–30)
CALCIUM SERPL-MCNC: 8.8 MG/DL (ref 8.5–10.1)
CHLORIDE BLD-SCNC: 99 MMOL/L (ref 94–109)
CO2 SERPL-SCNC: 34 MMOL/L (ref 20–32)
CREAT SERPL-MCNC: 0.78 MG/DL (ref 0.52–1.04)
GFR SERPL CREATININE-BSD FRML MDRD: >90 ML/MIN/1.73M2
GLUCOSE BLD-MCNC: 91 MG/DL (ref 70–99)
POTASSIUM BLD-SCNC: 3.7 MMOL/L (ref 3.4–5.3)
PROT SERPL-MCNC: 7.6 G/DL (ref 6.8–8.8)
SODIUM SERPL-SCNC: 136 MMOL/L (ref 133–144)

## 2021-09-14 ASSESSMENT — ASTHMA QUESTIONNAIRES: ACT_TOTALSCORE: 13

## 2021-09-15 ENCOUNTER — OFFICE VISIT (OUTPATIENT)
Dept: OPTOMETRY | Facility: CLINIC | Age: 36
End: 2021-09-15
Payer: COMMERCIAL

## 2021-09-15 DIAGNOSIS — H43.812 PVD (POSTERIOR VITREOUS DETACHMENT), LEFT: Primary | ICD-10-CM

## 2021-09-15 PROCEDURE — 92012 INTRM OPH EXAM EST PATIENT: CPT | Performed by: OPTOMETRIST

## 2021-09-15 ASSESSMENT — VISUAL ACUITY
CORRECTION_TYPE: GLASSES
OS_CC: 20/20
OD_CC: 20/20-1
OS_SC: CF @ 3'
OD_SC: 20/40-1
OD_SC: CF @ 3'
OS_CC: 20/20
METHOD: SNELLEN - LINEAR
OS_SC: 20/30-2
OD_CC: 20/20

## 2021-09-15 ASSESSMENT — TONOMETRY
IOP_METHOD: APPLANATION
OS_IOP_MMHG: 15
OD_IOP_MMHG: 16

## 2021-09-15 ASSESSMENT — CONF VISUAL FIELD
METHOD: COUNTING FINGERS
OD_NORMAL: 1
OS_NORMAL: 1

## 2021-09-15 ASSESSMENT — SLIT LAMP EXAM - LIDS
COMMENTS: NORMAL
COMMENTS: NORMAL

## 2021-09-15 ASSESSMENT — EXTERNAL EXAM - LEFT EYE: OS_EXAM: NORMAL

## 2021-09-15 ASSESSMENT — CUP TO DISC RATIO
OS_RATIO: 0.40
OD_RATIO: 0.45

## 2021-09-15 ASSESSMENT — EXTERNAL EXAM - RIGHT EYE: OD_EXAM: NORMAL

## 2021-09-15 NOTE — LETTER
"    9/15/2021         RE: Vivi Mccall  [address]  [place] [facility] [address]        Dear Colleague,    Thank you for referring your patient, Vivi Mccall, to the [facility]. Please see a copy of my visit note below.    Chief Complaint   Patient presents with     Floaters Follow-Up     For 1+ month, she has been noticing a \"film\" in her peripheral vision of her left eye. She feels it has worsened a little bit since her last evaluation 8/10/2021. The film is still present and it is bothering her. She reports that if she rolls her eyes around, it sometimes clears it up a little bit. Also reports that sometimes it seems thicker than others.     No burning, itching, redness, discomfort, flashes.    She reports that she has had a sinus infection for the past 3-4 weeks and when she uses a nasal cleanser, the solution comes out of her left eye which is concerning her.     Has not been using any artificial tears or warm/cold compresses.    Do you wear contact lenses? Yes - has been wearing them and the film is much more annoying when she has them in      [facility]     See Review Of Systems   Eyes: \"film\"/floater left eye  Constitutional: No fevers, chills, or weight changes.      Medical, surgical and family histories reviewed and updated 9/15/2021.         OBJECTIVE: See Ophthalmology exam    ASSESSMENT:    ICD-10-CM    1. PVD (posterior vitreous detachment), left  H43.812       PLAN:    Patient Instructions   You have a PVD- posterior vitreous detachment which is due to the gel of the eye shrinking and clumping together.  This can sometimes cause holes or tears in the retina.  The signs of a retinal detachment are flashes of light or a \"curtain veil\" coming over your vision. If you notice any of these changes return to clinic for re-evaluation.     Return for comprehensive exam in May 2021, or sooner if needed.     The effects of the dilating drops last for 4- 6 hours.  You will " be more sensitive to light and vision will be blurry up close.  Mydriatic sunglasses were given if needed.    Alfred Clarke OD  [address]  [place]  [address] [phone]           Again, thank you for allowing me to participate in the care of your patient.        Sincerely,        Alfred Clarke OD

## 2021-09-15 NOTE — PATIENT INSTRUCTIONS
"You have a PVD- posterior vitreous detachment which is due to the gel of the eye shrinking and clumping together.  This can sometimes cause holes or tears in the retina.  The signs of a retinal detachment are flashes of light or a \"curtain veil\" coming over your vision. If you notice any of these changes return to clinic for re-evaluation.     Return for comprehensive exam in May 2021, or sooner if needed.     The effects of the dilating drops last for 4- 6 hours.  You will be more sensitive to light and vision will be blurry up close.  Mydriatic sunglasses were given if needed.    Alfred Clarke, OD  [facility] [place]  [address] [place] [address]. [place]  [id number]    [phone]    "

## 2021-09-15 NOTE — PROGRESS NOTES
"Chief Complaint   Patient presents with     Floaters Follow-Up     For 1+ month, she has been noticing a \"film\" in her peripheral vision of her left eye. She feels it has worsened a little bit since her last evaluation 8/10/2021. The film is still present and it is bothering her. She reports that if she rolls her eyes around, it sometimes clears it up a little bit. Also reports that sometimes it seems thicker than others.     No burning, itching, redness, discomfort, flashes.    She reports that she has had a sinus infection for the past 3-4 weeks and when she uses a nasal cleanser, the solution comes out of her left eye which is concerning her.     Has not been using any artificial tears or warm/cold compresses.    Do you wear contact lenses? Yes - has been wearing them and the film is much more annoying when she has them in      [facility]     See Review Of Systems   Eyes: \"film\"/floater left eye  Constitutional: No fevers, chills, or weight changes.      Medical, surgical and family histories reviewed and updated 9/15/2021.         OBJECTIVE: See Ophthalmology exam    ASSESSMENT:    ICD-10-CM    1. PVD (posterior vitreous detachment), left  H43.812       PLAN:    Patient Instructions   You have a PVD- posterior vitreous detachment which is due to the gel of the eye shrinking and clumping together.  This can sometimes cause holes or tears in the retina.  The signs of a retinal detachment are flashes of light or a \"curtain veil\" coming over your vision. If you notice any of these changes return to clinic for re-evaluation.     Return for comprehensive exam in May 2021, or sooner if needed.     The effects of the dilating drops last for 4- 6 hours.  You will be more sensitive to light and vision will be blurry up close.  Mydriatic sunglasses were given if needed.    Alfred Clarke, OD  [facility]  [address] [place] [address]. [place]  [address] [phone]       "

## 2021-09-29 NOTE — TELEPHONE ENCOUNTER
Patient called and is newly pregnant-LMP is 3/27. I tried to schedule 1st OB, but patient said  wants to see her at 6 wks because of her issues. Please call patient and let her know when we can schedule.  
Pt can schedule her new ob for 6 1/2 weeks per ov notes. Pt transferred to scheduling to make appt.    Soco Barbosa RN on 4/29/2019 at 9:51 AM    
no

## 2021-10-04 ENCOUNTER — TELEPHONE (OUTPATIENT)
Dept: FAMILY MEDICINE | Facility: CLINIC | Age: 36
End: 2021-10-04

## 2021-10-04 NOTE — TELEPHONE ENCOUNTER
Pt needs appt and EKG with PCP  Scheduled    Next 5 appointments (look out 90 days)    Oct 11, 2021  8:45 AM  Office Visit with Vi Baldwin MD  [facility] [address] [facility] [address]  [phone]        Claudia PIPER RN

## 2021-10-04 NOTE — TELEPHONE ENCOUNTER
Reason for Call: Other order    Detailed comments: Patient is needing an order for repeat EKG.  Per result notes - 'the EKG was similar to the one previously done but I had like to see you again on repeat it as well as see how you are feeling for a follow-up.  Please see me in the next 1 to 2 weeks so we can recheck.'    Phone Number Patient can be reached at: Cell number on file:    Telephone Information:   Mobile [phone]     Best Time: Any    Can we leave a detailed message on this number? YES    Call taken on 10/4/2021 at 1:43 PM by Yenny Baptiste

## 2021-10-09 ENCOUNTER — HEALTH MAINTENANCE LETTER (OUTPATIENT)
Age: 36
End: 2021-10-09

## 2021-10-11 ENCOUNTER — OFFICE VISIT (OUTPATIENT)
Dept: FAMILY MEDICINE | Facility: CLINIC | Age: 36
End: 2021-10-11
Payer: COMMERCIAL

## 2021-10-11 VITALS
HEIGHT: 68 IN | DIASTOLIC BLOOD PRESSURE: 85 MMHG | BODY MASS INDEX: 28.69 KG/M2 | WEIGHT: 189.3 LBS | TEMPERATURE: 98.2 F | SYSTOLIC BLOOD PRESSURE: 124 MMHG | HEART RATE: 74 BPM | OXYGEN SATURATION: 100 %

## 2021-10-11 DIAGNOSIS — J45.30 MILD PERSISTENT ASTHMA WITHOUT COMPLICATION: Primary | ICD-10-CM

## 2021-10-11 DIAGNOSIS — G89.29 CHRONIC LEFT-SIDED LOW BACK PAIN WITH LEFT-SIDED SCIATICA: ICD-10-CM

## 2021-10-11 DIAGNOSIS — M54.42 CHRONIC LEFT-SIDED LOW BACK PAIN WITH LEFT-SIDED SCIATICA: ICD-10-CM

## 2021-10-11 DIAGNOSIS — F33.1 MAJOR DEPRESSIVE DISORDER, RECURRENT EPISODE, MODERATE (H): ICD-10-CM

## 2021-10-11 PROBLEM — J45.20 MILD INTERMITTENT ASTHMA WITHOUT COMPLICATION: Status: RESOLVED | Noted: 2017-08-22 | Resolved: 2021-10-11

## 2021-10-11 PROCEDURE — 90686 IIV4 VACC NO PRSV 0.5 ML IM: CPT | Performed by: FAMILY MEDICINE

## 2021-10-11 PROCEDURE — 90471 IMMUNIZATION ADMIN: CPT | Performed by: FAMILY MEDICINE

## 2021-10-11 PROCEDURE — 99214 OFFICE O/P EST MOD 30 MIN: CPT | Mod: 25 | Performed by: FAMILY MEDICINE

## 2021-10-11 ASSESSMENT — MIFFLIN-ST. JEOR: SCORE: 1592.16

## 2021-10-11 NOTE — PROGRESS NOTES
"    Assessment & Plan     Mild persistent asthma without complication  Discussed continue medications and using them seasonally. If winter is a quiet nontrigger time for her and she can stop medications at that time but definitely would resume the medicine in the spring since she has mold and seasonal allergies.    Chronic left-sided low back pain with left-sided sciatica  Her left-sided pain does seem to be muscular she is able to pinpoint a place where the pain is tender and can reach it with massage. I think a trial of physical therapy would be helpful  - [facility]; Future    Major depressive disorder, recurrent episode, moderate (H)  Mood is a bit better is taking medications daily is also working with a therapist and working through the loss of her grand father. Encouraged her to reach out to me if she needs anything.        20 minutes spent on the date of the encounter doing chart review, history and exam, documentation and further activities per the note        BMI:   Estimated body mass index is 28.78 kg/m  as calculated from the following:    Height as of this encounter: 1.727 m (5' 8\").    Weight as of this encounter: 85.9 kg (189 lb 4.8 oz).       See me in a year for physical  Schedule physical therapy  If any your symptoms are worsening or not improving over the next 4 to 6 weeks please let me know and we can reevaluate    No follow-ups on file.    Vi Baldwin MD  [facility]   Vivi is a 36 year old who presents for the following health issues     HPI       Per patient states that she has been having pain on her back middle lower for 3 weeks states it's not related to her back at all, thinks it could be kidney related but not sure would like a second opinion.    I did tell if this is muscle related. Does feel that stretching or massage in certain places does help it to feel better as does heat however it does seem to come back shortly " "afterwards. No injuries. She does feel that the pain goes down into her left leg some. Has not done any physical therapy.    Has had an abnormal EKG in the past at her last visit we discussed that she did have pregnancy-induced hypertension and recent pregnancy in the last year. Unclear if either of these were factors in the EKG findings. Recommended stress test but she has not gotten this yet. We will set her up for that    Asthma significantly better since starting the fluticasone/Flovent. She does have fall and seasonal allergies and we discussed using medications at these times but keeping an eye out for other triggers.    Is taking 40 mg of citalopram and this is really been helping. She is lost her grandfather to Covid and has a lot of angst around this. Has also been working with a therapist and this has been helping a significant amount. She feels stable from the standpoint              Review of Systems   Constitutional, HEENT, cardiovascular, pulmonary, gi and gu systems are negative, except as otherwise noted.      Objective    /85   Pulse 74   Temp 98.2  F (36.8  C)   Ht 1.727 m (5' 8\")   Wt 85.9 kg (189 lb 4.8 oz)   SpO2 100%   BMI 28.78 kg/m    Body mass index is 28.78 kg/m .  Physical Exam   GENERAL: healthy, alert and no distress  BACK: no CVA tenderness, left sided paralumbar tenderness no rash along the skin  PSYCH: mentation appears normal, affect normal/bright    Reviewed her last EKG            "

## 2021-10-12 ASSESSMENT — ASTHMA QUESTIONNAIRES: ACT_TOTALSCORE: 19

## 2021-10-27 ENCOUNTER — HOSPITAL ENCOUNTER (OUTPATIENT)
Dept: CARDIOLOGY | Facility: CLINIC | Age: 36
Discharge: HOME | End: 2021-10-27
Attending: FAMILY MEDICINE | Admitting: FAMILY MEDICINE
Payer: COMMERCIAL

## 2021-10-27 DIAGNOSIS — R07.9 CHEST PAIN, UNSPECIFIED TYPE: ICD-10-CM

## 2021-10-27 PROCEDURE — 93321 DOPPLER ECHO F-UP/LMTD STD: CPT | Mod: 26 | Performed by: INTERNAL MEDICINE

## 2021-10-27 PROCEDURE — 93016 CV STRESS TEST SUPVJ ONLY: CPT | Performed by: INTERNAL MEDICINE

## 2021-10-27 PROCEDURE — 93350 STRESS TTE ONLY: CPT | Mod: 26 | Performed by: INTERNAL MEDICINE

## 2021-10-27 PROCEDURE — 93350 STRESS TTE ONLY: CPT | Mod: TC

## 2021-10-27 PROCEDURE — 93017 CV STRESS TEST TRACING ONLY: CPT

## 2021-10-27 PROCEDURE — 93018 CV STRESS TEST I&R ONLY: CPT | Performed by: INTERNAL MEDICINE

## 2021-10-27 PROCEDURE — 93325 DOPPLER ECHO COLOR FLOW MAPG: CPT | Mod: 26 | Performed by: INTERNAL MEDICINE

## 2021-10-30 NOTE — TELEPHONE ENCOUNTER
"Requested Prescriptions   Pending Prescriptions Disp Refills     citalopram (CELEXA) 20 MG tablet  Last Written Prescription Date:  3/8/2019  Last Fill Quantity: 90 tablet,  # refills: 0   Last office visit: 5/29/2019 with prescribing provider:  Ru   Future Office Visit:   Next 5 appointments (look out 90 days)    Jun 19, 2019  9:00 AM CDT  ESTABLISHED PRENATAL with Anamaria Park MD  [facility]) [address] [place] [id number]  [phone]          90 tablet 0     Sig: Take 1 tablet (20 mg) by mouth daily       SSRIs Protocol Failed - 6/10/2019  3:30 PM        Failed - No active pregnancy on record        Passed - PHQ-9 score less than 5 in past 6 months     Please review last PHQ-9 score.   PHQ-9 SCORE 7/30/2018 1/17/2019 5/15/2019   PHQ-9 Total Score MyChart 4 (Minimal depression) 3 (Minimal depression) -   PHQ-9 Total Score 4 3 0     ROCHELLE-7 SCORE 2/27/2018 7/30/2018 5/15/2019   Total Score 0 4 0             Passed - Medication is active on med list        Passed - Patient is age 18 or older        Passed - No positive pregnancy test in last 12 months        Passed - Recent (6 mo) or future (30 days) visit within the authorizing provider's specialty     Patient had office visit in the last 6 months or has a visit in the next 30 days with authorizing provider or within the authorizing provider's specialty.  See \"Patient Info\" tab in inbasket, or \"Choose Columns\" in Meds & Orders section of the refill encounter.               "
Routing refill request to provider for review/approval because:  Failed- No active pregnancy on record.        
Xray Shoulder 2 Views, Left

## 2021-11-01 NOTE — RESULT ENCOUNTER NOTE
Hello,    Great news, your results were normal.  Completely normal stress test this is wonderful news.  Vi Baldwin MD

## 2021-11-14 PROBLEM — M25.512 SHOULDER PAIN, LEFT: Status: RESOLVED | Noted: 2020-10-27 | Resolved: 2021-11-14

## 2021-11-14 NOTE — PROGRESS NOTES
DISCHARGE REPORT    Vivi did not return for further treatment after the last visit on 11/16/20.   Current status is unknown.  Please see information below for last known relevant information.  Patient seen for 2 visits.    SUBJECTIVE  Subjective changes noted by patient:  decreasing left shoulder pain.  now feels it reaching across body.  Reaching out to side is feeling better.  lifting 10 mo old is getting easier.  .  Current pain level is  .     Previous pain level was   .   Changes in function: (See Goal flowsheet attached for changes in current functional level)  Adverse reaction to treatment or activity: None    OBJECTIVE  Changes noted in objective findings:   left shoulder AROM is WNL per video assessment.  There is end range discomfort with functional ER/abd and end range flexion and horiz add.     ASSESSMENT/PLAN  Diagnosis: left shoulder pain   Updated problem list and treatment plan:  Patient will continue to utilize HEP for any remaining deficits.   STG/LTGs have been met or progress has been made towards goals:  Please see goal flowsheet for most current information  Assessment of Progress: current status is unknown.    Last current status: Pt is progressing well   Self Management Plans:  HEP  I have re-evaluated this patient and find that the nature, scope, duration and intensity of the therapy is appropriate for the medical condition of the patient.  Vivi continues to require the following intervention to meet STG and LTG's:  HEP.    Recommendations:  Discharge with current home program.  Patient to follow up with MD as needed.    Please refer to the daily flowsheet for treatment today, total treatment time and time spent performing 1:1 timed codes.

## 2022-01-04 ENCOUNTER — MYC MEDICAL ADVICE (OUTPATIENT)
Dept: FAMILY MEDICINE | Facility: CLINIC | Age: 37
End: 2022-01-04
Payer: COMMERCIAL

## 2022-01-05 ENCOUNTER — VIRTUAL VISIT (OUTPATIENT)
Dept: FAMILY MEDICINE | Facility: CLINIC | Age: 37
End: 2022-01-05
Payer: COMMERCIAL

## 2022-01-05 DIAGNOSIS — F33.1 MAJOR DEPRESSIVE DISORDER, RECURRENT EPISODE, MODERATE (H): ICD-10-CM

## 2022-01-05 DIAGNOSIS — H53.9 VISION CHANGES: ICD-10-CM

## 2022-01-05 DIAGNOSIS — H02.401 EYELID DROOPING DISEASE, RIGHT: ICD-10-CM

## 2022-01-05 DIAGNOSIS — R41.840 CONCENTRATION DEFICIT: Primary | ICD-10-CM

## 2022-01-05 PROCEDURE — 99214 OFFICE O/P EST MOD 30 MIN: CPT | Mod: 95 | Performed by: FAMILY MEDICINE

## 2022-01-05 RX ORDER — BUPROPION HYDROCHLORIDE 150 MG/1
150 TABLET ORAL EVERY MORNING
Qty: 30 TABLET | Refills: 1 | Status: SHIPPED | OUTPATIENT
Start: 2022-01-05 | End: 2022-05-09

## 2022-01-05 NOTE — PROGRESS NOTES
Vivi is a 36 year old who is being evaluated via a billable video visit.      How would you like to obtain your AVS? MyChart  If the video visit is dropped, the invitation should be resent by: Text to cell phone: [phone]  Will anyone else be joining your video visit? No     Video Start Time: 513    Assessment & Plan     Concentration deficit    - Adult Mental Health Referral; Future    Major depressive disorder, recurrent episode, moderate (H)  Trial of new med in addition to ssri  She is filling out PHQ and ROCHELLE  F/u 4-5 weeks  - buPROPion (WELLBUTRIN XL) 150 MG 24 hr tablet; Take 1 tablet (150 mg) by mouth every morning    Eyelid drooping disease, right    - Adult Eye Referral; Future    Vision changes    - Adult Eye Referral; Future    Prescription drug management  18 minutes spent on the date of the encounter doing chart review, history and exam, documentation and further activities per the note            No follow-ups on file.    Vi Baldwin MD  [facility]    Subjective   Vivi is a 36 year old who presents for the following health issues     HPI     ADHD    Onset: past couple of months  Mom has this    Description:   Easily distracted: YES  Short attention span: YES- especially when she is upset   Trouble following directions: no   Impulsive behavior: no   Trouble completing tasks: YES    Accompanying Signs & Symptoms:        Change in sleep pattern: recently yes due to daughter being sick off and on   Irritability at certain times of the day: no  Socially withdrawn: YES  Depression symptoms: YES  Anxiety symptoms: YES    History:  Caffeine intake: Moderate   Loss of appetite: no, just forgets to eat   Healthy diet: no  Did you have problems in school or with previous employment: no  Family history of ADHD: YES  Have you had an evaluation for ADHD in the past: no  Do you use alcohol or drugs: YES- alcohol    Therapies tried: medication not used     Difficulty finishing  tasks - starts other tasks hard t complete  At work she notices difficulty slowing down her mind to discuss projects hard to organize thoughts    Vision changes - clouded vision  Also has right eyelid droop  Progressive  Is also in her grandmother and mother      Review of Systems   Constitutional, HEENT, cardiovascular, pulmonary, gi and gu systems are negative, except as otherwise noted.      Objective           Vitals:  No vitals were obtained today due to virtual visit.    Physical Exam   GENERAL: Healthy, alert and no distress  EYES: Eyes grossly normal to inspection.  No discharge or erythema, or obvious scleral/conjunctival abnormalities.  RESP: No audible wheeze, cough, or visible cyanosis.  No visible retractions or increased work of breathing.    SKIN: Visible skin clear. No significant rash, abnormal pigmentation or lesions.  NEURO: Cranial nerves grossly intact.  Mentation and speech appropriate for age.  PSYCH: Mentation appears normal, affect normal/bright, judgement and insight intact, normal speech and appearance well-groomed.                Video-Visit Details    Type of service:  Video Visit    Video End Time:527    Originating Location (pt. Location): Home    Distant Location (provider location):  [facility]     Platform used for Video Visit: [employer]

## 2022-01-06 ASSESSMENT — ASTHMA QUESTIONNAIRES: ACT_TOTALSCORE: 25

## 2022-01-06 NOTE — TELEPHONE ENCOUNTER
Patient Quality Outreach      Summary:    Patient has the following on her problem list/HM:   Asthma review       ACT Total Scores 10/11/2021   ACT TOTAL SCORE (Goal Greater than or Equal to 20) 19   In the past 12 months, how many times did you visit the emergency room for your asthma without being admitted to the hospital? 0   In the past 12 months, how many times were you hospitalized overnight because of your asthma? 0          Patient is due/failing the following:   Asthma  -  ACT needed    Type of outreach:    Sent [employer] message.    Questions for provider review:    Updated pt score 25                                                                                                                                     Roque Payne MA        Chart routed to .

## 2022-01-23 ENCOUNTER — HOSPITAL ENCOUNTER (EMERGENCY)
Facility: CLINIC | Age: 37
Discharge: HOME | End: 2022-01-23
Attending: NURSE PRACTITIONER | Admitting: NURSE PRACTITIONER
Payer: COMMERCIAL

## 2022-01-23 ENCOUNTER — APPOINTMENT (OUTPATIENT)
Dept: CT IMAGING | Facility: CLINIC | Age: 37
End: 2022-01-23
Attending: NURSE PRACTITIONER
Payer: COMMERCIAL

## 2022-01-23 VITALS
DIASTOLIC BLOOD PRESSURE: 97 MMHG | HEIGHT: 68 IN | BODY MASS INDEX: 28.04 KG/M2 | WEIGHT: 185 LBS | SYSTOLIC BLOOD PRESSURE: 131 MMHG | OXYGEN SATURATION: 100 % | TEMPERATURE: 97.6 F | HEART RATE: 74 BPM | RESPIRATION RATE: 16 BRPM

## 2022-01-23 DIAGNOSIS — S06.0X0A CONCUSSION WITHOUT LOSS OF CONSCIOUSNESS, INITIAL ENCOUNTER: ICD-10-CM

## 2022-01-23 DIAGNOSIS — S09.90XA CLOSED HEAD INJURY, INITIAL ENCOUNTER: ICD-10-CM

## 2022-01-23 LAB
HOLD SPECIMEN: NORMAL

## 2022-01-23 PROCEDURE — 99284 EMERGENCY DEPT VISIT MOD MDM: CPT | Mod: 25

## 2022-01-23 PROCEDURE — 250N000011 HC RX IP 250 OP 636: Performed by: NURSE PRACTITIONER

## 2022-01-23 PROCEDURE — 250N000013 HC RX MED GY IP 250 OP 250 PS 637: Performed by: NURSE PRACTITIONER

## 2022-01-23 PROCEDURE — 96374 THER/PROPH/DIAG INJ IV PUSH: CPT

## 2022-01-23 PROCEDURE — 70450 CT HEAD/BRAIN W/O DYE: CPT

## 2022-01-23 RX ORDER — ONDANSETRON 4 MG/1
4 TABLET, ORALLY DISINTEGRATING ORAL EVERY 8 HOURS PRN
Qty: 10 TABLET | Refills: 0 | Status: SHIPPED | OUTPATIENT
Start: 2022-01-23 | End: 2022-01-23

## 2022-01-23 RX ORDER — CYCLOBENZAPRINE HCL 10 MG
10 TABLET ORAL 3 TIMES DAILY PRN
Qty: 20 TABLET | Refills: 0 | Status: SHIPPED | OUTPATIENT
Start: 2022-01-23 | End: 2022-03-18

## 2022-01-23 RX ORDER — ONDANSETRON 4 MG/1
4 TABLET, ORALLY DISINTEGRATING ORAL EVERY 8 HOURS PRN
Qty: 10 TABLET | Refills: 0 | Status: SHIPPED | OUTPATIENT
Start: 2022-01-23 | End: 2023-05-22

## 2022-01-23 RX ORDER — OXYCODONE HYDROCHLORIDE 5 MG/1
5 TABLET ORAL ONCE
Status: COMPLETED | OUTPATIENT
Start: 2022-01-23 | End: 2022-01-23

## 2022-01-23 RX ORDER — ONDANSETRON 2 MG/ML
4 INJECTION INTRAMUSCULAR; INTRAVENOUS ONCE
Status: COMPLETED | OUTPATIENT
Start: 2022-01-23 | End: 2022-01-23

## 2022-01-23 RX ORDER — CYCLOBENZAPRINE HCL 10 MG
10 TABLET ORAL 3 TIMES DAILY PRN
Qty: 20 TABLET | Refills: 0 | Status: SHIPPED | OUTPATIENT
Start: 2022-01-23 | End: 2022-01-23

## 2022-01-23 RX ADMIN — OXYCODONE HYDROCHLORIDE 5 MG: 5 TABLET ORAL at 12:38

## 2022-01-23 RX ADMIN — ONDANSETRON 4 MG: 2 INJECTION INTRAMUSCULAR; INTRAVENOUS at 11:54

## 2022-01-23 ASSESSMENT — ENCOUNTER SYMPTOMS
JOINT SWELLING: 0
NECK PAIN: 0
DIZZINESS: 0
SHORTNESS OF BREATH: 0
FACIAL ASYMMETRY: 0
HEADACHES: 1
LIGHT-HEADEDNESS: 0
MYALGIAS: 0
ARTHRALGIAS: 0
SEIZURES: 0
VOMITING: 1
WOUND: 0
BACK PAIN: 0
DYSURIA: 0
CHEST TIGHTNESS: 0
TREMORS: 0
CONFUSION: 0
WEAKNESS: 0
NUMBNESS: 0
SPEECH DIFFICULTY: 0
NAUSEA: 1
NECK STIFFNESS: 0
ABDOMINAL PAIN: 0
FEVER: 0

## 2022-01-23 ASSESSMENT — MIFFLIN-ST. JEOR: SCORE: 1572.65

## 2022-01-23 NOTE — ED NOTES
Patient slipped down the stairs and hit her left hip, let back (rib and shoulder blade area and then her head.  She had head pain that felt like a shroud over her left eye with blurred vision but then it became more intense so she went to the eye doctor yesterday and was told she needed an MRI.  Today she became nauseated and started to vomiting.  Pain in her left hip has gotten worse.

## 2022-01-23 NOTE — LETTER
January 23, 2022      To Whom It May Concern:      Vivi Mccall was seen in our Emergency Department today, 01/23/22.  Please excuse Vivi from work on 1/24, 1/25, 1/26 due to injury.  She may return to work when improved.    Sincerely,        Suki Dyer, CNP

## 2022-01-23 NOTE — ED PROVIDER NOTES
History   Chief Complaint:  Headache and Fall       HPI   Vivi Mccall is a 36 year old female who presents after slipping and falling down stairs 2 days ago.  She notes her foot slipped out from under her and she fell backward onto her back and head and slid down a few steps.  She hit the left side of her body and her head and has hip pain, shoulder blade pain, and back of the head pain. She has left eye cloudiness that has been constant, but notes that this is unchanged from her chronic left eye cloudiness due to a vitreous detachment.  Per patient report, she was evaluated by her ophthalmologist prior to arriving in the ED and they note no concern.  Due to concern for ongoing head pain, nausea, and an episode of vomiting, she presents to the ED. She has sensitivity to light in both eyes. She mentions that she feels pressure in her sinuses. She denies weakness or tingling in her extremities. She took 800 mg ibuprofen for the pain after the fall. This morning she took 2 Advils.  She does note her symptoms improve when she rests.  She notes her symptoms worsened when she was helping at a [employer].    Review of Systems   Constitutional: Negative for fever.   HENT: Negative for congestion.    Eyes: Positive for visual disturbance.   Respiratory: Negative for chest tightness and shortness of breath.    Cardiovascular: Negative for chest pain.   Gastrointestinal: Positive for nausea and vomiting. Negative for abdominal pain.   Genitourinary: Negative for dysuria.   Musculoskeletal: Negative for arthralgias, back pain, gait problem, joint swelling, myalgias, neck pain and neck stiffness.   Skin: Negative for rash and wound.   Neurological: Positive for headaches. Negative for dizziness, tremors, seizures, syncope, facial asymmetry, speech difficulty, weakness, light-headedness and numbness.   Psychiatric/Behavioral: Negative for confusion.   All other systems reviewed and are  "negative.    Allergies:  Amoxicillin  Codeine  Penicillins    Medications:  Albuterol  Celexa  Jyoti  Zestoretic  Ativan  Zofran  Valtrex    Past Medical History:     Asthma  C. Difficile  Depression  Anxiety  Hypertension    Past Surgical History:    Gyn surgery  Orthopedic surgery  Soft tissue surgery  Nerve entrapment release    Family History:    DM  Bipolar disorder  Hypertension  VAD  Depression    Social History:  The patient presents alone  She is a dancer    Physical Exam     Patient Vitals for the past 24 hrs:   BP Temp Temp src Pulse Resp SpO2 Height Weight   01/23/22 1308 (!) 131/97 -- -- 74 -- -- -- --   01/23/22 1129 (!) 132/101 97.6  F (36.4  C) Temporal 84 16 100 % 1.727 m (5' 8\") 83.9 kg (185 lb)     Physical Exam  Nursing notes reviewed. Vitals reviewed.  General: Alert. Well kept.  Eyes:  Conjunctiva non-injected, non-icteric.  Neck/Throat: Moist mucous membranes. Normal voice.  Cardiac: Regular rhythm. Normal heart sounds.  Pulmonary: Clear and equal breath sounds bilaterally.   Abdomen: soft and non-tender.  Musculoskeletal: Normal gross range of motion of all 4 extremities.  No midline cervical, thoracic, lumbar spinal tenderness.  Skin: Warm and dry. Normal appearance of visualized exposed skin without rashes or petechiae.  Psych: Affect normal. Good eye contact.  Neurological:  Mental: alert and oriented x 4, follows commands, no aphasia or dysarthria.   Cranial Nerves:  CN II: visual acuity - able to accurately count fingers with each eye. Visual fields intact  CN III, IV, VI: EOM intact, pupils equal and reactive  CN V: facial sensation nl  CN VII: face symmetric, no facial droop  CN VIII: hearing normal  CN IX: palate elevation symmetric, uvula at midline  CN XI SCM normal, shoulder shrug nl  CN XII: tongue midline  Motor: Strength: 5/5 in all major groups of all extremities. Normal tone. No abnormal movements. No pronator drift b/l.  Sensory: temperature, light touch intact. Romberg: " "negative.  Coordination: FNF and heel-shin tests intact b/l.   Gait:  Normal.      Emergency Department Course     Imaging:  Head CT w/o contrast   Final Result   IMPRESSION:   1.  No acute intracranial process.        Report per radiology    Emergency Department Course:    Reviewed:  I reviewed nursing notes, vitals, past medical history and Care Everywhere    Assessments:  1145 I obtained history and examined the patient as noted above.     1250 I rechecked the patient and explained findings.     Interventions:  1154 Zofran 4 mg IV    1238 Oxycodone 5 mg PO    Disposition:  The patient was discharged to home.     Impression & Plan     Medical Decision Making:  Vivi Mccall is a 36 year old female who presents for evaluation after trauma to the head as detailed above during a fall. This patient has a history and clinical exam consistent with concussion, which is a clinical diagnosis. The differential diagnosis includes skull fracture, epidural hematoma, subdural hematoma, intracerebral hemorrhage, and traumatic subarachnoid hemorrhage; these diagnoses were not detected during this visit on CT imaging.  Her logic exam is without deficit and not consistent with stroke.  Despite the normal neuroimaging, the patient understands that they must return if any \"red flags\" appear/develop in the coming hours/days, as this may represent an indication to perform another CT scan. I have noted that \"red flags\" include: headaches that get worse, increased drowsiness, strange behavior, repetitive speech, seizures, repeated vomiting, growing confusion, increased irritability, slurred speech, weakness or numbness, and loss of responsiveness. Although rare, delayed CNS bleeds can occur, and the patient was notified of this. I have discussed the second impact syndrome, and the importance of not sustaining repeated concussions in the next 1-2 weeks. Post concussive syndrome is also discussed. Concussion information will also " be provided in writing at discharge detailing this.  She was given a work note.  The patients head to toe trauma exam is otherwise negative for serious underlying disease of the head, neck, chest, abdomen, extremities, pelvis   Diagnosis:    ICD-10-CM    1. Closed head injury, initial encounter  S09.90XA Concussion  Referral   2. Concussion without loss of consciousness, initial encounter  S06.0X0A Concussion  Referral       Discharge Medications:  New Prescriptions    CYCLOBENZAPRINE (FLEXERIL) 10 MG TABLET    Take 1 tablet (10 mg) by mouth 3 times daily as needed for muscle spasms    ONDANSETRON (ZOFRAN ODT) 4 MG ODT TAB    Take 1 tablet (4 mg) by mouth every 8 hours as needed for nausea       Scribe Disclosure:  HUBERT, Maribel Parnell, am serving as a scribe at 11:29 AM on 1/23/2022 to document services personally performed by Suki Dyer DNP based on my observations and the provider's statements to me.            Suki Dyer, CNP  01/23/22 1917

## 2022-01-23 NOTE — ED TRIAGE NOTES
Patient fell inside house on Friday hitting head, denies LOC. Now having increased nausea, vomiting and blurry vision. Not on blood thinners

## 2022-03-18 ENCOUNTER — VIRTUAL VISIT (OUTPATIENT)
Dept: NEUROLOGY | Facility: CLINIC | Age: 37
End: 2022-03-18
Payer: COMMERCIAL

## 2022-03-18 DIAGNOSIS — F07.81 POST CONCUSSION SYNDROME: Primary | ICD-10-CM

## 2022-03-18 DIAGNOSIS — R41.840 ATTENTION AND CONCENTRATION DEFICIT: ICD-10-CM

## 2022-03-18 DIAGNOSIS — S06.0X0A CONCUSSION WITHOUT LOSS OF CONSCIOUSNESS, INITIAL ENCOUNTER: ICD-10-CM

## 2022-03-18 DIAGNOSIS — S09.90XA CLOSED HEAD INJURY, INITIAL ENCOUNTER: ICD-10-CM

## 2022-03-18 DIAGNOSIS — G47.00 INSOMNIA, UNSPECIFIED TYPE: ICD-10-CM

## 2022-03-18 PROCEDURE — 99205 OFFICE O/P NEW HI 60 MIN: CPT | Mod: GT | Performed by: NURSE PRACTITIONER

## 2022-03-18 NOTE — LETTER
"    3/18/2022         RE: Vivi Mccall  [address]  [facility] [address]        Dear Colleague,    Thank you for referring your patient, Vivi Mccall, to the [facility]. Please see a copy of my visit note below.    Video Visit  Vivi Mccall is a 36 year old female who is being evaluated via a billable video visit in light of the ongoing global health crisis (COVID-19) that requires us to abide by social distancing mandates in order to reduce the risk of COVID-19 exposure.      The patient has been notified of following:     \"This virtual visit will be conducted via a video call between you and your physician/provider. We have found that certain health care needs can be provided without the need for a physical exam.  This service lets us provide the care you need with a short video conversation.  If a prescription is necessary we can send it directly to your pharmacy.  If lab work is needed we can place an order for that and you can then stop by our lab to have the test done at a later time.    If during the course of the call the physician/provider feels a video visit is not appropriate, you will not be charged for this service.\"     Patient has given verbal consent to a Video visit? Yes    Vivi Mccall chief complaint is: Post Concussion Syndrome      Current PT  No   Current OT   No   Current ST      No   Current Chiropractic   No   Psychiatrist currently  No   Past:   Yes   Psychologist currently  No   Past:   Yes   Primary: Currently    Yes                Need a note for work accommodations   No   Need a note for school accommodations    No        Medications  Currently on medication to help you sleep   Yes  Lorazepam  Mental health dx.- Yes   Currently on medication to help with mental health Yes     Citalopram  Currently on medication for concentration or ADD /ADHD      No      Date of accident: 1/21/22    Workman's Comp  No     QRC   " No        How concussion happened:     Pt slipped and fell down the stairs       LOC:  No      Did you seek medical attention:  Yes    When :  1/23/22    MRI/CT Completed Yes       Injury Description:               Was there a forcible blow to the head?:                Yes      Where on head? Back                                             Retrograde Amnesia (loss of memory of events before the injury)?:  No   Anterograde Amnesia (loss of memory of events following injury)?: No     Number of previous head injuries.        4  Pt unsure when previous head injuries were    Had all previous concussion symptoms resolved   Yes     Work/School  Currently employed    Yes    Retired    No   How many years ago/Previous occupation: N/a  Disability No   for/when started:  N/a      Title         works at    [employer]      Normal hours per week  (Average before injury) 40-50        Have you returned to work?            Yes    Hours working a week currently  40-50  The concussion symptoms are limiting her ability to work.  How Dizziness, screens    Patient History  Patient was referred to the concussion clinic by [facility].     Phone Start Time: 8:30 am    Phone End Time:  9:00 am    Total time of phone call 30 minutes    Mode of Communication: Video Conference via [employer]    Plan:     Neuropsychological assessment   No     PT to evaluate and treat  Yes   OT to evaluate and treat  No   ST to evaluate and treat  No   Safe and Sound eval and treat   No   Referral to ophthalmology   No   Referral to Neurology        No   Referral to psychology No   Referral to psychiatry  No   Other Referral   No   MRI/CT ordered today : No   Labs ordered today : No   New medication :  Yes   see below  Work note completed : Yes   School note completed : N/A   QRC list sent : N/A     We discussed some treatment options and have elected to   refer the patient to physical  therapy to help with neck pain and balance problems/dizziness.  Patient will also start amitriptyline, Wellbutrin, and Ritalin.  I will also put a note into her chart to help her with her return to work..    Medication Adjustment:  Wellbutrin 150 mg, take one tab PO every am  Amitriptyline 25 mg, take 1-2 tabs PO every HS  Ritalin 5 mg, take 1 tablet twice a day    Return to Work/School   Full scheduled hours  Yes      Note completed    Yes      Return to clinic 8 weeks    Continue with the support of the clinic, reassurance, and redirection. Staff monitoring and ongoing assessments per team plan. This team will utilize appropriate emergency services if necessary. I will make myself available if concerns or problems arise.  The patient agrees to call/message before her next visit with any questions, concerns or problems.    Subjective:          HPI Per Pt EMR: Vivi Mccall is a 36 year old female who presents after slipping and falling down stairs 2 days ago.  She notes her foot slipped out from under her and she fell backward onto her back and head and slid down a few steps.  She hit the left side of her body and her head and has hip pain, shoulder blade pain, and back of the head pain. She has left eye cloudiness that has been constant, but notes that this is unchanged from her chronic left eye cloudiness due to a vitreous detachment.  Per patient report, she was evaluated by her ophthalmologist prior to arriving in the ED and they note no concern.  Due to concern for ongoing head pain, nausea, and an episode of vomiting, she presents to the ED. She has sensitivity to light in both eyes. She mentions that she feels pressure in her sinuses. She denies weakness or tingling in her extremities. She took 800 mg ibuprofen for the pain after the fall. This morning she took 2 Advils.  She does note her symptoms improve when she rests.  She notes her symptoms worsened when she was helping at a dance  recital.    Headaches:  Significant ongoing headaches Yes   Headaches: Intermittently  Improvement :No   Current Headache No   Wake with HA  Yes     Worse Headache    6/10           How often: Couple days per week    Average Headache 2/10.    Best Headache 2/10.  Brings on HA/Makes symptoms worse:   Screens  Makes symptoms better. Resting  Taking  ibuprofen (Advil)        Helpful:  Yes sometimes    Physical Symptoms:  Headache-Yes     Resolved No           Improved since accident Same     Nausea- Yes    Resolved No        Improved since accident    Same     Vomiting - No       Balance problems - Yes        Resolved No  Improved since accident Worsen     Dizziness - Yes     Resolved No        Improved since accident Worsen   Visual problems - Yes      Resolved No          Improved since accident Same    Fatigue - Yes     Resolved No         Improved since accident Same    Sensitivity to light - No     Resolved Yes         Improved since accident Improved    Sensitivity to sound - No       Numbness/tingling -No           Cognitive Symptoms  Feeling mentally foggy - Yes        Resolved No      Improved since accident Same    Feeling slowed down - Yes       Resolved No        Improved since accident Improved    Difficulty Concentrating- Yes       Resolved  No    Improved since accident Same    Difficulty remembering - No           Emotional Symptoms  Irritability - No        Resolved N/A       Improved since accident Same    Sadness-   No       Resolved N/A       Improved since accident Same    More emotional - No      Resolved N/A       Improved since accident Same    Nervousness/anxiety - No      Resolved N/A         Improved since accident Same      Psychiatric History:  Anxiety -Yes   Depression -Yes   Other mental health dx:  No     Sleep Disorders - No   The patient denies being a victim of abuse.   Ever Hospitalized for mental health:            No   Any thought of hurting self or others now?   No   Any history  of hurting self or others?            No     Sleep History:  Drowsiness- Yes        Resolved No       Improved since accident Worsen    Sleep less than usual - No   Sleep more than usual - Yes   Trouble falling asleep - No     Resolved Yes        Improved since accident Improved    Does the patient wake feeling rested - No        Resolved No          Improved since accident Same       Migraine Headaches      Patient history of migraines.   Yes       Family history of migraines    Yes     Exertion:         Do the above stated symptoms worsen with physical activity? No         Do the above stated symptoms worsen with cognitive activity? Yes           The following portions of the patient's history were reviewed and updated as appropriate: allergies, current medications, past family history, past medical history, past social history, past surgical history and problem list.    Review of Systems  A comprehensive review of systems was negative except for what is noted above.    Objective:       Discussion was held with the patient today regarding concussion in general including types of injury, symptoms that are common, treatment and variability in time to recover. Education about concussion symptoms and length of time it would take the patient to recover was also given to the patient.  I have reassured the patient her symptoms are very common when a concussion is present and will improve with time. We discussed the risks and benefits of the medication including risk of worsening depression with medication adjustments and even the possibility of emergence of suicidal ideations.       Total time spent with the patient today was 80 minutes with greater than 50% of the time spent in counseling and care coordination. The patient will call before then with any questions, concerns or problems.The patient will seek out appropriate emergency services should that become necessary.    Physical Exam:   Neck:  Full ROM  Yes  with  pain or stiffness Yes     Neurologic:   Mental status: Alert, oriented, thought content appropriate.. Recent and remote memory grossly intact.  Yes  Speech is clear and fluent with no obvious word finding or paraphasic errors. Yes     Assessment/Diagnosis managed and treated at today's visit :  Post concussion syndrome  Post concussion headache  Nausea  Dizziness  Fatigue  Insomnia  Sensitivity to light  Sound sensitivity  Concentration and Attention deficit  Memory difficulties  Anxiety d/t a medical condition  Irritability  Return to work    Other:   Patient agrees to call or return sooner with any questions or concerns.  Risks and benefits were discussed. Continue with individual therapist if already established.     Continue with the support of the clinic, reassurance, and redirection. Staff monitoring and ongoing assessments per team plan.This team will utilize appropriate emergency services if necessary. I will make myself available if concerns or problems arise.     Mental Status Examination  Alertness:  alert  and oriented  Appearance:  well groomed  Behavior/Demeanor:  cooperative, pleasant and calm, with good  eye contact.  Speech:  normal  Psychomotor:  normal or unremarkable    Mood:  good  Affect:  appropriate and was congruent to speech content.  Thought Process/Associations: unremarkable   Thought Content: devoid of  suicidal and violent ideation and delusions.   Perception: devoid of  hallucinations  Insight:  good.  Judgment: good.  Attention/Concentration:  Fair  Language:  Intact  Fund of Knowledge:  Average.    Memory:  Immediate recall intact, Short-term memory intact and Long-term memory intact.         Consent was obtained for this service by one of our care team members    Video Visit Details    Type of service: Video Visit    Video Start Time: 0900    Video End Time:  1010    Total time of video visit: 70 minutes    10 minutes spent on the date of the encounter doing chart review, review of  "outside records, review of test results, interpretation of tests, documentation and return to work letter    Originating Location: Patient's home    Distant Location:  [facility] [place]    Mode of Communication: Video Conference via [place] [facility]    Patient Instructions   It was nice speaking with you today for our office visit held through a virtual visit. The following is a summary of our visit and my recommendations:    How to return to daily activities with concussion:  1. Get lots of rest. Be sure to get enough sleep at night- no late nights. Keep the same bedtime weekdays and weekends.   2. Take daytime naps or rest breaks when you feel tired or fatigued.  3. Limit physical activity as well as activities that require a lot of thinking or concentration. These activities can make symptoms worse and recovery time longer. In some cases, your doctor may prescribe time that you completely eliminate these activities to allow complete \"brain rest.\"  Physical activity includes going to the gym, sports practices, weight-training, running, exercising, heavy lifting, etc.  Thinking and concentration activities (e.g., cell phone texting, computer games, movies, parties, loud music and in severe cases may include limiting your time at work).  4. Drink lots of fluids and eat carbohydrates or protein to main appropriate blood sugar levels.  5. As symptoms decrease, with consent from your doctor, you may begin to gradually return to your daily activities. If symptoms worsen or return, lessen your activities, then try again to increase your activities gradually.   6. During recovery, it is normal to feel frustrated and sad when you do not feel right and you can't be as active as usual.  7. Repeated evaluation of your symptoms is recommended to help guide recovery. Please follow up as recommended by your doctor to ensure a safe and healthy recovery.    Watch for and go to the Emergency " Department if you have any of the following symptoms:  Headaches that significantly worsen  Looks very drowsy or can't be awakened  Can't recognize people or places  Worsening neck pain  Seizures  Repeated vomiting  Increasing confusion or irritability  Unusual behavioral change  Slurred speech  Weakness or numbness in arms/legs  Change in state of consciousness    For more information, please visit on the Internet:  http://www.cdc.gov/concussion/get_help.html   http://www.cdc.gov/concussion/pdf/Facts_about_Concussion_TBI-a.pdf      General Information:  Today you had your appointment with Susannah Prasad CNP     If lab work was done today as part of your evaluation you will generally be contacted via My Chart, mail, or phone with the results within 1-5 days. If there is an alarming result we will contact you by phone. Lab results come back at varying times, I generally wait until all labs are resulted before making comments on results. Please note labs are automatically released to My Chart once available.     If you need refills please contact your pharmacist. They will send a refill request to me to review. Please allow 3 business days for us to process all refill requests.     Please call or send a medical message through My Chart, with any questions or concerns.    If you need any paperwork completed please fax forms to [phone]. Please state if you would like a copy of the completed paperwork, mailed or faxed back to the patient and a fax number to fax the paperwork to. Please allow up to 10 business days for paperwork to be completed.    Susannah Prasad CNP          Again, thank you for allowing me to participate in the care of your patient.        Sincerely,        RAYMOND Garcia CNP

## 2022-03-18 NOTE — PROGRESS NOTES
"Video Visit  Vivi Mccall is a 36 year old female who is being evaluated via a billable video visit in light of the ongoing global health crisis (COVID-19) that requires us to abide by social distancing mandates in order to reduce the risk of COVID-19 exposure.      The patient has been notified of following:     \"This virtual visit will be conducted via a video call between you and your physician/provider. We have found that certain health care needs can be provided without the need for a physical exam.  This service lets us provide the care you need with a short video conversation.  If a prescription is necessary we can send it directly to your pharmacy.  If lab work is needed we can place an order for that and you can then stop by our lab to have the test done at a later time.    If during the course of the call the physician/provider feels a video visit is not appropriate, you will not be charged for this service.\"     Patient has given verbal consent to a Video visit? Yes    Vivi Mccall chief complaint is: Post Concussion Syndrome      Current PT  No   Current OT   No   Current ST      No   Current Chiropractic   No   Psychiatrist currently  No   Past:   Yes   Psychologist currently  No   Past:   Yes   Primary: Currently    Yes                Need a note for work accommodations   No   Need a note for school accommodations    No        Medications  Currently on medication to help you sleep   Yes  Lorazepam  Mental health dx.- Yes   Currently on medication to help with mental health Yes     Citalopram  Currently on medication for concentration or ADD /ADHD      No      Date of accident: 1/21/22    Workman's Comp  No     QRC   No        How concussion happened:     Pt slipped and fell down the stairs       LOC:  No      Did you seek medical attention:  Yes    When :  1/23/22    MRI/CT Completed Yes       Injury Description:               Was there a forcible blow to the head?:                " Yes      Where on head? Back                                             Retrograde Amnesia (loss of memory of events before the injury)?:  No   Anterograde Amnesia (loss of memory of events following injury)?: No     Number of previous head injuries.        4  Pt unsure when previous head injuries were    Had all previous concussion symptoms resolved   Yes     Work/School  Currently employed    Yes    Retired    No   How many years ago/Previous occupation: N/a  Disability No   for/when started:  N/a      Title         works at    [employer]      Normal hours per week  (Average before injury) 40-50        Have you returned to work?            Yes    Hours working a week currently  40-50  The concussion symptoms are limiting her ability to work.  How Dizziness, screens    Patient History  Patient was referred to the concussion clinic by [facility].     Phone Start Time: 8:30 am    Phone End Time:  9:00 am    Total time of phone call 30 minutes    Mode of Communication: Video Conference via [employer]    Plan:     Neuropsychological assessment   No     PT to evaluate and treat  Yes   OT to evaluate and treat  No   ST to evaluate and treat  No   Safe and Sound eval and treat   No   Referral to ophthalmology   No   Referral to Neurology        No   Referral to psychology No   Referral to psychiatry  No   Other Referral   No   MRI/CT ordered today : No   Labs ordered today : No   New medication :  Yes   see below  Work note completed : Yes   School note completed : N/A   C list sent : N/A     We discussed some treatment options and have elected to   refer the patient to physical therapy to help with neck pain and balance problems/dizziness.  Patient will also start amitriptyline, Wellbutrin, and Ritalin.  I will also put a note into her chart to help her with her return to work..    Medication Adjustment:  Wellbutrin 150 mg, take one tab PO every  am  Amitriptyline 25 mg, take 1-2 tabs PO every HS  Ritalin 5 mg, take 1 tablet twice a day    Return to Work/School   Full scheduled hours  Yes      Note completed    Yes      Return to clinic 8 weeks    Continue with the support of the clinic, reassurance, and redirection. Staff monitoring and ongoing assessments per team plan. This team will utilize appropriate emergency services if necessary. I will make myself available if concerns or problems arise.  The patient agrees to call/message before her next visit with any questions, concerns or problems.    Subjective:          HPI Per Pt EMR: Vivi Mccall is a 36 year old female who presents after slipping and falling down stairs 2 days ago.  She notes her foot slipped out from under her and she fell backward onto her back and head and slid down a few steps.  She hit the left side of her body and her head and has hip pain, shoulder blade pain, and back of the head pain. She has left eye cloudiness that has been constant, but notes that this is unchanged from her chronic left eye cloudiness due to a vitreous detachment.  Per patient report, she was evaluated by her ophthalmologist prior to arriving in the ED and they note no concern.  Due to concern for ongoing head pain, nausea, and an episode of vomiting, she presents to the ED. She has sensitivity to light in both eyes. She mentions that she feels pressure in her sinuses. She denies weakness or tingling in her extremities. She took 800 mg ibuprofen for the pain after the fall. This morning she took 2 Advils.  She does note her symptoms improve when she rests.  She notes her symptoms worsened when she was helping at a dance recital.    Headaches:  Significant ongoing headaches Yes   Headaches: Intermittently  Improvement :No   Current Headache No   Wake with HA  Yes     Worse Headache    6/10           How often: Couple days per week    Average Headache 2/10.    Best Headache 2/10.  Brings on HA/Makes  symptoms worse:   Screens  Makes symptoms better. Resting  Taking  ibuprofen (Advil)        Helpful:  Yes sometimes    Physical Symptoms:  Headache-Yes     Resolved No           Improved since accident Same     Nausea- Yes    Resolved No        Improved since accident    Same     Vomiting - No       Balance problems - Yes        Resolved No  Improved since accident Worsen     Dizziness - Yes     Resolved No        Improved since accident Worsen   Visual problems - Yes      Resolved No          Improved since accident Same    Fatigue - Yes     Resolved No         Improved since accident Same    Sensitivity to light - No     Resolved Yes         Improved since accident Improved    Sensitivity to sound - No       Numbness/tingling -No           Cognitive Symptoms  Feeling mentally foggy - Yes        Resolved No      Improved since accident Same    Feeling slowed down - Yes       Resolved No        Improved since accident Improved    Difficulty Concentrating- Yes       Resolved  No    Improved since accident Same    Difficulty remembering - No           Emotional Symptoms  Irritability - No        Resolved N/A       Improved since accident Same    Sadness-   No       Resolved N/A       Improved since accident Same    More emotional - No      Resolved N/A       Improved since accident Same    Nervousness/anxiety - No      Resolved N/A         Improved since accident Same      Psychiatric History:  Anxiety -Yes   Depression -Yes   Other mental health dx:  No     Sleep Disorders - No   The patient denies being a victim of abuse.   Ever Hospitalized for mental health:            No   Any thought of hurting self or others now?   No   Any history of hurting self or others?            No     Sleep History:  Drowsiness- Yes        Resolved No       Improved since accident Worsen    Sleep less than usual - No   Sleep more than usual - Yes   Trouble falling asleep - No     Resolved Yes        Improved since accident Improved     Does the patient wake feeling rested - No        Resolved No          Improved since accident Same       Migraine Headaches      Patient history of migraines.   Yes       Family history of migraines    Yes     Exertion:         Do the above stated symptoms worsen with physical activity? No         Do the above stated symptoms worsen with cognitive activity? Yes           The following portions of the patient's history were reviewed and updated as appropriate: allergies, current medications, past family history, past medical history, past social history, past surgical history and problem list.    Review of Systems  A comprehensive review of systems was negative except for what is noted above.    Objective:       Discussion was held with the patient today regarding concussion in general including types of injury, symptoms that are common, treatment and variability in time to recover. Education about concussion symptoms and length of time it would take the patient to recover was also given to the patient.  I have reassured the patient her symptoms are very common when a concussion is present and will improve with time. We discussed the risks and benefits of the medication including risk of worsening depression with medication adjustments and even the possibility of emergence of suicidal ideations.       Total time spent with the patient today was 80 minutes with greater than 50% of the time spent in counseling and care coordination. The patient will call before then with any questions, concerns or problems.The patient will seek out appropriate emergency services should that become necessary.    Physical Exam:   Neck:  Full ROM  Yes  with pain or stiffness Yes     Neurologic:   Mental status: Alert, oriented, thought content appropriate.. Recent and remote memory grossly intact.  Yes  Speech is clear and fluent with no obvious word finding or paraphasic errors. Yes     Assessment/Diagnosis managed and treated at  today's visit :  Post concussion syndrome  Post concussion headache  Nausea  Dizziness  Fatigue  Insomnia  Sensitivity to light  Sound sensitivity  Concentration and Attention deficit  Memory difficulties  Anxiety d/t a medical condition  Irritability  Return to work    Other:   Patient agrees to call or return sooner with any questions or concerns.  Risks and benefits were discussed. Continue with individual therapist if already established.     Continue with the support of the clinic, reassurance, and redirection. Staff monitoring and ongoing assessments per team plan.This team will utilize appropriate emergency services if necessary. I will make myself available if concerns or problems arise.     Mental Status Examination  Alertness:  alert  and oriented  Appearance:  well groomed  Behavior/Demeanor:  cooperative, pleasant and calm, with good  eye contact.  Speech:  normal  Psychomotor:  normal or unremarkable    Mood:  good  Affect:  appropriate and was congruent to speech content.  Thought Process/Associations: unremarkable   Thought Content: devoid of  suicidal and violent ideation and delusions.   Perception: devoid of  hallucinations  Insight:  good.  Judgment: good.  Attention/Concentration:  Fair  Language:  Intact  Fund of Knowledge:  Average.    Memory:  Immediate recall intact, Short-term memory intact and Long-term memory intact.         Consent was obtained for this service by one of our care team members    Video Visit Details    Type of service: Video Visit    Video Start Time: 0900    Video End Time:  1010    Total time of video visit: 70 minutes    10 minutes spent on the date of the encounter doing chart review, review of outside records, review of test results, interpretation of tests, documentation and return to work letter    Originating Location: Patient's home    Distant Location:  [facility] [place]    Mode of Communication: Video Conference via [employer]  "[employer]    Patient Instructions   It was nice speaking with you today for our office visit held through a virtual visit. The following is a summary of our visit and my recommendations:    How to return to daily activities with concussion:  1. Get lots of rest. Be sure to get enough sleep at night- no late nights. Keep the same bedtime weekdays and weekends.   2. Take daytime naps or rest breaks when you feel tired or fatigued.  3. Limit physical activity as well as activities that require a lot of thinking or concentration. These activities can make symptoms worse and recovery time longer. In some cases, your doctor may prescribe time that you completely eliminate these activities to allow complete \"brain rest.\"  Physical activity includes going to the gym, sports practices, weight-training, running, exercising, heavy lifting, etc.  Thinking and concentration activities (e.g., cell phone texting, computer games, movies, parties, loud music and in severe cases may include limiting your time at work).  4. Drink lots of fluids and eat carbohydrates or protein to main appropriate blood sugar levels.  5. As symptoms decrease, with consent from your doctor, you may begin to gradually return to your daily activities. If symptoms worsen or return, lessen your activities, then try again to increase your activities gradually.   6. During recovery, it is normal to feel frustrated and sad when you do not feel right and you can't be as active as usual.  7. Repeated evaluation of your symptoms is recommended to help guide recovery. Please follow up as recommended by your doctor to ensure a safe and healthy recovery.    Watch for and go to the Emergency Department if you have any of the following symptoms:  Headaches that significantly worsen  Looks very drowsy or can't be awakened  Can't recognize people or places  Worsening neck pain  Seizures  Repeated vomiting  Increasing confusion or irritability  Unusual " behavioral change  Slurred speech  Weakness or numbness in arms/legs  Change in state of consciousness    For more information, please visit on the Internet:  http://www.cdc.gov/concussion/get_help.html   http://www.cdc.gov/concussion/pdf/Facts_about_Concussion_TBI-a.pdf      General Information:  Today you had your appointment with Susannah Prasad CNP     If lab work was done today as part of your evaluation you will generally be contacted via My Chart, mail, or phone with the results within 1-5 days. If there is an alarming result we will contact you by phone. Lab results come back at varying times, I generally wait until all labs are resulted before making comments on results. Please note labs are automatically released to My Chart once available.     If you need refills please contact your pharmacist. They will send a refill request to me to review. Please allow 3 business days for us to process all refill requests.     Please call or send a medical message through My Chart, with any questions or concerns.    If you need any paperwork completed please fax forms to [phone]. Please state if you would like a copy of the completed paperwork, mailed or faxed back to the patient and a fax number to fax the paperwork to. Please allow up to 10 business days for paperwork to be completed.    Susannah Prasad CNP

## 2022-03-20 RX ORDER — METHYLPHENIDATE HYDROCHLORIDE 5 MG/1
5 TABLET ORAL 2 TIMES DAILY
Qty: 60 TABLET | Refills: 0 | Status: SHIPPED | OUTPATIENT
Start: 2022-03-20 | End: 2022-08-04

## 2022-03-20 RX ORDER — BUPROPION HYDROCHLORIDE 150 MG/1
150 TABLET ORAL EVERY MORNING
Qty: 30 TABLET | Refills: 3 | Status: SHIPPED | OUTPATIENT
Start: 2022-03-20 | End: 2022-08-08

## 2022-03-26 ENCOUNTER — HEALTH MAINTENANCE LETTER (OUTPATIENT)
Age: 37
End: 2022-03-26

## 2022-04-29 ENCOUNTER — OFFICE VISIT (OUTPATIENT)
Dept: OBGYN | Facility: CLINIC | Age: 37
End: 2022-04-29
Payer: COMMERCIAL

## 2022-04-29 VITALS — DIASTOLIC BLOOD PRESSURE: 98 MMHG | SYSTOLIC BLOOD PRESSURE: 132 MMHG | BODY MASS INDEX: 29.8 KG/M2 | WEIGHT: 196 LBS

## 2022-04-29 DIAGNOSIS — Z30.432 ENCOUNTER FOR REMOVAL OF INTRAUTERINE CONTRACEPTIVE DEVICE: Primary | ICD-10-CM

## 2022-04-29 DIAGNOSIS — Z12.4 SCREENING FOR CERVICAL CANCER: ICD-10-CM

## 2022-04-29 DIAGNOSIS — Z80.3 FAMILY HISTORY OF BREAST CANCER: ICD-10-CM

## 2022-04-29 PROCEDURE — 87624 HPV HI-RISK TYP POOLED RSLT: CPT | Performed by: OBSTETRICS & GYNECOLOGY

## 2022-04-29 PROCEDURE — G0145 SCR C/V CYTO,THINLAYER,RESCR: HCPCS | Performed by: OBSTETRICS & GYNECOLOGY

## 2022-04-29 PROCEDURE — 99212 OFFICE O/P EST SF 10 MIN: CPT | Mod: 25 | Performed by: OBSTETRICS & GYNECOLOGY

## 2022-04-29 PROCEDURE — 58301 REMOVE INTRAUTERINE DEVICE: CPT | Performed by: OBSTETRICS & GYNECOLOGY

## 2022-04-29 NOTE — PATIENT INSTRUCTIONS
Jyoti IUD removed easily today.  Discussed that Vivi may get some light bleeding/spotting with acute drop in hormones.  Discussed current medications and pregnancy.  Will discuss blood pressure meds with PCP if they were to try to get pregnant any time soon.  REviewed family history with mother's new diagnosis of breast cancer.  Given 2 generations of breast cancer (maternal grandmother at age 35 with recurrence and now mother at age 60), will check baseline mammogram today.  Will return for yearly exam at the same time.

## 2022-04-29 NOTE — PROGRESS NOTES
IUD Removal:  SUBJECTIVE:    Is a pregnancy test required: No.  Was a consent obtained?  Yes    Vivi Mccall is a 36 year old female,, Patient's last menstrual period was 2022 (exact date). who presents today for IUD removal. Her current IUD was placed 2 years ago. She has not had problems with the IUD. She requests removal of the IUD because she desires to conceive   Will also perform full pelvic exam and pap smear as will be due this summer.    Mother recently diagnosed with triple negative breast cancer at age 60.  Negative genetic testing.  Will start neoadjuvant chemo next week and then surgery upon completion --leaning towards mastectomy.  Also maternal grandmother had breast cancer at at 35 with recurrence later in life.  Wonders about early mammogram and about BRCA testing for herself.  Daughter Therese is doing great!    Today's PHQ-2 Score:   PHQ-2 (  [employer]) 2021   Q1: Little interest or pleasure in doing things 0   Q2: Feeling down, depressed or hopeless 0   PHQ-2 Score 0   PHQ-2 Total Score (12-17 Years)- Positive if 3 or more points; Administer PHQ-A if positive 0   Q1: Little interest or pleasure in doing things -   Q2: Feeling down, depressed or hopeless -   PHQ-2 Score -       PROCEDURE:    A speculum exam was performed and the cervix was visualized. Pap smear was collected.  The IUD string was visualized. Using ring forceps, the string was grasped and the IUD removed intact.  Speculum removed and bimanual exam done.      Pelvic Exam:  External Genitalia:                Normal appearance for age, no discharge present, no tenderness present, no inflammatory lesions present, color normal  Vagina:               Normal vaginal vault without central or paravaginal defects, no discharge present, no inflammatory lesions present, no masses present  Bladder:                Nontender to palpation  Urethra:              Urethral Body:  Urethra palpation normal, urethra structural  support normal              Urethral Meatus:  No erythema or lesions present  Cervix:                Appearance healthy, no lesions present, nontender to palpation, no bleeding present, string present  Uterus:                Nontender to palpation, no masses present, position anteflexed, mobility: normal  Adnexa:                No adnexal tenderness present, no adnexal masses present  Perineum:                Perineum within normal limits, no evidence of trauma, no rashes or skin lesions present  Anus:                Anus within normal limits, no hemorrhoids present  Inguinal Lymph Nodes:                No lymphadenopathy present  Pubic Hair:                Normal pubic hair distribution for age  Genitalia and Groin:                No rashes present, no lesions present, no areas of discoloration, no masses present      POST PROCEDURE:    The patient tolerated the procedure well. Patient was discharged in stable condition.    Call if bleeding, pain or fever occur., Birth control counseling given. and Pregnancy counseling given, including folic acid supplementation 800-1000 mg per day.     Patient Instructions   Jyoti IUD removed easily today.  Discussed that Vivi may get some light bleeding/spotting with acute drop in hormones.  Discussed current medications and pregnancy.  Will discuss blood pressure meds with PCP if they were to try to get pregnant any time soon.  REviewed family history with mother's new diagnosis of breast cancer.  Given 2 generations of breast cancer (maternal grandmother at age 35 with recurrence and now mother at age 60), will check baseline mammogram today.  Will return for yearly exam at the same time.      Anamaria Park MD

## 2022-05-04 LAB
BKR LAB AP GYN ADEQUACY: NORMAL
BKR LAB AP GYN INTERPRETATION: NORMAL
BKR LAB AP HPV REFLEX: NORMAL
BKR LAB AP LMP: NORMAL
BKR LAB AP PREVIOUS ABNORMAL: NORMAL
PATH REPORT.COMMENTS IMP SPEC: NORMAL
PATH REPORT.COMMENTS IMP SPEC: NORMAL
PATH REPORT.RELEVANT HX SPEC: NORMAL

## 2022-05-05 LAB
HUMAN PAPILLOMA VIRUS 16 DNA: NEGATIVE
HUMAN PAPILLOMA VIRUS 18 DNA: NEGATIVE
HUMAN PAPILLOMA VIRUS FINAL DIAGNOSIS: NORMAL
HUMAN PAPILLOMA VIRUS OTHER HR: NEGATIVE

## 2022-05-06 NOTE — PROGRESS NOTES
"  Assessment & Plan     Benign essential hypertension  Restarting labetalol  They are trying to conceive  reveiwed starting at low dose and ramping up  See me in 1 month  - labetalol (NORMODYNE) 100 MG tablet; Take 1 tablet (100 mg) by mouth 2 times daily INCREASE  MG TWICE DAILY AFTER 3 WEEKS  - lisinopril-hydrochlorothiazide (ZESTORETIC) 20-25 MG tablet; Take 1 tablet by mouth daily THEN STOP AFTER 2 WEEKS    Family history of malignant neoplasm of breast        Prescription drug management  20 minutes spent on the date of the encounter doing chart review, history and exam, documentation and further activities per the note       BMI:   Estimated body mass index is 29.95 kg/m  as calculated from the following:    Height as of 1/23/22: 1.727 m (5' 8\").    Weight as of this encounter: 89.4 kg (197 lb).       See Patient Instructions    Return in about 4 weeks (around 6/6/2022) for BP Recheck.    Vi Baldwin MD  [facility]   Vivi is a 36 year old who presents for the following health issues     History of Present Illness       Mental Health Follow-up:                    Today's PHQ-9         PHQ-9 Total Score: 1  PHQ-9 Q9 Thoughts of better off dead/self-harm past 2 weeks :   (P) Not at all    How difficult have these problems made it for you to do your work, take care of things at home, or get along with other people: Not difficult at all        Reason for visit:  Medications /Update family history  Symptom onset:  More than a month  Symptom intensity:  Mild  Symptom progression:  Improving  Had these symptoms before:  No    She eats 2-3 servings of fruits and vegetables daily.She consumes 3 sweetened beverage(s) daily.She exercises with enough effort to increase her heart rate 20 to 29 minutes per day.  She exercises with enough effort to increase her heart rate 3 or less days per week. She is missing 7 dose(s) of medications per week.  She is not taking " prescribed medications regularly due to remembering to take.     SLIPPED AND FELL AT [place] AND HAD CONCUSSION  HAD EMESIS AND WAS SEEN AT THE er  TOOK 3 DAYS OFF FROM SCREENS   END OF fEBRUARY DAUGHTER WAS ADMITTED TO HOSPITAL  CONCUSION symptoms RETURNED HAD VISION CHANGES, WAS SEEN BY AN OPHTHALMOLOGY AND CONCERNS FOR DROPPING EYELID NOT NOTED TO BE HORNERS  SENT TO CONSUSSION CLINIC    HAS ADDRESSED SCREEN USE - LOTS OF MEETINGS AND IS NOW USING A SCREEN AND GLASSES WHEN ON SCREEN    STARTED mETHYLPHENIDATE FOR CONCENTRATION ISSUES  5 MG ONCE DAILY - UNABLE TO SLEEP WITH AFTERNOON DOSE    HAD iud TAKEN OUT AND INTERESTED IN TRYING TO CONCEIVE    TAKING ZYRTEC AND NOSE SPRAY  HAS TESTED NEG FOR COVID          Review of Systems   Constitutional, HEENT, cardiovascular, pulmonary, gi and gu systems are negative, except as otherwise noted.      Objective    LMP 04/19/2022 (Exact Date)   There is no height or weight on file to calculate BMI.  Physical Exam   GENERAL: healthy, alert and no distress  CV: regular rate and rhythm, normal S1 S2, no S3 or S4, no murmur, click or rub, no peripheral edema and peripheral pulses strong    Office Visit on 04/29/2022   Component Date Value Ref Range Status     Interpretation 04/29/2022 Negative for Intraepithelial Lesion or Malignancy (NILM)    Final     Comment 04/29/2022    Final                    Value:This result contains rich text formatting which cannot be displayed here.     Specimen Adequacy 04/29/2022 Satisfactory for evaluation, endocervical/transformation zone component absent   Final     Clinical Information 04/29/2022    Final                    Value:This result contains rich text formatting which cannot be displayed here.     LMP/Menopause Date 04/29/2022    Final                    Value:This result contains rich text formatting which cannot be displayed here.     Reflex Testing 04/29/2022 Yes regardless of result   Final     Previous Abnormal? 04/29/2022    Final                     Value:This result contains rich text formatting which cannot be displayed here.     Performing Labs 04/29/2022    Final                    Value:This result contains rich text formatting which cannot be displayed here.     Other HR HPV 04/29/2022 Negative  Negative Final     HPV16 DNA 04/29/2022 Negative  Negative Final     HPV18 DNA 04/29/2022 Negative  Negative Final     FINAL DIAGNOSIS 04/29/2022    Final                    Value:This result contains rich text formatting which cannot be displayed here.

## 2022-05-09 ENCOUNTER — OFFICE VISIT (OUTPATIENT)
Dept: FAMILY MEDICINE | Facility: CLINIC | Age: 37
End: 2022-05-09
Payer: COMMERCIAL

## 2022-05-09 VITALS
TEMPERATURE: 97.7 F | OXYGEN SATURATION: 99 % | RESPIRATION RATE: 16 BRPM | BODY MASS INDEX: 29.95 KG/M2 | SYSTOLIC BLOOD PRESSURE: 144 MMHG | WEIGHT: 197 LBS | HEART RATE: 91 BPM | DIASTOLIC BLOOD PRESSURE: 94 MMHG

## 2022-05-09 DIAGNOSIS — I10 BENIGN ESSENTIAL HYPERTENSION: Primary | ICD-10-CM

## 2022-05-09 DIAGNOSIS — Z80.3 FAMILY HISTORY OF MALIGNANT NEOPLASM OF BREAST: ICD-10-CM

## 2022-05-09 PROCEDURE — 99214 OFFICE O/P EST MOD 30 MIN: CPT | Performed by: FAMILY MEDICINE

## 2022-05-09 RX ORDER — LISINOPRIL AND HYDROCHLOROTHIAZIDE 20; 25 MG/1; MG/1
1 TABLET ORAL DAILY
Qty: 180 TABLET | Refills: 3 | COMMUNITY
Start: 2022-05-09 | End: 2022-07-07

## 2022-05-09 RX ORDER — LABETALOL 100 MG/1
100 TABLET, FILM COATED ORAL 2 TIMES DAILY
Qty: 60 TABLET | Refills: 1 | Status: SHIPPED | OUTPATIENT
Start: 2022-05-09 | End: 2022-06-09

## 2022-05-09 ASSESSMENT — PATIENT HEALTH QUESTIONNAIRE - PHQ9
SUM OF ALL RESPONSES TO PHQ QUESTIONS 1-9: 1
10. IF YOU CHECKED OFF ANY PROBLEMS, HOW DIFFICULT HAVE THESE PROBLEMS MADE IT FOR YOU TO DO YOUR WORK, TAKE CARE OF THINGS AT HOME, OR GET ALONG WITH OTHER PEOPLE: NOT DIFFICULT AT ALL
SUM OF ALL RESPONSES TO PHQ QUESTIONS 1-9: 1

## 2022-05-09 NOTE — PATIENT INSTRUCTIONS
START LABETALOL 100 MG TWICE DAILY FOR 3 WEEKS THEN INCREASE  MG TWICE DAILY  REDUCE LISINOPRIL/hydrochlorothiazide TO 1 TABLET DAILY FOR THE FIRST 2 WEEKS THEN STOP.    SEE ME IN 4-6 WEEK FOR BP CHECK

## 2022-06-06 ENCOUNTER — VIRTUAL VISIT (OUTPATIENT)
Dept: PSYCHOLOGY | Facility: CLINIC | Age: 37
End: 2022-06-06
Attending: FAMILY MEDICINE
Payer: COMMERCIAL

## 2022-06-06 DIAGNOSIS — F33.1 MAJOR DEPRESSIVE DISORDER, RECURRENT EPISODE, MODERATE (H): Primary | ICD-10-CM

## 2022-06-06 DIAGNOSIS — F41.1 GAD (GENERALIZED ANXIETY DISORDER): ICD-10-CM

## 2022-06-06 DIAGNOSIS — R41.840 CONCENTRATION DEFICIT: ICD-10-CM

## 2022-06-06 PROCEDURE — 90837 PSYTX W PT 60 MINUTES: CPT | Mod: 95 | Performed by: PSYCHOLOGIST

## 2022-06-06 ASSESSMENT — COLUMBIA-SUICIDE SEVERITY RATING SCALE - C-SSRS
5. HAVE YOU STARTED TO WORK OUT OR WORKED OUT THE DETAILS OF HOW TO KILL YOURSELF? DO YOU INTEND TO CARRY OUT THIS PLAN?: NO
1. IN THE PAST MONTH, HAVE YOU WISHED YOU WERE DEAD OR WISHED YOU COULD GO TO SLEEP AND NOT WAKE UP?: NO
2. HAVE YOU ACTUALLY HAD ANY THOUGHTS OF KILLING YOURSELF IN THE PAST MONTH?: YES
3. HAVE YOU BEEN THINKING ABOUT HOW YOU MIGHT KILL YOURSELF?: NO
4. HAVE YOU HAD THESE THOUGHTS AND HAD SOME INTENTION OF ACTING ON THEM?: NO
6. HAVE YOU EVER DONE ANYTHING, STARTED TO DO ANYTHING, OR PREPARED TO DO ANYTHING TO END YOUR LIFE?: NO

## 2022-06-06 ASSESSMENT — PATIENT HEALTH QUESTIONNAIRE - PHQ9
SUM OF ALL RESPONSES TO PHQ QUESTIONS 1-9: 3
SUM OF ALL RESPONSES TO PHQ QUESTIONS 1-9: 3
10. IF YOU CHECKED OFF ANY PROBLEMS, HOW DIFFICULT HAVE THESE PROBLEMS MADE IT FOR YOU TO DO YOUR WORK, TAKE CARE OF THINGS AT HOME, OR GET ALONG WITH OTHER PEOPLE: NOT DIFFICULT AT ALL

## 2022-06-06 NOTE — PROGRESS NOTES
[facility]   Mental Health & Addiction Services     Progress Note - Initial Visit    Patient  Name:  Vivi Mccall Date: 2022         Service Type: Individual     Visit Start Time: 1206  Visit End Time:     Visit #: 1    Attendees: Client    Service Modality:  Video Visit:      Provider verified identity through the following two step process.  Patient provided:  Patient photo and Patient     Telemedicine Visit: The patient's condition can be safely assessed and treated via synchronous audio and visual telemedicine encounter.      Reason for Telemedicine Visit: Services only offered telehealth    Originating Site (Patient Location): Patient's home    Distant Site (Provider Location): Provider Remote Setting- Home Office    Consent:  The patient/guardian has verbally consented to: the potential risks and benefits of telemedicine (video visit) versus in person care; bill my insurance or make self-payment for services provided; and responsibility for payment of non-covered services.     Patient would like the video invitation sent by:  My Chart    Mode of Communication:  Video Conference via Amwell    As the provider I attest to compliance with applicable laws and regulations related to telemedicine.       DATA:   Interactive Complexity: No   Crisis: No  Extended Session (53+ minutes):   - Treatment protocol required additional time to complete, due to the nature of diagnosis being treated.  See Interventions section for details      Presenting Concerns/  Current Stressors:   ADHD Eval      ASSESSMENT:  Mental Status Assessment:  Appearance:   Appropriate   Eye Contact:   Good   Psychomotor Behavior: Normal   Attitude:   Cooperative   Orientation:   All  Speech   Rate / Production: Normal/ Responsive   Volume:  Normal   Mood:    Dysphoric  Affect:    Restricted   Thought Content:  Clear   Thought Form:  Coherent  Goal Directed   Insight:    Good  and Fair     Answers for  HPI/ROS submitted by the patient on 6/6/2022  If you checked off any problems, how difficult have these problems made it for you to do your work, take care of things at home, or get along with other people?: Not difficult at all  PHQ9 TOTAL SCORE: 3    Safety Issues and Plan for Safety and Risk Management:     [place] Suicide Severity Rating Scale (Short Version)  [place] Suicide Severity Rating (Short Version) 12/10/2019 12/16/2019 12/20/2019 12/27/2019 9/3/2021 1/23/2022 6/6/2022   Over the past 2 weeks have you felt down, depressed, or hopeless? no no no no no no -   Over the past 2 weeks have you had thoughts of killing yourself? no no no no no no -   Have you ever attempted to kill yourself? yes yes yes no no no -   When did this last happen? more than 6 months ago more than 6 months ago more than 6 months ago - - - -   Comments 20 years ago - - - - - -   Q1 Wished to be Dead (Past Month) - - - - - - no   Q2 Suicidal Thoughts (Past Month) - - - - - - yes   Q3 Suicidal Thought Method - - - - - - no   Q4 Suicidal Intent without Specific Plan - - - - - - no   Q5 Suicide Intent with Specific Plan - - - - - - no   Q6 Suicide Behavior (Lifetime) - - - - - - no   Level of Risk per Screen - - - - - - low risk     Patient reports the following current fears or concerns for personal safety: passive SI.  Patient denies current or recent suicidal ideation or behaviors.  Patient denies current or recent homicidal ideation or behaviors.  Patient denies current or recent self injurious behavior or ideation.  Patient denies other safety concerns.  Recommended that patient call 911 or go to the local ED should there be a change in any of these risk factors.  Patient reports there are no firearms in the house.     Diagnostic Criteria:  MDD  Hx of Anxiety  Rule Out ADHD  Hx of concussion  WHODAS 2.0 (12 item):   WHODAS 2.0 Total Score 6/6/2022   Total Score 12   Total Score MyChart 12     Intervention:  During today s session,  this writer outlined the expectations and purpose of the ADHD Evaluation. The client outlined their reason for referral and symptoms. This writer used the Adult ADHD Evaluation Intake Form to guide the clinical interview; it was not finished. Their PHQ-9 score was available for reviewed; her risk was asessed. A second session was scheduled to complete the clinical interview.    Collateral Reports Completed:  Routed note to Care Team Member(s)      PLAN: (Homework, other):  1. Provider will continue Diagnostic Assessment.  2.  Suicide Risk and Safety Concerns were assessed for Vivi Mccall.        Lela Meadows, PhD LP  June 6, 2022

## 2022-06-09 ENCOUNTER — TELEPHONE (OUTPATIENT)
Dept: FAMILY MEDICINE | Facility: CLINIC | Age: 37
End: 2022-06-09
Payer: COMMERCIAL

## 2022-06-09 DIAGNOSIS — I10 BENIGN ESSENTIAL HYPERTENSION: ICD-10-CM

## 2022-06-09 RX ORDER — LABETALOL 100 MG/1
200 TABLET, FILM COATED ORAL 2 TIMES DAILY
Qty: 180 TABLET | Refills: 0 | Status: SHIPPED | OUTPATIENT
Start: 2022-06-09 | End: 2022-07-07

## 2022-06-09 NOTE — TELEPHONE ENCOUNTER
SN,    Pt calling says she has had high bp readings since being off HCTZ and changing to labetalol 100 mg 1 tab bid. Also has been having swelling daily. She notices in fingers/hands and that reminds her to take the evening dose.    Has been taking the 1 tab bid for a week and a half to two week.     Bp readings today montez been 164/110, 155/96 and last 143/103 at 11:43 this am.    She says she has been taking regularly and has not missed any doses.    Denies any HA, vision changes.  She says she is tired, not thinking as well and a little shaky.    Please advise.  Thanks,  Catia Lundy RN

## 2022-06-09 NOTE — TELEPHONE ENCOUNTER
Let's have her increase to 200 mg twice daily and see where her pressures are after 2 weeks  Can add the hydrochlorothiazide back until the labetalol is up to speed if needed    Please ask her to set up a follow up BP visit in clinic in 3-4 weeks  Vi Baldwin MD

## 2022-06-13 ENCOUNTER — VIRTUAL VISIT (OUTPATIENT)
Dept: PSYCHOLOGY | Facility: CLINIC | Age: 37
End: 2022-06-13
Payer: COMMERCIAL

## 2022-06-13 DIAGNOSIS — F33.1 MAJOR DEPRESSIVE DISORDER, RECURRENT EPISODE, MODERATE (H): Primary | ICD-10-CM

## 2022-06-13 DIAGNOSIS — Z13.39 ATTENTION DEFICIT HYPERACTIVITY DISORDER (ADHD) EVALUATION: ICD-10-CM

## 2022-06-13 DIAGNOSIS — F43.10 POST-TRAUMATIC STRESS DISORDER, UNSPECIFIED: ICD-10-CM

## 2022-06-13 PROCEDURE — 90834 PSYTX W PT 45 MINUTES: CPT | Mod: 95 | Performed by: PSYCHOLOGIST
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
## 2022-06-21 ENCOUNTER — VIRTUAL VISIT (OUTPATIENT)
Dept: PSYCHOLOGY | Facility: CLINIC | Age: 37
End: 2022-06-21
Payer: COMMERCIAL

## 2022-06-21 DIAGNOSIS — F33.1 MAJOR DEPRESSIVE DISORDER, RECURRENT EPISODE, MODERATE (H): Primary | ICD-10-CM

## 2022-06-21 DIAGNOSIS — F43.10 POST-TRAUMATIC STRESS DISORDER, UNSPECIFIED: ICD-10-CM

## 2022-06-21 DIAGNOSIS — Z13.39 ATTENTION DEFICIT HYPERACTIVITY DISORDER (ADHD) EVALUATION: ICD-10-CM

## 2022-06-21 PROCEDURE — 90791 PSYCH DIAGNOSTIC EVALUATION: CPT | Mod: 95 | Performed by: PSYCHOLOGIST

## 2022-06-21 NOTE — PROGRESS NOTES
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 per patient report:  Depression: Change in sleep, Change in appetite, Ruminations, Feeling sad, down, or depressed and Frequent crying  Liz:  No Symptoms  Psychosis: No Symptoms  Anxiety: Excessive worry, Nervousness, Physical complaints, such as headaches, stomachaches, muscle tension, Separation anxiety, Sleep disturbance, Psychomotor agitation, Ruminations, Irritability and Anger outbursts  Panic:  Tremors, Shortness of breath, Tingling, Numbness and Sense of impending doom (last one 4 yrs ago)  Post Traumatic Stress Disorder:  Avoids traumatic stimuli, Hypervigilance, Increased arousal and Impaired functioning   Eating Disorder: No Symptoms  ADD / ADHD:  Difficulties listening, Interrupts and Intrudes  Conduct Disorder: No symptoms  Autism Spectrum Disorder: No symptoms  Obsessive Compulsive Disorder: No Symptoms    Patient reports the following compulsive behaviors and treatment history: Picking - has not had treatment..      Diagnostic Criteria:   PTSD  MDD   Rule Out ADHD    Functional  Status:  Patient reports the following functional impairments:  educational activities, health maintenance, management of the household and or completion of tasks, money management, operation of a motor vehicle, organization, relationship(s), self-care, social interactions and work / vocational responsibilities.     Nonprogrammatic care:  Patient is requesting basic services to address current mental health concerns.    Clinical Summary:  1. Reason for assessment: ADHD Eval  .  2. Psychosocial, Cultural and Contextual Factors: Hx of trauma  .  3. Principal DSM5 Diagnoses  (Sustained by DSM5 Criteria Listed Above):  PTSD  4. Other Diagnoses that is relevant to services:  MDD  5. Provisional Diagnosis: Rule Out ADHD  6. Prognosis: Maintain Current Status / Prevent Deterioration.  7. Likely consequences of symptoms if not treated: chronic.  8. Client strengths include:  motivated and support of family, friends and providers .     Recommendations:     1. Plan for Safety and Risk Management:   Recommended that patient call 911 or go to the local ED should there be a change in any of these risk factors..          Report to child / adult protection services was NA.     2. Patient's identified cultural considerations.     3. Initial Treatment will focus on:    ADHD Testing:  Patient was given self and collaborative rating scales to be completed prior to the next appointment.  Depression and anxiety rating scales were completed.  Copies of previous report cards were requested. .     4. Resources/Service Plan:    services are not indicated.   Modifications to assist communication are not indicated.   Additional disability accommodations are not indicated.      5. Collaboration:   Collaboration / coordination of treatment will be initiated with the following  support professionals: primary care physician.      6.  Referrals:   The following referral(s) will be initiated: None.     A Release of Information has been  obtained for the following: None    7. BRENDA:    BRENDA:  Discussed the general effects of drugs and alcohol on health and well-being. Provider gave patient printed information about the effects of chemical use on their health and well being. Recommendations:  Use responsibly  .     8. Records:   These were reviewed at time of assessment.   Information in this assessment was obtained from the medical record and  provided by patient who is a good historian.    Patient will have open access to their mental health medical record.        Provider Name/ Credentials:  Dr Meadows  June 21, 2022

## 2022-07-04 ENCOUNTER — MYC MEDICAL ADVICE (OUTPATIENT)
Dept: FAMILY MEDICINE | Facility: CLINIC | Age: 37
End: 2022-07-04

## 2022-07-04 DIAGNOSIS — I10 BENIGN ESSENTIAL HYPERTENSION: Primary | ICD-10-CM

## 2022-07-07 RX ORDER — LABETALOL 300 MG/1
300 TABLET, FILM COATED ORAL 2 TIMES DAILY
Qty: 180 TABLET | Refills: 1 | Status: SHIPPED | OUTPATIENT
Start: 2022-07-07 | End: 2022-12-22

## 2022-07-07 RX ORDER — LABETALOL 100 MG/1
300 TABLET, FILM COATED ORAL 2 TIMES DAILY
Qty: 180 TABLET | Refills: 0 | COMMUNITY
Start: 2022-07-07 | End: 2022-09-17 | Stop reason: DRUGHIGH

## 2022-07-13 DIAGNOSIS — F41.1 GAD (GENERALIZED ANXIETY DISORDER): ICD-10-CM

## 2022-07-13 DIAGNOSIS — F33.1 MAJOR DEPRESSIVE DISORDER, RECURRENT EPISODE, MODERATE (H): ICD-10-CM

## 2022-07-14 RX ORDER — LORAZEPAM 0.5 MG/1
TABLET ORAL
Qty: 30 TABLET | OUTPATIENT
Start: 2022-07-14

## 2022-07-14 NOTE — TELEPHONE ENCOUNTER
Routing refill request to provider for review/approval because:  Drug not on the [facility] refill protocol   Please authorize if appropriate.  Thanks,  Catia Lundy RN

## 2022-07-15 NOTE — TELEPHONE ENCOUNTER
Visit needed for controlled substance (benzo) every six months and due to review/sign csa. Prescription not sent. Leaving for dr. Lebron to address next week. Joel Wegener,MD w

## 2022-07-18 RX ORDER — LORAZEPAM 0.5 MG/1
0.5 TABLET ORAL EVERY 6 HOURS PRN
Qty: 30 TABLET | Refills: 1 | Status: ON HOLD | OUTPATIENT
Start: 2022-07-18 | End: 2023-03-19

## 2022-08-03 PROBLEM — Z97.5 IUD (INTRAUTERINE DEVICE) IN PLACE: Status: RESOLVED | Noted: 2020-10-16 | Resolved: 2022-08-03

## 2022-08-04 ENCOUNTER — OFFICE VISIT (OUTPATIENT)
Dept: OBGYN | Facility: CLINIC | Age: 37
End: 2022-08-04
Payer: COMMERCIAL

## 2022-08-04 ENCOUNTER — ANCILLARY PROCEDURE (OUTPATIENT)
Dept: MAMMOGRAPHY | Facility: CLINIC | Age: 37
End: 2022-08-04
Payer: COMMERCIAL

## 2022-08-04 VITALS
SYSTOLIC BLOOD PRESSURE: 136 MMHG | HEIGHT: 69 IN | DIASTOLIC BLOOD PRESSURE: 90 MMHG | BODY MASS INDEX: 30.07 KG/M2 | WEIGHT: 203 LBS

## 2022-08-04 DIAGNOSIS — Z01.419 ENCOUNTER FOR GYNECOLOGICAL EXAMINATION WITHOUT ABNORMAL FINDING: Primary | ICD-10-CM

## 2022-08-04 DIAGNOSIS — Z12.31 VISIT FOR SCREENING MAMMOGRAM: ICD-10-CM

## 2022-08-04 PROCEDURE — 77067 SCR MAMMO BI INCL CAD: CPT | Mod: TC | Performed by: RADIOLOGY

## 2022-08-04 PROCEDURE — 77063 BREAST TOMOSYNTHESIS BI: CPT | Mod: TC | Performed by: RADIOLOGY

## 2022-08-04 PROCEDURE — 99395 PREV VISIT EST AGE 18-39: CPT | Performed by: OBSTETRICS & GYNECOLOGY

## 2022-08-04 ASSESSMENT — ANXIETY QUESTIONNAIRES
3. WORRYING TOO MUCH ABOUT DIFFERENT THINGS: NOT AT ALL
5. BEING SO RESTLESS THAT IT IS HARD TO SIT STILL: NOT AT ALL
GAD7 TOTAL SCORE: 1
7. FEELING AFRAID AS IF SOMETHING AWFUL MIGHT HAPPEN: NOT AT ALL
2. NOT BEING ABLE TO STOP OR CONTROL WORRYING: NOT AT ALL
GAD7 TOTAL SCORE: 1
6. BECOMING EASILY ANNOYED OR IRRITABLE: NOT AT ALL
1. FEELING NERVOUS, ANXIOUS, OR ON EDGE: SEVERAL DAYS
IF YOU CHECKED OFF ANY PROBLEMS ON THIS QUESTIONNAIRE, HOW DIFFICULT HAVE THESE PROBLEMS MADE IT FOR YOU TO DO YOUR WORK, TAKE CARE OF THINGS AT HOME, OR GET ALONG WITH OTHER PEOPLE: NOT DIFFICULT AT ALL

## 2022-08-04 ASSESSMENT — PATIENT HEALTH QUESTIONNAIRE - PHQ9
SUM OF ALL RESPONSES TO PHQ QUESTIONS 1-9: 3
5. POOR APPETITE OR OVEREATING: NOT AT ALL

## 2022-08-04 NOTE — PROGRESS NOTES
Vivi is a 37 year old  female who presents for annual exam.     Besides routine health maintenance, she has no other health concerns today .    HPI:  Here today for yearly exam --doing well.  Regular, monthly (q31-32d) periods since IUD removed with me in April.  Usually 4d menses --not terribly heavy and minimal cramping.  No intermenstrual bleeding/spotting.  +SA --no issues.  Not actively TTC but not preventing.  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  -staying active iwht dance  +SBE --first mammo today due to mother's recent dx of breast ca  PCP -Dr. Baldwin --following bloodwork, meds, etc.  Having some issues with higher dose of labetolol and abdominal pain      GYNECOLOGIC HISTORY:    Patient's last menstrual period was 2022.    Regular menses? yes  Menses every 31-32 days.  Length of menses: 4 days    Her current contraception method is: none.  She  reports that she has never smoked. She has never used smokeless tobacco.    Patient is sexually active.  STD testing offered?  Declined  Last PHQ-9 score on record =   PHQ-9 SCORE 2022   PHQ-9 Total Score MyChart -   PHQ-9 Total Score 3     Last GAD7 score on record =   ROCHELLE-7 SCORE 2022   Total Score -   Total Score 1     Alcohol Score = NA    HEALTH MAINTENANCE:  Cholesterol: (No results found for: CHOL   Last Mammo: Not applicable, Result: Normal, Next Mammo: Today   Pap:  Lab Results   Component Value Date    PAP NIL 2017 WNL HPV (-)neg  Colonoscopy:  NA, Result: Not applicable, Next Colonoscopy: NA years.  Dexa:  NA    Health maintenance updated:  yes    HISTORY:  OB History    Para Term  AB Living   2 1 1 0 1 1   SAB IAB Ectopic Multiple Live Births   0 0 0 0 1      # Outcome Date GA Lbr Han/2nd Weight Sex Delivery Anes PTL Lv   2 Term 19 39w3d 12:45 / 02:00 3.7 kg (8 lb 2.5 oz)  F Vag-Spont EPI N LAYO      Complications: GBS, Preeclampsia/Hypertension      Name: Therese      Apgar1: 8  Apgar5: 9   1 AB                Patient Active Problem List   Diagnosis     Benign essential hypertension     ROCHELLE (generalized anxiety disorder)     Major depressive disorder, recurrent episode, moderate (H)     Vitamin D deficiency     Seasonal allergic rhinitis due to pollen     Mild persistent asthma without complication     Family history of malignant neoplasm of breast     Past Surgical History:   Procedure Laterality Date     GYN SURGERY  2003    Laser treatment of HPV     ORTHOPEDIC SURGERY Left 07/21/2017    ganglion cyst removal     ORTHOPEDIC SURGERY Left     nerve release on left leg/ankle      SOFT TISSUE SURGERY  1999     SURGICAL HISTORY OF -       Nerve entrapment release     ZZHC RELEASE FOOT/TOE NERVE  2011      Social History     Tobacco Use     Smoking status: Never Smoker     Smokeless tobacco: Never Used   Substance Use Topics     Alcohol use: Yes     Comment: Rare       Problem (# of Occurrences) Relation (Name,Age of Onset)    Anxiety Disorder (2) Mother (Margarita), Maternal Grandmother (Nettie)    Bipolar Disorder (1) Mother (Margarita)    Breast Cancer (2) Mother (Margarita, 60): BRCA negative, neoadjuvant chemo, surgery pend, Maternal Grandmother (Nettie)    Cerebrovascular Disease (1) Maternal Grandfather (Saqib Sr.)    Coronary Artery Disease (3) Mother (Margarita), Maternal Grandfather (Saqib Sr.), Paternal Grandfather (Magdy)    Depression (3) Mother (Margarita), Maternal Grandmother (Nettie), Maternal Grandfather (Saqib Sr.)    Diabetes (2) Mother (Margarita), Maternal Grandfather (Saqib Sr.)    Diabetes Type 2  (1) Maternal Grandfather (Saqib Sr.)    Glaucoma (1) Maternal Grandfather (Saqib Sr.)    Hypertension (6) Mother (Margarita), Father (Parker), Maternal Grandmother (Nettie), Maternal Grandfather (Saqib Sr.), Paternal Grandmother (Zoe), Maternal Aunt    Mental Illness (1)  "Mother (Margarita)    Other - See Comments (1) Maternal Grandfather (Saqib Sr.): passed from Covid 9/2021    Ovarian Cancer (1) Other    Prostate Cancer (1) Maternal Grandfather (Saqib Griggs.)    Unknown/Adopted (1) Father (Parker)       Negative family history of: Macular Degeneration            Current Outpatient Medications   Medication Sig     albuterol (PROAIR HFA/PROVENTIL HFA/VENTOLIN HFA) 108 (90 Base) MCG/ACT inhaler Inhale 2 puffs into the lungs every 6 hours     buPROPion (WELLBUTRIN XL) 150 MG 24 hr tablet Take 1 tablet (150 mg) by mouth every morning     citalopram (CELEXA) 40 MG tablet Take 1 tablet (40 mg) by mouth daily     fluticasone (FLOVENT HFA) 220 MCG/ACT inhaler Inhale 1 puff into the lungs 2 times daily     labetalol (NORMODYNE) 100 MG tablet Take 3 tablets (300 mg) by mouth 2 times daily INCREASE  MG TWICE DAILY AFTER 3 WEEKS     labetalol (NORMODYNE) 300 MG tablet Take 1 tablet (300 mg) by mouth 2 times daily     LORazepam (ATIVAN) 0.5 MG tablet Take 1 tablet (0.5 mg) by mouth every 6 hours as needed for anxiety     ondansetron (ZOFRAN ODT) 4 MG ODT tab Take 1 tablet (4 mg) by mouth every 8 hours as needed for nausea (Patient not taking: Reported on 8/4/2022)     No current facility-administered medications for this visit.     Allergies   Allergen Reactions     Amoxicillin      Codeine      Penicillins        Past medical, surgical, social and family histories were reviewed and updated in EPIC.    ROS:   12 point review of systems negative other than symptoms noted below or in the HPI.  No urinary frequency or dysuria, bladder or kidney problems    EXAM:  BP (!) 136/90   Ht 1.74 m (5' 8.5\")   Wt 92.1 kg (203 lb)   LMP 07/24/2022   Breastfeeding No   BMI 30.42 kg/m     BMI: Body mass index is 30.42 kg/m .    PHYSICAL EXAM:  Constitutional:   Appearance: Well nourished, well developed, alert, in no acute distress  Neck:  Lymph Nodes:  No lymphadenopathy present    Thyroid:  Gland size " normal, nontender, no nodules or masses present  on palpation  Chest:  Respiratory Effort:  Breathing unlabored  Cardiovascular:    Heart: Auscultation:  Regular rate, normal rhythm, no murmurs present  Breasts: Palpation of Breasts and Axillae:  No masses present on palpation, no breast tenderness., Axillary Lymph Nodes:  No lymphadenopathy present. and No nodularity, asymmetry or nipple discharge bilaterally.  Gastrointestinal:   Abdominal Examination:  Abdomen nontender to palpation, tone normal without rigidity or guarding, no masses present, umbilicus without lesions   Liver and Spleen:  No hepatomegaly present, liver nontender to palpation    Hernias:  No hernias present  Lymphatic: Lymph Nodes:  No other lymphadenopathy present  Skin:  General Inspection:  No rashes present, no lesions present, no areas of  discoloration  Neurologic:    Mental Status:  Oriented X3.  Normal strength and tone, sensory exam                grossly normal, mentation intact and speech normal.    Psychiatric:   Mentation appears normal and affect normal/bright.         No Pelvic Exam performed --deferred as just done in April with IUD removal and pap smear    COUNSELING:   Reviewed preventive health counseling, as reflected in patient instructions  Special attention given to:        Regular exercise       Healthy diet/nutrition       Family planning       Folic Acid    BMI: Body mass index is 30.42 kg/m .  Weight management plan: Discussed healthy diet and exercise guidelines Patient was referred to their PCP to discuss a diet and exercise plan.    ASSESSMENT:  37 year old female with satisfactory annual exam.    ICD-10-CM    1. Encounter for gynecological examination without abnormal finding  Z01.419        PLAN:  Patient Instructions   Follow up with your primary care provider for your other medical problems.  Continue self breast exam.  Increase physical activity and exercise.  Usual safety and preventative measures counseling  done.  BMI >25  Weight loss encouraged.  Last pap smear (2022) was normal and negative for the DNA of high risk HPV subtypes.  No pap was obtained this year.  This was discussed with the patient and she agrees with the plan.       Anamaria Park MD

## 2022-08-04 NOTE — PATIENT INSTRUCTIONS
Follow up with your primary care provider for your other medical problems.  Continue self breast exam.  Increase physical activity and exercise.  Usual safety and preventative measures counseling done.  BMI >25  Weight loss encouraged.  Last pap smear (2022) was normal and negative for the DNA of high risk HPV subtypes.  No pap was obtained this year.  This was discussed with the patient and she agrees with the plan.

## 2022-08-08 ENCOUNTER — VIRTUAL VISIT (OUTPATIENT)
Dept: FAMILY MEDICINE | Facility: CLINIC | Age: 37
End: 2022-08-08
Payer: COMMERCIAL

## 2022-08-08 DIAGNOSIS — F33.1 MAJOR DEPRESSIVE DISORDER, RECURRENT EPISODE, MODERATE (H): ICD-10-CM

## 2022-08-08 DIAGNOSIS — R41.840 ATTENTION AND CONCENTRATION DEFICIT: ICD-10-CM

## 2022-08-08 DIAGNOSIS — F07.81 POST CONCUSSION SYNDROME: ICD-10-CM

## 2022-08-08 DIAGNOSIS — F41.1 GAD (GENERALIZED ANXIETY DISORDER): ICD-10-CM

## 2022-08-08 PROCEDURE — 99213 OFFICE O/P EST LOW 20 MIN: CPT | Mod: 95 | Performed by: FAMILY MEDICINE

## 2022-08-08 RX ORDER — BUPROPION HYDROCHLORIDE 150 MG/1
150 TABLET ORAL EVERY MORNING
Qty: 90 TABLET | Refills: 3 | Status: SHIPPED | OUTPATIENT
Start: 2022-08-08 | End: 2023-08-30

## 2022-08-08 RX ORDER — CITALOPRAM HYDROBROMIDE 40 MG/1
40 TABLET ORAL DAILY
Qty: 90 TABLET | Refills: 3 | Status: SHIPPED | OUTPATIENT
Start: 2022-08-08 | End: 2023-08-30

## 2022-08-08 NOTE — PROGRESS NOTES
Vivi is a 37 year old who is being evaluated via a billable video visit.      How would you like to obtain your AVS? MyChart  If the video visit is dropped, the invitation should be resent by: Text to cell phone:    Will anyone else be joining your video visit? No        Assessment & Plan     Major depressive disorder, recurrent episode, moderate (H)  Stable and no concerns  - citalopram (CELEXA) 40 MG tablet; Take 1 tablet (40 mg) by mouth daily    ROCHELLE (generalized anxiety disorder)   stable  Has lorazepam PRN - does not need this regularly  - citalopram (CELEXA) 40 MG tablet; Take 1 tablet (40 mg) by mouth daily    Post concussion syndrome     - buPROPion (WELLBUTRIN XL) 150 MG 24 hr tablet; Take 1 tablet (150 mg) by mouth every morning    Attention and concentration deficit     - buPROPion (WELLBUTRIN XL) 150 MG 24 hr tablet; Take 1 tablet (150 mg) by mouth every morning    Prescription drug management  20 minutes spent on the date of the encounter doing chart review, history and exam, documentation and further activities per the note        See Patient Instructions - needs bp recheck in ne xt 1-2 months - she will set this up through a pre-op    No follow-ups on file.    Vi Baldwin MD  [facility]    Juliette Trinidad is a 37 year old, presenting for the following health issues:  No chief complaint on file.      History of Present Illness       Hypertension: She presents for follow up of hypertension.  She does check blood pressure  regularly outside of the clinic. Outside blood pressures have been over 140/90. She does not follow a low salt diet.         Anxiety Follow-Up    How are you doing with your anxiety since your last visit? Improved  She is working to interview for jobs closer to home    Struggling with sleep    Her mother was diagnosed with breast cancer    She lives in [place] hard to be away    There is family present to help    Pt sees a therapist and this has been  helping        Are you having other symptoms that might be associated with anxiety? Yes:  has underlying anxiety    Have you had a significant life event? OTHER: as noted above     Are you feeling depressed? Yes:  has had bouts of thi but improves    Do you have any concerns with your use of alcohol or other drugs? No    Social History     Tobacco Use     Smoking status: Never Smoker     Smokeless tobacco: Never Used   Vaping Use     Vaping Use: Never used   Substance Use Topics     Alcohol use: Yes     Comment: Rare      Drug use: No     ROCHELLE-7 SCORE 5/18/2020 12/31/2020 8/4/2022   Total Score 13 (moderate anxiety) - -   Total Score 13 4 1     PHQ 5/9/2022 6/6/2022 8/4/2022   PHQ-9 Total Score 1 3 3   Q9: Thoughts of better off dead/self-harm past 2 weeks Not at all Several days Not at all   F/U: Thoughts of suicide or self-harm - No -   F/U: Safety concerns - No -     Last PHQ-9 8/4/2022   1.  Little interest or pleasure in doing things 0   2.  Feeling down, depressed, or hopeless 1   3.  Trouble falling or staying asleep, or sleeping too much 0   4.  Feeling tired or having little energy 1   5.  Poor appetite or overeating 1   6.  Feeling bad about yourself 0   7.  Trouble concentrating 0   8.  Moving slowly or restless 0   Q9: Thoughts of better off dead/self-harm past 2 weeks 0   PHQ-9 Total Score 3   Difficulty at work, home, or with people Not difficult at all   In the past two weeks have you had thoughts of suicide or self harm? -   Do you have concerns about your personal safety or the safety of others? -     ROCHELLE-7  8/4/2022   1. Feeling nervous, anxious, or on edge 1   2. Not being able to stop or control worrying 0   3. Worrying too much about different things 0   4. Trouble relaxing 0   5. Being so restless that it is hard to sit still 0   6. Becoming easily annoyed or irritable 0   7. Feeling afraid, as if something awful might happen 0   ROCHELLE-7 Total Score 1   If you checked any problems, how difficult  have they made it for you to do your work, take care of things at home, or get along with other people? Not difficult at all         How many servings of fruits and vegetables do you eat daily?  4 or more    On average, how many sweetened beverages do you drink each day (Examples: soda, juice, sweet tea, etc.  Do NOT count diet or artificially sweetened beverages)?   0    How many days per week do you exercise enough to make your heart beat faster? 4    How many minutes a day do you exercise enough to make your heart beat faster? 20 - 29    How many days per week do you miss taking your medication? 0    Blood pressure is better - she has noted upset  Stomach on an occasion with 300 mg dose  Feels she is fine if she takes the 100 mg tablets  Has been taking this dose for about 3 weeks  Has a cuff at home        Review of Systems   Constitutional, HEENT, cardiovascular, pulmonary, gi and gu systems are negative, except as otherwise noted.      Objective           Vitals:  No vitals were obtained today due to virtual visit.    Physical Exam   GENERAL: Healthy, alert and no distress  EYES: Eyes grossly normal to inspection.  No discharge or erythema, or obvious scleral/conjunctival abnormalities.  RESP: No audible wheeze, cough, or visible cyanosis.  No visible retractions or increased work of breathing.    SKIN: Visible skin clear. No significant rash, abnormal pigmentation or lesions.  NEURO: Cranial nerves grossly intact.  Mentation and speech appropriate for age.  PSYCH: Mentation appears normal, affect normal/bright, judgement and insight intact, normal speech and appearance well-groomed.                Video-Visit Details    Video Start Time: 902    Type of service:  Video Visit    Video End Time:914    Originating Location (pt. Location): Home    Distant Location (provider location):  [facility]     Platform used for Video Visit: [employer]  ..

## 2022-08-15 ENCOUNTER — MYC MEDICAL ADVICE (OUTPATIENT)
Dept: FAMILY MEDICINE | Facility: CLINIC | Age: 37
End: 2022-08-15

## 2022-08-15 DIAGNOSIS — M25.512 ACUTE PAIN OF LEFT SHOULDER: Primary | ICD-10-CM

## 2022-09-13 ENCOUNTER — MYC MEDICAL ADVICE (OUTPATIENT)
Dept: FAMILY MEDICINE | Facility: CLINIC | Age: 37
End: 2022-09-13

## 2022-09-17 ENCOUNTER — HEALTH MAINTENANCE LETTER (OUTPATIENT)
Age: 37
End: 2022-09-17

## 2022-09-17 ENCOUNTER — OFFICE VISIT (OUTPATIENT)
Dept: FAMILY MEDICINE | Facility: CLINIC | Age: 37
End: 2022-09-17
Payer: COMMERCIAL

## 2022-09-17 VITALS
SYSTOLIC BLOOD PRESSURE: 170 MMHG | OXYGEN SATURATION: 100 % | HEART RATE: 88 BPM | DIASTOLIC BLOOD PRESSURE: 110 MMHG | TEMPERATURE: 98 F

## 2022-09-17 DIAGNOSIS — R21 RASH: Primary | ICD-10-CM

## 2022-09-17 PROCEDURE — 99213 OFFICE O/P EST LOW 20 MIN: CPT

## 2022-09-17 RX ORDER — NYSTATIN 100000 [USP'U]/G
POWDER TOPICAL 2 TIMES DAILY
Qty: 60 G | Refills: 0 | Status: SHIPPED | OUTPATIENT
Start: 2022-09-17 | End: 2022-10-01

## 2022-09-17 NOTE — PATIENT INSTRUCTIONS
Use nystatin powder twice daily for up to 14 days.    If not improving, please mention this to your primary care doctor at your upcoming visit.

## 2022-09-17 NOTE — PROGRESS NOTES
ICD-10-CM    1. Rash  R21 nystatin (MYCOSTATIN) 883175 UNIT/GM external powder     Likely fungal etiology given location and history of such rashes here.  Appearance is similar to tinea versicolor .    PLAN:  Patient Instructions   Use nystatin powder twice daily for up to 14 days.    If not improving, please mention this to your primary care doctor at your upcoming visit.    Pt to follow up on elevated BP with her primary.    SUBJECTIVE:  Vivi Mccall is a 37 year old female who presents to  today with a rash under her breasts for about 2 weeks.  This rash is itchy.  She has had this before and used an antifungal powder with good results.  She has tried using the Lotrimin her daughter has for diaper rash, but this hasn't had much effect.    No rashes elsewhere.    OBJECTIVE:  BP (!) 170/110   Pulse 88   Temp 98  F (36.7  C) (Tympanic)   LMP 08/25/2022   SpO2 100%   GEN: well-appearing, in NAD  Skin: skin folds beneath both breasts with patchy, round areas of mild hyperpigmentation.  No skin breakdown or excoriations noted.

## 2022-09-27 ENCOUNTER — OFFICE VISIT (OUTPATIENT)
Dept: FAMILY MEDICINE | Facility: CLINIC | Age: 37
End: 2022-09-27
Payer: COMMERCIAL

## 2022-09-27 VITALS
DIASTOLIC BLOOD PRESSURE: 96 MMHG | TEMPERATURE: 99.3 F | WEIGHT: 205.2 LBS | BODY MASS INDEX: 30.75 KG/M2 | SYSTOLIC BLOOD PRESSURE: 132 MMHG | HEART RATE: 74 BPM | OXYGEN SATURATION: 98 %

## 2022-09-27 DIAGNOSIS — R21 RASH: Primary | ICD-10-CM

## 2022-09-27 DIAGNOSIS — Z23 NEED FOR IMMUNIZATION AGAINST INFLUENZA: ICD-10-CM

## 2022-09-27 DIAGNOSIS — I10 BENIGN ESSENTIAL HYPERTENSION: ICD-10-CM

## 2022-09-27 PROCEDURE — 90686 IIV4 VACC NO PRSV 0.5 ML IM: CPT | Performed by: PHYSICIAN ASSISTANT

## 2022-09-27 PROCEDURE — 90471 IMMUNIZATION ADMIN: CPT | Performed by: PHYSICIAN ASSISTANT

## 2022-09-27 PROCEDURE — 99215 OFFICE O/P EST HI 40 MIN: CPT | Mod: 25 | Performed by: PHYSICIAN ASSISTANT

## 2022-09-27 RX ORDER — HYDROCHLOROTHIAZIDE 25 MG/1
25 TABLET ORAL DAILY
Qty: 90 TABLET | Refills: 0 | Status: SHIPPED | OUTPATIENT
Start: 2022-09-27 | End: 2022-11-10

## 2022-09-27 RX ORDER — FLUCONAZOLE 150 MG/1
150 TABLET ORAL
Qty: 3 TABLET | Refills: 0 | Status: SHIPPED | OUTPATIENT
Start: 2022-09-27 | End: 2022-10-04

## 2022-09-27 ASSESSMENT — ASTHMA QUESTIONNAIRES
QUESTION_5 LAST FOUR WEEKS HOW WOULD YOU RATE YOUR ASTHMA CONTROL: COMPLETELY CONTROLLED
QUESTION_4 LAST FOUR WEEKS HOW OFTEN HAVE YOU USED YOUR RESCUE INHALER OR NEBULIZER MEDICATION (SUCH AS ALBUTEROL): NOT AT ALL
QUESTION_1 LAST FOUR WEEKS HOW MUCH OF THE TIME DID YOUR ASTHMA KEEP YOU FROM GETTING AS MUCH DONE AT WORK, SCHOOL OR AT HOME: NONE OF THE TIME
QUESTION_2 LAST FOUR WEEKS HOW OFTEN HAVE YOU HAD SHORTNESS OF BREATH: ONCE OR TWICE A WEEK
ACT_TOTALSCORE: 24
QUESTION_3 LAST FOUR WEEKS HOW OFTEN DID YOUR ASTHMA SYMPTOMS (WHEEZING, COUGHING, SHORTNESS OF BREATH, CHEST TIGHTNESS OR PAIN) WAKE YOU UP AT NIGHT OR EARLIER THAN USUAL IN THE MORNING: NOT AT ALL
ACT_TOTALSCORE: 24

## 2022-09-27 NOTE — PATIENT INSTRUCTIONS
Make sure you are taking your Labetalol 300 mg TWO TIMES A DAY, but we will add a low dose water pill, hydrochlorothiazide 25 mg daily (in the morning) as well. After discussion with Vivi ([facility] pharmacist), this should be the safest option. You should stop it though if/when you ever find out you are pregnant.        Did you know?      You can schedule a video visit for follow-up appointments as well as future appointments for certain conditions.  Please see the below link.     Video Visits ([email or web address])     If you have not already done so,  I encourage you to sign up for [employer] ([email or web address]).  This will allow you to review your results, securely communicate with a provider, and schedule virtual visits as well,.

## 2022-09-27 NOTE — PROGRESS NOTES
Assessment & Plan     Rash  Slowly improving, continue with nystatin prescription and over the counter options discussed with patient; prescription for oral fluconazole if no improvement or any future flares occur. Continue to keep area clean and dry.  - fluconazole (DIFLUCAN) 150 MG tablet; Take 1 tablet (150 mg) by mouth every 3 days for 3 doses    Benign essential hypertension  Long standing, chronic, UNcontrolled- patient needs to take labetalol two times a day, but should add an alternative as well (hydrochlorothiazide 25 mg sent to pharmacy after discussion with MTM as well) - will try to choose safest next option as patient not using any contraception at this time; patient aware of risks and will hold on trying to actively conceive for the next few months. Return to clinic with any worsening or changes in symptoms and follow up with PCP for routine care.     Need for immunization against influenza  - INFLUENZA VACCINE IM > 6 MONTHS VALENT IIV4 (AFLURIA/FLUZONE)    Review of prior external note(s) from - previous routine PCP notes  40 minutes spent on the date of the encounter doing chart review, history and exam, documentation and further activities per the note       Patient Instructions   Make sure you are taking your Labetalol 300 mg TWO TIMES A DAY, but we will add a low dose water pill, hydrochlorothiazide 25 mg daily (in the morning) as well. After discussion with Vivi ([facility] pharmacist), this should be the safest option. You should stop it though if/when you ever find out you are pregnant.        Did you know?      You can schedule a video visit for follow-up appointments as well as future appointments for certain conditions.  Please see the below link.     Video Visits ([email or web address])     If you have not already done so,  I encourage you to sign up for [employer] ([email or web address]).  This will allow you to review your results, securely communicate with a provider, and  schedule virtual visits as well,.      Return in about 4 weeks (around 10/25/2022) for BP Recheck, Routine Visit, or sooner with worsening symptoms.    MONIQUE Singleton [facility]   Vivi is a 37 year old presenting for the following health issues:  Hypertension and Rash      History of Present Illness       Hypertension: She presents for follow up of hypertension.  She does check blood pressure  regularly outside of the clinic. Outside blood pressures have been over 140/90. She follows a low salt diet.      Patient taking labetalol 300 mg two times a day.  History of being on lisinoptil and hydrochlorothiazide off/on in the past as well.       Rash  Onset/Duration: off/on for some time  Description  Location: Under both breast   Character: blotchy, raised  Itching: mild, at the start was severe   Intensity:  moderate  Progression of Symptoms:  improving  Accompanying signs and symptoms:   Fever: No  Body aches or joint pain: No  Sore throat symptoms: No  Recent cold symptoms: No  History:           Previous episodes of similar rash: Yes, In the same area   New exposures:  None  Recent travel: No  Exposure to similar rash: No  Precipitating or alleviating factors: None   Therapies tried and outcome: Pt recently went to ED, currently taking medication (Nystatin)      Patient seen by FP at [facility] 9/17/22 and given nystatin cream.        Review of Systems   Constitutional, HEENT, cardiovascular, pulmonary, GI, , musculoskeletal, neuro, skin, endocrine and psych systems are negative, except as otherwise noted.      Objective    BP (!) 132/96   Pulse 74   Temp 99.3  F (37.4  C) (Temporal)   Wt 93.1 kg (205 lb 3.2 oz)   LMP 08/25/2022   SpO2 98%   BMI 30.75 kg/m    Body mass index is 30.75 kg/m .  Physical Exam   GENERAL: healthy, alert and no distress  RESP: lungs clear to auscultation - no rales, rhonchi or wheezes  CV: regular rate and rhythm, normal S1 S2, no S3  or S4, no murmur, click or rub, no peripheral edema and peripheral pulses strong  MS: no gross musculoskeletal defects noted, no edema  SKIN: no suspicious lesions or rashes; slight hyperpigmentation areas between breasts but no increased erythema or discharge noted at this time  PSYCH: mentation appears normal, affect normal/bright

## 2022-10-03 ENCOUNTER — THERAPY VISIT (OUTPATIENT)
Dept: PHYSICAL THERAPY | Facility: CLINIC | Age: 37
End: 2022-10-03
Attending: PHYSICIAN ASSISTANT
Payer: COMMERCIAL

## 2022-10-03 DIAGNOSIS — M25.512 CHRONIC LEFT SHOULDER PAIN: ICD-10-CM

## 2022-10-03 DIAGNOSIS — G89.29 CHRONIC LEFT SHOULDER PAIN: ICD-10-CM

## 2022-10-03 PROCEDURE — 97161 PT EVAL LOW COMPLEX 20 MIN: CPT | Mod: GP | Performed by: PHYSICAL THERAPIST

## 2022-10-03 PROCEDURE — 97110 THERAPEUTIC EXERCISES: CPT | Mod: GP | Performed by: PHYSICAL THERAPIST

## 2022-10-03 NOTE — PROGRESS NOTES
"Physical Therapy Initial Evaluation  Subjective:  The history is provided by the patient. No  was used.   Therapist Generated HPI Evaluation  Problem details: Patient was loading a truck April 2017, was pushing a heavy load overhead, it slipped and felt her left shoulder \"slip\", pain the next day and limitation of ROM.  Pain has not relieved since, but insidious exacerbation April 2022.  At this pain is intermittent left lateral shoulder, intermittent to the left upper trapezius/cervical, 0-7/10, described as \"sharp\" with movement, can be \"throbbing\".  Symptoms increase with putting the hand on her hip, putting arm in jacket, reaching across the body, unable to lie on left side unrestricted (throbbing after), turning her head if pain is UT/neck, lifting her 2.5 year old.  Symptoms decrease with rest.  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  She does 15 pushups 3x/week (needs to be in a certain position), machine weights at club.    Patient is right-handed..                     Pain is the same all the time.  Since onset symptoms are unchanged.       Previous treatment includes physical therapy and chiropractic (2 visits PT late 2020; currently seeing chiro, acupuncture).   Restrictions due to condition include:  Working in normal job without restrictions.  Barriers include:  None as reported by patient.    Patient Health History           General health as reported by patient is good.  Pertinent medical history includes: asthma, depression and high blood pressure.   Red flags:  None as reported by patient.  Medical allergies: other.    Other surgery history details: L wrist ganglion removed, left ankle nerve entrapment release.    Current medications:  High blood pressure medication and anti-depressants.    Current occupation is /performer (dancer). "                                       Objective:  Standing Alignment:    Cervical/Thoracic:  Forward head and cervical lordosis decreased  Shoulder/UE:  Rounded shoulders                                       Shoulder Evaluation:  ROM:  AROM:    Flexion:  Left:  151 with pain past 90    Right:  WNL  Extension: Left: 50%Right: WNL  Abduction:  Left: WNL-pain past 90 degrees   Right:  WNL    Internal Rotation:  Left:  WNL-ERP    Right:  WNL  External Rotation:  Left:  WNL-ERP    Right:  WNL            Extension/Internal Rotation:  Left:  T9 with pain    Right:  T6                                                 Cervical AROM all directions except left rotation which is 90%.  No increased pain with cervical ROM.     Trial rep ext with wand overpressure in standing: increase pain during/no worse, increase flexion AROM; further reps inc flexion to 162 degrees with cont pain after ~90 degrees, no change abduction or ext/IR or pain with resisted flexion and abduction  Trial rep ext/IR standing with towel:  incr ext/IR ROM/continued pain, no change flexion or abduction or pain with resisted flexion and abduction    Assessment/Plan:    Patient is a 37 year old female with left side shoulder complaints.    Patient has the following significant findings with corresponding treatment plan.                Diagnosis 1:  Left shoulder pain    Pain -  self management, education, directional preference exercise and home program  Decreased ROM/flexibility - therapeutic exercise and home program  Decreased strength - therapeutic exercise, therapeutic activities and home program  Decreased function - therapeutic activities and home program    Cumulative Therapy Evaluation is: Low complexity.    Previous and current functional limitations:  (See Goal Flow Sheet for this information)    Short term and Long term goals: (See Goal Flow Sheet for this information)     Communication ability:  Patient appears to be able to clearly communicate  and understand verbal and written communication and follow directions correctly.  Treatment Explanation - The following has been discussed with the patient:   RX ordered/plan of care  Anticipated outcomes  Possible risks and side effects  This patient would benefit from PT intervention to resume normal activities.   Rehab potential is good.    Frequency:  1 X week, once daily  Duration:  for 6 weeks  Discharge Plan:  Achieve all LTG.  Independent in home treatment program.  Reach maximal therapeutic benefit.    Please refer to the daily flowsheet for treatment today, total treatment time and time spent performing 1:1 timed codes.

## 2022-10-13 ENCOUNTER — THERAPY VISIT (OUTPATIENT)
Dept: PHYSICAL THERAPY | Facility: CLINIC | Age: 37
End: 2022-10-13
Payer: COMMERCIAL

## 2022-10-13 DIAGNOSIS — M25.512 CHRONIC LEFT SHOULDER PAIN: Primary | ICD-10-CM

## 2022-10-13 DIAGNOSIS — G89.29 CHRONIC LEFT SHOULDER PAIN: Primary | ICD-10-CM

## 2022-10-13 PROCEDURE — 97110 THERAPEUTIC EXERCISES: CPT | Mod: GP | Performed by: PHYSICAL THERAPIST

## 2022-11-01 ENCOUNTER — TELEPHONE (OUTPATIENT)
Dept: OBGYN | Facility: CLINIC | Age: 37
End: 2022-11-01

## 2022-11-01 NOTE — TELEPHONE ENCOUNTER
Yes, wellbutrin is safe in pregnancy and sounds that it is necessary given her currrent symptoms/mood.  Encourage her to continue working closely with therapist/psychiatrist with her mood struggles  We will see her at the usual time for new OB visit --between 8-10wks is fine; nothing high risk to warrant earlier visit or ultrasound

## 2022-11-01 NOTE — TELEPHONE ENCOUNTER
Pt requesting HCG and early US  LMP 9/23/22    AMA, Essential HTN    Switched to from lisinopril to   labetalol (NORMODYNE) 300 MG tablet when her IUD was removed as she was going to try for pregnancy  HTN being managed by PCP Vi Baldwin MD      citalopram (CELEXA) 40 MG tablet  --currently taking and did take it during her last pregnancy    buPROPion (WELLBUTRIN XL) 150 MG 24 hr tablet  --stopped wellbutrin 1 week ago (cold turkey without weaning) as patient thought this was not safe in pregnancy.    Patient states this has been one of the hardest weeks of her life. Feels like she doesn't have control of her depression symptoms at this time. Did not want to spend the money on the medication if this was something that she could not take during pregnancy since she wasn't sure if she was going to have a job in a week.    Crazy stressed with job situation. Unsure what is stress from job and hormones of pregnancy.    States that she does not have any thoughts of harming herself at this time. Has been crying a lot, wants to stay in bed all day, and is having a hard time focusing.    Pt states that she was doing well while on celexa and wellbtrin. Just found out about her job situation the same time she stopped the wellbutrin.    Routing pt [address] message to provider to advise.  Skylar Dunn RN on 11/1/2022 at 1:43 PM

## 2022-11-01 NOTE — TELEPHONE ENCOUNTER
Pt returned call.  Informed pt of response from Dr Park below.  Pt will restart her Wellbutrin and keep close contact w psych as well. She will schedule her 1st OB visits based on LMP 9/23/22    Pt verbalized understanding, in agreement with plan, and voiced no further questions.  Transferred to scheduling.  Jael Nelson RN on 11/1/2022 at 2:00 PM

## 2022-11-10 ENCOUNTER — PRENATAL OFFICE VISIT (OUTPATIENT)
Dept: NURSING | Facility: CLINIC | Age: 37
End: 2022-11-10
Payer: COMMERCIAL

## 2022-11-10 DIAGNOSIS — O09.529 SUPERVISION OF HIGH-RISK PREGNANCY OF ELDERLY MULTIGRAVIDA: ICD-10-CM

## 2022-11-10 DIAGNOSIS — O09.91 SUPERVISION OF HIGH RISK PREGNANCY IN FIRST TRIMESTER: Primary | ICD-10-CM

## 2022-11-10 DIAGNOSIS — Z13.79 GENETIC SCREENING: ICD-10-CM

## 2022-11-10 DIAGNOSIS — O36.80X0 PREGNANCY WITH INCONCLUSIVE FETAL VIABILITY: ICD-10-CM

## 2022-11-10 PROCEDURE — 99207 PR NO CHARGE NURSE ONLY: CPT

## 2022-11-10 NOTE — PROGRESS NOTES
SUBJECTIVE:     HPI:    This is a 37 year old female patient,  who presents for her first obstetrical visit.    NOHEMI: 2023, by Last Menstrual Period.  She is 6w6d weeks.  Her cycles are regular.  Her last menstrual period was normal.   Since her LMP, she has experienced  nausea, fatigue and constipation).   She denies emesis, abdominal pain, headache, loss of appetite, vaginal discharge, dysuria, pelvic pain, urinary urgency, lightheadedness, urinary frequency, vaginal bleeding and hemorrhoids.    Additional History: -AMA  -Hx preeclampsia/CHTN-labetalol 300mg BID  -hx anxiety-Wellbutrin XL 150mg; has situational stress w work as well; sees therapist  -asthma  Hx of yeast infections in last pregnancy; taking probiotic and trying to be proactive on this now  -mother has newly dx breast cancer - BRCA gene negative  -previous abnormal pap/laser tx  -pt states she fell over a year ago and sustained concussion    Have you travelled during the pregnancy?No  Have your sexual partner(s) travelled during the pregnancy?No    HISTORY:   Planned Pregnancy: Yes  Marital Status:   Occupation:  for [employer] at [employer] - stressful  Living in Household: Spouse and Children    Past History:  Her past medical history   Past Medical History:   Diagnosis Date     Asthma     seasonal and environmental     C. difficile diarrhea 2013     Concussion 2022     Depression      Depressive disorder      Encounter for IUD insertion 10/16/2020    Jyoti inserted by Dr Park     Generalized anxiety disorder      Hypertension      IUD contraception 10/2020    Jyoti inserted by Dr Park     Pain in both hands 2020   .      She has a history of  hypertension and preeclampsia    Since her last LMP she denies use of alcohol, tobacco and street drugs.    Past medical, surgical, social and family history were reviewed and updated in [facility].    Current Outpatient Medications   Medication     albuterol  (PROAIR HFA/PROVENTIL HFA/VENTOLIN HFA) 108 (90 Base) MCG/ACT inhaler     buPROPion (WELLBUTRIN XL) 150 MG 24 hr tablet     citalopram (CELEXA) 40 MG tablet     fluticasone (FLOVENT HFA) 220 MCG/ACT inhaler     labetalol (NORMODYNE) 300 MG tablet     Prenatal Vit-Fe Sulfate-FA-DHA (PRENATAL VITAMIN/MIN +DHA PO)     Probiotic Product (PROBIOTIC DAILY PO)     LORazepam (ATIVAN) 0.5 MG tablet     ondansetron (ZOFRAN ODT) 4 MG ODT tab     No current facility-administered medications for this visit.       ROS:   12 point review of systems negative other than symptoms noted below or in the HPI.  Constitutional: Fatigue  Gastrointestinal: Constipation and Nausea    Nurse phone visit completed. Prenatal book and folder (containing standard educational hand-outs and brochures) will be given at next visit to patient. Information in folder reviewed over the phone. Questions answered. Brochure given on optional screening available to assess chromosomal anomalies. Pt desires NIPS. Pt advised to call the clinic if she has any questions or concerns related to her pregnancy. Prenatal labs future ordered. New prenatal visit scheduled on 12/13/22 with Dr Park.  Jael WALSH    Lab Results   Component Value Date    PAP NIL 08/22/2017     PHQ-9 score:    PHQ 11/9/2022   PHQ-9 Total Score 3   Q9: Thoughts of better off dead/self-harm past 2 weeks Not at all   F/U: Thoughts of suicide or self-harm -   F/U: Safety concerns -       ROCHELLE-7 SCORE 12/31/2020 8/4/2022 11/9/2022   Total Score - - 2 (minimal anxiety)   Total Score 4 1 2     Patient supplied answers from flow sheet for:  Prenatal OB Questionnaire.  Past Medical History  Have you ever recieved care for your mental health? : (!) Yes  Have you ever been in a major accident or suffered serious trauma?: (!) Yes  Within the last year, has anyone hit, slapped, kicked or otherwise hurt you?: No  In the last year, has anyone forced you to have sex when you didn't want to?: No    Past Medical  History 2   Have you ever received a blood transfusion?: No  Would you accept a blood transfusion if was medically recommended?: Yes  Does anyone in your home smoke?: No   Is your blood type Rh negative?: Unknown  Have you ever ?: (!) Yes  Have you been hospitalized for a nonsurgical reason excluding normal delivery?: No  Have you ever had an abnormal pap smear?: (!) Yes    Past Medical History (Continued)  Do you have a history of abnormalities of the uterus?: No  Did your mother take CAIT or any other hormones when she was pregnant with you?: Unknown  Do you have any other problems we have not asked about which you feel may be important to this pregnancy?: No

## 2022-11-15 ENCOUNTER — MYC MEDICAL ADVICE (OUTPATIENT)
Dept: OBGYN | Facility: CLINIC | Age: 37
End: 2022-11-15

## 2022-11-29 ENCOUNTER — TELEPHONE (OUTPATIENT)
Dept: OBGYN | Facility: CLINIC | Age: 37
End: 2022-11-29

## 2022-11-29 NOTE — TELEPHONE ENCOUNTER
Next 5 appointments (look out 90 days)    Dec 13, 2022 10:20 AM  New Prenatal with Anamaria Park MD  [facility] ([facility] ) [address] [id number] [phone]        LMP 9/23/22    Having mild intermittent cramping, no bleeding.   Was recently ill, thought maybe a case of food poisoning.    Is taking Vit B6 and Unisom with an occ Zofran for nausea in early pregnancy.    Reviewed s/s that would warrant evaluation as well as the importance of staying hydrated.     Will keep appt as scheduled   Will call with any concerns.     Soco Barbosa RN on 11/29/2022 at 10:59 AM

## 2022-11-30 ENCOUNTER — OFFICE VISIT (OUTPATIENT)
Dept: FAMILY MEDICINE | Facility: CLINIC | Age: 37
End: 2022-11-30
Payer: COMMERCIAL

## 2022-11-30 VITALS
TEMPERATURE: 99.1 F | SYSTOLIC BLOOD PRESSURE: 144 MMHG | HEART RATE: 77 BPM | WEIGHT: 204 LBS | OXYGEN SATURATION: 98 % | BODY MASS INDEX: 30.57 KG/M2 | DIASTOLIC BLOOD PRESSURE: 90 MMHG

## 2022-11-30 DIAGNOSIS — I10 BENIGN ESSENTIAL HYPERTENSION: ICD-10-CM

## 2022-11-30 DIAGNOSIS — F41.1 GAD (GENERALIZED ANXIETY DISORDER): ICD-10-CM

## 2022-11-30 DIAGNOSIS — Z32.01 PREGNANCY TEST POSITIVE: Primary | ICD-10-CM

## 2022-11-30 LAB — HCG UR QL: POSITIVE

## 2022-11-30 PROCEDURE — 99214 OFFICE O/P EST MOD 30 MIN: CPT | Performed by: FAMILY MEDICINE

## 2022-11-30 PROCEDURE — 81025 URINE PREGNANCY TEST: CPT | Performed by: FAMILY MEDICINE

## 2022-11-30 PROCEDURE — 96127 BRIEF EMOTIONAL/BEHAV ASSMT: CPT | Performed by: FAMILY MEDICINE

## 2022-11-30 RX ORDER — LABETALOL 200 MG/1
400 TABLET, FILM COATED ORAL 2 TIMES DAILY
Qty: 120 TABLET | Refills: 0 | Status: SHIPPED | OUTPATIENT
Start: 2022-11-30 | End: 2022-12-22 | Stop reason: DRUGHIGH

## 2022-11-30 ASSESSMENT — ANXIETY QUESTIONNAIRES
6. BECOMING EASILY ANNOYED OR IRRITABLE: NOT AT ALL
7. FEELING AFRAID AS IF SOMETHING AWFUL MIGHT HAPPEN: NOT AT ALL
8. IF YOU CHECKED OFF ANY PROBLEMS, HOW DIFFICULT HAVE THESE MADE IT FOR YOU TO DO YOUR WORK, TAKE CARE OF THINGS AT HOME, OR GET ALONG WITH OTHER PEOPLE?: NOT DIFFICULT AT ALL
2. NOT BEING ABLE TO STOP OR CONTROL WORRYING: NOT AT ALL
7. FEELING AFRAID AS IF SOMETHING AWFUL MIGHT HAPPEN: NOT AT ALL
GAD7 TOTAL SCORE: 0
IF YOU CHECKED OFF ANY PROBLEMS ON THIS QUESTIONNAIRE, HOW DIFFICULT HAVE THESE PROBLEMS MADE IT FOR YOU TO DO YOUR WORK, TAKE CARE OF THINGS AT HOME, OR GET ALONG WITH OTHER PEOPLE: NOT DIFFICULT AT ALL
GAD7 TOTAL SCORE: 0
1. FEELING NERVOUS, ANXIOUS, OR ON EDGE: NOT AT ALL
GAD7 TOTAL SCORE: 0
5. BEING SO RESTLESS THAT IT IS HARD TO SIT STILL: NOT AT ALL
4. TROUBLE RELAXING: NOT AT ALL
3. WORRYING TOO MUCH ABOUT DIFFERENT THINGS: NOT AT ALL

## 2022-11-30 NOTE — PATIENT INSTRUCTIONS
Preparing for Your Surgery  Getting started  A nurse will call you to review your health history and instructions. They will give you an arrival time based on your scheduled surgery time. Please be ready to share:    Your doctor s clinic name and phone number    Your medical, surgical, and anesthesia history    A list of allergies and sensitivities    A list of medicines, including herbal treatments and over-the-counter drugs    Whether the patient has a legal guardian (ask how to send us the papers in advance)  Please tell us if you re pregnant--or if there s any chance you might be pregnant. Some surgeries may injure a fetus (unborn baby), so they require a pregnancy test. Surgeries that are safe for a fetus don t always need a test, and you can choose whether to have one.   If you have a child who s having surgery, please ask for a copy of Preparing for Your Child s Surgery.    Preparing for surgery    Within 10 to 30 days of surgery: Have a pre-op exam (sometimes called an H&P, or History and Physical). This can be done at a clinic or pre-operative center.  ? If you re having a , you may not need this exam. Talk to your care team.    At your pre-op exam, talk to your care team about all medicines you take. If you need to stop any medicines before surgery, ask when to start taking them again.  ? We do this for your safety. Many medicines can make you bleed too much during surgery. Some change how well surgery (anesthesia) drugs work.    Call your insurance company to let them know you re having surgery. (If you don t have insurance, call [phone].)    Call your clinic if there s any change in your health. This includes signs of a cold or flu (sore throat, runny nose, cough, rash, fever). It also includes a scrape or scratch near the surgery site.    If you have questions on the day of surgery, call your hospital or surgery center.  COVID testing  You may need to be tested for COVID-19 before having  surgery. If so, we will give you instructions (or click here).  Eating and drinking guidelines  For your safety: Unless your surgeon tells you otherwise, follow the guidelines below.    Eat and drink as usual until 8 hours before you arrive for surgery. After that, no food or milk.    Drink clear liquids until 2 hours before you arrive. These are liquids you can see through, like water, Gatorade, and Propel Water. They also include plain black coffee and tea (no cream or milk), candy, and breath mints. You can spit out gum when you arrive.    If you drink alcohol: Stop drinking it the night before surgery.    If your care team tells you to take medicine on the morning of surgery, it s okay to take it with a sip of water.  Preventing infection    Shower or bathe the night before and morning of your surgery. Follow the instructions your clinic gave you. (If no instructions, use regular soap.)    Don t shave or clip hair near your surgery site. We ll remove the hair if needed.    Don t smoke or vape the morning of surgery. You may chew nicotine gum up to 2 hours before surgery. A nicotine patch is okay.  ? Note: Some surgeries require you to completely quit smoking and nicotine. Check with your surgeon.    Your care team will make every effort to keep you safe from infection. We will:  ? Clean our hands often with soap and water (or an alcohol-based hand rub).  ? Clean the skin at your surgery site with a special soap that kills germs.  ? Give you a special gown to keep you warm. (Cold raises the risk of infection.)  ? Wear special hair covers, masks, gowns and gloves during surgery.  ? Give antibiotic medicine, if prescribed. Not all surgeries need antibiotics.  What to bring on the day of surgery    Photo ID and insurance card    Copy of your health care directive, if you have one    Glasses and hearing aids (bring cases)  ? You can t wear contacts during surgery    Inhaler and eye drops, if you use them (tell us  about these when you arrive)    CPAP machine or breathing device, if you use them    A few personal items, if spending the night    If you have . . .  ? A pacemaker, ICD (cardiac defibrillator) or other implant: Bring the ID card.  ? An implanted stimulator: Bring the remote control.  ? A legal guardian: Bring a copy of the certified (court-stamped) guardianship papers.  Please remove any jewelry, including body piercings. Leave jewelry and other valuables at home.  If you re going home the day of surgery    You must have a responsible adult drive you home. They should stay with you overnight as well.    If you don t have someone to stay with you, and you aren t safe to go home alone, we may keep you overnight. Insurance often won t pay for this.  After surgery  If it s hard to control your pain or you need more pain medicine, please call your surgeon s office.  Questions?   If you have any questions for your care team, list them here:   ____________________________________________________________________________________________________________________________________________________________________________________________________________________________________________________________________  For informational purposes only. Not to replace the advice of your health care provider. Copyright   2003, 2019 [employer]. All rights reserved. Clinically reviewed by Erin Petersen MD. [employer] [id number] - REV 10/22.

## 2022-11-30 NOTE — PROGRESS NOTES
Assessment & Plan     Pregnancy test positive  Will see OBGYN in 2 weeks  Taking PNV  Managing nsausea with B2, unasom and PRN zofran  Bone broth helps a lot  - HCG qualitative urine; Future  - HCG qualitative urine    Benign essential hypertension  Trial of 400 mg bid for BP  Recheck BP in 2 weeks with GYN if not at goal can consider nifedipine    - labetalol (NORMODYNE) 200 MG tablet; Take 2 tablets (400 mg) by mouth 2 times daily for 30 days    Prescription drug management  20 minutes spent on the date of the encounter doing chart review, history and exam, documentation and further activities per the note        See Patient Instructions    No follow-ups on file.    Vi Baldwin MD  [facility]idad is a 37 year old, presenting for the following health issues:  Pre-Op Exam      HPI   Answers for HPI/ROS submitted by the patient on 11/30/2022  If you checked off any problems, how difficult have these problems made it for you to do your work, take care of things at home, or get along with other people?: Not difficult at all  PHQ9 TOTAL SCORE: 4  ROCHELLE 7 TOTAL SCORE: 0    Pregnancy:  They were not using protection - not trying to avoid pregnancy  Surprised that she got pregnant so quickly  Sure LMP was 9/23  NOHEMI 6/30/23  Today 9+5 WGA    Has noted a rash on skin between breasts - using a powder for this    Anxiety:  Has wellbutrin  Has been working well - no concerns reviewed safety of this with her  Really feels this has helped her reduce anxiety  Lots of stress with work  ROCHELLE-7 SCORE 8/4/2022 11/9/2022 11/30/2022   Total Score - 2 (minimal anxiety) 0 (minimal anxiety)   Total Score 1 2 0           BP:  Has been struggling with his  Taking 300 mg labetalol twice daily still not at goal  Has caused slight nausea with dose change but this resolved  Has not tried other meds  With last pregnancy her BP stabilized at 20 weeks and at the end increased.    Discuss about  concerns regarding BP, Medications   Sides Effects regarding meds and remembering to take medications as prescribed         Review of Systems   Constitutional, HEENT, cardiovascular, pulmonary, gi and gu systems are negative, except as otherwise noted.      Objective    BP (!) 144/90   Pulse 77   Temp 99.1  F (37.3  C) (Temporal)   Wt 92.5 kg (204 lb)   LMP 09/23/2022   SpO2 98%   BMI 30.57 kg/m    Body mass index is 30.57 kg/m .  Physical Exam   GENERAL: healthy, alert and no distress  CV: regular rate and rhythm, normal S1 S2, no S3 or S4, no murmur, click or rub, no peripheral edema and peripheral pulses strong  ABDOMEN: FHT are heard at 150 BMP  PSYCH: mentation appears normal, affect normal/bright    Results for orders placed or performed in visit on 11/30/22 (from the past 24 hour(s))   HCG qualitative urine   Result Value Ref Range    hCG Urine Qualitative Positive (A) Negative

## 2022-12-01 RX ORDER — LABETALOL 200 MG/1
TABLET, FILM COATED ORAL
Qty: 360 TABLET | OUTPATIENT
Start: 2022-12-01

## 2022-12-01 NOTE — TELEPHONE ENCOUNTER
Patient requesting 90 day supply. Original order sent 11/30/22 has a stop date of 12/30/22. Patient should reach out to provider.      Katarzyna Bangura RN  [facility]

## 2022-12-07 ENCOUNTER — MYC MEDICAL ADVICE (OUTPATIENT)
Dept: FAMILY MEDICINE | Facility: CLINIC | Age: 37
End: 2022-12-07

## 2022-12-07 DIAGNOSIS — I10 BENIGN ESSENTIAL HYPERTENSION: ICD-10-CM

## 2022-12-07 DIAGNOSIS — Z3A.10 10 WEEKS GESTATION OF PREGNANCY: ICD-10-CM

## 2022-12-07 DIAGNOSIS — G89.29 CHRONIC LEFT SHOULDER PAIN: Primary | ICD-10-CM

## 2022-12-07 DIAGNOSIS — M25.512 CHRONIC LEFT SHOULDER PAIN: Primary | ICD-10-CM

## 2022-12-07 NOTE — PROGRESS NOTES
Answers for HPI/ROS submitted by the patient on 2022  If you checked off any problems, how difficult have these problems made it for you to do your work, take care of things at home, or get along with other people?: Not difficult at all  PHQ9 TOTAL SCORE: 4    HPI:     This is a 37 year old female patient,  who presents for her first obstetrical visit.     NOHEMI: 2023, by Last Menstrual Period.  She is 6w6d weeks.  Her cycles are regular.  Her last menstrual period was normal.   Since her LMP, she has experienced  nausea, fatigue and constipation).   She denies emesis, abdominal pain, headache, loss of appetite, vaginal discharge, dysuria, pelvic pain, urinary urgency, lightheadedness, urinary frequency, vaginal bleeding and hemorrhoids.     Additional History: -AMA  -Hx preeclampsia/CHTN-labetalol 300mg BID  -hx anxiety-Wellbutrin XL 150mg; has situational stress w work as well; sees therapist  -asthma  Hx of yeast infections in last pregnancy; taking probiotic and trying to be proactive on this now  -mother has newly dx breast cancer - BRCA gene negative  -previous abnormal pap/laser tx  -pt states she fell over a year ago and sustained concussion      New OB visit --2nd pregnancy.  Sure LMP and us today confirms dating.  No vb/spotting.  Mild cramping and some generalized abdominal soreness.  No bowel/bladder issues.  +nausea --using vitamin B6 and unisom as needed.  +emesis at times.  Has been struggling with BPs --was on labetolol 200mg BID and switched to 400mg BID last week with PCP --not feeling well with higher dose but BPs better.  Monitoring at home as well.  Is changing to hyralazine this week with PCP.  No headaches, chest pain, etc  2nd pregnancy --first complicated by CHTN --was able to stop meds sat 18wks; was still monitored closely and induced at 39+wks; was started on BID labetolol PP  AMA--interested in invitae today; agrees to AFP and level II in 2nd trimester  -Anxiety --doing  better on wellbutrin  -has had flu and covid vaccines     Have you travelled during the pregnancy?No  Have your sexual partner(s) travelled during the pregnancy?No     HISTORY:   Planned Pregnancy: Yes  Marital Status:   Occupation:  for [employer] at a [employer] - stressful  Living in Household: Spouse and Children     Past History:  Her past medical history   Past Medical History        Past Medical History:   Diagnosis Date     Asthma       seasonal and environmental     C. difficile diarrhea 2013     Concussion 01/2022     Depression       Depressive disorder       Encounter for IUD insertion 10/16/2020     Jyoti inserted by Dr aPrk     Generalized anxiety disorder       Hypertension       IUD contraception 10/2020     Jyoti inserted by Dr Park     Pain in both hands 02/20/2020      .       She has a history of  hypertension and preeclampsia     Since her last LMP she denies use of alcohol, tobacco and street drugs.     Past medical, surgical, social and family history were reviewed and updated in [facility].         Current Outpatient Medications   Medication     albuterol (PROAIR HFA/PROVENTIL HFA/VENTOLIN HFA) 108 (90 Base) MCG/ACT inhaler     buPROPion (WELLBUTRIN XL) 150 MG 24 hr tablet     citalopram (CELEXA) 40 MG tablet     fluticasone (FLOVENT HFA) 220 MCG/ACT inhaler     labetalol (NORMODYNE) 300 MG tablet     Prenatal Vit-Fe Sulfate-FA-DHA (PRENATAL VITAMIN/MIN +DHA PO)     Probiotic Product (PROBIOTIC DAILY PO)     LORazepam (ATIVAN) 0.5 MG tablet     ondansetron (ZOFRAN ODT) 4 MG ODT tab      No current facility-administered medications for this visit.         ROS:   12 point review of systems negative other than symptoms noted below or in the HPI.  Constitutional: Fatigue  Gastrointestinal: Constipation and Nausea     Nurse phone visit completed. Prenatal book and folder (containing standard educational hand-outs and brochures) will be given at next visit to patient. Information  "in folder reviewed over the phone. Questions answered. Brochure given on optional screening available to assess chromosomal anomalies. Pt desires NIPS. Pt advised to call the clinic if she has any questions or concerns related to her pregnancy. Prenatal labs future ordered. New prenatal visit scheduled on 12/13/22 with Dr Kristie Elder RN           Lab Results   Component Value Date     PAP NIL 08/22/2017      PHQ-9 score:    PHQ 11/9/2022   PHQ-9 Total Score 3   Q9: Thoughts of better off dead/self-harm past 2 weeks Not at all   F/U: Thoughts of suicide or self-harm -   F/U: Safety concerns -         ROCHELLE-7 SCORE 12/31/2020 8/4/2022 11/9/2022   Total Score - - 2 (minimal anxiety)   Total Score 4 1 2      Patient supplied answers from flow sheet for:  Prenatal OB Questionnaire.  Past Medical History  Have you ever recieved care for your mental health? : (!) Yes  Have you ever been in a major accident or suffered serious trauma?: (!) Yes  Within the last year, has anyone hit, slapped, kicked or otherwise hurt you?: No  In the last year, has anyone forced you to have sex when you didn't want to?: No     Past Medical History 2   Have you ever received a blood transfusion?: No  Would you accept a blood transfusion if was medically recommended?: Yes  Does anyone in your home smoke?: No   Is your blood type Rh negative?: Unknown  Have you ever ?: (!) Yes  Have you been hospitalized for a nonsurgical reason excluding normal delivery?: No  Have you ever had an abnormal pap smear?: (!) Yes     Past Medical History (Continued)  Do you have a history of abnormalities of the uterus?: No  Did your mother take CAIT or any other hormones when she was pregnant with you?: Unknown  Do you have any other problems we have not asked about which you feel may be important to this pregnancy?: No         OBJECTIVE:     EXAM:  /88   Ht 1.727 m (5' 8\")   Wt 92.5 kg (204 lb)   LMP 09/23/2022   BMI 31.02 kg/m   Body mass index " is 31.02 kg/m .    GENERAL: healthy, alert and no distress  EYES: Eyes grossly normal to inspection, PERRL and conjunctivae and sclerae normal  HENT: ear canals and TM's normal, nose and mouth without ulcers or lesions  NECK: no adenopathy, no asymmetry, masses, or scars and thyroid normal to palpation  RESP: lungs clear to auscultation - no rales, rhonchi or wheezes  BREAST: normal without masses, tenderness or nipple discharge and no palpable axillary masses or adenopathy  CV: regular rate and rhythm, normal S1 S2, no S3 or S4, no murmur, click or rub, no peripheral edema and peripheral pulses strong  ABDOMEN: soft, nontender, no hepatosplenomegaly, no masses and bowel sounds normal  MS: no gross musculoskeletal defects noted, no edema  SKIN: no suspicious lesions or rashes  NEURO: Normal strength and tone, mentation intact and speech normal  PSYCH: mentation appears normal, affect normal/bright    ASSESSMENT/PLAN:       ICD-10-CM    1. Supervision of high-risk pregnancy of elderly multigravida  O09.529       2. Chronic hypertension during pregnancy  O10.919           37 year old , On 2022 patient is 11w4d weeks of pregnancy with NOHEMI of 2023, by Last Menstrual Period    Discussed as follows:AMA, CHTN, vaccines    Counseling given:   - Follow up in 4-6 weeks for return OB visit.  - Recommended weight gain for pregnancy: 25-35 lbs.      PLAN/PATIENT INSTRUCTIONS:    Patient Instructions   Avoid alcoholic beverages.  Discussed advanced maternal age and testing options.  Patient is above age 35 at delivery.  Explained risks of chromosomal abnormalities and age risks.  Discussed amnio versus non-invasive testing and their individual limitations and risks.  Patient would like non-invasive testing to start with.  Understands Invtae is a screenting test and not a diagnostic test.  Will also plan to check AFP at 15-20wks and Level II us with MFM at 18-20wks.  With regards to CHTN, will continue on  labetolol for blood pressure control.  Plan to check baseline Pre-eclampsia labs today.  Will start daily baby ASA later this week until delivery.  Will check Level II us with MFM and growth us monthly in the 3rd trimester with weekly BPP at 32wks.       Anamaria Park MD

## 2022-12-08 ENCOUNTER — HOSPITAL ENCOUNTER (EMERGENCY)
Facility: CLINIC | Age: 37
Discharge: HOME | End: 2022-12-08
Attending: EMERGENCY MEDICINE | Admitting: EMERGENCY MEDICINE
Payer: COMMERCIAL

## 2022-12-08 VITALS
TEMPERATURE: 98 F | OXYGEN SATURATION: 99 % | HEART RATE: 77 BPM | SYSTOLIC BLOOD PRESSURE: 154 MMHG | DIASTOLIC BLOOD PRESSURE: 108 MMHG | RESPIRATION RATE: 16 BRPM

## 2022-12-08 DIAGNOSIS — I10 BENIGN ESSENTIAL HYPERTENSION: ICD-10-CM

## 2022-12-08 DIAGNOSIS — Z33.1 PREGNANCY, INCIDENTAL: ICD-10-CM

## 2022-12-08 LAB
ALBUMIN SERPL-MCNC: 3 G/DL (ref 3.4–5)
ALBUMIN UR-MCNC: NEGATIVE MG/DL
ALP SERPL-CCNC: 43 U/L (ref 40–150)
ALT SERPL W P-5'-P-CCNC: 24 U/L (ref 0–50)
ANION GAP SERPL CALCULATED.3IONS-SCNC: 5 MMOL/L (ref 3–14)
APPEARANCE UR: CLEAR
AST SERPL W P-5'-P-CCNC: 17 U/L (ref 0–45)
BILIRUB SERPL-MCNC: 0.7 MG/DL (ref 0.2–1.3)
BILIRUB UR QL STRIP: NEGATIVE
BUN SERPL-MCNC: 6 MG/DL (ref 7–30)
CALCIUM SERPL-MCNC: 8.6 MG/DL (ref 8.5–10.1)
CHLORIDE BLD-SCNC: 105 MMOL/L (ref 94–109)
CO2 SERPL-SCNC: 25 MMOL/L (ref 20–32)
COLOR UR AUTO: NORMAL
CREAT SERPL-MCNC: 0.57 MG/DL (ref 0.52–1.04)
ERYTHROCYTE [DISTWIDTH] IN BLOOD BY AUTOMATED COUNT: 12.7 % (ref 10–15)
GFR SERPL CREATININE-BSD FRML MDRD: >90 ML/MIN/1.73M2
GLUCOSE BLD-MCNC: 86 MG/DL (ref 70–99)
GLUCOSE UR STRIP-MCNC: NEGATIVE MG/DL
HCT VFR BLD AUTO: 35.5 % (ref 35–47)
HGB BLD-MCNC: 12.2 G/DL (ref 11.7–15.7)
HGB UR QL STRIP: NEGATIVE
KETONES UR STRIP-MCNC: NEGATIVE MG/DL
LEUKOCYTE ESTERASE UR QL STRIP: NEGATIVE
MCH RBC QN AUTO: 32 PG (ref 26.5–33)
MCHC RBC AUTO-ENTMCNC: 34.4 G/DL (ref 31.5–36.5)
MCV RBC AUTO: 93 FL (ref 78–100)
NITRATE UR QL: NEGATIVE
PH UR STRIP: 7 [PH] (ref 5–7)
PLATELET # BLD AUTO: 319 10E3/UL (ref 150–450)
POTASSIUM BLD-SCNC: 3.6 MMOL/L (ref 3.4–5.3)
PROT SERPL-MCNC: 6.5 G/DL (ref 6.8–8.8)
RBC # BLD AUTO: 3.81 10E6/UL (ref 3.8–5.2)
RBC URINE: 0 /HPF
SODIUM SERPL-SCNC: 135 MMOL/L (ref 133–144)
SP GR UR STRIP: 1.01 (ref 1–1.03)
SQUAMOUS EPITHELIAL: 1 /HPF
UROBILINOGEN UR STRIP-MCNC: NORMAL MG/DL
WBC # BLD AUTO: 4.4 10E3/UL (ref 4–11)
WBC URINE: 1 /HPF

## 2022-12-08 PROCEDURE — 36415 COLL VENOUS BLD VENIPUNCTURE: CPT | Performed by: EMERGENCY MEDICINE

## 2022-12-08 PROCEDURE — 99283 EMERGENCY DEPT VISIT LOW MDM: CPT

## 2022-12-08 PROCEDURE — 81001 URINALYSIS AUTO W/SCOPE: CPT | Performed by: EMERGENCY MEDICINE

## 2022-12-08 PROCEDURE — 80053 COMPREHEN METABOLIC PANEL: CPT | Performed by: EMERGENCY MEDICINE

## 2022-12-08 PROCEDURE — 85041 AUTOMATED RBC COUNT: CPT | Performed by: EMERGENCY MEDICINE

## 2022-12-08 PROCEDURE — 85014 HEMATOCRIT: CPT | Performed by: EMERGENCY MEDICINE

## 2022-12-08 ASSESSMENT — ENCOUNTER SYMPTOMS
HEADACHES: 1
SHORTNESS OF BREATH: 1
FEVER: 0
CHILLS: 0
COUGH: 0
VOMITING: 0
NAUSEA: 1
LIGHT-HEADEDNESS: 1

## 2022-12-08 ASSESSMENT — ACTIVITIES OF DAILY LIVING (ADL)
ADLS_ACUITY_SCORE: 35
ADLS_ACUITY_SCORE: 35

## 2022-12-08 NOTE — ED NOTES
"Recent change of labetalol from 300 mg BID to 400 mg BID. 20 min later, nausea is terrible. Having dizziness and sob with any small tasks. Headaches every day. \"Feel like this is for sure my bp not the pregnancy because this is how I felt when I had bp problems before I found out I was pregnant\"  "

## 2022-12-08 NOTE — DISCHARGE INSTRUCTIONS
Continue your labetalol, schedule an appointment with your doctor for a recheck on your blood pressure and medications.  Keep your appointment with Dr. Park next week.  Tylenol as needed.  Get plenty of rest.  Avoid salt in your diet.

## 2022-12-08 NOTE — ED TRIAGE NOTES
Pt is 10 weeks pregnant and has been having high bp, recently upped her bp meds last week with no change, c/o feeling dizzy and short of breath

## 2022-12-08 NOTE — ED PROVIDER NOTES
History   Chief Complaint:  Hypertension     The history is provided by the patient.      Vivi Mccall is a 37 year old female who is 10 weeks pregnant  with history of asthma, hypertension, ROCHELLE, and major depressive disorder who presents with worsening pin-point headache (over eyes), lightheadedness, and shortness of breath for two weeks.  However, the headaches have been off and on prior to her being pregnant.  Patient notes she had a recent change of Labetalol from 300 mg BID to 400 mg BID one weeks ago. She notes symptoms began progressively worsening after medication dosage change last week. Patient experiences nausea 15 minutes after taking medication. She notes she had diarrhea three days ago. Patient reports yesterday she had vision changes with blurry vision in her left eye for a few hours which she notes has occurred in the past and she attributes to her hypertension. Patient presents to ED because her bp last night was 167/100. She denies leg swelling, fevers, chills, cough, or vomiting. Patient has first OBGYN appointment in 5 days.    Review of Systems   Constitutional: Negative for chills and fever.   Eyes: Positive for visual disturbance.   Respiratory: Positive for shortness of breath. Negative for cough.    Cardiovascular: Negative for leg swelling.   Gastrointestinal: Positive for nausea. Negative for vomiting.   Neurological: Positive for light-headedness and headaches.   All other systems reviewed and are negative.        Allergies:  Amoxicillin  Codeine  Penicillins    Medications:  Albuterol inhaler  Bupropion    Citalopram   Fluticasone inhaler  Labetalol   Lorazepa  Ondansetron   Prenatal Vit-Fe Sulfate-FA-DHA    Past Medical History:     Benign essential hypertension  ROCHELLE  Major depressive disorder  Vitamin D disorder  Shoulder pain, left  Seasonal allergic rhinitis due to pollen  Mild persistent asthma without complication  Chronic left shoulder pain     Past Surgical  History:    Laser treatment of HPV  Ganglion cyst removal  Nerve release on left leg/ankle    Family History:    Mother- diabetes, bipolar disorder, hypertension, coronary artery disorder, depression, anxiety disorder, mental illness, breast cancer  Father- hypertension     Social History:  PCP: Vi Baldwin   Patient presents alone.  Patient arrived via car.    Physical Exam     Patient Vitals for the past 24 hrs:   BP Temp Temp src Pulse Resp SpO2   12/08/22 1254 (!) 154/108 -- -- 77 16 --   12/08/22 1241 (!) 154/115 -- -- 78 -- --   12/08/22 1051 (!) 130/92 -- -- 93 -- --   12/08/22 1036 (!) 148/92 98  F (36.7  C) Temporal 100 14 99 %       Physical Exam  Nursing note and vitals reviewed.    Constitutional:  Appears comfortable.    Eyes:    Conjunctivae are normal without injection.  Pupils are equal.  Cardiovascular:  Normal rate, regular rhythm with normal S1 and S2.      Normal heart sounds and peripheral pulses 2+ and equal.       No murmur or amari.  Pulmonary:  Effort normal and breath sounds clear to auscultation bilaterally. No respiratory distress.  No stridor.     No wheezes. No rales.     GI:    Soft. No distension and no mass. No tenderness.    Musculoskeletal:  Normal range of motion. No extremity deformity.     No edema and no tenderness.    Neurological:   Alert and oriented. No focal weakness. Reflexes 1+ no lower extremity edema.   Skin:    Skin is warm and dry. No rash noted.   Psychiatric:   Behavior is normal. Appropriate mood and affect.     Judgment and thought content normal.     Emergency Department Course       Laboratory:  Labs Ordered and Resulted from Time of ED Arrival to Time of ED Departure   COMPREHENSIVE METABOLIC PANEL - Abnormal       Result Value    Sodium 135      Potassium 3.6      Chloride 105      Carbon Dioxide (CO2) 25      Anion Gap 5      Urea Nitrogen 6 (*)     Creatinine 0.57      Calcium 8.6      Glucose 86      Alkaline Phosphatase 43      AST 17      ALT  24      Protein Total 6.5 (*)     Albumin 3.0 (*)     Bilirubin Total 0.7      GFR Estimate >90     CBC WITH PLATELETS - Normal    WBC Count 4.4      RBC Count 3.81      Hemoglobin 12.2      Hematocrit 35.5      MCV 93      MCH 32.0      MCHC 34.4      RDW 12.7      Platelet Count 319     ROUTINE UA WITH MICROSCOPIC REFLEX TO CULTURE - Normal    Color Urine Light Yellow      Appearance Urine Clear      Glucose Urine Negative      Bilirubin Urine Negative      Ketones Urine Negative      Specific Gravity Urine 1.007      Blood Urine Negative      pH Urine 7.0      Protein Albumin Urine Negative      Urobilinogen Urine Normal      Nitrite Urine Negative      Leukocyte Esterase Urine Negative      RBC Urine 0      WBC Urine 1      Squamous Epithelials Urine 1        Emergency Department Course:    Reviewed:  I reviewed nursing notes, vitals, past medical history and Care Everywhere    Assessments:  1050 I obtained history and examined the patient as noted above.   1249 I rechecked the patient and explained findings.     Consults:  1243 I consulted with ARMINDA Krishna, regarding the patient's history and presentation here in the emergency department who accepted the patient for admission.    Disposition:  The patient was discharged to home.     Impression & Plan     Medical Decision Making:  Patient presents for evaluation of elevated blood pressure and lightheaded episodes.  She has underlying hypertension.  She has not had severe headaches that have changed in nature since she has become pregnant and she is in her first trimester only at 10 weeks so preeclampsia would be highly unlikely.  I did get blood work and her LFTs, comprehensive panel, CBC including platelet count are all normal and she has no protein in her urine.  I think that she was experiencing some effects of the increased dose over the last week with the lightheadedness from the increase in labetalol.  Her blood pressure is still not under good  control.  She was borderline orthostatic here.  I encouraged her to try to stay hydrated but cut salt out of her diet.  I talked with Dr. Park her OB/GYN and she did not have any other suggestions other than to continue to follow-up with her doctor to get her blood pressure under better control.  She has a first appointment with her in 5 days and encouraged her to keep that.  Patient is comfortable this plan to go home and follow-up with her doctor and Dr. Park.    Continue your labetalol, schedule an appointment with your doctor for a recheck on your blood pressure and medications.  Keep your appointment with Dr. Park next week.  Tylenol as needed.  Get plenty of rest.  Avoid salt in your diet.    Diagnosis:    ICD-10-CM    1. Benign essential hypertension  I10       2. Pregnancy, incidental  Z33.1     10 weeks        Scribe Disclosure:  I, Leah Burns, am serving as a scribe at 11:05 AM on 12/8/2022 to document services personally performed by Sheela Diego MD based on my observations and the provider's statements to me.          Sheela Diego MD  12/08/22 [id number]

## 2022-12-08 NOTE — ED NOTES
Report received. Care of patient assumed.    Patient resting on cart. Airway patent. Respirations are regular and unlabored. Skin is normal color and dry. Patient repositions self on cart.     Bed in lowest position with side rail up x 1. Call light in easy reach. HOB at level of comfort. Continue to monitor.

## 2022-12-12 PROBLEM — Z32.01 PREGNANCY TEST POSITIVE: Status: RESOLVED | Noted: 2022-11-30 | Resolved: 2022-12-12

## 2022-12-12 LAB
ABO/RH(D): NORMAL
ANTIBODY SCREEN: NEGATIVE
SPECIMEN EXPIRATION DATE: NORMAL

## 2022-12-12 RX ORDER — HYDRALAZINE HYDROCHLORIDE 10 MG/1
10 TABLET, FILM COATED ORAL 4 TIMES DAILY
Qty: 180 TABLET | Refills: 0 | Status: SHIPPED | OUTPATIENT
Start: 2022-12-12 | End: 2022-12-22 | Stop reason: ALTCHOICE

## 2022-12-12 RX ORDER — HYDRALAZINE HYDROCHLORIDE 10 MG/1
10 TABLET, FILM COATED ORAL 3 TIMES DAILY
Qty: 180 TABLET | Refills: 0 | Status: SHIPPED | OUTPATIENT
Start: 2022-12-12 | End: 2022-12-12

## 2022-12-13 ENCOUNTER — ANCILLARY PROCEDURE (OUTPATIENT)
Dept: ULTRASOUND IMAGING | Facility: CLINIC | Age: 37
End: 2022-12-13
Payer: COMMERCIAL

## 2022-12-13 ENCOUNTER — TRANSCRIBE ORDERS (OUTPATIENT)
Dept: MATERNAL FETAL MEDICINE | Facility: CLINIC | Age: 37
End: 2022-12-13

## 2022-12-13 ENCOUNTER — PRENATAL OFFICE VISIT (OUTPATIENT)
Dept: OBGYN | Facility: CLINIC | Age: 37
End: 2022-12-13
Payer: COMMERCIAL

## 2022-12-13 VITALS
BODY MASS INDEX: 30.92 KG/M2 | SYSTOLIC BLOOD PRESSURE: 132 MMHG | HEIGHT: 68 IN | DIASTOLIC BLOOD PRESSURE: 88 MMHG | WEIGHT: 204 LBS

## 2022-12-13 DIAGNOSIS — O09.91 SUPERVISION OF HIGH RISK PREGNANCY IN FIRST TRIMESTER: ICD-10-CM

## 2022-12-13 DIAGNOSIS — O10.919 CHRONIC HYPERTENSION DURING PREGNANCY: ICD-10-CM

## 2022-12-13 DIAGNOSIS — O36.80X0 PREGNANCY WITH INCONCLUSIVE FETAL VIABILITY: ICD-10-CM

## 2022-12-13 DIAGNOSIS — O26.90 PREGNANCY RELATED CONDITION, ANTEPARTUM: Primary | ICD-10-CM

## 2022-12-13 DIAGNOSIS — Z13.79 GENETIC SCREENING: ICD-10-CM

## 2022-12-13 DIAGNOSIS — O09.529 SUPERVISION OF HIGH-RISK PREGNANCY OF ELDERLY MULTIGRAVIDA: Primary | ICD-10-CM

## 2022-12-13 LAB
ALBUMIN MFR UR ELPH: 18.7 MG/DL
ALBUMIN UR-MCNC: ABNORMAL MG/DL
ALT SERPL W P-5'-P-CCNC: 18 U/L (ref 10–35)
APPEARANCE UR: CLEAR
AST SERPL W P-5'-P-CCNC: 20 U/L (ref 10–35)
BACTERIA #/AREA URNS HPF: ABNORMAL /HPF
BILIRUB UR QL STRIP: NEGATIVE
BUN SERPL-MCNC: 4.5 MG/DL (ref 6–20)
COLOR UR AUTO: YELLOW
CREAT SERPL-MCNC: 0.61 MG/DL (ref 0.51–0.95)
CREAT UR-MCNC: 198 MG/DL
ERYTHROCYTE [DISTWIDTH] IN BLOOD BY AUTOMATED COUNT: 12.3 % (ref 10–15)
GFR SERPL CREATININE-BSD FRML MDRD: >90 ML/MIN/1.73M2
GLUCOSE UR STRIP-MCNC: NEGATIVE MG/DL
HCT VFR BLD AUTO: 36.2 % (ref 35–47)
HGB BLD-MCNC: 12.1 G/DL (ref 11.7–15.7)
HGB UR QL STRIP: NEGATIVE
KETONES UR STRIP-MCNC: NEGATIVE MG/DL
LEUKOCYTE ESTERASE UR QL STRIP: NEGATIVE
MCH RBC QN AUTO: 31.3 PG (ref 26.5–33)
MCHC RBC AUTO-ENTMCNC: 33.4 G/DL (ref 31.5–36.5)
MCV RBC AUTO: 94 FL (ref 78–100)
MUCOUS THREADS #/AREA URNS LPF: PRESENT /LPF
NITRATE UR QL: NEGATIVE
PH UR STRIP: 8.5 [PH] (ref 5–7)
PLATELET # BLD AUTO: 328 10E3/UL (ref 150–450)
PROT/CREAT 24H UR: 0.09 MG/MG CR (ref 0–0.2)
RBC # BLD AUTO: 3.87 10E6/UL (ref 3.8–5.2)
RBC #/AREA URNS AUTO: ABNORMAL /HPF
SP GR UR STRIP: 1.02 (ref 1–1.03)
SQUAMOUS #/AREA URNS AUTO: ABNORMAL /LPF
URATE SERPL-MCNC: 2.3 MG/DL (ref 2.4–5.7)
UROBILINOGEN UR STRIP-ACNC: 0.2 E.U./DL
WBC # BLD AUTO: 4.5 10E3/UL (ref 4–11)
WBC #/AREA URNS AUTO: ABNORMAL /HPF

## 2022-12-13 PROCEDURE — 86900 BLOOD TYPING SEROLOGIC ABO: CPT | Performed by: OBSTETRICS & GYNECOLOGY

## 2022-12-13 PROCEDURE — 86901 BLOOD TYPING SEROLOGIC RH(D): CPT | Performed by: OBSTETRICS & GYNECOLOGY

## 2022-12-13 PROCEDURE — 87389 HIV-1 AG W/HIV-1&-2 AB AG IA: CPT | Performed by: OBSTETRICS & GYNECOLOGY

## 2022-12-13 PROCEDURE — 86803 HEPATITIS C AB TEST: CPT | Performed by: OBSTETRICS & GYNECOLOGY

## 2022-12-13 PROCEDURE — 84450 TRANSFERASE (AST) (SGOT): CPT | Performed by: OBSTETRICS & GYNECOLOGY

## 2022-12-13 PROCEDURE — 82565 ASSAY OF CREATININE: CPT | Performed by: OBSTETRICS & GYNECOLOGY

## 2022-12-13 PROCEDURE — 36415 COLL VENOUS BLD VENIPUNCTURE: CPT | Performed by: OBSTETRICS & GYNECOLOGY

## 2022-12-13 PROCEDURE — 86780 TREPONEMA PALLIDUM: CPT | Performed by: OBSTETRICS & GYNECOLOGY

## 2022-12-13 PROCEDURE — 84460 ALANINE AMINO (ALT) (SGPT): CPT | Performed by: OBSTETRICS & GYNECOLOGY

## 2022-12-13 PROCEDURE — 81001 URINALYSIS AUTO W/SCOPE: CPT | Performed by: OBSTETRICS & GYNECOLOGY

## 2022-12-13 PROCEDURE — 99214 OFFICE O/P EST MOD 30 MIN: CPT | Performed by: OBSTETRICS & GYNECOLOGY

## 2022-12-13 PROCEDURE — 84520 ASSAY OF UREA NITROGEN: CPT | Performed by: OBSTETRICS & GYNECOLOGY

## 2022-12-13 PROCEDURE — 87086 URINE CULTURE/COLONY COUNT: CPT | Performed by: OBSTETRICS & GYNECOLOGY

## 2022-12-13 PROCEDURE — 76801 OB US < 14 WKS SINGLE FETUS: CPT | Performed by: OBSTETRICS & GYNECOLOGY

## 2022-12-13 PROCEDURE — 86850 RBC ANTIBODY SCREEN: CPT | Performed by: OBSTETRICS & GYNECOLOGY

## 2022-12-13 PROCEDURE — 86762 RUBELLA ANTIBODY: CPT | Performed by: OBSTETRICS & GYNECOLOGY

## 2022-12-13 PROCEDURE — 84156 ASSAY OF PROTEIN URINE: CPT | Performed by: OBSTETRICS & GYNECOLOGY

## 2022-12-13 PROCEDURE — 84550 ASSAY OF BLOOD/URIC ACID: CPT | Performed by: OBSTETRICS & GYNECOLOGY

## 2022-12-13 PROCEDURE — 87340 HEPATITIS B SURFACE AG IA: CPT | Performed by: OBSTETRICS & GYNECOLOGY

## 2022-12-13 PROCEDURE — 85027 COMPLETE CBC AUTOMATED: CPT | Performed by: OBSTETRICS & GYNECOLOGY

## 2022-12-13 NOTE — PATIENT INSTRUCTIONS
Avoid alcoholic beverages.  Discussed advanced maternal age and testing options.  Patient is above age 35 at delivery.  Explained risks of chromosomal abnormalities and age risks.  Discussed amnio versus non-invasive testing and their individual limitations and risks.  Patient would like non-invasive testing to start with.  Understands Invtae is a screenting test and not a diagnostic test.  Will also plan to check AFP at 15-20wks and Level II us with MFM at 18-20wks.  With regards to CHTN, will continue on labetolol for blood pressure control.  Plan to check baseline Pre-eclampsia labs today.  Will start daily baby ASA later this week until delivery.  Will check Level II us with MFM and growth us monthly in the 3rd trimester with weekly BPP at 32wks.

## 2022-12-14 LAB
HBV SURFACE AG SERPL QL IA: NONREACTIVE
HCV AB SERPL QL IA: NONREACTIVE
HIV 1+2 AB+HIV1 P24 AG SERPL QL IA: NONREACTIVE
RUBV IGG SERPL QL IA: 3.14 INDEX
RUBV IGG SERPL QL IA: POSITIVE
T PALLIDUM AB SER QL: NONREACTIVE

## 2022-12-15 LAB — BACTERIA UR CULT: NORMAL

## 2022-12-22 ENCOUNTER — VIRTUAL VISIT (OUTPATIENT)
Dept: PHARMACY | Facility: CLINIC | Age: 37
End: 2022-12-22
Attending: FAMILY MEDICINE
Payer: COMMERCIAL

## 2022-12-22 DIAGNOSIS — J45.30 MILD PERSISTENT ASTHMA WITHOUT COMPLICATION: ICD-10-CM

## 2022-12-22 DIAGNOSIS — R11.0 NAUSEA: ICD-10-CM

## 2022-12-22 DIAGNOSIS — I10 BENIGN ESSENTIAL HYPERTENSION: Primary | ICD-10-CM

## 2022-12-22 DIAGNOSIS — F33.1 MAJOR DEPRESSIVE DISORDER, RECURRENT EPISODE, MODERATE (H): ICD-10-CM

## 2022-12-22 PROCEDURE — 99607 MTMS BY PHARM ADDL 15 MIN: CPT | Performed by: PHARMACIST

## 2022-12-22 PROCEDURE — 99605 MTMS BY PHARM NP 15 MIN: CPT | Performed by: PHARMACIST

## 2022-12-22 RX ORDER — LABETALOL 200 MG/1
200 TABLET, FILM COATED ORAL 2 TIMES DAILY
Qty: 180 TABLET | Refills: 0 | Status: SHIPPED | OUTPATIENT
Start: 2022-12-22 | End: 2023-01-30

## 2022-12-22 RX ORDER — NIFEDIPINE 30 MG
30 TABLET, EXTENDED RELEASE ORAL DAILY
Qty: 90 TABLET | Refills: 0 | Status: SHIPPED | OUTPATIENT
Start: 2022-12-22 | End: 2023-01-30 | Stop reason: SINTOL

## 2022-12-22 RX ORDER — ASPIRIN 81 MG/1
81 TABLET ORAL DAILY
COMMUNITY
End: 2023-06-24

## 2022-12-22 RX ORDER — LANOLIN ALCOHOL/MO/W.PET/CERES
25 CREAM (GRAM) TOPICAL PRN
Status: ON HOLD | COMMUNITY
End: 2023-03-19

## 2022-12-22 NOTE — PROGRESS NOTES
Medication Therapy Management (MTM) Encounter    ASSESSMENT:                            Medication Adherence/Access: No issues identified    Hypertension: Patient not tolerating labetalol or hydralazine at current doses. Would recommend decreasing labetalol dose to reduce risk of shortness of breath and other SE to a dose that the patient has tolerated. Additionally would recommend changing hydralazine to nifidepine ER since hydralazine has not seemed to be effective, patient is not tolerating, and multiple daily doses is harder for Vivi. After a chart review it appears Vivi has tried nifedipine during a previous pregnancy but experienced headaches. Hard to say at the time if headaches were due to nifedipine or labetalol withdrawal at the time so will plan to use caution when reducing labetalol dose and implementing nifedipine to prevent similar side effects. There is evidence to support methyldopa use however it appears that it has been discontinued from the market and [facility] is not able to make it.     Depression/Anxiety: The use of citalopram and bupropion should be based on a risk over benefit recommendation in pregnancy. At this time Vivi is aware of the risks with the medications and wishes to continue current regimen for mood.     Nausea: Stable, current regimen is effective when needed    Asthma: Stable, SOB is likely not asthma related, feel that is okay to remain off maintenance inhaler for now but will continue to monitor.     PLAN:                            Now:   1.    Stop hydralazine   2.    Start nifedipine ER 30mg 1 tablet daily (this is going to replace hydralazine)    After you can start the nifedipine:   1.    Decrease labetalol to 1 and 1/2 tablets (300mg) twice daily for 1 week then decrease to 1 tablet (200mg) twice daily going forward    As for the garlic there is some evidence to say that garlic might help with blood pressure management and some evidence to say it might  not. Garlic has been shown to increase bleed risk so at this time I recommend it would be best to avoid since Vivi is taking aspirin which also can increase bleed risk.    Verbal approval from Vi Baldwin MD in agreement of plan and to send in orders    Follow-up: Check in [facility] after starting Nifedipine      SUBJECTIVE/OBJECTIVE:                          Vivi Mccall is a 37 year old female contacted via secure video for a follow-up visit.  Today's visit is a follow-up [facility] visit from 11/12/2020. This is will be the fist visit for 2022.      Reason for visit: Discuss medications for blood pressure management in pregnancy.     Allergies/ADRs: Reviewed in chart  Past Medical History: Reviewed in chart  Tobacco: She reports that she has never smoked. She has never used smokeless tobacco.  Alcohol: none  Caffeine: keeps use under 200mg a day, black tea in the morning, occasional pepsi.     Medication Adherence/Access: no concerns.    Hypertension: Current medications include hydralazine 20mg, 10mg, 10mg, 10mg (10AM, 2PM, 6PM, 10PM)- started about 9 days ago, labetalol 400mg twice daily (10AM, 10AM).  Patient does self-monitor blood pressure. Home BP monitoring in range of 150-155's systolic over 90's diastolic.  Patient reports the following medication side effects: nausea, fatigue, shortness of breath particularly 15-20 minutes after taking hydralazine or labetalol. Was able to better tolerate labetalol at a lower dose of 200mg twice daily. Feels it is hard to take the medications because they make her feel so horrible to where others have noticed (her boss told her she could leave one day from work) and finds that they really haven't been having much of an impact at lowering her blood pressure. Additionally she started acupuncture yesterday- during her last pregnancy she was able to stop her medications at 18 weeks because of the help of acupuncture. Questions use of garlic for blood pressure  management in pregnancy.   BP Readings from Last 3 Encounters:   12/13/22 132/88   12/08/22 (!) 154/108   11/30/22 (!) 144/90     Depression/Anxiety: Current therapy includes bupropion 150mg XL daily and citalopram 40mg daily, and has lorazepam on hand as needed- she is not using during pregnancy. Has an increased stress with new job recently, feels that current regimen is effective. Took citalopram during her last pregnancy.     Nausea: Current therapy includes ondansetron 4mg odt- used twice at start of pregnancy but does not use regularly, vitamin B6 1/2 tablet as needed and unisom 1/2-1 tablet as needed. Feels that nausea related to pregnancy has gotten better recently.     Asthma: currently prescribed albuterol inhaler as needed and Flovent 1 puff twice daily for which the patient uses Flovent and albuterol only as needed when she is really ill- she has not been using inhalers recently. She does have some shortness of breath but feels it is more so pregnancy related. Feels asthma is more so environmental induced. Overall feels asthma is very well controlled.     Today's Vitals: LMP 09/23/2022   ----------------  I spent 35 minutes with this patient today. All changes were made via verbal approval with Vi Baldwin MD. A copy of the visit note was provided to the patient's provider(s).    A summary of these recommendations was sent via [employer].    Yamilka Bray PharmD  Medication Therapy Management Resident  [phone]    Preceptor cosignature: Vivi Mccall was seen independently by Dr. Bray. I have reviewed the assessment and plan. Vivi Calderón, Qasim, MANJIT, BCACP      Telemedicine Visit Details  Type of service:  Video Conference via [employer]  Start Time: 9:01 AM  End Time: 9:36 AM  Originating Location (pt. Location): Home      Distant Location (provider location):  Off-site  Provider has received verbal consent for a visit from the patient? Yes     Medication Therapy  Recommendations  Benign essential hypertension    Current Medication: hydrALAZINE (APRESOLINE) 10 MG tablet (Discontinued)   Rationale: Undesirable effect - Adverse medication event - Safety   Recommendation: Change Medication   Status: Accepted per Provider   Note: Nifedipine          Current Medication: labetalol (NORMODYNE) 200 MG tablet   Rationale: Dose too high - Dosage too high - Safety   Recommendation: Decrease Dose   Status: Accepted per Provider

## 2022-12-22 NOTE — PATIENT INSTRUCTIONS
"Recommendations from today's MTM visit:                                                      Now:   1.    Stop hydralazine   2.    Start nifedipine ER 30mg 1 tablet daily (this is going to replace hydralazine)    After you can start the nifedipine:   1.    Decrease labetalol to 1 and 1/2 tablets (300mg) twice daily for 1 week then decrease to 1 tablet (200mg) twice daily going forward    As for the garlic there is some evidence to say that garlic might help with blood pressure management and some evidence to say it might not. Garlic has been shown to increase bleed risk so at this time I recommend it would be best to avoid since Vivi is taking aspirin which also can increase bleed risk.    Follow-up: [facility] after starting Nifedipine      It was great speaking with you today.  I value your experience and would be very thankful for your time in providing feedback in our clinic survey. In the next few days, you may receive an email or text message from [employer] with a link to a survey related to your  clinical pharmacist.\"     To schedule another MTM appointment, please call the clinic directly or you may call the MTM scheduling line at [phone] or toll-free at [phone].     My Clinical Pharmacist's contact information:                                                      Please feel free to contact me with any questions or concerns you have.      Yamilka Bary, PharmD  Medication Therapy Management Resident  [phone]  "

## 2022-12-23 ENCOUNTER — TELEPHONE (OUTPATIENT)
Dept: OBGYN | Facility: CLINIC | Age: 37
End: 2022-12-23

## 2022-12-23 LAB — SCANNED LAB RESULT: NORMAL

## 2022-12-23 NOTE — TELEPHONE ENCOUNTER
Patient calling in regards to her genetic screening results.  Results are back. Negative which is a normal finding.  Gender disclosed to pt and her  over the phone per their requests.  Pt verbalized understanding, in agreement with plan, and voiced no further questions.  Skylar Dunn RN on 12/23/2022 at 12:12 PM

## 2022-12-26 ENCOUNTER — TELEPHONE (OUTPATIENT)
Dept: OBGYN | Facility: CLINIC | Age: 37
End: 2022-12-26

## 2023-01-10 ENCOUNTER — VIRTUAL VISIT (OUTPATIENT)
Dept: FAMILY MEDICINE | Facility: CLINIC | Age: 38
End: 2023-01-10
Payer: COMMERCIAL

## 2023-01-10 DIAGNOSIS — U07.1 INFECTION DUE TO 2019 NOVEL CORONAVIRUS: Primary | ICD-10-CM

## 2023-01-10 PROCEDURE — 99213 OFFICE O/P EST LOW 20 MIN: CPT | Mod: CS | Performed by: FAMILY MEDICINE

## 2023-01-10 NOTE — Clinical Note
Saw pt today for virtual covid treatment visit. We discussed holding her nifedipine while on paxlovid. Pls see note. Advised to keep next phone OB appt for monitoring.

## 2023-01-10 NOTE — PROGRESS NOTES
Vivi is a 37 year old who is being evaluated via a billable video visit.      How would you like to obtain your AVS? MyChart  If the video visit is dropped, the invitation should be resent by: Text to cell phone: [phone]  Will anyone else be joining your video visit? No          Assessment & Plan     Infection due to 2019 novel coronavirus  -Hx of htn on labetalol and nifedipine.  -Has not take BP meds today. Discussed drug-drug interaction with paxlovid and nifedipine. Pt prefers to hold her nifedipine so that she can take paxlovid. States she does not feel like nifedipine helped. Has BP cuff at home for monitoring. Has phone appt with OB in two days. Plans to reach out to [facility] pharmacist tomorrow in regards to changing her BP meds.   -Paxlovid side effects discussed.   -ED should severe covid symptoms occur  - nirmatrelvir and ritonavir (PAXLOVID) therapy pack  Dispense: 30 tablet; Refill: 0      DO LISA [facility]    Subjective   Vivi is a 37 year old, presenting for the following health issues:  COVID Treatment      HPI    Tested positive for covid today. Symptoms began yesterday. Pregnant, 15w4d. Hx of hypertension. Called OB office today, recommend virtual visit for treatment with paxlovid.    COVID-19 Symptom Review  How many days ago did these symptoms start? 1/9/23    Are any of the following symptoms significant for you?    New or worsening difficulty breathing? No    Worsening cough? No    Fever or chills? Yes, the highest temperature was 101    Headache: YES    Sore throat: No    Chest pain: YES    Diarrhea: No    Body aches? YES    What treatments has patient tried? Acetaminophen   Does patient live in a nursing home, group home, or shelter? No  Does patient have a way to get food/medications during quarantined? Yes, I have a friend or family member who can help me.          Review of Systems         Objective       Vitals:  No vitals were obtained today  due to virtual visit.    Physical Exam   GENERAL: alert and no distress  EYES: Eyes grossly normal to inspection.  No discharge or erythema, or obvious scleral/conjunctival abnormalities.  RESP: No audible wheeze, cough, or visible cyanosis.  No visible retractions or increased work of breathing.    SKIN: Visible skin clear. No significant rash, abnormal pigmentation or lesions.  NEURO: Cranial nerves grossly intact.  Mentation and speech appropriate for age.  PSYCH: Mentation appears normal, affect normal/bright, judgement and insight intact, normal speech and appearance well-groomed.          Video-Visit Details    Type of service:  Video Visit     Originating Location (pt. Location): Home    Distant Location (provider location):  On-site  Platform used for Video Visit: [employer]

## 2023-01-11 NOTE — PROGRESS NOTES
Vivi Mccall is a 37 year old female who is being evaluated via a billable telephone visit.      What phone number would you like to be contacted at? [phone]  How would you like to obtain your AVS? MyChart      Originating Location (pt. Location): Home      Distant Location (provider location):  On-site      SUBJECTIVE:                                                   Vivi Mccall is a 37 year old female who presents for virtual visit today for the following health issue(s):  Patient presents with:  Prenatal Care: 15w6d Covid +       Additional information: ***    HPI:  ***    Patient's last menstrual period was 2022..     Patient is sexually active, .  Using none for contraception.    reports that she has never smoked. She has never used smokeless tobacco.      Health maintenance updated:  yes    Today's PHQ-2 Score:   PHQ-2 (  [employer]) 2022   Q1: Little interest or pleasure in doing things 0   Q2: Feeling down, depressed or hopeless 0   PHQ-2 Score 0   PHQ-2 Total Score (12-17 Years)- Positive if 3 or more points; Administer PHQ-A if positive -   Q1: Little interest or pleasure in doing things Not at all   Q2: Feeling down, depressed or hopeless Not at all   PHQ-2 Score 0     Today's PHQ-9 Score:   PHQ-9 SCORE 2022   PHQ-9 Total Score [facility] 4 (Minimal depression)   PHQ-9 Total Score 4     Today's ROCHELLE-7 Score:   ROCHELLE-7 SCORE 2022   Total Score 0 (minimal anxiety)   Total Score 0       Problem list and histories reviewed & adjusted, as indicated.  Additional history: as documented.    Patient Active Problem List   Diagnosis     Benign essential hypertension     ROCHELLE (generalized anxiety disorder)     Major depressive disorder, recurrent episode, moderate (H)     Vitamin D deficiency     Shoulder pain, left     Seasonal allergic rhinitis due to pollen     Mild persistent asthma without complication     Family history of malignant neoplasm of breast      Chronic left shoulder pain     Supervision of high-risk pregnancy of elderly multigravida     Past Surgical History:   Procedure Laterality Date     GYN SURGERY  2003    Laser treatment of HPV     ORTHOPEDIC SURGERY Left 07/21/2017    ganglion cyst removal     ORTHOPEDIC SURGERY Left     nerve release on left leg/ankle      SOFT TISSUE SURGERY  1999     SURGICAL HISTORY OF -       Nerve entrapment release     ZZHC RELEASE FOOT/TOE NERVE  2011      Social History     Tobacco Use     Smoking status: Never     Smokeless tobacco: Never   Substance Use Topics     Alcohol use: Not Currently     Comment: Rare       Problem (# of Occurrences) Relation (Name,Age of Onset)    Anxiety Disorder (2) Mother (Margarita), Maternal Grandmother (Nettie)    Bipolar Disorder (1) Mother (Margarita)    Mental Illness (1) Mother (Margarita)    Unknown/Adopted (1) Father (Parker)    Depression (3) Mother (Margarita), Maternal Grandmother (Nettie), Maternal Grandfather (Saqib Sr.)    Diabetes (2) Mother (Margarita), Maternal Grandfather (Saqib Sr.)    Hypertension (6) Mother (Margarita), Father (Parker), Maternal Grandmother (Nettie), Maternal Grandfather (Saqib Sr.), Paternal Grandmother (Zoe), Maternal Aunt    Cerebrovascular Disease (1) Maternal Grandfather (Saqib Sr.)    Breast Cancer (2) Mother (Margarita): BRCA negative, neoadjuvant chemo, surgery pend, Maternal Grandmother (Nettie)    Prostate Cancer (1) Maternal Grandfather (Saqib Sr.)    Glaucoma (1) Maternal Grandfather (Saqib Sr.)    Coronary Artery Disease (3) Mother (Margarita), Maternal Grandfather (Saqib Sr.), Paternal Grandfather (Magdy)    Other - See Comments (1) Maternal Grandfather (Saqib Sr.): passed from [facility] 9/2021    Ovarian Cancer (1) Other    Diabetes Type 2  (1) Maternal Grandfather (Saqib Sr.)       Negative family history of: Macular Degeneration            Current Outpatient Medications   Medication Sig     albuterol (PROAIR HFA/PROVENTIL HFA/VENTOLIN HFA) 108 (90  "Base) MCG/ACT inhaler Inhale 2 puffs into the lungs every 6 hours     aspirin 81 MG EC tablet Take 81 mg by mouth daily     buPROPion (WELLBUTRIN XL) 150 MG 24 hr tablet Take 1 tablet (150 mg) by mouth every morning     citalopram (CELEXA) 40 MG tablet Take 1 tablet (40 mg) by mouth daily     doxylamine (UNISOM) 25 MG TABS tablet Take 12.5-50 mg by mouth as needed (nasusea)     fluticasone (FLOVENT HFA) 220 MCG/ACT inhaler Inhale 1 puff into the lungs 2 times daily     labetalol (NORMODYNE) 200 MG tablet Take 1 tablet (200 mg) by mouth 2 times daily Start after taking 300mg twice daily for 1 week     LORazepam (ATIVAN) 0.5 MG tablet Take 1 tablet (0.5 mg) by mouth every 6 hours as needed for anxiety     NIFEdipine ER (ADALAT CC) 30 MG 24 hr tablet Take 1 tablet (30 mg) by mouth daily     nirmatrelvir and ritonavir (PAXLOVID) therapy pack Take 3 tablets by mouth 2 times daily for 5 days (Take 2 Nirmatrelvir tablets and 1 Ritonavir tablet twice daily for 5 days)     ondansetron (ZOFRAN ODT) 4 MG ODT tab Take 1 tablet (4 mg) by mouth every 8 hours as needed for nausea     PAXLOVID, 300/100, therapy pack      Prenatal Vit-Fe Sulfate-FA-DHA (PRENATAL VITAMIN/MIN +DHA PO) Take 2 chew tab by mouth daily     Probiotic Product (PROBIOTIC DAILY PO)      vitamin B6 (PYRIDOXINE) 50 MG TABS Take 25 mg by mouth as needed (nausea)     No current facility-administered medications for this visit.     Allergies   Allergen Reactions     Amoxicillin      Codeine      Penicillins          OBJECTIVE:     No vitals were obtained today due to virtual visit.    Physical Exam  {video visit exam brief selected:[id number]::\"GENERAL: Healthy, alert and no distress\",\"EYES: Eyes grossly normal to inspection.  No discharge or erythema, or obvious scleral/conjunctival abnormalities.\",\"RESP: No audible wheeze, cough, or visible cyanosis.  No visible retractions or increased work of breathing.  \",\"SKIN: Visible skin clear. No significant rash, abnormal " "pigmentation or lesions.\",\"NEURO: Cranial nerves grossly intact.  Mentation and speech appropriate for age.\",\"PSYCH: Mentation appears normal, affect normal/bright, judgement and insight intact, normal speech and appearance well-groomed.\"}          ASSESSMENT/PLAN:                                                      Phone call duration: *** minutes    No diagnosis found.    There are no Patient Instructions on file for this visit.    ***    Anamaria Park MD  [facility]    "

## 2023-01-12 ENCOUNTER — VIRTUAL VISIT (OUTPATIENT)
Dept: OBGYN | Facility: CLINIC | Age: 38
End: 2023-01-12
Payer: COMMERCIAL

## 2023-01-12 VITALS — DIASTOLIC BLOOD PRESSURE: 90 MMHG | SYSTOLIC BLOOD PRESSURE: 136 MMHG

## 2023-01-12 DIAGNOSIS — U07.1 INFECTION DUE TO 2019 NOVEL CORONAVIRUS: ICD-10-CM

## 2023-01-12 DIAGNOSIS — Z3A.15 15 WEEKS GESTATION OF PREGNANCY: ICD-10-CM

## 2023-01-12 DIAGNOSIS — O10.919 CHRONIC HYPERTENSION DURING PREGNANCY: ICD-10-CM

## 2023-01-12 DIAGNOSIS — O09.529 SUPERVISION OF HIGH-RISK PREGNANCY OF ELDERLY MULTIGRAVIDA: Primary | ICD-10-CM

## 2023-01-12 PROCEDURE — 99207 PR PRENATAL VISIT: CPT | Performed by: OBSTETRICS & GYNECOLOGY

## 2023-01-12 RX ORDER — NIRMATRELVIR AND RITONAVIR 300-100 MG
KIT ORAL
Status: ON HOLD | COMMUNITY
Start: 2023-01-10 | End: 2023-03-19

## 2023-01-12 NOTE — PROGRESS NOTES
PHONE VISIT due to maternal COVID infection  First started noticing symptoms on Monday evening with congestion and fatigue  Symptoms significantly worse on Tuesday --fever to 101, chills, severe body aches, joint aches, etc   and daughter also sick but both on the mend  Starting to feel a bit better today --no fevers and body aches improving; started on paxlovid yesterday AM with PCP  No cough, shortness of breath, chest pain  Discussed safe OTC meds for symptoms moving forward  Off her nifedipine while on paxlovid per PCP recommendations --still on labetolol; BP today while on the phone with me 136/90  No vb/spotting/cramping  +constipation --discussed hydration, fiber, stool softeners as needed  RTC next week for in person visit and AFP testing  Has level II us with MFM the week after (1/27) for AMA and CHTN    Total phone visit time: 12min    Provider location:   Clinic  Patient location:  Home

## 2023-01-20 ENCOUNTER — PRENATAL OFFICE VISIT (OUTPATIENT)
Dept: OBGYN | Facility: CLINIC | Age: 38
End: 2023-01-20
Payer: COMMERCIAL

## 2023-01-20 VITALS — SYSTOLIC BLOOD PRESSURE: 120 MMHG | WEIGHT: 197 LBS | DIASTOLIC BLOOD PRESSURE: 74 MMHG | BODY MASS INDEX: 29.95 KG/M2

## 2023-01-20 DIAGNOSIS — Z86.16 PERSONAL HISTORY OF COVID-19: ICD-10-CM

## 2023-01-20 DIAGNOSIS — Z3A.17 17 WEEKS GESTATION OF PREGNANCY: ICD-10-CM

## 2023-01-20 DIAGNOSIS — O09.529 SUPERVISION OF HIGH-RISK PREGNANCY OF ELDERLY MULTIGRAVIDA: Primary | ICD-10-CM

## 2023-01-20 DIAGNOSIS — O10.919 CHRONIC HYPERTENSION DURING PREGNANCY: ICD-10-CM

## 2023-01-20 DIAGNOSIS — Z36.1 NEED FOR MATERNAL SERUM ALPHA-PROTEIN (MSAFP) SCREENING: ICD-10-CM

## 2023-01-20 PROCEDURE — 99207 PR PRENATAL VISIT: CPT | Performed by: OBSTETRICS & GYNECOLOGY

## 2023-01-20 PROCEDURE — 99000 SPECIMEN HANDLING OFFICE-LAB: CPT | Performed by: OBSTETRICS & GYNECOLOGY

## 2023-01-20 PROCEDURE — 82105 ALPHA-FETOPROTEIN SERUM: CPT | Mod: 90 | Performed by: OBSTETRICS & GYNECOLOGY

## 2023-01-20 PROCEDURE — 36415 COLL VENOUS BLD VENIPUNCTURE: CPT | Performed by: OBSTETRICS & GYNECOLOGY

## 2023-01-20 NOTE — PROGRESS NOTES
Doing well today.  +flutters this week  Occ cramping but seems to be GI related; +diarrhea intermittently since covid infection --discussed hydration and electrolyte replacement  No vb/spotting  Normal invitae testing; AFP only today; has level II us with MFM next week  Completely recovered from covid; did paxlovid and did well  Never restarted her nifedipine as it was giving her daily headaches and home BPs have all been normal (130s/70s-80s); will continue labetolol 400mg BID only at this time; discussed goal blood pressure parameters  RTC 4wks

## 2023-01-22 LAB
# FETUSES US: NORMAL
AFP MOM SERPL: 0.67
AFP SERPL-MCNC: 24 NG/ML
AGE - REPORTED: 39 YR
CURRENT SMOKER: NO
FAMILY MEMBER DISEASES HX: NO
GA METHOD: NORMAL
GA: NORMAL WK
IDDM PATIENT QL: NO
INTEGRATED SCN PATIENT-IMP: NORMAL
SPECIMEN DRAWN SERPL: NORMAL

## 2023-01-25 ENCOUNTER — PRE VISIT (OUTPATIENT)
Dept: MATERNAL FETAL MEDICINE | Facility: CLINIC | Age: 38
End: 2023-01-25
Payer: COMMERCIAL

## 2023-01-27 ENCOUNTER — OFFICE VISIT (OUTPATIENT)
Dept: MATERNAL FETAL MEDICINE | Facility: CLINIC | Age: 38
End: 2023-01-27
Attending: OBSTETRICS & GYNECOLOGY
Payer: COMMERCIAL

## 2023-01-27 ENCOUNTER — HOSPITAL ENCOUNTER (OUTPATIENT)
Dept: ULTRASOUND IMAGING | Facility: CLINIC | Age: 38
Discharge: HOME | End: 2023-01-27
Attending: OBSTETRICS & GYNECOLOGY
Payer: COMMERCIAL

## 2023-01-27 DIAGNOSIS — O26.90 PREGNANCY RELATED CONDITION, ANTEPARTUM: ICD-10-CM

## 2023-01-27 DIAGNOSIS — O10.919 CHRONIC HYPERTENSION IN PREGNANCY: Primary | ICD-10-CM

## 2023-01-27 DIAGNOSIS — Z36.2 ENCOUNTER FOR FOLLOW-UP ULTRASOUND OF FETAL ANATOMY: ICD-10-CM

## 2023-01-27 PROCEDURE — 99202 OFFICE O/P NEW SF 15 MIN: CPT | Mod: 25 | Performed by: OBSTETRICS & GYNECOLOGY

## 2023-01-27 PROCEDURE — 96040 HC GENETIC COUNSELING, EACH 30 MINUTES: CPT | Performed by: GENETIC COUNSELOR, MS

## 2023-01-27 PROCEDURE — 76811 OB US DETAILED SNGL FETUS: CPT

## 2023-01-27 PROCEDURE — 76811 OB US DETAILED SNGL FETUS: CPT | Mod: 26 | Performed by: OBSTETRICS & GYNECOLOGY

## 2023-01-27 NOTE — PROGRESS NOTES
[facility] Maternal Fetal Medicine [facility]  Genetic Counseling Consult    Patient:  Vivi Mccall YOB: 1985   Date of Service:  23   MRN: [id number]    Vivi was seen at the [facility] for genetic consultation. The indication for genetic counseling is advanced maternal age. The patient was accompanied to this visit by their partner, Bud. The patient is wearing a mask due to current [facility] policies.     The session was conducted in English.      IMPRESSION/ PLAN   1. Vivi had genetic screening earlier in this pregnancy. Their non-invasive prenatal test was screen negative or low risk for screened conditions     2. During today's [facility] visit, Vivi had a genetic counseling session only. Vivi has already had genetic testing in this pregnancy. Additional screening and diagnostic testing was discussed for the gestational age and declined.    3. Vivi had a level II comprehensive anatomy ultrasound today. Please see the ultrasound report for further details.    4. No further [facility] recommendations at this time.     PREGNANCY HISTORY   /Parity:       Vivi's pregnancy history is significant for:     2018 SAB     full-term delivery, female, alive and well. Pregnancy complicated by non-specific cardiac findings at level II ultrasound. Normal fetal echocardiogram.     CURRENT PREGNANCY   Current Age: 37 year old   Age at Delivery: 37 year old  NOHEMI: 2023, by Last Menstrual Period                                   Gestational Age: 18w0d  This pregnancy is a single gestation.   This pregnancy was conceived spontaneously.    MEDICAL HISTORY   Vivi s reported medical history is not expected to impact pregnancy management or risks to fetal development.       FAMILY HISTORY   A three-generation pedigree was obtained previously by a genetic counselor on 19 and updated today. The original and updated version  "is scanned under the \"Media\" tab in Epic.  The family history was reported by Vivi and their partner.    The following significant updates were reported today:     Vivi's partner, Bud's, nephew was recently diagnosed with brain cancer. He is in remission at this time. Bud was not sure of the specific type of brain cancer his nephew had.    Otherwise, the reported family history is unremarkable for multiple miscarriages, stillbirths, birth defects, intellectual disabilities, known genetic conditions, and consanguinity.       CARRIER SCREENING   Expanded carrier screening is available to screen for autosomal recessive conditions and X-linked conditions in a large list of genes. Autosomal recessive conditions happen when a mutation has been inherited from the egg and sperm and include conditions like cystic fibrosis, thalassemia, hearing loss, spinal muscular atrophy, and more. X-linked conditions happen when a mutation has been inherited from the egg and include conditions like fragile X syndrome.  screening was also reviewed. About [place]  Screening    Vivi specifically asked about carrier screening for sickle cell disease. She has friends whose children have the condition and she is of -American descent. She shared that she's been reading about bone marrow transplants for children with sickle cell disease and is wondering if her pregnancy would be at a high risk for the condition. We discussed that the possibility that Vivi is a carrier of sickle cell disease is approximately 10% (1 in 10) based on her ancestry alone. We reviewed that sickle cell disease is part of a group of conditions called hemoglobinopathies. Sickle cell disease is caused by having two copies of the gene HBB that both have a specific mutation. This mutation can also interact with other HBB mutations (such as beta thalassemia mutations). These conditions are inherited in a recessive manner. Thus, if Vivi is a " carrier, Bud would also need to be a carrier of an HBB-related condition for there to be a reproductive risk. In general, carriers are assymptomic.     We discussed that carrier screening for these conditions can be targeted (either through hemoglobin electrophoresis or, more precisely, via molecular carrier screening) or can be part of carrier screening for a larger group of conditions. Ultimately, the couple declined proceeding with carrier screening.     RISK ASSESSMENT FOR CHROMOSOME CONDITIONS   We explained that the risk for fetal chromosome abnormalities increases with maternal age. We discussed specific features of common chromosome abnormalities, including Down syndrome, trisomy 13, trisomy 18, and sex chromosome trisomies.      At age 37 at midtrimester, the risk to have a baby with Down syndrome is 1 in 129.     At age 37 at midtrimester, the risk to have a baby with any chromosome abnormality is 1 in 65.     Vivi had genetic screening earlier in this pregnancy. Their non-invasive prenatal test was screen negative or low risk for screened conditions     Non-invasive prenatal testing (NIPT) results    Maternal plasma cell-free DNA testing    Screens for fetal trisomy 21, trisomy 13, trisomy 18, and sex chromosome aneuploidy    First trimester ultrasound with nuchal translucency and nasal bone assessment was not performed in this pregnancy, to our knowledge.    Vivi had a NIPS test earlier in pregnancy; we reviewed the results today, which are low risk.    The NIPT did include sex chromosome aneuploidies and the result was low risk. The predicted sex is XX, which is typically female.    Given the accuracy of this test, these results greatly decrease the chance for certain fetal chromosome abnormalities    We discussed the limitations of normal NIPT results    Maternal serum AFP only to screen for open neural tube defects (after 15 weeks) results were within normal limits at a 0.67 MoM, which  decreased the risk of an open neural tube defect to 1 in >85338.       GENETIC TESTING OPTIONS   Genetic testing during a pregnancy includes screening and diagnostic procedures.      Screening tests are non-invasive which means no risk to the pregnancy and includes ultrasounds and blood work. The benefits and limitations of screening were reviewed. Screening tests provide a risk assessment (chance) specific to the pregnancy for certain fetal chromosome abnormalities but cannot definitively diagnose or exclude a fetal chromosome abnormality. Follow-up genetic counseling and consideration of diagnostic testing is recommended with any abnormal screening result. Diagnostic testing during a pregnancy is more certain and can test for more conditions. However, the tests do have a risk of miscarriage that requires careful consideration. These tests can detect fetal chromosome abnormalities with greater than 99% certainty. Results can be compromised by maternal cell contamination or mosaicism and are limited by the resolution of current genetic testing technology.     There is no screening or diagnostic test that detects all forms of birth defects or intellectual disability.     We discussed the following ultrasound options:  Comprehensive level II ultrasound (Fetal Anatomy Ultrasound)    Ultrasound done between 18-20 weeks gestation    Screens for major birth defects and markers for aneuploidy (like trisomy 21 and trisomy 18)    Includes assessment of the fetal growth, internal organs, placenta, and amniotic fluid      We discussed the following diagnostic options:   Chorionic villus sampling (CVS)    Invasive diagnostic procedure done between 10w0d and 13w6d    The procedure collects a small sample from the placenta for the purpose of chromosomal testing and/or other genetic testing    Diagnostic result; more than 99% sensitivity for fetal chromosome abnormalities    Cannot screen for open neural tube defects, maternal  serum AFP after 15 weeks is recommended    Amniocentesis    Invasive diagnostic procedure done after 15 weeks gestation    The procedure collects a small sample of amniotic fluid for the purpose of chromosomal testing and/or other genetic testing    Diagnostic result; more than 99% sensitivity for fetal chromosome abnormalities    Testing for AFP in the amniotic fluid can test for open neural tube defects        It was a pleasure to be involved with [facility] care. Face-to-face time of the meeting was 20 minutes.    Cony Enamorado GC, MS, [facility]  Certified and [place] Licensed Genetic Counselor  [facility]  Maternal Fetal Medicine  Office: [phone]  [facility]: [phone]   Fax: [phone]  [facility]

## 2023-01-27 NOTE — PROGRESS NOTES
The patient was seen for an ultrasound in the Maternal-Fetal Medicine Center at the [facility] today.  For a detailed report of the ultrasound examination, please see the ultrasound report which can be found under the imaging tab.    Ella Ferro MD  , OB/GYN  Maternal-Fetal Medicine  [phone] (Pager)

## 2023-02-07 ENCOUNTER — HOSPITAL ENCOUNTER (OUTPATIENT)
Facility: CLINIC | Age: 38
Discharge: STILL ADMITTED OR ADMINISTRATIVE | End: 2023-02-07
Attending: OBSTETRICS & GYNECOLOGY | Admitting: OBSTETRICS & GYNECOLOGY
Payer: COMMERCIAL

## 2023-02-07 ENCOUNTER — HOSPITAL ENCOUNTER (OUTPATIENT)
Facility: CLINIC | Age: 38
End: 2023-02-07
Admitting: OBSTETRICS & GYNECOLOGY
Payer: COMMERCIAL

## 2023-02-07 ENCOUNTER — HOSPITAL ENCOUNTER (EMERGENCY)
Facility: CLINIC | Age: 38
Discharge: LEFT BEFORE DISCHARGE | End: 2023-02-07
Payer: COMMERCIAL

## 2023-02-07 PROCEDURE — G0463 HOSPITAL OUTPT CLINIC VISIT: HCPCS

## 2023-02-07 NOTE — PROVIDER NOTIFICATION
Dr Park notified that pt fell on the ice at approx 0945 this morning and hit her left hip and left abdomen, left shoulder and arm. FHR dop tones 150 with no decels. No abdominal cramping, vaginal bleeding or leaking of fluid. Baby active. Orders received to discharge pt from OB triage and have her be seen in the ER for her left arm.  
Normal

## 2023-02-07 NOTE — PLAN OF CARE
Pt arrives from home stating she fell on the ice and hit her left hip, left abdomen and left shoulder and arm. Pt is in tears and states her left shoulder really hurts. She denies leaking of fluid or vaginal bleeding and states she's felt her baby move.

## 2023-02-07 NOTE — ED NOTES
Pt wanting to leave without being seen. She was more worried about baby but she was brought down from [facility] so wanting to go home. Signed form.

## 2023-02-07 NOTE — ED TRIAGE NOTES
Pt is 19 weeks pregnant and fell on the ice this morning. Falling onto L side and L shoulder. Came down from [facility] after assessing baby and all is well. C/o L shoulder pain. Did hit head but no loc.      Triage Assessment     Row Name 02/07/23 1057       Respiratory WDL    Respiratory WDL WDL       Cardiac WDL    Cardiac WDL WDL       Cognitive/Neuro/Behavioral WDL    Cognitive/Neuro/Behavioral WDL WDL

## 2023-02-08 PROBLEM — O09.529 SUPERVISION OF HIGH-RISK PREGNANCY OF ELDERLY MULTIGRAVIDA: Status: ACTIVE | Noted: 2022-11-10

## 2023-02-09 ENCOUNTER — MEDICAL CORRESPONDENCE (OUTPATIENT)
Dept: HEALTH INFORMATION MANAGEMENT | Facility: CLINIC | Age: 38
End: 2023-02-09
Payer: COMMERCIAL

## 2023-02-10 ENCOUNTER — PRENATAL OFFICE VISIT (OUTPATIENT)
Dept: OBGYN | Facility: CLINIC | Age: 38
End: 2023-02-10
Payer: COMMERCIAL

## 2023-02-10 VITALS — SYSTOLIC BLOOD PRESSURE: 136 MMHG | BODY MASS INDEX: 30.71 KG/M2 | DIASTOLIC BLOOD PRESSURE: 78 MMHG | WEIGHT: 202 LBS

## 2023-02-10 DIAGNOSIS — O09.529 SUPERVISION OF HIGH-RISK PREGNANCY OF ELDERLY MULTIGRAVIDA: Primary | ICD-10-CM

## 2023-02-10 DIAGNOSIS — Z3A.20 20 WEEKS GESTATION OF PREGNANCY: ICD-10-CM

## 2023-02-10 DIAGNOSIS — O10.919 CHRONIC HYPERTENSION DURING PREGNANCY: ICD-10-CM

## 2023-02-10 PROCEDURE — 99207 PR PRENATAL VISIT: CPT | Performed by: OBSTETRICS & GYNECOLOGY

## 2023-02-10 NOTE — PROGRESS NOTES
Doing well today.  +FM --a bit less with anterior placenta than she remembers with her first  Had fall on the ice earlier this week --feeling better; hurt her shoulder but doing better with ice, K tape, tylenol, etc  No cramping/vb/spotting  Had level II us in late Jan for CHTN -normal but limited heart views --will repeat next week  BP normal today; on labetolol 200mg BID --will schedule all 3rd trimester ultrasounds today --28, 32 and 36wk growth us as well as weekly BPP at 32wks  No bowel/bladder issues  RTC 4wks

## 2023-02-14 ENCOUNTER — HOSPITAL ENCOUNTER (OUTPATIENT)
Dept: ULTRASOUND IMAGING | Facility: CLINIC | Age: 38
Discharge: HOME | End: 2023-02-14
Attending: OBSTETRICS & GYNECOLOGY
Payer: COMMERCIAL

## 2023-02-14 ENCOUNTER — OFFICE VISIT (OUTPATIENT)
Dept: MATERNAL FETAL MEDICINE | Facility: CLINIC | Age: 38
End: 2023-02-14
Attending: OBSTETRICS & GYNECOLOGY
Payer: COMMERCIAL

## 2023-02-14 DIAGNOSIS — Z36.2 ENCOUNTER FOR FOLLOW-UP ULTRASOUND OF FETAL ANATOMY: Primary | ICD-10-CM

## 2023-02-14 DIAGNOSIS — Z36.2 ENCOUNTER FOR FOLLOW-UP ULTRASOUND OF FETAL ANATOMY: ICD-10-CM

## 2023-02-14 DIAGNOSIS — O10.919 CHRONIC HYPERTENSION IN PREGNANCY: ICD-10-CM

## 2023-02-14 PROCEDURE — 76816 OB US FOLLOW-UP PER FETUS: CPT | Mod: 26 | Performed by: STUDENT IN AN ORGANIZED HEALTH CARE EDUCATION/TRAINING PROGRAM

## 2023-02-14 PROCEDURE — 76816 OB US FOLLOW-UP PER FETUS: CPT

## 2023-02-14 NOTE — PROGRESS NOTES
Please see the full imaging report from the ViewPoint program under the imaging tab.      Albania Andersen MD  Maternal Fetal Medicine

## 2023-03-06 ENCOUNTER — MYC MEDICAL ADVICE (OUTPATIENT)
Dept: FAMILY MEDICINE | Facility: CLINIC | Age: 38
End: 2023-03-06
Payer: COMMERCIAL

## 2023-03-06 ENCOUNTER — MYC MEDICAL ADVICE (OUTPATIENT)
Dept: OBGYN | Facility: CLINIC | Age: 38
End: 2023-03-06
Payer: COMMERCIAL

## 2023-03-06 NOTE — TELEPHONE ENCOUNTER
Type of Paperwork received:  Letter of necessity     Date Rcvd:  3/6/2023    Rcvd From (Company name): [employer]    Provider:  Xavier    Placed on Provider Cart Date:  3/7/2023

## 2023-03-10 ENCOUNTER — PRENATAL OFFICE VISIT (OUTPATIENT)
Dept: OBGYN | Facility: CLINIC | Age: 38
End: 2023-03-10
Payer: COMMERCIAL

## 2023-03-10 VITALS — WEIGHT: 207 LBS | SYSTOLIC BLOOD PRESSURE: 116 MMHG | DIASTOLIC BLOOD PRESSURE: 78 MMHG | BODY MASS INDEX: 31.47 KG/M2

## 2023-03-10 DIAGNOSIS — O10.919 CHRONIC HYPERTENSION DURING PREGNANCY: ICD-10-CM

## 2023-03-10 DIAGNOSIS — O09.529 SUPERVISION OF HIGH-RISK PREGNANCY OF ELDERLY MULTIGRAVIDA: Primary | ICD-10-CM

## 2023-03-10 PROCEDURE — 99207 PR PRENATAL VISIT: CPT | Performed by: STUDENT IN AN ORGANIZED HEALTH CARE EDUCATION/TRAINING PROGRAM

## 2023-03-10 NOTE — PROGRESS NOTES
Prenatal Care (24w0d)      Vivi Mccall is a 37 year old  at 24w0d by LMP c/w 11w4d US presenting for routine prenatal care. She is doing okay. Some lower pelvic cramping. Denies LOF, VB, ctx. +FM.    Conditions affecting pregnancy:  - AMA  - cHTN, on labetalol 200mg BID    Objective- see flow sheet  Cvx closed    Vivi Mccall is a 37 year old  at 24w0d presenting for routine ob visit.       ICD-10-CM    1. Supervision of high-risk pregnancy of elderly multigravida  O09.529       2. Chronic hypertension during pregnancy  O10.919             - cHTN   - Baseline preE labs wnl   - Meds: labetalol 200mg BID   - No signs/symptoms of preE   - BP wnl    - Continue ASA   -  testing at 32w, growth q4 weeks- scheduled     Routine care   - First tri labs wnl. Level II w/ sub optimal views, follow-up wnl. S/p COVID-19 vaccine, influenza vax. Third tri labs next visit.  - Discussed routine precautions. Pelvic cramping- cervix checked, not dilated. Continue to monitor sx.   - TWlbs. Pregravid BMI 31. Expect 11-20lbs.    Follow-up in 4 weeks for routine care/28w labs.     Ruthann Ferreira MD, MHS  03/10/23

## 2023-03-14 ENCOUNTER — TELEPHONE (OUTPATIENT)
Dept: OBGYN | Facility: CLINIC | Age: 38
End: 2023-03-14
Payer: COMMERCIAL

## 2023-03-14 ENCOUNTER — MEDICAL CORRESPONDENCE (OUTPATIENT)
Dept: HEALTH INFORMATION MANAGEMENT | Facility: CLINIC | Age: 38
End: 2023-03-14
Payer: COMMERCIAL

## 2023-03-16 ENCOUNTER — TELEPHONE (OUTPATIENT)
Dept: OBGYN | Facility: CLINIC | Age: 38
End: 2023-03-16
Payer: COMMERCIAL

## 2023-03-16 NOTE — TELEPHONE ENCOUNTER
24w6d  Patient calling to check in after decreasing Labetalol to 100mg BID  Patient states that she has been feeling better  'BP has been all over the place'-- has been trying to take them 1-2x/day    Tuesday: 155/78,   Wednesday: 150s/70s--pt does not remember exact numbers  Thursday: 140/80    Still noticing SOB, however fatigue has decreased. Dizziness has also improved some, but she did have a headache yesterday    3/14/23: Xavier  Discussed with Dr. Park will continue to check home BPs and decrease Labetalol to 100 mg BID-Vivi will call on Thursday to update  Knows s/s that warrant emergent evaluation    Routing to provider to advise  Skylar Dunn RN on 3/16/2023 at 1:39 PM

## 2023-03-16 NOTE — TELEPHONE ENCOUNTER
Returned patient call  Will continue 100mg BID Labetalol since she is feeling better  Monitor BP BID--Each time, sit quietly for 10-15min before taking.  If elevated, repeat again in 10min.    Patient will update clinic next week, unless symptoms worsen or BP persistently >150/90  PIH precautions reviewed.  Pt verbalized understanding, in agreement with plan, and voiced no further questions.  Skylar Dunn RN on 3/16/2023 at 2:03 PM

## 2023-03-16 NOTE — TELEPHONE ENCOUNTER
Let's continue on the lower dose for the next week --would only change back (since she is feeling better) if she was persistently having >150/90s.  Each time, sit quietly for 10-15min before taking.  If elevated, repeat again in 10min.

## 2023-03-17 ENCOUNTER — NURSE TRIAGE (OUTPATIENT)
Dept: NURSING | Facility: CLINIC | Age: 38
End: 2023-03-17
Payer: COMMERCIAL

## 2023-03-17 ENCOUNTER — HOSPITAL ENCOUNTER (OUTPATIENT)
Facility: CLINIC | Age: 38
Setting detail: OBSERVATION
Discharge: HOME | End: 2023-03-19
Attending: EMERGENCY MEDICINE | Admitting: OBSTETRICS & GYNECOLOGY
Payer: COMMERCIAL

## 2023-03-17 DIAGNOSIS — Z33.1 PREGNANT STATE, INCIDENTAL: ICD-10-CM

## 2023-03-17 DIAGNOSIS — I10 HYPERTENSION, UNSPECIFIED TYPE: ICD-10-CM

## 2023-03-17 DIAGNOSIS — I10 BENIGN ESSENTIAL HYPERTENSION: ICD-10-CM

## 2023-03-17 DIAGNOSIS — O10.919 CHRONIC HYPERTENSION IN PREGNANCY: ICD-10-CM

## 2023-03-17 DIAGNOSIS — F51.01 PRIMARY INSOMNIA: Primary | ICD-10-CM

## 2023-03-17 LAB
ALBUMIN SERPL BCG-MCNC: 3.6 G/DL (ref 3.5–5.2)
ALBUMIN UR-MCNC: 20 MG/DL
ALP SERPL-CCNC: 57 U/L (ref 35–104)
ALT SERPL W P-5'-P-CCNC: 8 U/L (ref 10–35)
ANION GAP SERPL CALCULATED.3IONS-SCNC: 8 MMOL/L (ref 7–15)
APPEARANCE UR: ABNORMAL
AST SERPL W P-5'-P-CCNC: 14 U/L (ref 10–35)
ATRIAL RATE - MUSE: 78 BPM
BASOPHILS # BLD AUTO: 0 10E3/UL (ref 0–0.2)
BASOPHILS NFR BLD AUTO: 0 %
BILIRUB SERPL-MCNC: 0.3 MG/DL
BILIRUB UR QL STRIP: NEGATIVE
BUN SERPL-MCNC: 5.1 MG/DL (ref 6–20)
CALCIUM SERPL-MCNC: 8.8 MG/DL (ref 8.6–10)
CHLORIDE SERPL-SCNC: 101 MMOL/L (ref 98–107)
COLOR UR AUTO: YELLOW
CREAT SERPL-MCNC: 0.6 MG/DL (ref 0.51–0.95)
DEPRECATED HCO3 PLAS-SCNC: 26 MMOL/L (ref 22–29)
DIASTOLIC BLOOD PRESSURE - MUSE: NORMAL MMHG
EOSINOPHIL # BLD AUTO: 0.4 10E3/UL (ref 0–0.7)
EOSINOPHIL NFR BLD AUTO: 5 %
ERYTHROCYTE [DISTWIDTH] IN BLOOD BY AUTOMATED COUNT: 12.7 % (ref 10–15)
GFR SERPL CREATININE-BSD FRML MDRD: >90 ML/MIN/1.73M2
GLUCOSE SERPL-MCNC: 97 MG/DL (ref 70–99)
GLUCOSE UR STRIP-MCNC: NEGATIVE MG/DL
HCT VFR BLD AUTO: 32 % (ref 35–47)
HGB BLD-MCNC: 10.9 G/DL (ref 11.7–15.7)
HGB UR QL STRIP: NEGATIVE
HOLD SPECIMEN: NORMAL
HOLD SPECIMEN: NORMAL
IMM GRANULOCYTES # BLD: 0 10E3/UL
IMM GRANULOCYTES NFR BLD: 0 %
INTERPRETATION ECG - MUSE: NORMAL
KETONES UR STRIP-MCNC: NEGATIVE MG/DL
LEUKOCYTE ESTERASE UR QL STRIP: ABNORMAL
LYMPHOCYTES # BLD AUTO: 2.3 10E3/UL (ref 0.8–5.3)
LYMPHOCYTES NFR BLD AUTO: 26 %
MCH RBC QN AUTO: 31.9 PG (ref 26.5–33)
MCHC RBC AUTO-ENTMCNC: 34.1 G/DL (ref 31.5–36.5)
MCV RBC AUTO: 94 FL (ref 78–100)
MONOCYTES # BLD AUTO: 0.8 10E3/UL (ref 0–1.3)
MONOCYTES NFR BLD AUTO: 9 %
MUCOUS THREADS #/AREA URNS LPF: PRESENT /LPF
NEUTROPHILS # BLD AUTO: 5.3 10E3/UL (ref 1.6–8.3)
NEUTROPHILS NFR BLD AUTO: 60 %
NITRATE UR QL: NEGATIVE
NRBC # BLD AUTO: 0 10E3/UL
NRBC BLD AUTO-RTO: 0 /100
P AXIS - MUSE: 43 DEGREES
PH UR STRIP: 6 [PH] (ref 5–7)
PLATELET # BLD AUTO: 294 10E3/UL (ref 150–450)
POTASSIUM SERPL-SCNC: 3.4 MMOL/L (ref 3.4–5.3)
PR INTERVAL - MUSE: 142 MS
PROT SERPL-MCNC: 6.2 G/DL (ref 6.4–8.3)
QRS DURATION - MUSE: 82 MS
QT - MUSE: 386 MS
QTC - MUSE: 440 MS
R AXIS - MUSE: 2 DEGREES
RBC # BLD AUTO: 3.42 10E6/UL (ref 3.8–5.2)
RBC URINE: 4 /HPF
SODIUM SERPL-SCNC: 135 MMOL/L (ref 136–145)
SP GR UR STRIP: 1.02 (ref 1–1.03)
SQUAMOUS EPITHELIAL: 13 /HPF
SYSTOLIC BLOOD PRESSURE - MUSE: NORMAL MMHG
T AXIS - MUSE: 39 DEGREES
TROPONIN T SERPL HS-MCNC: <6 NG/L
UROBILINOGEN UR STRIP-MCNC: NORMAL MG/DL
VENTRICULAR RATE- MUSE: 78 BPM
WBC # BLD AUTO: 8.7 10E3/UL (ref 4–11)
WBC URINE: 9 /HPF

## 2023-03-17 PROCEDURE — 84484 ASSAY OF TROPONIN QUANT: CPT | Performed by: EMERGENCY MEDICINE

## 2023-03-17 PROCEDURE — 93005 ELECTROCARDIOGRAM TRACING: CPT

## 2023-03-17 PROCEDURE — 99285 EMERGENCY DEPT VISIT HI MDM: CPT | Mod: 25

## 2023-03-17 PROCEDURE — 36415 COLL VENOUS BLD VENIPUNCTURE: CPT | Performed by: EMERGENCY MEDICINE

## 2023-03-17 PROCEDURE — 81003 URINALYSIS AUTO W/O SCOPE: CPT | Performed by: EMERGENCY MEDICINE

## 2023-03-17 PROCEDURE — 85025 COMPLETE CBC W/AUTO DIFF WBC: CPT | Performed by: EMERGENCY MEDICINE

## 2023-03-17 PROCEDURE — 84156 ASSAY OF PROTEIN URINE: CPT | Performed by: OBSTETRICS & GYNECOLOGY

## 2023-03-17 PROCEDURE — 80053 COMPREHEN METABOLIC PANEL: CPT | Performed by: EMERGENCY MEDICINE

## 2023-03-17 ASSESSMENT — ENCOUNTER SYMPTOMS
DIZZINESS: 1
HEADACHES: 1
SHORTNESS OF BREATH: 1
FATIGUE: 1

## 2023-03-17 ASSESSMENT — ACTIVITIES OF DAILY LIVING (ADL): ADLS_ACUITY_SCORE: 35

## 2023-03-18 LAB
ALBUMIN MFR UR ELPH: 16.2 MG/DL (ref 1–14)
ALBUMIN SERPL BCG-MCNC: 3 G/DL (ref 3.5–5.2)
ALP SERPL-CCNC: 45 U/L (ref 35–104)
ALT SERPL W P-5'-P-CCNC: 7 U/L (ref 10–35)
ANION GAP SERPL CALCULATED.3IONS-SCNC: 6 MMOL/L (ref 7–15)
AST SERPL W P-5'-P-CCNC: 12 U/L (ref 10–35)
BILIRUB SERPL-MCNC: 0.5 MG/DL
BUN SERPL-MCNC: 4.1 MG/DL (ref 6–20)
CALCIUM SERPL-MCNC: 8.4 MG/DL (ref 8.6–10)
CHLORIDE SERPL-SCNC: 105 MMOL/L (ref 98–107)
CREAT SERPL-MCNC: 0.59 MG/DL (ref 0.51–0.95)
CREAT UR-MCNC: 201.9 MG/DL
DEPRECATED HCO3 PLAS-SCNC: 25 MMOL/L (ref 22–29)
ERYTHROCYTE [DISTWIDTH] IN BLOOD BY AUTOMATED COUNT: 12.8 % (ref 10–15)
FERRITIN SERPL-MCNC: 33 NG/ML (ref 6–175)
GFR SERPL CREATININE-BSD FRML MDRD: >90 ML/MIN/1.73M2
GLUCOSE SERPL-MCNC: 121 MG/DL (ref 70–99)
HCT VFR BLD AUTO: 30.2 % (ref 35–47)
HGB BLD-MCNC: 10.2 G/DL (ref 11.7–15.7)
IRON BINDING CAPACITY (ROCHE): 257 UG/DL (ref 240–430)
IRON SATN MFR SERPL: 45 % (ref 15–46)
IRON SERPL-MCNC: 115 UG/DL (ref 37–145)
MCH RBC QN AUTO: 31.5 PG (ref 26.5–33)
MCHC RBC AUTO-ENTMCNC: 33.8 G/DL (ref 31.5–36.5)
MCV RBC AUTO: 93 FL (ref 78–100)
PLATELET # BLD AUTO: 267 10E3/UL (ref 150–450)
POTASSIUM SERPL-SCNC: 3.5 MMOL/L (ref 3.4–5.3)
PROT SERPL-MCNC: 5.7 G/DL (ref 6.4–8.3)
PROT/CREAT 24H UR: 0.08 MG/MG CR (ref 0–0.2)
RBC # BLD AUTO: 3.24 10E6/UL (ref 3.8–5.2)
SODIUM SERPL-SCNC: 136 MMOL/L (ref 136–145)
VIT B12 SERPL-MCNC: 449 PG/ML (ref 232–1245)
WBC # BLD AUTO: 6.7 10E3/UL (ref 4–11)

## 2023-03-18 PROCEDURE — 85027 COMPLETE CBC AUTOMATED: CPT | Performed by: OBSTETRICS & GYNECOLOGY

## 2023-03-18 PROCEDURE — G0378 HOSPITAL OBSERVATION PER HR: HCPCS

## 2023-03-18 PROCEDURE — 250N000013 HC RX MED GY IP 250 OP 250 PS 637: Performed by: OBSTETRICS & GYNECOLOGY

## 2023-03-18 PROCEDURE — 250N000013 HC RX MED GY IP 250 OP 250 PS 637: Performed by: EMERGENCY MEDICINE

## 2023-03-18 PROCEDURE — 80053 COMPREHEN METABOLIC PANEL: CPT | Performed by: OBSTETRICS & GYNECOLOGY

## 2023-03-18 PROCEDURE — 83550 IRON BINDING TEST: CPT | Performed by: OBSTETRICS & GYNECOLOGY

## 2023-03-18 PROCEDURE — 82306 VITAMIN D 25 HYDROXY: CPT | Performed by: OBSTETRICS & GYNECOLOGY

## 2023-03-18 PROCEDURE — 82607 VITAMIN B-12: CPT | Performed by: OBSTETRICS & GYNECOLOGY

## 2023-03-18 PROCEDURE — 82728 ASSAY OF FERRITIN: CPT | Performed by: OBSTETRICS & GYNECOLOGY

## 2023-03-18 PROCEDURE — 99222 1ST HOSP IP/OBS MODERATE 55: CPT | Performed by: OBSTETRICS & GYNECOLOGY

## 2023-03-18 PROCEDURE — 36415 COLL VENOUS BLD VENIPUNCTURE: CPT | Performed by: OBSTETRICS & GYNECOLOGY

## 2023-03-18 RX ORDER — ACETAMINOPHEN 325 MG/1
975 TABLET ORAL EVERY 6 HOURS PRN
Status: DISCONTINUED | OUTPATIENT
Start: 2023-03-18 | End: 2023-03-19 | Stop reason: HOSPADM

## 2023-03-18 RX ORDER — ASPIRIN 81 MG/1
81 TABLET, CHEWABLE ORAL DAILY
Status: DISCONTINUED | OUTPATIENT
Start: 2023-03-18 | End: 2023-03-19 | Stop reason: HOSPADM

## 2023-03-18 RX ORDER — HYDROXYZINE HYDROCHLORIDE 25 MG/1
50-100 TABLET, FILM COATED ORAL
Status: DISCONTINUED | OUTPATIENT
Start: 2023-03-18 | End: 2023-03-18

## 2023-03-18 RX ORDER — CITALOPRAM HYDROBROMIDE 40 MG/1
40 TABLET ORAL DAILY
Status: DISCONTINUED | OUTPATIENT
Start: 2023-03-18 | End: 2023-03-19 | Stop reason: HOSPADM

## 2023-03-18 RX ORDER — BUPROPION HYDROCHLORIDE 150 MG/1
150 TABLET ORAL DAILY
Status: DISCONTINUED | OUTPATIENT
Start: 2023-03-18 | End: 2023-03-19 | Stop reason: HOSPADM

## 2023-03-18 RX ORDER — LABETALOL 200 MG/1
200 TABLET, FILM COATED ORAL 3 TIMES DAILY
Status: DISCONTINUED | OUTPATIENT
Start: 2023-03-18 | End: 2023-03-19 | Stop reason: HOSPADM

## 2023-03-18 RX ORDER — HYDROXYZINE HCL 25 MG
12.5-25 TABLET ORAL EVERY 4 HOURS PRN
Status: DISCONTINUED | OUTPATIENT
Start: 2023-03-18 | End: 2023-03-19 | Stop reason: HOSPADM

## 2023-03-18 RX ORDER — LABETALOL 100 MG/1
100 TABLET, FILM COATED ORAL EVERY 12 HOURS SCHEDULED
Status: DISCONTINUED | OUTPATIENT
Start: 2023-03-18 | End: 2023-03-18

## 2023-03-18 RX ORDER — HYDROXYZINE HYDROCHLORIDE 25 MG/1
50-100 TABLET, FILM COATED ORAL
Status: DISCONTINUED | OUTPATIENT
Start: 2023-03-18 | End: 2023-03-19 | Stop reason: HOSPADM

## 2023-03-18 RX ADMIN — ACETAMINOPHEN 975 MG: 325 TABLET ORAL at 02:30

## 2023-03-18 RX ADMIN — CITALOPRAM HYDROBROMIDE 40 MG: 40 TABLET ORAL at 09:38

## 2023-03-18 RX ADMIN — HYDROXYZINE HYDROCHLORIDE 50 MG: 25 TABLET, FILM COATED ORAL at 02:30

## 2023-03-18 RX ADMIN — LABETALOL HYDROCHLORIDE 200 MG: 200 TABLET, FILM COATED ORAL at 21:43

## 2023-03-18 RX ADMIN — ACETAMINOPHEN 975 MG: 325 TABLET ORAL at 20:20

## 2023-03-18 RX ADMIN — HYDROXYZINE HYDROCHLORIDE 50 MG: 25 TABLET, FILM COATED ORAL at 21:43

## 2023-03-18 RX ADMIN — LABETALOL HYDROCHLORIDE 200 MG: 200 TABLET, FILM COATED ORAL at 15:34

## 2023-03-18 RX ADMIN — ASPIRIN 81 MG CHEWABLE TABLET 81 MG: 81 TABLET CHEWABLE at 09:38

## 2023-03-18 RX ADMIN — LABETALOL HYDROCHLORIDE 100 MG: 100 TABLET, FILM COATED ORAL at 00:52

## 2023-03-18 RX ADMIN — BUPROPION HYDROCHLORIDE 150 MG: 150 TABLET, FILM COATED, EXTENDED RELEASE ORAL at 09:38

## 2023-03-18 RX ADMIN — LABETALOL HYDROCHLORIDE 200 MG: 200 TABLET, FILM COATED ORAL at 09:38

## 2023-03-18 ASSESSMENT — ACTIVITIES OF DAILY LIVING (ADL)
DOING_ERRANDS_INDEPENDENTLY_DIFFICULTY: NO
ADLS_ACUITY_SCORE: 20
NUMBER_OF_TIMES_PATIENT_HAS_FALLEN_WITHIN_LAST_SIX_MONTHS: 1
TOILETING_ISSUES: NO
WALKING_OR_CLIMBING_STAIRS_DIFFICULTY: NO
ADLS_ACUITY_SCORE: 20
HEARING_DIFFICULTY_OR_DEAF: NO
CONCENTRATING,_REMEMBERING_OR_MAKING_DECISIONS_DIFFICULTY: NO
DRESSING/BATHING_DIFFICULTY: NO
FALL_HISTORY_WITHIN_LAST_SIX_MONTHS: YES
ADLS_ACUITY_SCORE: 20
ADLS_ACUITY_SCORE: 20
ADLS_ACUITY_SCORE: 35
DIFFICULTY_COMMUNICATING: NO
ADLS_ACUITY_SCORE: 20
ADLS_ACUITY_SCORE: 20
DIFFICULTY_EATING/SWALLOWING: NO
CHANGE_IN_FUNCTIONAL_STATUS_SINCE_ONSET_OF_CURRENT_ILLNESS/INJURY: NO
WEAR_GLASSES_OR_BLIND: YES
ADLS_ACUITY_SCORE: 20
ADLS_ACUITY_SCORE: 20
VISION_MANAGEMENT: GLASSES
ADLS_ACUITY_SCORE: 20

## 2023-03-18 NOTE — PLAN OF CARE
Dr Yoder notified of pt's arrival, symptoms, VS, FHT's. Order placed for urine random protein. Difficulty tracing FHT's initially, will try again and order BPP if unable to get reactive tracing.

## 2023-03-18 NOTE — PLAN OF CARE
Pt admitted to 228 from ED at 0200. Pt appears tearful and states that she doesn't like to be alone and her  is at home with their 3 year old. Emotional support provided, pt oriented to room and call light and reviewed plan of care. FHTs 140 via doppler. BPs elevated but not treatable. Denies HA, epigastric pain, vision changes, leaking of fluid, vaginal bleeding, and contractions. States she is no longer SOB but that she still gets a little dizzy when standing up. Pt instructed to call of she felt she needed assistance getting to the bathroom. Tylenol and vistaril given and pt hopes to try to sleep.

## 2023-03-18 NOTE — TELEPHONE ENCOUNTER
OB Triage Call      Is patient's OB/Midwife with the [facility] or [facility]? [facility]- Proceed with triage     Reason for call: B/P     Assessment: monitoring B/P with symptoms the past few weeks.    Was advised to call if over 150/90.  In the past few hours b/P has been around 165/102, 150/99.  She did have a headache earlier today, shortness of breath and lightheadedness.        Plan: Be seen in the ED     Patient plans to deliver at [facility]    Patient's primary OB Provider is Xavier.      Per protocol recommendations: ED      Is patient's delivering hospital on divert? No      25w0d    Estimated Date of Delivery: 2023        OB History    Para Term  AB Living   3 1 1 0 1 1   SAB IAB Ectopic Multiple Live Births   0 0 0 0 1      # Outcome Date GA Lbr Han/2nd Weight Sex Delivery Anes PTL Lv   3 Current            2 Term 19 39w3d 12:45 / 02:00 3.7 kg (8 lb 2.5 oz) F Vag-Spont EPI N LAYO      Complications: GBS, Preeclampsia/Hypertension      Name: Therese      Apgar1: 8  Apgar5: 9   1 AB                Lab Results   Component Value Date    GBS Positive (A) 2019          Buffy Young RN 23 8:58 PM  [facility] Nurse Advisor    Reason for Disposition    [1] Systolic BP  >= 160 OR Diastolic >= 100 AND [2] cardiac or neurologic symptoms (e.g., chest pain, difficulty breathing, unsteady gait, blurred vision)    Additional Information    Negative: Difficult to awaken or acting confused (e.g., disoriented, slurred speech)    Negative: SEVERE difficulty breathing (e.g., struggling for each breath, speaks in single words)    Negative: [1] Weakness of the face, arm or leg on one side of the body AND [2] new-onset    Negative: [1] Numbness (i.e., loss of sensation) of the face, arm or leg on one side of the body AND [2] new-onset    Negative: [1] Chest pain lasts > 5 minutes AND [2] history of heart disease (i.e., heart attack, bypass surgery, angina, angioplasty,  CHF)    Negative: [1] Chest pain AND [2] took nitrogylcerin AND [3] pain was not relieved    Negative: Sounds like a life-threatening emergency to the triager    Negative: Symptom is main concern (e.g., headache, chest pain)    Negative: Low blood pressure is main concern    Protocols used: BLOOD PRESSURE - HIGH-A-AH

## 2023-03-18 NOTE — PLAN OF CARE
Pt -140/70-80's throughout the day.  Pt denies headache , visual changes or epigastric pain.  Pt states she had not felt dizzy or light headed this afternoon.  Pt is up to the bathroom independently.  Regular diet.  No new concerns. was here briefly this afternoon.  Will continue to monitor.

## 2023-03-18 NOTE — ED NOTES
[facility]  ED Nurse Handoff Report    ED Chief complaint: Hypertension      ED Diagnosis:   Final diagnoses:   None       Code Status: Full Code    Allergies:   Allergies   Allergen Reactions     Amoxicillin      Codeine      Penicillins        Patient Story: Arrived to triage c/o elevated blood pressure. Blood pressures have been anywhere between 140-165 throughout the day.  Pt states that she is on Labetalol 100 Mg bid. She is also 25 weeks pregnant. She also feels SOB and dizzy when ambulating. OBGYN recommended coming here to be seen.  Focused Assessment:    Labs Ordered and Resulted from Time of ED Arrival to Time of ED Departure   COMPREHENSIVE METABOLIC PANEL - Abnormal       Result Value    Sodium 135 (*)     Potassium 3.4      Chloride 101      Carbon Dioxide (CO2) 26      Anion Gap 8      Urea Nitrogen 5.1 (*)     Creatinine 0.60      Calcium 8.8      Glucose 97      Alkaline Phosphatase 57      AST 14      ALT 8 (*)     Protein Total 6.2 (*)     Albumin 3.6      Bilirubin Total 0.3      GFR Estimate >90     ROUTINE UA WITH MICROSCOPIC REFLEX TO CULTURE - Abnormal    Color Urine Yellow      Appearance Urine Slightly Cloudy (*)     Glucose Urine Negative      Bilirubin Urine Negative      Ketones Urine Negative      Specific Gravity Urine 1.025      Blood Urine Negative      pH Urine 6.0      Protein Albumin Urine 20 (*)     Urobilinogen Urine Normal      Nitrite Urine Negative      Leukocyte Esterase Urine Small (*)     Mucus Urine Present (*)     RBC Urine 4 (*)     WBC Urine 9 (*)     Squamous Epithelials Urine 13 (*)    CBC WITH PLATELETS AND DIFFERENTIAL - Abnormal    WBC Count 8.7      RBC Count 3.42 (*)     Hemoglobin 10.9 (*)     Hematocrit 32.0 (*)     MCV 94      MCH 31.9      MCHC 34.1      RDW 12.7      Platelet Count 294      % Neutrophils 60      % Lymphocytes 26      % Monocytes 9      % Eosinophils 5      % Basophils 0      % Immature Granulocytes 0      NRBCs per 100 WBC 0  "     Absolute Neutrophils 5.3      Absolute Lymphocytes 2.3      Absolute Monocytes 0.8      Absolute Eosinophils 0.4      Absolute Basophils 0.0      Absolute Immature Granulocytes 0.0      Absolute NRBCs 0.0     PROTEIN RANDOM URINE - Abnormal    Total Protein Urine mg/dL 16.2 (*)     Total Protein UR MG/MG CR 0.08      Creatinine Urine mg/dL 201.9     TROPONIN T, HIGH SENSITIVITY - Normal    Troponin T, High Sensitivity <6         Treatments and/or interventions provided: IV20G right AC. Oral labetalol  Patient's response to treatments and/or interventions: Tolerated     Does this patient have any cognitive concerns?: None    Activity level - Baseline/Home:  Independent  Activity Level - Current:   Independent    Patient's Preferred language: English   Needed?: No    Isolation: None  Infection: Not Applicable  Patient tested for COVID 19 prior to admission: No  Bariatric?: No    Vital Signs:   Vitals:    03/17/23 2134 03/17/23 2150 03/18/23 0012   BP: (!) 151/94 (!) 155/89 (!) 151/99   Pulse: 82  76   Resp: 20  18   Temp: 98.1  F (36.7  C)     TempSrc: Oral     SpO2: 99%     Weight: 93.9 kg (207 lb)     Height: 1.727 m (5' 8\")           Was the PSS-3 completed:   Yes  What interventions are required if any?  None             Family Comments: Patient able to text and call family   OBS brochure/video discussed/provided to patient/family: Yes  For the majority of the shift this patient's behavior was Green.   Behavioral interventions performed were None .    ED NURSE PHONE NUMBER: [phone]         "

## 2023-03-18 NOTE — PLAN OF CARE
"Patient called stating she felt short of breath, like a \"stack of books\" on her chest. Pulse oximeter applied, 98% on room air. Unlabored breathing. /80. After a few minutes, patient stated she is feeling anxious but is improving. Patient appears more relaxed and breathing easily. Aromatherapy provided. Dr. Yoder notified. Will continue to monitor.   "

## 2023-03-18 NOTE — ED PROVIDER NOTES
History     Chief Complaint:  Hypertension       The history is provided by the patient.      Vivi Mccall is a 37 year old female who is 25 weeks pregnant with a history of hypertension who presents with hypertension. The patient states that throughout the week she has been feeling increasingly shortness of breath from walking up the stairs, fatigue, headaches and dizziness. She states that today she was checking her blood pressure frequently throughout the day, seeing ranges between 140-165 which prompted her to call her OB who recommended she come in. She denies any leg swelling or chest pain.    Independent Historian:   None - Patient Only    Review of External Notes: Reviewed recent OB notes and phone calls    ROS:  Review of Systems   Constitutional: Positive for fatigue.   Respiratory: Positive for shortness of breath.    Cardiovascular: Negative for chest pain and leg swelling.   Neurological: Positive for dizziness and headaches.   10 point review of systems performed and is negative except as above and in HPI.    Allergies:  Amoxicillin  Codeine  Penicillins     Medications:    Albuterol   Aspirin   Wellbutrin   Celexa   Unisom   Normodyne   Ativan   Zofran   Prenatal vitamin   Pyridoxine     Past Medical History:    Asthma   C. Diff diarrhea   Concussion   Depression   Anxiety   Hypertension     Past Surgical History:    Laser treatment of HPV  Ganglion cyst removal   Nerve entrapment release      Family History:    Reviewed, noncontributory    Social History:   reports that she has never smoked. She has never used smokeless tobacco. She reports that she does not currently use alcohol. She reports that she does not use drugs.  PCP: Vi Baldwin     Physical Exam     Patient Vitals for the past 24 hrs:   BP Temp Temp src Pulse Resp SpO2 Height Weight   03/18/23 0012 (!) 151/99 -- -- 76 18 -- -- --   03/17/23 2150 (!) 155/89 -- -- -- -- -- -- --   03/17/23 2134 (!) 151/94 98.1  F (36.7  " C) Oral 82 20 99 % 1.727 m (5' 8\") 93.9 kg (207 lb)        Physical Exam  General: Resting on the gurney, appears comfortable  Head:  The scalp, face, and head appear normal  Mouth/Throat: Mucus membranes are moist  CV:  Regular rate    Normal S1 and S2  No pathological murmur   Resp:  Breath sounds clear and equal bilaterally    Non-labored, no retractions or accessory muscle use    No coarseness    No wheezing   GI:  Abdomen is soft, no rigidity    No tenderness to palpation  MS:  Normal motor assessment of all extremities.    Good capillary refill noted.    Gravid uterus   Skin:  No rash or lesions noted. No lower extremity edema   Neuro: Speech is normal and fluent. No apparent deficit.  Psych:  Awake. Alert.  Normal affect.      Appropriate interactions.    Emergency Department Course     ECG results from 03/17/23   EKG 12 lead     Value    Systolic Blood Pressure     Diastolic Blood Pressure     Ventricular Rate 78    Atrial Rate 78    NV Interval 142    QRS Duration 82        QTc 440    P Axis 43    R AXIS 2    T Axis 39    Interpretation ECG      Sinus rhythm  Nonspecific T wave abnormality  Abnormal ECG  No previous ECGs available  Confirmed by GENERATED REPORT, COMPUTER ([id number]),  Aasen, Bradley ([id number]) on 3/17/2023 10:31:16 PM           Laboratory:  Labs Ordered and Resulted from Time of ED Arrival to Time of ED Departure   COMPREHENSIVE METABOLIC PANEL - Abnormal       Result Value    Sodium 135 (*)     Potassium 3.4      Chloride 101      Carbon Dioxide (CO2) 26      Anion Gap 8      Urea Nitrogen 5.1 (*)     Creatinine 0.60      Calcium 8.8      Glucose 97      Alkaline Phosphatase 57      AST 14      ALT 8 (*)     Protein Total 6.2 (*)     Albumin 3.6      Bilirubin Total 0.3      GFR Estimate >90     ROUTINE UA WITH MICROSCOPIC REFLEX TO CULTURE - Abnormal    Color Urine Yellow      Appearance Urine Slightly Cloudy (*)     Glucose Urine Negative      Bilirubin Urine Negative      Ketones " Urine Negative      Specific Gravity Urine 1.025      Blood Urine Negative      pH Urine 6.0      Protein Albumin Urine 20 (*)     Urobilinogen Urine Normal      Nitrite Urine Negative      Leukocyte Esterase Urine Small (*)     Mucus Urine Present (*)     RBC Urine 4 (*)     WBC Urine 9 (*)     Squamous Epithelials Urine 13 (*)    CBC WITH PLATELETS AND DIFFERENTIAL - Abnormal    WBC Count 8.7      RBC Count 3.42 (*)     Hemoglobin 10.9 (*)     Hematocrit 32.0 (*)     MCV 94      MCH 31.9      MCHC 34.1      RDW 12.7      Platelet Count 294      % Neutrophils 60      % Lymphocytes 26      % Monocytes 9      % Eosinophils 5      % Basophils 0      % Immature Granulocytes 0      NRBCs per 100 WBC 0      Absolute Neutrophils 5.3      Absolute Lymphocytes 2.3      Absolute Monocytes 0.8      Absolute Eosinophils 0.4      Absolute Basophils 0.0      Absolute Immature Granulocytes 0.0      Absolute NRBCs 0.0     PROTEIN RANDOM URINE - Abnormal    Total Protein Urine mg/dL 16.2 (*)     Total Protein UR MG/MG CR 0.08      Creatinine Urine mg/dL 201.9     TROPONIN T, HIGH SENSITIVITY - Normal    Troponin T, High Sensitivity <6            Emergency Department Course & Assessments:       Interventions:  Medications   labetalol (NORMODYNE) tablet 100 mg (has no administration in time range)        Assessments:  2217 I examined the patient and obtained history   0026 I rechecked the patient and explained exam results     Consultations/Discussion of Management or Tests:  ED Course as of 03/18/23 0029   Sat Mar 18, 2023   0017 I [facility] about [facility] patient and plan of care.       Disposition:  The patient was admitted to the hospital under the care of Dr. Yoder.     Impression & Plan      Medical Decision Making:  Vivi Mccall is a 37 year old female currently at 25 weeks gestation with underlying hypertension who presents with elevated blood pressure.  She has a history of  "hypertension and takes hydrochlorothiazide while not pregnant and has been taking labetalol during her pregnancy.  She reports this is worked for her in the past.  She reports having felt somewhat lightheaded and had her labetalol decreased.  Today she noted that her blood pressures were quite high and she had a headache and felt \"fuzzy\"prompting her to contact her OB and she was sent to the emergency department.  Here her evaluation was largely unremarkable.  She has no lower extremity edema.  No clonus or hyperreflexia.  She does have a mild headache and is hypertensive.  She continued to be hypertensive on multiple rechecks therefore she was given another 100 mg of labetalol consistent with her prior effective home dose.  I contacted the on-call for her group and given her symptoms of headache associated with her high blood pressure she was brought in for further observation in the hospital to help figure out the best of blood pressure regimen for her as well to continue evaluation for preeclampsia.  She is admitted to Dr. Yoder in stable condition.      Diagnosis:    ICD-10-CM    1. Chronic hypertension in pregnancy  O10.919       2. Hypertension, unspecified type  I10       3. Pregnant state, incidental  Z33.1              Scribe Disclosure:  I, Dwayne Allen, am serving as a scribe at 10:25 PM on 3/17/2023 to document services personally performed by Fabiana Renae MD based on my observations and the provider's statements to me.     3/17/2023   Fabiana Renae MD Debroux, Karah M, MD  03/18/23 0129    "

## 2023-03-18 NOTE — H&P
[facility]    History and Physical  Obstetrics and Gynecology     Date of Admission:  3/17/2023      History of Present Illness   Vivi Mccall is a 37 year old female  25w1d  Estimated Date of Delivery: 2023 is calculated from Patient's last menstrual period was 2022. [facility] for worsening CHTN and feeling dizzy and weak and a little foggy/head pressure. No severe HA. Noticed the weekend of 3/11-3/12 in particular that if she was sitting for any length of time would get dizzy and lightheaded. Would also happen if standing. Stated it could happen even if laying down but mostly with sitting. Patient with CHTN for years. Was on labetalol 200mg BID to start pregnancy and called with these complaints 3/13 and ended up being told to cut her labetalol down to 100mg BID. However sx persisted and then felt a bit worse yesterday so came to the ER where her BPs were 150s/90s.    With her first preg her BPs were low and was off all BP meds by 18 weeks but then was restarted toward the end of pregnancy again. Her labs in the ER yesterday showed TX/Cr 0.08 and was 0.09 at her NOB. Her AST/ALT were normal. Her hgb was low at 10.9 but normal platelets. Sodium slightly low at 135.    Restarted on labetalol at 200mg TID on admit so has received just one additional 100mg in the ER as had taken 100mg herself and first AM dose of labetalol is due now. Early AM her BP was 118/67 and just after we had our visit was 140/80.  Patient states that she is not feeling dizzy since admission but still just weak and tired and not like herself.    Tolerating regular diet but has only had some water since admission. No other illness sx and other vitals besides BP are normal.  Feeling good FM and denies and pregnancy complaints    PRENATAL COURSE  Prenatal course was complicated by above as well as depression/anxiety well controlled on citalopram 40, wellbutrin 150mg       Recent Labs   Lab Test 12/13/22  1022 12/27/19  0843   ABO  --  O   RH  --  Pos   AS Negative Neg     Rhogam not indicated   Recent Labs   Lab Test 12/13/22  1021 12/04/19  0950   HEPBANG Nonreactive  --    HIAGAB Nonreactive  --    GBS  --  Positive*   RUQIGG Positive  --          Prior to Admission Medications   Prior to Admission Medications   Prescriptions Last Dose Informant Patient Reported? Taking?   LORazepam (ATIVAN) 0.5 MG tablet   No No   Sig: Take 1 tablet (0.5 mg) by mouth every 6 hours as needed for anxiety   Patient not taking: Reported on 3/10/2023   PAXLOVID, 300/100, therapy pack   Yes No   Patient not taking: Reported on 3/10/2023   Prenatal Vit-Fe Sulfate-FA-DHA (PRENATAL VITAMIN/MIN +DHA PO) 3/17/2023  Yes Yes   Sig: Take 2 chew tab by mouth daily   Probiotic Product (PROBIOTIC DAILY PO)   Yes No   Patient not taking: Reported on 3/10/2023   albuterol (PROAIR HFA/PROVENTIL HFA/VENTOLIN HFA) 108 (90 Base) MCG/ACT inhaler   No No   Sig: Inhale 2 puffs into the lungs every 6 hours   aspirin 81 MG EC tablet 3/17/2023  Yes Yes   Sig: Take 81 mg by mouth daily   buPROPion (WELLBUTRIN XL) 150 MG 24 hr tablet 3/17/2023  No Yes   Sig: Take 1 tablet (150 mg) by mouth every morning   citalopram (CELEXA) 40 MG tablet 3/17/2023  No Yes   Sig: Take 1 tablet (40 mg) by mouth daily   doxylamine (UNISOM) 25 MG TABS tablet   Yes No   Sig: Take 12.5-50 mg by mouth as needed (nasusea)   Patient not taking: Reported on 3/10/2023   fluticasone (FLOVENT HFA) 220 MCG/ACT inhaler   No No   Sig: Inhale 1 puff into the lungs 2 times daily   labetalol (NORMODYNE) 200 MG tablet 3/17/2023  No Yes   Sig: Take 1 tablet (200 mg) by mouth 2 times daily   ondansetron (ZOFRAN ODT) 4 MG ODT tab   No No   Sig: Take 1 tablet (4 mg) by mouth every 8 hours as needed for nausea   vitamin B6 (PYRIDOXINE) 50 MG TABS   Yes No   Sig: Take 25 mg by mouth as needed (nausea)   Patient not taking: Reported on 3/10/2023       Facility-Administered Medications: None     Allergies   Allergies   Allergen Reactions     Amoxicillin      Codeine      Penicillins          Immunization History   Immunization History   Administered Date(s) Administered     COVID-19 Vaccine 12+ (Pfizer) 03/10/2021, 2021, 2021     COVID-19 Vaccine Bivalent Booster 12+ (Pfizer) 2022     Influenza Vaccine >6 months (Alfuria,Fluzone) 10/20/2017, 10/01/2018, 10/02/2019, 10/16/2020, 10/11/2021, 2022     TDAP Vaccine (Adacel) 10/02/2019     TDAP Vaccine (Boostrix) 10/20/2017       Past Medical History:   Diagnosis Date     Asthma     seasonal and environmental     C. difficile diarrhea 2013     Concussion 2022     Depression      Depressive disorder      Encounter for IUD insertion 10/16/2020    Jyoti inserted by Dr Park     Generalized anxiety disorder      Hypertension      IUD contraception 10/2020    Jyoti inserted by Dr Park     Pain in both hands 2020       Past Surgical History:   Procedure Laterality Date     GYN SURGERY      Laser treatment of HPV     ORTHOPEDIC SURGERY Left 2017    ganglion cyst removal     ORTHOPEDIC SURGERY Left     nerve release on left leg/ankle      SOFT TISSUE SURGERY       SURGICAL HISTORY OF -       Nerve entrapment release     ZZHC RELEASE FOOT/TOE NERVE         vitals from today:  BP mostly 150s/80-90s and this AM as above     Abdomen: gravid, single vertex fetus, non-tender    Constitutional: healthy, alert, and no distress   Neurologic: Awake, alert, oriented x3  Neuropsychiatric: General: normal, calm and normal eye contact  Heart: Regular rate and rhythm  Lungs: clear to ausculation bilaterally  Extremities: NT, no edema     A/P 36 yo  at 25+1 admitted for worsening CHTN control after cutting her labetalol down to 100mg BID from 200mg BID due to feeling dizzy and weak. However those sx persisted and BPs were not well controlled so came to the ER yesterday and  admitted for evaulation and BP optimization    1)labetalol 200mg TID started on admit and discussed that pending level of control could consider BID at home vs TID. May need to increase dose or add nifedipine with goal <140/90    2)repeat CMP and CBC today as well as checking iron, ferritin, vitamin D and B12 to assess any other potential reasons for her sx other than BP given patient does have anemia of pregnancy  Will address any electrolyte or vitamin abnormalities once results are back    3)otherwise regular diet, up ad jackie, encourage oral hydration and regular balanced carb/protein meals to avoid any hypoglycemia and electrolyte imbalance    4)doptones qshift or prn    5)continue all other home meds    6)BPP/Growth if indicated though currently fetal status is reassuring    7)addressed typical causes of her sx and very likely she is having some vena cava compression with sitting causing some of her sx along with likely some anemia and feel that her HTN control will likely need to be managed separately from that and return to at least her baseline labetalol dose if not more.    8)no signs of superimpose pre-e as all LFTs, renal function, platelets normal and patient's sx are c/w pre-e    Anticipate d/c in the next 1-2 days once HTN is well controlled    Shakira Yoder MD

## 2023-03-18 NOTE — ED TRIAGE NOTES
[facility]  ED Arrival Note    Arrived to triage c/o elevated blood pressure. Blood pressures have been anywhere between 140-165 throughout the day.  Pt states that she is on Labetalol 100 Mg bid. She is also 25 weeks pregnant. She also feels SOB and dizzy when ambulating. OBGYN recommended coming here to be seen.    Visitors during triage: None    Triage Interventions: N/A    Ambulatory: Yes  Directed to: Main ED    Pronouns: she/her       Triage Assessment     Row Name 03/17/23 2129       Triage Assessment (Adult)    Airway WDL WDL       Respiratory WDL    Respiratory WDL WDL       Skin Circulation/Temperature WDL    Skin Circulation/Temperature WDL WDL       Cardiac WDL    Cardiac WDL X  Elevated BP.       Peripheral/Neurovascular WDL    Peripheral Neurovascular WDL WDL       Cognitive/Neuro/Behavioral WDL    Cognitive/Neuro/Behavioral WDL WDL

## 2023-03-19 ENCOUNTER — HOSPITAL ENCOUNTER (OUTPATIENT)
Facility: CLINIC | Age: 38
Setting detail: OBSERVATION
End: 2023-03-19
Admitting: OBSTETRICS & GYNECOLOGY
Payer: COMMERCIAL

## 2023-03-19 VITALS
WEIGHT: 207 LBS | HEART RATE: 73 BPM | SYSTOLIC BLOOD PRESSURE: 141 MMHG | DIASTOLIC BLOOD PRESSURE: 77 MMHG | RESPIRATION RATE: 16 BRPM | TEMPERATURE: 97.7 F | HEIGHT: 68 IN | BODY MASS INDEX: 31.37 KG/M2 | OXYGEN SATURATION: 98 %

## 2023-03-19 PROCEDURE — 99238 HOSP IP/OBS DSCHRG MGMT 30/<: CPT | Performed by: OBSTETRICS & GYNECOLOGY

## 2023-03-19 PROCEDURE — G0378 HOSPITAL OBSERVATION PER HR: HCPCS

## 2023-03-19 PROCEDURE — 250N000013 HC RX MED GY IP 250 OP 250 PS 637: Performed by: OBSTETRICS & GYNECOLOGY

## 2023-03-19 RX ORDER — LABETALOL 200 MG/1
200 TABLET, FILM COATED ORAL 2 TIMES DAILY
Qty: 180 TABLET | Refills: 0 | COMMUNITY
Start: 2023-03-19 | End: 2023-06-05

## 2023-03-19 RX ORDER — HYDROXYZINE HYDROCHLORIDE 50 MG/1
50-100 TABLET, FILM COATED ORAL
Qty: 180 TABLET | Refills: 0 | Status: SHIPPED | OUTPATIENT
Start: 2023-03-19 | End: 2023-05-22

## 2023-03-19 RX ADMIN — LABETALOL HYDROCHLORIDE 200 MG: 200 TABLET, FILM COATED ORAL at 09:45

## 2023-03-19 RX ADMIN — BUPROPION HYDROCHLORIDE 150 MG: 150 TABLET, FILM COATED, EXTENDED RELEASE ORAL at 09:45

## 2023-03-19 RX ADMIN — CITALOPRAM HYDROBROMIDE 40 MG: 40 TABLET ORAL at 09:45

## 2023-03-19 RX ADMIN — ASPIRIN 81 MG CHEWABLE TABLET 81 MG: 81 TABLET CHEWABLE at 09:45

## 2023-03-19 ASSESSMENT — ACTIVITIES OF DAILY LIVING (ADL)
ADLS_ACUITY_SCORE: 20

## 2023-03-19 NOTE — DISCHARGE INSTRUCTIONS
Discharge Instruction for Undelivered Patients      You were seen for:  Blood pressure monitoring  We Consulted: Dr Yoder  You had (Test or Medicine):Blood pressure monitoring and fetal assessment     Diet:   Drink 8 to 12 glasses of liquids (milk, juice, water) every day.  You may eat meals and snacks.     Activity:  Call your doctor or nurse midwife if your baby is moving less than usual.     Call your provider if you notice:  Swelling in your face or increased swelling in your hands or legs.  Headaches that are not relieved by Tylenol (acetaminophen).  Changes in your vision (blurring: seeing spots or stars.)  Nausea (sick to your stomach) and vomiting (throwing up).   Weight gain of 5 pounds or more per week.  Heartburn that doesn't go away.  Signs of bladder infection: pain when you urinate (use the toilet), need to go more often and more urgently.  The bag of giordano (rupture of membranes) breaks, or you notice leaking in your underwear.  Bright red blood in your underwear.  Abdominal (lower belly) or stomach pain.  For first baby: Contractions (tightening) less than 5 minutes apart for one hour or more.  Second (plus) baby: Contractions (tightening) less than 10 minutes apart and getting stronger.  *If less than 34 weeks: Contractions (tightening) more than 6 times in one hour.  Increase or change in vaginal discharge (note the color and amount    Follow-up:  As scheduled in the clinic

## 2023-03-19 NOTE — PLAN OF CARE
BPs WNL overnight. Clonus and reflexes WNL, Pt c/o HA in the evening that was resolved with tylenol. Denies vision changes and epigastric pain, LOF, vaginal bleeding, ctx, dizziness and SOB. Pt states she has had increased anxiety since being here and requested to take vistaril at bedtime. Pt has been able to sleep well overnight.

## 2023-03-19 NOTE — PROGRESS NOTES
"S: Pt is doing well since yesterday AM. Has not had any more dizzy or lightheaded spells. She did actually have a panic attack shortly after I saw her yesterday but recognized it as such. Had just finished talking on the phone to her mom and already anxious about being alone in the hospital and it triggered that once hung up. Did aromatherapy and support from her nurses and calmed down and has felt well since then. Didn't take hydroxyzine in that moment but did take 100mg last night to sleep and states she hasn't slept that well or soundly in weeks so thinks that has been a huge help also. Patient reports that her SOB pre hospital was not panic attacks as can tell when that is happening. That was exertional with stairs or walking. Had it first pregnancy but this has been more pronounced and earlier this pregnancy so w/previous covid infection just has been more worried that something else is wrong. Feels much better having been in the hosp and monitored and knowing everything is normal.  Baby has been moving well. No cramping or ctx and no other concerning sx.     O: /66   Pulse 72   Temp 98.1  F (36.7  C) (Temporal)   Resp 16   Ht 1.727 m (5' 8\")   Wt 93.9 kg (207 lb)   LMP 09/23/2022   SpO2 98%   BMI 31.47 kg/m          ABD:  Gravid, NT  Ext:no edema  doptones qshift have been normal and visible fetal movement during visit just now              Hemoglobin   Date Value Ref Range Status   03/18/2023 10.2 (L) 11.7 - 15.7 g/dL Final   03/08/2021 13.0 11.7 - 15.7 g/dL Final            Lab Results   Component Value Date    RH Pos 12/27/2019       A/P:  HD#2 admitted for suboptimal CHTN control at now 25+2 after having decreased her labetalol from 200mg BID to 100mg BID when she was having dizzy/lightheaded spells. However those sx persisted and BPs were 150s/90s     Patient was put back on labetalol at 200mg but TID rather than BID and her blood pressures have been great since then.    All pre-e labs have " been normal but she is slightly anemic with hgb 10.2-10.9  howevr her iron and ferritin are normal and her B12 is as well  Her vitamin D is still pending. Does have D in her PNV but not sure how much. Has been deficient in the past.  Will contact her by [facility] once that result is back and increase D supplementation if needed    Will d/c patient with hydroxyzine for sleep as well and encouraged her to take 1000 tylenol with 50-100mg vistaril even at bedtime for improved sleep which should help with daytime fatigue/weakness/dizziness as well    Will check BPs 2x/day or prn sx and if persistently >140/90 will contact us    O/W already has an appointment for her 28 weeks visit with Dr. Park, including her GCT and a growth scan on 3/28 so can just keep that and contact us for any other issues in between

## 2023-03-19 NOTE — PLAN OF CARE
Vital signs stable, denies headache, visual changes. Positive fetal movement per patient, doptones 140-145. Order received for patient to discharge to home, discharge instructions reviewed, prescribed meds given, all questions answered and patient voiced understanding. Patient to follow up as scheduled in the clinic. Staff walked with patient, ambulatory to door 6 to meet  for discharge.

## 2023-03-20 LAB — DEPRECATED CALCIDIOL+CALCIFEROL SERPL-MC: 26 UG/L (ref 20–75)

## 2023-03-21 NOTE — RESULT ENCOUNTER NOTE
Vivi,    So your vitamin D is back and technically it looks normal at 26 but ideally the range should be 40-50 or so.    Not sure if you had a chance to check what amount is in your prenatals but I think you should buy the over the counter vitamin D3, 2000 international unit(s), and plan to take that every day in addition to your prenatals b/c I think either way the amount you're taking is showing you could certainly use more.    Maybe this will help just a bit more with energy and just overall feeling better.    Shakira Yoder MD

## 2023-03-28 DIAGNOSIS — Z36.9 ENCOUNTER FOR ANTENATAL SCREENING OF MOTHER: Primary | ICD-10-CM

## 2023-03-28 DIAGNOSIS — Z23 NEED FOR TDAP VACCINATION: ICD-10-CM

## 2023-04-03 ENCOUNTER — TELEPHONE (OUTPATIENT)
Dept: OBGYN | Facility: CLINIC | Age: 38
End: 2023-04-03
Payer: COMMERCIAL

## 2023-04-03 NOTE — TELEPHONE ENCOUNTER
27w3d    CC:Wiped today and noticed small amount of bright red blood on toilet paper after bowel movement.   Currently no pain with bowel movement however does have soreness in the rectal area and feels uncomfortable after stooling.   No blood in stool.   Normal bowel movements-are more formed and straining slightly with BM. No itching or pain.     Is eating foods with fiber. Not currently taking a stool softerner. Discussed increasing water intake, increase fiber in diet.   Patient did have hemorrhoids in last pregnancy-both internal and external.     Discussed below recommendations   ~Avoid straining if you are constipated. Can take stool softener twice a day to help with constipation.   ~You can use a sitz bath (a tub filled with warm water not hot) or a bath to soak yourself in warm water. This will help hemorrhoids to shrink. Do this two to four times a day.  ~Sit on a pillow or  cushion to relieve pressure on the rectum. Sitting in a rocking chair or recliner may  be more comfortable than sitting in a straight chair. Avoid sitting for long periods of time. Changing positions frequently.   ~Increase dietary fiber and your fluids. This will help to prevent constipation. Eating more whole grains, fruits and vegetables.  ~Avoid tight clothing or underwear. Change underwear frequently. Allow for air flow so no wearing underwear at night. Making sure to rinse buttocks after each bowel movement. Do not wipe hard, or use irritating wipes. Use a rissa-bottle and allow to air dry if possible. If you are not able to use rissa-bottle then pat rectal area and do not wipe hard. Make sure all stool is removed from surface and you may need a barrier cream(A&D,vaseline, aquaphor,desitin)  to help with chaffing(friction).  ~Use witch hazel to reduce swelling or bleeding  Use Tucks Medicated Pads      Routing to . has upcoming appt on 4/7/23. Any other recommendations for rectal most likely d/t hemorrhoids.     Morenita  Arnold, NICO

## 2023-04-04 NOTE — TELEPHONE ENCOUNTER
Agree with all of the above --may either be hemorrhoids or may have a  small tear or fissure if she has had harder stools.  Would recommend starting stool softeners (colace) for the remainder of pregnancy.  If more of an issue, may consider seeing [facility] for further evaluation

## 2023-04-04 NOTE — TELEPHONE ENCOUNTER
Returned call to patient, she has colace at home and will start using it 1-2x/day for the remainder of the pregnancy.  Will also continue recommendations given yesterday.  Pt will call back if symptoms do not improve or worsen.  Pt verbalized understanding, in agreement with plan, and voiced no further questions.  Skylar Dunn RN on 4/4/2023 at 10:11 AM

## 2023-04-07 ENCOUNTER — LAB (OUTPATIENT)
Dept: LAB | Facility: CLINIC | Age: 38
End: 2023-04-07
Attending: OBSTETRICS & GYNECOLOGY
Payer: COMMERCIAL

## 2023-04-07 ENCOUNTER — ANCILLARY PROCEDURE (OUTPATIENT)
Dept: ULTRASOUND IMAGING | Facility: CLINIC | Age: 38
End: 2023-04-07
Attending: OBSTETRICS & GYNECOLOGY
Payer: COMMERCIAL

## 2023-04-07 ENCOUNTER — PRENATAL OFFICE VISIT (OUTPATIENT)
Dept: OBGYN | Facility: CLINIC | Age: 38
End: 2023-04-07
Attending: OBSTETRICS & GYNECOLOGY
Payer: COMMERCIAL

## 2023-04-07 VITALS — BODY MASS INDEX: 32.23 KG/M2 | SYSTOLIC BLOOD PRESSURE: 130 MMHG | WEIGHT: 212 LBS | DIASTOLIC BLOOD PRESSURE: 84 MMHG

## 2023-04-07 DIAGNOSIS — O10.919 CHRONIC HYPERTENSION IN PREGNANCY: ICD-10-CM

## 2023-04-07 DIAGNOSIS — Z36.9 ENCOUNTER FOR ANTENATAL SCREENING OF MOTHER: ICD-10-CM

## 2023-04-07 DIAGNOSIS — O09.529 SUPERVISION OF HIGH-RISK PREGNANCY OF ELDERLY MULTIGRAVIDA: Primary | ICD-10-CM

## 2023-04-07 DIAGNOSIS — Z3A.28 28 WEEKS GESTATION OF PREGNANCY: ICD-10-CM

## 2023-04-07 DIAGNOSIS — Z23 NEED FOR TDAP VACCINATION: ICD-10-CM

## 2023-04-07 DIAGNOSIS — O10.919 CHRONIC HYPERTENSION DURING PREGNANCY: ICD-10-CM

## 2023-04-07 LAB
ERYTHROCYTE [DISTWIDTH] IN BLOOD BY AUTOMATED COUNT: 12.7 % (ref 10–15)
GLUCOSE 1H P 50 G GLC PO SERPL-MCNC: 120 MG/DL (ref 70–129)
HCT VFR BLD AUTO: 31.3 % (ref 35–47)
HGB BLD-MCNC: 10.5 G/DL (ref 11.7–15.7)
MCH RBC QN AUTO: 31.9 PG (ref 26.5–33)
MCHC RBC AUTO-ENTMCNC: 33.5 G/DL (ref 31.5–36.5)
MCV RBC AUTO: 95 FL (ref 78–100)
PLATELET # BLD AUTO: 265 10E3/UL (ref 150–450)
RBC # BLD AUTO: 3.29 10E6/UL (ref 3.8–5.2)
WBC # BLD AUTO: 9.3 10E3/UL (ref 4–11)

## 2023-04-07 PROCEDURE — 76816 OB US FOLLOW-UP PER FETUS: CPT | Performed by: OBSTETRICS & GYNECOLOGY

## 2023-04-07 PROCEDURE — 85027 COMPLETE CBC AUTOMATED: CPT

## 2023-04-07 PROCEDURE — 36415 COLL VENOUS BLD VENIPUNCTURE: CPT

## 2023-04-07 PROCEDURE — 82950 GLUCOSE TEST: CPT

## 2023-04-07 PROCEDURE — 90471 IMMUNIZATION ADMIN: CPT | Performed by: OBSTETRICS & GYNECOLOGY

## 2023-04-07 PROCEDURE — 90715 TDAP VACCINE 7 YRS/> IM: CPT | Performed by: OBSTETRICS & GYNECOLOGY

## 2023-04-07 PROCEDURE — 99207 PR PRENATAL VISIT: CPT | Performed by: OBSTETRICS & GYNECOLOGY

## 2023-04-07 NOTE — PROGRESS NOTES
Doing pretty well today.  Very tired   +FM --very active  Occ ctx/cramping with work; no vb/spotting/lof  Had some blood with BM this week --using stool softeners for hemorrhoids  Growth us today for CHTN on meds; vtx, EFW 1241g/2-12# (58%ile) MVP 4.6cm; continue on labetolol 200mg TID  Glucola, CBC today; Tdap today  RTC 2wks --will start weekly BPP at 32wks with growth us

## 2023-04-10 ENCOUNTER — TELEPHONE (OUTPATIENT)
Dept: OBGYN | Facility: CLINIC | Age: 38
End: 2023-04-10
Payer: COMMERCIAL

## 2023-04-10 NOTE — TELEPHONE ENCOUNTER
"28w3d  Anterior placenta per last US    Pt calling w questions.  Normally a very active baby - \"tumbler\". Has not felt much today.  Last felt her at 11:15am, went into a meeting and then recently had a glass of cold water and not getting the normal activity at this time per usual.    Encouraged kick counts - lay on side and focus on counting movements 10 kicks in 2 hour period. If does not meet criteria, call back. Pt is going to focus on activity over next 2 hrs and call if does not meet criteria.    Pt verbalized understanding, in agreement with plan, and voiced no further questions.  Jael Nelson RN on 4/10/2023 at 2:59 PM      "

## 2023-04-21 ENCOUNTER — PRENATAL OFFICE VISIT (OUTPATIENT)
Dept: OBGYN | Facility: CLINIC | Age: 38
End: 2023-04-21
Payer: COMMERCIAL

## 2023-04-21 VITALS — DIASTOLIC BLOOD PRESSURE: 82 MMHG | WEIGHT: 215 LBS | SYSTOLIC BLOOD PRESSURE: 122 MMHG | BODY MASS INDEX: 32.69 KG/M2

## 2023-04-21 DIAGNOSIS — O09.529 SUPERVISION OF HIGH-RISK PREGNANCY OF ELDERLY MULTIGRAVIDA: Primary | ICD-10-CM

## 2023-04-21 DIAGNOSIS — Z3A.30 30 WEEKS GESTATION OF PREGNANCY: ICD-10-CM

## 2023-04-21 DIAGNOSIS — O10.919 CHRONIC HYPERTENSION IN PREGNANCY: ICD-10-CM

## 2023-04-21 PROCEDURE — 99207 PR PRENATAL VISIT: CPT | Performed by: OBSTETRICS & GYNECOLOGY

## 2023-04-21 NOTE — PROGRESS NOTES
"Doing well today.  +FM  No ctx/cramping/vb/spotting; more pressue and occ band of menstrual like cramps  No bowel/bladder issues  Got \"sidelined\" at work this week after a bit of an emotional breakdown; show opens tomorrow and life will be much simpler  Normotensive today with labetolol 200mg TID  RTC 2wk --growth us and first weekly BPP at that time  "

## 2023-04-27 NOTE — PROGRESS NOTES
"Discharge Note    Progress reporting period is from 10-3-22 to Oct 13, 2022.     Vivi failed to follow up and current status is unknown.  Please see information below for last relevant information on current status.  Patient seen for 2 visits.    SUBJECTIVE  At second visit patient reported she was improving - intensity of sxs and ROM/flexibility.  Easier to put hand on hip.  Still unable to lie on left unrestricted.  .  Current pain level is 0/10.     Previous pain level was   (0-7/10).   Changes in function:  Yes (See Goal flowsheet attached for changes in current functional level)  Adverse reaction to treatment or activity: None    OBJECTIVE  At second/last visit:   AROM left shoulder flexion 156 with \"click\" mid range, pain past 130, abduction WNL with ERP, extension 66%, ext/IR T9 with pain, \"hand on hip\" minimal discomfort, horiztontal adduction min restricted with ERP.     ASSESSMENT/PLAN  Diagnosis: L shoulder pain   Patient did not return for further visits so problems/goal status is unknown.    Recommendations:  Patient did not return for follow-up visits as expected.  Will discharge physical therapy chart at this time.    Please refer to the daily flowsheet for treatment today, total treatment time and time spent performing 1:1 timed codes.        "

## 2023-04-30 ENCOUNTER — OFFICE VISIT (OUTPATIENT)
Dept: URGENT CARE | Facility: URGENT CARE | Age: 38
End: 2023-04-30
Payer: COMMERCIAL

## 2023-04-30 ENCOUNTER — NURSE TRIAGE (OUTPATIENT)
Dept: NURSING | Facility: CLINIC | Age: 38
End: 2023-04-30

## 2023-04-30 VITALS
SYSTOLIC BLOOD PRESSURE: 144 MMHG | OXYGEN SATURATION: 100 % | HEART RATE: 90 BPM | RESPIRATION RATE: 20 BRPM | DIASTOLIC BLOOD PRESSURE: 87 MMHG | TEMPERATURE: 98.3 F | BODY MASS INDEX: 32.69 KG/M2 | WEIGHT: 215 LBS

## 2023-04-30 DIAGNOSIS — L72.3 SEBACEOUS CYST: Primary | ICD-10-CM

## 2023-04-30 PROCEDURE — 99214 OFFICE O/P EST MOD 30 MIN: CPT | Performed by: FAMILY MEDICINE

## 2023-04-30 RX ORDER — CEPHALEXIN 500 MG/1
500 CAPSULE ORAL 3 TIMES DAILY
Qty: 30 CAPSULE | Refills: 0 | Status: SHIPPED | OUTPATIENT
Start: 2023-04-30 | End: 2023-05-10

## 2023-04-30 NOTE — TELEPHONE ENCOUNTER
OB Triage Call      Is patient's OB/Midwife with the [facility] or [facility]? [facility]- Proceed with triage     Reason for call: mass under bilateral axilla    Pt states she changed deodorant 2 months ago.  Last night, pt noticed large mass under each axilla.    Pt was just at the  today and was given Keflex 500 mg, take 1 cap TID x 10 days. Pt prefers not to start antibiotics due to history of C. Diff, and pt is currently 31w2d pregnant. Pt was not confident at how she was assessed at .    Assessment:   Golf ball sized mass under R axilla  Pingpong ball sized mass under L axilla  Painful to touch  Red in the middle of the mass, darkened color in the mass itself     Pain when touched 4/10  Itchy  No fever      Plan: Be seen within 24 hours      Patient's primary OB Provider is Xavier NELSON    Per protocol recommendations Consult needed for ROBERT MD or Midwife.  Patient's primary OB is [facility].  On-Call provider Masters paged at 10:51 am. Call returned at 10:53 am and advised on triage assessment.  Provider recommendations: Home Care recommendations.      Since pt seen at , another provider's recommendation might be similar, or might do additional work like imaging since there is bilateral mass. Antibiotics are appropriate in treating infections.  - warm packs  - if pt prefers not to start treatment, pt has the option to be seen by a different provider    Is patient's delivering hospital on divert? Does not apply due to Patient given home care instructions      31w2d    Estimated Date of Delivery: 2023        OB History    Para Term  AB Living   3 1 1 0 1 1   SAB IAB Ectopic Multiple Live Births   0 0 0 0 1      # Outcome Date GA Lbr Han/2nd Weight Sex Delivery Anes PTL Lv   3 Current            2 Term 19 39w3d 12:45 / 02:00 3.7 kg (8 lb 2.5 oz) F Vag-Spont EPI N LAYO      Complications: GBS, Preeclampsia/Hypertension      Name: Therese      Apgar1: 8  Apgar5: 9   1 AB                 Lab Results   Component Value Date    GBS Positive (A) 12/04/2019          Lin Crespo RN 04/30/23 10:14 AM  [facility] Nurse Advisor                    Reason for Disposition    [1] Swelling is painful to touch AND [2] no fever    Additional Information    Negative: Sounds like a life-threatening emergency to the triager    Negative: Small growth, spot, bump, or pigmented area of skin (e.g., moles, skin tags, wart, melanoma, skin cancer)    Negative: Inguinal hernia previously diagnosed by a physician    Negative: Followed a skin injury    Negative: Follows an insect bite    Negative: Swelling of lymph node suspected    Negative: Swelling of vaccination site    Negative: Swelling of tongue    Negative: Swelling of lip    Negative: Swelling of eye    Negative: Swelling of entire face    Negative: Swelling of scrotum    Negative: Swelling of labia    Negative: Swelling of surgical incision    Negative: Swelling of ankle joint    Negative: Swelling of elbow joint    Negative: Swelling of knee joint    Negative: Swelling with a skin rash    Negative: Patient sounds very sick or weak to the triager    Negative: SEVERE pain (e.g., excruciating)    Negative: [1] Swelling is painful to touch AND [2] fever    Negative: [1] Swelling is red AND [2] fever    Negative: [1] Swelling is red AND [2] size > 2 inches (5.0 cm) (Exception: itchy area of skin)    Negative: [1] Swelling of groin (inguinal area) AND [2] painful    Protocols used: SKIN LUMP OR LOCALIZED SWELLING-A-AH

## 2023-04-30 NOTE — TELEPHONE ENCOUNTER
ATTENDING STATEMENT    I discussed the case with the Resident. I agree with the Resident's findings and plan, as documented in today's note.     Dr. Mauro Penaloza       Provider Recommendation Follow Up:   Reached patient/caregiver. Informed of provider's recommendations. Patient verbalized understanding and agrees with the plan.     Pt plans to start Keflex.  FNA advised probiotics or yogurt (such as Activia) while on antibiotics   Increased fluid intake  Call for further concerns.    Routing to clinic as FYI, pt will be seen on 5/5 for prenatal visit        Lin Crespo RN/[facility] Nurse Advisor

## 2023-04-30 NOTE — PROGRESS NOTES
Vivi Mccall is a 37 year old female who presents to the clinic today concerned about a mass located AXILLA.    It has been present for day(s).    Symptoms include swelling.  Past Medical History:   Diagnosis Date     Asthma     seasonal and environmental     C. difficile diarrhea 2013     Concussion 01/2022     Depression      Depressive disorder      Encounter for IUD insertion 10/16/2020    Jyoti inserted by Dr Park     Generalized anxiety disorder      Hypertension      IUD contraception 10/2020    Jyoti inserted by Dr Park     Pain in both hands 02/20/2020       Past Surgical History:   Procedure Laterality Date     GYN SURGERY  2003    Laser treatment of HPV     ORTHOPEDIC SURGERY Left 07/21/2017    ganglion cyst removal     ORTHOPEDIC SURGERY Left     nerve release on left leg/ankle      SOFT TISSUE SURGERY  1999     SURGICAL HISTORY OF -       Nerve entrapment release     ZZHC RELEASE FOOT/TOE NERVE  2011       Family History   Problem Relation Age of Onset     Diabetes Mother      Bipolar Disorder Mother      Hypertension Mother      Coronary Artery Disease Mother      Depression Mother      Anxiety Disorder Mother      Mental Illness Mother      Breast Cancer Mother         BRCA negative, neoadjuvant chemo, surgery pend     Unknown/Adopted Father      Hypertension Father      Breast Cancer Maternal Grandmother      Hypertension Maternal Grandmother      Depression Maternal Grandmother      Anxiety Disorder Maternal Grandmother      Diabetes Type 2  Maternal Grandfather      Glaucoma Maternal Grandfather      Hypertension Maternal Grandfather      Coronary Artery Disease Maternal Grandfather      Diabetes Maternal Grandfather      Depression Maternal Grandfather      Cerebrovascular Disease Maternal Grandfather      Prostate Cancer Maternal Grandfather      Other - See Comments Maternal Grandfather         passed from Covid 9/2021     Hypertension Paternal Grandmother      Coronary Artery  Disease Paternal Grandfather      Hypertension Maternal Aunt      Ovarian Cancer Other      Macular Degeneration No family hx of        Social History     Tobacco Use     Smoking status: Never     Smokeless tobacco: Never   Vaping Use     Vaping status: Never Used   Substance Use Topics     Alcohol use: Not Currently     Comment: Rare        OBJECTIVE:    Blood pressure (!) 144/87, pulse 90, temperature 98.3  F (36.8  C), resp. rate 20, weight 97.5 kg (215 lb), last menstrual period 09/23/2022, SpO2 100 %, not currently breastfeeding.  nad  Exam:  SWOLLEN mass is seen located AXILLA.    ASSESSMENT:     ICD-10-CM    1. Sebaceous cyst  L72.3 cephALEXin (KEFLEX) 500 MG capsule        pROBIOTICS  Warm moist heat, f/u when rippened/comes to a ahead  PLAN:  Warm packs and soaks recommended.  Monitor for drainage.

## 2023-05-05 ENCOUNTER — PRENATAL OFFICE VISIT (OUTPATIENT)
Dept: OBGYN | Facility: CLINIC | Age: 38
End: 2023-05-05
Attending: OBSTETRICS & GYNECOLOGY
Payer: COMMERCIAL

## 2023-05-05 ENCOUNTER — ANCILLARY PROCEDURE (OUTPATIENT)
Dept: ULTRASOUND IMAGING | Facility: CLINIC | Age: 38
End: 2023-05-05
Attending: OBSTETRICS & GYNECOLOGY
Payer: COMMERCIAL

## 2023-05-05 VITALS — SYSTOLIC BLOOD PRESSURE: 126 MMHG | DIASTOLIC BLOOD PRESSURE: 86 MMHG | BODY MASS INDEX: 33.45 KG/M2 | WEIGHT: 220 LBS

## 2023-05-05 DIAGNOSIS — O09.529 SUPERVISION OF HIGH-RISK PREGNANCY OF ELDERLY MULTIGRAVIDA: Primary | ICD-10-CM

## 2023-05-05 DIAGNOSIS — Z3A.32 32 WEEKS GESTATION OF PREGNANCY: ICD-10-CM

## 2023-05-05 DIAGNOSIS — O10.919 CHRONIC HYPERTENSION DURING PREGNANCY: ICD-10-CM

## 2023-05-05 DIAGNOSIS — O10.919 CHRONIC HYPERTENSION IN PREGNANCY: ICD-10-CM

## 2023-05-05 PROCEDURE — 76819 FETAL BIOPHYS PROFIL W/O NST: CPT | Performed by: OBSTETRICS & GYNECOLOGY

## 2023-05-05 PROCEDURE — 76816 OB US FOLLOW-UP PER FETUS: CPT | Performed by: OBSTETRICS & GYNECOLOGY

## 2023-05-05 PROCEDURE — 99207 PR PRENATAL VISIT: CPT | Performed by: OBSTETRICS & GYNECOLOGY

## 2023-05-05 NOTE — PROGRESS NOTES
Doing well.  +FM --very active  No ctx/vb/spotting.  +cramping --usually improves with rest and shea butter  No bowel/bladder issues  CHTN well controlled; labetolol 200mg BID  Growth us today wnl, vtx, EFW 1895g/4-3# (41%ile), MVP 6.2cm; BPP wnl  Was seen in UC for bilateral underarm lumps --being treated with 10d course keflex--both sides smaller but still a bit tender  RTC 1wk for BPP

## 2023-05-09 ENCOUNTER — TELEPHONE (OUTPATIENT)
Dept: FAMILY MEDICINE | Facility: CLINIC | Age: 38
End: 2023-05-09
Payer: COMMERCIAL

## 2023-05-09 NOTE — TELEPHONE ENCOUNTER
SN,      YVONNEI:        General Call      Reason for Call:  States she started a  new deodorant 1-2 months ago.   Two weeks ago developed 2 sebaceous cysts - Axilla    Seen in UC. Put on Keflex  Has prenatal appointment on 5/5 and has follow up prenatal on 5/12  (patient currently 32 weeks pregnant)    Patient states she wanted to update PCP on what was going on  Wasn't sure if you wanted to see her or not.  Will continue her prenatal follow ups.      Thank you,  Nicole Caal RN      Triage note:  No reason to call back unless SN wants to see patient.

## 2023-05-10 ENCOUNTER — NURSE TRIAGE (OUTPATIENT)
Dept: NURSING | Facility: CLINIC | Age: 38
End: 2023-05-10
Payer: COMMERCIAL

## 2023-05-10 ENCOUNTER — HOSPITAL ENCOUNTER (EMERGENCY)
Facility: CLINIC | Age: 38
End: 2023-05-10
Payer: COMMERCIAL

## 2023-05-10 ENCOUNTER — HOSPITAL ENCOUNTER (OUTPATIENT)
Facility: CLINIC | Age: 38
Discharge: HOME | End: 2023-05-10
Attending: OBSTETRICS & GYNECOLOGY | Admitting: OBSTETRICS & GYNECOLOGY
Payer: COMMERCIAL

## 2023-05-10 ENCOUNTER — MYC MEDICAL ADVICE (OUTPATIENT)
Dept: FAMILY MEDICINE | Facility: CLINIC | Age: 38
End: 2023-05-10
Payer: COMMERCIAL

## 2023-05-10 VITALS
SYSTOLIC BLOOD PRESSURE: 121 MMHG | OXYGEN SATURATION: 98 % | DIASTOLIC BLOOD PRESSURE: 72 MMHG | TEMPERATURE: 98.4 F | RESPIRATION RATE: 18 BRPM

## 2023-05-10 DIAGNOSIS — O09.529 SUPERVISION OF HIGH-RISK PREGNANCY OF ELDERLY MULTIGRAVIDA: Primary | ICD-10-CM

## 2023-05-10 PROBLEM — R42 DIZZINESS: Status: ACTIVE | Noted: 2023-05-10

## 2023-05-10 LAB
CLUE CELLS: ABNORMAL
CRYSTALS AMN MICRO: NORMAL
TRICHOMONAS, WET PREP: ABNORMAL
WBC'S/HIGH POWER FIELD, WET PREP: ABNORMAL
YEAST, WET PREP: ABNORMAL

## 2023-05-10 PROCEDURE — 87210 SMEAR WET MOUNT SALINE/INK: CPT | Performed by: OBSTETRICS & GYNECOLOGY

## 2023-05-10 PROCEDURE — 59025 FETAL NON-STRESS TEST: CPT

## 2023-05-10 PROCEDURE — 999N000105 HC STATISTIC NO DOCUMENTATION TO SUPPORT CHARGE

## 2023-05-10 PROCEDURE — G0463 HOSPITAL OUTPT CLINIC VISIT: HCPCS | Mod: 25

## 2023-05-10 RX ORDER — VITAMIN B COMPLEX
1 TABLET ORAL DAILY
COMMUNITY
End: 2023-10-23

## 2023-05-10 RX ORDER — ONDANSETRON 4 MG/1
4 TABLET, ORALLY DISINTEGRATING ORAL EVERY 6 HOURS PRN
Status: DISCONTINUED | OUTPATIENT
Start: 2023-05-10 | End: 2023-05-10 | Stop reason: HOSPADM

## 2023-05-10 RX ORDER — PROCHLORPERAZINE 25 MG
25 SUPPOSITORY, RECTAL RECTAL EVERY 12 HOURS PRN
Status: DISCONTINUED | OUTPATIENT
Start: 2023-05-10 | End: 2023-05-10 | Stop reason: HOSPADM

## 2023-05-10 RX ORDER — METOCLOPRAMIDE HYDROCHLORIDE 5 MG/ML
10 INJECTION INTRAMUSCULAR; INTRAVENOUS EVERY 6 HOURS PRN
Status: DISCONTINUED | OUTPATIENT
Start: 2023-05-10 | End: 2023-05-10 | Stop reason: HOSPADM

## 2023-05-10 RX ORDER — PROCHLORPERAZINE MALEATE 5 MG
10 TABLET ORAL EVERY 6 HOURS PRN
Status: DISCONTINUED | OUTPATIENT
Start: 2023-05-10 | End: 2023-05-10 | Stop reason: HOSPADM

## 2023-05-10 RX ORDER — ONDANSETRON 2 MG/ML
4 INJECTION INTRAMUSCULAR; INTRAVENOUS EVERY 6 HOURS PRN
Status: DISCONTINUED | OUTPATIENT
Start: 2023-05-10 | End: 2023-05-10 | Stop reason: HOSPADM

## 2023-05-10 RX ORDER — METOCLOPRAMIDE 10 MG/1
10 TABLET ORAL EVERY 6 HOURS PRN
Status: DISCONTINUED | OUTPATIENT
Start: 2023-05-10 | End: 2023-05-10 | Stop reason: HOSPADM

## 2023-05-10 ASSESSMENT — ACTIVITIES OF DAILY LIVING (ADL): ADLS_ACUITY_SCORE: 20

## 2023-05-10 NOTE — TELEPHONE ENCOUNTER
Does not need to be seen - agree with ABX and keeping area clean and dry but avoid aggressively scrubbing area - gently patting with soap water and towel  Avoiding irritants    See me if this is recurrent    SN

## 2023-05-10 NOTE — TELEPHONE ENCOUNTER
OB Triage Call      Is patient's OB/Midwife with [facility]? [facility]- Proceed with triage     Reason for call: Difficulty breathing, shortness of breath, fatigued, some dizziness, /49    Assessment: Denies abdominal pain, contractions, bleeding, ROM, decreased fetal movement. Fatigued and dyspnea.  /49.      Plan: Should be seen in the ER    Patient plans to deliver at [facility]    Patient's primary OB Provider is Anamaria Park MD.      Per protocol recommendations:   Patient needs ER    Is patient's delivering hospital on divert? No      32w5d    Estimated Date of Delivery: 2023        OB History    Para Term  AB Living   3 1 1 0 1 1   SAB IAB Ectopic Multiple Live Births   0 0 0 0 1      # Outcome Date GA Lbr Han/2nd Weight Sex Delivery Anes PTL Lv   3 Current            2 Term 19 39w3d 12:45 / 02:00 3.7 kg (8 lb 2.5 oz) F Vag-Spont EPI N LAYO      Complications: GBS, Preeclampsia/Hypertension      Name: Therese      Apgar1: 8  Apgar5: 9   1 AB                Lab Results   Component Value Date    GBS Positive (A) 2019          Roxann Chatterjee RN 05/10/23 6:12 PM  [facility] Nurse Advisor      Reason for Disposition    [1] MODERATE difficulty breathing (e.g., speaks in phrases, SOB even at rest, pulse 100-120) AND [2] NEW-onset or WORSE than normal    Additional Information    Negative: SEVERE difficulty breathing (e.g., struggling for each breath, speaks in single words)    Negative: [1] Breathing stopped AND [2] hasn't returned    Negative: Choking on something    Negative: Bluish (or gray) lips or face now    Negative: Difficult to awaken or acting confused (e.g., disoriented, slurred speech)    Negative: Passed out (i.e., lost consciousness, collapsed and was not responding)    Negative: Wheezing started suddenly after medicine, an allergic food or bee sting    Negative: Stridor    Negative: Slow, shallow and weak breathing    Negative:  Sounds like a life-threatening emergency to the triager    Protocols used: BREATHING DIFFICULTY-A-AH

## 2023-05-11 ENCOUNTER — TELEPHONE (OUTPATIENT)
Dept: OBGYN | Facility: CLINIC | Age: 38
End: 2023-05-11

## 2023-05-11 NOTE — PLAN OF CARE
"Pt is a  at 32.5 weeks who presented for dizziness, fatigue and low blood pressures (100/60's at home.)  Pt states she has been feeling \"off\" today and more fatigued than usual.  Pt states she worked today and rested when she got home, decided to check her blood pressures.  Denies headache or visual changes.   Pt states she has chronic hypertension and is taking labetalol 200 mg TID, last dose taken at 1600 today.  Monitors applied after obtaining verbal consent, pt denies any vaginal bleeding or leaking of fluid.  Pt and spouse oriented to room and call light given.  Prenatal reviewed.  Pt stated upon nurse next visit to room that she did notice some leaking today that she thinks might be urine but is unsure.  Dr Soni called and updated on pt status.  New orders received.    "

## 2023-05-11 NOTE — PLAN OF CARE
Dr Soni called and updated on pt status.  Pt states she is feeling better, denies dizziness but states she is just feeling tired now.  +FM,  Denies feeling any leaking of fluid.  Blood pressures remain stable.  Plan to hold labetalol dose this evening per Dr Soni and resume doses TID tomorrow.  Clinic will call to check on pt status tomorrow.  Pt has a  BPP scheduled for Friday, instructed to follow up as planned.  Pt voiced understanding, discharge instructions given, all questions answered.  Pt left ambulatory with spouse.

## 2023-05-11 NOTE — DISCHARGE INSTRUCTIONS
Discharge Instruction for Undelivered Patients      You were seen for:  blood pressure assessment  We Consulted: Dr Soni  You had (Test or Medicine):Fetal monitoring, membrane assessment     Diet:   Drink 8 to 12 glasses of liquids (milk, juice, water) every day.  You may eat meals and snacks.     Activity:  Call your doctor or nurse midwife if your baby is moving less than usual.     Call your provider if you notice:  Swelling in your face or increased swelling in your hands or legs.  Headaches that are not relieved by Tylenol (acetaminophen).  Changes in your vision (blurring: seeing spots or stars.)  Nausea (sick to your stomach) and vomiting (throwing up).   Weight gain of 5 pounds or more per week.  Heartburn that doesn't go away.  Signs of bladder infection: pain when you urinate (use the toilet), need to go more often and more urgently.  The bag of giordano (rupture of membranes) breaks, or you notice leaking in your underwear.  Bright red blood in your underwear.  Abdominal (lower belly) or stomach pain.  For first baby: Contractions (tightening) less than 5 minutes apart for one hour or more.  Second (plus) baby: Contractions (tightening) less than 10 minutes apart and getting stronger.  *If less than 34 weeks: Contractions (tightening) more than 6 times in one hour.  Increase or change in vaginal discharge (note the color and amount)    Follow-up:  As scheduled in the clinic   Clinic will also call tomorrow to follow up and check blood pressure when you present for BPP on Friday.

## 2023-05-11 NOTE — TELEPHONE ENCOUNTER
Masters, Stefania Luo, DO  P We Triage; Anamaria Park MD  Vivi was in triage last night (Wednesday) with SOB, feeling off all day while at work, blood pressure at home on lower side (for her) 100 systolic. BPs Were better in triage. No calf pain. Normal pulse. Feeling somewhat better after the evaluation, had rule out rupture too which was negative. Had her hold her blood pressure med for tonight and restart as normal this am. Has been on 200mg po TID. Please connect with her tomorrow to see how she is feeling. It would be good for her to see you guys on Friday when she comes in for BPP to have her blood pressure checked in clinic. Sees Xavier, her primary, next week.     I did not adjust her meds, thinking this was likely a one time episode and we would want to see if it was a pattern.     FYI to Xavier     Thanks,   AM     Feeling better today. Will restart meds as advised. Will have RN BP check after BPP tomorrow-will bring home cuff with her as well. Reviewed s/s that would warrant evaluation.     Soco Barbosa, RN on 5/11/2023 at 8:56 AM

## 2023-05-12 ENCOUNTER — ALLIED HEALTH/NURSE VISIT (OUTPATIENT)
Dept: NURSING | Facility: CLINIC | Age: 38
End: 2023-05-12
Payer: COMMERCIAL

## 2023-05-12 ENCOUNTER — ANCILLARY PROCEDURE (OUTPATIENT)
Dept: ULTRASOUND IMAGING | Facility: CLINIC | Age: 38
End: 2023-05-12
Attending: OBSTETRICS & GYNECOLOGY
Payer: COMMERCIAL

## 2023-05-12 VITALS — SYSTOLIC BLOOD PRESSURE: 114 MMHG | HEART RATE: 86 BPM | DIASTOLIC BLOOD PRESSURE: 82 MMHG

## 2023-05-12 DIAGNOSIS — O09.91 SUPERVISION OF HIGH RISK PREGNANCY IN FIRST TRIMESTER: Primary | ICD-10-CM

## 2023-05-12 DIAGNOSIS — O09.529 SUPERVISION OF HIGH-RISK PREGNANCY OF ELDERLY MULTIGRAVIDA: ICD-10-CM

## 2023-05-12 PROCEDURE — 99207 PR NO CHARGE NURSE ONLY: CPT

## 2023-05-12 PROCEDURE — 76819 FETAL BIOPHYS PROFIL W/O NST: CPT | Performed by: OBSTETRICS & GYNECOLOGY

## 2023-05-12 NOTE — PROGRESS NOTES
Vivi is here for BPP (8/8) and BP check    Home cuff reading 132/81    Clinic reading 114/82    Reviewed s/s that would warrant evaluation  Consulted with Dr. Lovelace (on call provider)    Plan:   Continue home BP checks, and Labetalol 200 mg TID  F/U as scheduled    Can schedule a nurse visit as well at anytime for a BP check    Soco Barbosa RN on 5/12/2023 at 4:26 PM

## 2023-05-16 ENCOUNTER — ANCILLARY PROCEDURE (OUTPATIENT)
Dept: ULTRASOUND IMAGING | Facility: CLINIC | Age: 38
End: 2023-05-16
Attending: OBSTETRICS & GYNECOLOGY
Payer: COMMERCIAL

## 2023-05-16 ENCOUNTER — PRENATAL OFFICE VISIT (OUTPATIENT)
Dept: OBGYN | Facility: CLINIC | Age: 38
End: 2023-05-16
Attending: OBSTETRICS & GYNECOLOGY
Payer: COMMERCIAL

## 2023-05-16 VITALS — DIASTOLIC BLOOD PRESSURE: 72 MMHG | BODY MASS INDEX: 33.6 KG/M2 | SYSTOLIC BLOOD PRESSURE: 116 MMHG | WEIGHT: 221 LBS

## 2023-05-16 DIAGNOSIS — O10.919 CHRONIC HYPERTENSION DURING PREGNANCY: ICD-10-CM

## 2023-05-16 DIAGNOSIS — Z3A.33 33 WEEKS GESTATION OF PREGNANCY: ICD-10-CM

## 2023-05-16 DIAGNOSIS — O10.919 CHRONIC HYPERTENSION IN PREGNANCY: ICD-10-CM

## 2023-05-16 DIAGNOSIS — O09.529 SUPERVISION OF HIGH-RISK PREGNANCY OF ELDERLY MULTIGRAVIDA: Primary | ICD-10-CM

## 2023-05-16 PROCEDURE — 99207 PR PRENATAL VISIT: CPT | Performed by: OBSTETRICS & GYNECOLOGY

## 2023-05-16 PROCEDURE — 76819 FETAL BIOPHYS PROFIL W/O NST: CPT | Performed by: OBSTETRICS & GYNECOLOGY

## 2023-05-16 NOTE — PROGRESS NOTES
Doing well today  +FM  No ctx/cramping/vb/spotting; actually feeling much better with baby oblique/transverse on ultrasound today --much less pressure  BPP today 8/8 with MVP 4.6 --continue weekly due to well controlled CHTN  Was seen in [facility] last week for low blood pressure --occ feels a bit lightheadedness/weak in the afternoons  On labetolol 200mg TID daniel --will start check pre-medication blood pressure in afternoon and if <120/70, will hold afternoon dose.  If has NOT taken afternoon dose, will check pre-medication PM dosing as well to make certain we are not spiking later in the day with alternate dosing  RTC 1wk

## 2023-05-19 ENCOUNTER — PRENATAL OFFICE VISIT (OUTPATIENT)
Dept: OBGYN | Facility: CLINIC | Age: 38
End: 2023-05-19
Attending: OBSTETRICS & GYNECOLOGY
Payer: COMMERCIAL

## 2023-05-19 ENCOUNTER — ANCILLARY PROCEDURE (OUTPATIENT)
Dept: ULTRASOUND IMAGING | Facility: CLINIC | Age: 38
End: 2023-05-19
Attending: OBSTETRICS & GYNECOLOGY
Payer: COMMERCIAL

## 2023-05-19 VITALS — DIASTOLIC BLOOD PRESSURE: 74 MMHG | SYSTOLIC BLOOD PRESSURE: 116 MMHG | BODY MASS INDEX: 34 KG/M2 | WEIGHT: 223.6 LBS

## 2023-05-19 DIAGNOSIS — R22.31 AXILLARY MASS, RIGHT: ICD-10-CM

## 2023-05-19 DIAGNOSIS — O09.529 SUPERVISION OF HIGH-RISK PREGNANCY OF ELDERLY MULTIGRAVIDA: Primary | ICD-10-CM

## 2023-05-19 DIAGNOSIS — O10.919 CHRONIC HYPERTENSION DURING PREGNANCY: ICD-10-CM

## 2023-05-19 DIAGNOSIS — O10.919 CHRONIC HYPERTENSION IN PREGNANCY: ICD-10-CM

## 2023-05-19 PROCEDURE — 99207 PR PRENATAL VISIT: CPT | Performed by: STUDENT IN AN ORGANIZED HEALTH CARE EDUCATION/TRAINING PROGRAM

## 2023-05-19 PROCEDURE — 76819 FETAL BIOPHYS PROFIL W/O NST: CPT | Performed by: STUDENT IN AN ORGANIZED HEALTH CARE EDUCATION/TRAINING PROGRAM

## 2023-05-19 NOTE — PROGRESS NOTES
Prenatal Care (34w0d)      Vivi Mccall is a 37 year old  at 34w0d by LMP c/w 11w4d US presenting for routine prenatal care. She is doing well.  Had some cysts under arms and went to . Received abx. Still has right sided axillary mass, tender to palpation. No fevers. No breast concerns. Denies LOF, VB, ctx. +FM.    Conditions affecting pregnancy:  - AMA  - cHTN, on labetalol 200mg TID    Objective- see flow sheet  Axilla: right with about 4cm well circumscribed soft tissue mass, compressible, no fluctuance. Mildly tender to palpation. Left axilla with slightly smaller soft tissue mass.     Vivi Mccall is a 37 year old  at 34w0d presenting for routine ob visit.       ICD-10-CM    1. Supervision of high-risk pregnancy of elderly multigravida  O09.529       2. Chronic hypertension in pregnancy  O10.919       3. Axillary mass, right  R22.31         - cHTN   - Baseline preE labs wnl   - Meds: labetalol 200mg TID   - No signs/symptoms of preE   - BP wnl today   - Continue ASA   -  testing at 32w, growth q4 weeks. BPP today . Increased MVP, normal ANUP.     Routine care   - First tri labs wnl. Level II w/ sub optimal views, follow-up wnl. S/p COVID-19 vaccine, influenza vax. Third tri labs: Hgb 10.5.   - Discussed routine precautions. Plan to follow-up axillary masses next week.   - TWlbs. Pregravid BMI 31. Expect 11-20lbs.    Follow-up in 1 week for routine care.     Ruthann Ferreira MD, S  23

## 2023-05-22 ENCOUNTER — NURSE TRIAGE (OUTPATIENT)
Dept: NURSING | Facility: CLINIC | Age: 38
End: 2023-05-22

## 2023-05-22 ENCOUNTER — HOSPITAL ENCOUNTER (OUTPATIENT)
Facility: CLINIC | Age: 38
End: 2023-05-22
Admitting: OBSTETRICS & GYNECOLOGY
Payer: COMMERCIAL

## 2023-05-22 ENCOUNTER — HOSPITAL ENCOUNTER (OUTPATIENT)
Facility: CLINIC | Age: 38
Discharge: HOME | End: 2023-05-22
Attending: OBSTETRICS & GYNECOLOGY | Admitting: OBSTETRICS & GYNECOLOGY
Payer: COMMERCIAL

## 2023-05-22 ENCOUNTER — OFFICE VISIT (OUTPATIENT)
Dept: FAMILY MEDICINE | Facility: CLINIC | Age: 38
End: 2023-05-22
Payer: COMMERCIAL

## 2023-05-22 VITALS
TEMPERATURE: 98.4 F | DIASTOLIC BLOOD PRESSURE: 86 MMHG | SYSTOLIC BLOOD PRESSURE: 138 MMHG | BODY MASS INDEX: 33.8 KG/M2 | HEIGHT: 68 IN | WEIGHT: 223 LBS

## 2023-05-22 VITALS
OXYGEN SATURATION: 100 % | HEART RATE: 86 BPM | SYSTOLIC BLOOD PRESSURE: 112 MMHG | TEMPERATURE: 98.1 F | DIASTOLIC BLOOD PRESSURE: 64 MMHG

## 2023-05-22 DIAGNOSIS — Z3A.34 34 WEEKS GESTATION OF PREGNANCY: ICD-10-CM

## 2023-05-22 DIAGNOSIS — R42 DIZZINESS: ICD-10-CM

## 2023-05-22 DIAGNOSIS — Z36.89 ENCOUNTER FOR TRIAGE IN PREGNANT PATIENT: Primary | ICD-10-CM

## 2023-05-22 DIAGNOSIS — R22.31 MASS OF RIGHT AXILLA: ICD-10-CM

## 2023-05-22 DIAGNOSIS — R22.32 MASS OF LEFT AXILLA: Primary | ICD-10-CM

## 2023-05-22 LAB
ALBUMIN MFR UR ELPH: 14.9 MG/DL (ref 1–14)
ALBUMIN SERPL BCG-MCNC: 3.2 G/DL (ref 3.5–5.2)
ALBUMIN UR-MCNC: NEGATIVE MG/DL
ALP SERPL-CCNC: 104 U/L (ref 35–104)
ALT SERPL W P-5'-P-CCNC: 11 U/L (ref 10–35)
ANION GAP SERPL CALCULATED.3IONS-SCNC: 12 MMOL/L (ref 7–15)
APPEARANCE UR: CLEAR
AST SERPL W P-5'-P-CCNC: 21 U/L (ref 10–35)
BACTERIA #/AREA URNS HPF: ABNORMAL /HPF
BILIRUB SERPL-MCNC: 0.3 MG/DL
BILIRUB UR QL STRIP: NEGATIVE
BUN SERPL-MCNC: 5.4 MG/DL (ref 6–20)
CALCIUM SERPL-MCNC: 8.5 MG/DL (ref 8.6–10)
CHLORIDE SERPL-SCNC: 103 MMOL/L (ref 98–107)
COLOR UR AUTO: YELLOW
CREAT SERPL-MCNC: 0.62 MG/DL (ref 0.51–0.95)
CREAT UR-MCNC: 152 MG/DL
CREAT UR-MCNC: 152 MG/DL
DEPRECATED HCO3 PLAS-SCNC: 20 MMOL/L (ref 22–29)
ERYTHROCYTE [DISTWIDTH] IN BLOOD BY AUTOMATED COUNT: 13.1 % (ref 10–15)
FFN SPECIMEN INTEGRITY: NORMAL
FIBRONECTIN FETAL VAG QL: NEGATIVE
GFR SERPL CREATININE-BSD FRML MDRD: >90 ML/MIN/1.73M2
GLUCOSE SERPL-MCNC: 92 MG/DL (ref 70–99)
GLUCOSE UR STRIP-MCNC: NEGATIVE MG/DL
HCT VFR BLD AUTO: 31.4 % (ref 35–47)
HGB BLD-MCNC: 10.7 G/DL (ref 11.7–15.7)
HGB UR QL STRIP: NEGATIVE
KETONES UR STRIP-MCNC: NEGATIVE MG/DL
LEUKOCYTE ESTERASE UR QL STRIP: NEGATIVE
MCH RBC QN AUTO: 32.2 PG (ref 26.5–33)
MCHC RBC AUTO-ENTMCNC: 34.1 G/DL (ref 31.5–36.5)
MCV RBC AUTO: 95 FL (ref 78–100)
MUCOUS THREADS #/AREA URNS LPF: PRESENT /LPF
NITRATE UR QL: NEGATIVE
PH UR STRIP: 6.5 [PH] (ref 5–7)
PLATELET # BLD AUTO: 250 10E3/UL (ref 150–450)
POTASSIUM SERPL-SCNC: 3.9 MMOL/L (ref 3.4–5.3)
PROT SERPL-MCNC: 6.4 G/DL (ref 6.4–8.3)
PROT/CREAT 24H UR: 0.1 MG/MG CR (ref 0–0.2)
RBC # BLD AUTO: 3.32 10E6/UL (ref 3.8–5.2)
RBC URINE: <1 /HPF
SODIUM SERPL-SCNC: 135 MMOL/L (ref 136–145)
SP GR UR STRIP: 1.02 (ref 1–1.03)
SQUAMOUS EPITHELIAL: 5 /HPF
UROBILINOGEN UR STRIP-MCNC: NORMAL MG/DL
WBC # BLD AUTO: 9.4 10E3/UL (ref 4–11)
WBC URINE: 2 /HPF

## 2023-05-22 PROCEDURE — 82570 ASSAY OF URINE CREATININE: CPT | Performed by: OBSTETRICS & GYNECOLOGY

## 2023-05-22 PROCEDURE — 80053 COMPREHEN METABOLIC PANEL: CPT | Performed by: OBSTETRICS & GYNECOLOGY

## 2023-05-22 PROCEDURE — 99213 OFFICE O/P EST LOW 20 MIN: CPT

## 2023-05-22 PROCEDURE — 36415 COLL VENOUS BLD VENIPUNCTURE: CPT | Performed by: OBSTETRICS & GYNECOLOGY

## 2023-05-22 PROCEDURE — 82731 ASSAY OF FETAL FIBRONECTIN: CPT | Performed by: OBSTETRICS & GYNECOLOGY

## 2023-05-22 PROCEDURE — G0463 HOSPITAL OUTPT CLINIC VISIT: HCPCS

## 2023-05-22 PROCEDURE — 84156 ASSAY OF PROTEIN URINE: CPT | Performed by: OBSTETRICS & GYNECOLOGY

## 2023-05-22 PROCEDURE — 85018 HEMOGLOBIN: CPT | Performed by: OBSTETRICS & GYNECOLOGY

## 2023-05-22 PROCEDURE — 81001 URINALYSIS AUTO W/SCOPE: CPT | Performed by: OBSTETRICS & GYNECOLOGY

## 2023-05-22 RX ORDER — LABETALOL 200 MG/1
300 TABLET, FILM COATED ORAL 3 TIMES DAILY
Qty: 135 TABLET | Refills: 0 | Status: SHIPPED | OUTPATIENT
Start: 2023-05-22 | End: 2023-05-22 | Stop reason: DRUGHIGH

## 2023-05-22 RX ORDER — LABETALOL 300 MG/1
300 TABLET, FILM COATED ORAL 3 TIMES DAILY
Qty: 90 TABLET | Refills: 0 | Status: SHIPPED | OUTPATIENT
Start: 2023-05-22 | End: 2023-06-02

## 2023-05-22 RX ORDER — LIDOCAINE 40 MG/G
CREAM TOPICAL
Status: DISCONTINUED | OUTPATIENT
Start: 2023-05-22 | End: 2023-05-22 | Stop reason: HOSPADM

## 2023-05-22 ASSESSMENT — ACTIVITIES OF DAILY LIVING (ADL)
DIFFICULTY_COMMUNICATING: NO
TOILETING_ISSUES: NO
VISION_MANAGEMENT: GLASSES
ADLS_ACUITY_SCORE: 20
HEARING_DIFFICULTY_OR_DEAF: NO
NUMBER_OF_TIMES_PATIENT_HAS_FALLEN_WITHIN_LAST_SIX_MONTHS: 1
DOING_ERRANDS_INDEPENDENTLY_DIFFICULTY: NO
DIFFICULTY_EATING/SWALLOWING: NO
CONCENTRATING,_REMEMBERING_OR_MAKING_DECISIONS_DIFFICULTY: NO
CHANGE_IN_FUNCTIONAL_STATUS_SINCE_ONSET_OF_CURRENT_ILLNESS/INJURY: NO
DRESSING/BATHING_DIFFICULTY: NO
FALL_HISTORY_WITHIN_LAST_SIX_MONTHS: YES
WEAR_GLASSES_OR_BLIND: YES
WALKING_OR_CLIMBING_STAIRS_DIFFICULTY: NO

## 2023-05-22 ASSESSMENT — ENCOUNTER SYMPTOMS
CARDIOVASCULAR NEGATIVE: 1
CONSTITUTIONAL NEGATIVE: 1
RESPIRATORY NEGATIVE: 1
NEUROLOGICAL NEGATIVE: 1

## 2023-05-22 NOTE — TELEPHONE ENCOUNTER
Caller reporting 2 hrs  of low back pains radiating to front;occurring > 10 minutes increased pelvic pressure and  decreased fetal movement; decreased but present fetal movement  Pain is  Not improved with  hot shower rest; patient is very anxious and fearful.         OB Triage Call      Is patient's OB/Midwife with [facility]? [facility]- Proceed with triage     Reason for call: 34 weeks; pain     Assessment: possible  Premature labor    Plan: 2 LT    Patient plans to deliver at [facility]    Patient's primary OB Provider is Xavier.      Per protocol recommendations 2LT  Contacted on call Dr. Jones and advised  Check in L&D  Report given to  RN in L&D triage   Patient  notified and will proceed   Micaela Peralta RN  FNA    Is patient's delivering hospital on divert? No           34w3d    Estimated Date of Delivery: 2023        OB History    Para Term  AB Living   3 1 1 0 1 1   SAB IAB Ectopic Multiple Live Births   0 0 0 0 1      # Outcome Date GA Lbr Han/2nd Weight Sex Delivery Anes PTL Lv   3 Current            2 Term 19 39w3d 12:45 / 02:00 3.7 kg (8 lb 2.5 oz) F Vag-Spont EPI N LAYO      Complications: GBS, Preeclampsia/Hypertension      Name: Therese      Apgar1: 8  Apgar5: 9   1 AB                Lab Results   Component Value Date    GBS Positive (A) 2019          Micaela Peralta RN 23 6:25 PM  [facility] Nurse Advisor    Reason for Disposition    [1] Having contractions or other symptoms of labor (such as vaginal pressure) AND [2] < 37 weeks pregnant (i.e., )    Increased pressure in pelvic area    [1] Lower back discomfort AND [2] not relieved by rest    Additional Information    Negative: Passed out (i.e., lost consciousness, collapsed and was not responding)    Negative: Shock suspected (e.g., cold/pale/clammy skin, too weak to stand, low BP, rapid pulse)    Negative: Difficult to awaken or acting confused (e.g., disoriented,  "slurred speech)    Negative: [1] SEVERE abdominal pain (e.g., excruciating) AND [2] constant AND [3] present > 1 hour    Negative: SEVERE vaginal bleeding (e.g., continuous red blood from vagina, large blood clots)    Negative: Sounds like a life-threatening emergency to the triager    Negative: Followed an abdomen (stomach) injury    Negative: [1] Having contractions or other symptoms of labor (such as vaginal pressure) AND [2] 37 or more weeks pregnant (i.e., term pregnancy)    Negative: Passed out (i.e., lost consciousness, collapsed and was not responding)    Negative: Shock suspected (e.g., cold/pale/clammy skin, too weak to stand, low BP, rapid pulse)    Negative: Difficult to awaken or acting confused (e.g., disoriented, slurred speech)    Negative: [1] SEVERE abdominal pain (e.g., excruciating) AND [2] constant AND [3] present > 1 hour    Negative: Severe bleeding (e.g., continuous red blood from vagina, or large blood clots)    Negative: Umbilical cord hanging out of the vagina (shiny, white, curled appearance, \"like telephone cord\")    Negative: Uncontrollable urge to push (i.e., feels like baby is coming out now)    Negative: Can see baby    Negative: Sounds like a life-threatening emergency to the triager    Negative: MODERATE-SEVERE abdominal pain    Negative: [1] Contractions < 10 minutes apart AND [2] persist > 1 hour  (i.e., 6 or more contractions an hour)    Negative: [1] Contractions > 10 minutes apart AND [2] persist > 24 hours AND [3] no improvement using CARE ADVICE    Negative: [1] Contractions AND [2] any vaginal bleeding (including: red blood, clots, spotting, or pink/brown mucous)    Negative: Vaginal bleeding  (Exception: brief spotting after intercourse or pelvic exam, or slight pinkish or brownish mucous discharge)    Negative: Leakage of fluid from vagina    Negative: [1] Baby moving less today (e.g., kick count < 5 in 1 hour or < 10 in 2 hours) AND [2] pregnant 23 or more weeks    " Negative: No movement of baby for 8 hours    Negative: Severe headache or headache that won't go away    Negative: New blurred vision or vision changes    Negative: Fever > 100.4 F (38.0 C)    Protocols used: PREGNANCY - ABDOMINAL PAIN GREATER THAN 20 WEEKS EGA-A-AH, PREGNANCY - LABOR - PGSAKDA-T-DS

## 2023-05-22 NOTE — PROGRESS NOTES
Assessment & Plan       ICD-10-CM    1. Mass of left axilla  R22.32 Adult General Surg Referral     US Axillary Left     US Axillary Right      2. Mass of right axilla  R22.31 Adult General Surg Referral     US Axillary Left     US Axillary Right      3. 34 weeks gestation of pregnancy  Z3A.34 Adult General Surg Referral     US Axillary Left     US Axillary Right         Referral placed to follow-up with general surgery for removal of the cysts.  Will for stat ultrasound of both areas to ensure pathology.  Discussed this diagnosis with Ele.  She is hoping to get this managed prior to delivery of her baby.    Return if symptoms worsen or fail to improve, for Follow up.    At the end of the encounter, I discussed results, diagnosis, medications. Discussed red flags for immediate return to clinic/ER, as well as indications for follow up if no improvement. Patient understood and agreed to plan. Patient was stable for discharge.    Juliette Trinidad is a 37 year old female who presents to clinic today for the following health issues:  Chief Complaint   Patient presents with     Urgent Care     Lump is on both armpit, Pt was put on abx 3 weeks ago. Per pt- Right is like a golf blow size but the left side is tender. Have change deodorant and has not changed anything     37-year-old female presents with reports of lump in both armpits.  She was placed on Keflex in Urgent Care 3 weeks ago. Left has gone down considerably, right side just some. Due 6/30, most likely will be induced around 6/23. She has talked to her OB about it.  The areas are tender.  She does report she has history of cysts.          Review of Systems   Constitutional: Negative.    Respiratory: Negative.    Cardiovascular: Negative.    Skin:        Swelling in both axilla   Neurological: Negative.        Problem List:  2023-05: Dizziness  2022-11: Pregnancy test positive  2022-11: Supervision of high-risk pregnancy of elderly multigravida  2022-10:  Chronic left shoulder pain  2022-05: Family history of malignant neoplasm of breast  2021-10: Mild persistent asthma without complication  2021-04: Seasonal allergic rhinitis due to pollen  2020-10: Shoulder pain, left  2020-10: IUD (intrauterine device) in place  2020-02: Pain in both hands  2020-02: Pain in both wrists  2019-12: Vaginal delivery  2019-12: Chronic hypertension in pregnancy  2019-12: Indication for care in labor and delivery, antepartum  2019-12: Indication for care in labor or delivery  2019-08: Encounter for triage in pregnant patient  2019-05: Supervision of high-risk pregnancy  2017-08: Benign essential hypertension  2017-08: ROCHELLE (generalized anxiety disorder)  2017-08: Major depressive disorder, recurrent episode, moderate (H)  2017-08: Mild intermittent asthma without complication  2017-08: Vitamin D deficiency  2017-08: IUD contraception      Past Medical History:   Diagnosis Date     Asthma     seasonal and environmental     C. difficile diarrhea 2013     Concussion 01/2022     Depression      Depressive disorder      Encounter for IUD insertion 10/16/2020    Jyoti inserted by Dr Park     Generalized anxiety disorder      Hypertension      IUD contraception 10/2020    Jyoti inserted by Dr Park     Pain in both hands 02/20/2020       Social History     Tobacco Use     Smoking status: Never     Smokeless tobacco: Never   Vaping Use     Vaping status: Never Used   Substance Use Topics     Alcohol use: Not Currently     Comment: Rare            Objective    /64   Pulse 86   Temp 98.1  F (36.7  C) (Tympanic)   LMP 09/23/2022   SpO2 100%   Physical Exam  Constitutional:       Appearance: Normal appearance.   HENT:      Head: Normocephalic and atraumatic.   Musculoskeletal:      Cervical back: Normal range of motion and neck supple.   Skin:     General: Skin is warm and dry.      Comments: Large mass in bilateral axilla, R>L, tender to palpation, appear to be fluid filled and cyst  like   Neurological:      General: No focal deficit present.      Mental Status: She is alert and oriented to person, place, and time.   Psychiatric:         Mood and Affect: Mood normal.         Behavior: Behavior normal.         Thought Content: Thought content normal.         Judgment: Judgment normal.              Dilan Turcios PA-C

## 2023-05-22 NOTE — TELEPHONE ENCOUNTER
Did the OB make any note to her about this.  If they are sebaceous cysts like uc says, they may not fully go away.  The antibiotic just took care of infection.  They might be able to be drained, although they usually only do that if they are still infected.  May want in person visit with provider who would be bobby to drain for evaluation    Roosevelt Mcclellan PA-C

## 2023-05-22 NOTE — TELEPHONE ENCOUNTER
SN/JS(DOD),  Patient calling to follow-up on this.  About three weeks ago, she noticed large masses under her armpits.  Thought it was a reaction to her deodorant change.  Was seen in urgent care, where she was prescribed antibiotics for this.  Masses have still not really gone away.  States she has done everything she has been asked to do and completed antibiotics.  Has had slight improvement- on left side, things improved quite a bit.  Right side it is still the size of a golf ball.   to the touch.  Describes as kind of squishy, doesn't think there is puss in it.  Patient has a history notable for cysts. She is also 34 weeks pregnant and is concerned for what is going to happen when she starts nursing.    Patient would like to have drained or evaluated for next steps.  Is this something that can go through us? Does she need to go to dermatology?  Please advise.  Soraya WONG RN

## 2023-05-23 ENCOUNTER — ALLIED HEALTH/NURSE VISIT (OUTPATIENT)
Dept: NURSING | Facility: CLINIC | Age: 38
End: 2023-05-23
Payer: COMMERCIAL

## 2023-05-23 ENCOUNTER — TELEPHONE (OUTPATIENT)
Dept: OBGYN | Facility: CLINIC | Age: 38
End: 2023-05-23

## 2023-05-23 VITALS — DIASTOLIC BLOOD PRESSURE: 80 MMHG | SYSTOLIC BLOOD PRESSURE: 119 MMHG | HEART RATE: 80 BPM

## 2023-05-23 DIAGNOSIS — O09.91 SUPERVISION OF HIGH RISK PREGNANCY IN FIRST TRIMESTER: Primary | ICD-10-CM

## 2023-05-23 PROCEDURE — 99207 PR NO CHARGE NURSE ONLY: CPT

## 2023-05-23 NOTE — PROGRESS NOTES
Vivi is here for BP check  Does check them at home  Well aware of s/s that warrant evaluation-did review them as well    See Chart review for MAC eval and labs 5/22    Did NOT increase Labetalol to 300 mg BID  Took one dose today of 200 mg at 10am    BP in clinic:  119/80 P80    Consulted with Dr. Ferreira-Labetalol 200 mg BID recommended. Keep Friday appt-Vivi is comfortable with this plan.  Soco Barbosa RN on 5/23/2023 at 3:21 PM

## 2023-05-23 NOTE — PLAN OF CARE
Patient arrived to maternal assessment center via wheelchair and hospital staff member, spouse, Bud arrived less than 5 minutes after patient.    Patient reports reason for visit is low back ache.  Patient to bathroom to void and collect urine sample then to MAC room 3 to change into gown.      G 3 P 1     34 weeks 3 days gestation    Prenatal record reviewed.        Verbal consent for EFM.     EFM applied for fetal well being.    Uterine assessment completed, non-tender and palpates soft.      Patient reports fetal movement is present.      Patient states she is having some abdominal tightening that she is unable to time due to being induced last time.  Patient reports having a backache.      Patient denies vaginal discharge, GI problems, bloody show, vaginal bleeding, edema, headache, visual disturbances, epigastric or URQ pain, and/or rupture of membranes.      Triage/Arrival assessment completed.       Dr Soni paged with return call received.  Update included but not limited to: Patients arrival, patient presents to maternal assessment center with complaint of low back pain and abdominal cramping that she is unable to time as she was induced last time.  She is a .  34 3/7 gestation.  Medical history significant for anxiety & depression, chronic htn that she takes labetalol 200 mg tid for.  Fetal heart tones with a 140 baseline, moderate variability, accelerations present, no decelerations noted.  No contractions noted, uterus palpates soft, but patient jumps with every fetal movement that is audible on external ultrasound.  Blood Pressures 147/89 & 146/90 since arrival 15 minutes apart.      Plan per provider is to send labs of CMP. CBC, add on to UA in lab: urine protein/creat ratio.        FFN collected with sterile speculum per policy followed by [facility].  Cervix: Long thick and closed.        Dr Soni paged with return call received.  Update included but not limited to:  Patients lab  results- see results tab.  SVE result.  Blood pressures have remained 140s/80s the duration of patients visit, however, her blood pressures at clinic have been 110s/70s.    Plan per provider to send prescription and have patient increase labetalol to 300 mg orally three times a day.  The clinic will call patient to schedule blood pressure check in clinic tomorrow (5/23/23).  Patient states she already has an appointment on Friday (5/23/23) patient should keep this appointment as well.      2140  Patient given discharge instructions verbalizes understanding and patient signed after visit summary signature page.  Patient and Bud demonstrate understanding of plan.  Patient instructed to report change in fetal movement, vaginal leaking of fluid or bleeding, abdominal pain, or any concerns related to the pregnancy to her physician.  Monitors removed for patient to dress.     2145 Discharged home, undelivered, ambulatory at 2145.

## 2023-05-23 NOTE — TELEPHONE ENCOUNTER
----- Message from Stefania Soni DO sent at 5/22/2023  9:19 PM CDT -----  Regarding: BP check in office  Please contact patient in morning to bring her into see RNs for blood pressure check Tuesday 5/23.  Patient of EC.  Hx Anxiety  In triage Monday night with r/o labor (closed/th, negative FFN, no ctx)  BPs elevated in the 140s/90s, started to trend down, normal labs, no symptoms.   Increased her blood pressure from 200 TID to 300 TID. I have been on when she's been in triage in past for concerns of low blood pressure, hence I chose to go slow with her med adjustment.   Sees Jhon on Friday.     Thanks!    Stefania Soni DO    Called pt and scheduled for 1500 today. She is working today and has meetings in the morning. No complaints at this time. Aware when to call.    Pt verbalized understanding, in agreement with plan, and voiced no further questions.  Jael Nelson, RN on 5/23/2023 at 8:33 AM

## 2023-05-23 NOTE — DISCHARGE INSTRUCTIONS
Discharge Instruction for Undelivered Patients      You were seen for:   labor assessment  We Consulted: Dr Soni  You had (Test or Medicine):fetal and uterine monitoring, blood & urine labs, vaginal exam     Diet:   Drink 8 to 12 glasses of liquids (milk, juice, water) every day.  You may eat meals and snacks.     Activity:  Count fetal kicks everyday (see handout)  Call your doctor or nurse midwife if your baby is moving less than usual.     Call your provider if you notice:  Swelling in your face or increased swelling in your hands or legs.  Headaches that are not relieved by Tylenol (acetaminophen).  Changes in your vision (blurring: seeing spots or stars.)  Nausea (sick to your stomach) and vomiting (throwing up).   Weight gain of 5 pounds or more per week.  Heartburn that doesn't go away.  Signs of bladder infection: pain when you urinate (use the toilet), need to go more often and more urgently.  The bag of giordano (rupture of membranes) breaks, or you notice leaking in your underwear.  Bright red blood in your underwear.  Abdominal (lower belly) or stomach pain.  Second (plus) baby: Contractions (tightening) less than 10 minutes apart and getting stronger.  Increase or change in vaginal discharge (note the color and amount)    Increase your labetalol to 300 mg three times a day.      Follow-up:  The clinic will call you to come in for blood pressure check tomorrow 23.  Keep your appointments as already scheduled.

## 2023-05-23 NOTE — TELEPHONE ENCOUNTER
Patient seen at  yesterday.  Given referral for general surgery for removal of cysts.    Nicole Caal RN

## 2023-05-24 ENCOUNTER — HOSPITAL ENCOUNTER (OUTPATIENT)
Dept: ULTRASOUND IMAGING | Facility: CLINIC | Age: 38
Discharge: HOME | End: 2023-05-24
Attending: PHYSICIAN ASSISTANT
Payer: COMMERCIAL

## 2023-05-24 DIAGNOSIS — Z3A.34 34 WEEKS GESTATION OF PREGNANCY: ICD-10-CM

## 2023-05-24 DIAGNOSIS — R22.32 MASS OF LEFT AXILLA: ICD-10-CM

## 2023-05-24 DIAGNOSIS — R22.31 MASS OF RIGHT AXILLA: ICD-10-CM

## 2023-05-24 PROCEDURE — 76882 US LMTD JT/FCL EVL NVASC XTR: CPT | Mod: RT

## 2023-05-26 ENCOUNTER — ANCILLARY PROCEDURE (OUTPATIENT)
Dept: ULTRASOUND IMAGING | Facility: CLINIC | Age: 38
End: 2023-05-26
Attending: OBSTETRICS & GYNECOLOGY
Payer: COMMERCIAL

## 2023-05-26 ENCOUNTER — PRENATAL OFFICE VISIT (OUTPATIENT)
Dept: OBGYN | Facility: CLINIC | Age: 38
End: 2023-05-26
Attending: OBSTETRICS & GYNECOLOGY
Payer: COMMERCIAL

## 2023-05-26 VITALS — SYSTOLIC BLOOD PRESSURE: 118 MMHG | WEIGHT: 223.6 LBS | BODY MASS INDEX: 34 KG/M2 | DIASTOLIC BLOOD PRESSURE: 74 MMHG

## 2023-05-26 DIAGNOSIS — O09.529 SUPERVISION OF HIGH-RISK PREGNANCY OF ELDERLY MULTIGRAVIDA: Primary | ICD-10-CM

## 2023-05-26 DIAGNOSIS — O10.919 CHRONIC HYPERTENSION IN PREGNANCY: ICD-10-CM

## 2023-05-26 DIAGNOSIS — O10.919 CHRONIC HYPERTENSION DURING PREGNANCY: ICD-10-CM

## 2023-05-26 PROCEDURE — 99207 PR PRENATAL VISIT: CPT | Performed by: STUDENT IN AN ORGANIZED HEALTH CARE EDUCATION/TRAINING PROGRAM

## 2023-05-26 PROCEDURE — 76819 FETAL BIOPHYS PROFIL W/O NST: CPT | Performed by: STUDENT IN AN ORGANIZED HEALTH CARE EDUCATION/TRAINING PROGRAM

## 2023-05-26 NOTE — PROGRESS NOTES
Prenatal Care (35w0d)    Vivi Mccall is a 37 year old  at 35w0d by LMP c/w 11w4d US presenting for routine prenatal care. She is doing well. Still has tenderness in axilla, but overall stable. US axilla normal. Denies LOF, VB, ctx. +FM.    Conditions affecting pregnancy:  - AMA  - cHTN, on labetalol 200mg BID     Objective- see flow sheet    Vivi Mccall is a 37 year old  at 35w0d presenting for routine ob visit.       ICD-10-CM    1. Supervision of high-risk pregnancy of elderly multigravida  O09.529       2. Chronic hypertension in pregnancy  O10.919         - cHTN   - Baseline preE labs wnl   - Meds: labetalol 200mg BID. Is rx'ed to be TID, but has low BPs.    - No signs/symptoms of preE   - BP wnl today. Discussed preE precautions.    - Continue ASA   -  testing at 32w, growth q4 weeks. BPP today .   - Discuss delivery timing at next visit.     Routine care   - First tri labs wnl. Level II w/ sub optimal views, follow-up wnl. S/p COVID-19 vaccine, influenza vax. Third tri labs: Hgb 10.5. GBS next week. Transverse presentation today- follow-up next week for plan of care. Reviewed need for primary CS if PTL and fetal malpresentaton.   - Discussed routine precautions.   - TWlbs. Pregravid BMI 31. Expect 11-20lbs.    Follow-up in 1 week for routine care.     Ruthann Ferreira MD, S  23

## 2023-05-26 NOTE — PATIENT INSTRUCTIONS
The Benefits of Breastmilk  Breastmilk is the best food for your baby. It has just the right amount of nutrients. It protects your baby's digestive system. It protects other body systems in your baby. And it helps them grow and develop.       Healthiest for baby  Breastmilk is the ideal food for babies. It has all the nutrients your baby needs to grow healthy and strong.    Breastmilk has these benefits:    It lowers the risk for sudden infant death syndrome (SIDS).    It gives babies a lower risk for ear infections in their first year. This is compared to babies who are fed formula.    It has DHA. This is a type of fat. It helps your baby s growing brain, nervous system, and eyes.    It is full of antibodies. These help your baby fight infection.    It lowers your baby's risk for lung illness. It lowers their risk of diarrhea.    It lowers your baby s risk for allergies. Babies fed formula are more likely to have an allergy to cow's milk.    It lowers your baby s risk for colds and many other diseases.    It changes as your baby grows. This meets your baby's changing needs.  And it s important to know that:    Giving only breastmilk for the first 6 months gives your baby more of these benefits.    Giving breastmilk plus solid food from 6 months to 1 year or more gives more benefits.     babies have fewer long-term health problems when they grow up. These problems include diabetes and obesity.    Breastfeeding gives contact that your baby loves. Spending time skin-to-skin with you is calming and comforting.  Healthiest for mom  For many people, breastfeeding is a good experience. It creates a strong bond between mother and baby. People who breastfeed also get health benefits. Some benefits for you include:     You can know that your baby is growing healthy and strong because of your milk.    Breastmilk is convenient. It's free and clean. It's always at the right temperature.    Breastfeeding burns  calories. This can help you lose pregnancy weight faster.    Breastfeeding releases hormones that contract the uterus. This helps the uterus return to its normal size after childbirth.    Mothers who breastfeed have a lower risk for ovarian and breast cancers.    Some studies have found that breastfeeding may reduce a person's risk for type 2 diabetes and rheumatoid arthritis. It may reduce the risk of cardiovascular disease. This includes high blood pressure and high cholesterol.    Breastfeeding every day delays the return of your menstrual period. This can help extend the time between pregnancies.  Many people can help you learn to breastfeed. A lactation consultant can help. This is a healthcare provider who is trained to help you breastfeed. Your nurse, midwife, nurse practitioner, obstetrician, pediatrician, or family practice doctor can also help you learn about breastfeeding.   What does it mean to give only breastmilk?   Giving only breastmilk for at least the first 6 months of life is best for your baby. Feeding your baby from your breasts is best. If you need to be away from your baby, you can express breastmilk. This means pumping milk from your breast into a container. Talk with your healthcare provider about the best ways to feed this milk to your baby.    You should not give your baby water, sugar water, formula, or solids during their first 6 months unless your baby's healthcare provider tells you to.   Your baby s provider may tell you to give your baby vitamins, minerals, or medicines.  babies should be given vitamin D supplements. The provider will tell you the type and amount of vitamin D to give your baby.   What are the risks of not giving only breastmilk?   You now know the many of the benefits of breastfeeding. But you might not know why it's important to give only breastmilk for at least 6 months.   Your baby gets the best protection against health problems when they get only  breastmilk. Breastfeeding some of the time is good. But breastfeeding all of the time is best.   Giving your baby formula or other liquids may cause you to:    Have more problems breastfeeding    Make less milk    Be less confident in breastfeeding    Breastfeed less often    Stop breastfeeding before your baby is at least 12 months old                                                     When other options may be needed  Giving only breastmilk is almost always the best thing to do. But your healthcare provider may have reasons to advise giving your baby formula or other liquids. They include:     Your baby has a health problem. There are cases where you may need to add formula or other liquids. This is often only for a short time. This may be the case if your baby has low blood sugar (hypoglycemia), loses body fluids (dehydration), or has high levels of bilirubin.    You have certain health problems. Some infections can be passed from your skin to your baby's skin. Or it can pass through your breastmilk. People with HIV/AIDS or untreated and contagious TB (tuberculosis) should not breastfeed. Women with active skin sores from chickenpox (varicella) can pump their breastmilk and feed their baby. But they should keep their baby s skin from touching any of the sores.    You use illegal drugs or drink alcohol. People who use illegal drugs should not breastfeed. If you are going to have a drink that has alcohol, it's best to do so just after you nurse or pump milk. Breastfeeding or pumping breast milk is OK at least 4 hours after your last drink. That way, your body will have some time to get rid of the alcohol before the next feeding. Less of it will reach your baby. Long-term exposure to alcohol in breastmilk may affect your baby's health. It may also cause you to make less milk.    You take certain medicines. If you take any medicines, ask your baby s healthcare provider if you can breastfeed.  [facility] last reviewed  this educational content on 2020-2022 [employer]. All rights reserved. This information is not intended as a substitute for professional medical care. Always follow your healthcare professional's instructions.          What Is Group B Strep?  Group B strep (streptococcus) is a common type of bacteria. It can grow in a woman s vagina, rectum, or urinary tract. It most often does not cause harm in adults. But in rare cases, a woman who has group B strep can infect her baby during the birth. This can cause serious illness in the . But treating the mother during labor reduces the risk of the baby becoming infected. And if a  gets group B strep, the infection can be treated.     Facts about group B strep   Learning more about group B strep can help you understand how testing and treatment can help. Here are some basic facts about group B strep:     It is not a sexually transmitted disease.    It is not the same as strep throat. (That is caused by group A strep.)    It often has no symptoms. It may cause no problems in adults.    Test results can be misleading. They may be negative one week and positive the next week.    Group B strep can be spread to the baby during vaginal delivery. It cannot be passed during  (surgical) birth.    A mother with group B strep rarely infects her . (Infection happens only about 1% to 2% of the time.)    When a mother is treated during labor and delivery, her baby almost never becomes infected.    Certain factors during pregnancy increase the risk of a baby becoming infected.  Possible effects on your baby   Group B strep can infect the blood. It can also cause inflammation of the baby s lungs, brain, or spinal cord. Long-term effects can include blindness, deafness, mental retardation, or cerebral palsy. And in rare cases, infection causes death. Infection is most often found soon after the baby is born.   How your baby may become  infected   Group B strep often lives in the vagina or rectum. If the amniotic sac breaks early, bacteria from the vagina can travel to the uterus, reaching the baby. Or, as the baby passes through the birth canal, it can come in contact with the bacteria. In rare cases, group B strep can be passed to the baby after delivery. This is called late-onset group B strep. The source of this type of infection is not well understood. But some experts believe that it happens if the baby is exposed to group B strep in the home, from the parents or siblings, or in the community.   What increases the risk?   Certain risk factors increase the chance that a baby will be infected. They include:     Breaking or leaking of the amniotic sac before 37 weeks of pregnancy    Labor before 37 weeks of pregnancy    Breaking of the amniotic sac more than 18 hours before labor starts    Fever during labor    A urinary tract infection with group B strep at any point in the pregnancy    Having a previous baby born with a group B strep infection  [employer] last reviewed this educational content on 2020-2022 [employer]. All rights reserved. This information is not intended as a substitute for professional medical care. Always follow your healthcare professional's instructions.          Vitamin K Deficiency Bleeding (VKDB) in a [place] Baby  What is vitamin K deficiency bleeding in a ?  Vitamin K deficiency bleeding (VKDB) is a problem that occurs in some  babies. It most often happens during the first few days and weeks of life. But it can occur up to 6 months of age. This condition used to be called hemorrhagic disease of the .    What causes vitamin K deficiency bleeding in a ?  Babies are normally born with low levels of vitamin K. Vitamin K is needed for blood to clot. Not having enough vitamin K is the main cause of vitamin deficiency bleeding. If your baby s blood doesn t clot, they may  have severe bleeding or a hemorrhage. This can be life-threatening. The cause of vitamin K deficiency depends on the 3 types of VKDB:     Early VKDB. This can occur right after birth or up to 24 hours of age. It's caused by certain medicines the mother took during pregnancy    Classical VKDB. This occurs from 1 to 7 days after birth. It's caused by low levels of vitamin K found in newborns.    Late VKDB. This most often occurs up to 3 months after birth. But it can occur up to 6 months after birth. It can occur in a baby who did not get a vitamin K shot at birth and who was  only.    Which newborns are at risk for vitamin K deficiency bleeding?   These things may make it more likely for a baby to have this condition:     Not getting a vitamin K shot at birth.The American Academy of Pediatrics recommends that all newborns get a vitamin K shot. This can prevent severe bleeding.     Being  only and not getting a vitamin K shot at birth.  Breastmilk has less vitamin K than formula made with cow s milk. The vitamin K shot will provide what a  baby needs. A mother who takes a vitamin K supplement while breastfeeding will not raise her baby's vitamin K level.    Being born to a mother who took certain medicines during pregnancy.  These include medicines for seizures (anticonvulsants) and medicines for blood-clotting problems (anticoagulants).  What are the symptoms of vitamin K deficiency bleeding in a ?   Symptoms can occur a bit differently in each child. They can include:     Blood in your baby's stool that make it black and sticky (tarry)    Blood in your baby's urine    Oozing of blood from around your baby s umbilical cord or circumcision site    Bruising more easily than normal. This may happen around your baby's head and face.    Beingvery sleepy or fussy. In severe cases, vitamin K deficiency may cause bleeding in and around the brain. Other signs of bleeding in the brain can  include seizures or vomiting, not just spitting up.  The symptoms of this condition may be similar to symptoms of other health issues. Make sure your child sees a healthcare provider for a diagnosis.   How is vitamin K deficiency bleeding in a  diagnosed?   The healthcare provider will look at your baby's health history. They will also check your baby for signs of bleeding. Your baby may need lab tests to measure their blood clotting times. The results of these tests can help your child s healthcare provider make the diagnosis.   How is vitamin K deficiency bleeding in a  treated?   Treatment will depend on your child s symptoms, age, and general health. It will also depend on how severe the condition is.   Your baby will probably get a vitamin K shot.   Your baby may need a blood transfusion if they have severe bleeding. If your baby is severely ill, they may need to be treated in the intensive care unit (ICU).   What are possible complications of vitamin K deficiency bleeding in a ?   Vitamin K deficiency bleeding can lead to life-threatening problems. These include dangerous bleeding that can lead to brain damage or death. In the [place] deaths and long-term complications from vitamin K deficiency have been greatly lowered because of vitamin K shots given at birth. But bleeding into the brain, central nervous system, stomach, intestines, or other parts of the body can cause serious problems, or even death.   Can vitamin K deficiency bleeding in a  be prevented?   This condition can be prevented. The American Academy of Pediatrics recommends that all newborns get a vitamin K shot. Your child will get a shot into their upper leg (thigh) muscle. This shot will be given soon after birth. This will prevent dangerous bleeding.   Key points about vitamin K deficiency bleeding in a      Vitamin K deficiency bleeding is a problem that occurs in some newborns. It often happens during the  first few days of life.    Babies are normally born with low levels of vitamin K. Not having enough vitamin K is the main cause of this condition.    Your child s healthcare provider will diagnose this condition. This will be based on your child s signs of bleeding and lab tests for blood clotting times.    The American Academy of Pediatrics recommends that all newborns get a vitamin K shot. This can prevent this condition.     Next steps  Tips to help you get the most from a visit to your child s healthcare provider:     Know the reason for the visit and what you want to happen.    Before your visit, write down questions you want answered.    At the visit, write down the name of a new diagnosis, and any new medicines, treatments, or tests. Also write down any new instructions your provider gives you for your child.    Know why a new medicine or treatment is prescribed and how it will help your child. Also know what the side effects are.    Ask if your child s condition can be treated in other ways.    Know why a test or procedure is recommended and what the results could mean.    Know what to expect if your child does not take the medicine or have the test or procedure.    If your child has a follow-up appointment, write down the date, time, and purpose for that visit.    Know how you can contact your child s provider after office hours. This is important if your child becomes ill and you have questions or need advice.  [employer] last reviewed this educational content on 4/1/2022 2000-2022 [employer]. All rights reserved. This information is not intended as a substitute for professional medical care. Always follow your healthcare professional's instructions.

## 2023-06-02 ENCOUNTER — PRENATAL OFFICE VISIT (OUTPATIENT)
Dept: OBGYN | Facility: CLINIC | Age: 38
End: 2023-06-02
Attending: OBSTETRICS & GYNECOLOGY
Payer: COMMERCIAL

## 2023-06-02 ENCOUNTER — ANCILLARY PROCEDURE (OUTPATIENT)
Dept: ULTRASOUND IMAGING | Facility: CLINIC | Age: 38
End: 2023-06-02
Attending: OBSTETRICS & GYNECOLOGY
Payer: COMMERCIAL

## 2023-06-02 VITALS — DIASTOLIC BLOOD PRESSURE: 82 MMHG | BODY MASS INDEX: 34.06 KG/M2 | WEIGHT: 224 LBS | SYSTOLIC BLOOD PRESSURE: 130 MMHG

## 2023-06-02 DIAGNOSIS — Z3A.36 36 WEEKS GESTATION OF PREGNANCY: ICD-10-CM

## 2023-06-02 DIAGNOSIS — Z36.85 ANTENATAL SCREENING FOR STREPTOCOCCUS B: ICD-10-CM

## 2023-06-02 DIAGNOSIS — O09.529 SUPERVISION OF HIGH-RISK PREGNANCY OF ELDERLY MULTIGRAVIDA: Primary | ICD-10-CM

## 2023-06-02 DIAGNOSIS — O10.919 CHRONIC HYPERTENSION IN PREGNANCY: ICD-10-CM

## 2023-06-02 DIAGNOSIS — O10.919 CHRONIC HYPERTENSION DURING PREGNANCY: ICD-10-CM

## 2023-06-02 PROCEDURE — 99207 PR PRENATAL VISIT: CPT | Performed by: OBSTETRICS & GYNECOLOGY

## 2023-06-02 PROCEDURE — 87653 STREP B DNA AMP PROBE: CPT | Performed by: OBSTETRICS & GYNECOLOGY

## 2023-06-02 PROCEDURE — 76816 OB US FOLLOW-UP PER FETUS: CPT | Performed by: OBSTETRICS & GYNECOLOGY

## 2023-06-02 PROCEDURE — 87186 SC STD MICRODIL/AGAR DIL: CPT | Performed by: OBSTETRICS & GYNECOLOGY

## 2023-06-02 PROCEDURE — 87077 CULTURE AEROBIC IDENTIFY: CPT | Performed by: OBSTETRICS & GYNECOLOGY

## 2023-06-02 PROCEDURE — 76819 FETAL BIOPHYS PROFIL W/O NST: CPT | Performed by: OBSTETRICS & GYNECOLOGY

## 2023-06-02 NOTE — PROGRESS NOTES
Doing well today.  +FM  No ctx/cramping/vb/spotting  BPP today  with baby BREECH!!  Discussed breech presentation and options for management.  Discussed ECV vs scheduled  section.   Discussed safety and success rates.  Discussed baby size, fluid levels, multiparous status and recent fetal activity as favorable for ECV to work.  Discussed anterior placenta as potential issue.  Wll see me next week Friday AM and if still breech, will attempt ECV on Friday afternoon; if successful, will plan IOL the following week.  If not successful, will plan primary c/s the following week  BPs at home normal --since cutting out afternoon dose of labetolol, evening pressures have been slightly higher.  Will move evening dose a bit earlier to see if this helps  RTC 1wk

## 2023-06-03 DIAGNOSIS — I10 BENIGN ESSENTIAL HYPERTENSION: ICD-10-CM

## 2023-06-03 LAB — GP B STREP DNA SPEC QL NAA+PROBE: POSITIVE

## 2023-06-05 RX ORDER — LABETALOL 200 MG/1
TABLET, FILM COATED ORAL
Qty: 180 TABLET | Refills: 0 | Status: ON HOLD | OUTPATIENT
Start: 2023-06-05 | End: 2023-06-18

## 2023-06-07 LAB — BACTERIA SPEC CULT: ABNORMAL

## 2023-06-09 ENCOUNTER — PRENATAL OFFICE VISIT (OUTPATIENT)
Dept: OBGYN | Facility: CLINIC | Age: 38
End: 2023-06-09
Attending: OBSTETRICS & GYNECOLOGY
Payer: COMMERCIAL

## 2023-06-09 ENCOUNTER — ANCILLARY PROCEDURE (OUTPATIENT)
Dept: ULTRASOUND IMAGING | Facility: CLINIC | Age: 38
End: 2023-06-09
Attending: OBSTETRICS & GYNECOLOGY
Payer: COMMERCIAL

## 2023-06-09 VITALS — DIASTOLIC BLOOD PRESSURE: 86 MMHG | BODY MASS INDEX: 33.75 KG/M2 | SYSTOLIC BLOOD PRESSURE: 132 MMHG | WEIGHT: 222 LBS

## 2023-06-09 DIAGNOSIS — O10.919 CHRONIC HYPERTENSION IN PREGNANCY: ICD-10-CM

## 2023-06-09 DIAGNOSIS — O10.919 CHRONIC HYPERTENSION DURING PREGNANCY: ICD-10-CM

## 2023-06-09 DIAGNOSIS — A49.1 GBS (GROUP B STREPTOCOCCUS) INFECTION: ICD-10-CM

## 2023-06-09 DIAGNOSIS — O09.529 SUPERVISION OF HIGH-RISK PREGNANCY OF ELDERLY MULTIGRAVIDA: Primary | ICD-10-CM

## 2023-06-09 DIAGNOSIS — Z3A.37 37 WEEKS GESTATION OF PREGNANCY: ICD-10-CM

## 2023-06-09 PROCEDURE — 76819 FETAL BIOPHYS PROFIL W/O NST: CPT | Performed by: OBSTETRICS & GYNECOLOGY

## 2023-06-09 PROCEDURE — 99207 PR PRENATAL VISIT: CPT | Performed by: OBSTETRICS & GYNECOLOGY

## 2023-06-09 NOTE — PROGRESS NOTES
Doing okay today.  +FM  More pressure/cramping; no ctx/vb/spotting/lof  Added back her afternoon dose of labetolo as early evening blood pressures had started to increase --improved with third dose  BPP today 8/8, MVP 7cm, VERTEX!!!!  Will schedule cervical ripening with me next week --thurs PM 6/15; will come in for cervix check on Thursday afternoon to make certain ripening is still necessary  GBS positive --will need IV abx with labor --will do clindamycin due to allergy

## 2023-06-11 ENCOUNTER — HOSPITAL ENCOUNTER (OUTPATIENT)
Facility: CLINIC | Age: 38
Discharge: HOME | End: 2023-06-12
Attending: STUDENT IN AN ORGANIZED HEALTH CARE EDUCATION/TRAINING PROGRAM | Admitting: STUDENT IN AN ORGANIZED HEALTH CARE EDUCATION/TRAINING PROGRAM
Payer: COMMERCIAL

## 2023-06-11 ENCOUNTER — NURSE TRIAGE (OUTPATIENT)
Dept: NURSING | Facility: CLINIC | Age: 38
End: 2023-06-11
Payer: COMMERCIAL

## 2023-06-11 VITALS — SYSTOLIC BLOOD PRESSURE: 131 MMHG | TEMPERATURE: 98.1 F | RESPIRATION RATE: 18 BRPM | DIASTOLIC BLOOD PRESSURE: 87 MMHG

## 2023-06-11 PROCEDURE — 36415 COLL VENOUS BLD VENIPUNCTURE: CPT | Performed by: STUDENT IN AN ORGANIZED HEALTH CARE EDUCATION/TRAINING PROGRAM

## 2023-06-11 PROCEDURE — 85027 COMPLETE CBC AUTOMATED: CPT | Performed by: STUDENT IN AN ORGANIZED HEALTH CARE EDUCATION/TRAINING PROGRAM

## 2023-06-11 PROCEDURE — G0463 HOSPITAL OUTPT CLINIC VISIT: HCPCS

## 2023-06-11 PROCEDURE — 80053 COMPREHEN METABOLIC PANEL: CPT | Performed by: STUDENT IN AN ORGANIZED HEALTH CARE EDUCATION/TRAINING PROGRAM

## 2023-06-11 RX ORDER — PROCHLORPERAZINE MALEATE 5 MG
10 TABLET ORAL EVERY 6 HOURS PRN
Status: DISCONTINUED | OUTPATIENT
Start: 2023-06-11 | End: 2023-06-12 | Stop reason: HOSPADM

## 2023-06-11 RX ORDER — ONDANSETRON 2 MG/ML
4 INJECTION INTRAMUSCULAR; INTRAVENOUS EVERY 6 HOURS PRN
Status: DISCONTINUED | OUTPATIENT
Start: 2023-06-11 | End: 2023-06-12 | Stop reason: HOSPADM

## 2023-06-11 RX ORDER — METOCLOPRAMIDE HYDROCHLORIDE 5 MG/ML
10 INJECTION INTRAMUSCULAR; INTRAVENOUS EVERY 6 HOURS PRN
Status: DISCONTINUED | OUTPATIENT
Start: 2023-06-11 | End: 2023-06-12 | Stop reason: HOSPADM

## 2023-06-11 RX ORDER — METOCLOPRAMIDE 10 MG/1
10 TABLET ORAL EVERY 6 HOURS PRN
Status: DISCONTINUED | OUTPATIENT
Start: 2023-06-11 | End: 2023-06-12 | Stop reason: HOSPADM

## 2023-06-11 RX ORDER — ONDANSETRON 4 MG/1
4 TABLET, ORALLY DISINTEGRATING ORAL EVERY 6 HOURS PRN
Status: DISCONTINUED | OUTPATIENT
Start: 2023-06-11 | End: 2023-06-12 | Stop reason: HOSPADM

## 2023-06-11 RX ORDER — PROCHLORPERAZINE 25 MG
25 SUPPOSITORY, RECTAL RECTAL EVERY 12 HOURS PRN
Status: DISCONTINUED | OUTPATIENT
Start: 2023-06-11 | End: 2023-06-12 | Stop reason: HOSPADM

## 2023-06-12 LAB
ALBUMIN MFR UR ELPH: 10 MG/DL (ref 1–14)
ALBUMIN SERPL BCG-MCNC: 3.2 G/DL (ref 3.5–5.2)
ALP SERPL-CCNC: 136 U/L (ref 35–104)
ALT SERPL W P-5'-P-CCNC: 9 U/L (ref 10–35)
ANION GAP SERPL CALCULATED.3IONS-SCNC: 13 MMOL/L (ref 7–15)
AST SERPL W P-5'-P-CCNC: 13 U/L (ref 10–35)
BILIRUB SERPL-MCNC: 0.3 MG/DL
BUN SERPL-MCNC: 2.9 MG/DL (ref 6–20)
CALCIUM SERPL-MCNC: 8.6 MG/DL (ref 8.6–10)
CHLORIDE SERPL-SCNC: 104 MMOL/L (ref 98–107)
CREAT SERPL-MCNC: 0.54 MG/DL (ref 0.51–0.95)
CREAT UR-MCNC: 89.4 MG/DL
DEPRECATED HCO3 PLAS-SCNC: 19 MMOL/L (ref 22–29)
ERYTHROCYTE [DISTWIDTH] IN BLOOD BY AUTOMATED COUNT: 13.1 % (ref 10–15)
GFR SERPL CREATININE-BSD FRML MDRD: >90 ML/MIN/1.73M2
GLUCOSE SERPL-MCNC: 81 MG/DL (ref 70–99)
HCT VFR BLD AUTO: 31.7 % (ref 35–47)
HGB BLD-MCNC: 10.7 G/DL (ref 11.7–15.7)
MCH RBC QN AUTO: 31.5 PG (ref 26.5–33)
MCHC RBC AUTO-ENTMCNC: 33.8 G/DL (ref 31.5–36.5)
MCV RBC AUTO: 93 FL (ref 78–100)
PLATELET # BLD AUTO: 220 10E3/UL (ref 150–450)
POTASSIUM SERPL-SCNC: 3.5 MMOL/L (ref 3.4–5.3)
PROT SERPL-MCNC: 6.1 G/DL (ref 6.4–8.3)
PROT/CREAT 24H UR: 0.11 MG/MG CR (ref 0–0.2)
RBC # BLD AUTO: 3.4 10E6/UL (ref 3.8–5.2)
SODIUM SERPL-SCNC: 136 MMOL/L (ref 136–145)
WBC # BLD AUTO: 8.2 10E3/UL (ref 4–11)

## 2023-06-12 PROCEDURE — 84156 ASSAY OF PROTEIN URINE: CPT | Performed by: STUDENT IN AN ORGANIZED HEALTH CARE EDUCATION/TRAINING PROGRAM

## 2023-06-12 NOTE — DISCHARGE INSTRUCTIONS
Discharge Instruction for Undelivered Patients      You were seen for:  elevated blood pressure  We Consulted: Dr. SHADI Ferreira  You had (Test or Medicine): NST, lab work     Diet:   Drink 8 to 12 glasses of liquids (milk, juice, water) every day.  You may eat meals and snacks.     Activity:  Call your doctor or nurse midwife if your baby is moving less than usual.     Call your provider if you notice:  Swelling in your face or increased swelling in your hands or legs.  Headaches that are not relieved by Tylenol (acetaminophen).  Changes in your vision (blurring: seeing spots or stars.)  Nausea (sick to your stomach) and vomiting (throwing up).   Weight gain of 5 pounds or more per week.  Heartburn that doesn't go away.  Signs of bladder infection: pain when you urinate (use the toilet), need to go more often and more urgently.  The bag of giordano (rupture of membranes) breaks, or you notice leaking in your underwear.  Bright red blood in your underwear.  Abdominal (lower belly) or stomach pain.  Second (plus) baby: Contractions (tightening) less than 10 minutes apart and getting stronger.  *If less than 34 weeks: Contractions (tightening) more than 6 times in one hour.  Increase or change in vaginal discharge (note the color and amount)    Follow-up:  As scheduled in the clinic

## 2023-06-12 NOTE — PLAN OF CARE
Pt arrives ambulatory to [facility] for pre-eclampsia eval.  She states her blood pressures at home are 140's/80's and the nurse line advised her to go in for evaluation.  Pt states she has chronic hypertension and is on labetolol 200mg TID, with her last dose at 9pm.  She denies any URQ pain, visual changes or headaches.  Reflexes are 2+, no clonus noted.      Consent obtained for external fetal and uterine monitoring - monitors applied.  Assessment and admission completed.     present and supportive.

## 2023-06-12 NOTE — TELEPHONE ENCOUNTER
150'/100    147/96    OB Triage Call      Is patient's OB/Midwife with the [facility] or [facility]? [facility]- Proceed with triage     Reason for call: Patient is having higher blood pressures tonight.  They have been 150's/100's    Assessment: Patient is on labetalol and took her last dose around 9:30pm.  Patient has no confusion, no breathing issues, no weakness or numbness on one side of body, no chest pain.     Plan: Go to L&D    Patient plans to deliver at [facility]    Patient's primary OB Provider is Xavier.      Per protocol recommendations Patient to be evaluated in L&D. Patient's primary OB is [facility].  Labor and delivery at [facility] ([phone]) notified of patient's pending arrival.  Report given to Sherie.      Is patient's delivering hospital on divert? No      37w2d    Estimated Date of Delivery: 2023        OB History    Para Term  AB Living   3 1 1 0 1 1   SAB IAB Ectopic Multiple Live Births   0 0 0 0 1      # Outcome Date GA Lbr Han/2nd Weight Sex Delivery Anes PTL Lv   3 Current            2 Term 19 39w3d 12:45 / 02:00 3.7 kg (8 lb 2.5 oz) F Vag-Spont EPI N LAYO      Complications: GBS, Preeclampsia/Hypertension      Name: Therese      Apgar1: 8  Apgar5: 9   1 AB                Lab Results   Component Value Date    GBS Positive (A) 2019          Buffy Maldonado RN 23 10:51 PM  [facility]    Reason for Disposition    [1] Pregnant > 20 weeks AND [2] BP Systolic BP  >= 140 OR Diastolic >= 90    Additional Information    Negative: Difficult to awaken or acting confused (e.g., disoriented, slurred speech)    Negative: Severe difficulty breathing (e.g., struggling for each breath, speaks in single words)    Negative: [1] Weakness of the face, arm or leg on one side of the body AND [2] new onset    Negative: [1] Numbness (i.e., loss of sensation) of the face, arm or leg on one side of the body AND [2] new onset     Negative: [1] Chest pain lasts > 5 minutes AND [2] history of heart disease  (i.e., heart attack, bypass surgery, angina, angioplasty, CHF)    Negative: [1] Chest pain AND [2] took nitrogylcerin AND [3] pain was not relieved    Negative: Sounds like a life-threatening emergency to the triager    Negative: Symptom is main concern  (e.g., headache, chest pain)    Negative: Low blood pressure is main concern    Negative: [1] Systolic BP  >= 160 OR Diastolic >= 100 AND [2] cardiac or neurologic symptoms (e.g., chest pain, difficulty breathing, unsteady gait, blurred vision)    Negative: [1] Pregnant > 20 weeks (or postpartum < 6 weeks) AND [2] new hand or face swelling    Protocols used: HIGH BLOOD PRESSURE-A-AH

## 2023-06-12 NOTE — PLAN OF CARE
Discharge instructions verbally given, patient verbalizes understanding.  RN did not print paperwork for discharge.      Pt's  given wheelchair to transport pt to exit doors.  Pt discharged to home in stable condition.

## 2023-06-15 ENCOUNTER — HOSPITAL ENCOUNTER (INPATIENT)
Facility: CLINIC | Age: 38
LOS: 3 days | Discharge: HOME | End: 2023-06-18
Attending: OBSTETRICS & GYNECOLOGY | Admitting: OBSTETRICS & GYNECOLOGY
Payer: COMMERCIAL

## 2023-06-15 ENCOUNTER — PRENATAL OFFICE VISIT (OUTPATIENT)
Dept: OBGYN | Facility: CLINIC | Age: 38
End: 2023-06-15
Payer: COMMERCIAL

## 2023-06-15 VITALS — SYSTOLIC BLOOD PRESSURE: 128 MMHG | BODY MASS INDEX: 33.75 KG/M2 | DIASTOLIC BLOOD PRESSURE: 88 MMHG | WEIGHT: 222 LBS

## 2023-06-15 DIAGNOSIS — O09.529 SUPERVISION OF HIGH-RISK PREGNANCY OF ELDERLY MULTIGRAVIDA: Primary | ICD-10-CM

## 2023-06-15 DIAGNOSIS — I10 BENIGN ESSENTIAL HYPERTENSION: ICD-10-CM

## 2023-06-15 DIAGNOSIS — O10.919 CHRONIC HYPERTENSION IN PREGNANCY: ICD-10-CM

## 2023-06-15 DIAGNOSIS — Z3A.37 37 WEEKS GESTATION OF PREGNANCY: ICD-10-CM

## 2023-06-15 DIAGNOSIS — O10.919 CHRONIC HYPERTENSION AFFECTING PREGNANCY: ICD-10-CM

## 2023-06-15 LAB
ABO/RH(D): NORMAL
ALBUMIN MFR UR ELPH: 10.5 MG/DL
ALT SERPL W P-5'-P-CCNC: 10 U/L (ref 0–50)
ANTIBODY SCREEN: NEGATIVE
AST SERPL W P-5'-P-CCNC: 23 U/L (ref 0–45)
BUN SERPL-MCNC: 5.1 MG/DL (ref 6–20)
CREAT SERPL-MCNC: 0.63 MG/DL (ref 0.51–0.95)
CREAT UR-MCNC: 129.2 MG/DL
ERYTHROCYTE [DISTWIDTH] IN BLOOD BY AUTOMATED COUNT: 13.3 % (ref 10–15)
GFR SERPL CREATININE-BSD FRML MDRD: >90 ML/MIN/1.73M2
HCT VFR BLD AUTO: 32.7 % (ref 35–47)
HGB BLD-MCNC: 11 G/DL (ref 11.7–15.7)
MCH RBC QN AUTO: 31.7 PG (ref 26.5–33)
MCHC RBC AUTO-ENTMCNC: 33.6 G/DL (ref 31.5–36.5)
MCV RBC AUTO: 94 FL (ref 78–100)
PLATELET # BLD AUTO: 225 10E3/UL (ref 150–450)
PROT/CREAT 24H UR: 0.08 MG/MG CR (ref 0–0.2)
RBC # BLD AUTO: 3.47 10E6/UL (ref 3.8–5.2)
SPECIMEN EXPIRATION DATE: NORMAL
URATE SERPL-MCNC: 4 MG/DL (ref 2.4–5.7)
WBC # BLD AUTO: 8.7 10E3/UL (ref 4–11)

## 2023-06-15 PROCEDURE — 84520 ASSAY OF UREA NITROGEN: CPT | Performed by: OBSTETRICS & GYNECOLOGY

## 2023-06-15 PROCEDURE — 84450 TRANSFERASE (AST) (SGOT): CPT | Performed by: OBSTETRICS & GYNECOLOGY

## 2023-06-15 PROCEDURE — 59426 ANTEPARTUM CARE ONLY: CPT | Performed by: OBSTETRICS & GYNECOLOGY

## 2023-06-15 PROCEDURE — 84550 ASSAY OF BLOOD/URIC ACID: CPT | Performed by: OBSTETRICS & GYNECOLOGY

## 2023-06-15 PROCEDURE — 99207 PR PRENATAL VISIT: CPT | Performed by: OBSTETRICS & GYNECOLOGY

## 2023-06-15 PROCEDURE — 120N000001 HC R&B MED SURG/OB

## 2023-06-15 PROCEDURE — 86901 BLOOD TYPING SEROLOGIC RH(D): CPT | Performed by: OBSTETRICS & GYNECOLOGY

## 2023-06-15 PROCEDURE — 84460 ALANINE AMINO (ALT) (SGPT): CPT | Performed by: OBSTETRICS & GYNECOLOGY

## 2023-06-15 PROCEDURE — 86850 RBC ANTIBODY SCREEN: CPT | Performed by: OBSTETRICS & GYNECOLOGY

## 2023-06-15 PROCEDURE — 85027 COMPLETE CBC AUTOMATED: CPT | Performed by: OBSTETRICS & GYNECOLOGY

## 2023-06-15 PROCEDURE — 84156 ASSAY OF PROTEIN URINE: CPT | Performed by: OBSTETRICS & GYNECOLOGY

## 2023-06-15 PROCEDURE — 250N000013 HC RX MED GY IP 250 OP 250 PS 637: Performed by: OBSTETRICS & GYNECOLOGY

## 2023-06-15 PROCEDURE — 82565 ASSAY OF CREATININE: CPT | Performed by: OBSTETRICS & GYNECOLOGY

## 2023-06-15 PROCEDURE — 86780 TREPONEMA PALLIDUM: CPT | Performed by: OBSTETRICS & GYNECOLOGY

## 2023-06-15 PROCEDURE — 36415 COLL VENOUS BLD VENIPUNCTURE: CPT | Performed by: OBSTETRICS & GYNECOLOGY

## 2023-06-15 RX ORDER — CITALOPRAM HYDROBROMIDE 40 MG/1
40 TABLET ORAL DAILY
Status: DISCONTINUED | OUTPATIENT
Start: 2023-06-16 | End: 2023-06-16

## 2023-06-15 RX ORDER — ONDANSETRON 4 MG/1
4 TABLET, ORALLY DISINTEGRATING ORAL EVERY 6 HOURS PRN
Status: CANCELLED | OUTPATIENT
Start: 2023-06-15

## 2023-06-15 RX ORDER — HYDROXYZINE HYDROCHLORIDE 25 MG/1
50 TABLET, FILM COATED ORAL EVERY 4 HOURS PRN
Status: DISCONTINUED | OUTPATIENT
Start: 2023-06-15 | End: 2023-06-16

## 2023-06-15 RX ORDER — CARBOPROST TROMETHAMINE 250 UG/ML
250 INJECTION, SOLUTION INTRAMUSCULAR
Status: CANCELLED | OUTPATIENT
Start: 2023-06-15

## 2023-06-15 RX ORDER — PROCHLORPERAZINE 25 MG
25 SUPPOSITORY, RECTAL RECTAL EVERY 12 HOURS PRN
Status: CANCELLED | OUTPATIENT
Start: 2023-06-15

## 2023-06-15 RX ORDER — MISOPROSTOL 100 UG/1
25 TABLET ORAL EVERY 4 HOURS PRN
Status: DISCONTINUED | OUTPATIENT
Start: 2023-06-15 | End: 2023-06-16

## 2023-06-15 RX ORDER — MISOPROSTOL 200 UG/1
800 TABLET ORAL
Status: DISCONTINUED | OUTPATIENT
Start: 2023-06-15 | End: 2023-06-16

## 2023-06-15 RX ORDER — KETOROLAC TROMETHAMINE 30 MG/ML
30 INJECTION, SOLUTION INTRAMUSCULAR; INTRAVENOUS
Status: CANCELLED | OUTPATIENT
Start: 2023-06-15 | End: 2023-06-20

## 2023-06-15 RX ORDER — NALOXONE HYDROCHLORIDE 0.4 MG/ML
0.4 INJECTION, SOLUTION INTRAMUSCULAR; INTRAVENOUS; SUBCUTANEOUS
Status: CANCELLED | OUTPATIENT
Start: 2023-06-15

## 2023-06-15 RX ORDER — NALOXONE HYDROCHLORIDE 0.4 MG/ML
0.4 INJECTION, SOLUTION INTRAMUSCULAR; INTRAVENOUS; SUBCUTANEOUS
Status: DISCONTINUED | OUTPATIENT
Start: 2023-06-15 | End: 2023-06-16

## 2023-06-15 RX ORDER — OXYTOCIN/0.9 % SODIUM CHLORIDE 30/500 ML
100-340 PLASTIC BAG, INJECTION (ML) INTRAVENOUS CONTINUOUS PRN
Status: CANCELLED | OUTPATIENT
Start: 2023-06-15

## 2023-06-15 RX ORDER — NALOXONE HYDROCHLORIDE 0.4 MG/ML
0.2 INJECTION, SOLUTION INTRAMUSCULAR; INTRAVENOUS; SUBCUTANEOUS
Status: CANCELLED | OUTPATIENT
Start: 2023-06-15

## 2023-06-15 RX ORDER — OXYTOCIN 10 [USP'U]/ML
10 INJECTION, SOLUTION INTRAMUSCULAR; INTRAVENOUS
Status: CANCELLED | OUTPATIENT
Start: 2023-06-15

## 2023-06-15 RX ORDER — METOCLOPRAMIDE HYDROCHLORIDE 5 MG/ML
10 INJECTION INTRAMUSCULAR; INTRAVENOUS EVERY 6 HOURS PRN
Status: CANCELLED | OUTPATIENT
Start: 2023-06-15

## 2023-06-15 RX ORDER — OXYTOCIN/0.9 % SODIUM CHLORIDE 30/500 ML
340 PLASTIC BAG, INJECTION (ML) INTRAVENOUS CONTINUOUS PRN
Status: CANCELLED | OUTPATIENT
Start: 2023-06-15

## 2023-06-15 RX ORDER — METHYLERGONOVINE MALEATE 0.2 MG/ML
200 INJECTION INTRAVENOUS
Status: DISCONTINUED | OUTPATIENT
Start: 2023-06-15 | End: 2023-06-16

## 2023-06-15 RX ORDER — IBUPROFEN 200 MG
800 TABLET ORAL
Status: CANCELLED | OUTPATIENT
Start: 2023-06-15 | End: 2023-06-20

## 2023-06-15 RX ORDER — CLINDAMYCIN PHOSPHATE 900 MG/50ML
900 INJECTION, SOLUTION INTRAVENOUS EVERY 8 HOURS
Status: DISCONTINUED | OUTPATIENT
Start: 2023-06-15 | End: 2023-06-16

## 2023-06-15 RX ORDER — MISOPROSTOL 200 UG/1
800 TABLET ORAL
Status: CANCELLED | OUTPATIENT
Start: 2023-06-15

## 2023-06-15 RX ORDER — NALOXONE HYDROCHLORIDE 0.4 MG/ML
0.2 INJECTION, SOLUTION INTRAMUSCULAR; INTRAVENOUS; SUBCUTANEOUS
Status: DISCONTINUED | OUTPATIENT
Start: 2023-06-15 | End: 2023-06-16

## 2023-06-15 RX ORDER — CARBOPROST TROMETHAMINE 250 UG/ML
250 INJECTION, SOLUTION INTRAMUSCULAR
Status: DISCONTINUED | OUTPATIENT
Start: 2023-06-15 | End: 2023-06-16

## 2023-06-15 RX ORDER — METOCLOPRAMIDE 5 MG/1
10 TABLET ORAL EVERY 6 HOURS PRN
Status: CANCELLED | OUTPATIENT
Start: 2023-06-15

## 2023-06-15 RX ORDER — PROCHLORPERAZINE 25 MG
25 SUPPOSITORY, RECTAL RECTAL EVERY 12 HOURS PRN
Status: DISCONTINUED | OUTPATIENT
Start: 2023-06-15 | End: 2023-06-16

## 2023-06-15 RX ORDER — MISOPROSTOL 100 UG/1
25 TABLET ORAL EVERY 4 HOURS PRN
Status: CANCELLED | OUTPATIENT
Start: 2023-06-15

## 2023-06-15 RX ORDER — ONDANSETRON 2 MG/ML
4 INJECTION INTRAMUSCULAR; INTRAVENOUS EVERY 6 HOURS PRN
Status: CANCELLED | OUTPATIENT
Start: 2023-06-15

## 2023-06-15 RX ORDER — CITRIC ACID/SODIUM CITRATE 334-500MG
30 SOLUTION, ORAL ORAL
Status: DISCONTINUED | OUTPATIENT
Start: 2023-06-15 | End: 2023-06-16

## 2023-06-15 RX ORDER — METOCLOPRAMIDE HYDROCHLORIDE 5 MG/ML
10 INJECTION INTRAMUSCULAR; INTRAVENOUS EVERY 6 HOURS PRN
Status: DISCONTINUED | OUTPATIENT
Start: 2023-06-15 | End: 2023-06-16

## 2023-06-15 RX ORDER — ONDANSETRON 4 MG/1
4 TABLET, ORALLY DISINTEGRATING ORAL EVERY 6 HOURS PRN
Status: DISCONTINUED | OUTPATIENT
Start: 2023-06-15 | End: 2023-06-16

## 2023-06-15 RX ORDER — MISOPROSTOL 200 UG/1
400 TABLET ORAL
Status: DISCONTINUED | OUTPATIENT
Start: 2023-06-15 | End: 2023-06-16

## 2023-06-15 RX ORDER — ONDANSETRON 2 MG/ML
4 INJECTION INTRAMUSCULAR; INTRAVENOUS EVERY 6 HOURS PRN
Status: DISCONTINUED | OUTPATIENT
Start: 2023-06-15 | End: 2023-06-16

## 2023-06-15 RX ORDER — OXYTOCIN/0.9 % SODIUM CHLORIDE 30/500 ML
340 PLASTIC BAG, INJECTION (ML) INTRAVENOUS CONTINUOUS PRN
Status: DISCONTINUED | OUTPATIENT
Start: 2023-06-15 | End: 2023-06-16

## 2023-06-15 RX ORDER — LABETALOL 200 MG/1
200 TABLET, FILM COATED ORAL EVERY 8 HOURS SCHEDULED
Status: DISCONTINUED | OUTPATIENT
Start: 2023-06-15 | End: 2023-06-16

## 2023-06-15 RX ORDER — PROCHLORPERAZINE MALEATE 5 MG
10 TABLET ORAL EVERY 6 HOURS PRN
Status: DISCONTINUED | OUTPATIENT
Start: 2023-06-15 | End: 2023-06-16

## 2023-06-15 RX ORDER — OXYTOCIN 10 [USP'U]/ML
10 INJECTION, SOLUTION INTRAMUSCULAR; INTRAVENOUS
Status: DISCONTINUED | OUTPATIENT
Start: 2023-06-15 | End: 2023-06-16

## 2023-06-15 RX ORDER — CITRIC ACID/SODIUM CITRATE 334-500MG
30 SOLUTION, ORAL ORAL
Status: CANCELLED | OUTPATIENT
Start: 2023-06-15

## 2023-06-15 RX ORDER — METHYLERGONOVINE MALEATE 0.2 MG/ML
200 INJECTION INTRAVENOUS
Status: CANCELLED | OUTPATIENT
Start: 2023-06-15

## 2023-06-15 RX ORDER — BUPROPION HYDROCHLORIDE 150 MG/1
150 TABLET ORAL DAILY
Status: DISCONTINUED | OUTPATIENT
Start: 2023-06-16 | End: 2023-06-16

## 2023-06-15 RX ORDER — PROCHLORPERAZINE MALEATE 5 MG
10 TABLET ORAL EVERY 6 HOURS PRN
Status: CANCELLED | OUTPATIENT
Start: 2023-06-15

## 2023-06-15 RX ORDER — TRANEXAMIC ACID 10 MG/ML
1 INJECTION, SOLUTION INTRAVENOUS EVERY 30 MIN PRN
Status: DISCONTINUED | OUTPATIENT
Start: 2023-06-15 | End: 2023-06-16

## 2023-06-15 RX ORDER — MISOPROSTOL 100 UG/1
400 TABLET ORAL
Status: CANCELLED | OUTPATIENT
Start: 2023-06-15

## 2023-06-15 RX ORDER — METOCLOPRAMIDE 10 MG/1
10 TABLET ORAL EVERY 6 HOURS PRN
Status: DISCONTINUED | OUTPATIENT
Start: 2023-06-15 | End: 2023-06-16

## 2023-06-15 RX ADMIN — HYDROXYZINE HYDROCHLORIDE 50 MG: 25 TABLET, FILM COATED ORAL at 23:05

## 2023-06-15 RX ADMIN — LABETALOL HYDROCHLORIDE 200 MG: 200 TABLET, FILM COATED ORAL at 22:05

## 2023-06-15 RX ADMIN — MISOPROSTOL 25 MCG: 100 TABLET ORAL at 21:06

## 2023-06-15 ASSESSMENT — ACTIVITIES OF DAILY LIVING (ADL)
FALL_HISTORY_WITHIN_LAST_SIX_MONTHS: YES
HEARING_DIFFICULTY_OR_DEAF: NO
ADLS_ACUITY_SCORE: 20
DOING_ERRANDS_INDEPENDENTLY_DIFFICULTY: NO
NUMBER_OF_TIMES_PATIENT_HAS_FALLEN_WITHIN_LAST_SIX_MONTHS: 1
DIFFICULTY_EATING/SWALLOWING: NO
CHANGE_IN_FUNCTIONAL_STATUS_SINCE_ONSET_OF_CURRENT_ILLNESS/INJURY: NO
WALKING_OR_CLIMBING_STAIRS_DIFFICULTY: NO
WEAR_GLASSES_OR_BLIND: YES
TOILETING_ISSUES: NO
DRESSING/BATHING_DIFFICULTY: NO
CONCENTRATING,_REMEMBERING_OR_MAKING_DECISIONS_DIFFICULTY: NO
VISION_MANAGEMENT: GLASSES AND CONTACTS
ADLS_ACUITY_SCORE: 20
DIFFICULTY_COMMUNICATING: NO

## 2023-06-15 NOTE — PROGRESS NOTES
Doing okay today; some nausea and urinary incontinence today  Had contractions overnight on Tuesday; no vb/spotting/cramping  A bit more pressure  GBS positive ---will need IV clinda  Normotensive today  Will proceed with vaginal cytotec cervical ripening tonight

## 2023-06-16 ENCOUNTER — ANESTHESIA EVENT (OUTPATIENT)
Dept: OBGYN | Facility: CLINIC | Age: 38
End: 2023-06-16
Payer: COMMERCIAL

## 2023-06-16 ENCOUNTER — ANESTHESIA (OUTPATIENT)
Dept: OBGYN | Facility: CLINIC | Age: 38
End: 2023-06-16
Payer: COMMERCIAL

## 2023-06-16 LAB — T PALLIDUM AB SER QL: NONREACTIVE

## 2023-06-16 PROCEDURE — 370N000003 HC ANESTHESIA WARD SERVICE: Performed by: ANESTHESIOLOGY

## 2023-06-16 PROCEDURE — 10907ZC DRAINAGE OF AMNIOTIC FLUID, THERAPEUTIC FROM PRODUCTS OF CONCEPTION, VIA NATURAL OR ARTIFICIAL OPENING: ICD-10-PCS | Performed by: OBSTETRICS & GYNECOLOGY

## 2023-06-16 PROCEDURE — 0HQ9XZZ REPAIR PERINEUM SKIN, EXTERNAL APPROACH: ICD-10-PCS | Performed by: OBSTETRICS & GYNECOLOGY

## 2023-06-16 PROCEDURE — 00HU33Z INSERTION OF INFUSION DEVICE INTO SPINAL CANAL, PERCUTANEOUS APPROACH: ICD-10-PCS | Performed by: ANESTHESIOLOGY

## 2023-06-16 PROCEDURE — 250N000011 HC RX IP 250 OP 636: Performed by: ANESTHESIOLOGY

## 2023-06-16 PROCEDURE — 3E033VJ INTRODUCTION OF OTHER HORMONE INTO PERIPHERAL VEIN, PERCUTANEOUS APPROACH: ICD-10-PCS | Performed by: OBSTETRICS & GYNECOLOGY

## 2023-06-16 PROCEDURE — 722N000001 HC LABOR CARE VAGINAL DELIVERY SINGLE

## 2023-06-16 PROCEDURE — 250N000009 HC RX 250: Performed by: OBSTETRICS & GYNECOLOGY

## 2023-06-16 PROCEDURE — 258N000003 HC RX IP 258 OP 636: Performed by: OBSTETRICS & GYNECOLOGY

## 2023-06-16 PROCEDURE — 3E0P7VZ INTRODUCTION OF HORMONE INTO FEMALE REPRODUCTIVE, VIA NATURAL OR ARTIFICIAL OPENING: ICD-10-PCS | Performed by: OBSTETRICS & GYNECOLOGY

## 2023-06-16 PROCEDURE — 3E0R3BZ INTRODUCTION OF ANESTHETIC AGENT INTO SPINAL CANAL, PERCUTANEOUS APPROACH: ICD-10-PCS | Performed by: ANESTHESIOLOGY

## 2023-06-16 PROCEDURE — 250N000011 HC RX IP 250 OP 636: Performed by: OBSTETRICS & GYNECOLOGY

## 2023-06-16 PROCEDURE — 250N000013 HC RX MED GY IP 250 OP 250 PS 637: Performed by: OBSTETRICS & GYNECOLOGY

## 2023-06-16 PROCEDURE — 120N000012 HC R&B POSTPARTUM

## 2023-06-16 PROCEDURE — 59410 OBSTETRICAL CARE: CPT | Performed by: OBSTETRICS & GYNECOLOGY

## 2023-06-16 RX ORDER — ACETAMINOPHEN 325 MG/1
650 TABLET ORAL EVERY 4 HOURS PRN
Status: DISCONTINUED | OUTPATIENT
Start: 2023-06-16 | End: 2023-06-18 | Stop reason: HOSPADM

## 2023-06-16 RX ORDER — LABETALOL HYDROCHLORIDE 5 MG/ML
20-40 INJECTION, SOLUTION INTRAVENOUS EVERY 10 MIN PRN
Status: DISCONTINUED | OUTPATIENT
Start: 2023-06-16 | End: 2023-06-18 | Stop reason: HOSPADM

## 2023-06-16 RX ORDER — IBUPROFEN 400 MG/1
800 TABLET, FILM COATED ORAL EVERY 6 HOURS PRN
Status: DISCONTINUED | OUTPATIENT
Start: 2023-06-16 | End: 2023-06-18 | Stop reason: HOSPADM

## 2023-06-16 RX ORDER — ROPIVACAINE HYDROCHLORIDE 2 MG/ML
INJECTION, SOLUTION EPIDURAL; INFILTRATION; PERINEURAL
Status: COMPLETED | OUTPATIENT
Start: 2023-06-16 | End: 2023-06-16

## 2023-06-16 RX ORDER — ALBUTEROL SULFATE 90 UG/1
2 AEROSOL, METERED RESPIRATORY (INHALATION) EVERY 6 HOURS
Status: DISCONTINUED | OUTPATIENT
Start: 2023-06-16 | End: 2023-06-18 | Stop reason: HOSPADM

## 2023-06-16 RX ORDER — MODIFIED LANOLIN
OINTMENT (GRAM) TOPICAL
Status: DISCONTINUED | OUTPATIENT
Start: 2023-06-16 | End: 2023-06-18 | Stop reason: HOSPADM

## 2023-06-16 RX ORDER — CITALOPRAM HYDROBROMIDE 20 MG/1
40 TABLET ORAL DAILY
Status: DISCONTINUED | OUTPATIENT
Start: 2023-06-16 | End: 2023-06-18 | Stop reason: HOSPADM

## 2023-06-16 RX ORDER — CARBOPROST TROMETHAMINE 250 UG/ML
250 INJECTION, SOLUTION INTRAMUSCULAR
Status: DISCONTINUED | OUTPATIENT
Start: 2023-06-16 | End: 2023-06-18 | Stop reason: HOSPADM

## 2023-06-16 RX ORDER — ROPIVACAINE HYDROCHLORIDE 2 MG/ML
10 INJECTION, SOLUTION EPIDURAL; INFILTRATION; PERINEURAL ONCE
Status: DISCONTINUED | OUTPATIENT
Start: 2023-06-16 | End: 2023-06-16

## 2023-06-16 RX ORDER — LABETALOL 200 MG/1
200 TABLET, FILM COATED ORAL 2 TIMES DAILY
Status: DISCONTINUED | OUTPATIENT
Start: 2023-06-16 | End: 2023-06-16

## 2023-06-16 RX ORDER — TRANEXAMIC ACID 10 MG/ML
1 INJECTION, SOLUTION INTRAVENOUS EVERY 30 MIN PRN
Status: DISCONTINUED | OUTPATIENT
Start: 2023-06-16 | End: 2023-06-18 | Stop reason: HOSPADM

## 2023-06-16 RX ORDER — HYDRALAZINE HYDROCHLORIDE 20 MG/ML
5-10 INJECTION INTRAMUSCULAR; INTRAVENOUS
Status: DISCONTINUED | OUTPATIENT
Start: 2023-06-16 | End: 2023-06-18 | Stop reason: HOSPADM

## 2023-06-16 RX ORDER — OXYTOCIN 10 [USP'U]/ML
10 INJECTION, SOLUTION INTRAMUSCULAR; INTRAVENOUS
Status: DISCONTINUED | OUTPATIENT
Start: 2023-06-16 | End: 2023-06-18 | Stop reason: HOSPADM

## 2023-06-16 RX ORDER — ONDANSETRON 4 MG/1
4 TABLET, ORALLY DISINTEGRATING ORAL EVERY 6 HOURS PRN
Status: DISCONTINUED | OUTPATIENT
Start: 2023-06-16 | End: 2023-06-16

## 2023-06-16 RX ORDER — HYDROCORTISONE 25 MG/G
CREAM TOPICAL 3 TIMES DAILY PRN
Status: DISCONTINUED | OUTPATIENT
Start: 2023-06-16 | End: 2023-06-18 | Stop reason: HOSPADM

## 2023-06-16 RX ORDER — MISOPROSTOL 200 UG/1
400 TABLET ORAL
Status: DISCONTINUED | OUTPATIENT
Start: 2023-06-16 | End: 2023-06-18 | Stop reason: HOSPADM

## 2023-06-16 RX ORDER — METHYLERGONOVINE MALEATE 0.2 MG/ML
200 INJECTION INTRAVENOUS
Status: DISCONTINUED | OUTPATIENT
Start: 2023-06-16 | End: 2023-06-18 | Stop reason: HOSPADM

## 2023-06-16 RX ORDER — LIDOCAINE 40 MG/G
CREAM TOPICAL
Status: DISCONTINUED | OUTPATIENT
Start: 2023-06-16 | End: 2023-06-16

## 2023-06-16 RX ORDER — BISACODYL 10 MG
10 SUPPOSITORY, RECTAL RECTAL DAILY PRN
Status: DISCONTINUED | OUTPATIENT
Start: 2023-06-16 | End: 2023-06-18 | Stop reason: HOSPADM

## 2023-06-16 RX ORDER — OXYTOCIN/0.9 % SODIUM CHLORIDE 30/500 ML
100-340 PLASTIC BAG, INJECTION (ML) INTRAVENOUS CONTINUOUS PRN
Status: DISCONTINUED | OUTPATIENT
Start: 2023-06-16 | End: 2023-06-16

## 2023-06-16 RX ORDER — FENTANYL CITRATE 50 UG/ML
100 INJECTION, SOLUTION INTRAMUSCULAR; INTRAVENOUS ONCE
Status: DISCONTINUED | OUTPATIENT
Start: 2023-06-16 | End: 2023-06-16

## 2023-06-16 RX ORDER — NALBUPHINE HYDROCHLORIDE 20 MG/ML
2.5-5 INJECTION, SOLUTION INTRAMUSCULAR; INTRAVENOUS; SUBCUTANEOUS EVERY 6 HOURS PRN
Status: DISCONTINUED | OUTPATIENT
Start: 2023-06-16 | End: 2023-06-16

## 2023-06-16 RX ORDER — OXYTOCIN/0.9 % SODIUM CHLORIDE 30/500 ML
1-24 PLASTIC BAG, INJECTION (ML) INTRAVENOUS CONTINUOUS
Status: DISCONTINUED | OUTPATIENT
Start: 2023-06-16 | End: 2023-06-16

## 2023-06-16 RX ORDER — EPHEDRINE SULFATE 50 MG/ML
5 INJECTION, SOLUTION INTRAMUSCULAR; INTRAVENOUS; SUBCUTANEOUS
Status: DISCONTINUED | OUTPATIENT
Start: 2023-06-16 | End: 2023-06-16

## 2023-06-16 RX ORDER — ONDANSETRON 2 MG/ML
4 INJECTION INTRAMUSCULAR; INTRAVENOUS EVERY 6 HOURS PRN
Status: DISCONTINUED | OUTPATIENT
Start: 2023-06-16 | End: 2023-06-16

## 2023-06-16 RX ORDER — OXYTOCIN 10 [USP'U]/ML
10 INJECTION, SOLUTION INTRAMUSCULAR; INTRAVENOUS
Status: DISCONTINUED | OUTPATIENT
Start: 2023-06-16 | End: 2023-06-16

## 2023-06-16 RX ORDER — OXYTOCIN/0.9 % SODIUM CHLORIDE 30/500 ML
340 PLASTIC BAG, INJECTION (ML) INTRAVENOUS CONTINUOUS PRN
Status: DISCONTINUED | OUTPATIENT
Start: 2023-06-16 | End: 2023-06-18 | Stop reason: HOSPADM

## 2023-06-16 RX ORDER — DOCUSATE SODIUM 100 MG/1
100 CAPSULE, LIQUID FILLED ORAL DAILY
Status: DISCONTINUED | OUTPATIENT
Start: 2023-06-16 | End: 2023-06-18 | Stop reason: HOSPADM

## 2023-06-16 RX ORDER — FENTANYL CITRATE-0.9 % NACL/PF 10 MCG/ML
100 PLASTIC BAG, INJECTION (ML) INTRAVENOUS EVERY 5 MIN PRN
Status: DISCONTINUED | OUTPATIENT
Start: 2023-06-16 | End: 2023-06-16

## 2023-06-16 RX ORDER — MISOPROSTOL 200 UG/1
800 TABLET ORAL
Status: DISCONTINUED | OUTPATIENT
Start: 2023-06-16 | End: 2023-06-18 | Stop reason: HOSPADM

## 2023-06-16 RX ORDER — IBUPROFEN 400 MG/1
800 TABLET, FILM COATED ORAL
Status: DISCONTINUED | OUTPATIENT
Start: 2023-06-16 | End: 2023-06-16

## 2023-06-16 RX ORDER — KETOROLAC TROMETHAMINE 30 MG/ML
30 INJECTION, SOLUTION INTRAMUSCULAR; INTRAVENOUS
Status: DISCONTINUED | OUTPATIENT
Start: 2023-06-16 | End: 2023-06-16

## 2023-06-16 RX ORDER — SODIUM CHLORIDE, SODIUM LACTATE, POTASSIUM CHLORIDE, CALCIUM CHLORIDE 600; 310; 30; 20 MG/100ML; MG/100ML; MG/100ML; MG/100ML
INJECTION, SOLUTION INTRAVENOUS CONTINUOUS PRN
Status: DISCONTINUED | OUTPATIENT
Start: 2023-06-16 | End: 2023-06-16

## 2023-06-16 RX ORDER — BUPROPION HYDROCHLORIDE 150 MG/1
150 TABLET ORAL EVERY MORNING
Status: DISCONTINUED | OUTPATIENT
Start: 2023-06-17 | End: 2023-06-18 | Stop reason: HOSPADM

## 2023-06-16 RX ADMIN — DOCUSATE SODIUM 100 MG: 100 CAPSULE, LIQUID FILLED ORAL at 21:45

## 2023-06-16 RX ADMIN — SODIUM CHLORIDE, POTASSIUM CHLORIDE, SODIUM LACTATE AND CALCIUM CHLORIDE: 600; 310; 30; 20 INJECTION, SOLUTION INTRAVENOUS at 09:10

## 2023-06-16 RX ADMIN — LABETALOL HYDROCHLORIDE 200 MG: 200 TABLET, FILM COATED ORAL at 14:31

## 2023-06-16 RX ADMIN — MISOPROSTOL 25 MCG: 100 TABLET ORAL at 05:08

## 2023-06-16 RX ADMIN — Medication 2 MILLI-UNITS/MIN: at 09:11

## 2023-06-16 RX ADMIN — ROPIVACAINE HYDROCHLORIDE 8 ML: 2 INJECTION, SOLUTION EPIDURAL; INFILTRATION at 16:02

## 2023-06-16 RX ADMIN — LABETALOL HYDROCHLORIDE 200 MG: 200 TABLET, FILM COATED ORAL at 20:20

## 2023-06-16 RX ADMIN — TRANEXAMIC ACID 1 G: 10 INJECTION, SOLUTION INTRAVENOUS at 19:37

## 2023-06-16 RX ADMIN — SODIUM CHLORIDE, POTASSIUM CHLORIDE, SODIUM LACTATE AND CALCIUM CHLORIDE 500 ML: 600; 310; 30; 20 INJECTION, SOLUTION INTRAVENOUS at 15:21

## 2023-06-16 RX ADMIN — LABETALOL HYDROCHLORIDE 200 MG: 200 TABLET, FILM COATED ORAL at 07:48

## 2023-06-16 RX ADMIN — BENZOCAINE: 11.4 AEROSOL, SPRAY TOPICAL at 21:45

## 2023-06-16 RX ADMIN — CLINDAMYCIN IN 5 PERCENT DEXTROSE 900 MG: 18 INJECTION, SOLUTION INTRAVENOUS at 17:03

## 2023-06-16 RX ADMIN — MISOPROSTOL 25 MCG: 100 TABLET ORAL at 01:06

## 2023-06-16 RX ADMIN — HYDRALAZINE HYDROCHLORIDE 5 MG: 20 INJECTION INTRAMUSCULAR; INTRAVENOUS at 21:10

## 2023-06-16 RX ADMIN — CITALOPRAM HYDROBROMIDE 40 MG: 40 TABLET ORAL at 07:48

## 2023-06-16 RX ADMIN — CLINDAMYCIN IN 5 PERCENT DEXTROSE 900 MG: 18 INJECTION, SOLUTION INTRAVENOUS at 09:10

## 2023-06-16 RX ADMIN — Medication: at 15:46

## 2023-06-16 RX ADMIN — BUPROPION HYDROCHLORIDE 150 MG: 150 TABLET, FILM COATED, EXTENDED RELEASE ORAL at 07:48

## 2023-06-16 ASSESSMENT — ACTIVITIES OF DAILY LIVING (ADL)
ADLS_ACUITY_SCORE: 20

## 2023-06-16 NOTE — ANESTHESIA PREPROCEDURE EVALUATION
Anesthesia Pre-Procedure Evaluation    Patient: Vivi Mccall   MRN: [id number] : 1985        Procedure : * No procedures listed *          Past Medical History:   Diagnosis Date     Asthma     seasonal and environmental     C. difficile diarrhea 2013     Concussion 2022     Depression      Depressive disorder      Encounter for IUD insertion 10/16/2020    Jyoti inserted by Dr Park     Generalized anxiety disorder      Hypertension      IUD contraception 10/2020    Jyoti inserted by Dr Park     Pain in both hands 2020      Past Surgical History:   Procedure Laterality Date     GYN SURGERY      Laser treatment of HPV     ORTHOPEDIC SURGERY Left 2017    ganglion cyst removal     ORTHOPEDIC SURGERY Left     nerve release on left leg/ankle      SOFT TISSUE SURGERY       SURGICAL HISTORY OF -       Nerve entrapment release     ZZHC RELEASE FOOT/TOE NERVE        Allergies   Allergen Reactions     Amoxicillin Other (See Comments)     Patient does not want to take since having c diff     Codeine Other (See Comments)     Hallucinations     Penicillins Other (See Comments)     Patient does not want to take since having c diff      Social History     Tobacco Use     Smoking status: Never     Smokeless tobacco: Never   Vaping Use     Vaping status: Never Used   Substance Use Topics     Alcohol use: Not Currently     Comment: currently pregnant      Wt Readings from Last 1 Encounters:   06/15/23 100.7 kg (222 lb)        Anesthesia Evaluation   Pt has had prior anesthetic.     History of anesthetic complications  - PONV.      ROS/MED HX  ENT/Pulmonary:     (+) asthma     Neurologic:       Cardiovascular: Comment: Chronic HTN      METS/Exercise Tolerance:     Hematologic:     (+) no thrombocytopenia,  (-) anemia   Musculoskeletal:   (+) lower back pain     GI/Hepatic:     (+) GERD (Daily, significant yesterday such that she vomited),     Renal/Genitourinary:       Endo:    (-) Type  II DM and obesity   Psychiatric/Substance Use:       Infectious Disease:       Malignancy:       Other:            Physical Exam    Airway        Mallampati: III   TM distance: > 3 FB   Neck ROM: full   Mouth opening: > 3 cm    Respiratory Devices and Support         Dental  no notable dental history         Cardiovascular   cardiovascular exam normal          Pulmonary   pulmonary exam normal                OUTSIDE LABS:  CBC:   Lab Results   Component Value Date    WBC 8.7 06/15/2023    WBC 8.2 06/11/2023    HGB 11.0 (L) 06/15/2023    HGB 10.7 (L) 06/11/2023    HCT 32.7 (L) 06/15/2023    HCT 31.7 (L) 06/11/2023     06/15/2023     06/11/2023     BMP:   Lab Results   Component Value Date     06/11/2023     (L) 05/22/2023    POTASSIUM 3.5 06/11/2023    POTASSIUM 3.9 05/22/2023    CHLORIDE 104 06/11/2023    CHLORIDE 103 05/22/2023    CO2 19 (L) 06/11/2023    CO2 20 (L) 05/22/2023    BUN 5.1 (L) 06/15/2023    BUN 2.9 (L) 06/11/2023    CR 0.63 06/15/2023    CR 0.54 06/11/2023    GLC 81 06/11/2023    GLC 92 05/22/2023     COAGS: No results found for: PTT, INR, FIBR  POC:   Lab Results   Component Value Date    HCG Positive (A) 11/30/2022    HCGS Negative 10/22/2018     HEPATIC:   Lab Results   Component Value Date    ALBUMIN 3.2 (L) 06/11/2023    PROTTOTAL 6.1 (L) 06/11/2023    ALT 10 06/15/2023    AST 23 06/15/2023    ALKPHOS 136 (H) 06/11/2023    BILITOTAL 0.3 06/11/2023     OTHER:   Lab Results   Component Value Date    BARRY 8.6 06/11/2023    LIPASE 59 (L) 09/03/2021    TSH 0.98 01/17/2019       Anesthesia Plan    ASA Status:  2      Anesthesia Type: Epidural.              Consents    Anesthesia Plan(s) and associated risks, benefits, and realistic alternatives discussed. Questions answered and patient/representative(s) expressed understanding.    - Discussed:     - Discussed with:  Patient, Spouse         Postoperative Care            Comments:                Yajaira S. Mcclellan, MD

## 2023-06-16 NOTE — H&P
No significant change in general health status based on examination of the patient, review of Nursing Admission Database and prenatal record.  38yo  admitted at 37+6wks for IOL due CHTN in pregnancy.  Well controlled with labetolol 200mg TID.  PIH labs normal upon admission.  Cervical ripening overnight with vaginal cytotec.  Will start IV clindamycin with active labor.  Pain measures as needed.    EFW: 7lb-7.5lbs    Anamaria Park MD

## 2023-06-16 NOTE — PROVIDER NOTIFICATION
06/16/23 1437   Provider Notification   Provider Name/Title Dr. Park   Method of Notification Electronic Page   Request Evaluate - Remote   Notification Reason SVE     SVE 2/50/-2, pitocin infusing at 16mu/min.     Updated via phone at 1440. Dr. Park will plan to AR[facility] after clinic day and will call to notify that she is on her way over.

## 2023-06-16 NOTE — PLAN OF CARE
ILDA paged for epidural at 1540. Dr Vy GONSALES in room at 1543. Medications, Ropivacaine, handed off to ILDA at this time.  Time out performed.  Pt sat up at edge of bed. EFM off during procedure. Pt tolerated procedure well. Into left tilt position at 1605, EFM reapplied. BP set for every 2 minutes with continuous pulse oximeter in place.

## 2023-06-16 NOTE — PROGRESS NOTES
OB Progress Note    S: resting comfortably with epidural; still a bit anxious.  Bothered by her ramirez catheter    O: AF, BPs 129-146/87-95  Gen: resting comfortably; tearful with check  Abd: gravid, nontenderr  Cervix: 3/70/-3; still quite posterior  FHT: 135bpm, mod variability, +accels, no decels, Cat I tracing  [place]: irregular; q1-6min    A/P: 36yo  admitted at 38wks for IOL due to CHTN  -AROM for clear fluid  --continue pitocin induction  -IV clinda for GBS positive status  --continuous fetal monitoring  --no severe range blood pressures; will continue to monitor closely    Anamaria Park MD

## 2023-06-16 NOTE — PROGRESS NOTES
Dr. Park called to give orders for [facility] labs. Per Dr. Park the patient was checked in the clinic today and her SVE was 0/50/-3. No cervical exam is needed upon first does of vag Cytotec per provider.

## 2023-06-16 NOTE — PROGRESS NOTES
Patient admitted to room 214 for cervical ripening. Admission navigator and prenatal reviewed. Patient oriented to room and call light. PIV SL placed. VSS taken, BP slightly elevated but per patient that's her baseline as of recently, plan to give labetalol before bed. First dose of vag cytotec given at 2100. Will continue to monitor.

## 2023-06-16 NOTE — CARE PLAN
Dr Yun at bedside for rebolus. Pt uncomfortable, breathing through ctx. BP cycling q 2 min. Pt in left tilt position.

## 2023-06-16 NOTE — ANESTHESIA PROCEDURE NOTES
"Epidural catheter Procedure Note    Pre-Procedure   Staff -        Anesthesiologist:  Yajaira Mcclellan MD       Performed By: anesthesiologist       Location: OB       Pre-Anesthestic Checklist: patient identified, IV checked, risks and benefits discussed, informed consent, monitors and equipment checked, pre-op evaluation, at physician/surgeon's request and post-op pain management  Timeout:       Correct Patient: Yes        Correct Procedure: Yes        Correct Site: Yes        Correct Position: Yes   Procedure Documentation  Procedure: epidural catheter       Diagnosis: Labor       Patient Position: sitting       Skin prep: Chloraprep       Local skin infiltrated with 5 mL of 1% lidocaine.        Insertion Site: L3-4. (midline approach).       Technique: LORT saline and LORT air        RAPHAEL at 6 cm.       Needle Type: Bilnay needle       Needle Gauge: 17.        Needle Length (Inches): 3.5           Catheter threaded easily.           Threaded 12 cm at skin.         # of attempts: 1 and  # of redirects:  1    Assessment/Narrative         Paresthesias: No.       Test dose of 5 mL lidocaine 1.5% w/ 1:200,000 epinephrine at.         Test dose negative, 3 minutes after injection, for signs of intravascular, subdural, or intrathecal injection.       Insertion/Infusion Method: LORT saline and LORT air       Aspiration negative for Heme or CSF via Epidural Catheter.    Medication(s) Administered   0.2% Ropivacaine (Epidural) - EPIDURAL   8 mL - 6/16/2023 4:02:00 PM    FOR [facility] ([facility]) ONLY:   Pain Team Contact information: please page the Pain Team Via [employer]. Search \"Pain\". During daytime hours, please page the attending first. At night please page the resident first.      "

## 2023-06-16 NOTE — PROGRESS NOTES
OB Progress Note    S: Doing well this AM.  Got some decent rest.  +contractions this AM.  No leaking fluid/bleeding/spotting.  +FM    O: AF, VSS; BPs: 144-158/87-97  Gen: comfortable  Abd: Gravid, nontender  Cervix: FT, soft, high; still fairly intolerant of exams  FHT: 135bpm baseline, mod variability, +accels, no decels; Cat I tracing  [place]: q3-4min    A/P: 38yo  admitted overnight for cervical ripening at 38+0wks  -s/p 3 doses vaginal cytotec with minimal change  --will change to IV pitocin this AM  --IV clindamycin for GBS status  --pain measures as needed  --continuous fetal monitoring    Anamaria Park MD

## 2023-06-16 NOTE — PROVIDER NOTIFICATION
"   06/16/23 0740   Provider Notification   Provider Name/Title Dr. Park   Method of Notification Electronic Page   Request Evaluate - Remote   Notification Reason SVE;Status Update     Page to MD, \"Next cervical exam/ vaginal cytotec at 0900. Last SVE at 0500: fingertip/ -3 station.\"   "

## 2023-06-17 LAB — HGB BLD-MCNC: 10.2 G/DL (ref 11.7–15.7)

## 2023-06-17 PROCEDURE — 120N000012 HC R&B POSTPARTUM

## 2023-06-17 PROCEDURE — 36415 COLL VENOUS BLD VENIPUNCTURE: CPT | Performed by: OBSTETRICS & GYNECOLOGY

## 2023-06-17 PROCEDURE — 85018 HEMOGLOBIN: CPT | Performed by: OBSTETRICS & GYNECOLOGY

## 2023-06-17 PROCEDURE — 250N000013 HC RX MED GY IP 250 OP 250 PS 637: Performed by: OBSTETRICS & GYNECOLOGY

## 2023-06-17 RX ORDER — FUROSEMIDE 20 MG
20 TABLET ORAL DAILY
Status: DISCONTINUED | OUTPATIENT
Start: 2023-06-17 | End: 2023-06-18 | Stop reason: HOSPADM

## 2023-06-17 RX ADMIN — ACETAMINOPHEN 650 MG: 325 TABLET ORAL at 00:00

## 2023-06-17 RX ADMIN — IBUPROFEN 800 MG: 400 TABLET ORAL at 11:48

## 2023-06-17 RX ADMIN — ACETAMINOPHEN 650 MG: 325 TABLET ORAL at 11:47

## 2023-06-17 RX ADMIN — BUPROPION HYDROCHLORIDE 150 MG: 150 TABLET, FILM COATED, EXTENDED RELEASE ORAL at 07:51

## 2023-06-17 RX ADMIN — IBUPROFEN 800 MG: 400 TABLET ORAL at 00:00

## 2023-06-17 RX ADMIN — LABETALOL HYDROCHLORIDE 300 MG: 200 TABLET, FILM COATED ORAL at 09:13

## 2023-06-17 RX ADMIN — DOCUSATE SODIUM 100 MG: 100 CAPSULE, LIQUID FILLED ORAL at 07:51

## 2023-06-17 RX ADMIN — LABETALOL HYDROCHLORIDE 300 MG: 200 TABLET, FILM COATED ORAL at 15:54

## 2023-06-17 RX ADMIN — CITALOPRAM HYDROBROMIDE 40 MG: 20 TABLET ORAL at 07:50

## 2023-06-17 RX ADMIN — FUROSEMIDE 20 MG: 20 TABLET ORAL at 09:13

## 2023-06-17 RX ADMIN — LABETALOL HYDROCHLORIDE 300 MG: 200 TABLET, FILM COATED ORAL at 21:14

## 2023-06-17 ASSESSMENT — ACTIVITIES OF DAILY LIVING (ADL)
ADLS_ACUITY_SCORE: 20

## 2023-06-17 NOTE — PLAN OF CARE
: MD at bedside for delivery, RN continuously at bedside.     :  of female infant at 1933, with delivery team present.     : 1st degree lac repaired, txa given per MAR.

## 2023-06-17 NOTE — PLAN OF CARE
Goal Outcome Evaluation:  Patient VSS. BP's slightly elevated overnight per flowsheet. Fundus and lochia WDL. Patient utilizing icepacks, spray and witch hazel on perineum. Pain being controled with PRN meds per MAR. Bonding well with infant.

## 2023-06-17 NOTE — PROVIDER NOTIFICATION
06/16/23 2101   Provider Notification   Provider Name/Title Dr. Lovelace   Method of Notification Electronic Page   Request Evaluate - Remote   Notification Reason Maternal Vital Sign Change     Updated proivder on but not limited to; patient increase in BP and having 2 pressures 160/90's range. Also updated on last dose of oral labetalol.  Received orders to give hydralazine 5mg IV.

## 2023-06-17 NOTE — ANESTHESIA POSTPROCEDURE EVALUATION
Patient: Vivi Mccall    Procedure: * No procedures listed *       Anesthesia Type:  Epidural    Note:  Disposition: Inpatient   Postop Pain Control: Uneventful            Sign Out: Well controlled pain   PONV: No   Neuro/Psych: Uneventful            Sign Out: Acceptable/Baseline neuro status   Airway/Respiratory: Uneventful            Sign Out: Acceptable/Baseline resp. status   CV/Hemodynamics: Uneventful            Sign Out: Acceptable CV status   Other NRE: NONE   DID A NON-ROUTINE EVENT OCCUR? No    Event details/Postop Comments:  Patient doing well.  Ambulating without difficulty. Denies residual numbness/weakness.  No complaints/concerns.           Last vitals:  Vitals:    06/17/23 0430 06/17/23 0753 06/17/23 1151   BP: (!) 140/89 (!) 148/100 (!) 142/90   Pulse: 73 70 74   Resp: 18 16 16   Temp: 36.6  C (97.8  F) 36.4  C (97.6  F)    SpO2:          Electronically Signed By: Mario Wilson MD  June 17, 2023  3:47 PM

## 2023-06-17 NOTE — L&D DELIVERY NOTE
of viable female at 1933   Baby's weight not yet recorded     Apgars 9, 9     Placenta delievered spontaneously and intact   Small first degree abrasion repaired with 4-0 vicryl       Primary OB: Park

## 2023-06-17 NOTE — PROGRESS NOTES
[facility]    Post-Partum Progress Note    Assessment & Plan   Assessment:  Post-partum day #1  Normal spontaneous vaginal delivery  Chronic hypertension    Doing well.  No excessive bleeding  Pain well-controlled.  Mildly elevated blood pressures    Plan:  Ambulation encouraged  Hemoglobin in AM  Lactation consultation  Monitor wound for signs of infection  Reportable signs and symptoms dicussed with the patient  Close blood pressure monitoring; increase labetolol to 300mg TID with room to increase as needed; will also give one dose oral lasix today to help with fluid mobilization  Anticipate discharge tomorrow to clinda treated GBS     Anamaria Park MD     Interval History   Doing well.  Pain is well-controlled.  No fevers.  No history of foul-smelling vaginal discharge.  Good appetite.  Denies chest pain, shortness of breath, nausea or vomiting.  Vaginal bleeding is similar to a heavy menstrual flow.  Ambulatory.  Breastfeeding well.  Denies headache, n/v    Medications     - MEDICATION INSTRUCTIONS -       - MEDICATION INSTRUCTIONS -       oxytocin in 0.9% NaCl         albuterol  2 puff Inhalation Q6H     buPROPion  150 mg Oral QAM     citalopram  40 mg Oral Daily     docusate sodium  100 mg Oral Daily     furosemide  20 mg Oral Daily     labetalol  300 mg Oral TID       Physical Exam   Temp: 97.6  F (36.4  C) Temp src: Oral BP: (!) 148/100 Pulse: 70   Resp: 16 SpO2: 100 % O2 Device: None (Room air)    Vitals:    06/17/23 0430   Weight: 102.8 kg (226 lb 10.1 oz)     Vital Signs with Ranges  Temp:  [97.6  F (36.4  C)-98.8  F (37.1  C)] 97.6  F (36.4  C)  Pulse:  [65-92] 70  Resp:  [16-18] 16  BP: (129-183)/() 148/100  SpO2:  [95 %-100 %] 100 %  I/O last 3 completed shifts:  In: -   Out: 790 [Urine:350; Blood:440]    Uterine fundus is firm, non-tender and at the level of the umbilicus  Extremities Non-tender    Data   Recent Labs   Lab Test 06/15/23  2031 12/13/22  1022  12/27/19  0843   ABO  --   --  O   RH  --   --  Pos   AS Negative   < > Neg    < > = values in this interval not displayed.     Recent Labs   Lab Test 06/15/23  2031 06/11/23  2353   HGB 11.0* 10.7*     Recent Labs   Lab Test 12/13/22  1021   RUQIGG Positive

## 2023-06-17 NOTE — PLAN OF CARE
Vital signs stable./86  On labetalol 300 mg TID denies headache, blurred vision and epigastric pain +2 reflex no clonus Hgb 10.2 today  Postpartum assessment WDL. Pain controlled with tylenol and motrin  Patient up ambulating voiding without difficulty. On lasix daily working on Breastfeeding . Patient and infant bonding well. Will continue with current plan of care.

## 2023-06-17 NOTE — PLAN OF CARE
2200: Patient up to bathroom via ambulation with stand by assist. Voided, and educated on pericares and expected bleeding. Patient verbalized understanding.     Patient transferred up to post-partum room 422 via wheelchair with infant in arms, both in stable condition.      Educated on call light use, and when to call RN. Educated on expectant length of stay and post partum unit routines. Patient and spouse verbalized understanding.

## 2023-06-17 NOTE — L&D DELIVERY NOTE
Delivery Date: 06/16/2023    DELIVERY SUMMARY:  Vivi is a 37-year-old G3, P1-0-1-1, who was brought to Labor and Delivery at 37+6 weeks' gestation for cervical ripening due to chronic hypertension in pregnancy.  Vivi's pregnancy was complicated by advanced maternal age, for which she had a normal [employer] test, alpha fetoprotein testing and level 2 ultrasound.  Vivi also has a history of chronic hypertension and was maintained on labetalol t.i.d. throughout the majority of her pregnancy.  Vivi had several evaluations including PIH labs, all of which were within normal limits including upon admission.  Vivi was maintained on daily aspirin, had monthly growth ultrasounds in the third trimester and had weekly biophysical profiles beginning at 32 weeks' gestation.  Prenatal laboratories were within normal limits including blood type O positive, rubella status immune, Glucola within normal limits and GBS positive.  Vivi had COVID infection early in the pregnancy with no residual are lasting effects.  Estimated fetal weight based on most recent growth ultrasound was 7 to 7-1/2 pounds.    On arrival to Labor and Delivery, Vivi was found to be closed and -3 station.  She received 3 doses of vaginal Cytotec overnight with minimal change the following morning.  She was started on IV Pitocin, which was gradually increased throughout the day.  Vivi received an epidural for pain control, after which she was found to be 2-3 centimeters dilated, 50% effaced, and -2.  We performed artificial rupture of membranes for clear fluid and continued Pitocin augmentation.  IV clindamycin had been started with initiation of Pitocin.  Fetal heart tones remained reactive throughout the labor process with baseline in the 130s with moderate variability and accelerations present.  Ele had occasional variable decelerations throughout the latter part of her labor process.  Upon becoming complete Vivi and was feeling  pressure immediately with excellent maternal effort, delivered a female infant in the left occiput anterior position with only 4 pushes.  No nuchal cords were noted.  Remainder of infant was delivered and placed on maternal abdomen.  NICU team was present for delivery given antidepressant medications.  Infant was immediately vigorous and required no intervention.  Placenta was delivered spontaneously and intact.  Adela had fairly brisk bleeding initially prior to separation of placenta and received 1 dose of IV tranexamic acid.  With delivery of placenta Pitocin was started.  Fundus was firm and bleeding was much improved.  Very small perineal abrasion was reapproximated using a 4-0 Vicryl in an interrupted stitch.  Both mother and infant were doing very well following delivery and are stable for recovery.  We will continue to monitor Adela's blood pressures closely throughout her postpartum stay.    DELIVERY TIMES:  First stage of labor 50 minutes, second stage of labor 13 minutes, third stage of labor 9 minutes.    FINAL DIAGNOSES:    1.  Successful induction of labor at 38 + 0 weeks' gestation for chronic hypertension with delivery of a female infant with a weight not yet recorded and Apgars of 9 and 9.  2.  Chronic hypertension.  3.  Advanced maternal age.  4.  Maternal depression/anxiety.  5.  History of maternal COVID infection.  6.  Epidural for pain control.    Anamaria Park MD        D: 2023   T: 2023   MT: caity    Name:     ADELA LIVINGSTON  MRN:      [id number]        Account:       [id number]   :      1985           Delivery Date: 2023     Document: [id number]

## 2023-06-17 NOTE — PLAN OF CARE
Vital signs stable no severe range with recently increased labetalol dosage. Postpartum assessment WDL. Pain controlled with ibuprofen and tylenol when requested. Patient voiding without difficulty. Breastfeeding on cue with minimal assist. Patient and infant bonding well. Will continue with current plan of care.

## 2023-06-18 VITALS
WEIGHT: 223.33 LBS | TEMPERATURE: 98 F | SYSTOLIC BLOOD PRESSURE: 148 MMHG | BODY MASS INDEX: 33.96 KG/M2 | RESPIRATION RATE: 18 BRPM | DIASTOLIC BLOOD PRESSURE: 97 MMHG | OXYGEN SATURATION: 100 % | HEART RATE: 74 BPM

## 2023-06-18 PROCEDURE — 250N000013 HC RX MED GY IP 250 OP 250 PS 637: Performed by: OBSTETRICS & GYNECOLOGY

## 2023-06-18 RX ORDER — ACETAMINOPHEN 325 MG/1
650 TABLET ORAL EVERY 4 HOURS PRN
Qty: 60 TABLET | Refills: 1 | Status: SHIPPED | OUTPATIENT
Start: 2023-06-18 | End: 2023-10-23

## 2023-06-18 RX ORDER — LABETALOL 200 MG/1
400 TABLET, FILM COATED ORAL 3 TIMES DAILY
COMMUNITY
Start: 2023-06-18 | End: 2023-06-22

## 2023-06-18 RX ORDER — IBUPROFEN 600 MG/1
600 TABLET, FILM COATED ORAL EVERY 6 HOURS PRN
Qty: 60 TABLET | Refills: 1 | Status: SHIPPED | OUTPATIENT
Start: 2023-06-18 | End: 2023-10-23

## 2023-06-18 RX ORDER — LABETALOL 200 MG/1
400 TABLET, FILM COATED ORAL 3 TIMES DAILY
Status: DISCONTINUED | OUTPATIENT
Start: 2023-06-18 | End: 2023-06-18 | Stop reason: HOSPADM

## 2023-06-18 RX ADMIN — LABETALOL HYDROCHLORIDE 400 MG: 200 TABLET, FILM COATED ORAL at 08:31

## 2023-06-18 RX ADMIN — IBUPROFEN 800 MG: 400 TABLET ORAL at 07:14

## 2023-06-18 RX ADMIN — DOCUSATE SODIUM 100 MG: 100 CAPSULE, LIQUID FILLED ORAL at 08:09

## 2023-06-18 RX ADMIN — BUPROPION HYDROCHLORIDE 150 MG: 150 TABLET, FILM COATED, EXTENDED RELEASE ORAL at 08:10

## 2023-06-18 RX ADMIN — FUROSEMIDE 20 MG: 20 TABLET ORAL at 08:10

## 2023-06-18 RX ADMIN — ACETAMINOPHEN 650 MG: 325 TABLET ORAL at 07:14

## 2023-06-18 RX ADMIN — CITALOPRAM HYDROBROMIDE 40 MG: 20 TABLET ORAL at 08:10

## 2023-06-18 ASSESSMENT — ACTIVITIES OF DAILY LIVING (ADL)
ADLS_ACUITY_SCORE: 20

## 2023-06-18 NOTE — PLAN OF CARE
D: VSS, assessments WDL.   I: Pt. received complete discharge paperwork and home medications as filled by discharge pharmacy  Pt. was given times of last dose for all discharge medications in writing on discharge medication sheets.  Discharge teaching included home medication, pain management, activity restrictions, postpartum cares, and signs and symptoms of infection.    A: Discharge outcomes on care plan met.  Mother states understanding and comfort with self care and follow up care.   P: Pt. Discharged.  Pt. was accompanied by Spouse and left with personal belongings.   Pt. to follow up with OB provider per discharge instructions.  Pt. had no further questions at the time of discharge and no unmet needs were identified.

## 2023-06-18 NOTE — LACTATION NOTE
Initial and discharge visit with Mother and baby girl.  Mother states breast feeding is going well so far.  She breast fed her first child for about a year.  LC reminded Mother of the differences between feeding a one year old versus a one day old.  LC reviewed the basics of breast feeding and how to help latch a  versus an older child.  Mother feels like it is going well although she states it does hurt some getting her latched on.  LC reviewed the importance of getting a deep latch with feedings versus a shallow latch.  Physiology of milk supply and milk production explained to Mother.   Mother educated on normal  behavior, focusing on normal feeding patterns from birth to day 3 of life. Breast feeding general information reviewed.    Appreciative of visit.  No further questions at this time.   Reviewed follow up with outpatient lactation consultant in pediatrician clinic as needed.    Beba Whyte RN, IBCLC

## 2023-06-18 NOTE — PROGRESS NOTES
[facility]    Post-Partum Progress Note    Assessment & Plan   Assessment:  Post-partum day #2  Normal spontaneous vaginal delivery    Doing well.  No excessive bleeding  Pain well-controlled.    Plan:  Ambulation encouraged  Pain control measures as needed  Reportable signs and symptoms dicussed with the patient  Blood pressures continue to be mildly elevated; will increase home labetolol to 400mg TID and Vivi will call in blood pressures in 2-3 days.  Blood pressure precautions discussed  Discharge later today    Anamaria Park MD     Interval History   Doing well.  Pain is well-controlled.  No fevers.  No history of foul-smelling vaginal discharge.  Good appetite.  Denies chest pain, shortness of breath, nausea or vomiting.  Vaginal bleeding is similar to a heavy menstrual flow.  Ambulatory.  Breastfeeding well.  NO issues overnight.  No headache/nausea/vomiting    Medications     - MEDICATION INSTRUCTIONS -       - MEDICATION INSTRUCTIONS -       oxytocin in 0.9% NaCl         albuterol  2 puff Inhalation Q6H     buPROPion  150 mg Oral QAM     citalopram  40 mg Oral Daily     docusate sodium  100 mg Oral Daily     furosemide  20 mg Oral Daily     labetalol  400 mg Oral TID       Physical Exam   Temp: 98  F (36.7  C) Temp src: Oral BP: (!) 144/99 Pulse: 67   Resp: 18   O2 Device: None (Room air)    Vitals:    06/17/23 0430 06/18/23 0700   Weight: 102.8 kg (226 lb 10.1 oz) 101.3 kg (223 lb 5.2 oz)     Vital Signs with Ranges  Temp:  [98  F (36.7  C)-98.2  F (36.8  C)] 98  F (36.7  C)  Pulse:  [67-80] 67  Resp:  [16-18] 18  BP: (135-153)/() 144/99  I/O last 3 completed shifts:  In: 1000 [P.O.:1000]  Out: 300 [Urine:300]    Uterine fundus is firm, non-tender and at the level of the umbilicus  Extremities Non-tender    Data   Recent Labs   Lab Test 06/15/23  2031 12/13/22  1022 12/27/19  0843   ABO  --   --  O   RH  --   --  Pos   AS Negative   < > Neg    < > = values in this  interval not displayed.     Recent Labs   Lab Test 06/17/23  1559 06/15/23  2031   HGB 10.2* 11.0*     Recent Labs   Lab Test 12/13/22  1021   RUQIGG Positive

## 2023-06-18 NOTE — PROVIDER NOTIFICATION
06/18/23 1100   Provider Notification   Provider Name/Title Dr Park   Method of Notification Phone   Request Evaluate-Remote   Notification Reason Vital Signs Change   MD is OK to Discharge the patient with /97 and Pt  denies any sign and symptoms

## 2023-06-18 NOTE — DISCHARGE INSTRUCTIONS

## 2023-06-18 NOTE — PLAN OF CARE
Assessment and Plan:     Problem List Items Addressed This Visit        Respiratory    Mild persistent asthma without complication    Relevant Medications    albuterol (ProAir HFA) 90 mcg/act inhaler    ADVAIR DISKUS 100-50 MCG/DOSE inhaler       Cardiovascular and Mediastinum    Hypertension, essential, benign - Primary     Continue losartan 50mg  She must check her BP 1-2 times daily and call with her readings in 2 weeks  Goal is <140/80  Low salt diet discussed  She should call with h previous PCP's name/number so we can obtain records of her previous cardiac tests  She recalls getting stress tests and echo in the past           Relevant Medications    losartan (COZAAR) 50 mg tablet    Other Relevant Orders    Lipid panel    CBC    Comprehensive metabolic panel       Genitourinary    Stage 2 chronic kidney disease     Stable creatinine         Relevant Orders    Comprehensive metabolic panel       Other    Mixed hyperlipidemia     Continue atorvastatin 10mg         Relevant Orders    Lipid panel    Comprehensive metabolic panel      Other Visit Diagnoses     Encounter for screening mammogram for breast cancer        Relevant Orders    Mammo screening bilateral w cad    Medicare annual wellness visit, subsequent               Preventive health issues were discussed with patient, and age appropriate screening tests were ordered as noted in patient's After Visit Summary  Personalized health advice and appropriate referrals for health education or preventive services given if needed, as noted in patient's After Visit Summary  History of Present Illness:     Patient [facility] for [facility] to Medicare visit       Patient Care Team:  Gerry Velazquez MD as PCP - General (Internal Medicine)     Review of Systems:     Review of Systems   Problem List:     Patient Active Problem List   Diagnosis    Hypertension, essential, benign    Mixed hyperlipidemia    Stage 2 chronic kidney disease    Mild persistent asthma without BP's 130's-150's-80's-90's. AOVSS. Voiding adequately. Breastfeeding well. Denies pain.   Supportive spouse at bedside.    complication      Past Medical and Surgical History:     Past Medical History:   Diagnosis Date    Allergic     Asthma     Asthmatic bronchitis 4/17/2019    High cholesterol     Hyperglycemia 4/18/2019    Hypertension     Mild persistent asthma with acute exacerbation 1/8/2019     Past Surgical History:   Procedure Laterality Date    CATARACT EXTRACTION, BILATERAL Bilateral     OOPHORECTOMY      TONSILLECTOMY Bilateral       Family History:     Family History   Problem Relation Age of Onset    Hypertension Mother     Heart attack Mother     Hypertension Father     Stroke Father     Asthma Sister     Asthma Brother     Cancer Brother         intestine? Social History:        Social History     Socioeconomic History    Marital status: Single     Spouse name: None    Number of children: None    Years of education: None    Highest education level: None   Occupational History    None   Social Needs    Financial resource strain: None    Food insecurity:     Worry: None     Inability: None    Transportation needs:     Medical: None     Non-medical: None   Tobacco Use    Smoking status: Never Smoker    Smokeless tobacco: Never Used   Substance and Sexual Activity    Alcohol use:  Yes     Alcohol/week: 2 0 standard drinks     Types: 2 Glasses of wine per week     Frequency: 2-4 times a month     Drinks per session: 1 or 2    Drug use: No    Sexual activity: None   Lifestyle    Physical activity:     Days per week: None     Minutes per session: None    Stress: None   Relationships    Social connections:     Talks on phone: None     Gets together: None     Attends Sikh service: None     Active member of club or organization: None     Attends meetings of clubs or organizations: None     Relationship status: None    Intimate partner violence:     Fear of current or ex partner: None     Emotionally abused: None     Physically abused: None     Forced sexual activity: None   Other Topics Concern    None   Social History Narrative    None      Medications and Allergies:     Current Outpatient Medications   Medication Sig Dispense Refill    ADVAIR DISKUS 100-50 MCG/DOSE inhaler Inhale 1 puff 2 (two) times a day 3 each 3    atorvastatin (LIPITOR) 10 mg tablet Take 1 tablet (10 mg total) by mouth daily 90 tablet 1    losartan (COZAAR) 50 mg tablet Take 1 tablet (50 mg total) by mouth daily 90 tablet 3    albuterol (ProAir HFA) 90 mcg/act inhaler Inhale 2 puffs every 6 (six) hours as needed for wheezing 18 g 0    ipratropium (ATROVENT) 0 02 % nebulizer solution Take 1 vial (0 5 mg total) by nebulization every 6 (six) hours as needed for wheezing or shortness of breath (Patient not taking: Reported on 2/18/2020) 25 vial 0    levalbuterol (XOPENEX) 1 25 mg/0 5 mL nebulizer solution Take 0 5 mL (1 25 mg total) by nebulization every 6 (six) hours as needed for wheezing (Patient not taking: Reported on 2/18/2020) 30 vial 0     No current facility-administered medications for this visit  Allergies   Allergen Reactions    Eggs Or Egg-Derived Products Swelling and Vomiting      Immunizations:     Immunization History   Administered Date(s) Administered    Influenza, recombinant, quadrivalent,injectable, preservative free 01/08/2019      Health Maintenance:         Topic Date Due    CRC Screening: Colonoscopy  1948    Hepatitis C Screening  Completed         Topic Date Due    Pneumococcal Vaccine: 65+ Years (1 of 2 - PCV13) 04/26/2012      Medicare Screening Tests and Risk Assessments:     Nas Black is here for her Subsequent Wellness visit  Health Risk Assessment:   Patient rates overall health as good  Patient feels that their physical health rating is same  Eyesight was rated as same  Hearing was rated as same  Patient feels that their emotional and mental health rating is same  Pain experienced in the last 7 days has been none   Patient states that she has experienced no weight loss or gain in last 6 months  Depression Screening:   PHQ-2 Score: 0      Fall Risk Screening: In the past year, patient has experienced: no history of falling in past year      Urinary Incontinence Screening:   Patient has leaked urine accidently in the last six months  Home Safety:  Patient does not have trouble with stairs inside or outside of their home  Patient has working smoke alarms and has working carbon monoxide detector  Home safety hazards include: none  Nutrition:   Current diet is Regular  Medications:   Patient is currently taking over-the-counter supplements  OTC medications include: see medication list  Patient is able to manage medications  Activities of Daily Living (ADLs)/Instrumental Activities of Daily Living (IADLs):   Walk and transfer into and out of bed and chair?: Yes  Dress and groom yourself?: Yes    Bathe or shower yourself?: Yes    Feed yourself?  Yes  Do your laundry/housekeeping?: Yes  Manage your money, pay your bills and track your expenses?: Yes  Make your own meals?: Yes    Do your own shopping?: Yes    Durable Medical Equipment Suppliers  none    Previous Hospitalizations:   Any hospitalizations or ED visits within the last 12 months?: Yes    How many hospitalizations have you had in the last year?: 1-2    Hospitalization Comments: Asthma exacerbation in April    Advance Care Planning:   Living will: No    Durable POA for healthcare: No      Cognitive Screening:   Provider or family/friend/caregiver concerned regarding cognition?: No    PREVENTIVE SCREENINGS      Cardiovascular Screening:    General: Screening Not Indicated and History Lipid Disorder      Diabetes Screening:     General: Screening Current      Colorectal Cancer Screening:     General: Risks and Benefits Discussed and Patient Declines      Breast Cancer Screening:     General: Risks and Benefits Discussed    Due for: Mammogram        Cervical Cancer Screening:    General: Screening Not Indicated Osteoporosis Screening:    General: Risks and Benefits Discussed and Patient Declines      Abdominal Aortic Aneurysm (AAA) Screening:        General: Screening Not Indicated      Lung Cancer Screening:     General: Screening Not Indicated      Hepatitis C Screening:    General: Screening Current    No exam data present     Physical Exam:     BP (!) 178/100   Pulse 82   Temp 98 1 °F (36 7 °C) (Oral)   Resp 16   Ht 5' 2 6" (1 59 m)   Wt 59 6 kg (131 lb 6 4 oz)   SpO2 99%   BMI 23 57 kg/m²     Physical Exam    Kecia Menard MD

## 2023-06-18 NOTE — PLAN OF CARE
Vital signs stable. BP remains 140/90's started on labetalol 400 mg TID Pt stated blurred vision on one eye comes and goes normal per patient denies headache and epigastric pain MD aware +2 reflex no clonus Postpartum assessment WDL. Pain controlled with tylenol and motrin . Patient up ambulating voiding without difficulty. Breastfeeding well  Patient and infant bonding well. Discharge today Will continue with current plan of care.

## 2023-06-20 ENCOUNTER — MYC MEDICAL ADVICE (OUTPATIENT)
Dept: OBGYN | Facility: CLINIC | Age: 38
End: 2023-06-20
Payer: COMMERCIAL

## 2023-06-20 ENCOUNTER — TELEPHONE (OUTPATIENT)
Dept: OBGYN | Facility: CLINIC | Age: 38
End: 2023-06-20
Payer: COMMERCIAL

## 2023-06-20 RX ORDER — LABETALOL 200 MG/1
TABLET, FILM COATED ORAL
Qty: 810 TABLET | OUTPATIENT
Start: 2023-06-20

## 2023-06-20 RX ORDER — LABETALOL 200 MG/1
600 TABLET, FILM COATED ORAL
Qty: 270 TABLET | Refills: 0 | Status: SHIPPED | OUTPATIENT
Start: 2023-06-20 | End: 2023-06-22

## 2023-06-20 NOTE — TELEPHONE ENCOUNTER
We have 2 options with her blood pressures --I am confident that this is her chronic HTN as opposed to Pre-eclampsia.  We can either go up to 600mg TID of her labetalol or we can add procardia XL 30mg in the AM with first does to be taken whenever she would pick it up.   The only reason I'm giving her the option is that she didn't love procardia during her pregnancy.  Both are reasonable options.  I want her to send BPs again on Friday AM (you can call me on my cell with results).    With regards to her arm lump, the only thing I can think of is cool packs and to continue feeding on that side consistently to keep it drained

## 2023-06-20 NOTE — TELEPHONE ENCOUNTER
Pt called and telephone encounter started with this message notes.    Closing this as duplicate.    Jael Nelson RN on 6/20/2023 at 1:29 PM

## 2023-06-20 NOTE — TELEPHONE ENCOUNTER
Pt feels like prior to pregnancy she got headaches w the nifedipine so went with the Labetalol 600 mg TID - rx sent. (she preferred the 200 mg tablets)  She will start w next dose due within the hour.    Appreciated the advice on the axillary nipple leakage. Recommended taping a breast pad to area as well.    She will send BP readings on Friday via mychart  Call w worsening sx or BP.    Pt verbalized understanding, in agreement with plan, and voiced no further questions.  Jael Nelson RN on 6/20/2023 at 2:05 PM

## 2023-06-20 NOTE — TELEPHONE ENCOUNTER
23   Labetalol 400 mg 3 times a day     - 11:30a- 153/96 (took meds)  1:50p- 151/93  2:00p- 142/92  6:/91  8:40- 169/103 (started feeling light headed ,swelling in legs)  9:10p-161/91  12a-147/84  1:30a-155/82- (took meds)  - 9:30a- 128/79              1:15p-  160/101     Swelling in her legs; HA now but unsure if it's d/t allergies and being tired. No piercing or pressure.  Took Tylenol/Ibuprofen this morning and is due for another soon.    #2 issue:   Another thing that has come up involves the lump under my right arm. As you may have seen in my notes it was diagnosed as auxiliary breast tissue. Well last night when my milk came in the lump under my arm began to leak heavily. Is there anything I can do about this to stop it without affecting everything else?      Routing to Dr Park to advise on BP readings and sx.    Jael Nelson RN on 2023 at 1:30 PM

## 2023-06-22 ENCOUNTER — OFFICE VISIT (OUTPATIENT)
Dept: FAMILY MEDICINE | Facility: CLINIC | Age: 38
End: 2023-06-22
Payer: COMMERCIAL

## 2023-06-22 VITALS
WEIGHT: 209 LBS | RESPIRATION RATE: 18 BRPM | HEART RATE: 78 BPM | HEIGHT: 68 IN | BODY MASS INDEX: 31.67 KG/M2 | TEMPERATURE: 98.1 F | SYSTOLIC BLOOD PRESSURE: 111 MMHG | OXYGEN SATURATION: 97 % | DIASTOLIC BLOOD PRESSURE: 75 MMHG

## 2023-06-22 DIAGNOSIS — F33.1 MAJOR DEPRESSIVE DISORDER, RECURRENT EPISODE, MODERATE (H): ICD-10-CM

## 2023-06-22 DIAGNOSIS — N64.59 ENGORGED BREASTS: ICD-10-CM

## 2023-06-22 DIAGNOSIS — M77.11 LATERAL EPICONDYLITIS OF RIGHT ELBOW: Primary | ICD-10-CM

## 2023-06-22 DIAGNOSIS — Q83.1 AXILLARY ACCESSORY BREAST TISSUE: ICD-10-CM

## 2023-06-22 PROCEDURE — 99215 OFFICE O/P EST HI 40 MIN: CPT | Performed by: FAMILY MEDICINE

## 2023-06-22 RX ORDER — LABETALOL 200 MG/1
600-800 TABLET, FILM COATED ORAL
Status: ON HOLD | COMMUNITY
Start: 2023-06-22 | End: 2023-06-29

## 2023-06-22 ASSESSMENT — PATIENT HEALTH QUESTIONNAIRE - PHQ9
10. IF YOU CHECKED OFF ANY PROBLEMS, HOW DIFFICULT HAVE THESE PROBLEMS MADE IT FOR YOU TO DO YOUR WORK, TAKE CARE OF THINGS AT HOME, OR GET ALONG WITH OTHER PEOPLE: NOT DIFFICULT AT ALL
SUM OF ALL RESPONSES TO PHQ QUESTIONS 1-9: 0
SUM OF ALL RESPONSES TO PHQ QUESTIONS 1-9: 0

## 2023-06-22 ASSESSMENT — ASTHMA QUESTIONNAIRES
ACT_TOTALSCORE: 24
QUESTION_5 LAST FOUR WEEKS HOW WOULD YOU RATE YOUR ASTHMA CONTROL: WELL CONTROLLED
QUESTION_1 LAST FOUR WEEKS HOW MUCH OF THE TIME DID YOUR ASTHMA KEEP YOU FROM GETTING AS MUCH DONE AT WORK, SCHOOL OR AT HOME: NONE OF THE TIME
QUESTION_3 LAST FOUR WEEKS HOW OFTEN DID YOUR ASTHMA SYMPTOMS (WHEEZING, COUGHING, SHORTNESS OF BREATH, CHEST TIGHTNESS OR PAIN) WAKE YOU UP AT NIGHT OR EARLIER THAN USUAL IN THE MORNING: NOT AT ALL
QUESTION_2 LAST FOUR WEEKS HOW OFTEN HAVE YOU HAD SHORTNESS OF BREATH: NOT AT ALL
QUESTION_4 LAST FOUR WEEKS HOW OFTEN HAVE YOU USED YOUR RESCUE INHALER OR NEBULIZER MEDICATION (SUCH AS ALBUTEROL): NOT AT ALL
ACT_TOTALSCORE: 24

## 2023-06-22 NOTE — PROGRESS NOTES
"  Assessment & Plan   37 year old female  G2 Now P2 & 6 day post partum & Nursing.    Lateral epicondylitis of right elbow  Plan: avoid repetitive motions on affected arm  Cold or warm pack  NSAID with meals as needed   Wear arm band for tendon relief most of the time.  consider PHYSICAL THERAPY in few weeks if not improving      - Miscellaneous Order for DME - ONLY FOR DME    Engorged breasts  Axillary accessory breast tissue  Plan: continue to nurse.  May use breast pump if breast more engorged.  Axillary breast tissue- she has surgical consultation referral in hand to consider surgical option as with nurse ing the axillary accessory tissue is  Also engorged and seem to be tender- no signs of infection.  No need for antibiotic  But they have plans to consider surgical consultation to consider excision of accessory tissues      Postpartum hypertension  Plan: Blood pressure stable .  Currently on   - labetalol (NORMODYNE) 200 MG tablet; Take 3 tablets (600 mg) by mouth 3 times daily 3 tables; 3 times daily      Major depressive disorder, recurrent episode, moderate (H)  anxety  - considered this problem when making today's plans  - no interventions today       -followed by primary care physicain           I spent a total of 45 minutes on the day of the visit.   Time spent by me doing chart review, history and exam, documentation and further activities per the note       BMI:   Estimated body mass index is 31.78 kg/m  as calculated from the following:    Height as of this encounter: 1.727 m (5' 8\").    Weight as of this encounter: 94.8 kg (209 lb).           Anuradha Allen MD  [facility]   Vivi is a 37 year old, presenting for the following health issues:  Postpartum Complications    History of Present Illness       Reason for visit:  Postpartum check up  Symptom onset:  3-7 days ago    She eats 2-3 servings of fruits and vegetables daily.She consumes 2 sweetened beverage(s) " "daily.She exercises with enough effort to increase her heart rate 20 to 29 minutes per day.  She exercises with enough effort to increase her heart rate 3 or less days per week. She is missing 2 dose(s) of medications per week.    Today's PHQ-9         PHQ-9 Total Score: 0    PHQ-9 Q9 Thoughts of better off dead/self-harm past 2 weeks :   Not at all    How difficult have these problems made it for you to do your work, take care of things at home, or get along with other people: Not difficult at all         Concern - Post Partum Concern   Onset: 6 days Post Partum   Description: Has noticed that when with lactating located in right   Axillary   Noticed right hand numbness - started last 2-3 weeks of pregnancy    - Radiated to the forearm on the same side   Additional symptoms include tingling   Intensity: moderate  Progression of Symptoms:  worsening  Accompanying Signs & Symptoms: None   Previous history of similar problem: None   Precipitating factors:        Worsened by: None   Alleviating factors:        Improved by: None   Therapies tried and outcome: None  4/30- PROMINENET BREAST TISSUE WITH TENDERNESS- SEEN in [facility]- treated with antibiotic cephelex three times daily for 10 day    5/24-ULTRASOUND BREAST- BILATERAL AXILLARY TISSUE    NORMAL VAGINA DELIVERY 6/16/23  New born nursing well on demand      She is rt handed  Noted wrist, rt tendenrss within  Last 2-3 weeks of pregany      Review of Systems   Constitutional, HEENT, cardiovascular, pulmonary, GI, , musculoskeletal, neuro, skin, endocrine and psych systems are negative, except as otherwise noted.      Objective    /75   Pulse 78   Temp 98.1  F (36.7  C) (Temporal)   Resp 18   Ht 1.727 m (5' 8\")   Wt 94.8 kg (209 lb)   LMP 09/23/2022   SpO2 97%   Breastfeeding Unknown   BMI 31.78 kg/m    Body mass index is 31.78 kg/m .  Physical Exam   GENERAL: healthy, alert and no distress  Rt Foream exam:no obvious bruising or deformity.  Tenderness " localized a centimeter distal to lateral epicondyle.  Positive provocative maneuver eliciting pain include sedation against resistance and pain with resisting repeat.  Bilateral breast.  Engorged.  No redness.  No lump.  Right axillary region has accessory breast tissue.  No localized tenderness.  On pressure drainage.  NECK: no adenopathy, no asymmetry, masses, or scars and thyroid normal to palpation  RESP: lungs clear to auscultation - no rales, rhonchi or wheezes  CV: regular rate and rhythm, normal S1 S2, no S3 or S4, no murmur, click or rub, no peripheral edema and peripheral pulses strong  ABDOMEN: soft, nontender, no hepatosplenomegaly, no masses and bowel sounds normal  SKIN: no suspicious lesions or rashes  NEURO: Normal strength and tone, mentation intact and speech normal  PSYCH: mentation appears normal, affect normal/bright

## 2023-06-23 ENCOUNTER — TELEPHONE (OUTPATIENT)
Dept: OBGYN | Facility: CLINIC | Age: 38
End: 2023-06-23
Payer: COMMERCIAL

## 2023-06-23 NOTE — TELEPHONE ENCOUNTER
6/16/23: Delivery  Hx: CHTN  On Labetalol 600mg TID    6/21-- 9ft042/80, 3pm:155/82, 11pm: 143/86 157/99  6/22-- 9am 126/76, PCP office 111/73               Did take medication yesterday, but forgot to take readings            the rest of the day    States BPs elevate with activity, nothing more than short walks outside around her home. Trying to balance BPs and activity to keep her blood moving    6/23/23: 131/83 -- states she has been resting move of the morning working on breast feedings    Will call Dr Park to advise

## 2023-06-23 NOTE — TELEPHONE ENCOUNTER
Consulted Dr Park with BP over last few days  Pt to start 800mg labetalol AM, 800mg Labetalol mid-day, 600mg labetalol evening dosing to manage higher BP throughout the day    Pt to be extra diligent in tracking BPs over the weekend  Patient to call with readings around mid-day on Monday  Will consult Dr Park by cell on Monday as well.    Updated patient of Dr Park recommendations  Pt will be diligent in tracking BPs, update clinic on Monday around lunch.  Pt will notify clinic of low BP or feelings of dizziness/logh-headed, N/V, or increased fatigue  Pt does not need any refills of labetalol at this time.  Pt verbalized understanding, in agreement with plan, and voiced no further questions.  Skylar Dunn RN on 6/23/2023 at 1:17 PM

## 2023-06-24 ENCOUNTER — NURSE TRIAGE (OUTPATIENT)
Dept: NURSING | Facility: CLINIC | Age: 38
End: 2023-06-24
Payer: COMMERCIAL

## 2023-06-24 ENCOUNTER — HOSPITAL ENCOUNTER (INPATIENT)
Facility: CLINIC | Age: 38
LOS: 5 days | Discharge: HOME | DRG: 776 | End: 2023-06-29
Attending: EMERGENCY MEDICINE | Admitting: STUDENT IN AN ORGANIZED HEALTH CARE EDUCATION/TRAINING PROGRAM
Payer: COMMERCIAL

## 2023-06-24 DIAGNOSIS — F41.1 GAD (GENERALIZED ANXIETY DISORDER): Primary | ICD-10-CM

## 2023-06-24 DIAGNOSIS — O10.919 CHRONIC HYPERTENSION AFFECTING PREGNANCY: ICD-10-CM

## 2023-06-24 LAB
ALBUMIN SERPL BCG-MCNC: 3.3 G/DL (ref 3.5–5.2)
ALBUMIN SERPL BCG-MCNC: 3.4 G/DL (ref 3.5–5.2)
ALP SERPL-CCNC: 100 U/L (ref 35–104)
ALP SERPL-CCNC: 102 U/L (ref 35–104)
ALT SERPL W P-5'-P-CCNC: 26 U/L (ref 0–50)
ALT SERPL W P-5'-P-CCNC: 27 U/L (ref 0–50)
ANION GAP SERPL CALCULATED.3IONS-SCNC: 12 MMOL/L (ref 7–15)
AST SERPL W P-5'-P-CCNC: 30 U/L (ref 0–45)
AST SERPL W P-5'-P-CCNC: 32 U/L (ref 0–45)
ATRIAL RATE - MUSE: 71 BPM
BASOPHILS # BLD AUTO: 0 10E3/UL (ref 0–0.2)
BASOPHILS NFR BLD AUTO: 1 %
BILIRUB DIRECT SERPL-MCNC: <0.2 MG/DL (ref 0–0.3)
BILIRUB SERPL-MCNC: 0.4 MG/DL
BILIRUB SERPL-MCNC: 0.4 MG/DL
BUN SERPL-MCNC: 9.6 MG/DL (ref 6–20)
CALCIUM SERPL-MCNC: 8.4 MG/DL (ref 8.6–10)
CHLORIDE SERPL-SCNC: 106 MMOL/L (ref 98–107)
CREAT SERPL-MCNC: 0.85 MG/DL (ref 0.51–0.95)
DEPRECATED HCO3 PLAS-SCNC: 21 MMOL/L (ref 22–29)
DIASTOLIC BLOOD PRESSURE - MUSE: NORMAL MMHG
EOSINOPHIL # BLD AUTO: 0.4 10E3/UL (ref 0–0.7)
EOSINOPHIL NFR BLD AUTO: 5 %
ERYTHROCYTE [DISTWIDTH] IN BLOOD BY AUTOMATED COUNT: 13.2 % (ref 10–15)
GFR SERPL CREATININE-BSD FRML MDRD: 89 ML/MIN/1.73M2
GLUCOSE SERPL-MCNC: 125 MG/DL (ref 70–99)
HCT VFR BLD AUTO: 34.5 % (ref 35–47)
HGB BLD-MCNC: 11.6 G/DL (ref 11.7–15.7)
HOLD SPECIMEN: NORMAL
IMM GRANULOCYTES # BLD: 0 10E3/UL
IMM GRANULOCYTES NFR BLD: 0 %
INTERPRETATION ECG - MUSE: NORMAL
LIPASE SERPL-CCNC: 17 U/L (ref 13–60)
LYMPHOCYTES # BLD AUTO: 2.1 10E3/UL (ref 0.8–5.3)
LYMPHOCYTES NFR BLD AUTO: 27 %
MCH RBC QN AUTO: 31 PG (ref 26.5–33)
MCHC RBC AUTO-ENTMCNC: 33.6 G/DL (ref 31.5–36.5)
MCV RBC AUTO: 92 FL (ref 78–100)
MONOCYTES # BLD AUTO: 0.6 10E3/UL (ref 0–1.3)
MONOCYTES NFR BLD AUTO: 7 %
NEUTROPHILS # BLD AUTO: 4.6 10E3/UL (ref 1.6–8.3)
NEUTROPHILS NFR BLD AUTO: 60 %
NRBC # BLD AUTO: 0 10E3/UL
NRBC BLD AUTO-RTO: 0 /100
NT-PROBNP SERPL-MCNC: 80 PG/ML (ref 0–450)
P AXIS - MUSE: 53 DEGREES
PLATELET # BLD AUTO: 369 10E3/UL (ref 150–450)
POTASSIUM SERPL-SCNC: 3.4 MMOL/L (ref 3.4–5.3)
PR INTERVAL - MUSE: 162 MS
PROT SERPL-MCNC: 6.6 G/DL (ref 6.4–8.3)
PROT SERPL-MCNC: 6.7 G/DL (ref 6.4–8.3)
QRS DURATION - MUSE: 82 MS
QT - MUSE: 422 MS
QTC - MUSE: 458 MS
R AXIS - MUSE: 14 DEGREES
RBC # BLD AUTO: 3.74 10E6/UL (ref 3.8–5.2)
SODIUM SERPL-SCNC: 139 MMOL/L (ref 136–145)
SYSTOLIC BLOOD PRESSURE - MUSE: NORMAL MMHG
T AXIS - MUSE: 49 DEGREES
TROPONIN T SERPL HS-MCNC: <6 NG/L
VENTRICULAR RATE- MUSE: 71 BPM
WBC # BLD AUTO: 7.7 10E3/UL (ref 4–11)

## 2023-06-24 PROCEDURE — 85025 COMPLETE CBC W/AUTO DIFF WBC: CPT | Performed by: EMERGENCY MEDICINE

## 2023-06-24 PROCEDURE — 96375 TX/PRO/DX INJ NEW DRUG ADDON: CPT

## 2023-06-24 PROCEDURE — 36415 COLL VENOUS BLD VENIPUNCTURE: CPT | Performed by: STUDENT IN AN ORGANIZED HEALTH CARE EDUCATION/TRAINING PROGRAM

## 2023-06-24 PROCEDURE — 250N000011 HC RX IP 250 OP 636: Mod: JZ | Performed by: STUDENT IN AN ORGANIZED HEALTH CARE EDUCATION/TRAINING PROGRAM

## 2023-06-24 PROCEDURE — 83880 ASSAY OF NATRIURETIC PEPTIDE: CPT | Performed by: EMERGENCY MEDICINE

## 2023-06-24 PROCEDURE — 120N000012 HC R&B POSTPARTUM

## 2023-06-24 PROCEDURE — 80076 HEPATIC FUNCTION PANEL: CPT | Performed by: STUDENT IN AN ORGANIZED HEALTH CARE EDUCATION/TRAINING PROGRAM

## 2023-06-24 PROCEDURE — 250N000013 HC RX MED GY IP 250 OP 250 PS 637: Performed by: STUDENT IN AN ORGANIZED HEALTH CARE EDUCATION/TRAINING PROGRAM

## 2023-06-24 PROCEDURE — 93005 ELECTROCARDIOGRAM TRACING: CPT

## 2023-06-24 PROCEDURE — 83690 ASSAY OF LIPASE: CPT | Performed by: STUDENT IN AN ORGANIZED HEALTH CARE EDUCATION/TRAINING PROGRAM

## 2023-06-24 PROCEDURE — 99291 CRITICAL CARE FIRST HOUR: CPT | Mod: 25

## 2023-06-24 PROCEDURE — 82248 BILIRUBIN DIRECT: CPT | Performed by: EMERGENCY MEDICINE

## 2023-06-24 PROCEDURE — 36415 COLL VENOUS BLD VENIPUNCTURE: CPT | Performed by: EMERGENCY MEDICINE

## 2023-06-24 PROCEDURE — 96374 THER/PROPH/DIAG INJ IV PUSH: CPT

## 2023-06-24 PROCEDURE — 250N000011 HC RX IP 250 OP 636: Mod: JZ | Performed by: EMERGENCY MEDICINE

## 2023-06-24 PROCEDURE — 84484 ASSAY OF TROPONIN QUANT: CPT | Performed by: EMERGENCY MEDICINE

## 2023-06-24 PROCEDURE — 84450 TRANSFERASE (AST) (SGOT): CPT | Performed by: STUDENT IN AN ORGANIZED HEALTH CARE EDUCATION/TRAINING PROGRAM

## 2023-06-24 PROCEDURE — 84155 ASSAY OF PROTEIN SERUM: CPT | Performed by: STUDENT IN AN ORGANIZED HEALTH CARE EDUCATION/TRAINING PROGRAM

## 2023-06-24 PROCEDURE — 258N000003 HC RX IP 258 OP 636: Performed by: STUDENT IN AN ORGANIZED HEALTH CARE EDUCATION/TRAINING PROGRAM

## 2023-06-24 PROCEDURE — 85027 COMPLETE CBC AUTOMATED: CPT | Performed by: STUDENT IN AN ORGANIZED HEALTH CARE EDUCATION/TRAINING PROGRAM

## 2023-06-24 RX ORDER — MAGNESIUM SULFATE IN WATER 40 MG/ML
2 INJECTION, SOLUTION INTRAVENOUS CONTINUOUS
Status: DISPENSED | OUTPATIENT
Start: 2023-06-24 | End: 2023-06-25

## 2023-06-24 RX ORDER — BUPROPION HYDROCHLORIDE 150 MG/1
150 TABLET ORAL EVERY MORNING
Status: DISCONTINUED | OUTPATIENT
Start: 2023-06-25 | End: 2023-06-29 | Stop reason: HOSPADM

## 2023-06-24 RX ORDER — IBUPROFEN 600 MG/1
600 TABLET, FILM COATED ORAL EVERY 6 HOURS PRN
Status: DISCONTINUED | OUTPATIENT
Start: 2023-06-24 | End: 2023-06-29 | Stop reason: HOSPADM

## 2023-06-24 RX ORDER — HYDRALAZINE HYDROCHLORIDE 10 MG/1
10 TABLET, FILM COATED ORAL 4 TIMES DAILY
Status: DISCONTINUED | OUTPATIENT
Start: 2023-06-24 | End: 2023-06-26 | Stop reason: DRUGHIGH

## 2023-06-24 RX ORDER — DOCUSATE SODIUM 100 MG/1
100 CAPSULE, LIQUID FILLED ORAL DAILY
Status: DISCONTINUED | OUTPATIENT
Start: 2023-06-25 | End: 2023-06-29 | Stop reason: HOSPADM

## 2023-06-24 RX ORDER — LIDOCAINE 40 MG/G
CREAM TOPICAL
Status: DISCONTINUED | OUTPATIENT
Start: 2023-06-24 | End: 2023-06-29 | Stop reason: HOSPADM

## 2023-06-24 RX ORDER — SODIUM CHLORIDE, SODIUM LACTATE, POTASSIUM CHLORIDE, CALCIUM CHLORIDE 600; 310; 30; 20 MG/100ML; MG/100ML; MG/100ML; MG/100ML
10-125 INJECTION, SOLUTION INTRAVENOUS CONTINUOUS
Status: DISCONTINUED | OUTPATIENT
Start: 2023-06-24 | End: 2023-06-26

## 2023-06-24 RX ORDER — CITALOPRAM HYDROBROMIDE 20 MG/1
40 TABLET ORAL DAILY
Status: DISCONTINUED | OUTPATIENT
Start: 2023-06-25 | End: 2023-06-29 | Stop reason: HOSPADM

## 2023-06-24 RX ORDER — LABETALOL 200 MG/1
800 TABLET, FILM COATED ORAL EVERY 8 HOURS SCHEDULED
Status: DISCONTINUED | OUTPATIENT
Start: 2023-06-24 | End: 2023-06-29 | Stop reason: HOSPADM

## 2023-06-24 RX ORDER — HYDRALAZINE HYDROCHLORIDE 20 MG/ML
10 INJECTION INTRAMUSCULAR; INTRAVENOUS
Status: DISCONTINUED | OUTPATIENT
Start: 2023-06-24 | End: 2023-06-29 | Stop reason: HOSPADM

## 2023-06-24 RX ORDER — ALBUTEROL SULFATE 90 UG/1
2 AEROSOL, METERED RESPIRATORY (INHALATION) EVERY 6 HOURS PRN
COMMUNITY
End: 2024-03-20

## 2023-06-24 RX ORDER — CALCIUM GLUCONATE 94 MG/ML
1 INJECTION, SOLUTION INTRAVENOUS
Status: DISCONTINUED | OUTPATIENT
Start: 2023-06-24 | End: 2023-06-29 | Stop reason: HOSPADM

## 2023-06-24 RX ORDER — BISACODYL 10 MG
10 SUPPOSITORY, RECTAL RECTAL DAILY PRN
Status: DISCONTINUED | OUTPATIENT
Start: 2023-06-24 | End: 2023-06-29 | Stop reason: HOSPADM

## 2023-06-24 RX ORDER — MAGNESIUM SULFATE HEPTAHYDRATE 40 MG/ML
4 INJECTION, SOLUTION INTRAVENOUS
Status: DISCONTINUED | OUTPATIENT
Start: 2023-06-24 | End: 2023-06-29 | Stop reason: HOSPADM

## 2023-06-24 RX ORDER — FUROSEMIDE 10 MG/ML
20 INJECTION INTRAMUSCULAR; INTRAVENOUS ONCE
Status: COMPLETED | OUTPATIENT
Start: 2023-06-24 | End: 2023-06-24

## 2023-06-24 RX ORDER — HYDRALAZINE HYDROCHLORIDE 20 MG/ML
10 INJECTION INTRAMUSCULAR; INTRAVENOUS
Status: DISCONTINUED | OUTPATIENT
Start: 2023-06-24 | End: 2023-06-24

## 2023-06-24 RX ORDER — FUROSEMIDE 20 MG
20 TABLET ORAL DAILY
Status: DISCONTINUED | OUTPATIENT
Start: 2023-06-25 | End: 2023-06-29 | Stop reason: HOSPADM

## 2023-06-24 RX ORDER — LABETALOL HYDROCHLORIDE 5 MG/ML
20-80 INJECTION, SOLUTION INTRAVENOUS EVERY 10 MIN PRN
Status: DISCONTINUED | OUTPATIENT
Start: 2023-06-24 | End: 2023-06-26

## 2023-06-24 RX ORDER — ALBUTEROL SULFATE 90 UG/1
2 AEROSOL, METERED RESPIRATORY (INHALATION) EVERY 6 HOURS PRN
Status: DISCONTINUED | OUTPATIENT
Start: 2023-06-24 | End: 2023-06-29 | Stop reason: HOSPADM

## 2023-06-24 RX ORDER — MAGNESIUM SULFATE HEPTAHYDRATE 40 MG/ML
2 INJECTION, SOLUTION INTRAVENOUS
Status: DISCONTINUED | OUTPATIENT
Start: 2023-06-24 | End: 2023-06-29 | Stop reason: HOSPADM

## 2023-06-24 RX ORDER — ACETAMINOPHEN 500 MG
1000 TABLET ORAL ONCE
Status: COMPLETED | OUTPATIENT
Start: 2023-06-24 | End: 2023-06-24

## 2023-06-24 RX ORDER — LABETALOL HYDROCHLORIDE 5 MG/ML
20-80 INJECTION, SOLUTION INTRAVENOUS EVERY 10 MIN PRN
Status: DISCONTINUED | OUTPATIENT
Start: 2023-06-24 | End: 2023-06-24

## 2023-06-24 RX ORDER — MAGNESIUM SULFATE HEPTAHYDRATE 40 MG/ML
4 INJECTION, SOLUTION INTRAVENOUS ONCE
Status: COMPLETED | OUTPATIENT
Start: 2023-06-24 | End: 2023-06-24

## 2023-06-24 RX ORDER — ACETAMINOPHEN 325 MG/1
650 TABLET ORAL EVERY 4 HOURS PRN
Status: DISCONTINUED | OUTPATIENT
Start: 2023-06-24 | End: 2023-06-29 | Stop reason: HOSPADM

## 2023-06-24 RX ADMIN — ACETAMINOPHEN 1000 MG: 500 TABLET ORAL at 23:42

## 2023-06-24 RX ADMIN — LABETALOL HYDROCHLORIDE 800 MG: 200 TABLET, FILM COATED ORAL at 21:52

## 2023-06-24 RX ADMIN — SODIUM CHLORIDE, POTASSIUM CHLORIDE, SODIUM LACTATE AND CALCIUM CHLORIDE 75 ML/HR: 600; 310; 30; 20 INJECTION, SOLUTION INTRAVENOUS at 22:25

## 2023-06-24 RX ADMIN — LABETALOL HYDROCHLORIDE 80 MG: 5 INJECTION INTRAVENOUS at 19:44

## 2023-06-24 RX ADMIN — LABETALOL HYDROCHLORIDE 40 MG: 5 INJECTION INTRAVENOUS at 19:33

## 2023-06-24 RX ADMIN — HYDRALAZINE HYDROCHLORIDE 10 MG: 20 INJECTION INTRAMUSCULAR; INTRAVENOUS at 19:58

## 2023-06-24 RX ADMIN — LABETALOL HYDROCHLORIDE 20 MG: 5 INJECTION INTRAVENOUS at 19:18

## 2023-06-24 RX ADMIN — FUROSEMIDE 20 MG: 10 INJECTION, SOLUTION INTRAMUSCULAR; INTRAVENOUS at 22:21

## 2023-06-24 RX ADMIN — HYDRALAZINE HYDROCHLORIDE 10 MG: 10 TABLET ORAL at 21:58

## 2023-06-24 RX ADMIN — MAGNESIUM SULFATE IN WATER 2 G/HR: 40 INJECTION, SOLUTION INTRAVENOUS at 22:26

## 2023-06-24 RX ADMIN — MAGNESIUM SULFATE HEPTAHYDRATE 4 G: 40 INJECTION, SOLUTION INTRAVENOUS at 21:43

## 2023-06-24 ASSESSMENT — ACTIVITIES OF DAILY LIVING (ADL)
ADLS_ACUITY_SCORE: 35
ADLS_ACUITY_SCORE: 35

## 2023-06-24 NOTE — ED TRIAGE NOTES
Pt present 8 days postpartum and experiencing high BP recently increased labetalol. Pt reading at 174/109     Pt has hx of chronic HTN denies needing to have magnesium during labor in the hospital     last labetalol dose at 1630    Pt reports Ha and Chest tightness with numbness in the end of fingers but has dx of tendonitis     CP rated 5 /10 mid sternal with no travel

## 2023-06-24 NOTE — TELEPHONE ENCOUNTER
Triage Call:    Patient calling to report hypertension.  She is 8 days postpartum.  She reports that she was advised to call if BP >160/110.  She reports her labetalol was increased yesterday to 800 mg BID and 600 mg once daily.  Her last dose was at 1630 with BP at 174/105 at 1700.  She reports tendinitis causing numbness to right fingers for the past couple of weeks.  She reports tightness in her chest for a couple of days, denies history of cardiac diagnosis.    According to the protocol, patient should go to ED now.  Care advice given. Patient verbalizes understanding and agrees with plan of care. Patient plans to go to [facility] ED.    Sherie Christian RN  06/24/23 5:43 PM  [facility] Nurse Advisor    Reason for Disposition    [1] Systolic BP  >= 160 OR Diastolic >= 100 AND [2] cardiac or neurologic symptoms (e.g., chest pain, difficulty breathing, unsteady gait, blurred vision)    Additional Information    Negative: Difficult to awaken or acting confused (e.g., disoriented, slurred speech)    Negative: Severe difficulty breathing (e.g., struggling for each breath, speaks in single words)    Negative: [1] Weakness of the face, arm or leg on one side of the body AND [2] new onset    Negative: [1] Numbness (i.e., loss of sensation) of the face, arm or leg on one side of the body AND [2] new onset    Negative: [1] Chest pain lasts > 5 minutes AND [2] history of heart disease  (i.e., heart attack, bypass surgery, angina, angioplasty, CHF)    Negative: [1] Chest pain AND [2] took nitrogylcerin AND [3] pain was not relieved    Negative: Sounds like a life-threatening emergency to the triager    Negative: Symptom is main concern  (e.g., headache, chest pain)    Negative: Low blood pressure is main concern    Protocols used: HIGH BLOOD PRESSURE-A-

## 2023-06-25 LAB
ALBUMIN SERPL BCG-MCNC: 3.5 G/DL (ref 3.5–5.2)
ALP SERPL-CCNC: 104 U/L (ref 35–104)
ALT SERPL W P-5'-P-CCNC: 24 U/L (ref 0–50)
ALT SERPL W P-5'-P-CCNC: 26 U/L (ref 0–50)
ANION GAP SERPL CALCULATED.3IONS-SCNC: 13 MMOL/L (ref 7–15)
AST SERPL W P-5'-P-CCNC: 24 U/L (ref 0–45)
AST SERPL W P-5'-P-CCNC: 27 U/L (ref 0–45)
BILIRUB SERPL-MCNC: 0.4 MG/DL
BUN SERPL-MCNC: 9.2 MG/DL (ref 6–20)
CALCIUM SERPL-MCNC: 8.5 MG/DL (ref 8.6–10)
CHLORIDE SERPL-SCNC: 102 MMOL/L (ref 98–107)
CREAT SERPL-MCNC: 0.82 MG/DL (ref 0.51–0.95)
CREAT SERPL-MCNC: 0.85 MG/DL (ref 0.51–0.95)
DEPRECATED HCO3 PLAS-SCNC: 23 MMOL/L (ref 22–29)
ERYTHROCYTE [DISTWIDTH] IN BLOOD BY AUTOMATED COUNT: 13.2 % (ref 10–15)
GFR SERPL CREATININE-BSD FRML MDRD: 89 ML/MIN/1.73M2
GFR SERPL CREATININE-BSD FRML MDRD: >90 ML/MIN/1.73M2
GLUCOSE SERPL-MCNC: 93 MG/DL (ref 70–99)
HCT VFR BLD AUTO: 36.8 % (ref 35–47)
HGB BLD-MCNC: 11.9 G/DL (ref 11.7–15.7)
HGB BLD-MCNC: 12.2 G/DL (ref 11.7–15.7)
MCH RBC QN AUTO: 31.3 PG (ref 26.5–33)
MCHC RBC AUTO-ENTMCNC: 33.2 G/DL (ref 31.5–36.5)
MCV RBC AUTO: 94 FL (ref 78–100)
PLATELET # BLD AUTO: 361 10E3/UL (ref 150–450)
PLATELET # BLD AUTO: 378 10E3/UL (ref 150–450)
POTASSIUM SERPL-SCNC: 3.2 MMOL/L (ref 3.4–5.3)
PROT SERPL-MCNC: 6.9 G/DL (ref 6.4–8.3)
RBC # BLD AUTO: 3.9 10E6/UL (ref 3.8–5.2)
SODIUM SERPL-SCNC: 138 MMOL/L (ref 136–145)
WBC # BLD AUTO: 7.1 10E3/UL (ref 4–11)

## 2023-06-25 PROCEDURE — 36415 COLL VENOUS BLD VENIPUNCTURE: CPT | Performed by: STUDENT IN AN ORGANIZED HEALTH CARE EDUCATION/TRAINING PROGRAM

## 2023-06-25 PROCEDURE — 258N000003 HC RX IP 258 OP 636: Performed by: STUDENT IN AN ORGANIZED HEALTH CARE EDUCATION/TRAINING PROGRAM

## 2023-06-25 PROCEDURE — 250N000013 HC RX MED GY IP 250 OP 250 PS 637: Performed by: STUDENT IN AN ORGANIZED HEALTH CARE EDUCATION/TRAINING PROGRAM

## 2023-06-25 PROCEDURE — 84460 ALANINE AMINO (ALT) (SGPT): CPT | Performed by: STUDENT IN AN ORGANIZED HEALTH CARE EDUCATION/TRAINING PROGRAM

## 2023-06-25 PROCEDURE — 84450 TRANSFERASE (AST) (SGOT): CPT | Performed by: STUDENT IN AN ORGANIZED HEALTH CARE EDUCATION/TRAINING PROGRAM

## 2023-06-25 PROCEDURE — 85018 HEMOGLOBIN: CPT | Performed by: STUDENT IN AN ORGANIZED HEALTH CARE EDUCATION/TRAINING PROGRAM

## 2023-06-25 PROCEDURE — 250N000011 HC RX IP 250 OP 636: Mod: JZ | Performed by: OBSTETRICS & GYNECOLOGY

## 2023-06-25 PROCEDURE — 250N000011 HC RX IP 250 OP 636: Mod: JZ | Performed by: STUDENT IN AN ORGANIZED HEALTH CARE EDUCATION/TRAINING PROGRAM

## 2023-06-25 PROCEDURE — 85049 AUTOMATED PLATELET COUNT: CPT | Performed by: STUDENT IN AN ORGANIZED HEALTH CARE EDUCATION/TRAINING PROGRAM

## 2023-06-25 PROCEDURE — 120N000012 HC R&B POSTPARTUM

## 2023-06-25 PROCEDURE — 82565 ASSAY OF CREATININE: CPT | Performed by: STUDENT IN AN ORGANIZED HEALTH CARE EDUCATION/TRAINING PROGRAM

## 2023-06-25 RX ORDER — LABETALOL 200 MG/1
200 TABLET, FILM COATED ORAL ONCE
Status: DISCONTINUED | OUTPATIENT
Start: 2023-06-25 | End: 2023-06-25

## 2023-06-25 RX ADMIN — BUPROPION HYDROCHLORIDE 150 MG: 150 TABLET, FILM COATED, EXTENDED RELEASE ORAL at 08:28

## 2023-06-25 RX ADMIN — ACETAMINOPHEN 650 MG: 325 TABLET, FILM COATED ORAL at 06:11

## 2023-06-25 RX ADMIN — HYDRALAZINE HYDROCHLORIDE 10 MG: 10 TABLET ORAL at 13:23

## 2023-06-25 RX ADMIN — LABETALOL HYDROCHLORIDE 800 MG: 200 TABLET, FILM COATED ORAL at 21:57

## 2023-06-25 RX ADMIN — HYDRALAZINE HYDROCHLORIDE 10 MG: 10 TABLET ORAL at 08:45

## 2023-06-25 RX ADMIN — MAGNESIUM SULFATE IN WATER 2 G/HR: 40 INJECTION, SOLUTION INTRAVENOUS at 08:25

## 2023-06-25 RX ADMIN — LABETALOL HYDROCHLORIDE 800 MG: 200 TABLET, FILM COATED ORAL at 14:08

## 2023-06-25 RX ADMIN — FUROSEMIDE 20 MG: 20 TABLET ORAL at 08:27

## 2023-06-25 RX ADMIN — DOCUSATE SODIUM 100 MG: 100 CAPSULE, LIQUID FILLED ORAL at 08:27

## 2023-06-25 RX ADMIN — HYDRALAZINE HYDROCHLORIDE 10 MG: 10 TABLET ORAL at 17:17

## 2023-06-25 RX ADMIN — SODIUM CHLORIDE, POTASSIUM CHLORIDE, SODIUM LACTATE AND CALCIUM CHLORIDE 75 ML/HR: 600; 310; 30; 20 INJECTION, SOLUTION INTRAVENOUS at 11:24

## 2023-06-25 RX ADMIN — IBUPROFEN 600 MG: 600 TABLET ORAL at 19:03

## 2023-06-25 RX ADMIN — IBUPROFEN 600 MG: 600 TABLET ORAL at 00:18

## 2023-06-25 RX ADMIN — HYDRALAZINE HYDROCHLORIDE 10 MG: 10 TABLET ORAL at 22:59

## 2023-06-25 RX ADMIN — MAGNESIUM SULFATE IN WATER 2 G/HR: 40 INJECTION, SOLUTION INTRAVENOUS at 18:13

## 2023-06-25 RX ADMIN — LABETALOL HYDROCHLORIDE 800 MG: 200 TABLET, FILM COATED ORAL at 06:11

## 2023-06-25 RX ADMIN — ACETAMINOPHEN 650 MG: 325 TABLET, FILM COATED ORAL at 19:03

## 2023-06-25 RX ADMIN — IBUPROFEN 600 MG: 600 TABLET ORAL at 06:11

## 2023-06-25 RX ADMIN — CITALOPRAM HYDROBROMIDE 40 MG: 20 TABLET ORAL at 08:27

## 2023-06-25 ASSESSMENT — ACTIVITIES OF DAILY LIVING (ADL)
ADLS_ACUITY_SCORE: 35

## 2023-06-25 NOTE — PHARMACY-ADMISSION MEDICATION HISTORY
Pharmacist Admission Medication History    Admission medication history is complete. The information provided in this note is only as accurate as the sources available at the time of the update.    Medication reconciliation/reorder completed by provider prior to medication history? No    Information Source(s): Patient and CareEverywhere/SureScripts via in-person    Pertinent Information: Patient is a reliable historian. Labetalol dose increase consistent with telephone encounter 6/23/23.     Changes made to PTA medication list:    Added: None    Deleted: aspirin 81 mg daily     Changed: albuterol to PRN, labetalol 600 mg TID --> 800 mg AM/afternoon, 600 mg HS, prenatal vitamin 2 tablets --> 1 tablet    Medication Affordability:  Not including over the counter (OTC) medications, was there a time in the past 3 months when you did not take your medications as prescribed because of cost?: No    Allergies reviewed with patient and updates made in EHR: yes    Medication History Completed By: Jyotsna Browne [facility] 6/24/2023 7:27 PM    Medication Sig Last Dose Taking? Auth Provider   acetaminophen (TYLENOL) 325 MG tablet Take 2 tablets (650 mg) by mouth every 4 hours as needed for mild pain or fever 6/22/2023 at PRN Yes Anamaria Park MD   albuterol (PROAIR HFA/PROVENTIL HFA/VENTOLIN HFA) 108 (90 Base) MCG/ACT inhaler Inhale 2 puffs into the lungs every 6 hours as needed for shortness of breath, wheezing or cough More than a month at PRN Yes Unknown, Entered By History   buPROPion (WELLBUTRIN XL) 150 MG 24 hr tablet Take 1 tablet (150 mg) by mouth every morning 6/24/2023 at AM Yes Vi Baldwin MD   citalopram (CELEXA) 40 MG tablet Take 1 tablet (40 mg) by mouth daily 6/24/2023 at AM Yes Vi Baldwin MD   ibuprofen (ADVIL/MOTRIN) 600 MG tablet Take 1 tablet (600 mg) by mouth every 6 hours as needed for other (cramping) 6/22/2023 at PRN Yes Anamaria Park MD   labetalol (NORMODYNE) 200 MG tablet  Take 600-800 mg by mouth 3 times daily Instructed to increase dose from 600 mg TID to 800 mg AM/800 mg afternoon/600 mg HS as of 6/24/23 6/24/2023 at AM Yes Anuradha Allen MD   Prenatal Vit-Fe Sulfate-FA-DHA (PRENATAL VITAMIN/MIN +DHA PO) Take 1 chew tab by mouth daily 6/23/2023 at Unknown time Yes Reported, Patient   Vitamin D3 (CHOLECALCIFEROL) 25 mcg (1000 units) tablet Take 1 tablet by mouth daily 6/23/2023 at Unknown time Yes Reported, Patient

## 2023-06-25 NOTE — PROVIDER NOTIFICATION
06/24/23 0490   Provider Notification   Provider Name/Title Dr. Ferreira   Method of Notification Phone;Electronic Page   Request Evaluate-Remote   Notification Reason Status Update;Other     On-call provider Dr. Ferreira paged regarding sudden onset 6/10 headache, upper abdominal pain and intermittent hand tingling. Patient has had loading dose and 1 hour of continuous magnesium on board. BPs are 130s/80s. LS are clear. Pt reports some dyspnea on exertion, but sats remain stable. Order obtained for STAT CBC/CMP and 1000mg tylenol now for headache. Notify provider if headache not improving after tylenol and if abnormal lab results. Will place orders and update patient on plan of care.

## 2023-06-25 NOTE — H&P
[facility]  OB History and Physical      Vivi Mccall MRN# [id number]   Age: 37 year old YOB: 1985     CC:  Elevated BP    HPI:  Ms. Vivi Mccall is a 37 year old  postpartum day #8 s/p , who presents with elevated BP. Pregnancy c/b cHTN, on increasing doses of labetalol in the last few days. Last took labetalol at 4:30pm. Did not tolerate Procardia due to headachesShe notes persistent symptoms of right wrist tingling worsening since delivery. Was given a wrist brace for tendinitis. Has had low mid epigastric/chest pain for the past week, radiating to the right mid abdomen. States it feels different from GERD symptoms. Denies SOB. Has a headache, that improves with rest. Feels like her swelling has decreased significantly.  Denies symptoms of vision changes, fevers, chills, palpitations, nausea, vomiting, RUQ pain, dysuria, constipation, diarrhea.       OB History  OB History    Para Term  AB Living   3 2 2 0 1 2   SAB IAB Ectopic Multiple Live Births   0 0 0 0 2      # Outcome Date GA Lbr Han/2nd Weight Sex Delivery Anes PTL Lv   3 Term 23 38w0d 00:50 / 00:13 2.92 kg (6 lb 7 oz) F Vag-Spont EPI N LAYO      Name: MIKIFEMALE-VIVI      Apgar1: 9  Apgar5: 9   2 Term 19 39w3d 12:45 / 02:00 3.7 kg (8 lb 2.5 oz) F Vag-Spont EPI N LAYO      Complications: GBS, Preeclampsia/Hypertension      Name: Therese      Apgar1: 8  Apgar5: 9   1 AB                PMHx:   Past Medical History:   Diagnosis Date     Asthma     seasonal and environmental     C. difficile diarrhea      Concussion 2022     Depression      Depressive disorder      Encounter for IUD insertion 10/16/2020    Jyoti inserted by Dr Park     Generalized anxiety disorder      Hypertension      IUD contraception 10/2020    Jyoti inserted by Dr Park     Lateral epicondylitis of right elbow 2023     Pain in both hands 2020     PSHx:   Past Surgical History:    Procedure Laterality Date     GYN SURGERY  2003    Laser treatment of HPV     ORTHOPEDIC SURGERY Left 07/21/2017    ganglion cyst removal     ORTHOPEDIC SURGERY Left     nerve release on left leg/ankle      SOFT TISSUE SURGERY  1999     SURGICAL HISTORY OF -       Nerve entrapment release     ZZHC RELEASE FOOT/TOE NERVE  2011     Meds:   Medications Prior to Admission   Medication Sig Dispense Refill Last Dose     acetaminophen (TYLENOL) 325 MG tablet Take 2 tablets (650 mg) by mouth every 4 hours as needed for mild pain or fever 60 tablet 1 6/22/2023 at PRN     albuterol (PROAIR HFA/PROVENTIL HFA/VENTOLIN HFA) 108 (90 Base) MCG/ACT inhaler Inhale 2 puffs into the lungs every 6 hours as needed for shortness of breath, wheezing or cough   More than a month at PRN     buPROPion (WELLBUTRIN XL) 150 MG 24 hr tablet Take 1 tablet (150 mg) by mouth every morning 90 tablet 3 6/24/2023 at AM     citalopram (CELEXA) 40 MG tablet Take 1 tablet (40 mg) by mouth daily 90 tablet 3 6/24/2023 at AM     ibuprofen (ADVIL/MOTRIN) 600 MG tablet Take 1 tablet (600 mg) by mouth every 6 hours as needed for other (cramping) 60 tablet 1 6/22/2023 at PRN     labetalol (NORMODYNE) 200 MG tablet Take 600-800 mg by mouth 3 times daily Instructed to increase dose from 600 mg TID to 800 mg AM/800 mg afternoon/600 mg HS as of 6/24/23 6/24/2023 at AM     Prenatal Vit-Fe Sulfate-FA-DHA (PRENATAL VITAMIN/MIN +DHA PO) Take 1 chew tab by mouth daily   6/23/2023 at Unknown time     Vitamin D3 (CHOLECALCIFEROL) 25 mcg (1000 units) tablet Take 1 tablet by mouth daily   6/23/2023 at Unknown time     Allergies:    Allergies   Allergen Reactions     Amoxicillin Other (See Comments)     Patient does not want to take since having c diff     Codeine Other (See Comments)     Hallucinations     Penicillins Other (See Comments)     Patient does not want to take since having c diff      FmHx:   Family History   Problem Relation Age of Onset     Diabetes Mother       Bipolar Disorder Mother      Hypertension Mother      Coronary Artery Disease Mother      Depression Mother      Anxiety Disorder Mother      Mental Illness Mother      Breast Cancer Mother         BRCA negative, neoadjuvant chemo, surgery pend     Unknown/Adopted Father      Hypertension Father      Breast Cancer Maternal Grandmother      Hypertension Maternal Grandmother      Depression Maternal Grandmother      Anxiety Disorder Maternal Grandmother      Diabetes Type 2  Maternal Grandfather      Glaucoma Maternal Grandfather      Hypertension Maternal Grandfather      Coronary Artery Disease Maternal Grandfather      Diabetes Maternal Grandfather      Depression Maternal Grandfather      Cerebrovascular Disease Maternal Grandfather      Prostate Cancer Maternal Grandfather      Other - See Comments Maternal Grandfather         passed from Covid 9/2021     Hypertension Paternal Grandmother      Coronary Artery Disease Paternal Grandfather      Hypertension Maternal Aunt      Ovarian Cancer Other      Macular Degeneration No family hx of        PE:  Vit:   Patient Vitals for the past 4 hrs:   BP Temp Temp src Pulse Resp SpO2   06/24/23 2108 (!) 154/93 98.6  F (37  C) Oral 71 16 100 %   06/24/23 2040 (!) 150/98 -- -- 85 -- --   06/24/23 2030 (!) 157/100 -- -- 76 -- --   06/24/23 2020 (!) 170/111 -- -- 77 -- --   06/24/23 2009 (!) 155/99 -- -- 71 -- --   06/24/23 1956 (!) 180/115 -- -- 64 -- --   06/24/23 1950 (!) 177/110 -- -- 76 -- --   06/24/23 1943 (!) 193/114 -- -- 64 -- --   06/24/23 1940 (!) 185/115 -- -- 64 -- --   06/24/23 1930 (!) 189/112 -- -- 63 -- --   06/24/23 1912 (!) 176/116 -- -- 72 -- --   06/24/23 1827 (!) 152/102 98  F (36.7  C) Temporal 68 16 97 %      Gen: Well-appearing, NAD, comfortable   CV: Regular rate  Pulm: Breathing comfortably on RA  Abd: Soft, gravid, non-tender, +BS. Epigastric pain to palpation.   Ext: Trace LE edema b/l          Assessment  Ms. Vivi Mccall is a 37  year old  postpartum day #8 s/p  who presents with superimposed preE w/ SF (BPs)     Plan  - Superimposed preE w/ SF (BPs)   - On arrival to ER, BPs were severe range. She received three esclatating doses of IV labetalol and one dose of IV hydralazine. BPs are now high mild range.    - Start IV magnesium for seizure prophylaxis x 24 hours   - Start PO labetalol 800mg TID and PO hydralazine 10mg QID, and IV lasix 20mg x 1, and start 20 PO lasix every day tomorrow.     - Consider medicine consult if BPs are not managed with this regimen   - Symptoms:      - Manzo wrist tingling: suspect due to swelling, although states it has not improved with diuresis. Continue to monitor    - HA: improving with rest, continue to monitor     - Epigastric/low chest pain: Labs wnl. Will add on lipase, hepatic panel. Continue to carefully monitor. Consider CT PE if not improving/worsening.     - Labs: Cr 0.85 from 0.63. Otherwise wnl. Repeat qAM or sooner as clinically indicated     Anticipate inpatient admission for 2-3 days.     Ruthann Ferreira MD, [facility]  OB/GYN  2023

## 2023-06-25 NOTE — LACTATION NOTE
Lactation visit with Vivi, FOB, and baby girl.    Vivi was readmitted for pre-eclampsia. Vivi is 9 days post partum and shares that infant is breast feeding really well. Vivi reports her breasts feel really full today. Discussed interventions for comfort, taking ibuprofen as scheduled and ice/cold packs can be applied after breastfeeding.      Vivi has her own nipple butter she is using for comfort. Vivi doesn't have any concerns or questions for LC at this time. Assured Vivi we are available if needed.    Ann Marie Quintero RN IBCLC

## 2023-06-25 NOTE — PROGRESS NOTES
[facility]    Post-Partum Progress Note    Assessment & Plan   Assessment:  Post-partum day #9 s/p  on   Normal spontaneous vaginal delivery (23)  Readmit for superimposed pre-eclampsia with severe features based on BPs    Doing well.  Normotensive since starting magnesium  Headache resolved    Plan:  Ambulation encouraged  Pain control measures as needed  Reportable signs and symptoms dicussed with the patient  Continue magnesium x 24hrs (0); close monitoring of BPs once off mag; continue labetalol 800mg TID and hydralazine 10mg QID; daily lasix while inpt  -am labs pend (normal on admission)  -anticipate discharge in 1-3days pending BP control    Anamaria Park MD     Interval History   Doing well.  Pain is well-controlled.  No fevers.  No history of foul-smelling vaginal discharge.  Good appetite.  Denies chest pain, shortness of breath, nausea or vomiting.  Vaginal bleeding is minimal.  Ambulatory.  Breastfeeding well.  Headache resolved this AM.  No n/v, abdominal pain.  Still some left wrist tingling.  Swelling significantly improved as of Friday    Medications     lactated ringers 75 mL/hr (23 0712)     magnesium sulfate 2 g/hr (23 0825)     - MEDICATION INSTRUCTIONS -       - MEDICATION INSTRUCTIONS -         buPROPion  150 mg Oral QAM     citalopram  40 mg Oral Daily     docusate sodium  100 mg Oral Daily     furosemide  20 mg Oral Daily     hydrALAZINE  10 mg Oral 4x Daily     labetalol  800 mg Oral Q8H DANIELLE     sodium chloride (PF)  3 mL Intracatheter Q8H       Physical Exam   Temp: 97.5  F (36.4  C) Temp src: Oral BP: 136/88 Pulse: 69   Resp: 16 SpO2: 98 % O2 Device: None (Room air)    Vitals:    23 2239 23 0800   Weight: 95 kg (209 lb 8 oz) 95.9 kg (211 lb 6.4 oz)     Vital Signs with Ranges  Temp:  [97.5  F (36.4  C)-98.6  F (37  C)] 97.5  F (36.4  C)  Pulse:  [63-85] 69  Resp:  [16-20] 16  BP: (122-193)/() 136/88  SpO2:  [97 %-100 %]  98 %  I/O last 3 completed shifts:  In: 2840.8 [P.O.:1740; I.V.:1100.8]  Out: 1350 [Urine:1350]    Uterine fundus is firm, non-tender and at the level of the umbilicus  Extremities Non-tender  Heart is regular rate and rhythm and lungs clear to auscultation    Data   Recent Labs   Lab Test 06/15/23  2031 12/13/22  1022 12/27/19  0843   ABO  --   --  O   RH  --   --  Pos   AS Negative   < > Neg    < > = values in this interval not displayed.     Recent Labs   Lab Test 06/24/23  2336 06/24/23  1834   HGB 12.2 11.6*     Recent Labs   Lab Test 12/13/22  1021   RUQIGG Positive

## 2023-06-25 NOTE — PLAN OF CARE
Goal Outcome Evaluation:Elevated blood pressures.I/V  magnesium 2 gm/hr,denies headache,vision change, epigastric pain or shortness of breath,2+ reflexes,no clonus,ambulating to bathroom,voiding well,pneumoboots on while in bed,O2 sat 97 to 98%.Plan to discontinue magnesium at 2230.Will continue to monitor.

## 2023-06-25 NOTE — ED PROVIDER NOTES
History     Chief Complaint:  Pre-Eclampsia     HPI   Vivi Mccall is a 37 year old female who is 8 days postpartum after normal vaginal delivery of her second child who comes with elevated blood pressure.  She has a history of chronic hypertension and she was discharged home on labetalol.  She reports that she takes 800 mg in the morning and afternoon and 600 at night because her morning blood pressures have been very good.  Her last dose of labetalol was at 4:30 PM.  She reports that since she started breast-feeding and her milk came in she has had some chest pain although when she points to the pain she describes it is under her ribs bilaterally.  She does report a headache.  No numbness, weakness, problems with speech or vision.  She has not had a fever.  She denies any lower abdominal pain.  She reports that her swelling is actually significantly improved.    Independent Historian:   None - Patient Only    Review of External Notes:   Reviewed the nurse triage telephone note to Dr. Park, her OB/GYN.  Blood pressures ranged from 125 systolic to 157/99.  Reviewed labor and delivery summary from Dr. Park on 1623.  Patient is 37-year-old [id number] who was brought to labor and delivery at 37 and 6 weeks gestation for cervical ripening due to chronic hypertension in pregnancy.  She had been maintained on labetalol through her out her pregnancy.  It does not appear that she was on magnesium during the delivery or prior to discharge.    Medications:    No current outpatient medications on file.  Past Medical History:    Past Medical History:   Diagnosis Date     Asthma      C. difficile diarrhea 2013     Concussion 2022     Depression      Depressive disorder      Encounter for IUD insertion 10/16/2020     Generalized anxiety disorder      Hypertension      IUD contraception 10/2020     Lateral epicondylitis of right elbow 2023     Pain in both hands 2020       Past Surgical History:     Past Surgical History:   Procedure Laterality Date     GYN SURGERY  2003    Laser treatment of HPV     ORTHOPEDIC SURGERY Left 07/21/2017    ganglion cyst removal     ORTHOPEDIC SURGERY Left     nerve release on left leg/ankle      SOFT TISSUE SURGERY  1999     SURGICAL HISTORY OF -       Nerve entrapment release     ZZHC RELEASE FOOT/TOE NERVE  2011        Physical Exam     Patient Vitals for the past 24 hrs:   BP Temp Temp src Pulse Resp SpO2 Weight   06/24/23 2314 130/87 -- -- 71 18 100 % --   06/24/23 2309 130/88 -- -- 72 20 100 % --   06/24/23 2259 122/83 -- -- 78 16 99 % --   06/24/23 2244 131/89 -- -- 78 16 100 % --   06/24/23 2239 -- -- -- -- -- -- 95 kg (209 lb 8 oz)   06/24/23 2229 (!) 153/97 -- -- 76 16 99 % --   06/24/23 2214 (!) 153/95 -- -- 74 16 100 % --   06/24/23 2159 (!) 150/71 -- -- 77 16 100 % --   06/24/23 2145 (!) 147/96 -- -- 73 16 -- --   06/24/23 2143 (!) 146/96 -- -- 73 -- 100 % --   06/24/23 2133 (!) 138/93 -- -- 76 16 100 % --   06/24/23 2108 (!) 154/93 98.6  F (37  C) Oral 71 16 100 % --   06/24/23 2040 (!) 150/98 -- -- 85 -- -- --   06/24/23 2030 (!) 157/100 -- -- 76 -- -- --   06/24/23 2020 (!) 170/111 -- -- 77 -- -- --   06/24/23 2009 (!) 155/99 -- -- 71 -- -- --   06/24/23 1956 (!) 180/115 -- -- 64 -- -- --   06/24/23 1950 (!) 177/110 -- -- 76 -- -- --   06/24/23 1943 (!) 193/114 -- -- 64 -- -- --   06/24/23 1940 (!) 185/115 -- -- 64 -- -- --   06/24/23 1930 (!) 189/112 -- -- 63 -- -- --   06/24/23 1912 (!) 176/116 -- -- 72 -- -- --   06/24/23 1827 (!) 152/102 98  F (36.7  C) Temporal 68 16 97 % --        Physical Exam  General: Well-nourished  Eyes: PERRL, conjunctivae pink no scleral icterus or conjunctival injection  ENT:  Moist mucus membranes, posterior oropharynx clear without erythema or exudates  Respiratory:  Lungs clear to auscultation bilaterally, no crackles/rubs/wheezes.  Good air movement  CV: Normal rate and rhythm, no murmurs/rubs/gallops  GI:  Abdomen soft and  non-distended.  Normoactive BS.  No tenderness, guarding or rebound  Skin: Warm, dry.  No rashes or petechiae. No signs of mastitis  Musculoskeletal: No peripheral edema or calf tenderness  Neuro: Alert and oriented to person/place/time. PERRL, EOMI no nystagmus, no aphasia/facial droop/dysarthria, tongue midline, symmetric palatal elevation, normal strength at SCM/trapezius/BUE/BLE, normal coordination to FNF at BUE, normal casual gait, negative romberg, sensation intact to LT over face/BUE/BLE  Psychiatric: Tearful affect    Emergency Department Course   ECG  ECG taken at 1844, ECG read at 1858  Normal sinus rhythm  Possible Left atrial enlargement  Septal infarct , age undetermined  Abnormal ECG   No STEMI  No change  as compared to prior, dated 3/17/23.  Rate 71 bpm. AZ interval 162 ms. QRS duration 82 ms. QT/QTc 422/458 ms. P-R-T axes 53 14 49.     Laboratory:  Labs Ordered and Resulted from Time of ED Arrival to Time of ED Departure   COMPREHENSIVE METABOLIC PANEL - Abnormal       Result Value    Sodium 139      Potassium 3.4      Chloride 106      Carbon Dioxide (CO2) 21 (*)     Anion Gap 12      Urea Nitrogen 9.6      Creatinine 0.85      Calcium 8.4 (*)     Glucose 125 (*)     Alkaline Phosphatase 102      AST 30      ALT 26      Protein Total 6.6      Albumin 3.3 (*)     Bilirubin Total 0.4      GFR Estimate 89     CBC WITH PLATELETS AND DIFFERENTIAL - Abnormal    WBC Count 7.7      RBC Count 3.74 (*)     Hemoglobin 11.6 (*)     Hematocrit 34.5 (*)     MCV 92      MCH 31.0      MCHC 33.6      RDW 13.2      Platelet Count 369      % Neutrophils 60      % Lymphocytes 27      % Monocytes 7      % Eosinophils 5      % Basophils 1      % Immature Granulocytes 0      NRBCs per 100 WBC 0      Absolute Neutrophils 4.6      Absolute Lymphocytes 2.1      Absolute Monocytes 0.6      Absolute Eosinophils 0.4      Absolute Basophils 0.0      Absolute Immature Granulocytes 0.0      Absolute NRBCs 0.0     TROPONIN T,  HIGH SENSITIVITY - Normal    Troponin T, High Sensitivity <6     NT PROBNP INPATIENT - Normal    N terminal Pro BNP Inpatient 80        Emergency Department Course & Assessments:       Interventions:  Medications   albuterol (PROVENTIL HFA/VENTOLIN HFA) inhaler (has no administration in time range)   buPROPion (WELLBUTRIN XL) 24 hr tablet 150 mg (has no administration in time range)   citalopram (celeXA) tablet 40 mg (has no administration in time range)   ibuprofen (ADVIL/MOTRIN) tablet 600 mg (600 mg Oral $Given 6/25/23 0018)   acetaminophen (TYLENOL) tablet 650 mg (has no administration in time range)   docusate sodium (COLACE) capsule 100 mg (has no administration in time range)   bisacodyl (DULCOLAX) suppository 10 mg (has no administration in time range)   No MMR Needed - Assessment: Patient does not need MMR vaccine (has no administration in time range)   No Tdap Needed - Assessment: Patient does not need Tdap vaccine (has no administration in time range)   lidocaine 1 % 0.1-1 mL (has no administration in time range)   lidocaine (LMX4) cream (has no administration in time range)   sodium chloride (PF) 0.9% PF flush 3 mL (3 mLs Intracatheter $Given 6/24/23 2154)   sodium chloride (PF) 0.9% PF flush 3 mL (has no administration in time range)   lactated ringers infusion (75 mL/hr Intravenous $New Bag 6/24/23 2225)   labetalol (NORMODYNE/TRANDATE) injection 20-80 mg (has no administration in time range)   hydrALAZINE (APRESOLINE) injection 10 mg (has no administration in time range)   labetalol (NORMODYNE) tablet 800 mg (800 mg Oral $Given 6/24/23 2152)   furosemide (LASIX) tablet 20 mg (has no administration in time range)   magnesium sulfate 2 g in 50 mL sterile water intermittent infusion (has no administration in time range)   magnesium sulfate 4 g in 100 mL sterile water intermittent infusion (has no administration in time range)   midazolam (VERSED) injection 2 mg (has no administration in time range)    calcium gluconate 10 % injection 1 g (has no administration in time range)   magnesium sulfate infusion (2 g/hr Intravenous $New Bag 6/24/23 2226)   hydrALAZINE (APRESOLINE) tablet 10 mg (10 mg Oral $Given 6/24/23 2158)   furosemide (LASIX) injection 20 mg (20 mg Intravenous $Given 6/24/23 2221)   magnesium sulfate 4 g in 100 mL sterile water intermittent infusion (4 g Intravenous $New Bag 6/24/23 2143)   acetaminophen (TYLENOL) tablet 1,000 mg (1,000 mg Oral $Given 6/24/23 2342)        Assessments:  I evaluated and assessed the patient.  I updated the patient and discussed the plan of care with her.    Independent Interpretation (X-rays, CTs, rhythm strip):  None    Consultations/Discussion of Management or Tests:  I consulted with Dr. Browne regarding presentation and plan of care.  I discussed with Dr. Browne regarding presentation and plan of care and she agreed with starting magnesium.  Patient has a bed on the OB floor and will go appropriate a call to have the magnesium started there.       Social Determinants of Health affecting care:   None    Disposition:  The patient was admitted to the hospital under the care of Dr. Browne for Dr. Park.     Impression & Plan      Medical Decision Making:  Vivi Mccall is a 37 year old female with a history of hypertension and hypertension in pregnancy who comes 8 days after delivering her daughter with increased blood pressure not controlled on her home antihypertensives.  She also reports headache.  No neurologic symptoms to suggest acute stroke.  She reports some chest pain but on further evaluation it seems like it is pain in the upper abdomen.  It is not pleuritic and I doubt pulmonary embolism given no tachycardia or hypoxia.  Lungs are otherwise clear and I do not appreciate any signs of congestive heart failure or cardiomyopathy.  EKG is nonischemic.  Troponin is negative.  BNP is also normal.  Her preeclampsia labs were drawn.  She was treated  expeditiously with antihypertensives and blood pressure remained elevated.  I consulted with her OB/GYN who was in agreement with the plan for admission.  The patient will go to the OB floor where her magnesium will be started as well.  Patient was updated and was tearful but understanding and in agreement with the plan for admission.    Critical Care time:  Was 30 minutes for this patient excluding procedures.    Diagnosis:    ICD-10-CM    1. Preeclampsia in postpartum period  O14.95 Breast pump - Manual/Electric           Discharge Medications:  Current Discharge Medication List         6/24/2023   Trish Singh MD Cho, Amy C, MD  06/25/23 0021

## 2023-06-25 NOTE — ED NOTES
[facility]  ED Nurse Handoff Report    ED Chief complaint: Pre-Eclampsia      ED Diagnosis:   Final diagnoses:   None       Code Status: Full Code    Allergies:   Allergies   Allergen Reactions    Amoxicillin Other (See Comments)     Patient does not want to take since having c diff    Codeine Other (See Comments)     Hallucinations    Penicillins Other (See Comments)     Patient does not want to take since having c diff       Patient Story: Pt present 8 days postpartum and experiencing high BP recently increased labetalol. Pt reading at 174/109      Pt has hx of chronic HTN denies needing to have magnesium during labor in the hospital      last labetalol dose at 1630     Pt reports Ha and Chest tightness with numbness in the end of fingers but has dx of tendonitis      CP rated 5 /10 mid sternal with no travel     Focused Assessment:  Pain 5/10 midsternal chest, BP elevated after 2 doses of labetolol; partner and baby bedside    Treatments and/or interventions provided: IV placed, labs, meds    Patient's response to treatments and/or interventions: See results; see MAR    To be done/followed up on inpatient unit:  See orders    Does this patient have any cognitive concerns?:  N/A    Activity level - Baseline/Home:  Independent  Activity Level - Current:   Independent    Patient's Preferred language: English   Needed?: No    Isolation: None  Infection: Not Applicable  Patient tested for COVID 19 prior to admission: NO  Bariatric?: No    Vital Signs:   Vitals:    06/24/23 1827 06/24/23 1912 06/24/23 1930   BP: (!) 152/102 (!) 176/116 (!) 189/112   Pulse: 68 72 63   Resp: 16     Temp: 98  F (36.7  C)     TempSrc: Temporal     SpO2: 97%         Cardiac Rhythm:     Was the PSS-3 completed:   Yes  What interventions are required if any?               Family Comments:   OBS brochure/video discussed/provided to patient/family: N/A              Name of person given brochure if not patient:                Relationship to patient:     For the majority of the shift this patient's behavior was Green.   No behavioral interventions performed.    ED NURSE PHONE NUMBER: [phone]

## 2023-06-25 NOTE — PLAN OF CARE
Goal Outcome Evaluation:      Plan of Care Reviewed With: patient, spouse    Overall Patient Progress: improvingOverall Patient Progress: improving     VSS BPs 130s/80s. Sats WNL. Reflexes 2+, no clonus. Ambulating to bathroom with standby assist. Pt c/o of headache up to 8/10, improved with tylenol, ibuprofen and rest.   Continue to monitor and notify MD as needed.

## 2023-06-26 LAB
ALT SERPL W P-5'-P-CCNC: 24 U/L (ref 0–50)
AST SERPL W P-5'-P-CCNC: 24 U/L (ref 0–45)
CREAT SERPL-MCNC: 0.85 MG/DL (ref 0.51–0.95)
GFR SERPL CREATININE-BSD FRML MDRD: 89 ML/MIN/1.73M2
HGB BLD-MCNC: 12.1 G/DL (ref 11.7–15.7)
PLATELET # BLD AUTO: 388 10E3/UL (ref 150–450)

## 2023-06-26 PROCEDURE — 250N000013 HC RX MED GY IP 250 OP 250 PS 637: Performed by: OBSTETRICS & GYNECOLOGY

## 2023-06-26 PROCEDURE — 99223 1ST HOSP IP/OBS HIGH 75: CPT | Performed by: PHYSICIAN ASSISTANT

## 2023-06-26 PROCEDURE — 250N000011 HC RX IP 250 OP 636: Mod: JZ | Performed by: STUDENT IN AN ORGANIZED HEALTH CARE EDUCATION/TRAINING PROGRAM

## 2023-06-26 PROCEDURE — 36415 COLL VENOUS BLD VENIPUNCTURE: CPT | Performed by: STUDENT IN AN ORGANIZED HEALTH CARE EDUCATION/TRAINING PROGRAM

## 2023-06-26 PROCEDURE — 84460 ALANINE AMINO (ALT) (SGPT): CPT | Performed by: STUDENT IN AN ORGANIZED HEALTH CARE EDUCATION/TRAINING PROGRAM

## 2023-06-26 PROCEDURE — 250N000013 HC RX MED GY IP 250 OP 250 PS 637: Performed by: STUDENT IN AN ORGANIZED HEALTH CARE EDUCATION/TRAINING PROGRAM

## 2023-06-26 PROCEDURE — 120N000012 HC R&B POSTPARTUM

## 2023-06-26 PROCEDURE — 85018 HEMOGLOBIN: CPT | Performed by: STUDENT IN AN ORGANIZED HEALTH CARE EDUCATION/TRAINING PROGRAM

## 2023-06-26 PROCEDURE — 84450 TRANSFERASE (AST) (SGOT): CPT | Performed by: STUDENT IN AN ORGANIZED HEALTH CARE EDUCATION/TRAINING PROGRAM

## 2023-06-26 PROCEDURE — 82565 ASSAY OF CREATININE: CPT | Performed by: STUDENT IN AN ORGANIZED HEALTH CARE EDUCATION/TRAINING PROGRAM

## 2023-06-26 PROCEDURE — 85049 AUTOMATED PLATELET COUNT: CPT | Performed by: STUDENT IN AN ORGANIZED HEALTH CARE EDUCATION/TRAINING PROGRAM

## 2023-06-26 PROCEDURE — 250N000013 HC RX MED GY IP 250 OP 250 PS 637: Performed by: PHYSICIAN ASSISTANT

## 2023-06-26 RX ORDER — HYDRALAZINE HYDROCHLORIDE 10 MG/1
10 TABLET, FILM COATED ORAL ONCE
Status: COMPLETED | OUTPATIENT
Start: 2023-06-26 | End: 2023-06-26

## 2023-06-26 RX ORDER — HYDRALAZINE HYDROCHLORIDE 10 MG/1
20 TABLET, FILM COATED ORAL 4 TIMES DAILY
Status: DISCONTINUED | OUTPATIENT
Start: 2023-06-26 | End: 2023-06-26

## 2023-06-26 RX ORDER — HYDRALAZINE HYDROCHLORIDE 50 MG/1
50 TABLET, FILM COATED ORAL 4 TIMES DAILY
Status: DISCONTINUED | OUTPATIENT
Start: 2023-06-26 | End: 2023-06-29 | Stop reason: HOSPADM

## 2023-06-26 RX ORDER — HYDROXYZINE HYDROCHLORIDE 50 MG/1
50 TABLET, FILM COATED ORAL EVERY 6 HOURS PRN
Status: DISCONTINUED | OUTPATIENT
Start: 2023-06-26 | End: 2023-06-29 | Stop reason: HOSPADM

## 2023-06-26 RX ORDER — HYDROXYZINE HYDROCHLORIDE 25 MG/1
25 TABLET, FILM COATED ORAL EVERY 6 HOURS PRN
Status: DISCONTINUED | OUTPATIENT
Start: 2023-06-26 | End: 2023-06-29 | Stop reason: HOSPADM

## 2023-06-26 RX ORDER — NIFEDIPINE 30 MG/1
30 TABLET, EXTENDED RELEASE ORAL DAILY
Status: COMPLETED | OUTPATIENT
Start: 2023-06-26 | End: 2023-06-28

## 2023-06-26 RX ADMIN — NIFEDIPINE 30 MG: 30 TABLET, FILM COATED, EXTENDED RELEASE ORAL at 21:31

## 2023-06-26 RX ADMIN — CITALOPRAM HYDROBROMIDE 40 MG: 20 TABLET ORAL at 08:13

## 2023-06-26 RX ADMIN — HYDRALAZINE HYDROCHLORIDE 20 MG: 10 TABLET ORAL at 18:03

## 2023-06-26 RX ADMIN — HYDRALAZINE HYDROCHLORIDE 10 MG: 20 INJECTION INTRAMUSCULAR; INTRAVENOUS at 02:54

## 2023-06-26 RX ADMIN — HYDRALAZINE HYDROCHLORIDE 10 MG: 20 INJECTION INTRAMUSCULAR; INTRAVENOUS at 19:20

## 2023-06-26 RX ADMIN — IBUPROFEN 600 MG: 600 TABLET ORAL at 08:15

## 2023-06-26 RX ADMIN — LABETALOL HYDROCHLORIDE 800 MG: 200 TABLET, FILM COATED ORAL at 21:58

## 2023-06-26 RX ADMIN — LABETALOL HYDROCHLORIDE 800 MG: 200 TABLET, FILM COATED ORAL at 14:11

## 2023-06-26 RX ADMIN — DOCUSATE SODIUM 100 MG: 100 CAPSULE, LIQUID FILLED ORAL at 08:13

## 2023-06-26 RX ADMIN — HYDRALAZINE HYDROCHLORIDE 10 MG: 20 INJECTION INTRAMUSCULAR; INTRAVENOUS at 18:31

## 2023-06-26 RX ADMIN — LABETALOL HYDROCHLORIDE 800 MG: 200 TABLET, FILM COATED ORAL at 06:11

## 2023-06-26 RX ADMIN — HYDRALAZINE HYDROCHLORIDE 20 MG: 10 TABLET ORAL at 13:05

## 2023-06-26 RX ADMIN — ACETAMINOPHEN 650 MG: 325 TABLET, FILM COATED ORAL at 01:43

## 2023-06-26 RX ADMIN — BUPROPION HYDROCHLORIDE 150 MG: 150 TABLET, FILM COATED, EXTENDED RELEASE ORAL at 08:13

## 2023-06-26 RX ADMIN — ACETAMINOPHEN 650 MG: 325 TABLET, FILM COATED ORAL at 08:15

## 2023-06-26 RX ADMIN — FUROSEMIDE 20 MG: 20 TABLET ORAL at 08:13

## 2023-06-26 RX ADMIN — HYDRALAZINE HYDROCHLORIDE 10 MG: 10 TABLET ORAL at 08:17

## 2023-06-26 RX ADMIN — HYDROXYZINE HYDROCHLORIDE 25 MG: 25 TABLET, FILM COATED ORAL at 18:54

## 2023-06-26 RX ADMIN — HYDRALAZINE HYDROCHLORIDE 50 MG: 50 TABLET, FILM COATED ORAL at 21:33

## 2023-06-26 RX ADMIN — HYDRALAZINE HYDROCHLORIDE 10 MG: 20 INJECTION INTRAMUSCULAR; INTRAVENOUS at 01:58

## 2023-06-26 RX ADMIN — HYDRALAZINE HYDROCHLORIDE 10 MG: 10 TABLET ORAL at 09:01

## 2023-06-26 RX ADMIN — IBUPROFEN 600 MG: 600 TABLET ORAL at 01:43

## 2023-06-26 ASSESSMENT — ACTIVITIES OF DAILY LIVING (ADL)
ADLS_ACUITY_SCORE: 35
ADLS_ACUITY_SCORE: 20
ADLS_ACUITY_SCORE: 35
ADLS_ACUITY_SCORE: 20

## 2023-06-26 NOTE — PLAN OF CARE
Goal Outcome Evaluation:      Plan of Care Reviewed With: patient, spouse    Overall Patient Progress: improvingOverall Patient Progress: improving       BP max 170/110, iv hydralazine given x2, BPs now 130-140s/80s-90s. Pt denies headache, blurred vision or RUQ pain. 1+ edema noted. Pt ambulating to bathroom independently. SCDs in place while in bed. Reflexes 2+, no clonus. Weight notable for 10 pound increase overnight. Sticky note left for provider to address during rounds as pt continues to be asymptomatic. Continue to monitor.

## 2023-06-26 NOTE — PROVIDER NOTIFICATION
06/26/23 0147   Provider Notification   Provider Name/Title Dr. Park   Method of Notification Phone;Electronic Page   Request Evaluate-Remote   Notification Reason Vital Signs Change   On-call provider paged regarding BP >160/100. Pt denies headache. Reflexes 2+, no clonus. Denies abdominal pain. Mag d/c'd at 2245. Hydralazine PO given at 2300. Will await return call to determine next steps.     Spoke with Dr. Park. Telephone order to begin IV hydralazine per hypertensive med algorithm. Give up to 2 doses 20 min apart, if still >160/110 after 2 doses notify provider for further plan.

## 2023-06-26 NOTE — PROGRESS NOTES
S: Pt doing well and much better since stopping the magnesium. Infant is being . The patient is not having any pain. Still has not diuresced and has actually had 10# weight gain since yesterday. AM BP was mild range but several severe range overnight. She denies HA now that magnesium is off. No other si/sx of pre. Bleeding is minimal. daniel reg diet    O: BP (!) 143/94   Pulse 73   Temp 98.4  F (36.9  C) (Oral)   Resp 16   Wt 99.4 kg (219 lb 1.6 oz)   LMP 09/23/2022   SpO2 96%   BMI 33.31 kg/m          ABD:  Uterine fundus is firm, non-tender and at the level of the umbilicus                Hemoglobin   Date Value Ref Range Status   06/25/2023 11.9 11.7 - 15.7 g/dL Final   03/08/2021 13.0 11.7 - 15.7 g/dL Final            Lab Results   Component Value Date    RH Pos 12/27/2019       Normal spontaneous vaginal delivery (6/16/23)  Readmit for superimposed pre-eclampsia with severe features based on BPs        Plan:  Ambulation encouraged  Pain control measures as needed  Reportable signs and symptoms dicussed with the patient  Magnesium stopped last night at 2330 and has mild range BP this AM but several in the severe range overnight despite labetalol 800mg TID and hydralazine 10mg QID; daily lasix while inpt  Had HAs with nifedipine in the past so will hold off on that but will increase hydralazine to 20mg QID for now  Minimal diuresis from oral lasix but just got AM dose of 20mg  Will see how much UOP by noon today and consider an IV dose if needed  -am labs pending (normal on admission)  -anticipate discharge in 1-3days pending BP controlNormal spontaneous vaginal delivery (6/16/23)

## 2023-06-26 NOTE — PROGRESS NOTES
Reviewed vitals and though still mild range elevation in BPs there are no longer any severe range s/p increase to 20mg QID of hydralazine  Diuresed 1500cc so far this shift so will hold on IV lasix at this time and just continue with qam oral lasix 20mg as scheduled

## 2023-06-27 LAB
ALT SERPL W P-5'-P-CCNC: 21 U/L (ref 0–50)
AST SERPL W P-5'-P-CCNC: 20 U/L (ref 0–45)
CREAT SERPL-MCNC: 0.75 MG/DL (ref 0.51–0.95)
GFR SERPL CREATININE-BSD FRML MDRD: >90 ML/MIN/1.73M2
HGB BLD-MCNC: 11.5 G/DL (ref 11.7–15.7)
PLATELET # BLD AUTO: 362 10E3/UL (ref 150–450)

## 2023-06-27 PROCEDURE — 84460 ALANINE AMINO (ALT) (SGPT): CPT | Performed by: STUDENT IN AN ORGANIZED HEALTH CARE EDUCATION/TRAINING PROGRAM

## 2023-06-27 PROCEDURE — 250N000013 HC RX MED GY IP 250 OP 250 PS 637: Performed by: PHYSICIAN ASSISTANT

## 2023-06-27 PROCEDURE — 120N000012 HC R&B POSTPARTUM

## 2023-06-27 PROCEDURE — 84450 TRANSFERASE (AST) (SGOT): CPT | Performed by: STUDENT IN AN ORGANIZED HEALTH CARE EDUCATION/TRAINING PROGRAM

## 2023-06-27 PROCEDURE — 85049 AUTOMATED PLATELET COUNT: CPT | Performed by: STUDENT IN AN ORGANIZED HEALTH CARE EDUCATION/TRAINING PROGRAM

## 2023-06-27 PROCEDURE — 85018 HEMOGLOBIN: CPT | Performed by: STUDENT IN AN ORGANIZED HEALTH CARE EDUCATION/TRAINING PROGRAM

## 2023-06-27 PROCEDURE — 99233 SBSQ HOSP IP/OBS HIGH 50: CPT | Performed by: INTERNAL MEDICINE

## 2023-06-27 PROCEDURE — 82565 ASSAY OF CREATININE: CPT | Performed by: STUDENT IN AN ORGANIZED HEALTH CARE EDUCATION/TRAINING PROGRAM

## 2023-06-27 PROCEDURE — 36415 COLL VENOUS BLD VENIPUNCTURE: CPT | Performed by: STUDENT IN AN ORGANIZED HEALTH CARE EDUCATION/TRAINING PROGRAM

## 2023-06-27 PROCEDURE — 250N000013 HC RX MED GY IP 250 OP 250 PS 637: Performed by: STUDENT IN AN ORGANIZED HEALTH CARE EDUCATION/TRAINING PROGRAM

## 2023-06-27 RX ADMIN — CITALOPRAM HYDROBROMIDE 40 MG: 20 TABLET ORAL at 08:20

## 2023-06-27 RX ADMIN — NIFEDIPINE 30 MG: 30 TABLET, FILM COATED, EXTENDED RELEASE ORAL at 20:59

## 2023-06-27 RX ADMIN — HYDRALAZINE HYDROCHLORIDE 50 MG: 50 TABLET, FILM COATED ORAL at 08:21

## 2023-06-27 RX ADMIN — ACETAMINOPHEN 650 MG: 325 TABLET, FILM COATED ORAL at 06:39

## 2023-06-27 RX ADMIN — LABETALOL HYDROCHLORIDE 800 MG: 200 TABLET, FILM COATED ORAL at 06:20

## 2023-06-27 RX ADMIN — BUPROPION HYDROCHLORIDE 150 MG: 150 TABLET, FILM COATED, EXTENDED RELEASE ORAL at 08:20

## 2023-06-27 RX ADMIN — LABETALOL HYDROCHLORIDE 800 MG: 200 TABLET, FILM COATED ORAL at 14:14

## 2023-06-27 RX ADMIN — HYDRALAZINE HYDROCHLORIDE 50 MG: 50 TABLET, FILM COATED ORAL at 12:06

## 2023-06-27 RX ADMIN — LABETALOL HYDROCHLORIDE 800 MG: 200 TABLET, FILM COATED ORAL at 21:58

## 2023-06-27 RX ADMIN — HYDRALAZINE HYDROCHLORIDE 50 MG: 50 TABLET, FILM COATED ORAL at 16:56

## 2023-06-27 RX ADMIN — FUROSEMIDE 20 MG: 20 TABLET ORAL at 08:21

## 2023-06-27 RX ADMIN — ACETAMINOPHEN 650 MG: 325 TABLET, FILM COATED ORAL at 11:29

## 2023-06-27 RX ADMIN — HYDRALAZINE HYDROCHLORIDE 50 MG: 50 TABLET, FILM COATED ORAL at 20:59

## 2023-06-27 ASSESSMENT — ACTIVITIES OF DAILY LIVING (ADL)
ADLS_ACUITY_SCORE: 20

## 2023-06-27 NOTE — PROGRESS NOTES
[facility]  Hospitalist Progress Note  Chandler Castillo MD  06/27/2023    Assessment & Plan   Vivi Mccall is a very pleasant 37 year old female, postpartum 10 days after vaginal delivery of second child who presented to the ED on 6/24/2023 due to persistently elevated blood pressure.  The hospitalist service was consulted on 6/26 for further recommendations as her blood pressures are still not well controlled despite medication adjustments.     Uncontrolled hypertension, hx preeclampsia  Patient was discharged previously on labetalol, however redeveloped significant hypertension, prompting presentation to ED.  Her labetalol has been increased to 800 mg 3 times daily, and hydralazine ordered at 20 mg 4 times daily.  She is also been receiving as needed doses of IV hydralazine without much effect.  BP max charted at 189/115.  Now down to 162/95.  --At this time, recommend titration of p.o. medications to try to achieve reasonable blood pressure control, with short-term goal of SBP less than 150.  Ultimately would like to get her blood pressure into normal range.  --LFTs, creatinine, hemoglobin, platelets WNL.  Continue to monitor.  --continue increased hydralazine at 50 mg QID (from 20 mg QID)  --PRN hydralazine dosing still avaliable  --Continue Labetalol 800 mg TID, would not increase   --continue Procardia XL 30 ng daily, low dose given hx of headache associated with this.  Discussed with pt and spouse.  May increase if needed.  --- Continue to monitor closely in hospital and will adjust regimen as necessary.  Patient plans to continue breast-feeding which is preferable, but does limit some of the medications that could be safely given to treat blood pressure.       -- will sign off for now, please call with any questions or re-consult for further BP issues.     Postpartum status:  --Defer routine cares to OB/GYN service.    Disposition:   -- as per primary  service      Interval History   -- chart reviewed  -- discussed with RN  -- patient seen, some slight background headache  -- BP better controlled    -Data reviewed today: I reviewed all new labs and imaging over the last 24 hours. I personally reviewed no images or EKG's today.    Physical Exam    , Blood pressure 130/79, pulse 63, temperature 98.6  F (37  C), temperature source Oral, resp. rate 16, weight 96.6 kg (212 lb 14.4 oz), last menstrual period 09/23/2022, SpO2 99 %, unknown if currently breastfeeding.  Vitals:    06/25/23 0800 06/26/23 0610 06/27/23 0600   Weight: 95.9 kg (211 lb 6.4 oz) 99.4 kg (219 lb 1.6 oz) 96.6 kg (212 lb 14.4 oz)     Vital Signs with Ranges  Temp:  [98.6  F (37  C)-99.3  F (37.4  C)] 98.6  F (37  C)  Pulse:  [61-84] 63  Resp:  [15-19] 16  BP: (124-189)/() 130/79  SpO2:  [98 %-100 %] 99 %  I/O's Last 24 hours  I/O last 3 completed shifts:  In: 4486 [P.O.:4486]  Out: 5900 [Urine:5900]      Medications   All medications were reviewed.    - MEDICATION INSTRUCTIONS -       - MEDICATION INSTRUCTIONS -         buPROPion  150 mg Oral QAM     citalopram  40 mg Oral Daily     docusate sodium  100 mg Oral Daily     furosemide  20 mg Oral Daily     hydrALAZINE  50 mg Oral 4x Daily     labetalol  800 mg Oral Q8H DANIELLE     NIFEdipine ER OSMOTIC  30 mg Oral Daily     sodium chloride (PF)  3 mL Intracatheter Q8H        Data   Recent Labs   Lab 06/27/23  0843 06/26/23  0954 06/25/23  0917 06/24/23  2336 06/24/23  1834   WBC  --   --   --  7.1 7.7   HGB 11.5* 12.1 11.9 12.2 11.6*   MCV  --   --   --  94 92    388 361 378 369   NA  --   --   --  138 139   POTASSIUM  --   --   --  3.2* 3.4   CHLORIDE  --   --   --  102 106   CO2  --   --   --  23 21*   BUN  --   --   --  9.2 9.6   CR 0.75 0.85 0.85 0.82 0.85   ANIONGAP  --   --   --  13 12   BARRY  --   --   --  8.5* 8.4*   GLC  --   --   --  93 125*   ALBUMIN  --   --   --  3.5 3.4*  3.3*   PROTTOTAL  --   --   --  6.9 6.7  6.6   BILITOTAL   --   --   --  0.4 0.4  0.4   ALKPHOS  --   --   --  104 100  102   ALT 21 24 24 26 27  26   AST 20 24 24 27 32  30   LIPASE  --   --   --   --  17       No results found for this or any previous visit (from the past 24 hour(s)).    Chandler Castillo MD  Text Page  (7am to 6pm)

## 2023-06-27 NOTE — PLAN OF CARE
Pt  vital signs stable. BP remain 130/80's pt still has mild dull headache using  Ice and tylenol  pt state headache improving denies blurred vision and epigastric pain +2 reflex no clonus .On labetalol 800 mg TID ,Hydralazine 50 mg four times day and procardia 30 mg daily now Hospitalitis seen today  Patient up ambulating independently and  voiding adequate  patient ambulated in wright today rested in between the care Baby rooming in Spouse bedside supportive  Will continue with current plan of care.

## 2023-06-27 NOTE — PLAN OF CARE
Goal Outcome Evaluation:      Plan of Care Reviewed With: patient    Overall Patient Progress: improvingOverall Patient Progress: improving     Had elevated blood pressures last night, MD was paged and Hospitalist evaluated patient and placed orders - see previous note, MAR, and orders. BP decreased below severe range. Patient recently c/o headache pain - given tylenol per orders and patient request. Reflexes range between +1 to +2. Down approximately 6 pounds from yesterday. Questions encouraged and answered. Continue with current plan of care.

## 2023-06-27 NOTE — PLAN OF CARE
Pt Vitals stable except  BP running 140/90's in morning  by evening  BP remain 189-170/117  MD notified IV  hydralazine given x2,Pt denies headache, blurred vision or RUQ pain. 1+ edema noted. Pt very anxious Atarax given 25 mg once /90's now  Hospitalist  Consulted pt denies tylenol and motrin now  Pt ambulating to bathroom independently. Voiding good amount SCDs in place while in bed. Reflexes 2+, no clonus. pt continues to be asymptomatic Labs within normal limit  Continue to monitor.

## 2023-06-27 NOTE — PROGRESS NOTES
Obstetrics Rounding Note    S: Has slight HA today. Has had this in past with the procardia. Is willing to tolerate this if necessary to control her BPs. Being able to breast feed is very important to her  Naturally wants to go home as soon as possible. Is frustrated, but understanding      Yesterday evening started having severe HTN. Required multiple IV doses of hydralazine. Hostpitalists consulted to assist with blood pressure control considering hx of CHTN already on multiple agents.   She is managed prepregnancy on hydrochlorothiazide/lisinopril    O:   Vitals:    06/27/23 0600 06/27/23 0817 06/27/23 1205 06/27/23 1412   BP: 133/85 135/85 130/79 137/86   Pulse: 65 74 63 74   Resp: 15 16 16 16   Temp: 99  F (37.2  C) 98.6  F (37  C)  98.9  F (37.2  C)   TempSrc: Oral Oral  Oral   SpO2: 99%      Weight: 96.6 kg (212 lb 14.4 oz)         Peak blood pressure 189/115 @ 1833 6/26/23   Blood pressure <150/90 since 2133 6/26/23    4200ml total UOP yesterday. 4200mL so far today    Wt down 7lb since yesterday    Gen: AOx3, NAD. Tearful   Resp: non-labored breathing  Abd: soft, ND, non ttp  Ext: no calf ttp, trace b/l LE edema    Labs:         Hemoglobin   Date Value Ref Range Status   06/27/2023 11.5 (L) 11.7 - 15.7 g/dL Final   06/26/2023 12.1 11.7 - 15.7 g/dL Final   03/08/2021 13.0 11.7 - 15.7 g/dL Final   02/04/2020 12.1 11.7 - 15.7 g/dL Final            Lab Results   Component Value Date    RH Pos 12/27/2019     Cr 0.75  ALT/AST 21/20  Plt 362  Hgb 11.5    Meds:  Current Facility-Administered Medications   Medication     acetaminophen (TYLENOL) tablet 650 mg     albuterol (PROVENTIL HFA/VENTOLIN HFA) inhaler     bisacodyl (DULCOLAX) suppository 10 mg     buPROPion (WELLBUTRIN XL) 24 hr tablet 150 mg     calcium gluconate 10 % injection 1 g     citalopram (celeXA) tablet 40 mg     docusate sodium (COLACE) capsule 100 mg     furosemide (LASIX) tablet 20 mg     hydrALAZINE (APRESOLINE) injection 10 mg     hydrALAZINE  SURGICAL CONSULTATION    This consultation was requested by Fidel William MD for an evaluation regarding concern for umbilical hernia.    HISTORY OF THE PRESENT ILLNESS       This  60 year old female presents with lump around her umbilicus for the last year. It has increased in size over the last year. She notices protruding when she bears down. She is able to self reduce with pushing it back in. She does have discomfort when she leans against that area. Discomfort resolves when she stops leaning on the area. She does have occasional nausea but not associated to food. When she coughs she will feel like she needs to have emesis. She will occasionally need to take Tums for acid reflux and this does resolve her symptoms. She has a BM once daily formed. She denies any constipation or diarrhea. She denies smoking and occasionally drinks ETOH.     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 She will occasionally have to pick her up when she falls. 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 She did this for about 6 years.      US was completed on 05/24/21 revealing a defect in the anterior abdominal wall fascia measuring 2 x 1.6 cm. Herniation of the peritoneal fat within the sac measuring 7.1 x 3 x 5.2 cm. She did have tenderness with exam.     Prior abdominal surgical history includes laparoscopic cholecystomy approximately 10 years ago.     The patient is actively pursuing weight loss.  She discuss this with her primary care provider Dr. Fidel William asked office visit on 16 April 2021. She is trying lifestyle changes and dietary changes.    PAST HISTORY  1.   Past Medical History:   Diagnosis Date   • Acute pancreatitis 2006    hospitalized at [facility] for 10 mckay   • Allergic rhinitis due to pollen     Allergic Rhinitis   • Anxiety state, unspecified     Anxiety Disorder   • Depressive disorder, not elsewhere classified     Depression   • Essential hypertension, benign      (APRESOLINE) tablet 50 mg     hydrOXYzine (ATARAX) tablet 25 mg    Or     hydrOXYzine (ATARAX) tablet 50 mg     ibuprofen (ADVIL/MOTRIN) tablet 600 mg     labetalol (NORMODYNE) tablet 800 mg     lidocaine (LMX4) cream     lidocaine 1 % 0.1-1 mL     magnesium sulfate 2 g in 50 mL sterile water intermittent infusion     magnesium sulfate 4 g in 100 mL sterile water intermittent infusion     midazolam (VERSED) injection 2 mg     NIFEdipine ER OSMOTIC (PROCARDIA XL) 24 hr tablet 30 mg     No MMR Needed - Assessment: Patient does not need MMR vaccine     No Tdap Needed - Assessment: Patient does not need Tdap vaccine     sodium chloride (PF) 0.9% PF flush 3 mL     sodium chloride (PF) 0.9% PF flush 3 mL       A/P: PPD#11, HD#4 s/p readmission for CHTN with superimposed preeclampsia by severe HTN.  - CHTN with SI Pre E. S/p mag, off more than 24hr. Has had difficult to control BPs, necessitating IV hydralazine (yesterday around 6:30pm)  on top of max dose labetalol 800mg TID and hydralazine 20mg QID. She has been receiving lasix as well, and had good diuresis. Is down 7lb. Nl labs (ordered daily).  Hospitalists have been consulted to assist with blood pressure control, see their note for recommendations. Hydralazine increased to 50mg QID and added Procardia XL 30mg. patient with HA, suspect from this as this has been her response in past. Is willing to monitor and continue med for time being.   Discussed need for good control and stability prior to discharge. Acknowledged the frustration of readmission and extended stay as the medications are dialed in for control.   -Continue routine care. Has SCDs  -Is breastfeeding  -Anticipate discharge when BPs stable and well controlled for at least 24hrs.    Stefania Luo Masters, DO  June 27, 2023       Hypertension   • Lump or mass in breast 6/19/07     Chris left breast  B9   • Migraine, unspecified, without mention of intractable migraine without mention of status migrainosus     Migraine   • Other abnormal heart sounds     nl echo 5/99   • Other and unspecified nonspecific immunological findings     IgE antibody to Aspergillus, neg.  Neg w/u for BPA.   • Reflux esophagitis     GERD   • Seronegative rheumatoid arthritis (CMS/HCC)    • Unspecified asthma(493.90)     Asthma       2.   Past Surgical History:   Procedure Laterality Date   • Biopsy of breast, incisional  10/97    Breast Biopsy L breast, fibrocystic changes   • Excise breast lesion  2/6/04    left breast bx: benign, right hilp mass/benign  Dr. Perez   f/u left mammo 6 mo, bilat 12 mos.   • Excise breast lesion  6/19/07    Chris left breast  B9   • Laparoscopy, cholecystectomy      Cholecystectomy, laparoscopic   • Past surgical history       lipoma removed from R arm , date unknown and and L groin 2/99   • Past surgical history      ARTHROSCOPIC SURGERY LEFT KNEE   • Remove tonsils/adenoids,<12 y/o      Tonsillectomy w Adenoids   • Spine surgery procedure unlisted  2001    C4-7 spinal fusion with left iliac crest graft.         REVIEW OF SYSTEMS:  Yamila Hernandez RN  7/15/2021 11:01 AM  Sign when Signing Visit  Patient presents today for consult for abdominal wall hernia as requested by Fidel William MD    Patient has known latex allergies or sensitivities    Allergies: reviewed and updated    Medications: reviewed and updated    PCP:  Fidel William MD     Estimated body mass index is 39.13 kg/m² as calculated from the following:    Height as of this encounter: 5' 4\" (1.626 m).    Weight as of this encounter: 103.4 kg.    Tobacco history reviewed      REVIEW OF SYSTEMS:    EYES: itching d/t seasonal allergies  Blurred vision?  no  Double vision?  no    EARS,NOSE,MOUTH:  Ringing in ears?  no  Runny nose?   no    CARDIOVASCULAR:  Chest  pain?  no  Swelling in legs or ankles?  no    RESPIRATORY:  Shortness of breath or difficulty breathing?   no  Any coughing?  no  Have you ever coughed up blood?   no    GASTROINTESTINAL:  Any weight loss? no  Heartburn? no  Difficulty swallowing?   no  Any change in bowel habits?  no  Any bleeding from the rectum? no    GENITOURINARY:  Blood in urine?  no  Pain with urination?  no    MUSCULOSKELETAL:  Any complaints of muscular pain?  no  Any complaints of joint pain?   ---YES---,RA    SKIN:  Rashes?  no  Hives?  no    NEUROLOGIC:  Headaches?  ---YES---,hx of migraines  History of seizures?  no    ENDOCRINE:  Intolerance to cold? no  Intolerance to heat?  no  Exopthalmos, bulging eyes?  no    PSYCHOLOGICAL:  Any prolonged periods of sadness?  ---YES---,on Cymbalta  Any prolonged periods of anxiety?  no    HEMATOLOGIC/LYMPHOTIC:  Bruises easily?  ---YES---,bruises easily  Enlarged lymph nodes?  no    Have you ever seen Dr. Vincent in the past?  no    Patient's preferred number: [employer] [phone].  Patient reports it is okay to leave detailed message if not available.     Patient, provider and nurse wearing masks during office visit today.         PATIENT PROFILE  1. Social History  Social History     Socioeconomic History   • Marital status: Single     Spouse name: Not on file   • Number of children: 1   • Years of education: Not on file   • Highest education level: Not on file   Occupational History   • Occupation: Personal caregiver   Tobacco Use   • Smoking status: Former Smoker     Packs/day: 0.25     Years: 10.00     Pack years: 2.50     Quit date: 2009     Years since quittin.5   • Smokeless tobacco: Never Used   Substance and Sexual Activity   • Alcohol use: Yes     Alcohol/week: 0.0 standard drinks     Comment: or less   • Drug use: No     Comment: quit marijuana and cocaine in    • Sexual activity: Not Currently   Other Topics Concern   • ADL RESPONSE Not Asked   • ADL RESPONSE No   • ADL  RESPONSE Not Asked   • ADL RESPONSE Not Asked   • ADL RESPONSE Not Asked   • ADL RESPONSE Not Asked   • ADL RESPONSE Not Asked   • ADL RESPONSE Not Asked   • ADL RESPONSE Not Asked   • ADL RESPONSE Not Asked   • ADL RESPONSE Yes     Comment: walking on occasion-- 2 times per week for 1/2 hour   • ADL RESPONSE Not Asked   • ADL RESPONSE Not Asked   • ADL RESPONSE Not Asked   Social History Narrative    Works as personal caregiver.      Social Determinants of Health     Financial Resource Strain:    • Social Determinants: Financial Resource Strain:    Food Insecurity:    • Social Determinants: Food Insecurity:    Transportation Needs:    • Lack of Transportation (Medical):    • Lack of Transportation (Non-Medical):    Physical Activity:    • Days of Exercise per Week:    • Minutes of Exercise per Session:    Stress:    • Social Determinants: Stress:    Social Connections:    • Social Determinants: Social Connections:    Intimate Partner Violence:    • Social Determinants: Intimate Partner Violence Past Fear:    • Social Determinants: Intimate Partner Violence Current Fear:        2. Habits:   Social History     Tobacco Use   Smoking Status Former Smoker   • Packs/day: 0.25   • Years: 10.00   • Pack years: 2.50   • Quit date: 2009   • Years since quittin.5   Smokeless Tobacco Never Used       Alcohol Use: Yes           0 oz/week       Comment: or less      3. Allergies: Reviewed and accepted in medical record    4. Medications: Reviewed and accepted on medication reconciliation list    5.  Family History   Problem Relation Age of Onset   • Heart Father          91 at age 66 massive mi   • Hypertension Father    • Diabetes Mother    • Heart Mother         CAD, angioplasty   • Cancer Mother         breast-   • Psychiatry Sister         depression   • Cancer Other         aunt, Lung CA    No family history of gastrointestinal malignancies    PHYSICAL EXAMINATION  Vital signs:Blood pressure 124/80,  pulse 83, height 5' 4\" (1.626 m), weight 103.4 kg, last menstrual period 01/10/2003, SpO2 98 %.  General: Patient resting comfortably in NAD  HEENT: Normocephalic, PERRLA, EOMI, sclera nonicteric, nasopharynx  moist without erythema  Neck: Supple without thyromegaly, JVD, cervical bruits, or adenopathy  Lungs: Clear bilaterally, trachea in the midline  Heart: SHANNON without a murmur, S1 and S2 within normal limits, PMI not displaced  Abdomen: Soft without distention, BS active and normal pitch.  The patient has a supraumbilical site (secondary to prior laparoscopic cholecystectomy assist and has a reducible bulge in this location.  It is present with cough and strain maneuvers and resolves spontaneously with rest.  There are no groin bulges.  No other abdominal masses are appreciated.  There is no tenderness to deep palpation the rest of the abdomen.  Back: No CVA tenderness  Extremities: Bilateral femoral pulses, no femoral or axillary lymphadenopathy, no pedal edema  Neurological: Alert, oriented x 3        IMPRESSION  1.   Reducible incisional hernia, mildly symptomatic  2.   Morbid obesity, BMI 39.1     PLAN  1.   I discussed with the patient the diagnosis of incisional hernia and the pathophysiology of abdominal wall hernia.  We discussed my recommendation for surgical repair and this included the techniques of open repair, laparoscopic repair, and robot assisted repair.  These procedures as well as risks of bleeding, infection, recurrence, the use of prosthetic mesh, the possibility of requiring conversion from laparoscopic or robotic repair to open repair, the risks of laparoscopic/robotic surgery itself including air embolism and/or injury to bowel, bladder, and/or blood vessel, possibility of blood transfusion including the risks of HIV, hepatitis, and/or transfusion reaction have been reviewed with the patient.  2.   In light of the patient's interest in weight loss I think it is prudent to defer her surgery  for the time being as long as she is only minimally symptomatic.  I will call her in 4 weeks to ensure that she has not had escalating symptoms and to monitor her weight loss.  I think it is reasonable to pursue this plan over the next 2 to 3 months with hopes of a reduced BMI at the time of surgery.  We have discussed that lowering her BMI would reduce her risk of complications postoperatively.  She agrees with this plan and will notify me immediately if she has escalation or changes in her symptoms.    Heladio Vincent MD      An electronic copy of this consultation will be sent to Fidel William MD.

## 2023-06-27 NOTE — PROVIDER NOTIFICATION
06/26/23 1800   Provider Notification   Provider Name/Title Dr soni   Method of Notification Electronic Page   Request Evaluate-Remote   Notification Reason Vital Signs Change  (/117 ,186/112)   Dr Soni  paged regarding BP >160/100. Pt denies headache.blurred vision epigastric pain  Reflexes 2+, no clonus. Hydralazine PO given order to begin IV hydralazine per hypertensive med algorithm. Given 2 doses 20 min apart Hospitalist  consulted and ordered Atarax for anxiety .

## 2023-06-27 NOTE — PROVIDER NOTIFICATION
06/26/23 2004   Provider Notification   Provider Name/Title Dr. Soni   Method of Notification Phone   Request Evaluate-Remote   Notification Reason Vital Signs Change     MD notified regarding recent SBP >160. MD states to hold off on giving PO dose of Hydralazine normally scheduled at 2100, and to instead start the algorithm over and give 10mg IV Hydralazine at this time. If in 20 minutes the BP is still >160/110, give an additional dose of 10mg IV Hydralazine. If 20 minutes following that, BP is still elevated, call MD for further orders. Upon going in to give IV Hydralazine per MD orders, patient appears to have lost IV, so unable to give medication. L&D called to place IV STAT. Dr. Soni also updated that the Hospitalist has not seen patient yet - states she will call to have them see patient STAT.     2025: Dr. Soni called Postpartum and updated regarding loss of IV site and that no medication has been given at this time due to loss of IV. Dr. Soni states to place new IV and give medication if BP still >160/110 following IV placement. Dr. Soni requesting a page when Hospitalist arrives to see patient. States from here on out, contact Hospitalist regarding blood pressure medications/orders.    2040: Rob VILLA in to assess patient. BP now 159/92. Per Rob VILLA, plan to give patient her 2100 PO scheduled dose of Hydralazine, but increase dose and future scheduled doses to 50mg PO. Rob VILLA aware of patient's report of headache from taking Procardia in the past and spoke with patient regarding this - states to start patient on Procardia 30mg PO extended release once daily. States if patients BP > or = 160/110 per algorithm, okay to give patient 2 additional doses of Hydralazine 10mg IV as needed and to contact Hospitalist for future orders from there.     2058: L&D RN remains at bedside attempting to place IV. Will assess BP immediately following. Charge RN aware.    Per   Masters request, Charge RN paged Dr. Soni updating that Hospitalist had seen patient.

## 2023-06-27 NOTE — CONSULTS
[facility]  Consult Note - Hospitalist Service     Date of Admission:  6/24/2023  Consult Requested by: Dr. Stefania Soni DO  Reason for Consult: readmitted for postpartum chronic hypertension with superimposed preeclampsia with resistant BPs  PRIMARY CARE PROVIDER:   Vi Baldwin    Assessment & Plan   Vivi Mccall is a very pleasant 37 year old female, postpartum 10 days after vaginal delivery of second child who presented to the ED on 6/24/2023 due to persistently elevated blood pressure.  The hospitalist service was consulted on 6/26 for further recommendations as her blood pressures are still not well controlled despite medication adjustments.    Uncontrolled hypertension, hx preeclampsia  Patient was discharged previously on labetalol, however redeveloped significant hypertension, prompting presentation to ED.  Her labetalol has been increased to 800 mg 3 times daily, and hydralazine ordered at 20 mg 4 times daily.  She is also been receiving as needed doses of IV hydralazine without much effect.  BP max charted at 189/115.  Now down to 162/95.  --At this time, recommend titration of p.o. medications to try to achieve reasonable blood pressure control, with short-term goal of SBP less than 150.  Ultimately would like to get her blood pressure into normal range.  --LFTs, creatinine, hemoglobin, platelets WNL.  Continue to monitor.  --Increase hydralazine to 50 mg QID (from 20 mg QID)  --PRN hydralazine dosing still avaliable  --Continue Labetalol 800 mg TID, would not increase   --Start Procardia XL 30 ng daily, low dose given hx of headache associated with this.  Discussed with pt and spouse.  May increase if needed.  --- Continue to monitor closely in hospital and will adjust regimen as necessary.  Patient plans to continue breast-feeding which is preferable, but does limit some of the medications that could be safely given to treat blood pressure.  Hopefully  blood pressures will improve, however if not, will need to have further discussion.    Postpartum status:  --Defer routine cares to OB/GYN service.    Diet: Regular Diet Adult     DVT Prophylaxis: Defer to primary service   Oates Catheter: Not present    Disposition Plan      Expected Discharge Date: 06/26/2023              Entered: ALLEN Cortés 06/26/2023, 8:48 PM        The patient's care was discussed with the patient, patient's SO, bedside nurse, and charge nurse.    The patient has been discussed with Dr. Osuna, who agrees with the assessment and plan at this time.      The hospitalist service would like to thank Dr. Soni for the consult.  We will continue to follow the patient.        ALLEN Cortés  [facility]    ______________________________________________________________________    Chief Complaint   Elevated blood pressure    History is obtained from the patient and EMR, as well as discussion from the OB/GYN.      History of Present Illness   Vivi Mccall is a very pleasant 37 year old female, postpartum 10 days after vaginal delivery of second child who presented to the ED on 6/24/2023 due to persistently elevated blood pressure.  The hospitalist service was consulted on 6/26 for further recommendations as her blood pressures are still not well controlled despite medication adjustments.    Patient is approximately 10 days postpartum after normal vaginal delivery of her second child.  She has had a history of chronic hypertension and was discharged home on labetalol, reportedly 800 mg in the morning and afternoon, and 600 mg at night.  Her blood pressure was for a time controlled on this.  She had issues with hypertension and preeclampsia with her first pregnancy.  She reports she had previously been on nicardipine/nifedipine which had caused a headache and was not tolerated.  She has since been off of this medication.  She is now on  hydralazine, receiving 20 mg 4 times daily.  She is also receiving IV hydralazine as needed.  Blood pressures were better for a time, but now today noted to be elevated up to 189/115.  Subsequent blood pressure checks have been in the upper 150s to lower 160s systolic.  She reports headache, intermittent chest discomfort, otherwise asymptomatic.  She is breast-feeding, and plans to continue.  Vital signs otherwise stable with pulse in the 60s-70s.  Temperature is normal.  Satting in the upper 90s on room air.    Review of Systems   The 10 point Review of Systems is negative other than noted in the HPI.    Past Medical History    I have reviewed this patient's medical history and updated it with pertinent information if needed.   Past Medical History:   Diagnosis Date     Asthma     seasonal and environmental     C. difficile diarrhea 2013     Concussion 01/2022     Depression      Depressive disorder      Encounter for IUD insertion 10/16/2020    Jyoti inserted by Dr Park     Generalized anxiety disorder      Hypertension      IUD contraception 10/2020    Jyoti inserted by Dr Park     Lateral epicondylitis of right elbow 6/22/2023     Pain in both hands 02/20/2020       Past Surgical History   I have reviewed this patient's surgical history and updated it with pertinent information if needed.  Past Surgical History:   Procedure Laterality Date     GYN SURGERY  2003    Laser treatment of HPV     ORTHOPEDIC SURGERY Left 07/21/2017    ganglion cyst removal     ORTHOPEDIC SURGERY Left     nerve release on left leg/ankle      SOFT TISSUE SURGERY  1999     SURGICAL HISTORY OF -       Nerve entrapment release     ZZHC RELEASE FOOT/TOE NERVE  2011       Social History   I have reviewed this patient's social history and updated it with pertinent information if needed.  Social History     Tobacco Use     Smoking status: Never     Smokeless tobacco: Never   Vaping Use     Vaping Use: Never used   Substance Use Topics      Alcohol use: Not Currently     Comment: currently pregnant     Drug use: No       Medications   Prior to Admission Medications   Prescriptions Last Dose Informant Patient Reported? Taking?   Prenatal Vit-Fe Sulfate-FA-DHA (PRENATAL VITAMIN/MIN +DHA PO) 6/23/2023 at Unknown time Self Yes Yes   Sig: Take 1 chew tab by mouth daily   Vitamin D3 (CHOLECALCIFEROL) 25 mcg (1000 units) tablet 6/23/2023 at Unknown time Self Yes Yes   Sig: Take 1 tablet by mouth daily   acetaminophen (TYLENOL) 325 MG tablet 6/22/2023 at PRN Self No Yes   Sig: Take 2 tablets (650 mg) by mouth every 4 hours as needed for mild pain or fever   albuterol (PROAIR HFA/PROVENTIL HFA/VENTOLIN HFA) 108 (90 Base) MCG/ACT inhaler More than a month at PRN Self Yes Yes   Sig: Inhale 2 puffs into the lungs every 6 hours as needed for shortness of breath, wheezing or cough   buPROPion (WELLBUTRIN XL) 150 MG 24 hr tablet 6/24/2023 at AM Self No Yes   Sig: Take 1 tablet (150 mg) by mouth every morning   citalopram (CELEXA) 40 MG tablet 6/24/2023 at AM Self No Yes   Sig: Take 1 tablet (40 mg) by mouth daily   ibuprofen (ADVIL/MOTRIN) 600 MG tablet 6/22/2023 at PRN Self No Yes   Sig: Take 1 tablet (600 mg) by mouth every 6 hours as needed for other (cramping)   labetalol (NORMODYNE) 200 MG tablet 6/24/2023 at AM Self Yes Yes   Sig: Take 600-800 mg by mouth 3 times daily Instructed to increase dose from 600 mg TID to 800 mg AM/800 mg afternoon/600 mg HS as of 6/24/23      Facility-Administered Medications: None     Allergies   Allergies   Allergen Reactions     Amoxicillin Other (See Comments)     Patient does not want to take since having c diff     Codeine Other (See Comments)     Hallucinations     Penicillins Other (See Comments)     Patient does not want to take since having c diff       Physical Exam   Vital Signs: Temp: 99.3  F (37.4  C) Temp src: Oral BP: (!) (P) 163/92 Pulse: 69   Resp: 19 SpO2: 99 % O2 Device: None (Room air)    Weight: 219 lbs 1.6  oz    Constitutional: Awake, alert, cooperative, no apparent distress.    ENT: Normocephalic, without obvious abnormality, atraumatic, oral pharynx with moist mucus membranes.  Eyes pupils are equal, round.  Normal sclera.    Pulmonary: No increased work of breathing, good air exchange  Skin/Integumen: No rashes on exposed skin.  Nondiaphoretic.  Neuro: No overt deficits of higher functions.  Moves all 4 extremities equally.  Psych:  Alert and oriented to self, place, date, situation.  Anxious appearing.  Normal mood and affect.  Extremities: Mild bilateral lower extremity edema noted.     Data   Results for orders placed or performed during the hospital encounter of 06/24/23 (from the past 24 hour(s))   ALT   Result Value Ref Range    ALT 24 0 - 50 U/L   AST   Result Value Ref Range    AST 24 0 - 45 U/L   Creatinine   Result Value Ref Range    Creatinine 0.85 0.51 - 0.95 mg/dL    GFR Estimate 89 >60 mL/min/1.73m2   Hemoglobin   Result Value Ref Range    Hemoglobin 12.1 11.7 - 15.7 g/dL   Platelet count   Result Value Ref Range    Platelet Count 388 150 - 450 10e3/uL

## 2023-06-28 PROCEDURE — 250N000013 HC RX MED GY IP 250 OP 250 PS 637: Performed by: STUDENT IN AN ORGANIZED HEALTH CARE EDUCATION/TRAINING PROGRAM

## 2023-06-28 PROCEDURE — 120N000012 HC R&B POSTPARTUM

## 2023-06-28 PROCEDURE — 250N000013 HC RX MED GY IP 250 OP 250 PS 637: Performed by: PHYSICIAN ASSISTANT

## 2023-06-28 RX ORDER — NIFEDIPINE 30 MG/1
60 TABLET, EXTENDED RELEASE ORAL DAILY
Status: DISCONTINUED | OUTPATIENT
Start: 2023-06-29 | End: 2023-06-29 | Stop reason: HOSPADM

## 2023-06-28 RX ORDER — NIFEDIPINE 30 MG/1
30 TABLET, EXTENDED RELEASE ORAL ONCE
Status: COMPLETED | OUTPATIENT
Start: 2023-06-28 | End: 2023-06-28

## 2023-06-28 RX ADMIN — BUPROPION HYDROCHLORIDE 150 MG: 150 TABLET, FILM COATED, EXTENDED RELEASE ORAL at 08:42

## 2023-06-28 RX ADMIN — NIFEDIPINE 30 MG: 30 TABLET, FILM COATED, EXTENDED RELEASE ORAL at 09:27

## 2023-06-28 RX ADMIN — LABETALOL HYDROCHLORIDE 800 MG: 200 TABLET, FILM COATED ORAL at 14:01

## 2023-06-28 RX ADMIN — LABETALOL HYDROCHLORIDE 800 MG: 200 TABLET, FILM COATED ORAL at 22:10

## 2023-06-28 RX ADMIN — HYDRALAZINE HYDROCHLORIDE 50 MG: 50 TABLET, FILM COATED ORAL at 21:00

## 2023-06-28 RX ADMIN — NIFEDIPINE 30 MG: 30 TABLET, FILM COATED, EXTENDED RELEASE ORAL at 21:00

## 2023-06-28 RX ADMIN — LABETALOL HYDROCHLORIDE 800 MG: 200 TABLET, FILM COATED ORAL at 06:12

## 2023-06-28 RX ADMIN — HYDRALAZINE HYDROCHLORIDE 50 MG: 50 TABLET, FILM COATED ORAL at 11:45

## 2023-06-28 RX ADMIN — CITALOPRAM HYDROBROMIDE 40 MG: 20 TABLET ORAL at 08:42

## 2023-06-28 RX ADMIN — HYDRALAZINE HYDROCHLORIDE 50 MG: 50 TABLET, FILM COATED ORAL at 07:46

## 2023-06-28 RX ADMIN — FUROSEMIDE 20 MG: 20 TABLET ORAL at 08:43

## 2023-06-28 RX ADMIN — HYDRALAZINE HYDROCHLORIDE 50 MG: 50 TABLET, FILM COATED ORAL at 17:43

## 2023-06-28 ASSESSMENT — ACTIVITIES OF DAILY LIVING (ADL)
ADLS_ACUITY_SCORE: 20

## 2023-06-28 NOTE — PLAN OF CARE
Goal Outcome Evaluation:      Plan of Care Reviewed With: patient, spouse    Overall Patient Progress: no change  Overall Patient Progress: no change    Vital signs, reflex/ clonus, LOC, & I/O monitored per orders. Bps 152/94 & 149/97. Clonus and reflexes WDL. Pt denies preeclampsia symptoms of a headache, visual disturbances, epigastric pain. Patient denies dizziness, lightheadedness, chest pain, SOB. Patient has BLE +1 edema. Pt ambulating with independently. Patient voiding without difficulty. Infant is rooming in with mother. Patient is exclusively breastfeeding infantSoraya. Will continue with current plan of care.       Soraya Moore RN on 6/27/2023 at 10:33 PM

## 2023-06-28 NOTE — PLAN OF CARE
Goal Outcome Evaluation:      Plan of Care Reviewed With: patient, spouse    Overall Patient Progress: improving Overall Patient Progress: improving  Pt. denies pain or need for pain meds. Denies headache/blurred vision/epigastric pain. Voiding good amounts. Breastfeeding  when she is here. Pumping prn. Blood pressures improving slightly. Will continue to monitor.

## 2023-06-28 NOTE — PROGRESS NOTES
Obstetrics Progress Note    Subjective:  Patient is doing okay.  No complaints. All symptoms from admission are improved. Feels very thirsty after breast feeding, drinking several liters of fluid. Understandably frustrated, saddened by long readmission.     Objective:  Patient Vitals for the past 24 hrs:   BP Temp Temp src Pulse Resp Weight   23 0738 (!) 137/91 98.8  F (37.1  C) Oral 72 18 --   23 0612 (!) 146/100 -- -- 69 -- 96.3 kg (212 lb 6.4 oz)   23 0600 (!) 146/100 98.9  F (37.2  C) -- -- -- --   23 0200 130/78 98.7  F (37.1  C) -- 75 -- --   23 2158 (!) 149/97 -- -- 74 16 --   23 2057 (!) 152/94 -- -- 71 18 --   23 1652 132/85 99.1  F (37.3  C) Oral 69 16 --   23 1412 137/86 98.9  F (37.2  C) Oral 74 16 --   23 1205 130/79 -- -- 63 16 --       General: NAD, appears generally well  CV:  Regular rate  Resp:  Breathing comfortably on RA  Abd:  No RUQ pain  Ext:  1+ edema in bilateral LE      Intake/Output Summary (Last 24 hours) at 2023 0849  Last data filed at 2023 0600  Gross per 24 hour   Intake 3360 ml   Output 2600 ml   Net 760 ml     Assessment and Plan:  37 year old old  HD#4 and post-partum day #12 s/p  readmitted with superimposed preE w/ SF (BPs).      Superimposed preE w/ SF (BPs)               - SR on admit and again . BPs are now normal, to high mild range. Last dose of IV meds was  PM               - s/p IV magnesium for seizure prophylaxis x 24 hours on admit               - S/p IM consult. Current PO regimen: Start PO labetalol 800mg TID and PO hydralazine 50mg QID, procardia 30mg every day. On Lasix 20mg every day                             - Will increase procardia to 60mg daily (30mg today AM and 30mg today PM, and start 60mg tomorrow AM).                - Symptoms:      absent   - Weight: net up 3lbs since admit but down 7lbs since peak weight on . Post delivery weights not available. Weight gain during  pregnancy was 17lbs. Down 10 lbs from pre-delivery weight.    - I/O: Last 24 hours has had net increase in fluid. Orders placed for sodium restricted diet and limit fluid intake to 2L daily.                - Labs: normal. AM pending. Will discontinue daily checks and order as clinically indicated.     Discharge pending control of BPs, diuresis.     Ruthann Ferreira MD, M[facility]  06/28/23

## 2023-06-29 VITALS
RESPIRATION RATE: 16 BRPM | OXYGEN SATURATION: 99 % | WEIGHT: 208 LBS | DIASTOLIC BLOOD PRESSURE: 79 MMHG | BODY MASS INDEX: 31.63 KG/M2 | SYSTOLIC BLOOD PRESSURE: 117 MMHG | TEMPERATURE: 97.7 F | HEART RATE: 75 BPM

## 2023-06-29 PROBLEM — O14.10 PREECLAMPSIA, SEVERE: Status: ACTIVE | Noted: 2023-06-29

## 2023-06-29 PROCEDURE — 250N000013 HC RX MED GY IP 250 OP 250 PS 637: Performed by: STUDENT IN AN ORGANIZED HEALTH CARE EDUCATION/TRAINING PROGRAM

## 2023-06-29 RX ORDER — FUROSEMIDE 20 MG
20 TABLET ORAL DAILY
Qty: 3 TABLET | Refills: 0 | Status: SHIPPED | OUTPATIENT
Start: 2023-06-30 | End: 2023-07-03

## 2023-06-29 RX ORDER — LABETALOL 200 MG/1
800 TABLET, FILM COATED ORAL EVERY 8 HOURS
Qty: 180 TABLET | Refills: 0 | Status: SHIPPED | OUTPATIENT
Start: 2023-06-29 | End: 2023-10-16

## 2023-06-29 RX ORDER — HYDRALAZINE HYDROCHLORIDE 50 MG/1
50 TABLET, FILM COATED ORAL 4 TIMES DAILY
Qty: 60 TABLET | Refills: 0 | Status: SHIPPED | OUTPATIENT
Start: 2023-06-29 | End: 2023-07-03

## 2023-06-29 RX ADMIN — IBUPROFEN 600 MG: 600 TABLET ORAL at 06:16

## 2023-06-29 RX ADMIN — DOCUSATE SODIUM 100 MG: 100 CAPSULE, LIQUID FILLED ORAL at 09:04

## 2023-06-29 RX ADMIN — NIFEDIPINE 60 MG: 30 TABLET, FILM COATED, EXTENDED RELEASE ORAL at 09:03

## 2023-06-29 RX ADMIN — ACETAMINOPHEN 650 MG: 325 TABLET, FILM COATED ORAL at 02:20

## 2023-06-29 RX ADMIN — FUROSEMIDE 20 MG: 20 TABLET ORAL at 09:04

## 2023-06-29 RX ADMIN — LABETALOL HYDROCHLORIDE 800 MG: 200 TABLET, FILM COATED ORAL at 06:16

## 2023-06-29 RX ADMIN — CITALOPRAM HYDROBROMIDE 40 MG: 20 TABLET ORAL at 09:04

## 2023-06-29 RX ADMIN — BUPROPION HYDROCHLORIDE 150 MG: 150 TABLET, FILM COATED, EXTENDED RELEASE ORAL at 09:03

## 2023-06-29 ASSESSMENT — ACTIVITIES OF DAILY LIVING (ADL)
ADLS_ACUITY_SCORE: 20

## 2023-06-29 NOTE — PROGRESS NOTES
Obstetrics Progress Note    Subjective:  Patient better today.  No complaints. Denies any symptoms of pre-eclampsia. All symptoms from admission are improved.    Objective:  Patient Vitals for the past 24 hrs:   BP Temp Temp src Pulse Resp Weight   23 0607 121/80 98.5  F (36.9  C) Oral 72 16 94.3 kg (208 lb)   23 0205 120/79 98.4  F (36.9  C) Oral 77 16 --   23 2210 (!) 144/97 -- -- 70 16 --   23 2055 (!) 148/92 98.6  F (37  C) Oral 76 16 --   23 1743 131/85 -- -- -- -- --   23 1642 (!) 143/103 98.4  F (36.9  C) Oral 72 16 --   23 1145 134/83 -- -- 72 16 --   23 0738 (!) 137/91 98.8  F (37.1  C) Oral 72 18 --     Vitals:    23 2239 23 0800 23 0610 23 0600   Weight: 95 kg (209 lb 8 oz) 95.9 kg (211 lb 6.4 oz) 99.4 kg (219 lb 1.6 oz) 96.6 kg (212 lb 14.4 oz)    23 0612 23 0607   Weight: 96.3 kg (212 lb 6.4 oz) 94.3 kg (208 lb)     Weight change: -1.996 kg (-4 lb 6.4 oz) in LAST 24 HOURS. -1 POUND SINCE ADMISSION    General: NAD, appears generally well  CV:  Regular rate  Resp:  Breathing comfortably on RA  Abd:  No RUQ pain  Ext:  1+ edema in bilateral LE      Intake/Output Summary (Last 24 hours) at 2023 0722  Last data filed at 2023 0608  Gross per 24 hour   Intake 2200 ml   Output 3700 ml   Net -1500 ml       Assessment and Plan:  37 year old old  HD#5 and post-partum day #13 s/p  readmitted with superimposed preE w/ SF (BPs).      Superimposed preE w/ SF (BPs)               - SR on admit and again . BPs are now normal, to high mild range. Last dose of IV meds was  PM               - s/p IV magnesium for seizure prophylaxis x 24 hours on admit               - S/p IM consult. Current PO regimen:     PO labetalol 800mg TID and PO hydralazine 50mg QID, procardia 60mg every day. On Lasix 20mg every day                - Symptoms: absent   - Weight: net up -1lbs since admit but down 11lbs since peak weight on  6/26.    - I/O: Last 24 hours has had net increase in fluid. Orders placed for sodium restricted diet and limit fluid intake to 2L daily.               - Labs: normal.     Plan discharge today with follow-up blood pressure check on Monday in the clinic  Reviewed BP parameters at home  Reviewed signs/symptoms of pre-eclampsia.    Arleth Lovelace MD

## 2023-06-29 NOTE — PLAN OF CARE
Pt's BPs: 120's-140's/70's-90's, AOVSS on RA.Scheduled BP meds administered per order, see Mar.  Reflexes normal, absent clonus. Edema +1 on BLE. Pt. denies  blurry vision, epigastric pain. Pt. c/o headache & treated with Ibuprofen & Tylenol with some relief. Pt. up ad jackie. Voiding without issues with adequate amounts, 2L fluid restriction.  Left PIV SL. Spouse at bedside supportive of pt. Baby rooming in. Continue to assess & monitor per POC.

## 2023-06-29 NOTE — DISCHARGE INSTRUCTIONS
Preeclampsia   Call your doctor right away if you have any of the following:  - Edema (swelling) in your face or hands  - Rapid weight gain-about 1 pound or more in a day  - Headache  - Abdominal pain on your right side  - Vision problems (flashes or spots)  - You have questions or concerns once you return home.      Warning Signs after Having a Baby    Keep this paper on your fridge or somewhere else where you can see it.    Call your provider if you have any of these symptoms up to 12 weeks after having your baby.    Thoughts of hurting yourself or your baby  Pain in your chest or trouble breathing  Severe headache not helped by pain medicine  Eyesight concerns (blurry vision, seeing spots or flashes of light, other changes to eyesight)  Fainting, shaking or other signs of a seizure    Call 9-1-1 if you feel that it is an emergency.     The symptoms below can happen to anyone after giving birth. They can be very serious. Call your provider if you have any of these warning signs.    My provider s phone number: _______________________    Losing too much blood (hemorrhage)    Call your provider if you soak through a pad in less than an hour or pass blood clots bigger than a golf ball. These may be signs that you are bleeding too much.    Blood clots in the legs or lungs    After you give birth, your body naturally clots its blood to help prevent blood loss. Sometimes this increased clotting can happen in other areas of the body, like the legs or lungs. This can block your blood flow and be very dangerous.     Call your provider if you:  Have a red, swollen spot on the back of your leg that is warm or painful when you touch it.   Are coughing up blood.     Infection    Call your provider if you have any of these symptoms:  Fever of 100.4 F (38 C) or higher.  Pain or redness around your stitches if you had an incision.   Any yellow, white, or green fluid coming from places where you had stitches or surgery.    Mood  Problems (postpartum depression)    Many people feel sad or have mood changes after having a baby. But for some people, these mood swings are worse.     Call your provider right away if you feel so anxious or nervous that you can't care for yourself or your baby.    Preeclampsia (high blood pressure)    Even if you didn't have high blood pressure when you were pregnant, you are at risk for the high blood pressure disease called preeclampsia. This risk can last up to 12 weeks after giving birth.     Call your provider if you have:   Pain on your right side under your rib cage  Sudden swelling in the hands and face    Remember: You know your body. If something doesn't feel right, get medical help.     For informational purposes only. Not to replace the advice of your health care provider. Copyright 2020 [facility]. All rights reserved. Clinically reviewed by Nadia Luevano, RNC-OB, MSN. "[employer] [id number] - Rev 02/23.  Furosemide Oral Tablet  Brands: Lasix  Uses  This medicine is used for the following purposes:  high blood pressure  swelling  Instructions  This medicine may be taken with or without food.  It is very important that you take the medicine at about the same time every day. It will work best if you do this.  Keep the medicine at room temperature. Avoid heat and direct light.  This medicine will make you urinate more. If you have difficulty passing urine, please tell your doctor.  This medicine may cause you to become more sensitive to the sun. Use sunscreen or wear protective clothing when you are exposed to the sun.  It is important that you keep taking each dose of this medicine on time even if you are feeling well.  If you forget to take a dose on time, take it as soon as you remember. If it is almost time for the next dose, do not take the missed dose. Return to your normal dosing schedule. Do not take 2 doses of this medicine at one time.  Tell your doctor and pharmacist about all your  medicines. Include prescription and over-the-counter medicines, vitamins, and herbal medicines.  Do not suddenly stop taking this medicine. Check with your doctor before stopping.  It is very important that you follow your doctor's instructions for all blood tests.  Cautions  Tell your doctor and pharmacist if you ever had an allergic reaction to a medicine.  Some patients taking this medicine have experienced serious side effects. Please speak with your doctor to understand the risks and benefits associated with this medicine.  Do not use the medication any more than instructed.  This medicine may cause dizziness or fainting, especially after exercising or in hot weather. Be very careful when standing or sitting up quickly.  Your ability to stay alert or to react quickly may be impaired by this medicine. Do not drive or operate machinery until you know how this medicine will affect you.  Please check with your doctor before drinking alcohol while on this medicine.  Tell the doctor or pharmacist if you are pregnant, planning to be pregnant, or breastfeeding.  Do not start or stop any other medicines without first speaking to your doctor or pharmacist.  Do not share this medicine with anyone who has not been prescribed this medicine.  Side Effects  The following is a list of some common side effects from this medicine. Please speak with your doctor about what you should do if you experience these or other side effects.  constipation  dizziness  dry mouth  lack of energy and tiredness  headaches  high blood sugar  low blood pressure  liver problems  red, burning, or itchy skin  stomach upset or abdominal pain  increased risk of sunburn  increased urinary frequency  blurring or changes of vision  If you have any of the following side effects, you may be getting too much medicine. Please contact your doctor to let them know about these side effects.  confusion  drowsiness or sedation  fainting  numbness or tingling in  hands and feet  irritability  muscle cramps  muscle pain or weakness  tight or rigid muscles  thirst  unsteadiness while walking  urinating less often  dark urine  Call your doctor or get medical help right away if you notice any of these more serious side effects:  shallow, irregular breathing  unusual bruising or discoloration on skin  changes in memory, mood, or thinking  ear problems (ringing in the ears, hearing loss)  fast or irregular heart beats  kidney problems  kidney stones  signs of liver damage (such as yellowing of eye or skin, dark urine, or unusual tiredness)  seizures  light colored stool  unusual or unexplained tiredness or weakness  severe or persistent vomiting  A few people may have an allergic reaction to this medicine. Symptoms can include difficulty breathing, skin rash, itching, swelling, or severe dizziness. If you notice any of these symptoms, seek medical help quickly.  Extra  Please speak with your doctor, nurse, or pharmacist if you have any questions about this medicine.  [email or web address]  IMPORTANT NOTE: This document tells you briefly how to take your medicine, but it does not tell you all there is to know about it. Your doctor or pharmacist may give you other documents about your medicine. Please talk to them if you have any questions. Always follow their advice. There is a more complete description of this medicine available in English. Scan this code on your smartphone or tablet or use the web address below. You can also ask your pharmacist for a printout. If you have any questions, please ask your pharmacist. The display and use of this drug information is subject to Terms of Use. Copyright(c) 2022 [employer]     8369-0065 [employer]. All rights reserved. This information is not intended as a substitute for professional medical care. Always follow your healthcare professional's instructions.

## 2023-06-29 NOTE — PLAN OF CARE
Goal Outcome Evaluation:      Plan of Care Reviewed With: patient, spouse    Overall Patient Progress: improvingOverall Patient Progress: improving         D: VSS, assessments WDL.   I: Pt. received complete discharge paperwork and home medications as filled by discharge pharmacy.  Pt. was given times of last dose for all discharge medications in writing on discharge medication sheets.  Discharge teaching included home medication, pain management, activity restrictions, postpartum cares, and signs and symptoms of infection.    A: Discharge outcomes on care plan met.  Mother states understanding and comfort with self and baby cares.  P: Pt. discharged to home.  Pt. was discharged with baby, and bands were checked at time of discharge.  Pt. was accompanied by , nurse and baby, and left with personal belongings.  Pt. to follow up with OB on Fridat for a BP check per MD order.  Pt. had no further questions at the time of discharge and no unmet needs were identified.

## 2023-06-29 NOTE — DISCHARGE SUMMARY
[facility]    Discharge Summary  Gynecology    Date of Admission:  6/24/2023  Date of Discharge:  6/29/2023  Discharging Provider: Arleth Lovelace MD    Discharge Diagnoses   Principal Problem:    Preeclampsia in postpartum period  Active Problems:    Preeclampsia, severe      History of Present Illness   Vivi Mccall is a 37 year old female who presented with severe range blood pressure consistent with postpartum pre-eclampsia    Hospital Course   Patient was admitted on 6/24/2023 (PP Day #8) with elevated blood pressures.  Her pregnancy was complicated by CHTN. Upon arrival to ER she complained of epigastric pain. Bps ranged in the 150-180's/100-110's. She required IV hydralazine to reduce blood pressures. Pre-eclampsia labs were normal. She received IV magnesium x 24 hours, Was started on labetalol 800mg TID, PO hydralazine 10mg QID, and 20mg lasix daily.  6/25: Patient was normotensive on the Magnesium and was overall feeling well  6/26: 10# weight gain noted and she had several severe range blood pressures throughout the night. Hydralazine was increased to 20mg QID. Minimal diuresis was noted. Required multiple IV hydralazine doses to improve blood pressures.  6/27: Hospitalist was consulted. Discussed continuing to titrate hydralazine up if needed. Hydralazine increased to 50mg QID.  6/28: Procardia increased to 60mg daily.  Today on 6/29 patient blood pressures have continued to improve and she is now normotensive.  She denies any symptoms of severe pre-eclampsia.  Decision made to discharge her home. She is instructed to follow-up on Monday in clinic for blood pressure check.    Arleth Lovelace MD    Discharge Disposition   Discharged to home   Condition at discharge: Stable      Primary Care Physician   Vi Baldwin     Primary OB Provider:  Dr. Anamaria Park    Consultations This Hospital Stay   HOSPITALIST IP CONSULT  SOCIAL WORK IP CONSULT    Time Spent on this  Encounter   I have spent greater than 30 minutes on this discharge. (60 minutes in total spent on discharge today including doing chart review, review of outside records, review and interpretation of pertinent test results, history and exam, documentation, patient counseling, and further activities as noted above.)      Discharge Orders      Activity    Activity as tolerated     Reason for your hospital stay    Maternity care     Severe preeclampsia follow up    Follow up in clinic or with home care within 3 days for blood pressure check     Breast pump - Manual/Electric    Breast Pump Documentation:  Manual/Electric Pump: To support adequate breast milk production and nutrition for infant.     I, the undersigned, certify that the above prescribed supplies are medically necessary for this patient and is both reasonable and necessary in reference to accepted standards of medical and necessary in reference to accepted standards of medical practice in the treatment of this patient's condition and is not prescribed as a convenience.     Diet    Resume previous diet     Discharge Medications   Current Discharge Medication List      START taking these medications    Details   furosemide (LASIX) 20 MG tablet Take 1 tablet (20 mg) by mouth daily for 3 days  Qty: 3 tablet, Refills: 0    Associated Diagnoses: Preeclampsia in postpartum period; Chronic hypertension affecting pregnancy      hydrALAZINE (APRESOLINE) 50 MG tablet Take 1 tablet (50 mg) by mouth 4 times daily for 15 days  Qty: 60 tablet, Refills: 0    Associated Diagnoses: Preeclampsia in postpartum period; Chronic hypertension affecting pregnancy      NIFEdipine ER OSMOTIC (ADALAT CC) 60 MG 24 hr tablet Take 1 tablet (60 mg) by mouth daily for 15 days  Qty: 15 tablet, Refills: 0    Associated Diagnoses: Preeclampsia in postpartum period; Chronic hypertension affecting pregnancy         CONTINUE these medications which have CHANGED    Details   labetalol  (NORMODYNE) 200 MG tablet Take 4 tablets (800 mg) by mouth every 8 hours for 15 days  Qty: 180 tablet, Refills: 0    Associated Diagnoses: Preeclampsia in postpartum period; Chronic hypertension affecting pregnancy         CONTINUE these medications which have NOT CHANGED    Details   acetaminophen (TYLENOL) 325 MG tablet Take 2 tablets (650 mg) by mouth every 4 hours as needed for mild pain or fever  Qty: 60 tablet, Refills: 1    Associated Diagnoses: Vaginal delivery      albuterol (PROAIR HFA/PROVENTIL HFA/VENTOLIN HFA) 108 (90 Base) MCG/ACT inhaler Inhale 2 puffs into the lungs every 6 hours as needed for shortness of breath, wheezing or cough      buPROPion (WELLBUTRIN XL) 150 MG 24 hr tablet Take 1 tablet (150 mg) by mouth every morning  Qty: 90 tablet, Refills: 3    Associated Diagnoses: Post concussion syndrome; Attention and concentration deficit      citalopram (CELEXA) 40 MG tablet Take 1 tablet (40 mg) by mouth daily  Qty: 90 tablet, Refills: 3    Associated Diagnoses: Major depressive disorder, recurrent episode, moderate (H); ROCHELLE (generalized anxiety disorder)      ibuprofen (ADVIL/MOTRIN) 600 MG tablet Take 1 tablet (600 mg) by mouth every 6 hours as needed for other (cramping)  Qty: 60 tablet, Refills: 1    Associated Diagnoses: Vaginal delivery      Prenatal Vit-Fe Sulfate-FA-DHA (PRENATAL VITAMIN/MIN +DHA PO) Take 1 chew tab by mouth daily      Vitamin D3 (CHOLECALCIFEROL) 25 mcg (1000 units) tablet Take 1 tablet by mouth daily           Allergies   Allergies   Allergen Reactions     Amoxicillin Other (See Comments)     Patient does not want to take since having c diff     Codeine Other (See Comments)     Hallucinations     Penicillins Other (See Comments)     Patient does not want to take since having c diff     Data   Most Recent 3 CBC's:Recent Labs   Lab Test 06/27/23  0843 06/26/23  0954 06/25/23  0917 06/24/23  2336 06/24/23  1834 06/17/23  1559 06/15/23  2031   WBC  --   --   --  7.1 7.7  --   8.7   HGB 11.5* 12.1 11.9 12.2 11.6*   < > 11.0*   MCV  --   --   --  94 92  --  94    388 361 378 369  --  225    < > = values in this interval not displayed.      Most Recent 3 BMP's:  Recent Labs   Lab Test 06/27/23  0843 06/26/23  0954 06/25/23  0917 06/24/23  2336 06/24/23  1834 06/15/23  2031 06/11/23  2353   NA  --   --   --  138 139  --  136   POTASSIUM  --   --   --  3.2* 3.4  --  3.5   CHLORIDE  --   --   --  102 106  --  104   CO2  --   --   --  23 21*  --  19*   BUN  --   --   --  9.2 9.6 5.1* 2.9*   CR 0.75 0.85 0.85 0.82 0.85 0.63 0.54   ANIONGAP  --   --   --  13 12  --  13   BARRY  --   --   --  8.5* 8.4*  --  8.6   GLC  --   --   --  93 125*  --  81     Most Recent 2 LFT's:  Recent Labs   Lab Test 06/27/23 0843 06/26/23 0954 06/25/23 0917 06/24/23 2336 06/24/23 1834   AST 20 24   < > 27 32  30   ALT 21 24   < > 26 27  26   ALKPHOS  --   --   --  104 100  102   BILITOTAL  --   --   --  0.4 0.4  0.4    < > = values in this interval not displayed.

## 2023-06-29 NOTE — PLAN OF CARE
Goal Outcome Evaluation:      Plan of Care Reviewed With: patient, spouse             /103 and 131/85 this shift. Pt denies headache, blurred vision, nausea or epigastric pain. Pt denies pain. Up independently to bathroom.  and daughters here to visit. Pt breastfeeding infant. Encouraged pt to call with any needs or questions.

## 2023-06-30 ENCOUNTER — TELEPHONE (OUTPATIENT)
Dept: OBGYN | Facility: CLINIC | Age: 38
End: 2023-06-30
Payer: COMMERCIAL

## 2023-06-30 NOTE — TELEPHONE ENCOUNTER
labetalol (NORMODYNE) 200 MG tablet 800 mg, EVERY 8 HOURS     NIFEdipine ER OSMOTIC (ADALAT CC) 60 MG 24 hr tablet 60 mg, DAILY     hydrALAZINE (APRESOLINE) 50 MG tablet  50 mg, 4 TIMES DAILY     furosemide (LASIX) 20 MG tablet  20 mg, DAILY x 3 days (started today )       23   Took nifedipine and Labetalol and hydralazine this morning.    BP before meds at noon as 113/62 and TOOK labetalol 800 mg and the hydralazine 50 mg at that time.  Just now was standing bathing baby (w her mother present) and became dizzy/lightheaded and BP 86/40    Wants to know what level of BP she should NOT take the medication or call. Since on a lot of different medications right now - having Dr Shin on call advise. She has a BP check here on Monday.    Jael Nelson RN on 2023 at 1:29 PM

## 2023-06-30 NOTE — TELEPHONE ENCOUNTER
Spoke w pt and reviewed recommendations. Pt still feeling alittle lightheaded but is resting at this time. Sent all instructions to her mychart so written down for her.    Pt verbalized understanding, in agreement with plan, and voiced no further questions.  Jael Nelson RN on 6/30/2023 at 3:40 PM

## 2023-06-30 NOTE — TELEPHONE ENCOUNTER
Cut labetalol in half to 400 mg TID  Continue nifedipine at 60 mg daily     Stop Lasix and stop hydralazine  Do not take blood pressure medication if blood pressures are less than 100/50 mm Hg.    Call us if your blood pressure is less than 120/ 80 mm Hg prior to your next dose.     If you experience any blood pressures at 150/100 mm Hg on this new regimen please give us a call.

## 2023-07-01 ENCOUNTER — NURSE TRIAGE (OUTPATIENT)
Dept: NURSING | Facility: CLINIC | Age: 38
End: 2023-07-01
Payer: COMMERCIAL

## 2023-07-01 NOTE — TELEPHONE ENCOUNTER
Nurse Triage SBAR    Is this a 2nd Level Triage? YES, LICENSED PRACTITIONER REVIEW IS REQUIRED    Situation:     Patient calling stating she was to call on call provider if her blood pressure was elevated.  Primary OB/GYN Dr Park.    Background:     Post partum  6/16/23.   Discharged on 6/29/23 for preeclampsia/postpartum    Assessment:     Patient calling reporting B/P 162/99 currently.  Patient is denying any symptoms. Stating she just woke up to check B/P prior to next dose of medication.  Stating she was advised to call prior to taking next dose of Labetalol if blood pressure was elevated.  Patient took her last dose of Labetalol 400 mg 8 hours ago.    Protocol Recommended Disposition:   See My Chart Message from 6/30/23. Patient to call if B/P >150/100.    Recommendation:     Paged to provider  418 a.m. paged on call Dr Shin through [employer] to call Vi at  [phone].  Dr Shin returned page advised patient to increase Labetalol to 800 mg and take Nifedipine 60 mg. Patient to recheck B/P in 1 hour and call back if blood pressure outside of perimeters per My Chart Message 6/30/23.  Patient should continue to hold Lasix and Hydralazine medications.    Provider Recommendation Follow Up:   Reached patient/caregiver. Informed of provider's recommendations. Patient verbalized understanding and agrees with the plan.       Reason for Disposition    [1] Follow-up call from patient regarding patient's clinical status AND [2] information urgent    Additional Information    Negative: Lab calling with strep throat test results and triager can call in prescription    Negative: Lab calling with urinalysis test results and triager can call in prescription    Negative: Medication questions    Negative: Medication renewal and refill questions    Negative: Pre-operative or pre-procedural questions    Negative: ED call to PCP (i.e., primary care provider; doctor, NP, or PA)    Negative: Doctor  (or NP/PA) call to PCP    Negative: Lab or radiology calling with CRITICAL test results    Negative: Call about patient who is currently hospitalized    Protocols used: PCP CALL - NO TRIAGE-A-AH

## 2023-07-02 NOTE — TELEPHONE ENCOUNTER
[facility] post partum 15 days: Dr Park.   Discharged on Thursday for preeclampsia. Her blood pressure has been high: 154/91 @ 8pm before meds. 9pm 151/97. She took Labetolol 800mg @ 8pm with   Nifedipine 60 mg   She was told to stop Hydralazine and Lasix yesterday (8pm).   4am 164/99 Labetolol 800mg and Nifedipine 60mg  5:30am 108/72. By noon 131/86 Labetolol 800mg  .   Yesterday her blood pressure was too low.   She was told to call if BP >150/100.   DR Arleth Lovelace on call, paged via am com @ 9:33pm  Call if BP> or =160/110 x 2 measurements 15 min apart while resting.  Keep medications the same for now.     9:39pm contacted patient with this information. She verbalized understanding and has written the information down. She is not having any symptoms at this time.     Susana Clemente RN Triage Nurse Advisor 9:42 PM 7/1/2023      Reason for Disposition    [1] Caller has URGENT medicine question about med that PCP or specialist prescribed AND [2] triager unable to answer question    Additional Information    Negative: [1] Intentional drug overdose AND [2] suicidal thoughts or ideas    Negative: Drug overdose and triager unable to answer question    Negative: Caller requesting a renewal or refill of a medicine patient is currently taking    Negative: Caller requesting information unrelated to medicine    Negative: Caller requesting information about COVID-19 Vaccine    Negative: Caller requesting information about Emergency Contraception    Negative: Caller requesting information about Combined Birth Control Pills    Negative: Caller requesting information about Progestin Birth Control Pills    Negative: Caller requesting information about Post-Op pain or medicines    Negative: Caller requesting a prescription antibiotic (such as Penicillin) for Strep throat and has a positive culture result    Negative: Caller requesting a prescription anti-viral med (such as Tamiflu) and has influenza (flu)  symptoms    Negative: Immunization reaction suspected    Negative: Rash while taking a medicine or within 3 days of stopping it    Negative: [1] Asthma and [2] having symptoms of asthma (cough, wheezing, etc.)    Negative: [1] Symptom of illness (e.g., headache, abdominal pain, earache, vomiting) AND [2] more than mild    Negative: Breastfeeding questions about mother's medicines and diet    Negative: MORE THAN A DOUBLE DOSE of a prescription or over-the-counter (OTC) drug    Negative: [1] DOUBLE DOSE (an extra dose or lesser amount) of prescription drug AND [2] any symptoms (e.g., dizziness, nausea, pain, sleepiness)    Negative: [1] DOUBLE DOSE (an extra dose or lesser amount) of over-the-counter (OTC) drug AND [2] any symptoms (e.g., dizziness, nausea, pain, sleepiness)    Negative: Took another person's prescription drug    Negative: [1] DOUBLE DOSE (an extra dose or lesser amount) of prescription drug AND [2] NO symptoms (Exception: a double dose of antibiotics)    Negative: Diabetes drug error or overdose (e.g., took wrong type of insulin or took extra dose)    Negative: [1] Prescription not at pharmacy AND [2] was prescribed by PCP recently (Exception: triager has access to EMR and prescription is recorded there. Go to Home Care and confirm for pharmacy.)    Negative: [1] Pharmacy calling with prescription question AND [2] triager unable to answer question    Protocols used: MEDICATION QUESTION CALL-A-

## 2023-07-03 ENCOUNTER — ALLIED HEALTH/NURSE VISIT (OUTPATIENT)
Dept: OBGYN | Facility: CLINIC | Age: 38
End: 2023-07-03
Payer: COMMERCIAL

## 2023-07-03 VITALS — SYSTOLIC BLOOD PRESSURE: 97 MMHG | HEART RATE: 73 BPM | DIASTOLIC BLOOD PRESSURE: 73 MMHG

## 2023-07-03 PROCEDURE — 99207 PR NO CHARGE NURSE ONLY: CPT

## 2023-07-03 NOTE — PROGRESS NOTES
Did we calibrate her home cuff with ours since she's clearly much lower on ours than she ever was at home?  With the BPs we had here I'd have her even stop her labetalol all together but guessing that will backfire so I would say to do 200 BID which is what she was on at onset of preg and then keep track.  If she hasn't ever calibrated her home cuff with a manual read in the office, both arms, then she should do that

## 2023-07-03 NOTE — PROGRESS NOTES
23  IOL for CHTN    Before pregnancy hydrochlorothiazide and Lisinopril-PCP appt     Readmit 23- 23  IV medications and mag    Med change  had stopped Hydralazine and lowered Labetalol to 400 mg q 8 hours   Lasix for short term 3 day prescription-completed    CURRENT MEDS    Labetalol increased back to 800 mg q 8 hours (reastarted on 23)  Nifedipine 60 mg daily    Swelling has improved, denied s/s of PreE-feeling well today-however light headed this afternoon-a little tired      BPs  0530 (had increased Labetalol at 0430)  108/72  Noon 131/86 Labetalol  [id number] Labetalol  2100 151/97    7/2 BPs  0500 141/97  0900 Nifedipine   Noon 137/83  2000 149/90    7/3 BPs  0900 130/90  Noon 113/72     BPs in clinic   95/66  97/73    Discussed probable med changes-here with  and baby  Would prefer a MyChart message with plan moving forward.     Soco Barbosa RN on 7/3/2023 at 2:22 PM

## 2023-07-05 ENCOUNTER — TELEPHONE (OUTPATIENT)
Dept: OBGYN | Facility: CLINIC | Age: 38
End: 2023-07-05
Payer: COMMERCIAL

## 2023-07-05 ENCOUNTER — OFFICE VISIT (OUTPATIENT)
Dept: FAMILY MEDICINE | Facility: CLINIC | Age: 38
End: 2023-07-05
Payer: COMMERCIAL

## 2023-07-05 ENCOUNTER — PATIENT OUTREACH (OUTPATIENT)
Dept: CARE COORDINATION | Facility: CLINIC | Age: 38
End: 2023-07-05
Payer: COMMERCIAL

## 2023-07-05 VITALS
OXYGEN SATURATION: 99 % | TEMPERATURE: 97.9 F | DIASTOLIC BLOOD PRESSURE: 84 MMHG | SYSTOLIC BLOOD PRESSURE: 122 MMHG | WEIGHT: 204 LBS | RESPIRATION RATE: 16 BRPM | BODY MASS INDEX: 31.02 KG/M2 | HEART RATE: 76 BPM

## 2023-07-05 DIAGNOSIS — G56.31 RADIAL NERVE COMPRESSION, RIGHT: Primary | ICD-10-CM

## 2023-07-05 PROCEDURE — 99213 OFFICE O/P EST LOW 20 MIN: CPT | Performed by: FAMILY MEDICINE

## 2023-07-05 ASSESSMENT — PATIENT HEALTH QUESTIONNAIRE - PHQ9
SUM OF ALL RESPONSES TO PHQ QUESTIONS 1-9: 0
10. IF YOU CHECKED OFF ANY PROBLEMS, HOW DIFFICULT HAVE THESE PROBLEMS MADE IT FOR YOU TO DO YOUR WORK, TAKE CARE OF THINGS AT HOME, OR GET ALONG WITH OTHER PEOPLE: NOT DIFFICULT AT ALL
SUM OF ALL RESPONSES TO PHQ QUESTIONS 1-9: 0

## 2023-07-05 ASSESSMENT — ASTHMA QUESTIONNAIRES: ACT_TOTALSCORE: 25

## 2023-07-05 NOTE — TELEPHONE ENCOUNTER
Type of Paperwork received:  fmla     Date Rcvd:  7/3/2023    Rcvd From (Company name): [facility]    Provider:  paola    Placed on Provider Cart Date:  7/5/2023

## 2023-07-12 ENCOUNTER — MYC REFILL (OUTPATIENT)
Dept: FAMILY MEDICINE | Facility: CLINIC | Age: 38
End: 2023-07-12
Payer: COMMERCIAL

## 2023-07-12 DIAGNOSIS — O10.919 CHRONIC HYPERTENSION AFFECTING PREGNANCY: ICD-10-CM

## 2023-07-13 NOTE — TELEPHONE ENCOUNTER
"Requested Prescriptions   Pending Prescriptions Disp Refills     NIFEdipine ER (ADALAT CC) 60 MG 24 hr tablet 15 tablet 0     Sig: Take 1 tablet (60 mg) by mouth daily       Calcium Channel Blockers Protocol  Passed - 7/12/2023  5:45 PM        Passed - Blood pressure under 140/90 in past 12 months     BP Readings from Last 3 Encounters:   07/05/23 122/84   07/03/23 97/73   06/29/23 117/79                 Passed - Recent (12 mo) or future (30 days) visit within the authorizing provider's specialty     Patient has had an office visit with the authorizing provider or a provider within the authorizing providers department within the previous 12 mos or has a future within next 30 days. See \"Patient Info\" tab in inbasket, or \"Choose Columns\" in Meds & Orders section of the refill encounter.              Passed - Medication is active on med list        Passed - Patient is age 18 or older        Passed - No active pregnancy on record        Passed - Normal serum creatinine on file in past 12 months     Recent Labs   Lab Test 06/27/23  0843   CR 0.75       Ok to refill medication if creatinine is low          Passed - No positive pregnancy test in past 12 months           Last Written Prescription Date:  6/30/23  Last Fill Quantity: 15,  # refills: 0   Last office visit: 7/5/2023 ; last virtual visit: 8/8/2022 with prescribing provider:  Radu   Future Office Visit:   Next 5 appointments (look out 90 days)    Jul 28, 2023 11:15 AM  Post Partum with Ruthann Ferreira MD  [facility] ([facility] ) [address] [id number] [phone]        Medication is being filled for 1 time refill only due to:  Patient needs to be seen because post partum visit on July 28th..  Koki Cruz RN on 7/13/2023 at 5:52 AM          "

## 2023-07-19 ENCOUNTER — PATIENT OUTREACH (OUTPATIENT)
Dept: CARE COORDINATION | Facility: CLINIC | Age: 38
End: 2023-07-19
Payer: COMMERCIAL

## 2023-07-24 ENCOUNTER — THERAPY VISIT (OUTPATIENT)
Dept: OCCUPATIONAL THERAPY | Facility: CLINIC | Age: 38
End: 2023-07-24
Attending: FAMILY MEDICINE
Payer: COMMERCIAL

## 2023-07-24 ENCOUNTER — TELEPHONE (OUTPATIENT)
Dept: OBGYN | Facility: CLINIC | Age: 38
End: 2023-07-24

## 2023-07-24 DIAGNOSIS — G56.31 RADIAL NERVE COMPRESSION, RIGHT: ICD-10-CM

## 2023-07-24 PROCEDURE — 97165 OT EVAL LOW COMPLEX 30 MIN: CPT | Mod: GO

## 2023-07-24 PROCEDURE — 97535 SELF CARE MNGMENT TRAINING: CPT | Mod: GO

## 2023-07-24 PROCEDURE — 97760 ORTHOTIC MGMT&TRAING 1ST ENC: CPT | Mod: GO

## 2023-07-24 PROCEDURE — 97110 THERAPEUTIC EXERCISES: CPT | Mod: GO

## 2023-07-24 NOTE — PROGRESS NOTES
OCCUPATIONAL THERAPY EVALUATION  Type of Visit: Evaluation    Subjective      Presenting condition or subjective complaint: tendonitis in right arm/hand. Resulting in Numbness  Date of onset: 06/01/23late pregnancy, June 2023    Relevant medical history:     Past Medical History:   Diagnosis Date    Asthma     seasonal and environmental    C. difficile diarrhea 2013    Concussion 01/2022    Depression     Depressive disorder     Encounter for IUD insertion 10/16/2020    Jyoti inserted by Dr Park    Generalized anxiety disorder     Hypertension     IUD contraception 10/2020    Jyoti inserted by Dr Park    Lateral epicondylitis of right elbow 6/22/2023    Pain in both hands 02/20/2020       Dates & types of surgery:    Past Surgical History:   Procedure Laterality Date    GYN SURGERY  2003    Laser treatment of HPV    ORTHOPEDIC SURGERY Left 07/21/2017    ganglion cyst removal    ORTHOPEDIC SURGERY Left     nerve release on left leg/ankle     SOFT TISSUE SURGERY  1999    SURGICAL HISTORY OF -       Nerve entrapment release    ZZHC RELEASE FOOT/TOE NERVE  2011     Prior diagnostic imaging/testing results:     No  Prior therapy history for the same diagnosis, illness or injury: No      Prior Level of Function  Independent in all areas     OCCUPATIONAL THERAPY EVALUATION  Type of Visit: Evaluation    Other: Started at the end of pregnancy, got worse after birth   IV at lateral elbow   Difficult to write and /pinch  Thumb will lock some mornings   Works as a dancer       Living Environment  Social support: With a significant other or spouse   Type of home: House   Stairs to enter the home: No       Ramp: No   Stairs inside the home: Yes 14 Is there a railing: Yes   Help at home: None  Equipment owned:       Employment: Yes   Hobbies/Interests:      Patient goals for therapy: write, hold phone, type comfortablly    Pain assessment: Pain present  See objective evaluation for additional pain  details     Objective     Right hand dominant  Patient reports symptoms of pain, weakness/loss of strength, numbness, and tingling     Pain Level (Scale 0-10)   7/24/2023   At Rest 3   With Use 7     Pain Description  Date 7/24/2023   Location Lateral forearm, hand with more numbness     Posture  Normal    Sensation  Decreased Radial Nerve distribution per pt report and Decreased Dorsal Radial Sensory Nerve distribution per pt report    ROM - Elbow and wrist AROM are WNL, mild pain w/ R wrist RD.     Resisted Testing  Pain Report:  - none    + mild    ++ moderate    +++ severe    7/24/2023   Elbow Extension 5/5   Elbow Flexion 5/5   Supination  5/5 +   Pronation 5/5   Wrist Ext  5/5 +   Wrist Ext with UD, Elbow at side    Wrist Ext with RD, Elbow Ext    Wrist Ext with UD, Elbow Ext    Wrist Flex with RD, Elbow at side    Wrist Flex with UD, Elbow at side    Wrist Flex with RD, Elbow Ext    Wrist Flex with UD, Elbow Ext    EDC with Elbow at side    EDC with Elbow Ext    Long Finger Test 5/5 -     Neural Tension Testing  RNT: Radial Neurodynamic Test (based on JOSE LUIS Rodriguez's ULNT)   7/24/2023   0-5 Scale 4/5 S1; 5/5 S2   Position:   0/5: Arm across abdomen in coronal plane  1/5: Depress shoulder, ER to neutral ABD shoulder to 45 degrees  2/5: IR shoulder to end range, keep elbow at 90 degrees  3/5: Extend elbow to 0 degrees  4/5: Fully pronate forearm  5/5: Flex wrist and fingers with UD  Notes:  (+) indicates beyond grade level but less than skilled nursing to next level  (-) indicates over skilled nursing to level  S1  onset/change of patient's symptoms  S2 definite stop point based on patient's discomfort level    Strength   (Measured in pounds)  Pain Report: - none  + mild    ++ moderate    +++ severe    7/24/2023 7/24/2023   Trials L R   1  2  3 75 43++   Average         7/24/2023 7/24/2023    L R   Elbow Ext 80 36++     Palpation  Pain Report:  - none    + mild    ++ moderate    +++ severe    7/24/2023   Triangular  Interval    Infraspinatus    Teres Major    Pec Major    Pec Minor    Proximal Triceps    Spiral Groove    Distal Triceps    Anconeus    ECRB at LEP ++   ECU at LEP +   EDC at LEP +   Radial Head -   Extensor Wad ++   PIN Site ++       Assessment & Plan   CLINICAL IMPRESSIONS  Medical Diagnosis: R radial nerve compression    Treatment Diagnosis: R UE paresthesia and pain    Impression/Assessment: Pt is a 38 year old female presenting to Occupational Therapy due to R radial nerve compression and pain.  The following significant findings have been identified: Impaired activity tolerance, Impaired sensation, Impaired strength, and Pain.  These identified deficits interfere with their ability to perform self care tasks, work tasks, recreational activities, household chores, and meal planning and preparation as compared to previous level of function.     Clinical Decision Making (Complexity):  Assessment of Occupational Performance: 5 or more Performance Deficits  Occupational Performance Limitations: bathing/showering, dressing, home establishment and management, meal preparation and cleanup, shopping, work, and leisure activities  Clinical Decision Making (Complexity): Low complexity    PLAN OF CARE  Treatment Interventions:  Modalities:  US  Therapeutic Exercise:  AROM, PROM, Tendon Gliding, Isotonics, Isometrics, and Eccentrics  Neuromuscular re-education:  Nerve Gliding and Kinesiotaping  Manual Techniques:  Friction massage and Myofascial release  Orthotic Fabrication:  Static  Self Care:  Self Care Tasks, Ergonomic Considerations, and Work Tasks    Long Term Goals   OT Goal 1  Goal Description: Pt will demo ability to write and type without pain or paresthesia for improved function in work and IADLs by 9/18/23.  Target Date: 09/18/23      Frequency of Treatment: 1x/week  Duration of Treatment: 8 weeks     Recommended Referrals to Other Professionals:  None  Education Assessment: Learner/Method: Patient  Education  Comments: No barriers to learning     Risks and benefits of evaluation/treatment have been explained.   Patient/Family/caregiver agrees with Plan of Care.     Evaluation Time:    OT Eval, Low Complexity Minutes (61667): 16    Signing Clinician: SIMON Villegas

## 2023-07-24 NOTE — TELEPHONE ENCOUNTER
23   6 wk PP is this  at 5 weeks - started bleeding and cramping - bright red blood.  Bleeding light to moderate bright red over the weekend. Not heavy or passing any clots.  Denies fever or abdominal tenderness. Just more cramping, more noticeably cramping then would have a moderate gush of blood.  Had stopped completely for 2 weeks.  She is breastfeeding exclusively.    Today just spotting. Denies fever, body aches/chills or abdominal tenderness.  Has her 6 wk visit this Friday w Jhon as Park was unavailable.    Reassured bleeding all the way to 6 wks is acceptable. Could be back to cycle/period bleeding already but hard to say that was what it was but no other sx, not heavy or no fever will continue to monitor at home and recheck at Friday's PP visit.    Pt verbalized understanding, in agreement with plan, and voiced no further questions.    Jael Nelson RN on 2023 at 11:26 AM

## 2023-07-26 NOTE — PROGRESS NOTES
"[facility]  OB/GYN Clinic Note    SUBJECTIVE:  Vivi Mccall,  is here for a postpartum visit.  She had a  on 23 delivering a healthy baby girl, named Soraya weighing 6 lbs 7 oz at term.    Readmitted for PP preE w/ SF (BPs).     HPI:  Doing well postpartum. Recovering well. BPs- currently on labetalol 400mg once daily, Nifedipine 30mg AM. Met with primary. No symptoms of preE.   Experienced heavy bleeding on  - soaked through sheets. Lasted a few days and is better today. Planning vasectomy for contraception.   Mood is good. Breastfeeding going well. Experienced lactation from axillary masses. Strong family history of breast cancer- due for screening mammogram.     Last PHQ-9 score on record=       2023     1:12 PM   PHQ-9 SCORE   PHQ-9 Total Score 0         2022     3:40 PM 2022    10:53 AM 2023     1:12 PM   ROCHELLE-7 SCORE   Total Score 2 (minimal anxiety) 0 (minimal anxiety)    Total Score 2 0 2       Pap:   Lab Results   Component Value Date    GYNINTERP  2022     Negative for Intraepithelial Lesion or Malignancy (NILM)    PAP NIL 2017       Delivery complications:  No  Breast feeding:  Yes.   Bladder problems:  No  Bowel problems/hemorrhoids:  No  Episiotomy/laceration/incision healed? Yes:   Vaginal flow:  started bleeding at the 5 week moni 4 days then stopped came back 1 more day, bright red, weird cramping  [place]:  No  Contraception: vasectomy  Emotional adjustment:  doing well and happy  Back to work:        OBJECTIVE:  Vitals: /84   Ht 1.727 m (5' 8\")   Wt 90.6 kg (199 lb 12.8 oz)   LMP 2022   BMI 30.38 kg/m    BMI= Body mass index is 30.38 kg/m .  General - pleasant female in no acute distress.  Breast -  deferred  Abdomen - No incision      ASSESSMENT:    ICD-10-CM    1. Routine postpartum follow-up  Z39.2       2. Preeclampsia in postpartum period  O14.95 NIFEdipine ER (ADALAT CC) 60 MG 24 " hr tablet      3. General counseling and advice on contraceptive management  Z30.09 medroxyPROGESTERone (DEPO-PROVERA) injection 150 mg     HCG qualitative urine      4. Family planning education, guidance, and counseling  Z30.09       5. Postpartum hemorrhage, unspecified type  O72.1 US Pelvic Complete with Transvaginal      6. Family history of malignant neoplasm of breast  Z80.3       7. Chronic hypertension affecting pregnancy  O10.919 NIFEdipine ER (ADALAT CC) 60 MG 24 hr tablet          PLAN:  Postpartum preE  - BPs normal today. Recommend follow-up at [facility] clinic. Contact info provided  - Reviewed risk of cHTN  - Refill for Procardia provided    Contraception  Family planning  - Planning vasectomy   - Discussed contraception in the interim until vasectomy follow-up is complete. Open to Depo Provera. Orders for UPT and depo placed, and she will follow-up to schedule.     Episode of heavy postpartum bleeding  - Likely normal menses. If occurring again, recommend US. Orders placed and she can call to schedule.     Family hx breast cancer  - No known heritable mutation  - continue annual mammogram     Routine care  May resume normal activities without restrictions.  Pap smear was not done today.  Full counseling was provided, and all questions were answered.       Ruthann Ferreira MD, MHS

## 2023-07-28 ENCOUNTER — PRENATAL OFFICE VISIT (OUTPATIENT)
Dept: OBGYN | Facility: CLINIC | Age: 38
End: 2023-07-28
Payer: COMMERCIAL

## 2023-07-28 VITALS
DIASTOLIC BLOOD PRESSURE: 84 MMHG | HEIGHT: 68 IN | SYSTOLIC BLOOD PRESSURE: 128 MMHG | BODY MASS INDEX: 30.28 KG/M2 | WEIGHT: 199.8 LBS

## 2023-07-28 DIAGNOSIS — Z80.3 FAMILY HISTORY OF MALIGNANT NEOPLASM OF BREAST: ICD-10-CM

## 2023-07-28 DIAGNOSIS — O10.919 CHRONIC HYPERTENSION AFFECTING PREGNANCY: ICD-10-CM

## 2023-07-28 DIAGNOSIS — Z30.09 FAMILY PLANNING EDUCATION, GUIDANCE, AND COUNSELING: ICD-10-CM

## 2023-07-28 DIAGNOSIS — Z30.09 GENERAL COUNSELING AND ADVICE ON CONTRACEPTIVE MANAGEMENT: ICD-10-CM

## 2023-07-28 PROCEDURE — 99207 PR POST PARTUM EXAM: CPT | Performed by: STUDENT IN AN ORGANIZED HEALTH CARE EDUCATION/TRAINING PROGRAM

## 2023-07-28 RX ORDER — MEDROXYPROGESTERONE ACETATE 150 MG/ML
150 INJECTION, SUSPENSION INTRAMUSCULAR
Status: ACTIVE | OUTPATIENT
Start: 2023-07-28 | End: 2024-07-22

## 2023-07-28 ASSESSMENT — ANXIETY QUESTIONNAIRES
2. NOT BEING ABLE TO STOP OR CONTROL WORRYING: NOT AT ALL
7. FEELING AFRAID AS IF SOMETHING AWFUL MIGHT HAPPEN: SEVERAL DAYS
3. WORRYING TOO MUCH ABOUT DIFFERENT THINGS: NOT AT ALL
GAD7 TOTAL SCORE: 2
GAD7 TOTAL SCORE: 2
6. BECOMING EASILY ANNOYED OR IRRITABLE: SEVERAL DAYS
1. FEELING NERVOUS, ANXIOUS, OR ON EDGE: NOT AT ALL
IF YOU CHECKED OFF ANY PROBLEMS ON THIS QUESTIONNAIRE, HOW DIFFICULT HAVE THESE PROBLEMS MADE IT FOR YOU TO DO YOUR WORK, TAKE CARE OF THINGS AT HOME, OR GET ALONG WITH OTHER PEOPLE: NOT DIFFICULT AT ALL
5. BEING SO RESTLESS THAT IT IS HARD TO SIT STILL: NOT AT ALL

## 2023-07-28 ASSESSMENT — PATIENT HEALTH QUESTIONNAIRE - PHQ9
5. POOR APPETITE OR OVEREATING: NOT AT ALL
SUM OF ALL RESPONSES TO PHQ QUESTIONS 1-9: 0

## 2023-07-28 NOTE — TELEPHONE ENCOUNTER
"Requested Prescriptions   Pending Prescriptions Disp Refills    NIFEdipine ER (ADALAT CC) 60 MG 24 hr tablet 30 tablet 0     Sig: Take 1 tablet (60 mg) by mouth daily       Calcium Channel Blockers Protocol  Passed - 7/28/2023  4:32 PM        Passed - Blood pressure under 140/90 in past 12 months     BP Readings from Last 3 Encounters:   07/28/23 128/84   07/05/23 122/84   07/03/23 97/73                 Passed - Recent (12 mo) or future (30 days) visit within the authorizing provider's specialty     Patient has had an office visit with the authorizing provider or a provider within the authorizing providers department within the previous 12 mos or has a future within next 30 days. See \"Patient Info\" tab in inbasket, or \"Choose Columns\" in Meds & Orders section of the refill encounter.              Passed - Medication is active on med list        Passed - Patient is age 18 or older        Passed - No active pregnancy on record        Passed - Normal serum creatinine on file in past 12 months     Recent Labs   Lab Test 06/27/23  0843   CR 0.75       Ok to refill medication if creatinine is low          Passed - No positive pregnancy test in past 12 months           Last Written Prescription Date:  07/28/2023  Last Fill Quantity: 30,  # refills: 0   Last office visit: 7/28/23 ; last virtual visit: 1/12/2023 with prescribing provider:  Ruthann Ferreira   Future Office Visit:      7/28/23 Jhon:  PLAN:  Postpartum preE  - BPs normal today. Recommend follow-up at [facility] clinic. Contact info provided  - Reviewed risk of cHTN  - Refill for Procardia provided    Rx Denied  Skylar Dunn RN on 7/30/2023 at 4:34 PM            "

## 2023-07-28 NOTE — PATIENT INSTRUCTIONS
Please schedule a visit with Dr. Krystal Noriega at [facility]  [email or web address], [address] [place], [place] [address]e. [address], [address], [address], [place], [place], [address]  Phone: [phone]

## 2023-08-01 ENCOUNTER — THERAPY VISIT (OUTPATIENT)
Dept: OCCUPATIONAL THERAPY | Facility: CLINIC | Age: 38
End: 2023-08-01
Attending: FAMILY MEDICINE
Payer: COMMERCIAL

## 2023-08-01 DIAGNOSIS — G56.31 RADIAL NERVE COMPRESSION, RIGHT: Primary | ICD-10-CM

## 2023-08-01 PROCEDURE — 97035 APP MDLTY 1+ULTRASOUND EA 15: CPT | Mod: GO

## 2023-08-01 PROCEDURE — 97140 MANUAL THERAPY 1/> REGIONS: CPT | Mod: GO

## 2023-08-03 ENCOUNTER — ALLIED HEALTH/NURSE VISIT (OUTPATIENT)
Dept: OBGYN | Facility: CLINIC | Age: 38
End: 2023-08-03
Payer: COMMERCIAL

## 2023-08-03 DIAGNOSIS — Z30.42 ENCOUNTER FOR MANAGEMENT AND INJECTION OF DEPO-PROVERA: Primary | ICD-10-CM

## 2023-08-03 PROCEDURE — 99207 PR NO CHARGE NURSE ONLY: CPT

## 2023-08-03 PROCEDURE — 96372 THER/PROPH/DIAG INJ SC/IM: CPT | Performed by: STUDENT IN AN ORGANIZED HEALTH CARE EDUCATION/TRAINING PROGRAM

## 2023-08-03 RX ADMIN — MEDROXYPROGESTERONE ACETATE 150 MG: 150 INJECTION, SUSPENSION INTRAMUSCULAR at 11:23

## 2023-08-03 NOTE — PROGRESS NOTES
Clinic Administered Medication Documentation      Depo Provera Documentation    Depo-Provera Standing Order inclusion/exclusion criteria reviewed.     Is this the initial or subsequent dose of Depo Provera? Subsequent dose - patient is within the acceptable window of time (11-15 weeks) for subsequent injection. Pregnancy test not indicated.    Patient meets: inclusion criteria     Is there an active order (written within the past 365 days, with administrations remaining, not ) in the chart? Yes.     Prior to injection, verified patient identity using patient's name and date of birth. Medication was administered. Please see MAR and medication order for additional information.     Vial/Syringe: Single dose vial. Was entire vial of medication used? Yes    Patient instructed to remain in clinic for 15 minutes and report any adverse reaction to staff immediately.  NEXT INJECTION DUE: 10/19/23 - 23    Verified that the patient has refills remaining in their prescription.

## 2023-08-03 NOTE — NURSING NOTE
Prior to immunization administration, verified patients identity using patient s name and date of birth. Please see Immunization Activity for additional information.     Screening Questionnaire for Adult Immunization    Are you sick today?   No   Do you have allergies to medications, food, a vaccine component or latex?   No   Have you ever had a serious reaction after receiving a vaccination?   No   Do you have a long-term health problem with heart, lung, kidney, or metabolic disease (e.g., diabetes), asthma, a blood disorder, no spleen, complement component deficiency, a cochlear implant, or a spinal fluid leak?  Are you on long-term aspirin therapy?   No   Do you have cancer, leukemia, HIV/AIDS, or any other immune system problem?   No   Do you have a parent, brother, or sister with an immune system problem?   No   In the past 3 months, have you taken medications that affect  your immune system, such as prednisone, other steroids, or anticancer drugs; drugs for the treatment of rheumatoid arthritis, Crohn s disease, or psoriasis; or have you had radiation treatments?   No   Have you had a seizure, or a brain or other nervous system problem?   No   During the past year, have you received a transfusion of blood or blood    products, or been given immune (gamma) globulin or antiviral drug?   No   For women: Are you pregnant or is there a chance you could become       pregnant during the next month?   No   Have you received any vaccinations in the past 4 weeks?   No     Immunization questionnaire answers were all negative.    I have reviewed the following standing orders: Not Applicable; Order were already placed prior to ancillary visit  DEPO Injection was given today Site: RA next injection 10/19/23-11/16/23  Patient instructed to remain in clinic for 15 minutes afterwards, and to report any adverse reactions.     Screening performed by Cathie Silva MA on 8/3/2023 at 11:13 AM.

## 2023-08-09 ENCOUNTER — MYC MEDICAL ADVICE (OUTPATIENT)
Dept: OBGYN | Facility: CLINIC | Age: 38
End: 2023-08-09
Payer: COMMERCIAL

## 2023-08-09 DIAGNOSIS — Z80.3 FAMILY HISTORY OF MALIGNANT NEOPLASM OF BREAST: Primary | ICD-10-CM

## 2023-08-09 NOTE — TELEPHONE ENCOUNTER
"Patient had mammo 8/4/22 due to mother's diagnosis   Patient received letter in the mail saying she needs to schedule yearly mammogram, when she called they said she needs an order.     Per imaging report on the mammo last year \"RECOMMENDED FOLLOW-UP: Annual routine screening mammogram \"    Patient last seen in office on 7/28 for routine 6 week PP visit.     Routing to provider to advise. Mammo order pended if appropriate.    NICO Matos   "

## 2023-08-09 NOTE — TELEPHONE ENCOUNTER
I presume she only got the letter because she is a year out from her last mammogram.  We don't recommend mammogram while breast feeding unless she is having any acute concerns.  I would otherwise recommend repeating her mammogram 6 months after she completes breast feeding.  Once no longer breast feeding, she will have yearly mammograms.  She can simply call when one breast feeding and we can place the order at that time for screening mammogram

## 2023-08-14 ENCOUNTER — THERAPY VISIT (OUTPATIENT)
Dept: OCCUPATIONAL THERAPY | Facility: CLINIC | Age: 38
End: 2023-08-14
Payer: COMMERCIAL

## 2023-08-14 DIAGNOSIS — G56.31 RADIAL NERVE COMPRESSION, RIGHT: Primary | ICD-10-CM

## 2023-08-14 PROCEDURE — 97140 MANUAL THERAPY 1/> REGIONS: CPT | Mod: GO

## 2023-08-14 PROCEDURE — 97035 APP MDLTY 1+ULTRASOUND EA 15: CPT | Mod: GO

## 2023-08-23 ENCOUNTER — THERAPY VISIT (OUTPATIENT)
Dept: OCCUPATIONAL THERAPY | Facility: CLINIC | Age: 38
End: 2023-08-23
Payer: COMMERCIAL

## 2023-08-23 DIAGNOSIS — G56.31 RADIAL NERVE COMPRESSION, RIGHT: Primary | ICD-10-CM

## 2023-08-23 PROCEDURE — 97035 APP MDLTY 1+ULTRASOUND EA 15: CPT | Mod: GO | Performed by: OCCUPATIONAL THERAPIST

## 2023-08-23 PROCEDURE — 97140 MANUAL THERAPY 1/> REGIONS: CPT | Mod: GO | Performed by: OCCUPATIONAL THERAPIST

## 2023-08-23 PROCEDURE — 97110 THERAPEUTIC EXERCISES: CPT | Mod: GO | Performed by: OCCUPATIONAL THERAPIST

## 2023-08-23 NOTE — PROGRESS NOTES
SOAP note objective information for 8/23/2023.    Please refer to the daily flowsheet for treatment today, total treatment time and time spent performing 1:1 timed codes.         Pain Level (Scale 0-10)   7/24/2023 8/23/2023   At Rest 3 2-3   With Use 7 5     Pain Description  Date 7/24/2023   Location Lateral forearm, hand with more numbness     Posture  Normal    Sensation  Decreased Radial Nerve distribution per pt report and Decreased Dorsal Radial Sensory Nerve distribution per pt report    ROM - Elbow and wrist AROM are WNL, mild pain w/ R wrist RD.     Resisted Testing  Pain Report:  - none    + mild    ++ moderate    +++ severe    7/24/2023   Elbow Extension 5/5   Elbow Flexion 5/5   Supination  5/5 +   Pronation 5/5   Wrist Ext  5/5 +   Wrist Ext with UD, Elbow at side    Wrist Ext with RD, Elbow Ext    Wrist Ext with UD, Elbow Ext    Wrist Flex with RD, Elbow at side    Wrist Flex with UD, Elbow at side    Wrist Flex with RD, Elbow Ext    Wrist Flex with UD, Elbow Ext    EDC with Elbow at side    EDC with Elbow Ext    Long Finger Test 5/5 -     Neural Tension Testing  RNT: Radial Neurodynamic Test (based on JOSE LUIS Rodriguez's ULNT)   7/24/2023   0-5 Scale 4/5 S1; 5/5 S2   Position:   0/5: Arm across abdomen in coronal plane  1/5: Depress shoulder, ER to neutral ABD shoulder to 45 degrees  2/5: IR shoulder to end range, keep elbow at 90 degrees  3/5: Extend elbow to 0 degrees  4/5: Fully pronate forearm  5/5: Flex wrist and fingers with UD  Notes:  (+) indicates beyond grade level but less than skilled nursing to next level  (-) indicates over skilled nursing to level  S1  onset/change of patient's symptoms  S2 definite stop point based on patient's discomfort level    Strength   (Measured in pounds)  Pain Report: - none  + mild    ++ moderate    +++ severe    7/24/2023 7/24/2023   Trials L R   1  2  3 75 43++   Average         7/24/2023 7/24/2023    L R   Elbow Ext 80 36++     Palpation  Pain Report:  - none    + mild     ++ moderate    +++ severe    7/24/2023 8/23/2023   Triangular Interval  +++   Infraspinatus  +++   Teres Major  +++   Pec Major  +++   Pec Minor  +++   Proximal Triceps  NT   Spiral Groove  NT   Distal Triceps  +   Anconeus  NT   ECRB at LEP ++ min   ECU at LEP + min   EDC at LEP + min   Radial Head -    Extensor Wad ++ ++   PIN Site ++ ++             HEP  Exercise Name: Orthosis Wear and Care, Notes: Wear brace overnight and whenever possible during activity.     Avoid forceful or repetitive gripping, pinching, twisting of the arm, pushing or pulling.     Exercise Name: Warmth, Notes: Complete 2x/day for 5-10 minutes   Exercise Name: Forearm Passive Range of Motion Extensor Stretch, Sets: 1 - Reps: 3 - Sessions: 3, Notes: Hold for 20-30 seconds.   Exercise Name: Friction Massage, Notes: Complete 2x/day for 5 minutes.   Exercise Name: Nerve Gliding Proximal Radial, Sets: 1 - Reps: 10 - Sessions: 3      8/23/2023  Foam roller exercises

## 2023-08-29 DIAGNOSIS — F41.1 GAD (GENERALIZED ANXIETY DISORDER): ICD-10-CM

## 2023-08-29 DIAGNOSIS — R41.840 ATTENTION AND CONCENTRATION DEFICIT: ICD-10-CM

## 2023-08-29 DIAGNOSIS — F07.81 POST CONCUSSION SYNDROME: ICD-10-CM

## 2023-08-29 DIAGNOSIS — F33.1 MAJOR DEPRESSIVE DISORDER, RECURRENT EPISODE, MODERATE (H): ICD-10-CM

## 2023-08-30 DIAGNOSIS — O10.919 CHRONIC HYPERTENSION AFFECTING PREGNANCY: ICD-10-CM

## 2023-08-30 RX ORDER — BUPROPION HYDROCHLORIDE 150 MG/1
150 TABLET ORAL EVERY MORNING
Qty: 90 TABLET | Refills: 3 | Status: SHIPPED | OUTPATIENT
Start: 2023-08-30

## 2023-08-30 RX ORDER — CITALOPRAM HYDROBROMIDE 40 MG/1
40 TABLET ORAL DAILY
Qty: 90 TABLET | Refills: 3 | Status: SHIPPED | OUTPATIENT
Start: 2023-08-30 | End: 2024-07-08

## 2023-08-30 NOTE — TELEPHONE ENCOUNTER
Routing refill request to provider for review/approval because:  Patient due for physical, but just had follow-up visit.  Ok to refill for a few months?  Soraya WONG RN

## 2023-08-30 NOTE — TELEPHONE ENCOUNTER
"Requested Prescriptions   Pending Prescriptions Disp Refills    NIFEdipine ER (ADALAT CC) 60 MG 24 hr tablet 30 tablet 0     Sig: Take 1 tablet (60 mg) by mouth daily       Calcium Channel Blockers Protocol  Passed - 8/30/2023  1:39 PM        Passed - Blood pressure under 140/90 in past 12 months     BP Readings from Last 3 Encounters:   07/28/23 128/84   07/05/23 122/84   07/03/23 97/73                 Passed - Recent (12 mo) or future (30 days) visit within the authorizing provider's specialty     Patient has had an office visit with the authorizing provider or a provider within the authorizing providers department within the previous 12 mos or has a future within next 30 days. See \"Patient Info\" tab in inbasket, or \"Choose Columns\" in Meds & Orders section of the refill encounter.              Passed - Medication is active on med list        Passed - Patient is age 18 or older        Passed - No active pregnancy on record        Passed - Normal serum creatinine on file in past 12 months     Recent Labs   Lab Test 06/27/23  0843   CR 0.75       Ok to refill medication if creatinine is low          Passed - No positive pregnancy test in past 12 months           Last Written Prescription Date:  7/28/23  Last Fill Quantity: #30, 0 refills   Last office visit:     Future appt scheduled 9/15/23 with Dr.Collins Morenita Barrett RN on 8/30/2023 at 1:42 PM    "

## 2023-09-11 ENCOUNTER — THERAPY VISIT (OUTPATIENT)
Dept: OCCUPATIONAL THERAPY | Facility: CLINIC | Age: 38
End: 2023-09-11
Payer: COMMERCIAL

## 2023-09-11 DIAGNOSIS — G56.31 RADIAL NERVE COMPRESSION, RIGHT: Primary | ICD-10-CM

## 2023-09-11 PROCEDURE — 97035 APP MDLTY 1+ULTRASOUND EA 15: CPT | Mod: GO | Performed by: OCCUPATIONAL THERAPIST

## 2023-09-11 PROCEDURE — 97140 MANUAL THERAPY 1/> REGIONS: CPT | Mod: GO | Performed by: OCCUPATIONAL THERAPIST

## 2023-09-11 NOTE — PROGRESS NOTES
SOAP note objective information for 9/11/2023.    Please refer to the daily flowsheet for treatment today, total treatment time and time spent performing 1:1 timed codes.         Pain Level (Scale 0-10)   7/24/2023 8/23/2023 9/11/2023   At Rest 3 2-3 1   With Use 7 5 3     Pain Description  Date 7/24/2023   Location Lateral forearm, hand with more numbness     Posture  Normal    Sensation  Decreased Radial Nerve distribution per pt report and Decreased Dorsal Radial Sensory Nerve distribution per pt report    ROM - Elbow and wrist AROM are WNL, mild pain w/ R wrist RD.     Resisted Testing  Pain Report:  - none    + mild    ++ moderate    +++ severe    7/24/2023   Elbow Extension 5/5   Elbow Flexion 5/5   Supination  5/5 +   Pronation 5/5   Wrist Ext  5/5 +   Wrist Ext with UD, Elbow at side    Wrist Ext with RD, Elbow Ext    Wrist Ext with UD, Elbow Ext    Wrist Flex with RD, Elbow at side    Wrist Flex with UD, Elbow at side    Wrist Flex with RD, Elbow Ext    Wrist Flex with UD, Elbow Ext    EDC with Elbow at side    EDC with Elbow Ext    Long Finger Test 5/5 -     Neural Tension Testing  RNT: Radial Neurodynamic Test (based on JOSE LUIS Rodriguez's ULNT)   7/24/2023   0-5 Scale 4/5 S1; 5/5 S2   Position:   0/5: Arm across abdomen in coronal plane  1/5: Depress shoulder, ER to neutral ABD shoulder to 45 degrees  2/5: IR shoulder to end range, keep elbow at 90 degrees  3/5: Extend elbow to 0 degrees  4/5: Fully pronate forearm  5/5: Flex wrist and fingers with UD  Notes:  (+) indicates beyond grade level but less than [facility] to next level  (-) indicates over [facility] to level  S1  onset/change of patient's symptoms  S2 definite stop point based on patient's discomfort level    Strength   (Measured in pounds)  Pain Report: - none  + mild    ++ moderate    +++ severe    7/24/2023 7/24/2023   Trials L R   1  2  3 75 43++   Average         7/24/2023 7/24/2023    L R   Elbow Ext 80 36++     Palpation  Pain Report:  -  none    + mild    ++ moderate    +++ severe    7/24/2023 8/23/2023 9/11/2023   Triangular Interval  +++ ++   Infraspinatus  +++ ++   Teres Major  +++ ++   Pec Major  +++ ++   Pec Minor  +++ ++   Proximal Triceps  NT    Spiral Groove  NT    Distal Triceps  + -   Anconeus  NT    ECRB at LEP ++ min Min   ECU at LEP + min min   EDC at LEP + min min   Radial Head -     Extensor Wad ++ ++ min   PIN Site ++ ++ min   cmc   -   Web space   ++       HEP  Exercise Name: Orthosis Wear and Care, Notes: Wear brace overnight and whenever possible during activity.     Avoid forceful or repetitive gripping, pinching, twisting of the arm, pushing or pulling.     Exercise Name: Warmth, Notes: Complete 2x/day for 5-10 minutes   Exercise Name: Forearm Passive Range of Motion Extensor Stretch, Sets: 1 - Reps: 3 - Sessions: 3, Notes: Hold for 20-30 seconds.   Exercise Name: Friction Massage, Notes: Complete 2x/day for 5 minutes.   Exercise Name: Nerve Gliding Proximal Radial, Sets: 1 - Reps: 10 - Sessions: 3      8/23/2023  Foam roller exercises    9/11/2023  Clip massage

## 2023-09-14 PROBLEM — N64.59 ENGORGED BREASTS: Status: RESOLVED | Noted: 2023-06-22 | Resolved: 2023-09-14

## 2023-09-14 PROBLEM — O10.919 CHRONIC HYPERTENSION AFFECTING PREGNANCY: Status: RESOLVED | Noted: 2023-06-15 | Resolved: 2023-09-14

## 2023-09-14 PROBLEM — O09.529 SUPERVISION OF HIGH-RISK PREGNANCY OF ELDERLY MULTIGRAVIDA: Status: RESOLVED | Noted: 2022-11-10 | Resolved: 2023-09-14

## 2023-09-14 PROBLEM — Z36.89 ENCOUNTER FOR TRIAGE IN PREGNANT PATIENT: Status: RESOLVED | Noted: 2023-05-22 | Resolved: 2023-09-14

## 2023-09-14 PROBLEM — O14.10 PREECLAMPSIA, SEVERE: Status: RESOLVED | Noted: 2023-06-29 | Resolved: 2023-09-14

## 2023-09-15 ENCOUNTER — OFFICE VISIT (OUTPATIENT)
Dept: OBGYN | Facility: CLINIC | Age: 38
End: 2023-09-15
Payer: COMMERCIAL

## 2023-09-15 VITALS — WEIGHT: 201 LBS | BODY MASS INDEX: 30.56 KG/M2 | SYSTOLIC BLOOD PRESSURE: 122 MMHG | DIASTOLIC BLOOD PRESSURE: 86 MMHG

## 2023-09-15 DIAGNOSIS — N92.6 IRREGULAR BLEEDING: Primary | ICD-10-CM

## 2023-09-15 PROCEDURE — 99213 OFFICE O/P EST LOW 20 MIN: CPT | Performed by: OBSTETRICS & GYNECOLOGY

## 2023-09-15 NOTE — PROGRESS NOTES
SUBJECTIVE:                                                   Vivi Mccall is a 38 year old female who presents to clinic today for the following health issue(s):  Patient presents with:  Consult: Discuss abnormal bleeding since delivery. Got depo 8/3/23 and then started bleeding on 8/13/23 x 2 weeks, BRB      HPI:  Here today for follow up to postpartum visit with Dr. Ferreira.  Has questions still about bleeding episode in 5th PP week and bleeding episode the 2wks after first depo shot.  Reports very heavy bleeding episode the week prior to PP visit --lasted one day and was enough to bleed through her sheets.    Had nearly 2 weeks of bleeding approximately 10d after her depo shot.  Has now had no bleeding for a few weeks.  Has had mild cramping as if she is about to get her period but no other issues.  Bud has urology consultation next week for vasectomy.  Eating/drinking well  Back to work parttime in August --struggling with balance and re-looking at her position.  Bud now home on extended paternity leave  Still on procardia and labetalol for HTN; has previously managed with her PCP but was referred to specialist by Dr. Ferreira.  Has this appt in mid October.  Not usually checking blood pressures at home anymore but did check on one occ last week when she wasn't feeling well.  BP slightly elevated at that time and took her labetalol an hour early; no issues since.  Believes it may be work and stress related    Patient's last menstrual period was 2023 (exact date)..     Patient is sexually active, .  Using depo or other injectable for contraception.    reports that she has never smoked. She has never used smokeless tobacco.    STD testing offered?  Declined    Health maintenance updated:  yes    Today's PHQ-2 Score:       2023     2:20 PM   PHQ-2 (  [employer])   Q1: Little interest or pleasure in doing things 0   Q2: Feeling down, depressed or hopeless 0   PHQ-2 Score 0     Today's  PHQ-9 Score:       7/28/2023     1:12 PM   PHQ-9 SCORE   PHQ-9 Total Score 0     Today's ROCHELLE-7 Score:       7/28/2023     1:12 PM   ROCHELLE-7 SCORE   Total Score 2       Problem list and histories reviewed & adjusted, as indicated.  Additional history: as documented.    Patient Active Problem List   Diagnosis    Benign essential hypertension    ROCHELLE (generalized anxiety disorder)    Major depressive disorder, recurrent episode, moderate (H)    Vitamin D deficiency    Shoulder pain, left    Seasonal allergic rhinitis due to pollen    Mild persistent asthma without complication    Family history of malignant neoplasm of breast    Chronic left shoulder pain    Dizziness    Lateral epicondylitis of right elbow    Axillary accessory breast tissue    Radial nerve compression, right     Past Surgical History:   Procedure Laterality Date    GYN SURGERY  2003    Laser treatment of HPV    ORTHOPEDIC SURGERY Left 07/21/2017    ganglion cyst removal    ORTHOPEDIC SURGERY Left     nerve release on left leg/ankle     SOFT TISSUE SURGERY  1999    SURGICAL HISTORY OF -       Nerve entrapment release    ZZHC RELEASE FOOT/TOE NERVE  2011      Social History     Tobacco Use    Smoking status: Never    Smokeless tobacco: Never   Substance Use Topics    Alcohol use: Not Currently     Comment: currently pregnant      Problem (# of Occurrences) Relation (Name,Age of Onset)    Anxiety Disorder (2) Mother (Margarita), Maternal Grandmother (Nettie)    Bipolar Disorder (1) Mother (Margarita)    Mental Illness (1) Mother (Margarita)    Unknown/Adopted (1) Father (Parker)    Depression (3) Mother (Margarita), Maternal Grandmother (Nettie), Maternal Grandfather (Saqib Sr.)    Diabetes (2) Mother (Margarita), Maternal Grandfather (Saqib Sr.)    Hypertension (6) Mother (Margarita), Father (Parker), Maternal Grandmother (Nettie), Maternal Grandfather (Saqib Sr.), Paternal Grandmother (Zoe), Maternal Aunt    Cerebrovascular Disease (1) Maternal Grandfather  (Saqib Sr.)    Breast Cancer (2) Mother (Margarita, 60): BRCA negative, neoadjuvant chemo, surgery pend, Maternal Grandmother (Nettie)    Prostate Cancer (1) Maternal Grandfather (Saqib Sr.)    Glaucoma (1) Maternal Grandfather (Saqib Sr.)    Coronary Artery Disease (3) Mother (Margarita), Maternal Grandfather (Saqib Sr.), Paternal Grandfather (Magdy)    Other - See Comments (1) Maternal Grandfather (Saqib Sr.): passed from [employer] 9/2021    Ovarian Cancer (1) Other    Diabetes Type 2  (1) Maternal Grandfather (Saqib Sr.)           Negative family history of: Macular Degeneration              Current Outpatient Medications   Medication Sig    albuterol (PROAIR HFA/PROVENTIL HFA/VENTOLIN HFA) 108 (90 Base) MCG/ACT inhaler Inhale 2 puffs into the lungs every 6 hours as needed for shortness of breath, wheezing or cough    buPROPion (WELLBUTRIN XL) 150 MG 24 hr tablet TAKE 1 TABLET(150 MG) BY MOUTH EVERY MORNING    citalopram (CELEXA) 40 MG tablet TAKE 1 TABLET(40 MG) BY MOUTH DAILY    labetalol (NORMODYNE) 200 MG tablet Take 4 tablets (800 mg) by mouth every 8 hours for 15 days (Patient taking differently: Take 400 mg by mouth every 8 hours 2 200mg tablets at the end of the day)    NIFEdipine ER (ADALAT CC) 60 MG 24 hr tablet Take 1 tablet (60 mg) by mouth daily    Prenatal Vit-Fe Sulfate-FA-DHA (PRENATAL VITAMIN/MIN +DHA PO) Take 1 chew tab by mouth daily    acetaminophen (TYLENOL) 325 MG tablet Take 2 tablets (650 mg) by mouth every 4 hours as needed for mild pain or fever (Patient not taking: Reported on 7/28/2023)    ibuprofen (ADVIL/MOTRIN) 600 MG tablet Take 1 tablet (600 mg) by mouth every 6 hours as needed for other (cramping) (Patient not taking: Reported on 7/28/2023)    Vitamin D3 (CHOLECALCIFEROL) 25 mcg (1000 units) tablet Take 1 tablet by mouth daily (Patient not taking: Reported on 9/15/2023)     Current Facility-Administered Medications   Medication    medroxyPROGESTERone (DEPO-PROVERA) injection 150 mg      Allergies   Allergen Reactions    Amoxicillin Other (See Comments)     Patient does not want to take since having c diff    Codeine Other (See Comments)     Hallucinations    Penicillins Other (See Comments)     Patient does not want to take since having c diff       ROS:  12 point review of systems negative other than symptoms noted below or in the HPI.  Genitourinary: Irregular Menses  No urinary frequency or dysuria, bladder or kidney problems      OBJECTIVE:     /86   Wt 91.2 kg (201 lb)   LMP 08/13/2023 (Exact Date)   Breastfeeding Yes   BMI 30.56 kg/m    Body mass index is 30.56 kg/m .    Exam:  Constitutional:  Appearance: Well nourished, well developed alert, in no acute distress  Neurologic:  Mental Status:  Oriented X3.  Normal strength and tone, sensory exam grossly normal, mentation intact and speech normal.    Psychiatric:  Mentation appears normal and affect normal/bright.     In-Clinic Test Results:  No results found for this or any previous visit (from the past 24 hour(s)).    ASSESSMENT/PLAN:                                                        ICD-10-CM    1. Irregular bleeding  N92.6           Patient Instructions   Reassurance given with bleeding episodes in postpartum period as well as in close proximity to first depo provera injection.  Reviewed mechanism of action for depo provera and low likelihood of repeat bleeding with 2nd or 3rd shot.  Discussed high rate of amenorrhea with depo.  Will follow up as scheduled with HTN specialist as recommended by Dr. Ferreira.      Anamaria Park MD  [facility]

## 2023-09-15 NOTE — PATIENT INSTRUCTIONS
Reassurance given with bleeding episodes in postpartum period as well as in close proximity to first depo provera injection.  Reviewed mechanism of action for depo provera and low likelihood of repeat bleeding with 2nd or 3rd shot.  Discussed high rate of amenorrhea with depo.  Will follow up as scheduled with HTN specialist as recommended by Dr. Ferreira.

## 2023-09-25 NOTE — PROGRESS NOTES
SOAP note objective information for 9/27/2023.    Please refer to the daily flowsheet for treatment today, total treatment time and time spent performing 1:1 timed codes.         Pain Level (Scale 0-10)   7/24/2023 8/23/2023 9/11/2023    At Rest 3 2-3 1    With Use 7 5 3      Pain Description  Date 7/24/2023   Location Lateral forearm, hand with more numbness     Posture  Normal    Sensation  Decreased Radial Nerve distribution per pt report and Decreased Dorsal Radial Sensory Nerve distribution per pt report    ROM - Elbow and wrist AROM are WNL, mild pain w/ R wrist RD.     Resisted Testing  Pain Report:  - none    + mild    ++ moderate    +++ severe    7/24/2023   Elbow Extension 5/5   Elbow Flexion 5/5   Supination  5/5 +   Pronation 5/5   Wrist Ext  5/5 +   Wrist Ext with UD, Elbow at side    Wrist Ext with RD, Elbow Ext    Wrist Ext with UD, Elbow Ext    Wrist Flex with RD, Elbow at side    Wrist Flex with UD, Elbow at side    Wrist Flex with RD, Elbow Ext    Wrist Flex with UD, Elbow Ext    EDC with Elbow at side    EDC with Elbow Ext    Long Finger Test 5/5 -     Neural Tension Testing  RNT: Radial Neurodynamic Test (based on JOSE LUIS Rodriguez's ULNT)   7/24/2023   0-5 Scale 4/5 S1; 5/5 S2   Position:   0/5: Arm across abdomen in coronal plane  1/5: Depress shoulder, ER to neutral ABD shoulder to 45 degrees  2/5: IR shoulder to end range, keep elbow at 90 degrees  3/5: Extend elbow to 0 degrees  4/5: Fully pronate forearm  5/5: Flex wrist and fingers with UD  Notes:  (+) indicates beyond grade level but less than [facility] to next level  (-) indicates over [facility] to level  S1  onset/change of patient's symptoms  S2 definite stop point based on patient's discomfort level    Strength   (Measured in pounds)  Pain Report: - none  + mild    ++ moderate    +++ severe    7/24/2023 7/24/2023   Trials L R   1  2  3 75 43++   Average         7/24/2023 7/24/2023    L R   Elbow Ext 80 36++     Palpation  Pain Report:  -  none    + mild    ++ moderate    +++ severe    7/24/2023 8/23/2023 9/11/2023 9/27/2023   Triangular Interval  +++ ++ ++   Infraspinatus  +++ ++ ++   Teres Major  +++ ++ ++   Pec Major  +++ ++ +   Pec Minor  +++ ++ +   Proximal Triceps  NT     Spiral Groove  NT     Distal Triceps  + -    Anconeus  NT     ECRB at LEP ++ min Min min   ECU at LEP + min min min   EDC at LEP + min min min   Radial Head -      Extensor Wad ++ ++ min min   PIN Site ++ ++ min min   cmc   -    Web space   ++        HEP  Exercise Name: Orthosis Wear and Care, Notes: Wear brace overnight and whenever possible during activity.     Avoid forceful or repetitive gripping, pinching, twisting of the arm, pushing or pulling.     Exercise Name: Warmth, Notes: Complete 2x/day for 5-10 minutes   Exercise Name: Forearm Passive Range of Motion Extensor Stretch, Sets: 1 - Reps: 3 - Sessions: 3, Notes: Hold for 20-30 seconds.   Exercise Name: Friction Massage, Notes: Complete 2x/day for 5 minutes.   Exercise Name: Nerve Gliding Proximal Radial, Sets: 1 - Reps: 10 - Sessions: 3      8/23/2023  Foam roller exercises    9/27/2023  Roller on upper outer arm  Tennis ball massage to scapula area  Golf ball to forearm extensors    9/11/2023  Clip massage

## 2023-09-27 ENCOUNTER — THERAPY VISIT (OUTPATIENT)
Dept: OCCUPATIONAL THERAPY | Facility: CLINIC | Age: 38
End: 2023-09-27
Payer: COMMERCIAL

## 2023-09-27 DIAGNOSIS — G56.31 RADIAL NERVE COMPRESSION, RIGHT: Primary | ICD-10-CM

## 2023-09-27 PROCEDURE — 97110 THERAPEUTIC EXERCISES: CPT | Mod: GO | Performed by: OCCUPATIONAL THERAPIST

## 2023-09-29 DIAGNOSIS — O10.919 CHRONIC HYPERTENSION AFFECTING PREGNANCY: ICD-10-CM

## 2023-09-29 NOTE — PROGRESS NOTES
SOAP note objective information for 10/2/2023.    Please refer to the daily flowsheet for treatment today, total treatment time and time spent performing 1:1 timed codes.         Pain Level (Scale 0-10)   7/24/2023 8/23/2023 9/11/2023 10/2/2023   At Rest 3 2-3 1 0   With Use 7 5 3 0     Pain Description  Date 7/24/2023   Location Lateral forearm, hand with more numbness     Posture  Normal    Sensation  Decreased Radial Nerve distribution per pt report and Decreased Dorsal Radial Sensory Nerve distribution per pt report    ROM - Elbow and wrist AROM are WNL, mild pain w/ R wrist RD.     Resisted Testing  Pain Report:  - none    + mild    ++ moderate    +++ severe    7/24/2023 10/2/2023   Elbow Extension 5/5    Elbow Flexion 5/5    Supination  5/5 + 5/5 -   Pronation 5/5    Wrist Ext  5/5 + 5/5-   Wrist Ext with UD, Elbow at side     Wrist Ext with RD, Elbow Ext     Wrist Ext with UD, Elbow Ext     Wrist Flex with RD, Elbow at side     Wrist Flex with UD, Elbow at side     Wrist Flex with RD, Elbow Ext     Wrist Flex with UD, Elbow Ext     EDC with Elbow at side     EDC with Elbow Ext     Long Finger Test 5/5 -      Neural Tension Testing  RNT: Radial Neurodynamic Test (based on JOSE LUIS Rodriguez's ULNT)   7/24/2023 10/2/2023   0-5 Scale 4/5 S1; 5/5 S2 5   Position:   0/5: Arm across abdomen in coronal plane  1/5: Depress shoulder, ER to neutral ABD shoulder to 45 degrees  2/5: IR shoulder to end range, keep elbow at 90 degrees  3/5: Extend elbow to 0 degrees  4/5: Fully pronate forearm  5/5: Flex wrist and fingers with UD  Notes:  (+) indicates beyond grade level but less than shelter to next level  (-) indicates over shelter to level  S1  onset/change of patient's symptoms  S2 definite stop point based on patient's discomfort level    Strength   (Measured in pounds)  Pain Report: - none  + mild    ++ moderate    +++ severe    7/24/2023 7/24/2023 10/2/2023   Trials L R R   1  2  3 75 43++ 80-   Average           7/24/2023 7/24/2023 10/2/2023    L R R   Elbow Ext 80 36++ 83-     Palpation  Pain Report:  - none    + mild    ++ moderate    +++ severe    7/24/2023 8/23/2023 9/11/2023 9/27/2023 10/2/2023   Triangular Interval  +++ ++ ++ -   Infraspinatus  +++ ++ ++ -   Teres Major  +++ ++ ++ +   Pec Major  +++ ++ + -   Pec Minor  +++ ++ + -   Proximal Triceps  NT      Spiral Groove  NT      Distal Triceps  + -     Anconeus  NT      ECRB at LEP ++ min Min min -   ECU at LEP + min min min -   EDC at LEP + min min min -   Radial Head -       Extensor Wad ++ ++ min min +   PIN Site ++ ++ min min -   cmc   -     Web space   ++         HEP  Exercise Name: Orthosis Wear and Care, Notes: Wear brace overnight and whenever possible during activity.     Avoid forceful or repetitive gripping, pinching, twisting of the arm, pushing or pulling.     Exercise Name: Warmth, Notes: Complete 2x/day for 5-10 minutes   Exercise Name: Forearm Passive Range of Motion Extensor Stretch, Sets: 1 - Reps: 3 - Sessions: 3, Notes: Hold for 20-30 seconds.   Exercise Name: Friction Massage, Notes: Complete 2x/day for 5 minutes.   Exercise Name: Nerve Gliding Proximal Radial, Sets: 1 - Reps: 10 - Sessions: 3      8/23/2023  Foam roller exercises    9/27/2023  Roller on upper outer arm  Tennis ball massage to scapula area  Golf ball to forearm extensors    9/11/2023  Clip massage    10/2/2023  Extensor stretch making a fist, supinate

## 2023-09-29 NOTE — TELEPHONE ENCOUNTER
"Requested Prescriptions   Pending Prescriptions Disp Refills    NIFEdipine ER (ADALAT CC) 60 MG 24 hr tablet 30 tablet 0     Sig: Take 1 tablet (60 mg) by mouth daily       Calcium Channel Blockers Protocol  Passed - 9/29/2023  4:26 PM        Passed - Blood pressure under 140/90 in past 12 months     BP Readings from Last 3 Encounters:   09/15/23 122/86   07/28/23 128/84   07/05/23 122/84                 Passed - Recent (12 mo) or future (30 days) visit within the authorizing provider's specialty     Patient has had an office visit with the authorizing provider or a provider within the authorizing providers department within the previous 12 mos or has a future within next 30 days. See \"Patient Info\" tab in inbasket, or \"Choose Columns\" in Meds & Orders section of the refill encounter.              Passed - Medication is active on med list        Passed - Patient is age 18 or older        Passed - No active pregnancy on record        Passed - Normal serum creatinine on file in past 12 months     Recent Labs   Lab Test 06/27/23  0843   CR 0.75       Ok to refill medication if creatinine is low          Passed - No positive pregnancy test in past 12 months           Last Written Prescription Date:  09/15/2023  Last Fill Quantity: 30,  # refills: 0   Last office visit: 9/15/2023 ; last virtual visit: 1/12/2023 with prescribing provider:  Ruthann Ferreira   Future Office Visit:   Next 5 appointments (look out 90 days)      Oct 20, 2023  9:00 AM  Nurse Only with [facility]  [facility] [place] ([facility] [place] ) [address] [id number] [phone]                   "

## 2023-10-02 ENCOUNTER — THERAPY VISIT (OUTPATIENT)
Dept: OCCUPATIONAL THERAPY | Facility: CLINIC | Age: 38
End: 2023-10-02
Payer: COMMERCIAL

## 2023-10-02 ENCOUNTER — MYC REFILL (OUTPATIENT)
Dept: OBGYN | Facility: CLINIC | Age: 38
End: 2023-10-02

## 2023-10-02 DIAGNOSIS — O10.919 CHRONIC HYPERTENSION AFFECTING PREGNANCY: ICD-10-CM

## 2023-10-02 DIAGNOSIS — G56.31 RADIAL NERVE COMPRESSION, RIGHT: Primary | ICD-10-CM

## 2023-10-02 PROCEDURE — 97110 THERAPEUTIC EXERCISES: CPT | Mod: GO | Performed by: OCCUPATIONAL THERAPIST

## 2023-10-02 PROCEDURE — 97140 MANUAL THERAPY 1/> REGIONS: CPT | Mod: GO | Performed by: OCCUPATIONAL THERAPIST

## 2023-10-02 NOTE — CONFIDENTIAL NOTE
"Requested Prescriptions   Pending Prescriptions Disp Refills    NIFEdipine ER (ADALAT CC) 60 MG 24 hr tablet 30 tablet 0     Sig: Take 1 tablet (60 mg) by mouth daily       Calcium Channel Blockers Protocol  Passed - 9/29/2023  4:26 PM        Passed - Blood pressure under 140/90 in past 12 months     BP Readings from Last 3 Encounters:   09/15/23 122/86   07/28/23 128/84   07/05/23 122/84                 Passed - Recent (12 mo) or future (30 days) visit within the authorizing provider's specialty     Patient has had an office visit with the authorizing provider or a provider within the authorizing providers department within the previous 12 mos or has a future within next 30 days. See \"Patient Info\" tab in inbasket, or \"Choose Columns\" in Meds & Orders section of the refill encounter.              Passed - Medication is active on med list        Passed - Patient is age 18 or older        Passed - No active pregnancy on record        Passed - Normal serum creatinine on file in past 12 months     Recent Labs   Lab Test 06/27/23  0843   CR 0.75       Ok to refill medication if creatinine is low          Passed - No positive pregnancy test in past 12 months           Last Written Prescription Date:  8/30/23  Last Fill Quantity: 30,  # refills: 0   Last office visit: 9/15/2023 ; last virtual visit: 1/12/2023 with prescribing provider:  Dr Ferreira   Future Office Visit:   Next 5 appointments (look out 90 days)      Oct 20, 2023  9:00 AM  Nurse Only with [facility] Clinical Support  [facility] [place] ([facility] [place] ) [address] [place] [id number] [phone]           Has future apt w Family practice: 10/16/23    Ok to extend 30 days?    Jael Nelson, RN on 10/2/2023 at 11:18 AM            "

## 2023-10-02 NOTE — CONFIDENTIAL NOTE
Can you approve 30 more days worth to get her to her scheduled PCP visit?    Jael Nelson RN on 10/2/2023 at 3:10 PM

## 2023-10-03 DIAGNOSIS — O10.919 CHRONIC HYPERTENSION AFFECTING PREGNANCY: ICD-10-CM

## 2023-10-03 NOTE — TELEPHONE ENCOUNTER
"Requested Prescriptions   Pending Prescriptions Disp Refills    NIFEdipine ER (ADALAT CC) 60 MG 24 hr tablet 30 tablet 0     Sig: Take 1 tablet (60 mg) by mouth daily       Calcium Channel Blockers Protocol  Passed - 10/3/2023  9:32 AM        Passed - Blood pressure under 140/90 in past 12 months     BP Readings from Last 3 Encounters:   09/15/23 122/86   07/28/23 128/84   07/05/23 122/84                 Passed - Recent (12 mo) or future (30 days) visit within the authorizing provider's specialty     Patient has had an office visit with the authorizing provider or a provider within the authorizing providers department within the previous 12 mos or has a future within next 30 days. See \"Patient Info\" tab in inbasket, or \"Choose Columns\" in Meds & Orders section of the refill encounter.              Passed - Medication is active on med list        Passed - Patient is age 18 or older        Passed - No active pregnancy on record        Passed - Normal serum creatinine on file in past 12 months     Recent Labs   Lab Test 06/27/23  0843   CR 0.75       Ok to refill medication if creatinine is low          Passed - No positive pregnancy test in past 12 months           Last Written Prescription Date:  10/02/2023  Last Fill Quantity: 30,  # refills: 0   Last office visit: 9/15/2023 ; last virtual visit: 1/12/2023 with prescribing provider:  Ruthann Ferreira   Future Office Visit:   Next 5 appointments (look out 90 days)      Oct 20, 2023  9:00 AM  Nurse Only with [facility] Clinical Support  [facility] [place] ([facility] [place] ) [address] [id number] [phone]                   "

## 2023-10-07 ENCOUNTER — HEALTH MAINTENANCE LETTER (OUTPATIENT)
Age: 38
End: 2023-10-07

## 2023-10-15 ASSESSMENT — ANXIETY QUESTIONNAIRES
7. FEELING AFRAID AS IF SOMETHING AWFUL MIGHT HAPPEN: NOT AT ALL
GAD7 TOTAL SCORE: 2
4. TROUBLE RELAXING: NOT AT ALL
GAD7 TOTAL SCORE: 2
1. FEELING NERVOUS, ANXIOUS, OR ON EDGE: SEVERAL DAYS
3. WORRYING TOO MUCH ABOUT DIFFERENT THINGS: NOT AT ALL
IF YOU CHECKED OFF ANY PROBLEMS ON THIS QUESTIONNAIRE, HOW DIFFICULT HAVE THESE PROBLEMS MADE IT FOR YOU TO DO YOUR WORK, TAKE CARE OF THINGS AT HOME, OR GET ALONG WITH OTHER PEOPLE: NOT DIFFICULT AT ALL
5. BEING SO RESTLESS THAT IT IS HARD TO SIT STILL: NOT AT ALL
6. BECOMING EASILY ANNOYED OR IRRITABLE: NOT AT ALL
2. NOT BEING ABLE TO STOP OR CONTROL WORRYING: SEVERAL DAYS

## 2023-10-16 ENCOUNTER — OFFICE VISIT (OUTPATIENT)
Dept: INTERNAL MEDICINE | Facility: CLINIC | Age: 38
End: 2023-10-16
Attending: INTERNAL MEDICINE
Payer: COMMERCIAL

## 2023-10-16 VITALS
HEIGHT: 68 IN | DIASTOLIC BLOOD PRESSURE: 91 MMHG | HEART RATE: 75 BPM | WEIGHT: 202.2 LBS | SYSTOLIC BLOOD PRESSURE: 133 MMHG | BODY MASS INDEX: 30.65 KG/M2

## 2023-10-16 DIAGNOSIS — I10 BENIGN ESSENTIAL HYPERTENSION: Primary | ICD-10-CM

## 2023-10-16 PROCEDURE — 99213 OFFICE O/P EST LOW 20 MIN: CPT | Performed by: INTERNAL MEDICINE

## 2023-10-16 PROCEDURE — 99215 OFFICE O/P EST HI 40 MIN: CPT | Performed by: INTERNAL MEDICINE

## 2023-10-16 RX ORDER — LABETALOL 200 MG/1
400 TABLET, FILM COATED ORAL 2 TIMES DAILY
Qty: 120 TABLET | Refills: 4 | Status: SHIPPED | OUTPATIENT
Start: 2023-10-16 | End: 2023-12-04

## 2023-10-16 NOTE — PROGRESS NOTES
"  Assessment & Plan     Benign essential hypertension  Extensive conversation with patient regarding management of hypertension during breast-feeding.  Discussed options, recommend increasing labetalol to 400 twice daily as opposed to once daily to allow for better blood pressure control.  We will follow-up in 1 to 2 weeks.  Patient was also advised on implications of preeclampsia for future cardiovascular health as well as increased risk of stroke and heart attack for women with history of preeclampsia.  She is not planning additional pregnancies.  Recommend checking cholesterol as well as addressing other risk factors, such as impaired glucose tolerance often seen in women with history of preeclampsia.  Patient expressed understanding and agreement with the plan.  - labetalol (NORMODYNE) 200 MG tablet; Take 2 tablets (400 mg) by mouth 2 times daily      I spent a total of 40 minutes on the day of the visit.   Time spent by me doing chart review, history and exam, documentation and further activities per the note       BMI:   Estimated body mass index is 30.74 kg/m  as calculated from the following:    Height as of this encounter: 1.727 m (5' 8\").    Weight as of this encounter: 91.7 kg (202 lb 3.2 oz).           Return in about 1 week (around 10/23/2023).    Krystal Noriega MD  [facility] [place] Trinidad is a 38 year old, presenting for the following health issues:  Hypertension (B/P check)      HPI     Patient reports that she was diagnosed with hypertension in her 20's. She states that she has been diagnosed with preeclampsia following delivery. She is currently 4 months postpartum. She is on nifedipine and labetalol, however, only takes it once a day. She was on lisinopril and hydrochlorothiazide prior to pregnancy.   Her first pregnancy went to 39 weeks due to high blood pressure. Baby as 8 lbs 3 oz. She states that her blood pressure was better controlled after " "her first delivery, was started on labetalol. She went back to lisinopril and hydrochlorothiazide until she decided to have another baby.   With the second pregnancy her blood pressure was more difficult to control. She was having a lot of problems with headache with nifedipine. She was hospitalized at 25 weeks. She was induced at 38 week. Baby was 6 oz 7 oz. She states that her blood pressure was high at home. She was admitted to the hospital for 5 days for postpartum preeclampsia.   She reports significant family history of high blood pressure. She also reports family history of diabetes (mom and maternal grandmother). She also reports history of MI in paternal grandfather, CVA and MI in maternal grandfather. Her maternal grandmother had CVA and MI. She       Review of Systems   Constitutional, HEENT, cardiovascular, pulmonary, GI, , musculoskeletal, neuro, skin, endocrine and psych systems are negative, except as otherwise noted.      Objective    BP (!) 133/91   Pulse 75   Ht 1.727 m (5' 8\")   Wt 91.7 kg (202 lb 3.2 oz)   LMP 08/13/2023 (Exact Date)   BMI 30.74 kg/m    Body mass index is 30.74 kg/m .  Physical Exam   GENERAL: healthy, alert and no distress  EYES: Eyes grossly normal to inspection, PERRL and conjunctivae and sclerae normal  RESP: normal effort, no wheezing  CV: regular rates and rhythm and no peripheral edema  MS: no gross musculoskeletal defects noted, no edema  SKIN: no suspicious lesions or rashes  NEURO: Normal strength and tone, mentation intact and speech normal                      "

## 2023-10-16 NOTE — LETTER
"10/16/2023       RE: Vivi Mccall  [address]  [place] [place] [address]     Dear Colleague,    Thank you for referring your patient, Vivi Mccall, to the [facility]. Please see a copy of my visit note below.      Assessment & Plan     Benign essential hypertension  Extensive conversation with patient regarding management of hypertension during breast-feeding.  Discussed options, recommend increasing labetalol to 400 twice daily as opposed to once daily to allow for better blood pressure control.  We will follow-up in 1 to 2 weeks.  Patient was also advised on implications of preeclampsia for future cardiovascular health as well as increased risk of stroke and heart attack for women with history of preeclampsia.  She is not planning additional pregnancies.  Recommend checking cholesterol as well as addressing other risk factors, such as impaired glucose tolerance often seen in women with history of preeclampsia.  Patient expressed understanding and agreement with the plan.  - labetalol (NORMODYNE) 200 MG tablet; Take 2 tablets (400 mg) by mouth 2 times daily      I spent a total of 40 minutes on the day of the visit.   Time spent by me doing chart review, history and exam, documentation and further activities per the note       BMI:   Estimated body mass index is 30.74 kg/m  as calculated from the following:    Height as of this encounter: 1.727 m (5' 8\").    Weight as of this encounter: 91.7 kg (202 lb 3.2 oz).           Return in about 1 week (around 10/23/2023).    Krystal Noriega MD  [facility]    Juliette Trinidad is a 38 year old, presenting for the following health issues:  Hypertension (B/P check)      HPI     Patient reports that she was diagnosed with hypertension in her 20's. She states that she has been diagnosed with preeclampsia following delivery. " "She is currently 4 months postpartum. She is on nifedipine and labetalol, however, only takes it once a day. She was on lisinopril and hydrochlorothiazide prior to pregnancy.   Her first pregnancy went to 39 weeks due to high blood pressure. Baby as 8 lbs 3 oz. She states that her blood pressure was better controlled after her first delivery, was started on labetalol. She went back to lisinopril and hydrochlorothiazide until she decided to have another baby.   With the second pregnancy her blood pressure was more difficult to control. She was having a lot of problems with headache with nifedipine. She was hospitalized at 25 weeks. She was induced at 38 week. Baby was 6 oz 7 oz. She states that her blood pressure was high at home. She was admitted to the hospital for 5 days for postpartum preeclampsia.   She reports significant family history of high blood pressure. She also reports family history of diabetes (mom and maternal grandmother). She also reports history of MI in paternal grandfather, CVA and MI in maternal grandfather. Her maternal grandmother had CVA and MI. She       Review of Systems   Constitutional, HEENT, cardiovascular, pulmonary, GI, , musculoskeletal, neuro, skin, endocrine and psych systems are negative, except as otherwise noted.     Objective    BP (!) 133/91   Pulse 75   Ht 1.727 m (5' 8\")   Wt 91.7 kg (202 lb 3.2 oz)   LMP 08/13/2023 (Exact Date)   BMI 30.74 kg/m    Body mass index is 30.74 kg/m .  Physical Exam   GENERAL: healthy, alert and no distress  EYES: Eyes grossly normal to inspection, PERRL and conjunctivae and sclerae normal  RESP: normal effort, no wheezing  CV: regular rates and rhythm and no peripheral edema  MS: no gross musculoskeletal defects noted, no edema  SKIN: no suspicious lesions or rashes  NEURO: Normal strength and tone, mentation intact and speech normal    "

## 2023-10-19 NOTE — PROGRESS NOTES
Clinic Administered Medication Documentation      Depo Provera Documentation    Depo-Provera Standing Order inclusion/exclusion criteria reviewed.     Is this the initial or subsequent dose of Depo Provera? Subsequent dose - patient is within the acceptable window of time (11-15 weeks) for subsequent injection. Pregnancy test not indicated.    Patient meets: inclusion criteria     Is there an active order (written within the past 365 days, with administrations remaining, not ) in the chart? Yes.     Prior to injection, verified patient identity using patient's name and date of birth. Medication was administered. Please see MAR and medication order for additional information.     Vial/Syringe: Single dose vial. Was entire vial of medication used? Yes    Patient instructed to remain in clinic for 15 minutes and report any adverse reaction to staff immediately.  NEXT INJECTION DUE: 24 - 24    Verified that the patient has refills remaining in their prescription.       medroxyPROGESTERone (DEPO-PROVERA) injection 150 mg   [[id number]]  Order DetailsOrdered Dose: 150 mg Route: Intramuscular Frequency: EVERY 3 MONTHS   Admin Dose: 150 mg      Scheduled Start Date/Time: 23 1200 End Date/Time: 24 1159 after 4 doses           Associated Diagnoses: General counseling and advice on contraceptive management [Z30.09]       Order Status: Active   Ordering User: Ruthann Ferreira MD Ordering Date/Time:  1138   Ordering Provider: Ruthann Ferreira MD Authorizing Provider: Ruthann Ferreira MD Sarah Tullar-phaneuf, MA on 10/20/2023 at 9:34 AM

## 2023-10-20 ENCOUNTER — ALLIED HEALTH/NURSE VISIT (OUTPATIENT)
Dept: OBGYN | Facility: CLINIC | Age: 38
End: 2023-10-20
Payer: COMMERCIAL

## 2023-10-20 DIAGNOSIS — Z30.42 ENCOUNTER FOR MANAGEMENT AND INJECTION OF DEPO-PROVERA: Primary | ICD-10-CM

## 2023-10-20 PROCEDURE — 99207 PR NO CHARGE NURSE ONLY: CPT

## 2023-10-20 PROCEDURE — 96372 THER/PROPH/DIAG INJ SC/IM: CPT | Performed by: STUDENT IN AN ORGANIZED HEALTH CARE EDUCATION/TRAINING PROGRAM

## 2023-10-20 RX ADMIN — MEDROXYPROGESTERONE ACETATE 150 MG: 150 INJECTION, SUSPENSION INTRAMUSCULAR at 09:21

## 2023-10-23 ENCOUNTER — OFFICE VISIT (OUTPATIENT)
Dept: INTERNAL MEDICINE | Facility: CLINIC | Age: 38
End: 2023-10-23
Attending: INTERNAL MEDICINE
Payer: COMMERCIAL

## 2023-10-23 VITALS
BODY MASS INDEX: 30.71 KG/M2 | SYSTOLIC BLOOD PRESSURE: 125 MMHG | WEIGHT: 202 LBS | DIASTOLIC BLOOD PRESSURE: 85 MMHG | HEART RATE: 77 BPM

## 2023-10-23 DIAGNOSIS — I10 BENIGN ESSENTIAL HYPERTENSION: Primary | ICD-10-CM

## 2023-10-23 PROCEDURE — 99213 OFFICE O/P EST LOW 20 MIN: CPT | Performed by: INTERNAL MEDICINE

## 2023-10-23 ASSESSMENT — ANXIETY QUESTIONNAIRES
1. FEELING NERVOUS, ANXIOUS, OR ON EDGE: MORE THAN HALF THE DAYS
2. NOT BEING ABLE TO STOP OR CONTROL WORRYING: NOT AT ALL
GAD7 TOTAL SCORE: 2
3. WORRYING TOO MUCH ABOUT DIFFERENT THINGS: NOT AT ALL
GAD7 TOTAL SCORE: 2
7. FEELING AFRAID AS IF SOMETHING AWFUL MIGHT HAPPEN: NOT AT ALL
6. BECOMING EASILY ANNOYED OR IRRITABLE: NOT AT ALL
5. BEING SO RESTLESS THAT IT IS HARD TO SIT STILL: NOT AT ALL

## 2023-10-23 ASSESSMENT — PATIENT HEALTH QUESTIONNAIRE - PHQ9
SUM OF ALL RESPONSES TO PHQ QUESTIONS 1-9: 1
5. POOR APPETITE OR OVEREATING: NOT AT ALL

## 2023-10-23 NOTE — LETTER
"10/23/2023       RE: Vivi Mccall  [address]  [place] [address]     Dear Colleague,    Thank you for referring your patient, Vivi Mccall, to the [facility]. Please see a copy of my visit note below.    Chief Complaint   Patient presents with    RECHECK     B/P check    Agustina Lopez LPN       Assessment & Plan     Benign essential hypertension  Blood pressure is currently within acceptable range.  No medication changes were recommended at this time.  Recommend follow-up in approximately 1 month.  Patient was advised to contact the clinic if her blood pressure at home will become low where she would experience frequent episodes of dizziness and lightheadedness.        I spent a total of 20 minutes on the day of the visit.   Time spent by me doing chart review, history and exam, documentation and further activities per the note       BMI:   Estimated body mass index is 30.71 kg/m  as calculated from the following:    Height as of 10/16/23: 1.727 m (5' 8\").    Weight as of this encounter: 91.6 kg (202 lb).           Return in about 1 month (around 11/23/2023).    Krystal Noriega MD  [facility]    Juliette Trinidad is a 38 year old, presenting for the following health issues:  RECHECK (B/P check)      HPI     Patient comes for follow-up on hypertension.  She reports that she has been doing well.  She has noted some episodes of dizziness mostly with getting up.  Otherwise, she denies lower extremity edema.  Cough, shortness of breath, or palpitations.      Review of Systems   Constitutional, HEENT, cardiovascular, pulmonary, GI, , musculoskeletal, neuro, skin, endocrine and psych systems are negative, except as otherwise noted.     Objective    /85   Pulse 77   Wt 91.6 kg (202 lb)   LMP 08/13/2023 (Exact Date)   Breastfeeding Yes   BMI 30.71 " kg/m    Body mass index is 30.71 kg/m .  Physical Exam   GENERAL: healthy, alert and no distress  EYES: Eyes grossly normal to inspection, PERRL and conjunctivae and sclerae normal  RESP: Normal effort, no audible wheezing  CV: regular rates and rhythm and no peripheral edema  MS: no gross musculoskeletal defects noted, no edema

## 2023-10-23 NOTE — PATIENT INSTRUCTIONS
Thank you for trusting us with your care!     If you need to contact us for questions about:    Symptoms, Scheduling & Medical Questions: Non-urgent (2-3 day response) Delfina message, Urgent (needing response today) [phone] (if after 3:30pm next day response)   Prescriptions: Please call your Pharmacy   Billing: [facility] [phone] or [facility]:[phone]

## 2023-10-24 NOTE — PROGRESS NOTES
"  Assessment & Plan     Benign essential hypertension  Blood pressure is currently within acceptable range.  No medication changes were recommended at this time.  Recommend follow-up in approximately 1 month.  Patient was advised to contact the clinic if her blood pressure at home will become low where she would experience frequent episodes of dizziness and lightheadedness.        I spent a total of 20 minutes on the day of the visit.   Time spent by me doing chart review, history and exam, documentation and further activities per the note       BMI:   Estimated body mass index is 30.71 kg/m  as calculated from the following:    Height as of 10/16/23: 1.727 m (5' 8\").    Weight as of this encounter: 91.6 kg (202 lb).           Return in about 1 month (around 11/23/2023).    Krystal Noriega MD  [facility] [place] Trin[place] is a 38 year old, presenting for the following health issues:  RECHECK (B/P check)      HPI     Patient comes for follow-up on hypertension.  She reports that she has been doing well.  She has noted some episodes of dizziness mostly with getting up.  Otherwise, she denies lower extremity edema.  Cough, shortness of breath, or palpitations.      Review of Systems   Constitutional, HEENT, cardiovascular, pulmonary, GI, , musculoskeletal, neuro, skin, endocrine and psych systems are negative, except as otherwise noted.      Objective    /85   Pulse 77   Wt 91.6 kg (202 lb)   LMP 08/13/2023 (Exact Date)   Breastfeeding Yes   BMI 30.71 kg/m    Body mass index is 30.71 kg/m .  Physical Exam   GENERAL: healthy, alert and no distress  EYES: Eyes grossly normal to inspection, PERRL and conjunctivae and sclerae normal  RESP: Normal effort, no audible wheezing  CV: regular rates and rhythm and no peripheral edema  MS: no gross musculoskeletal defects noted, no edema                    "

## 2023-10-26 ENCOUNTER — VIRTUAL VISIT (OUTPATIENT)
Dept: FAMILY MEDICINE | Facility: CLINIC | Age: 38
End: 2023-10-26
Payer: COMMERCIAL

## 2023-10-26 DIAGNOSIS — I10 BENIGN ESSENTIAL HYPERTENSION: ICD-10-CM

## 2023-10-26 DIAGNOSIS — U07.1 INFECTION DUE TO 2019 NOVEL CORONAVIRUS: Primary | ICD-10-CM

## 2023-10-26 PROCEDURE — 99213 OFFICE O/P EST LOW 20 MIN: CPT | Mod: VID | Performed by: FAMILY MEDICINE

## 2023-10-26 NOTE — PROGRESS NOTES
"Vivi is a 38 year old who is being evaluated via a billable video visit.      How would you like to obtain your AVS? MyChart  If the video visit is dropped, the invitation should be resent by: Text to cell phone: [phone]  Will anyone else be joining your video visit? No          Assessment & Plan     Infection due to 2019 novel coronavirus - wonders if she needs paxlovid. Breastfeeding, already feeling better, on BP agents. Reviewed paxlovid would likely be beneficial in the situation. She agrees.     Benign essential hypertension - controlled on nifedipine      Buffy Carmen MD  [facility]    Subjective   Vivi is a 38 year old, presenting for the following health issues:  Medication Request (Paxlovid - Patient stated positive for Covid and would like to discuss taking Paxlovid)        10/26/2023     8:50 AM   Additional Questions   Roomed by VIVIEN Calvillo   Accompanied by Self       History of Present Illness       Reason for visit:  Covid    She eats 2-3 servings of fruits and vegetables daily.She consumes 2 sweetened beverage(s) daily.She exercises with enough effort to increase her heart rate 9 or less minutes per day.  She exercises with enough effort to increase her heart rate 3 or less days per week.   She is taking medications regularly.       Symptoms began Monday night.   Body aches, headache, congestion, loss of taste/smell.   Tested positive on Tuesday evening.  Using advil dual.   Feeling better today.     Recent history of pre-eclampsia. On nifedipine, doing well.         Review of Systems   Constitutional, HEENT, cardiovascular, pulmonary, gi and gu systems are negative, except as otherwise noted.      Objective    Vitals - Patient Reported  Weight (Patient Reported): 91.6 kg (202 lb)  Height (Patient Reported): 172.7 cm (5' 8\")  BMI (Based on Pt Reported Ht/Wt): 30.71  Pain Score: Mild Pain (3)  Pain Loc:  (body aches)        Physical Exam   GENERAL: " Healthy, alert and no distress  EYES: Eyes grossly normal to inspection.  No discharge or erythema, or obvious scleral/conjunctival abnormalities.  RESP: No audible wheeze, cough, or visible cyanosis.  No visible retractions or increased work of breathing.    SKIN: Visible skin clear. No significant rash, abnormal pigmentation or lesions.  NEURO: Cranial nerves grossly intact.  Mentation and speech appropriate for age.  PSYCH: Mentation appears normal, affect normal/bright, judgement and insight intact, normal speech and appearance well-groomed.        Video-Visit Details    Type of service:  Video Visit     Originating Location (pt. Location): Home    Distant Location (provider location):  Off-site  Platform used for Video Visit: [employer]      Answers submitted by the patient for this visit:  General Questionnaire (Submitted on 10/26/2023)  Chief Complaint: Chronic problems general questions HPI Form  What is the reason for your visit today? : Covid  How many servings of fruits and vegetables do you eat daily?: 2-3  On average, how many sweetened beverages do you drink each day (Examples: soda, juice, sweet tea, etc.  Do NOT count diet or artificially sweetened beverages)?: 2  How many minutes a day do you exercise enough to make your heart beat faster?: 9 or less  How many days a week do you exercise enough to make your heart beat faster?: 3 or less  How many days per week do you miss taking your medication?: 0

## 2023-12-04 ENCOUNTER — OFFICE VISIT (OUTPATIENT)
Dept: INTERNAL MEDICINE | Facility: CLINIC | Age: 38
End: 2023-12-04
Attending: INTERNAL MEDICINE
Payer: COMMERCIAL

## 2023-12-04 VITALS
BODY MASS INDEX: 31.02 KG/M2 | WEIGHT: 204 LBS | SYSTOLIC BLOOD PRESSURE: 137 MMHG | HEART RATE: 84 BPM | DIASTOLIC BLOOD PRESSURE: 90 MMHG

## 2023-12-04 DIAGNOSIS — I10 BENIGN ESSENTIAL HYPERTENSION: ICD-10-CM

## 2023-12-04 PROCEDURE — 99214 OFFICE O/P EST MOD 30 MIN: CPT | Performed by: INTERNAL MEDICINE

## 2023-12-04 PROCEDURE — 99213 OFFICE O/P EST LOW 20 MIN: CPT | Performed by: INTERNAL MEDICINE

## 2023-12-04 RX ORDER — LABETALOL 200 MG/1
600 TABLET, FILM COATED ORAL 2 TIMES DAILY
Qty: 120 TABLET | Refills: 4 | Status: SHIPPED | OUTPATIENT
Start: 2023-12-04 | End: 2024-07-24

## 2023-12-04 NOTE — LETTER
"12/4/2023       RE: Vivi Mccall  [address]  [place] [facility] [address]     Dear Colleague,    Thank you for referring your patient, Vivi Mccall, to the [facility]. Please see a copy of my visit note below.      Assessment & Plan        Benign essential hypertension  Blood pressure is elevated today.  Recommend increasing the dose of labetalol to 600 mg twice daily.  Given that patient is approaching 6 months postpartum with a lactating infant, may consider switching to hydrochlorothiazide from nifedipine.  May also consider enalapril given medications safety during nursing.  Will follow-up in 1 week for blood pressure check.  - labetalol (NORMODYNE) 200 MG tablet; Take 3 tablets (600 mg) by mouth 2 times daily      I spent a total of 20 minutes on the day of the visit.   Time spent by me doing chart review, history and exam, documentation and further activities per the note       BMI:   Estimated body mass index is 31.02 kg/m  as calculated from the following:    Height as of 10/16/23: 1.727 m (5' 8\").    Weight as of this encounter: 92.5 kg (204 lb).           Return in about 1 week (around 12/11/2023).    Krystal Noriega MD  [facility]    Juliette Trinidad is a 38 year old, presenting for the following health issues:  Follow Up (B/P check)      HPI     Patient comes in for follow-up on hypertension.  She denies issues with orthostatic hypotension symptoms, dizziness, or lightheadedness.  She has been taking her medications as prescribed.  She reports that her blood pressure has been fluctuating at home, however, for the most part has been within somewhat elevated range with systolics in the 130s and diastolics in the 80s.  She denies medication related side effects.  She also reports considerable anxiety and attributes her current blood pressure elevation to " feeling anxious today.      Review of Systems   Constitutional, HEENT, cardiovascular, pulmonary, GI, , musculoskeletal, neuro, skin, endocrine and psych systems are negative, except as otherwise noted.      Objective   BP (!) 137/90   Pulse 84   Wt 92.5 kg (204 lb)   BMI 31.02 kg/m    Body mass index is 31.02 kg/m .  Physical Exam   GENERAL: healthy, alert and no distress  EYES: Eyes grossly normal to inspection, PERRL and conjunctivae and sclerae normal  RESP: normal effort, no wheezing  CV: regular rates and rhythm and no peripheral edema  MS: no gross musculoskeletal defects noted, no edema  NEURO: Normal strength and tone, mentation intact and speech normal  PSYCH: mentation appears normal, affect normal/bright

## 2023-12-04 NOTE — PROGRESS NOTES
"  Assessment & Plan         Benign essential hypertension  Blood pressure is elevated today.  Recommend increasing the dose of labetalol to 600 mg twice daily.  Given that patient is approaching 6 months postpartum with a lactating infant, may consider switching to hydrochlorothiazide from nifedipine.  May also consider enalapril given medications safety during nursing.  Will follow-up in 1 week for blood pressure check.  - labetalol (NORMODYNE) 200 MG tablet; Take 3 tablets (600 mg) by mouth 2 times daily      I spent a total of 20 minutes on the day of the visit.   Time spent by me doing chart review, history and exam, documentation and further activities per the note       BMI:   Estimated body mass index is 31.02 kg/m  as calculated from the following:    Height as of 10/16/23: 1.727 m (5' 8\").    Weight as of this encounter: 92.5 kg (204 lb).           Return in about 1 week (around 12/11/2023).    Krystal Noriega MD  [facility] [place] Trinidad is a 38 year old, presenting for the following health issues:  Follow Up (B/P check)      HPI     Patient comes in for follow-up on hypertension.  She denies issues with orthostatic hypotension symptoms, dizziness, or lightheadedness.  She has been taking her medications as prescribed.  She reports that her blood pressure has been fluctuating at home, however, for the most part has been within somewhat elevated range with systolics in the 130s and diastolics in the 80s.  She denies medication related side effects.  She also reports considerable anxiety and attributes her current blood pressure elevation to feeling anxious today.      Review of Systems   Constitutional, HEENT, cardiovascular, pulmonary, GI, , musculoskeletal, neuro, skin, endocrine and psych systems are negative, except as otherwise noted.      Objective    BP (!) 137/90   Pulse 84   Wt 92.5 kg (204 lb)   BMI 31.02 kg/m    Body mass index is 31.02 " kg/m .  Physical Exam   GENERAL: healthy, alert and no distress  EYES: Eyes grossly normal to inspection, PERRL and conjunctivae and sclerae normal  RESP: normal effort, no wheezing  CV: regular rates and rhythm and no peripheral edema  MS: no gross musculoskeletal defects noted, no edema  NEURO: Normal strength and tone, mentation intact and speech normal  PSYCH: mentation appears normal, affect normal/bright

## 2023-12-18 ENCOUNTER — OFFICE VISIT (OUTPATIENT)
Dept: INTERNAL MEDICINE | Facility: CLINIC | Age: 38
End: 2023-12-18
Attending: INTERNAL MEDICINE
Payer: COMMERCIAL

## 2023-12-18 VITALS
HEART RATE: 88 BPM | SYSTOLIC BLOOD PRESSURE: 113 MMHG | DIASTOLIC BLOOD PRESSURE: 74 MMHG | BODY MASS INDEX: 31.32 KG/M2 | WEIGHT: 206 LBS

## 2023-12-18 DIAGNOSIS — I10 BENIGN ESSENTIAL HYPERTENSION: Primary | ICD-10-CM

## 2023-12-18 PROCEDURE — 99213 OFFICE O/P EST LOW 20 MIN: CPT | Performed by: INTERNAL MEDICINE

## 2023-12-18 NOTE — LETTER
"12/18/2023       RE: Vivi Mccall  [address]  [facility] [address]     Dear Colleague,    Thank you for referring your patient, Vivi Mccall, to the [facility] at [facility]. Please see a copy of my visit note below.    Chief Complaint   Patient presents with    RECHECK    Agustina Lopez LPN       Assessment & Plan    Benign essential hypertension  Blood pressure is currently within acceptable range.  Recommend to continue with current medical therapy.  Given the patient has some episodes of dizziness and lightheadedness at home, she may consider decreasing the dose of labetalol to 400 mg twice daily rather than 600 mg twice daily.  She will call the clinic with any additional concerns.  Recommend follow-up in 2 months.      I spent a total of 20 minutes on the day of the visit.   Time spent by me doing chart review, history and exam, documentation and further activities per the note       BMI:   Estimated body mass index is 31.32 kg/m  as calculated from the following:    Height as of 10/16/23: 1.727 m (5' 8\").    Weight as of this encounter: 93.4 kg (206 lb).           Return in about 2 months (around 2/18/2024).    Krystal Noriega MD  [facility]    Juliette Trinidad is a 38 year old, presenting for the following health issues:  RECHECK      HPI     Patient is here for follow-up.  She reports that her blood pressure at home has been considerably lower.  She has had some episodes of dizziness and lightheadedness.  She is taking 400 mg of labetalol occasionally in the evening rather than full dose to avoid dizziness and lightheadedness.  She otherwise has been feeling well.  She denies other medication related side effects.      Review of Systems   Constitutional, HEENT, cardiovascular, pulmonary, GI, , musculoskeletal, neuro, skin, endocrine and psych systems are " negative, except as otherwise noted.      Objective   /74   Pulse 88   Wt 93.4 kg (206 lb)   BMI 31.32 kg/m    Body mass index is 31.32 kg/m .  Physical Exam   GENERAL: healthy, alert and no distress  EYES: Eyes grossly normal to inspection, PERRL and conjunctivae and sclerae normal  RESP: normal effort, no wheezing  CV: regular rates and rhythm  MS: no gross musculoskeletal defects noted, no edema  PSYCH: mentation appears normal, affect normal/bright

## 2023-12-22 NOTE — PROGRESS NOTES
"  Assessment & Plan     Benign essential hypertension  Blood pressure is currently within acceptable range.  Recommend to continue with current medical therapy.  Given the patient has some episodes of dizziness and lightheadedness at home, she may consider decreasing the dose of labetalol to 400 mg twice daily rather than 600 mg twice daily.  She will call the clinic with any additional concerns.  Recommend follow-up in 2 months.      I spent a total of 20 minutes on the day of the visit.   Time spent by me doing chart review, history and exam, documentation and further activities per the note       BMI:   Estimated body mass index is 31.32 kg/m  as calculated from the following:    Height as of 10/16/23: 1.727 m (5' 8\").    Weight as of this encounter: 93.4 kg (206 lb).           Return in about 2 months (around 2/18/2024).    Krystal Noriega MD  [facility] [place] Trinidad is a 38 year old, presenting for the following health issues:  RECHECK      HPI     Patient is here for follow-up.  She reports that her blood pressure at home has been considerably lower.  She has had some episodes of dizziness and lightheadedness.  She is taking 400 mg of labetalol occasionally in the evening rather than full dose to avoid dizziness and lightheadedness.  She otherwise has been feeling well.  She denies other medication related side effects.      Review of Systems   Constitutional, HEENT, cardiovascular, pulmonary, GI, , musculoskeletal, neuro, skin, endocrine and psych systems are negative, except as otherwise noted.      Objective    /74   Pulse 88   Wt 93.4 kg (206 lb)   BMI 31.32 kg/m    Body mass index is 31.32 kg/m .  Physical Exam   GENERAL: healthy, alert and no distress  EYES: Eyes grossly normal to inspection, PERRL and conjunctivae and sclerae normal  RESP: normal effort, no wheezing  CV: regular rates and rhythm  MS: no gross musculoskeletal defects noted, no " edema  PSYCH: mentation appears normal, affect normal/bright

## 2024-01-19 ENCOUNTER — ALLIED HEALTH/NURSE VISIT (OUTPATIENT)
Dept: OBGYN | Facility: CLINIC | Age: 39
End: 2024-01-19
Payer: COMMERCIAL

## 2024-01-19 DIAGNOSIS — N92.6 IRREGULAR BLEEDING: Primary | ICD-10-CM

## 2024-01-19 PROCEDURE — 96372 THER/PROPH/DIAG INJ SC/IM: CPT | Performed by: STUDENT IN AN ORGANIZED HEALTH CARE EDUCATION/TRAINING PROGRAM

## 2024-01-19 PROCEDURE — 99207 PR NO CHARGE NURSE ONLY: CPT

## 2024-01-19 RX ADMIN — MEDROXYPROGESTERONE ACETATE 150 MG: 150 INJECTION, SUSPENSION INTRAMUSCULAR at 10:21

## 2024-01-19 NOTE — NURSING NOTE
Clinic Administered Medication Documentation      Depo Provera Documentation    Depo-Provera Standing Order inclusion/exclusion criteria reviewed.     Is this the initial or subsequent dose of Depo Provera? Subsequent dose - patient is within the acceptable window of time (11-15 weeks) for subsequent injection. Pregnancy test not indicated.    Patient meets: inclusion criteria     Is there an active order (written within the past 365 days, with administrations remaining, not ) in the chart? Yes.     Prior to injection, verified patient identity using patient's name and date of birth. Medication was administered. Please see MAR and medication order for additional information.     Vial/Syringe: Syringe    Patient instructed to remain in clinic for 15 minutes and report any adverse reaction to staff immediately.  NEXT INJECTION DUE: 24 -       Patient  state that her  is doing vasotomy and this is her last injection.

## 2024-02-09 PROBLEM — G56.31 RADIAL NERVE COMPRESSION, RIGHT: Status: RESOLVED | Noted: 2023-07-24 | Resolved: 2024-02-09

## 2024-02-12 ENCOUNTER — VIRTUAL VISIT (OUTPATIENT)
Dept: FAMILY MEDICINE | Facility: CLINIC | Age: 39
End: 2024-02-12
Payer: COMMERCIAL

## 2024-02-12 DIAGNOSIS — I10 BENIGN ESSENTIAL HYPERTENSION: Primary | ICD-10-CM

## 2024-02-12 DIAGNOSIS — N61.0 MASTITIS: ICD-10-CM

## 2024-02-12 DIAGNOSIS — Q83.1 AXILLARY ACCESSORY BREAST TISSUE: ICD-10-CM

## 2024-02-12 PROCEDURE — 99214 OFFICE O/P EST MOD 30 MIN: CPT | Mod: 95 | Performed by: FAMILY MEDICINE

## 2024-02-12 RX ORDER — CEPHALEXIN 500 MG/1
500 CAPSULE ORAL 4 TIMES DAILY
Qty: 40 CAPSULE | Refills: 0 | Status: SHIPPED | OUTPATIENT
Start: 2024-02-12 | End: 2024-02-22

## 2024-02-12 NOTE — PROGRESS NOTES
"Vivi is a 38 year old who is being evaluated via a billable video visit.      How would you like to obtain your AVS? MyChart  If the video visit is dropped, the invitation should be resent by: Send to e-mail at: [email or web address]  Will anyone else be joining your video visit? No          Assessment & Plan     (I10) Benign essential hypertension  (primary encounter diagnosis)  Comment: Discussed labetalol certainly could be wearing off  And causing fluctuations middle of the day.  Also discussed that nursing and expressing milk certainly can affect how she feels and fluid balance.  Lastly I will look into seeing if there is benefit to taking the nifedipine in the evening as opposed to in the morning.  Encouraged her to come in for a BP check in the next few weeks to just check her cuff against our cuff as well  Plan: TSH with free T4 reflex, CBC with platelets,         Comprehensive metabolic panel (BMP + Alb, Alk         Phos, ALT, AST, Total. Bili, TP)            Accessory breast tissue bilateral:  Recommended that patient try warm compresses and expressing towards the center of the accessory tissue or wherever the tissue drains from to see if she can get any ducts that might be blocked open I did offer to send in antibiotics in case this does not help.  Will plan for routine yearly mammogram and ultrasound as needed when she is done lactating.  Prescription drug management        BMI  Estimated body mass index is 31.32 kg/m  as calculated from the following:    Height as of 10/16/23: 1.727 m (5' 8\").    Weight as of 12/18/23: 93.4 kg (206 lb).         Advised to come in for labs and has a follow-up visit with Dr. Tian in 6 months.    Subjective   Vivi is a 38 year old, presenting for the following health issues:  Hypertension      HPI     Hypertension Follow-up  Has worked with Dr. Tian  Is not stabilizing  Last Saturday had a spike of BP  Realized she forgot to take meds  Felt anxious  Had " 181/117 BP  Did not have headache  Forgot to take meds that day  2 hours later had a drop in pressure to 80s/50s  Took both labetalol and nifedipine  Lots of brain fog post partum  Feels that usually she remembers to take the medications because she associates them with food some days can be forgetful.  Tends to take labetalol  12 hours apart morning and evening both with food and nifedipine takes this in the morning.  Patient is nursing she is pumping every 3 hours and nursing on demand she is 8 months postpartum but plans to stop breast-feeding in the next 3 months    Normally BPs are at goal  Has had some dips  Needs to eat with these meds  80s/50s the dips tend to be later in the afternoon    Does have a blood pressure cuff at home  Ave was 139/86  Today's blood pressure was :  140/89  139/86  135/85  Pulse 89  These were all done in the afternoon just recently.  Do you check your blood pressure regularly outside of the clinic? Yes   Are you following a low salt diet? No  Are your blood pressures ever more than 140 on the top number (systolic) OR more   than 90 on the bottom number (diastolic), for example 140/90? Yes Highest - 181/117 (Feb 4th) - Average over 5 minutes   81/55 - Taken when she was shopping at [employer]  -Had symptoms of dizziness and lightheadedness   - describes it as all her energy leaving and (January 24th)     Breast Concern  Onset/Duration: More than 6 months   Description:   Location: Axillary breast tissue tends to lactate  Has a tender spot in middle  Has tried warm compresses  Has tried to express some but not always able to get much milk out.  Has this in both axilla but the right side is more tender  Concerned because family history of breast cancer in both her mother at the age of 60 and her grandmother at the age of 35 and again at 60  Patient has already had screening mammogram prior to this pregnancy  Pain or tenderness: YES - Right Axillary region   Redness: No  Intensity:  Severe  Progression of Symptoms: constant  Accompanying Signs & Symptoms:  Any lumps in axillary region: Yes - On the right side ; No change in size   Movable: YES  Nipple discharge: No   Changes in the skin or nipple: No  On Hormone therapy: No  Does it change with menstrual cycle: No  Previous history of similar problem: Yes - had a visit on 6/22/23 during her pregnancy for similar symptoms   First degree relative with breast cancer: a positive family history for breast cancer in her mother and maternal grandmother.  Precipitating factors:           Worsened by: None   Alleviating factors:            Improved by: None   Therapies tried and outcome: None  No LMP recorded. Patient has had an injection.        Review of Systems  Constitutional, HEENT, cardiovascular, pulmonary, gi and gu systems are negative, except as otherwise noted.      Objective           Vitals:  No vitals were obtained today due to virtual visit.    Physical Exam   GENERAL: alert and no distress  EYES: Eyes grossly normal to inspection.  No discharge or erythema, or obvious scleral/conjunctival abnormalities.  RESP: No audible wheeze, cough, or visible cyanosis.    SKIN: Visible skin clear. No significant rash, abnormal pigmentation or lesions.  NEURO: Cranial nerves grossly intact.  Mentation and speech appropriate for age.  PSYCH: Appropriate affect, tone, and pace of words          Video-Visit Details    Type of service:  Video Visit   Joined the call at 2/12/2024, 1:13:25 pm.  Left the call at 2/12/2024, 1:36:42 pm.  You were on the call for 23 minutes 16 seconds .  Originating Location (pt. Location): Home    Distant Location (provider location):  On-site  Platform used for Video Visit: Rambo  Signed Electronically by: Vi Baldwin MD

## 2024-02-20 ENCOUNTER — TELEPHONE (OUTPATIENT)
Dept: FAMILY MEDICINE | Facility: CLINIC | Age: 39
End: 2024-02-20
Payer: COMMERCIAL

## 2024-02-20 NOTE — TELEPHONE ENCOUNTER
"----- Message from Vivi Calderón [facility] sent at 2/14/2024  7:31 AM CST -----  Regarding: RE: Question on blood pressure medications  If it's only happening a few times per month, it's really difficult to say what is causing it -    Moving the administration time of nifedipine will not matter (assuming she is adherent and taking it regularly to begin with). Because this is a controlled release medicine it will peak around hour 2-5 and again between 6-12 hours (biphasic release) but even with a \"peak\" she should be at steady state, so changing the admin time does not change the response with an ER formulation.     Labetalol should remain 12 hrs apart for best overall coverage.     Nifedipine should be taken on an empty stomach (food increases absorption). Labetalol is best taken with food. If she is varying this (sometimes taking them with food, sometimes not) that could also account for changes in the way her body reacts.     Non-drug things may also be a factor - caffeine use (for example - elevating the BP in the AM, and then seeing a decrease in the afternoon).     So it's not a super straightforward answer!  If I remember correctly, Vivi had a hard time remembering to take her meds. So she could move nifidepine to evening and try it (worst case scenario, nothing changes) but she would have to be able to remember to take it consistently. If she would miss doses, it may make the issue worse.   I'm not sure if she has tried any, but many people find apps like \"MyTherapy\" helpful. They are pretty simple to use (just put in your med names and what time you take them) and you get a push notification to remind you to take them.     Vivi Calderón, PharmD, MANJIT, BCACP  MTM Pharmacist, [facility]   ----- Message -----  From: Vi Baldwin MD  Sent: 2/12/2024   1:46 PM CST  To: Vivi Calderón [facility]  Subject: Question on blood pressure medications           Hello,    I just had a quick " visit with this patient virtually she has been noting some dips of blood pressure a few times per month they tend to happen in the afternoon.  I am wondering if the timing of when she takes her medicines might have any effect?  In particular she takes the nifedipine in the morning along with her a.m. dose of labetalol 600 mg.  She then takes another dose of labetalol 12 hours later also 600 mg if there are any benefit to taking the labetalol at night?    THANKS    sn

## 2024-02-21 ENCOUNTER — LAB (OUTPATIENT)
Dept: LAB | Facility: CLINIC | Age: 39
End: 2024-02-21
Payer: COMMERCIAL

## 2024-02-21 DIAGNOSIS — I10 BENIGN ESSENTIAL HYPERTENSION: ICD-10-CM

## 2024-02-21 LAB
ALBUMIN SERPL BCG-MCNC: 4.6 G/DL (ref 3.5–5.2)
ALP SERPL-CCNC: 92 U/L (ref 40–150)
ALT SERPL W P-5'-P-CCNC: 28 U/L (ref 0–50)
ANION GAP SERPL CALCULATED.3IONS-SCNC: 12 MMOL/L (ref 7–15)
AST SERPL W P-5'-P-CCNC: 24 U/L (ref 0–45)
BILIRUB SERPL-MCNC: 0.6 MG/DL
BUN SERPL-MCNC: 8.3 MG/DL (ref 6–20)
CALCIUM SERPL-MCNC: 9.1 MG/DL (ref 8.6–10)
CHLORIDE SERPL-SCNC: 104 MMOL/L (ref 98–107)
CREAT SERPL-MCNC: 0.78 MG/DL (ref 0.51–0.95)
DEPRECATED HCO3 PLAS-SCNC: 23 MMOL/L (ref 22–29)
EGFRCR SERPLBLD CKD-EPI 2021: >90 ML/MIN/1.73M2
ERYTHROCYTE [DISTWIDTH] IN BLOOD BY AUTOMATED COUNT: 12.4 % (ref 10–15)
GLUCOSE SERPL-MCNC: 91 MG/DL (ref 70–99)
HCT VFR BLD AUTO: 40.9 % (ref 35–47)
HGB BLD-MCNC: 13.5 G/DL (ref 11.7–15.7)
MCH RBC QN AUTO: 30.8 PG (ref 26.5–33)
MCHC RBC AUTO-ENTMCNC: 33 G/DL (ref 31.5–36.5)
MCV RBC AUTO: 93 FL (ref 78–100)
PLATELET # BLD AUTO: 369 10E3/UL (ref 150–450)
POTASSIUM SERPL-SCNC: 3.8 MMOL/L (ref 3.4–5.3)
PROT SERPL-MCNC: 7.7 G/DL (ref 6.4–8.3)
RBC # BLD AUTO: 4.39 10E6/UL (ref 3.8–5.2)
SODIUM SERPL-SCNC: 139 MMOL/L (ref 135–145)
TSH SERPL DL<=0.005 MIU/L-ACNC: 1.07 UIU/ML (ref 0.3–4.2)
WBC # BLD AUTO: 5.2 10E3/UL (ref 4–11)

## 2024-02-21 PROCEDURE — 36415 COLL VENOUS BLD VENIPUNCTURE: CPT

## 2024-02-21 PROCEDURE — 84443 ASSAY THYROID STIM HORMONE: CPT

## 2024-02-21 PROCEDURE — 85027 COMPLETE CBC AUTOMATED: CPT

## 2024-02-21 PROCEDURE — 80053 COMPREHEN METABOLIC PANEL: CPT

## 2024-02-23 ENCOUNTER — NURSE TRIAGE (OUTPATIENT)
Dept: FAMILY MEDICINE | Facility: CLINIC | Age: 39
End: 2024-02-23
Payer: COMMERCIAL

## 2024-02-23 ENCOUNTER — TRANSFERRED RECORDS (OUTPATIENT)
Dept: HEALTH INFORMATION MANAGEMENT | Facility: CLINIC | Age: 39
End: 2024-02-23
Payer: COMMERCIAL

## 2024-02-23 NOTE — TELEPHONE ENCOUNTER
Thanks that all sounds great - she can also schedule a visit with me to talk about this more    SN

## 2024-02-23 NOTE — TELEPHONE ENCOUNTER
Patient updated  Verbalized understanding  Is on her way to urgent care to be assessed same day  See other encounter  Sent PT information via [employer] per pt request  Lorena VIVEROS RN

## 2024-02-23 NOTE — TELEPHONE ENCOUNTER
S.N.,  Patient sent [employer] message regarding possible sciatica pain flare  12/29 last occurrence  - ice, heat, advil improved  3.5 weeks ago, pain returned  Accupuncture improved pain greatly - 4/10 at rest  7/10 in the evenings  Also feels menstrual type cramps currently  Advised OV in next 3 days to discuss further treatment options  No available appointments today  Advised UC/possibly orthopedic walk in clinics over the weekend   Declined at this time   Would like to avoid medications due to breastfeeding  Unsure if any other recommendations in the mean time?  Will update patient on PT referral as well  Lorena Judge RN      Reason for Disposition   MODERATE back pain (e.g., interferes with normal activities) and present > 3 days    Additional Information   Negative: Passed out (i.e., fainted, collapsed and was not responding)   Negative: Shock suspected (e.g., cold/pale/clammy skin, too weak to stand, low BP, rapid pulse)   Negative: Sounds like a life-threatening emergency to the triager   Negative: Major injury to the back (e.g., MVA, fall > 10 feet or 3 meters, penetrating injury, etc.)   Negative: Pain in the upper back over the ribs (rib cage) that radiates (travels) into the chest   Negative: Pain in the upper back over the ribs (rib cage) and worsened by coughing (or clearly increases with breathing)   Negative: Back pain during pregnancy   Negative: SEVERE back pain of sudden onset and age > 60 years   Negative: SEVERE abdominal pain (e.g., excruciating)   Negative: Abdominal pain and age > 60 years   Negative: Unable to urinate (or only a few drops) and bladder feels very full   Negative: Loss of bladder or bowel control (urine or bowel incontinence; wetting self, leaking stool) of new-onset   Negative: Numbness (loss of sensation) in groin or rectal area   Negative: Pain radiates into groin, scrotum   Negative: Blood in urine (red, pink, or tea-colored)   Negative: Vomiting and pain over lower  ribs of back (i.e., flank - kidney area)   Negative: Weakness of a leg or foot (e.g., unable to bear weight, dragging foot)   Negative: Patient sounds very sick or weak to the triager   Negative: Fever > 100.4 F (38.0 C) and flank pain   Negative: Pain or burning with passing urine (urination)   Negative: SEVERE back pain (e.g., excruciating, unable to do any normal activities) and not improved after pain medicine and CARE ADVICE   Negative: Numbness in an arm or hand (i.e., loss of sensation) and upper back pain   Negative: Numbness in a leg or foot (i.e., loss of sensation)   Negative: High-risk adult (e.g., history of cancer, history of HIV, or history of IV Drug Use)   Negative: Soft tissue infection (e.g., abscess, cellulitis) or other serious infection (e.g., bacteremia) in last 2 weeks   Negative: Painful rash with multiple small blisters grouped together (i.e., dermatomal distribution or 'band' or 'stripe')   Negative: Pain radiates into the thigh or further down the leg, and in both legs   Negative: Age > 50 and no history of prior similar back pain    Protocols used: Back Pain-A-[place]

## 2024-03-08 ENCOUNTER — THERAPY VISIT (OUTPATIENT)
Dept: PHYSICAL THERAPY | Facility: CLINIC | Age: 39
End: 2024-03-08
Attending: FAMILY MEDICINE
Payer: COMMERCIAL

## 2024-03-08 DIAGNOSIS — M54.9 CHRONIC BACK PAIN, UNSPECIFIED BACK LOCATION, UNSPECIFIED BACK PAIN LATERALITY: ICD-10-CM

## 2024-03-08 DIAGNOSIS — M54.41 RIGHT-SIDED LOW BACK PAIN WITH RIGHT-SIDED SCIATICA: Primary | ICD-10-CM

## 2024-03-08 DIAGNOSIS — G89.29 CHRONIC BACK PAIN, UNSPECIFIED BACK LOCATION, UNSPECIFIED BACK PAIN LATERALITY: ICD-10-CM

## 2024-03-08 PROCEDURE — 97110 THERAPEUTIC EXERCISES: CPT | Mod: GP | Performed by: PHYSICAL THERAPIST

## 2024-03-08 PROCEDURE — 97161 PT EVAL LOW COMPLEX 20 MIN: CPT | Mod: GP | Performed by: PHYSICAL THERAPIST

## 2024-03-08 NOTE — PROGRESS NOTES
PHYSICAL THERAPY EVALUATION  Type of Visit: Evaluation  Patient presents to PT for treatment of low back pain.  She reports initial LBP when she was pregnant last year.  Symptoms worsened 12/29/2023, which pt treated with heat and advil with good results.  Symptoms returned 1.5 mo ago, perhaps related to overstretching at the gym.  Patient complains of constant right low back pain with radiation to lateral hip ,  posterior thigh and intermittently to calf.  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  Patient is 8 months post partum and breast feeding.  See electronic medical record for Abuse and Falls Screening details.  Treatment so far includes medrol dos abdullahi with minimal results.    Subjective       Presenting condition or subjective complaint:    Date of onset: 02/23/24 (referral date)    Relevant medical history:     Dates & types of surgery:      Prior diagnostic imaging/testing results:       Prior therapy history for the same diagnosis, illness or injury:        Prior Level of Function  Transfers:   Ambulation:   ADL:   IADL:     Living Environment  Social support:     Type of home:     Stairs to enter the home:         Ramp:     Stairs inside the home:         Help at home:    Equipment owned:       Employment:      Hobbies/Interests:      Patient goals for therapy:      Pain assessment:      Objective   LUMBAR SPINE EVALUATION  PAIN: Pain level 6/10 at worst.  Worse with sitting, bending, dance movements involving reaching over head with trunk extension  INTEGUMENTARY (edema, incisions): WNL  POSTURE:  increased lordosis, mild scoliosis with lumbar convexity right  GAIT:   Weightbearing Status: WBAT  Assistive Device(s): None  Gait Deviations: WNL  BALANCE/PROPRIOCEPTION:   WEIGHTBEARING ALIGNMENT:   NON-WEIGHTBEARING ALIGNMENT:    ROM:   (Degrees) Left AROM Left PROM  Right AROM Right PROM   Hip Flexion       Hip Extension       Hip  Abduction       Hip Adduction       Hip Internal Rotation       Hip External Rotation       Knee Flexion       Knee Extension       Lumbar Side glide Mild low back complaint Mild low back complaint   Lumbar Flexion Fingertips to toes, painful arc, worse with reps   Lumbar Extension Max loss with increase right LBP   Pain:   End feel:   PELVIC/SI SCREEN:   STRENGTH:     MYOTOMES: WNL  DTR S: WNL  CORD SIGNS:   DERMATOMES:   NEURAL TENSION:  + dural/slump, +SLR with DF  FLEXIBILITY:   LUMBAR/HIP Special Tests:    PELVIS/SI SPECIAL TESTS:   FUNCTIONAL TESTS:   PALPATION:  Tender with spring testing L4, L5  SPINAL SEGMENTAL CONCLUSIONS:       Assessment & Plan   CLINICAL IMPRESSIONS  Medical Diagnosis: low back pain    Treatment Diagnosis: right lbp with radiation to distal LE   Impression/Assessment: Patient is a 38 year old female with low back and right LE complaints.  The following significant findings have been identified: Pain, Decreased ROM/flexibility, and Decreased activity tolerance. These impairments interfere with their ability to perform work tasks, recreational activities, and driving  as compared to previous level of function.     Clinical Decision Making (Complexity):  Clinical Presentation: Stable/Uncomplicated  Clinical Presentation Rationale: based on medical and personal factors listed in PT evaluation  Clinical Decision Making (Complexity): Low complexity    PLAN OF CARE  Treatment Interventions:  Interventions: Manual Therapy, Neuromuscular Re-education, Therapeutic Activity, Therapeutic Exercise    Long Term Goals     PT Goal 1  Goal Identifier: sittting  Goal Description: sit 1 hour without lbp  Rationale: to maximize safety and independence with transportation  Goal Progress: initially 6/10 R LB and LE pain with sitting an hour  Target Date: 04/19/24      Frequency of Treatment: 1x/week  Duration of Treatment:      Recommended Referrals to Other Professionals: Physical Therapy  Education  Assessment:   Learner/Method: Patient;Listening;Reading;Demonstration;Pictures/Video;No Barriers to Learning    Risks and benefits of evaluation/treatment have been explained.   Patient/Family/caregiver agrees with Plan of Care.     Evaluation Time:     PT Eval, Low Complexity Minutes (38119): 20       Signing Clinician: Audrey Espana PT

## 2024-03-11 ENCOUNTER — OFFICE VISIT (OUTPATIENT)
Dept: INTERNAL MEDICINE | Facility: CLINIC | Age: 39
End: 2024-03-11
Attending: INTERNAL MEDICINE
Payer: COMMERCIAL

## 2024-03-11 VITALS
BODY MASS INDEX: 30.86 KG/M2 | HEIGHT: 68 IN | DIASTOLIC BLOOD PRESSURE: 80 MMHG | SYSTOLIC BLOOD PRESSURE: 118 MMHG | HEART RATE: 88 BPM | WEIGHT: 203.6 LBS

## 2024-03-11 DIAGNOSIS — I10 BENIGN ESSENTIAL HYPERTENSION: Primary | ICD-10-CM

## 2024-03-11 PROCEDURE — 99213 OFFICE O/P EST LOW 20 MIN: CPT | Performed by: INTERNAL MEDICINE

## 2024-03-11 NOTE — PATIENT INSTRUCTIONS
Thank you for trusting us with your care!     If you need to contact us for questions about:  Symptoms, Scheduling & Medical Questions; Non-urgent (2-3 day response) Delfina message, Urgent (needing response today) [phone] (if after 3:30pm next day response)   Prescriptions: Please call your Pharmacy   Billing: [facility] [phone] or [facility]:[phone]

## 2024-03-11 NOTE — LETTER
"3/11/2024       RE: Vivi Mccall  [address]  [facility] [address]     Dear Colleague,    Thank you for referring your patient, Vivi Mccall, to the [facility] [place] [facility]. Please see a copy of my visit note below.      Assessment & Plan     Benign essential hypertension  Blood pressure is currently within acceptable range.  Patient is able to tolerate medical therapy without any significant side effects.  Discussed possible changes to medical therapy upon completion of breast-feeding.  Patient was encouraged to follow-up once she is down for consideration of medical regimen change.  May consider switching to angiotensin receptor blocker or an ACE inhibitor from labetalol and nifedipine.        I spent a total of 20 minutes on the day of the visit.   Time spent by me doing chart review, history and exam, documentation and further activities per the note      BMI  Estimated body mass index is 30.96 kg/m  as calculated from the following:    Height as of this encounter: 1.727 m (5' 8\").    Weight as of this encounter: 92.4 kg (203 lb 9.6 oz).             No follow-ups on file.      Subjective   Vivi is a 38 year old, presenting for the following health issues:  Follow Up    HPI     Patient is following up on hypertension. She reports that she is currently taking nifedipine without food and labetalol with food. It has helped with blood pressure dips. Her daughter is 9 months. She is still breastfeeding.          Objective    /80   Pulse 88   Ht 1.727 m (5' 8\")   Wt 92.4 kg (203 lb 9.6 oz)   LMP  (LMP Unknown)   BMI 30.96 kg/m    Body mass index is 30.96 kg/m .  Physical Exam   GENERAL: alert and no distress  EYES: Eyes grossly normal to inspection, PERRL and conjunctivae and sclerae normal  RESP: normal effort, no wheezing  CV: regular rates and rhythm and no peripheral edema  MS: no gross " musculoskeletal defects noted, no edema  SKIN: no suspicious lesions or rashes  NEURO: Normal strength and tone, mentation intact and speech normal           Signed Electronically by: Krystal Noriega MD     show

## 2024-03-11 NOTE — PROGRESS NOTES
"  Assessment & Plan     Benign essential hypertension  Blood pressure is currently within acceptable range.  Patient is able to tolerate medical therapy without any significant side effects.  Discussed possible changes to medical therapy upon completion of breast-feeding.  Patient was encouraged to follow-up once she is down for consideration of medical regimen change.  May consider switching to angiotensin receptor blocker or an ACE inhibitor from labetalol and nifedipine.        I spent a total of 20 minutes on the day of the visit.   Time spent by me doing chart review, history and exam, documentation and further activities per the note      BMI  Estimated body mass index is 30.96 kg/m  as calculated from the following:    Height as of this encounter: 1.727 m (5' 8\").    Weight as of this encounter: 92.4 kg (203 lb 9.6 oz).             No follow-ups on file.      Juliette Trinidad is a 38 year old, presenting for the following health issues:  Follow Up    HPI     Patient is following up on hypertension. She reports that she is currently taking nifedipine without food and labetalol with food. It has helped with blood pressure dips. Her daughter is 9 months. She is still breastfeeding.           Objective    /80   Pulse 88   Ht 1.727 m (5' 8\")   Wt 92.4 kg (203 lb 9.6 oz)   LMP  (LMP Unknown)   BMI 30.96 kg/m    Body mass index is 30.96 kg/m .  Physical Exam   GENERAL: alert and no distress  EYES: Eyes grossly normal to inspection, PERRL and conjunctivae and sclerae normal  RESP: normal effort, no wheezing  CV: regular rates and rhythm and no peripheral edema  MS: no gross musculoskeletal defects noted, no edema  SKIN: no suspicious lesions or rashes  NEURO: Normal strength and tone, mentation intact and speech normal            Signed Electronically by: Krystal Noriega MD    "

## 2024-03-14 ENCOUNTER — OFFICE VISIT (OUTPATIENT)
Dept: FAMILY MEDICINE | Facility: CLINIC | Age: 39
End: 2024-03-14
Payer: COMMERCIAL

## 2024-03-14 ENCOUNTER — ANCILLARY PROCEDURE (OUTPATIENT)
Dept: GENERAL RADIOLOGY | Facility: CLINIC | Age: 39
End: 2024-03-14
Attending: FAMILY MEDICINE
Payer: COMMERCIAL

## 2024-03-14 VITALS
HEART RATE: 89 BPM | RESPIRATION RATE: 16 BRPM | BODY MASS INDEX: 30.41 KG/M2 | WEIGHT: 200 LBS | OXYGEN SATURATION: 98 % | TEMPERATURE: 98.4 F | SYSTOLIC BLOOD PRESSURE: 132 MMHG | DIASTOLIC BLOOD PRESSURE: 82 MMHG

## 2024-03-14 DIAGNOSIS — M54.41 CHRONIC RIGHT-SIDED LOW BACK PAIN WITH RIGHT-SIDED SCIATICA: Primary | ICD-10-CM

## 2024-03-14 DIAGNOSIS — R10.2 PELVIC PAIN IN FEMALE: ICD-10-CM

## 2024-03-14 DIAGNOSIS — G89.29 CHRONIC RIGHT-SIDED LOW BACK PAIN WITH RIGHT-SIDED SCIATICA: Primary | ICD-10-CM

## 2024-03-14 PROCEDURE — 73522 X-RAY EXAM HIPS BI 3-4 VIEWS: CPT | Mod: TC | Performed by: RADIOLOGY

## 2024-03-14 PROCEDURE — 99214 OFFICE O/P EST MOD 30 MIN: CPT | Performed by: FAMILY MEDICINE

## 2024-03-14 ASSESSMENT — ASTHMA QUESTIONNAIRES
QUESTION_1 LAST FOUR WEEKS HOW MUCH OF THE TIME DID YOUR ASTHMA KEEP YOU FROM GETTING AS MUCH DONE AT WORK, SCHOOL OR AT HOME: NONE OF THE TIME
QUESTION_3 LAST FOUR WEEKS HOW OFTEN DID YOUR ASTHMA SYMPTOMS (WHEEZING, COUGHING, SHORTNESS OF BREATH, CHEST TIGHTNESS OR PAIN) WAKE YOU UP AT NIGHT OR EARLIER THAN USUAL IN THE MORNING: NOT AT ALL
ACT_TOTALSCORE: 24
QUESTION_2 LAST FOUR WEEKS HOW OFTEN HAVE YOU HAD SHORTNESS OF BREATH: ONCE OR TWICE A WEEK
ACT_TOTALSCORE: 24
QUESTION_5 LAST FOUR WEEKS HOW WOULD YOU RATE YOUR ASTHMA CONTROL: COMPLETELY CONTROLLED
QUESTION_4 LAST FOUR WEEKS HOW OFTEN HAVE YOU USED YOUR RESCUE INHALER OR NEBULIZER MEDICATION (SUCH AS ALBUTEROL): NOT AT ALL

## 2024-03-14 ASSESSMENT — ENCOUNTER SYMPTOMS: BACK PAIN: 1

## 2024-03-14 ASSESSMENT — ANXIETY QUESTIONNAIRES
IF YOU CHECKED OFF ANY PROBLEMS ON THIS QUESTIONNAIRE, HOW DIFFICULT HAVE THESE PROBLEMS MADE IT FOR YOU TO DO YOUR WORK, TAKE CARE OF THINGS AT HOME, OR GET ALONG WITH OTHER PEOPLE: NOT DIFFICULT AT ALL
3. WORRYING TOO MUCH ABOUT DIFFERENT THINGS: NOT AT ALL
GAD7 TOTAL SCORE: 3
4. TROUBLE RELAXING: NOT AT ALL
7. FEELING AFRAID AS IF SOMETHING AWFUL MIGHT HAPPEN: NOT AT ALL
GAD7 TOTAL SCORE: 3
2. NOT BEING ABLE TO STOP OR CONTROL WORRYING: NOT AT ALL
8. IF YOU CHECKED OFF ANY PROBLEMS, HOW DIFFICULT HAVE THESE MADE IT FOR YOU TO DO YOUR WORK, TAKE CARE OF THINGS AT HOME, OR GET ALONG WITH OTHER PEOPLE?: NOT DIFFICULT AT ALL
1. FEELING NERVOUS, ANXIOUS, OR ON EDGE: SEVERAL DAYS
6. BECOMING EASILY ANNOYED OR IRRITABLE: MORE THAN HALF THE DAYS
5. BEING SO RESTLESS THAT IT IS HARD TO SIT STILL: NOT AT ALL
7. FEELING AFRAID AS IF SOMETHING AWFUL MIGHT HAPPEN: NOT AT ALL
GAD7 TOTAL SCORE: 3

## 2024-03-14 ASSESSMENT — PAIN SCALES - GENERAL: PAINLEVEL: MODERATE PAIN (4)

## 2024-03-14 ASSESSMENT — PATIENT HEALTH QUESTIONNAIRE - PHQ9
10. IF YOU CHECKED OFF ANY PROBLEMS, HOW DIFFICULT HAVE THESE PROBLEMS MADE IT FOR YOU TO DO YOUR WORK, TAKE CARE OF THINGS AT HOME, OR GET ALONG WITH OTHER PEOPLE: NOT DIFFICULT AT ALL
SUM OF ALL RESPONSES TO PHQ QUESTIONS 1-9: 1
SUM OF ALL RESPONSES TO PHQ QUESTIONS 1-9: 1

## 2024-03-14 NOTE — PROGRESS NOTES
"  Assessment & Plan     (M54.41,  G89.29) Chronic right-sided low back pain with right-sided sciatica  (primary encounter diagnosis)  Comment: imaging today and referral to pt omid martinez  Plan: Physical Therapy  Referral            Prescription drug management        BMI  Estimated body mass index is 30.41 kg/m  as calculated from the following:    Height as of 3/11/24: 1.727 m (5' 8\").    Weight as of this encounter: 90.7 kg (200 lb).             Juilette Trinidad is a 38 year old, presenting for the following health issues:  Back Pain      3/14/2024    11:21 AM   Additional Questions   Roomed by Marly CHAVEZ MA   Accompanied by self         3/14/2024    11:21 AM   Patient Reported Additional Medications   Patient reports taking the following new medications none     Back Pain     History of Present Illness       Back Pain:  She presents for follow up of back pain. Patient's back pain is a recurring problem.  Location of back pain:  Right lower back, right buttock and right hip  Description of back pain: burning, dull ache, sharp and shooting  Back pain spreads: right buttocks, right thigh and right knee    Since patient first noticed back pain, pain is: always present, but gets better and worse  Does back pain interfere with her job:  Yes       Hypertension: She presents for follow up of hypertension.  She does check blood pressure  regularly outside of the clinic. Outside blood pressures have been over 140/90. She follows a low salt diet.     She eats 2-3 servings of fruits and vegetables daily.She consumes 2 sweetened beverage(s) daily.She exercises with enough effort to increase her heart rate 30 to 60 minutes per day.  She exercises with enough effort to increase her heart rate 3 or less days per week. She is missing 7 dose(s) of medications per week.  She is not taking prescribed medications regularly due to remembering to take.     10 years ago - had lower back issues and was given muscle " relaxers    Had a flare in December  this resolved witl advil and ice heat    Then another tweak while at the gym  Tried acupuncture and this helped but still had pain afterwards  \hard to see the same provider  Saw UC and was given steroid - this helped some  Made it less sharp more dull ache  Saw chiropractor and they tried PT and this  PT is slow hard to bend backwards  Feels she is at a loss  Lots of copays    Had lumbar Xray at [facility]      Review of Systems  Constitutional, HEENT, cardiovascular, pulmonary, gi and gu systems are negative, except as otherwise noted.      Objective    /82   Pulse 89   Temp 98.4  F (36.9  C) (Temporal)   Resp 16   Wt 90.7 kg (200 lb)   LMP  (LMP Unknown)   SpO2 98%   BMI 30.41 kg/m    There is no height or weight on file to calculate BMI.  Physical Exam   GENERAL: alert and no distress  MS: no gross musculoskeletal defects noted, no edema  ORTHO: Lumber/Thoracic Spine Exam: Tender:  lumbar spinous processes, right SI joint    Range of Motion:  lumbar extension  decreased  Strength:  able to heel walk, gastrocsoleus   5/5, hamstrings  5/5, quadriceps  5/5, tibialis anterior  5/5, peroneals  5/5  Special tests:   Difficulty with left heal to right knee - painful    Hip Exam: Hip ROM decreased    XR Pelvis w 1 View Each Hip    Result Date: 3/14/2024  XR PELVIS AND HIP BILATERAL 1 VIEW  3/14/2024 12:23 PM HISTORY: Pelvic pain in female COMPARISON: None     IMPRESSION: No acute fracture or malalignment. There is normal hip joint spacing bilaterally. Small ossicle versus calcification at the right superior acetabulum. Trace calcification at the right greater trochanter may suggest calcific tendinopathy. COLE MENESES MD   SYSTEM ID:  [id number]         Signed Electronically by: Vi Baldwin MD

## 2024-03-14 NOTE — PROGRESS NOTES
Preventive Care Visit  [facility]  BerryvilleJoy Baldwin MD, Family Medicine  Mar 14, 2024  {Provider  Link to SmartSet :895364}    {PROVIDER CHARTING PREFERENCE:144664}    Juliette Trinidad is a 38 year old, presenting for the following:  Physical    {(!) Visit Details have not yet been documented.  Please enter Visit Details and then use this list to pull in documentation. (Optional):038426}     Health Care Directive  Patient does not have a Health Care Directive or Living Will: {ADVANCE_DIRECTIVE_STATUS:492135}    Healthy Habits:     Taking medications regularly:  7    Barriers to taking medications:  Remembering to take  History of Present Illness       Back Pain:  She presents for follow up of back pain. Patient's back pain is a recurring problem.  Location of back pain:  Right lower back, right buttock and right hip  Description of back pain: burning, dull ache, sharp and shooting  Back pain spreads: right buttocks, right thigh and right knee    Since patient first noticed back pain, pain is: always present, but gets better and worse  Does back pain interfere with her job:  Yes       Hypertension: She presents for follow up of hypertension.  She does check blood pressure  regularly outside of the clinic. Outside blood pressures have been over 140/90. She follows a low salt diet.     She eats 2-3 servings of fruits and vegetables daily.She consumes 2 sweetened beverage(s) daily.She exercises with enough effort to increase her heart rate 30 to 60 minutes per day.  She exercises with enough effort to increase her heart rate 3 or less days per week. She is missing 7 dose(s) of medications per week.  She is not taking prescribed medications regularly due to remembering to take.    ***  {MA/LPN/RN Pre-Provider Visit Orders- hCG/UA/Strep (Optional):603916}  {SUPERLIST (Optional):570041}  {additonal problems for provider to add (Optional):686031}       No data to display                   No  data to display                   No data to display                  10/15/2023   Social Factors   Worry food won't last until get money to buy more No   Food not last or not have enough money for food? No   Do you have housing?  Yes   Are you worried about losing your housing? No   Lack of transportation? No   Unable to get utilities (heat,electricity)? No          No data to display                   Today's PHQ-9 Score:       3/14/2024    11:11 AM   PHQ-9 SCORE   PHQ-9 Total Score MyChart 1 (Minimal depression)   PHQ-9 Total Score 1          No data to display              Social History     Tobacco Use    Smoking status: Never    Smokeless tobacco: Never   Vaping Use    Vaping Use: Never used   Substance Use Topics    Alcohol use: Not Currently     Comment: currently pregnant    Drug use: No     {Provider  If there are gaps in the social history shown above, please follow the link to update and then refresh the note Link to Social and Substance History :141645}      11/29/2022   LAST FHS-7 RESULTS   1st degree relative breast or ovarian cancer Yes   Any relative bilateral breast cancer Unknown   Any male have breast cancer No   Any ONE woman have BOTH breast AND ovarian cancer Yes   Any woman with breast cancer before 50yrs Yes   2 or more relatives with breast AND/OR ovarian cancer Yes   2 or more relatives with breast AND/OR bowel cancer Yes     {If any of the questions to the FHS7 are answered yes, consider referral for genetic counseling.    Additional indications for genetic referral include personal history of breast or ovarian cancer, genetic mutation in 1st degree relative which increases risk of breast cancer including BRCA1, BRCA2, EMEKA, PALB 2, TP53, CHEK2, PTEN, CDH1, STK11 (per ACS) and/or 1st degree relative with history of pancreatic or high-risk prostate cancer (per NCCN):500584}   {Mammogram Decision Support (Optional):686105}      History of abnormal Pap smear: { :706872}        Latest Ref Rng  "& Units 4/29/2022     2:10 PM 8/22/2017     9:11 AM 8/22/2017     8:35 AM   PAP / HPV   PAP  Negative for Intraepithelial Lesion or Malignancy (NILM)      PAP (Historical)    NIL    HPV 16 DNA Negative Negative  Negative     HPV 18 DNA Negative Negative  Negative     Other HR HPV Negative Negative  Negative              No data to display              {Provider  Use the storyboard to review patient history, after sections have been marked as reviewed, refresh note to capture documentation:252335}   Reviewed and updated as needed this visit by Provider                    {HISTORY OPTIONS (Optional):458407}    {ROS Picklists (Optional):409508}     Objective    Exam  LMP  (LMP Unknown)    Estimated body mass index is 30.96 kg/m  as calculated from the following:    Height as of 3/11/24: 1.727 m (5' 8\").    Weight as of 3/11/24: 92.4 kg (203 lb 9.6 oz).    Physical Exam  {Exam Choices (Optional):642610}        Signed Electronically by: Vi Baldwin MD  {Email feedback regarding this note to [email or web address]   :968665}  "

## 2024-03-14 NOTE — PATIENT INSTRUCTIONS
Consider:  Orthology/[employer] - shayla Yang and [place]  [place] - I like Zeinab Rao  or Nolberto Gaviria ([place])    See if this is covered    Or    Try Ellis davalos?

## 2024-03-14 NOTE — RESULT ENCOUNTER NOTE
Hello,    The right hip shows a small calcified area that might be tendinosis.  Let's see if PT helps this - it  might not be the source of your pain.    Vi Baldwin MD

## 2024-03-15 ENCOUNTER — THERAPY VISIT (OUTPATIENT)
Dept: PHYSICAL THERAPY | Facility: CLINIC | Age: 39
End: 2024-03-15
Attending: FAMILY MEDICINE
Payer: COMMERCIAL

## 2024-03-15 DIAGNOSIS — M54.41 RIGHT-SIDED LOW BACK PAIN WITH RIGHT-SIDED SCIATICA: Primary | ICD-10-CM

## 2024-03-15 PROCEDURE — 97140 MANUAL THERAPY 1/> REGIONS: CPT | Mod: GP | Performed by: PHYSICAL THERAPIST

## 2024-03-15 PROCEDURE — 97110 THERAPEUTIC EXERCISES: CPT | Mod: GP | Performed by: PHYSICAL THERAPIST

## 2024-03-20 ENCOUNTER — MYC REFILL (OUTPATIENT)
Dept: FAMILY MEDICINE | Facility: CLINIC | Age: 39
End: 2024-03-20
Payer: COMMERCIAL

## 2024-03-20 DIAGNOSIS — J45.30 MILD PERSISTENT ASTHMA WITHOUT COMPLICATION: Primary | ICD-10-CM

## 2024-03-22 ENCOUNTER — THERAPY VISIT (OUTPATIENT)
Dept: PHYSICAL THERAPY | Facility: CLINIC | Age: 39
End: 2024-03-22
Attending: FAMILY MEDICINE
Payer: COMMERCIAL

## 2024-03-22 DIAGNOSIS — M54.41 RIGHT-SIDED LOW BACK PAIN WITH RIGHT-SIDED SCIATICA: Primary | ICD-10-CM

## 2024-03-22 PROCEDURE — 97110 THERAPEUTIC EXERCISES: CPT | Mod: GP | Performed by: PHYSICAL THERAPIST

## 2024-03-22 PROCEDURE — 97140 MANUAL THERAPY 1/> REGIONS: CPT | Mod: GP | Performed by: PHYSICAL THERAPIST

## 2024-03-22 RX ORDER — ALBUTEROL SULFATE 90 UG/1
2 AEROSOL, METERED RESPIRATORY (INHALATION) EVERY 6 HOURS PRN
Qty: 18 G | Refills: 3 | Status: SHIPPED | OUTPATIENT
Start: 2024-03-22

## 2024-03-25 ENCOUNTER — OFFICE VISIT (OUTPATIENT)
Dept: FAMILY MEDICINE | Facility: CLINIC | Age: 39
End: 2024-03-25
Payer: COMMERCIAL

## 2024-03-25 VITALS
OXYGEN SATURATION: 100 % | SYSTOLIC BLOOD PRESSURE: 98 MMHG | DIASTOLIC BLOOD PRESSURE: 58 MMHG | HEART RATE: 103 BPM | TEMPERATURE: 98.7 F | RESPIRATION RATE: 16 BRPM

## 2024-03-25 DIAGNOSIS — J45.21 MILD INTERMITTENT ASTHMA WITH EXACERBATION: ICD-10-CM

## 2024-03-25 DIAGNOSIS — J22 LOWER RESPIRATORY INFECTION: Primary | ICD-10-CM

## 2024-03-25 PROCEDURE — 99213 OFFICE O/P EST LOW 20 MIN: CPT

## 2024-03-25 RX ORDER — ALBUTEROL SULFATE 0.83 MG/ML
2.5 SOLUTION RESPIRATORY (INHALATION) EVERY 6 HOURS PRN
Qty: 90 ML | Refills: 0 | Status: SHIPPED | OUTPATIENT
Start: 2024-03-25

## 2024-03-25 RX ORDER — CEFDINIR 300 MG/1
300 CAPSULE ORAL 2 TIMES DAILY
Qty: 14 CAPSULE | Refills: 0 | Status: SHIPPED | OUTPATIENT
Start: 2024-03-25 | End: 2024-04-01

## 2024-03-25 RX ORDER — PREDNISONE 20 MG/1
20 TABLET ORAL 2 TIMES DAILY
Qty: 10 TABLET | Refills: 0 | Status: SHIPPED | OUTPATIENT
Start: 2024-03-25 | End: 2024-03-30

## 2024-03-25 ASSESSMENT — ENCOUNTER SYMPTOMS
WHEEZING: 1
COUGH: 1
SHORTNESS OF BREATH: 1
CHILLS: 1

## 2024-03-25 ASSESSMENT — LIFESTYLE VARIABLES: SMOKING_STATUS: 0

## 2024-03-25 NOTE — PROGRESS NOTES
Palliative Care Department  [phone]  Palliative Care Progress Note  Provider Eva Centeno, APRN - CNP     Ellie Santacruz  [id number]  Hospital Day: 12  Date of Initial Consult: 3/18/2024  Referring Provider: Jair Jauregui MD  Palliative Medicine was consulted for assistance with: Goals of care, end-stage disease    HPI:   Ellie Santacruz is a 52 y.o. with a medical history of anxiety, depression, alcoholic liver cirrhosis and ascites, breast cancer, drug abuse, chronic pancreatitis, diabetes, GERD, hepatitis C, polyneuropathy, schizoaffective disorder, who was admitted on 3/14/2024 from home with a CHIEF COMPLAINT of abdominal pain.  Patient states this morning while awakening developed generalized abdominal pain notes decision to abdomen.  Patient admits to crack cocaine use last known use 3/13.  Patient recently hospitalized on 3/4-3/5 Admitted to [facility] Generalized weakness, fatigue, and weight loss. She was noted with Hyperglycemia. Received NSS bolus and insulin in ED course. GI was consulted, however pt left AMA 0500. This admission, CT scan of the abdomen and pelvis was obtained and reported to show cirrhotic morphology with moderate to large ascites and abdominal wall thickening as well as increased stool burden and mild wall thickening of the colon.  GI, endocrinology, hepatobiliary, ID consulted.  Hepatobiliary feels the lesion is more consistent with an abscess and less so HCC.  Patient scheduled for procedure in IR to aspirate liver abscess.  Palliative medicine consulted for further assistance.    ASSESSMENT/PLAN:     Pertinent Hospital Diagnoses     Uncontrolled diabetes  Decompensated chronic cirrhosis with ascites-history hepatitis C  Acute encephalopathy  Liver mass  Chronic pancreatitis  Cocaine use disorder  Severe protein calorie malnutrition  History of breast centimeters  COPD    Palliative Care Encounter / Counseling Regarding Goals of Care  Please see detailed goals of care  [facility]  [address], [address]  [facility] [address]  Phone: [phone]  Primary Provider: Vi Steinberg  Pre-op Performing Provider: VI STEINBERG    {Provider  Link to PREOP SmartSet  Use this to apply standard patient instructions to AVS; includes medication directions, common orders, guidelines for anemia, warfarin, additional testing   :453665}  PREOPERATIVE EVALUATION:  Today's date: 11/30/2022    Vivi Mccall is a 37 year old female who presents for a preoperative evaluation.  Answers for HPI/ROS submitted by the patient on 11/30/2022  If you checked off any problems, how difficult have these problems made it for you to do your work, take care of things at home, or get along with other people?: Not difficult at all  PHQ9 TOTAL SCORE: 4  ROCHELLE 7 TOTAL SCORE: 0    Surgical Information:  Surgery/Procedure: ***  Surgery Location: ***  Surgeon: ***  Surgery Date: ***  Time of Surgery: ***  Where patient plans to recover: {Preop post recovery plans :753031}  Fax number for surgical facility: {SURGERY FAX NUMBER:[id number]}    Type of Anesthesia Anticipated: {ANESTHESIA:918848}    {2021 Provider Charting Preference for Preop :163171}    Subjective     HPI related to upcoming procedure: ***    Preop Questions 11/29/2022   1. Have you ever had a heart attack or stroke? No   2. Have you ever had surgery on your heart or blood vessels, such as a stent placement, a coronary artery bypass, or surgery on an artery in your head, neck, heart, or legs? No   3. Do you have chest pain with activity? No   4. Do you have a history of  heart failure? No   5. Do you currently have a cold, bronchitis or symptoms of other infection? No   6. Do you have a cough, shortness of breath, or wheezing? No   7. Do you or anyone in your family have previous history of blood clots? YES - ***   8. Do you or does anyone in your family have a serious bleeding problem such as  prolonged bleeding following surgeries or cuts? No   9. Have you ever had problems with anemia or been told to take iron pills? YES - ***   10. Have you had any abnormal blood loss such as black, tarry or bloody stools, or abnormal vaginal bleeding? No   11. Have you ever had a blood transfusion? No   12. Are you willing to have a blood transfusion if it is medically needed before, during, or after your surgery? Yes   13. Have you or any of your relatives ever had problems with anesthesia? YES - ***   14. Do you have sleep apnea, excessive snoring or daytime drowsiness? No   15. Do you have any artifical heart valves or other implanted medical devices like a pacemaker, defibrillator, or continuous glucose monitor? No   16. Do you have artificial joints? No   17. Are you allergic to latex? No   18. Is there any chance that you may be pregnant? YES - ***       Health Care Directive:  Patient does not have a Health Care Directive or Living Will: {ADVANCE_DIRECTIVE_STATUS:909020}    Preoperative Review of :  {Mnpmpreport:144571}  {Review MNPMP for all patients per [employer] MNPMP Profile:599041}    {Chronic problem details (Optional) :129885}    Review of Systems  {ROS Preop Choices:312393}    Patient Active Problem List    Diagnosis Date Noted     Supervision of high-risk pregnancy of elderly multigravida 11/10/2022     Priority: Medium     Chronic left shoulder pain 10/03/2022     Priority: Medium     Family history of malignant neoplasm of breast 05/09/2022     Priority: Medium     Her mother and grandmother have have breast cancer  Mom had negative genetic screens       Mild persistent asthma without complication 10/11/2021     Priority: Medium     Seasonal allergic rhinitis due to pollen 04/07/2021     Priority: Medium     Shoulder pain, left 10/27/2020     Priority: Medium     Benign essential hypertension 08/22/2017     Priority: Medium     ROCHELLE (generalized anxiety disorder) 08/22/2017     Priority: Medium      Seeing therapist Abdirahman Talamantes        Major depressive disorder, recurrent episode, moderate (H) 08/22/2017     Priority: Medium     Vitamin D deficiency 08/22/2017     Priority: Medium      Past Medical History:   Diagnosis Date     Asthma     seasonal and environmental     C. difficile diarrhea 2013     Concussion 01/2022     Depression      Depressive disorder      Encounter for IUD insertion 10/16/2020    Jyoti inserted by Dr Park     Generalized anxiety disorder      Hypertension      IUD contraception 10/2020    Jyoti inserted by Dr Park     Pain in both hands 02/20/2020     Past Surgical History:   Procedure Laterality Date     GYN SURGERY  2003    Laser treatment of HPV     ORTHOPEDIC SURGERY Left 07/21/2017    ganglion cyst removal     ORTHOPEDIC SURGERY Left     nerve release on left leg/ankle      SOFT TISSUE SURGERY  1999     SURGICAL HISTORY OF -       Nerve entrapment release     ZZHC RELEASE FOOT/TOE NERVE  2011     Current Outpatient Medications   Medication Sig Dispense Refill     albuterol (PROAIR HFA/PROVENTIL HFA/VENTOLIN HFA) 108 (90 Base) MCG/ACT inhaler Inhale 2 puffs into the lungs every 6 hours 1 Inhaler 3     buPROPion (WELLBUTRIN XL) 150 MG 24 hr tablet Take 1 tablet (150 mg) by mouth every morning 90 tablet 3     citalopram (CELEXA) 40 MG tablet Take 1 tablet (40 mg) by mouth daily 90 tablet 3     fluticasone (FLOVENT HFA) 220 MCG/ACT inhaler Inhale 1 puff into the lungs 2 times daily 12 g 1     labetalol (NORMODYNE) 300 MG tablet Take 1 tablet (300 mg) by mouth 2 times daily 180 tablet 1     LORazepam (ATIVAN) 0.5 MG tablet Take 1 tablet (0.5 mg) by mouth every 6 hours as needed for anxiety (Patient not taking: Reported on 11/10/2022) 30 tablet 1     ondansetron (ZOFRAN ODT) 4 MG ODT tab Take 1 tablet (4 mg) by mouth every 8 hours as needed for nausea (Patient not taking: Reported on 11/10/2022) 10 tablet 0     Prenatal Vit-Fe Sulfate-FA-DHA (PRENATAL VITAMIN/MIN +DHA PO)     "    Probiotic Product (PROBIOTIC DAILY PO)          Allergies   Allergen Reactions     Amoxicillin      Codeine      Penicillins         Social History     Tobacco Use     Smoking status: Never     Smokeless tobacco: Never   Substance Use Topics     Alcohol use: Not Currently     Comment: Rare      {FAMILY HISTORY (Optional):541200000}  History   Drug Use No         Objective     LMP 2022     Physical Exam  {EXAM Preop Choices:837377}    Recent Labs   Lab Test 21  1056 21  1134 21  1235   HGB  --  12.6 13.0   PLT  --  484* 379    138 138   POTASSIUM 3.7 3.7 3.8   CR 0.78 0.95 0.81        Diagnostics:  {LABS:418967}   {EK}    Revised Cardiac Risk Index (RCRI):  The patient has the following serious cardiovascular risks for perioperative complications:  {PREOP REVISED CARDIAC RISK INDEX (RCRI) :160460::\" - No serious cardiac risks = 0 points\"}     RCRI Interpretation: {REVISED CARDIAC RISK INTERPRETATION :854010}         Signed Electronically by: Vi Baldwin MD  Copy of this evaluation report is provided to requesting physician.    {Provider Resources  Preop Replaced by [facility] Preop Guidelines  Revised Cardiac Risk Index :547502}  "

## 2024-03-25 NOTE — PROGRESS NOTES
Assessment & Plan       ICD-10-CM    1. Lower respiratory infection  J22 predniSONE (DELTASONE) 20 MG tablet     cefdinir (OMNICEF) 300 MG capsule      2. Mild intermittent asthma with exacerbation  J45.21 predniSONE (DELTASONE) 20 MG tablet     cefdinir (OMNICEF) 300 MG capsule     albuterol (PROVENTIL) (2.5 MG/3ML) 0.083% neb solution           To take the above treatment as directed. This appears to be an asthma exacerbation with complication of rhonchi noted on exam and dizziness as well as crackling. Due to this, will cover with the above therapy.    Follow up with primary care provider with any problems, questions or concerns or if symptoms worsen or fail to improve. Patient agreed to plan and verbalized understanding.     Juliette Trinidad is a 38 year old female who presents to clinic today for the following health issues:  Chief Complaint   Patient presents with    Urgent Care    Cough     Having been coughing more in the last few days. Pt do have Asthma and has been using her 2 inhalers and has not helped. Started to notice more crackling sound, SOB, and feeling dizzy.      Cough  This is a recurrent problem. The current episode started more than 1 week ago. The problem occurs constantly. The problem has been gradually worsening. The cough is Productive of sputum. Associated symptoms include chills, shortness of breath and wheezing. Treatments tried: albuterol. She is not a smoker. Her past medical history is significant for asthma.           Review of Systems   Constitutional:  Positive for chills.   Respiratory:  Positive for cough, shortness of breath and wheezing.        Problem List:  2024-03: Pelvic pain in female  2024-03: Right-sided low back pain with right-sided sciatica  2023-07: Radial nerve compression, right  2023-06: Preeclampsia, severe  2023-06: Preeclampsia in postpartum period  2023-06: Lateral epicondylitis of right elbow  2023-06: Engorged breasts  2023-06: Axillary accessory  breast tissue  2023-06: Chronic hypertension affecting pregnancy  2023-05: Encounter for triage in pregnant patient  2023-05: Dizziness  2022-11: Pregnancy test positive  2022-11: Supervision of high-risk pregnancy of elderly multigravida  2022-10: Chronic left shoulder pain  2022-05: Family history of malignant neoplasm of breast  2021-10: Mild persistent asthma without complication  2021-04: Seasonal allergic rhinitis due to pollen  2020-10: Shoulder pain, left  2020-10: IUD (intrauterine device) in place  2020-02: Pain in both hands  2020-02: Pain in both wrists  2019-12: Vaginal delivery  2019-12: Chronic hypertension in pregnancy  2019-12: Indication for care in labor and delivery, antepartum  2019-12: Indication for care in labor or delivery  2019-08: Encounter for triage in pregnant patient  2019-05: Supervision of high-risk pregnancy  2017-08: Benign essential hypertension  2017-08: ROCHELLE (generalized anxiety disorder)  2017-08: Major depressive disorder, recurrent episode, moderate (H)  2017-08: Mild intermittent asthma without complication  2017-08: Vitamin D deficiency  2017-08: IUD contraception      Past Medical History:   Diagnosis Date    Asthma     seasonal and environmental    C. difficile diarrhea 2013    Concussion 01/2022    Depression     Depressive disorder     Encounter for IUD insertion 10/16/2020    Jyoti inserted by Dr Park    Generalized anxiety disorder     Hypertension     IUD contraception 10/2020    Jyoti inserted by Dr Park    Lateral epicondylitis of right elbow 6/22/2023    Pain in both hands 02/20/2020       Social History     Tobacco Use    Smoking status: Never     Passive exposure: Never    Smokeless tobacco: Never   Substance Use Topics    Alcohol use: Not Currently     Comment: currently pregnant           Objective    BP 98/58   Pulse 103   Temp 98.7  F (37.1  C) (Tympanic)   Resp 16   LMP  (LMP Unknown)   SpO2 100%   Breastfeeding Yes   Physical  Exam  Constitutional:       Appearance: Normal appearance.   HENT:      Head: Normocephalic and atraumatic.   Eyes:      Conjunctiva/sclera: Conjunctivae normal.      Pupils: Pupils are equal, round, and reactive to light.   Cardiovascular:      Rate and Rhythm: Normal rate and regular rhythm.      Heart sounds: Normal heart sounds.   Pulmonary:      Breath sounds: Wheezing and rhonchi present.   Musculoskeletal:      Cervical back: Normal range of motion and neck supple.   Skin:     General: Skin is warm and dry.   Neurological:      General: No focal deficit present.      Mental Status: She is alert and oriented to person, place, and time.   Psychiatric:         Mood and Affect: Mood normal.         Behavior: Behavior normal.         Thought Content: Thought content normal.         Judgment: Judgment normal.              Dilan Turcios PA-C

## 2024-04-12 ENCOUNTER — THERAPY VISIT (OUTPATIENT)
Dept: PHYSICAL THERAPY | Facility: CLINIC | Age: 39
End: 2024-04-12
Payer: COMMERCIAL

## 2024-04-12 DIAGNOSIS — M54.41 RIGHT-SIDED LOW BACK PAIN WITH RIGHT-SIDED SCIATICA: Primary | ICD-10-CM

## 2024-04-12 PROCEDURE — 97110 THERAPEUTIC EXERCISES: CPT | Mod: GP | Performed by: PHYSICAL THERAPIST

## 2024-04-12 PROCEDURE — 97140 MANUAL THERAPY 1/> REGIONS: CPT | Mod: GP | Performed by: PHYSICAL THERAPIST

## 2024-05-01 ENCOUNTER — THERAPY VISIT (OUTPATIENT)
Dept: PHYSICAL THERAPY | Facility: CLINIC | Age: 39
End: 2024-05-01
Payer: COMMERCIAL

## 2024-05-01 DIAGNOSIS — M54.41 RIGHT-SIDED LOW BACK PAIN WITH RIGHT-SIDED SCIATICA: Primary | ICD-10-CM

## 2024-05-01 PROCEDURE — 97110 THERAPEUTIC EXERCISES: CPT | Mod: GP | Performed by: PHYSICAL THERAPIST

## 2024-05-01 PROCEDURE — 97140 MANUAL THERAPY 1/> REGIONS: CPT | Mod: GP | Performed by: PHYSICAL THERAPIST

## 2024-05-07 NOTE — TELEPHONE ENCOUNTER
"  INFECTIOUS DISEASE DAILY PROGRESS NOTE    SUBJECTIVE:    No new events. Feels OK. Back pain overall better. No fevers. Discussed blood cx results and plans.    OBJECTIVE:  VITALS (Last 24 Hours)  /85 (BP Location: Right arm)   Pulse (!) 115   Temp 36.1 °C (97 °F) (Temporal)   Resp 19   Ht 1.753 m (5' 9\")   Wt 70.5 kg (155 lb 6.8 oz)   SpO2 94%   BMI 22.95 kg/m²     PHYSICAL EXAM:  Gen - NAD, in his wheelchair  Abd - soft, no ttp, BS present  RLE - s/p AKA  RUE - s/p amputation  Groin - femoral HD catheter present  Skin - no rash    ABX: IV Vancomycin    LABS:  Lab Results   Component Value Date    WBC 8.0 05/07/2024    HGB 9.6 (L) 05/07/2024    HCT 30.3 (L) 05/07/2024    MCV 88 05/07/2024     05/07/2024     Lab Results   Component Value Date    GLUCOSE 114 (H) 05/07/2024    CALCIUM 9.0 05/07/2024     (L) 05/07/2024    K 4.5 05/07/2024    CO2 25 05/07/2024    CL 95 (L) 05/07/2024    BUN 56 (H) 05/07/2024    CREATININE 5.14 (H) 05/07/2024     Results from last 72 hours   Lab Units 05/07/24  0631   ALBUMIN g/dL 2.8*     Estimated Creatinine Clearance: 19 mL/min (A) (by C-G formula based on SCr of 5.14 mg/dL (H)).    IMAGING:  MRI L-spine 5/3  IMPRESSION:  Diffusely hypointense bone marrow signal on the T1 weighted images  may be related to renal osteodystrophy. Differential considerations  include anemia, reactive bone marrow or bone marrow infiltrating  process.    No definite imaging findings to suggest discitis osteomyelitis on the  current exam.    Degenerative changes without significant spinal canal stenosis.  Varying degrees of mild neural foraminal narrowing as detailed above.      ASSESSMENT/PLAN:     CLABSI (HD cath POA) due to MRSA - line exchanged 5/3. Not having a full line holiday because of risk of losing HD access. TTE without endocarditis. Repeat blood cx 5/5 still positive.  Lumbar Spine Pain - CT without epidural abscess. MRI without OM/discitis.  DM II with DKA - DKA " "Patient calling FNA this evening with concerns about her blood pressure today. She woke up this morning with painful swelling in her hands and ankles with increase swelling, she had a slight headache (might have been from drinking tea she says) lightheadedness. She said her blood pressure has run 145-150/88-91 all day. She has a history of hypertension and was on meds for 10 years. She has been off meds since August and her pressure has consistently been less than 120/70. She is asking at what point she should be concerned about symptoms and BP and would like to speak to her OB on call. Paged out at 10p.  Keara Fierro RN  [facility]      Reason for Disposition    [1] Difficulty breathing AND [2] new onset or worsening    Additional Information    Negative: Severe difficulty breathing (e.g., struggling for each breath, speaks in single words)    Negative: Sounds like a life-threatening emergency to the triager    Negative: Followed a leg injury    Negative: [1] Small area of swelling AND [2] followed an insect bite to the area    Negative: Swelling only of ankle    Negative: Swelling only of knee    Negative: Chest pain    Negative: SEVERE leg swelling (e.g., swelling extends above knee, entire leg is swollen)    Answer Assessment - Initial Assessment Questions  1. ONSET: \"When did the swelling start?\" (e.g., minutes, hours, days)      Legs and hands, increased this morning  2. LOCATION: \"What part of the leg is swollen?\"  \"Are both legs swollen or just one leg?\"      Both legs, knees down, mostly feet and ankles  3. SEVERITY: \"How bad is the swelling?\" (e.g., localized; mild, moderate, severe)   - Localized - small area of swelling localized to one leg   - MILD pedal edema - swelling limited to foot and ankle, pitting edema < 1/4 inch (6 mm) deep, rest and elevation eliminate most or all swelling   - MODERATE edema - swelling of lower leg to knee, pitting edema > 1/4 inch (6 mm) deep, rest and " "elevation only partially reduce swelling   - SEVERE edema - swelling extends above knee, facial or hand swelling present       Hands as well, severe  4. REDNESS: \"Does the swelling look red or infected?\"      no  5. PAIN: \"Is the swelling painful to touch?\" If so, ask: \"How painful is it?\"   (Scale 1-10; mild, moderate or severe)      No, it was painful this morning,6-7/10  6. FEVER: \"Do you have a fever?\" If so, ask: \"What is it, how was it measured, and when did it start?\"       no  7. CAUSE: \"What do you think is causing the leg swelling?\"      BP is elevated all day today  8. MEDICAL HISTORY: \"Do you have a history of blood clots, heart failure, kidney disease, liver failure, or cancer?\"      HTN, was on meds for 10 years  9. OTHER SYMPTOMS: \"Do you have any other symptoms?\" (e.g., chest pain, difficulty breathing)      Shortness of breath, headache, lightheaded, no vision changes  10. NOHEMI: \"What date are you expecting to deliver?\"        1/1/20    Protocols used: PREGNANCY - LEG SWELLING AND EDEMA-A-AH      " resolved    IV Vancomycin.    Blood cx x2 this morning, hopefully with more time will clear bacteremia. However if he is not we may need to consider again line holiday or exchanging the line again if this is not possible.    Monitoring for adverse effects of abx such as rash/itching/diarrhea - none apparent.    Will follow. Thanks!    Mat Jauregui MD  [facility]  Office #[phone]

## 2024-06-18 ENCOUNTER — OFFICE VISIT (OUTPATIENT)
Dept: URGENT CARE | Facility: URGENT CARE | Age: 39
End: 2024-06-18
Payer: COMMERCIAL

## 2024-06-18 VITALS
DIASTOLIC BLOOD PRESSURE: 86 MMHG | HEART RATE: 77 BPM | BODY MASS INDEX: 30.81 KG/M2 | TEMPERATURE: 98.3 F | OXYGEN SATURATION: 98 % | WEIGHT: 202.6 LBS | SYSTOLIC BLOOD PRESSURE: 125 MMHG

## 2024-06-18 DIAGNOSIS — H66.002 ACUTE SUPPURATIVE OTITIS MEDIA OF LEFT EAR WITHOUT SPONTANEOUS RUPTURE OF TYMPANIC MEMBRANE, RECURRENCE NOT SPECIFIED: Primary | ICD-10-CM

## 2024-06-18 PROCEDURE — 99213 OFFICE O/P EST LOW 20 MIN: CPT | Performed by: FAMILY MEDICINE

## 2024-06-18 RX ORDER — CEFDINIR 300 MG/1
300 CAPSULE ORAL 2 TIMES DAILY
Qty: 20 CAPSULE | Refills: 0 | Status: SHIPPED | OUTPATIENT
Start: 2024-06-18 | End: 2024-06-28

## 2024-06-18 ASSESSMENT — PAIN SCALES - GENERAL: PAINLEVEL: SEVERE PAIN (7)

## 2024-06-18 NOTE — PROGRESS NOTES
SUBJECTIVE:Vivi Mccall is a 38 year old female who presents with left ear painfor day(s).   Noted after head cold    Past Medical History:   Diagnosis Date    Asthma     seasonal and environmental    C. difficile diarrhea 2013    Concussion 01/2022    Depression     Depressive disorder     Encounter for IUD insertion 10/16/2020    Jyoti inserted by Dr Park    Generalized anxiety disorder     Hypertension     IUD contraception 10/2020    Jyoti inserted by Dr Park    Lateral epicondylitis of right elbow 6/22/2023    Pain in both hands 02/20/2020     Allergies   Allergen Reactions    Amoxicillin Other (See Comments)     Patient does not want to take since having c diff    Codeine Other (See Comments)     Hallucinations    Penicillins Other (See Comments)     Patient does not want to take since having c diff     Social History     Tobacco Use    Smoking status: Never     Passive exposure: Never    Smokeless tobacco: Never   Substance Use Topics    Alcohol use: Not Currently     Comment: currently pregnant       ROS: CONSTITUTIONAL:NEGATIVE for fever, chills, change in weight    OBJECTIVE:  /86 (BP Location: Right arm, Patient Position: Sitting, Cuff Size: Adult Regular)   Pulse 77   Temp 98.3  F (36.8  C) (Tympanic)   Wt 91.9 kg (202 lb 9.6 oz)   SpO2 98%   BMI 30.81 kg/m     The right TM is normal: no effusions, no erythema, and normal landmarks     The right auditory canal is normal and without drainage, edema or erythema  The left TM is erythematous  The left auditory canal is normal and without drainage, edema or erythema  Oropharynx exam is normal: no lesions, erythema, adenopathy or exudate.GENERAL: no acute distress  EYES: EOMI,  PERRL, conjunctiva clear  SKIN: no suspicious lesions or rashes       ICD-10-CM    1. Acute suppurative otitis media of left ear without spontaneous rupture of tympanic membrane, recurrence not specified  H66.002 cefdinir (OMNICEF) 300 MG capsule         decongestants  F/U PCP/IM/FP/UC if worse, not any better

## 2024-06-24 PROBLEM — M54.41 RIGHT-SIDED LOW BACK PAIN WITH RIGHT-SIDED SCIATICA: Status: RESOLVED | Noted: 2024-03-08 | Resolved: 2024-06-24

## 2024-06-24 NOTE — PROGRESS NOTES
05/01/24 0500   Appointment Info   Signing clinician's name / credentials Audrey Juaresar PT [facility]   Total/Authorized Visits E+T 8   Visits Used 5   Medical Diagnosis low back pain   PT Tx Diagnosis right lbp with radiation to distal LE   Progress Note/Certification   Onset of illness/injury or Date of Surgery 02/23/24  (referral date)   Therapy Frequency 1x/week   PT Goal 1   Goal Identifier sittting   Goal Description sit 1 hour without lbp   Rationale to maximize safety and independence with transportation   Goal Progress can sit 1 hour without pain   Target Date 04/19/24   Date Met 05/01/24   Subjective Report   Subjective Report continues to note significant progress.  Now just feeling right LB/glut tightness with certain dance movements.  Ant hip sx from last visit resolved   Objective Measures   Objective Measures Objective Measure 1   Objective Measure 1   Objective Measure Mild R LBP with EIS, no worse with reps.  press ups are sx free.  R glut med strength 4+/5 with lateral hip complaint.   Treatment Interventions (PT)   Interventions Therapeutic Procedure/Exercise;Manual Therapy   Therapeutic Procedure/Exercise   Therapeutic Procedures: strength, endurance, ROM, flexibility minutes (07642) 23   PTRx Ther Proc 1 Standing Fire Hydrant   PTRx Ther Proc 1 - Details 5-10x without resistance   PTRx Ther Proc 2 Step Back   PTRx Ther Proc 2 - Details 5-10x each leg as tolerated   PTRx Ther Proc 3 Clamshell Feet together   PTRx Ther Proc 3 - Details 10-20x each leg   PTRx Ther Proc 4 Half Kneeling Hip Flexor Stretch   PTRx Ther Proc 4 - Details 3x twice a day with 20 sec hold   PTRx Ther Proc 5 Prone Plank   PTRx Ther Proc 5 - Details start with 20 sec hold and increase as tolerated   PTRx Ther Proc 6 Prone Plank Modified Knees   PTRx Ther Proc 6 - Details start with 15 - 20 sec hold and increase as able   PTRx Ther Proc 7 Wall Sit with Ball   PTRx Ther Proc 7 - Details 10x with 5 sec hold   PTRx Ther Proc 8 Single  small wound on right medial thigh preexisting. ?MRSA from dermatologist diagnosis.     Dressed with mupirocin and tegaderm.     Pt requesting tylenol for headache and benadryl for sleep. Rx placed.      Speedy Dailey DO Leg Deadlift Body Weight   PTRx Ther Proc 8 - Details 5x each leg as tolerated   PTRx Ther Proc 9 Bridging #1   PTRx Ther Proc 9 - Details 10-20x   PTRx Ther Proc 10 Prone On Elbows   PTRx Ther Proc 10 - Details 10x progressing to press ups when tolerated   PTRx Ther Proc 11 Prone Press Ups   PTRx Ther Proc 11 - Details when tolerated if increases leg symptoms defer to prone on elbows   Therapeutic Activity   PTRx Ther Act 1 Posture Correction with Lumbar Roll   PTRx Ther Act 1 - Details No Notes   PTRx Ther Act 2 Body Mechanics - [place]   PTRx Ther Act 2 - Details No Notes   PTRx Ther Act 3 Body Mechanics - Golfers Lift   PTRx Ther Act 3 - Details No Notes   PTRx Ther Act 4 Body Mechanics - Full Squat   PTRx Ther Act 4 - Details No Notes   Neuromuscular Re-education   PTRx Neuro Re-ed 1 Pallof Press   PTRx Neuro Re-ed 1 - Details green theraband 10x facing each direction progress to on single leg as tolerated   PTRx Neuro Re-ed 2 Bent Knee Fall Out   PTRx Neuro Re-ed 2 - Details 5-10x twice a day   PTRx Neuro Re-ed 3 Bent Knee Fall In   PTRx Neuro Re-ed 3 - Details 5-10x twice a day   PTRx Neuro Re-ed 4 Abdominal Brace Transverse Abdominis   PTRx Neuro Re-ed 4 - Details 10x twice a day with 5 sec hold   Manual Therapy   Manual Therapy: Mobilization, MFR, MLD, friction massage minutes (37709) 15   Manual Therapy 1 prone rodri ext mobs   Manual Therapy 3 MFR   Manual Therapy 3 - Details R sacral border/PF   Skilled Intervention for pain relief and increased hip ROM   Patient Response/Progress tolerated well   Education   Learner/Method Patient;Listening;Reading;Demonstration;Pictures/Video;No Barriers to Learning   Plan   Home program per PTRx   Plan for next session assess ext ex tolerance/effectivenss, progress core, check hips   Total Session Time   Timed Code Treatment Minutes 38   Total Treatment Time (sum of timed and untimed services) 38       DISCHARGE  Reason for Discharge: Patient has failed to  schedule further appointments.    Equipment Issued: none    Discharge Plan: Patient to continue home program.    Referring Provider:  Vi Baldwin

## 2024-06-30 ENCOUNTER — PATIENT OUTREACH (OUTPATIENT)
Dept: CARE COORDINATION | Facility: CLINIC | Age: 39
End: 2024-06-30
Payer: COMMERCIAL

## 2024-07-06 DIAGNOSIS — F33.1 MAJOR DEPRESSIVE DISORDER, RECURRENT EPISODE, MODERATE (H): ICD-10-CM

## 2024-07-06 DIAGNOSIS — F41.1 GAD (GENERALIZED ANXIETY DISORDER): ICD-10-CM

## 2024-07-08 RX ORDER — CITALOPRAM HYDROBROMIDE 40 MG/1
40 TABLET ORAL DAILY
Qty: 90 TABLET | Refills: 3 | Status: SHIPPED | OUTPATIENT
Start: 2024-07-08

## 2024-07-15 ENCOUNTER — PATIENT OUTREACH (OUTPATIENT)
Dept: CARE COORDINATION | Facility: CLINIC | Age: 39
End: 2024-07-15
Payer: COMMERCIAL

## 2024-07-23 ENCOUNTER — MYC MEDICAL ADVICE (OUTPATIENT)
Dept: FAMILY MEDICINE | Facility: CLINIC | Age: 39
End: 2024-07-23
Payer: COMMERCIAL

## 2024-07-23 NOTE — TELEPHONE ENCOUNTER
OTIS (DOD),      Please see [employer] message.  Please address due to SN's absence      Thank you,  Nicole Caal RN

## 2024-07-24 ENCOUNTER — OFFICE VISIT (OUTPATIENT)
Dept: PEDIATRICS | Facility: CLINIC | Age: 39
End: 2024-07-24
Payer: COMMERCIAL

## 2024-07-24 VITALS
OXYGEN SATURATION: 100 % | HEART RATE: 79 BPM | HEIGHT: 68 IN | DIASTOLIC BLOOD PRESSURE: 95 MMHG | SYSTOLIC BLOOD PRESSURE: 136 MMHG | BODY MASS INDEX: 31.36 KG/M2 | WEIGHT: 206.9 LBS | TEMPERATURE: 98.8 F | RESPIRATION RATE: 16 BRPM

## 2024-07-24 DIAGNOSIS — I10 BENIGN ESSENTIAL HYPERTENSION: ICD-10-CM

## 2024-07-24 DIAGNOSIS — M79.89 LEG SWELLING: Primary | ICD-10-CM

## 2024-07-24 LAB
ANION GAP SERPL CALCULATED.3IONS-SCNC: 6 MMOL/L (ref 7–15)
BUN SERPL-MCNC: 7.3 MG/DL (ref 6–20)
CALCIUM SERPL-MCNC: 9 MG/DL (ref 8.8–10.4)
CHLORIDE SERPL-SCNC: 101 MMOL/L (ref 98–107)
CREAT SERPL-MCNC: 0.8 MG/DL (ref 0.51–0.95)
D DIMER PPP FEU-MCNC: <0.27 UG/ML FEU (ref 0–0.5)
EGFRCR SERPLBLD CKD-EPI 2021: >90 ML/MIN/1.73M2
GLUCOSE SERPL-MCNC: 99 MG/DL (ref 70–99)
HCO3 SERPL-SCNC: 32 MMOL/L (ref 22–29)
POTASSIUM SERPL-SCNC: 4.3 MMOL/L (ref 3.4–5.3)
SODIUM SERPL-SCNC: 139 MMOL/L (ref 135–145)

## 2024-07-24 PROCEDURE — 80048 BASIC METABOLIC PNL TOTAL CA: CPT | Performed by: INTERNAL MEDICINE

## 2024-07-24 PROCEDURE — 93005 ELECTROCARDIOGRAM TRACING: CPT | Performed by: INTERNAL MEDICINE

## 2024-07-24 PROCEDURE — 36415 COLL VENOUS BLD VENIPUNCTURE: CPT | Performed by: INTERNAL MEDICINE

## 2024-07-24 PROCEDURE — 99214 OFFICE O/P EST MOD 30 MIN: CPT | Performed by: INTERNAL MEDICINE

## 2024-07-24 PROCEDURE — 85379 FIBRIN DEGRADATION QUANT: CPT | Performed by: INTERNAL MEDICINE

## 2024-07-24 RX ORDER — LISINOPRIL AND HYDROCHLOROTHIAZIDE 20; 25 MG/1; MG/1
1 TABLET ORAL DAILY
Qty: 90 TABLET | Refills: 0 | Status: SHIPPED | OUTPATIENT
Start: 2024-07-24

## 2024-07-24 NOTE — PATIENT INSTRUCTIONS
PLAN:  1.  Stop nifedipine  2.  Start lisinopril/hydrochlorothiazide one daily in the morning.  3.  Check blood pressure at home and do visit with primary care provider in 2 weeks.   E visit versus virtual visit would be fine.

## 2024-07-24 NOTE — PROGRESS NOTES
Acute and Diagnostic Services Clinic Visit    ASSESSMENT:      1.  Leg swelling L > R.  Likely caused by a combination of factors, with inactivity during travel/venous insufficiency the most likely.   Rule out contribution from calcium channel blocker.  2.  HTN, not currently controlled.   As no longer breast feeding and no plans for further children, will plan to change blood pressure medications at this time.  Patient tolerated lisinopril/hydrochlorothiazide well in the past and we will resume prior dosing.   If having symptoms of lower blood pressure, patient may half the dose until follow-up done.    PLAN:  1.  Stop nifedipine  2.  Start lisinopril/hydrochlorothiazide one daily in the morning.  3.  Monitor blood pressure at home and do visit with primary care provider in 2 weeks.   E visit versus virtual visit would be fine.   Will need lab recheck on new meds in the future.    Greater than 30 minutes were spent on chart review, patient care and assessment, and other management of this patient for this visit.           Juliette Trinidad is a 39 year old, presenting for the following health issues:  No chief complaint on file.    HPI     Evaluation for possible DVT  Onset/Duration: 6 days  Description:       Location: both legs, left worse than right       Redness: no        Pain: moderate       Warmth: no        Joint swelling YES  Progression of symptoms slight improvement since yesterday  Accompanying signs and symptoms:       Fevers: no        Numbness/tingling/weakness: YES- endorsed some numbness in leg yesterday       Chest pain/pleurisy: YES, some tightness       Shortness of breath: no   History        Trauma: no         Recent travel/when: YES- [place], travels a lot for work        Previous history of DVT: no         Family history of DVT: YES        Recent surgery: no   Aggravating factors include: sitting for extended periods of time and walking  Therapies tried and outcome: compression socks and  "elevation  Prior surgery on arteries of veins in this area: Yes, nerve entrapment release in 2011      History of nerve entrapment surgery L lower leg in past.   Has always had a bit larger since.    Dance instructor, judges competitions.   Has been elevating legs and using knee high leg sleeves past few weeks for some leg swelling and when flying.  No history of blood clot.  Mom and grandparents have had DVTs, no further details known.  First time had leg swelling was during pregnancy.   2 kids, youngest 13 months, history of post partum eclampsia.    had vasectomy and there is zero plan to have more children.   Trace edema today.    Patient has decreased her labetolol due to blood pressure variation and drops, off med right now.  Did go down to lower dose with problems too.   Taking nifedipine only 60 mg daily.  No swelling on good days.   Swelling seems mostly related to travel.   Past month has travelled to [facility].   Seasonal competitions.    No calf pain, mild pain and aching with a numb feeling L lower calf above the ankle.      Home blood pressures running 130/85 on average, arm cuff.   Feels that lisinopril did well for her in the past (was avoiding during to pregnancy/breast feeding).  Not using NSAIDs.             Review of Systems  Noted some left sided chest tightness few days ago while reaching for something at the store.   Also happened at rest a few times since, lasting 30 seconds, taking deep breaths helped.   Feeling some anxiety about symptoms.   Also trying to pay attention to self and not just her kids.  Apple watch advised her that blood pressure was 112 last weekend.   Was feeling OK but not sure anything is related.  Occasional tension headaches, frontal and back of neck.      Objective    BP (!) 136/95 (BP Location: Right arm, Patient Position: Sitting, Cuff Size: Adult Regular)   Pulse 79   Temp 98.8  F (37.1  C)   Resp 16   Ht 1.727 m (5' 8\")   Wt 93.8 kg " (206 lb 14.4 oz)   SpO2 100%   BMI 31.46 kg/m    There is no height or weight on file to calculate BMI.    Recheck /98 R arm large cuff.    Physical Exam   GENERAL: alert and no distress  NECK: no adenopathy, no asymmetry, masses, or scars  RESP: lungs clear to auscultation - no rales, rhonchi or wheezes  CV: regular rate and rhythm, normal S1 S2, no S3 or S4, no murmur, click or rub, no peripheral edema  ABDOMEN: soft, nontender, no hepatosplenomegaly, no masses and bowel sounds normal  MS: extremities normal- no gross deformities noted.   Very minimal if any edema of LLE.  Mild sensory difference L medial ankle and lower calf area only.  Negative calf stretch and squeeze.    EKG: appears normal, NSR, normal axis, normal intervals, no acute ST/T changes c/w ischemia, no LVH by voltage criteria, unchanged from previous tracings     Results for orders placed or performed in visit on 07/24/24 (from the past 24 hour(s))   D dimer quantitative   Result Value Ref Range    D-Dimer Quantitative <0.27 0.00 - 0.50 ug/mL FEU    Narrative    This D-dimer assay is intended for use in conjunction with a clinical pretest probability assessment model to exclude pulmonary embolism (PE) and deep venous thrombosis (DVT) in outpatients suspected of PE or DVT. The cut-off value is 0.50 ug/mL FEU.   Basic metabolic panel   Result Value Ref Range    Sodium 139 135 - 145 mmol/L    Potassium 4.3 3.4 - 5.3 mmol/L    Chloride 101 98 - 107 mmol/L    Carbon Dioxide (CO2) 32 (H) 22 - 29 mmol/L    Anion Gap 6 (L) 7 - 15 mmol/L    Urea Nitrogen 7.3 6.0 - 20.0 mg/dL    Creatinine 0.80 0.51 - 0.95 mg/dL    GFR Estimate >90 >60 mL/min/1.73m2    Calcium 9.0 8.8 - 10.4 mg/dL    Glucose 99 70 - 99 mg/dL           Signed Electronically by: Vj Escalante MD

## 2024-07-24 NOTE — TELEPHONE ENCOUNTER
Patient returned call  Provided ADS appointment time below and reviewed address  Patient will leave now  Thanks,  Lorena VIVEROS RN

## 2024-07-24 NOTE — TELEPHONE ENCOUNTER
Referral to Acute and Diagnostic Services    [phone] ([place]) [place] [address], [address], [place] [address]    Transition to Acute & Diagnostic Services Clinic has been discussed with patient, and she agrees with next level of care.   Patient understands that evaluation/treatment at [facility] typically takes significantly longer than in clinic/urgent care (>2 hours).  [facility] has been contacted by provider/staff to confirm patient acceptance.         Special issues:      None                     The following provider has assessed this patient for intervention at [facility], and directed the patient for referral: Provider reviewed symptoms. Advised ADS - r/o DVT           Spoke with [facility] at [facility].  They will accept patient.  Be at [place] 10:30-10:45    VM left asking patient to call back. [employer] message also sent to patient.    Nicole Caal RN

## 2024-07-24 NOTE — Clinical Note
Nba, thanks for the referral.   No DVT, only dependent edema from travel.   We adjusted her blood pressure meds, which should help.

## 2024-07-29 ENCOUNTER — VIRTUAL VISIT (OUTPATIENT)
Dept: FAMILY MEDICINE | Facility: CLINIC | Age: 39
End: 2024-07-29
Payer: COMMERCIAL

## 2024-07-29 DIAGNOSIS — I10 BENIGN ESSENTIAL HYPERTENSION: Primary | ICD-10-CM

## 2024-07-29 DIAGNOSIS — N62 LARGE BREASTS: ICD-10-CM

## 2024-07-29 DIAGNOSIS — Z12.31 VISIT FOR SCREENING MAMMOGRAM: ICD-10-CM

## 2024-07-29 DIAGNOSIS — Q83.1 POLYMASTIA: ICD-10-CM

## 2024-07-29 DIAGNOSIS — M54.9 UPPER BACK PAIN: ICD-10-CM

## 2024-07-29 PROCEDURE — 99213 OFFICE O/P EST LOW 20 MIN: CPT | Mod: 95 | Performed by: FAMILY MEDICINE

## 2024-07-29 PROCEDURE — G2211 COMPLEX E/M VISIT ADD ON: HCPCS | Mod: 95 | Performed by: FAMILY MEDICINE

## 2024-07-29 NOTE — PROGRESS NOTES
"Vivi is a 39 year old who is being evaluated via a billable video visit.    How would you like to obtain your AVS? Mail a copy  If the video visit is dropped, the invitation should be resent by: Send to e-mail at: [email or web address]  Will anyone else be joining your video visit? No      Assessment & Plan     (Z12.31) Visit for screening mammogram  (primary encounter diagnosis)  Comment:   Plan:           BMI  Estimated body mass index is 31.46 kg/m  as calculated from the following:    Height as of 7/24/24: 1.727 m (5' 8\").    Weight as of 7/24/24: 93.8 kg (206 lb 14.4 oz).         See Patient Instructions    Subjective   Vivi is a 39 year old, presenting for the following health issues:  Hypertension      7/29/2024     4:07 PM   Additional Questions   Roomed by Jeanette CALLAWAY   Accompanied by self     History of Present Illness       Back Pain:  She presents for follow up of back pain. Patient's back pain is a recurring problem.  Location of back pain:  Right upper back, left upper back, right side of neck and left side of neck  Description of back pain: dull ache and other  Back pain spreads: nowhere    Since patient first noticed back pain, pain is: gradually worsening  Does back pain interfere with her job:  Yes       Hypertension: She presents for follow up of hypertension.  She does check blood pressure  regularly outside of the clinic. Outside blood pressures have been over 140/90. She follows a low salt diet.     Headaches:   Since the patient's last clinic visit, headaches are: no change  The patient is getting headaches:  1-2 a week  She is able to do normal daily activities when she has a migraine.  The patient is taking the following rescue/relief medications:  Ibuprofen (Advil, Motrin) and Excedrin   Patient states \"I get some relief\" from the rescue/relief medications.   The patient is taking the following medications to prevent migraines:  No medications to prevent migraines  In the past 4 weeks, " "the patient has gone to an Urgent Care or Emergency Room 0 times times due to headaches.    She eats 2-3 servings of fruits and vegetables daily.She consumes 2 sweetened beverage(s) daily.She exercises with enough effort to increase her heart rate 30 to 60 minutes per day.  She exercises with enough effort to increase her heart rate 4 days per week. She is missing 2 dose(s) of medications per week.  She is not taking prescribed medications regularly due to remembering to take.     1) has had lower back pain based on sciatica but also struggles with upper back pain  Sees chiropractor through [facility] intermittently  Breast size has really increased  Has difficulty finding bras is a size G but short torso    Starting to have tension headaches in upper back and neck  Affects bra straps that dig into her skin    Really wonders about breast reduction  Massage gun does help but then this resumes    Has polymastia and axillary breast tissue is present under the right arm    2)  Blood pressure:  Was on nifedipine and labetalol weaned off labetalol - stopped this back in march  Had been on nifedipine only from march until now having started Lisinoprl HTZ 20/25  Is done breast feeding  Has been traveling a lot  Has noted swelling in the legs  Sometimes this goes down and sometimes this is persistent  Was seen for this  Had meds changed back to Lisinopril HTZ to see if swelling responds  Has been on this since Friday  Has not had side effects her BP numbers are still high  165/103                  Review of Systems  Constitutional, HEENT, cardiovascular, pulmonary, gi and gu systems are negative, except as otherwise noted.      Objective    Vitals - Patient Reported  Weight (Patient Reported): 90.7 kg (200 lb)  Height (Patient Reported): 172.7 cm (5' 8\")  BMI (Based on Pt Reported Ht/Wt): 30.41        Physical Exam   GENERAL: alert and no distress  EYES: Eyes grossly normal to inspection.  No discharge or erythema, or obvious " scleral/conjunctival abnormalities.  RESP: No audible wheeze, cough, or visible cyanosis.    SKIN: Visible skin clear. No significant rash, abnormal pigmentation or lesions.  NEURO: Cranial nerves grossly intact.  Mentation and speech appropriate for age.  PSYCH: Appropriate affect, tone, and pace of words          Video-Visit Details  Joined the call at 7/29/2024, 5:32:02 pm.  Left the call at 7/29/2024, 5:47:22 pm.  You were on the call for 15 minutes 19 seconds .  Type of service:  Video Visit   Originating Location (pt. Location): Home    Distant Location (provider location):  On-site  Platform used for Video Visit: Rambo  Signed Electronically by: Vi Baldwin MD

## 2024-08-07 ENCOUNTER — TRANSFERRED RECORDS (OUTPATIENT)
Dept: HEALTH INFORMATION MANAGEMENT | Facility: CLINIC | Age: 39
End: 2024-08-07
Payer: COMMERCIAL

## 2024-08-09 ENCOUNTER — HOSPITAL ENCOUNTER (OUTPATIENT)
Dept: MAMMOGRAPHY | Facility: CLINIC | Age: 39
Discharge: HOME | End: 2024-08-09
Attending: FAMILY MEDICINE
Payer: COMMERCIAL

## 2024-08-09 DIAGNOSIS — N62 LARGE BREASTS: ICD-10-CM

## 2024-08-09 DIAGNOSIS — Q83.1 POLYMASTIA: ICD-10-CM

## 2024-08-09 PROCEDURE — 77062 BREAST TOMOSYNTHESIS BI: CPT

## 2024-08-12 ENCOUNTER — OFFICE VISIT (OUTPATIENT)
Dept: URGENT CARE | Facility: URGENT CARE | Age: 39
End: 2024-08-12
Payer: COMMERCIAL

## 2024-08-12 ENCOUNTER — MYC MEDICAL ADVICE (OUTPATIENT)
Dept: FAMILY MEDICINE | Facility: CLINIC | Age: 39
End: 2024-08-12
Payer: COMMERCIAL

## 2024-08-12 DIAGNOSIS — R42 DIZZINESS: ICD-10-CM

## 2024-08-12 DIAGNOSIS — I10 BENIGN ESSENTIAL HYPERTENSION: Primary | ICD-10-CM

## 2024-08-12 PROCEDURE — 99213 OFFICE O/P EST LOW 20 MIN: CPT | Performed by: NURSE PRACTITIONER

## 2024-08-12 NOTE — PATIENT INSTRUCTIONS
BP uncontrolled.    Try to reduce sodium in your diet.    Will monitor at home  recommend follow up with PCP if remains elevated.

## 2024-09-12 ENCOUNTER — MYC MEDICAL ADVICE (OUTPATIENT)
Dept: FAMILY MEDICINE | Facility: CLINIC | Age: 39
End: 2024-09-12
Payer: COMMERCIAL

## 2024-09-13 ENCOUNTER — TELEPHONE (OUTPATIENT)
Dept: FAMILY MEDICINE | Facility: CLINIC | Age: 39
End: 2024-09-13
Payer: COMMERCIAL

## 2024-09-13 NOTE — TELEPHONE ENCOUNTER
Pt calling to schedule appointment still today, please call and help schedule.    Thanks!  Dale MÉNDEZ RN   [facility]

## 2024-09-13 NOTE — TELEPHONE ENCOUNTER
Left message for patient to call [facility] back  When patient calls back please transfer to an RN  Soraya WONG RN

## 2024-09-13 NOTE — TELEPHONE ENCOUNTER
"Please call again, as she would like a appointment sometime early next week.     Pt declined going into UC as \"they will just give me antibiotics and I don't want that\"   She feels that the Dr can better understand what she needs.     Please assist in setting up visit, if you cannot reach her she asks if you would please send a [employer] message.   Thank you!  Selvin Herrera RN  [facility]         "

## 2024-09-13 NOTE — TELEPHONE ENCOUNTER
Left message for patient to call [facility] back  When patient calls back please transfer to an RN  No appointments left today at this time.  Would recommend urgent care.    Soraya WONG RN

## 2024-09-14 VITALS
RESPIRATION RATE: 16 BRPM | SYSTOLIC BLOOD PRESSURE: 136 MMHG | OXYGEN SATURATION: 100 % | HEART RATE: 69 BPM | DIASTOLIC BLOOD PRESSURE: 100 MMHG | TEMPERATURE: 98.3 F

## 2024-09-14 NOTE — PROGRESS NOTES
Assessment & Plan     Benign essential hypertension    Dizziness     Patient Instructions   BP uncontrolled.    Try to reduce sodium in your diet.    Will monitor at home  recommend follow up with PCP if remains elevated.      Return in about 1 week (around 8/19/2024) for with regular provider if symptoms persist.    RAYMOND Acosta CNP  [facility] ROBERT Trinidad is a 39 year old female who presents to clinic today for the following health issues:  Chief Complaint   Patient presents with    Blood Pressure Check     High bp today-chest tightness, dizzy at times      HPI    BP was high today  Stress at work  Worried about her BP  Felt dizzy earlier.    Dizziness has resolved.      Review of Systems  Constitutional, HEENT, cardiovascular, pulmonary, GI, , musculoskeletal, neuro, skin, endocrine and psych systems are negative, except as otherwise noted.      Objective    BP (!) 136/100   Pulse 69   Temp 98.3  F (36.8  C) (Tympanic)   Resp 16   LMP 07/11/2024 (Exact Date)   SpO2 100%   Physical Exam   GENERAL: alert and no distress  EYES: Eyes grossly normal to inspection, PERRL and conjunctivae and sclerae normal  HENT: ear canals and TM's normal, nose and mouth without ulcers or lesions  NECK: no adenopathy, no asymmetry, masses, or scars  RESP: lungs clear to auscultation - no rales, rhonchi or wheezes  CV: regular rate and rhythm, normal S1 S2, no S3 or S4, no murmur, click or rub, no peripheral edema  ABDOMEN: soft, nontender, no hepatosplenomegaly, no masses and bowel sounds normal  MS: no gross musculoskeletal defects noted, no edema

## 2024-09-16 ENCOUNTER — PATIENT OUTREACH (OUTPATIENT)
Dept: ONCOLOGY | Facility: CLINIC | Age: 39
End: 2024-09-16

## 2024-09-16 ENCOUNTER — OFFICE VISIT (OUTPATIENT)
Dept: FAMILY MEDICINE | Facility: CLINIC | Age: 39
End: 2024-09-16
Payer: COMMERCIAL

## 2024-09-16 VITALS
RESPIRATION RATE: 16 BRPM | HEART RATE: 69 BPM | SYSTOLIC BLOOD PRESSURE: 142 MMHG | TEMPERATURE: 97.7 F | WEIGHT: 205.9 LBS | OXYGEN SATURATION: 98 % | DIASTOLIC BLOOD PRESSURE: 98 MMHG | BODY MASS INDEX: 31.31 KG/M2

## 2024-09-16 DIAGNOSIS — Q83.1 AXILLARY ACCESSORY BREAST TISSUE: ICD-10-CM

## 2024-09-16 DIAGNOSIS — I10 BENIGN ESSENTIAL HYPERTENSION: ICD-10-CM

## 2024-09-16 DIAGNOSIS — N61.0 MASTITIS: Primary | ICD-10-CM

## 2024-09-16 PROCEDURE — 99213 OFFICE O/P EST LOW 20 MIN: CPT | Performed by: FAMILY MEDICINE

## 2024-09-16 PROCEDURE — G2211 COMPLEX E/M VISIT ADD ON: HCPCS | Performed by: FAMILY MEDICINE

## 2024-09-16 RX ORDER — CEPHALEXIN 500 MG/1
500 CAPSULE ORAL 4 TIMES DAILY
Qty: 40 CAPSULE | Refills: 0 | Status: SHIPPED | OUTPATIENT
Start: 2024-09-16 | End: 2024-09-26

## 2024-09-16 RX ORDER — LABETALOL 200 MG/1
200 TABLET, FILM COATED ORAL 2 TIMES DAILY
COMMUNITY

## 2024-09-16 ASSESSMENT — PAIN SCALES - GENERAL: PAINLEVEL: SEVERE PAIN (6)

## 2024-09-16 ASSESSMENT — ASTHMA QUESTIONNAIRES
QUESTION_3 LAST FOUR WEEKS HOW OFTEN DID YOUR ASTHMA SYMPTOMS (WHEEZING, COUGHING, SHORTNESS OF BREATH, CHEST TIGHTNESS OR PAIN) WAKE YOU UP AT NIGHT OR EARLIER THAN USUAL IN THE MORNING: NOT AT ALL
QUESTION_4 LAST FOUR WEEKS HOW OFTEN HAVE YOU USED YOUR RESCUE INHALER OR NEBULIZER MEDICATION (SUCH AS ALBUTEROL): NOT AT ALL
ACT_TOTALSCORE: 25
QUESTION_1 LAST FOUR WEEKS HOW MUCH OF THE TIME DID YOUR ASTHMA KEEP YOU FROM GETTING AS MUCH DONE AT WORK, SCHOOL OR AT HOME: NONE OF THE TIME
QUESTION_5 LAST FOUR WEEKS HOW WOULD YOU RATE YOUR ASTHMA CONTROL: COMPLETELY CONTROLLED
ACT_TOTALSCORE: 25
QUESTION_2 LAST FOUR WEEKS HOW OFTEN HAVE YOU HAD SHORTNESS OF BREATH: NOT AT ALL

## 2024-09-16 NOTE — PROGRESS NOTES
New Patient Oncology Nurse Navigator Note     Referring provider: Vi Baldwin MD      Referring Clinic/Organization: [facility]      Referred to (specialty:) Breast Provider Referral      Date Referral Received: September 16, 2024     Evaluation for:    N61.0 (ICD-10-CM) - Mastitis  Q83.1 (ICD-10-CM) - Axillary accessory breast tissue    Clinical History (per Nurse review of records provided):      Vivi Mccall is a 39 year old female with history of enlarged breasts and accessory axillary breast tissue on prior ultrasounds. She presents now with axillary pain and has axillary mammary tissue. Patient recently weaned her youngest daughter from breast feeding in July. She reports a little bit of breast tenderness on the right side, but both axillary areas are swollen, burning and tender. The right is worse than the left. She does have a history of mastitis.    Most recent breast imaging on 8/9/24 with bilateral diagnostic mammograms to evaluate enlarged breasts and accessory axillary breast tissue on prior ultrasounds.  FINDINGS: Bilateral mammography with digital breast tomosynthesis was performed. Prominent tissue in both axillae is again seen. No concerning mammographic/tomographic findings on either side.  IMPRESSION: BI-RADS CATEGORY: 2 - Benign.  RECOMMENDED FOLLOW-UP: Routine yearly mammography beginning at age 40 or as discussed with your provider.    9/17 1235 - Telephoned and left voice message for patient requesting call back.     9/18 1200 - Vivi returned call. At 35 weeks pregnant she developed accessory axillary breast tissue. The tissues were very swollen during pregnancy and then the areas began to lactate after her daughter was born. She weaned from breastfeeding in June and the accessory axillary breast tissue is now very tender and sore. Provider started her on antibiotic, but patient has not felt any improvement yet and isn't optimistic there will be  improvement from antibiotic. She does not think this is mastitis. .    Writer will get opinion from Leena Brunson PA-C on next best steps.     9/19 1008 - Telephoned and left voice message for Vivi informing her Leena Brunson will see her. She may reach New Patient Scheduling at [phone] or know they will contact her to schedule appointment. New Patient Scheduling can also convey questions to writer if she would like a call back.    Patient resides in [place], [place].     Records Location: See Bookmarked material     Records Needed: Breast imaging for past 5 years

## 2024-09-16 NOTE — PROGRESS NOTES
"  Assessment & Plan     (N61.0) Mastitis  (primary encounter diagnosis)  Comment: Advised ice compress gentle expression to the axillary tissue ibuprofen Tylenol and cephalexin as noted below certainly if the symptoms persist would recommend patient to see a breast specialist  Plan: cephALEXin (KEFLEX) 500 MG capsule, Breast         Provider  Referral            (Q83.1) Axillary accessory breast tissue  Comment:   Plan: cephALEXin (KEFLEX) 500 MG capsule, Breast         Provider  Referral            (I10) Benign essential hypertension  Comment: Elevated blood pressure today likely due to infection add like her to follow-up with me in 1 week to recheck blood pressureAnd if pressures are improved can see me October 7 for her routine follow-up visit we will have her take 200 mg of labetalol twice daily until I see her  Plan:            BMI  Estimated body mass index is 31.31 kg/m  as calculated from the following:    Height as of 7/24/24: 1.727 m (5' 8\").    Weight as of this encounter: 93.4 kg (205 lb 14.4 oz).             Juliette Trinidad is a 39 year old, presenting for the following health issues:  Breast Problem        9/16/2024     1:06 PM   Additional Questions   Roomed by Cindy     History of Present Illness       Reason for visit:  Pain underarms    She eats 2-3 servings of fruits and vegetables daily.She consumes 2 sweetened beverage(s) daily.She exercises with enough effort to increase her heart rate 30 to 60 minutes per day.  She exercises with enough effort to increase her heart rate 6 days per week. She is missing 6 dose(s) of medications per week.         Breast Concern  Onset/Duration: < 7 days ago  Description:   Location: Axillary Region on both sides   Pain or tenderness: YES and burning   Redness: No  Intensity: moderate  Progression of Symptoms: worsening  Accompanying Signs & Symptoms:  Any lumps in axillary region: YES  Movable: No  Nipple discharge: No  Changes in the " skin or nipple: None   On Hormone therapy: No  Does it change with menstrual cycle: No  Previous history of similar problem: Yes - had discuss about concern of excess breast tissue after pregnancy  First degree relative with breast cancer:  her mother    Precipitating factors:           Worsened by: None   Alleviating factors:            Improved by: None   Therapies tried and outcome: Ice pack   Patient's last menstrual period was 09/09/2024 (exact date).    Axillary pain:  Has axillary mammary tissue  Recently weaned her youngest daughter from breast feeding in in July feels that she has a little bit of breast tenderness on the right side but both axillary areas are swollen and burning and tender the right is worse than the left the right breast is also worse.  Has had mastitis before    Does have significant allergy to penicillin and amoxicillin but has tolerated cephalexin well in the past has been trying ice and ibuprofen and just not getting better.    She did report that there is no breastmilk when she does try to express and has reported that in the past the axillary tissue used to express milk but no longer does.      Hypertension:  Blood pressure has been elevated was seen in urgent care and they advised that she start a low-dose labetalol 100 mg twice daily along with her lisinopril hydrochlorothiazide.  This was about 3 weeks ago.  She was not feeling well and did not realize that her pressures were that elevated.  She reports that she has been dancing and exercising more and weight had been going up and that she dropped about 5 pounds just in about a week and noted increased urination and wonders if she is now starting to lose water.  She usually has a lot of urination with hydrochlorothiazide and had not noticed it up until this point.              Review of Systems  Constitutional, HEENT, cardiovascular, pulmonary, gi and gu systems are negative, except as otherwise noted.      Objective    BP (!)  142/98   Pulse 69   Temp 97.7  F (36.5  C) (Skin)   Resp 16   Wt 93.4 kg (205 lb 14.4 oz)   LMP 09/09/2024 (Exact Date)   SpO2 98%   Breastfeeding No   BMI 31.31 kg/m    Body mass index is 31.31 kg/m .  Physical Exam   GENERAL: alert and no distress  BREAST: Both breasts are nonerythematous soft no masses felt.  The right breast is tender at the lower outer quadrant and the right axilla is significantly tender and swollen no breastmilk is noted the left axilla slightly tender and swollen as well  Skin exam: No erythema or redness noted.            Signed Electronically by: Vi Baldwin MD

## 2024-09-24 NOTE — PROGRESS NOTES
NEW CONSULTATION   Sep 26, 2024     Vivi Mccall is a 39 year old woman who presents with breast pain, axillar accessory breast tissue, family history of breast tissue. She was referred by Dr. Baldwin.    HPI:    Family history of mother, maternal grandmother, and maternal great aunt with breast cancer. Her mother had negative genetic testing.     When she was 35 weeks pregnant she developed prominent right axillary swelling. Imaging was consistent with accessory axillary breast tissue. When she was breast feeding the milk would leak from the right axillary skin. She is currently 15 months post partum. She stopped breast feeding in . She began having periods in July. She does the Depot shot for birth control. She continues to have bilateral axillary burning and swelling. She had a mammogram 24 that demonstrated accessory axillary breast tissue. She since developed increased right upper breast pain and firmness. She was prescribed Keflex on 24. She did not have any erythema or thinks he had an infection so she did not started the antibiotics. She has a history of Cdiff.     BREAST-SPECIFIC HISTORY:    Previous breast imaging: Yes  - 22 Smammo BI-RADS 1  - 23  - 24 b/l Dmammo accessory axillary breast tissue. BI-RADS 2     Prior breast biopsies/surgeries: No    Prior history of breast cancer or DCIS: No  Prior radiation history: No  Breast density: scattered fibroglandular densities    GYN HISTORY:  . Age at 1st pregnancy: 34. Breastfeeding history: Yes.   Age at menarche: 12  Menopausal: premenopausal .   Depot   Menopausal hormone replacement therapy: No     RISK ASSESSMENT: < 3% 5 year risk by Aide, < 5% 10 year risk by BARTOLO, and > 20% lifetime risk by BARTOLO  Aide:0.9% 5 year risk   BARTOLO/Hannah: 29.1% lifetime risk, 3.9% 10 year risk     FAMILY HISTORY:  Breast ca: Yes  - mother, 60, BRCA negative   - maternal grandmother, 40  - maternal great aunt, 70's    Ovarian ca: No  Pancreatic ca: No  Prostate: Yes  - maternal grandfather, older  Gastric ca: No  Melanoma: yes  - unknown  Colon ca: No  Other cancer: Yes  - great grandmother with bile duct cancer  Other genetic, testing, syndromes, or clotting disorders: no     PAST MEDICAL HISTORY  Past Medical History:   Diagnosis Date    Asthma     seasonal and environmental    C. difficile diarrhea 2013    Concussion 01/2022    Depression     Depressive disorder     Encounter for IUD insertion 10/16/2020    Jyoti inserted by Dr Park    Generalized anxiety disorder     Hypertension     IUD contraception 10/2020    Jyoti inserted by Dr Park    Lateral epicondylitis of right elbow 6/22/2023    Pain in both hands 02/20/2020     PAST SURGICAL HISTORY   Past Surgical History:   Procedure Laterality Date    GYN SURGERY  2003    Laser treatment of HPV    ORTHOPEDIC SURGERY Left 07/21/2017    ganglion cyst removal    ORTHOPEDIC SURGERY Left     nerve release on left leg/ankle     SOFT TISSUE SURGERY  1999    SURGICAL HISTORY OF -       Nerve entrapment release    ZZHC RELEASE FOOT/TOE NERVE  2011     MEDICATIONS  Current Outpatient Medications   Medication Sig Dispense Refill    albuterol (PROAIR HFA/PROVENTIL HFA/VENTOLIN HFA) 108 (90 Base) MCG/ACT inhaler Inhale 2 puffs into the lungs every 6 hours as needed for shortness of breath, wheezing or cough 18 g 3    albuterol (PROVENTIL) (2.5 MG/3ML) 0.083% neb solution Take 1 vial (2.5 mg) by nebulization every 6 hours as needed for shortness of breath, wheezing or cough (Patient not taking: Reported on 6/18/2024) 90 mL 0    buPROPion (WELLBUTRIN XL) 150 MG 24 hr tablet TAKE 1 TABLET(150 MG) BY MOUTH EVERY MORNING 90 tablet 3    cephALEXin (KEFLEX) 500 MG capsule Take 1 capsule (500 mg) by mouth 4 times daily for 10 days. 40 capsule 0    citalopram (CELEXA) 40 MG tablet TAKE 1 TABLET(40 MG) BY MOUTH DAILY 90 tablet 3    labetalol (NORMODYNE) 200 MG tablet Take 200 mg by mouth 2  "times daily.      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     ROS:   Change in vision No  Headaches: no  Respiratory: No shortness of breath, dyspnea on exertion, cough, or hemoptysis   Cardiovascular: negative   Gastrointestinal: negative Abdominal pain: no  Breast: breast pain   Musculoskeletal: negative Joint pain No Back pain: no  Psychiatric: negative  Hematologic/Lymphatic/Immunologic: negative  Endocrine: negative    EXAM  BP (!) 154/108 (BP Location: Right arm, Patient Position: Sitting, Cuff Size: Adult Large)   Pulse 68   Temp 98.2  F (36.8  C) (Oral)   Ht 1.727 m (5' 8\")   Wt 94.8 kg (209 lb)   LMP 09/09/2024 (Exact Date)   SpO2 100%   BMI 31.78 kg/m     PHYSICAL EXAM  Respiratory: breathing non labored.   Breasts: Examination was done in both the upright and supine positions.  Breasts are symmetrical . No masses noted. No skin or nipple changes. No nipple discharge.   Prominent bilateral axillary tissue consistent with accessory breast tissue, greater on the right.   No clavicular, cervical, or axillary lymphadenopathy.     ASSESSMENT/PLAN:    Vivi Mccall is a 39 year old woman with new right superior breast pain and axillary accessory breast tissue. She does have family history of breast cancer and meets guidelines for high risks screening.     1) Right breast pain  - Will obtain right breast ultrasound for new focal pain. No imaging available today. Will scheduled.     Breast pain is common. Up to 70% of women will experience breast " pain in their lifetime.   Breast pain is most often related to normal physiologic change (hormonal fluctuations) and is a rare symptom of breast cancer.   Management of breast pain  Evaluation with imaging is important to determine if the pain is due to normal physiologic changes related to hormonal fluctuations or to a pathologic process.   Physical support  Wear a supportive Bra that is professionally fit and sized. Be sure your bra is not overly compressive or restrictive.   Wear a sports bra when exercising.   Wear comfortable breast support while sleeping.   Improving omega 3 fatty acid intake may improve breast pain. Eat 1-2 tablespoons of flaxseed daily.   Plant-based diets may help with breast pain.  Breast massage. (see handout)   Warm compresses or ice packs can be used  Acupuncture may help with breast pain.   Consider eliminating or decrease caffeine (chocolate, tea, coffee, soda) for a two week period of time to see if pain improves/resolves  Evening Primrose Oil starting with 500 mg/day. May increase to 1000 mg 3 times daily for 6 months. Can be tried.   Chasteberry 400-500 mg/day can be tried.   If you are on hormone based medications adjusting or decreasing these medications may help.   If needed over the counter analgesics such as acetaminophen, ibuprofen, or topical diclofenac cream can intermittently be used.     2) Accessory axillary breast tissue  - Follow up with plastic surgery as scheduled     3) Family history of breast cancer  She meets NCCN guidelines for high risk screening based on family history with lifetime risk for breast cancer of >20%. Screening recommendations based on NCCN guidelines. Be familiar with your breast and promptly report any changes to your health care provider. Clinical encounter every 6-12 months. Annual mammogram with imani alternating with annual breast MRI.    - Breast MRI with clinic visit due: 2/2024  - Screening mammogram with clinic visit due: 8/10/25     4)  Lifestyle Modifications were provided.   - Maintain your best healthy weight. Higher body fat and adult weight gain is associated with increased risk for breast cancer. This increase in risk has been attributed to increase in circulating endogenous estrogen levels from fat tissue.   - Any alcohol intake increases the risk for breast cancer. If you choose to drink alcohol limit alcohol consumption to less than 1 drink (1 ounce of liquor, 6 ounces of wine, or 8 ounces of beer) per day or less than 3 drinks per week.  - Be active daily and void being sedentary.   - Vitamin D may decrease the risk of developing breast cancer.     Leena Brunson PA-C    30 minutes spent on the date of the encounter doing chart review, review of test results, interpretation of tests, patient visit and documentation.

## 2024-09-24 NOTE — TELEPHONE ENCOUNTER
RECORDS STATUS - BREAST    RECORDS REQUESTED FROM: [facility]   NOTES DETAILS STATUS   OFFICE NOTE from referring provider Epic 9/16/24: Dr. Vi Baldwin   MEDICATION LIST Epic    IMAGING (NEED IMAGES & REPORT)     MAMMO PACS 8/9/24, 8/4/22

## 2024-09-26 ENCOUNTER — ONCOLOGY VISIT (OUTPATIENT)
Dept: SURGERY | Facility: CLINIC | Age: 39
End: 2024-09-26
Attending: FAMILY MEDICINE
Payer: COMMERCIAL

## 2024-09-26 ENCOUNTER — PRE VISIT (OUTPATIENT)
Dept: ONCOLOGY | Facility: CLINIC | Age: 39
End: 2024-09-26
Payer: COMMERCIAL

## 2024-09-26 VITALS
HEART RATE: 68 BPM | OXYGEN SATURATION: 100 % | DIASTOLIC BLOOD PRESSURE: 108 MMHG | HEIGHT: 68 IN | SYSTOLIC BLOOD PRESSURE: 154 MMHG | TEMPERATURE: 98.2 F | BODY MASS INDEX: 31.67 KG/M2 | WEIGHT: 209 LBS

## 2024-09-26 DIAGNOSIS — N61.0 MASTITIS: ICD-10-CM

## 2024-09-26 DIAGNOSIS — N64.4 BREAST PAIN: Primary | ICD-10-CM

## 2024-09-26 DIAGNOSIS — Z91.89 AT HIGH RISK FOR BREAST CANCER: ICD-10-CM

## 2024-09-26 DIAGNOSIS — Q83.1 AXILLARY ACCESSORY BREAST TISSUE: ICD-10-CM

## 2024-09-26 PROCEDURE — 99214 OFFICE O/P EST MOD 30 MIN: CPT | Performed by: PHYSICIAN ASSISTANT

## 2024-09-26 PROCEDURE — 99213 OFFICE O/P EST LOW 20 MIN: CPT | Performed by: PHYSICIAN ASSISTANT

## 2024-09-26 ASSESSMENT — PAIN SCALES - GENERAL: PAINLEVEL: EXTREME PAIN (8)

## 2024-09-26 NOTE — PATIENT INSTRUCTIONS
Viiv Mccall is a 39 year old woman with new right superior breast pain and axillary accessory breast tissue. She does have family history of breast cancer and meets guidelines for high risks screening.     1) Right breast pain  - Will obtain right breast ultrasound for new focal pain. No imaging available today. Will scheduled.     Breast pain is common. Up to 70% of women will experience breast pain in their lifetime.   Breast pain is most often related to normal physiologic change (hormonal fluctuations) and is a rare symptom of breast cancer.   Management of breast pain  Evaluation with imaging is important to determine if the pain is due to normal physiologic changes related to hormonal fluctuations or to a pathologic process.   Physical support  Wear a supportive Bra that is professionally fit and sized. Be sure your bra is not overly compressive or restrictive.   Wear a sports bra when exercising.   Wear comfortable breast support while sleeping.   Improving omega 3 fatty acid intake may improve breast pain. Eat 1-2 tablespoons of flaxseed daily.   Plant-based diets may help with breast pain.  Breast massage. (see handout)   Warm compresses or ice packs can be used  Acupuncture may help with breast pain.   Consider eliminating or decrease caffeine (chocolate, tea, coffee, soda) for a two week period of time to see if pain improves/resolves  Evening Primrose Oil starting with 500 mg/day. May increase to 1000 mg 3 times daily for 6 months. Can be tried.   Chasteberry 400-500 mg/day can be tried.   If you are on hormone based medications adjusting or decreasing these medications may help.   If needed over the counter analgesics such as acetaminophen, ibuprofen, or topical diclofenac cream can intermittently be used.     2) Accessory axillary breast tissue  - Follow up with plastic surgery as schedule.d     3) Family history of breast cancer  She meets NCCN guidelines for high risk screening based on  family history with lifetime risk for breast cancer of >20%. Screening recommendations based on NCCN guidelines. Be familiar with your breast and promptly report any changes to your health care provider. Clinical encounter every 6-12 months. Annual mammogram with imani alternating with annual breast MRI.    - Breast MRI with clinic visit due: 2/2024  - Screening mammogram with clinic visit due: 8/10/25     4) Lifestyle Modifications were provided.   - Maintain your best healthy weight. Higher body fat and adult weight gain is associated with increased risk for breast cancer. This increase in risk has been attributed to increase in circulating endogenous estrogen levels from fat tissue.   - Any alcohol intake increases the risk for breast cancer. If you choose to drink alcohol limit alcohol consumption to less than 1 drink (1 ounce of liquor, 6 ounces of wine, or 8 ounces of beer) per day or less than 3 drinks per week.  - Be active daily and void being sedentary.   - Vitamin D may decrease the risk of developing breast cancer.

## 2024-09-26 NOTE — NURSING NOTE
"Oncology Rooming Note    September 26, 2024 12:04 PM   Vivi Mccall is a 39 year old female who presents for:    Chief Complaint   Patient presents with    Oncology Clinic Visit     Mastitis; Axillary accessory breast tissue     Initial Vitals: BP (!) 154/108 (BP Location: Right arm, Patient Position: Sitting, Cuff Size: Adult Large)   Pulse 68   Temp 98.2  F (36.8  C) (Oral)   Ht 1.727 m (5' 8\")   Wt 94.8 kg (209 lb)   LMP 09/09/2024 (Exact Date)   SpO2 100%   BMI 31.78 kg/m   Estimated body mass index is 31.78 kg/m  as calculated from the following:    Height as of this encounter: 1.727 m (5' 8\").    Weight as of this encounter: 94.8 kg (209 lb). Body surface area is 2.13 meters squared.  Extreme Pain (8) Comment: Data Unavailable   Patient's last menstrual period was 09/09/2024 (exact date).  Allergies reviewed: Yes  Medications reviewed: Yes    Medications: Medication refills not needed today.  Pharmacy name entered into [employer]: [id number] - [place], [place] - [address] AT [address] [place] [address]    Frailty Screening:   Is the patient here for a new oncology consult visit in cancer care? 2. No      Clinical concerns: New patient        Sherie Allen               "

## 2024-09-26 NOTE — LETTER
2024      Vivi Mccall  [address]  [facility] [address]      Dear Colleague,    Thank you for referring your patient, Vivi Mccall, to the [facility]. Please see a copy of my visit note below.    NEW CONSULTATION   Sep 26, 2024     Vivi Mccall is a 39 year old woman who presents with breast pain, axillar accessory breast tissue, family history of breast tissue. She was referred by Dr. Baldwin.    HPI:    Family history of mother, maternal grandmother, and maternal great aunt with breast cancer. Her mother had negative genetic testing.     When she was 35 weeks pregnant she developed prominent right axillary swelling. Imaging was consistent with accessory axillary breast tissue. When she was breast feeding the milk would leak from the right axillary skin. She is currently 15 months post partum. She stopped breast feeding in . She began having periods in July. She does the Depot shot for birth control. She continues to have bilateral axillary burning and swelling. She had a mammogram 24 that demonstrated accessory axillary breast tissue. She since developed increased right upper breast pain and firmness. She was prescribed Keflex on 24. She did not have any erythema or thinks he had an infection so she did not started the antibiotics. She has a history of Cdiff.     BREAST-SPECIFIC HISTORY:    Previous breast imaging: Yes  - 22 Smammo BI-RADS 1  - /  - 24 b/l Dmammo accessory axillary breast tissue. BI-RADS 2     Prior breast biopsies/surgeries: No    Prior history of breast cancer or DCIS: No  Prior radiation history: No  Breast density: scattered fibroglandular densities    GYN HISTORY:  . Age at 1st pregnancy: 34. Breastfeeding history: Yes.   Age at menarche: 12  Menopausal: premenopausal .   Depot   Menopausal hormone replacement therapy: No     RISK ASSESSMENT: < 3% 5 year risk by Aide, < 5% 10 year risk  by BARTOLO, and > 20% lifetime risk by BARTOLO  Aide:0.9% 5 year risk   BARTOLO/Hannah: 29.1% lifetime risk, 3.9% 10 year risk     FAMILY HISTORY:  Breast ca: Yes  - mother, 60, BRCA negative 2022  - maternal grandmother, 40  - maternal great aunt, 70's   Ovarian ca: No  Pancreatic ca: No  Prostate: Yes  - maternal grandfather, older  Gastric ca: No  Melanoma: yes  - unknown  Colon ca: No  Other cancer: Yes  - great grandmother with bile duct cancer  Other genetic, testing, syndromes, or clotting disorders: no     PAST MEDICAL HISTORY  Past Medical History:   Diagnosis Date     Asthma     seasonal and environmental     C. difficile diarrhea 2013     Concussion 01/2022     Depression      Depressive disorder      Encounter for IUD insertion 10/16/2020    Jyoti inserted by Dr Prak     Generalized anxiety disorder      Hypertension      IUD contraception 10/2020    Jyoti inserted by Dr Park     Lateral epicondylitis of right elbow 6/22/2023     Pain in both hands 02/20/2020     PAST SURGICAL HISTORY   Past Surgical History:   Procedure Laterality Date     GYN SURGERY  2003    Laser treatment of HPV     ORTHOPEDIC SURGERY Left 07/21/2017    ganglion cyst removal     ORTHOPEDIC SURGERY Left     nerve release on left leg/ankle      SOFT TISSUE SURGERY  1999     SURGICAL HISTORY OF -       Nerve entrapment release     ZZHC RELEASE FOOT/TOE NERVE  2011     MEDICATIONS  Current Outpatient Medications   Medication Sig Dispense Refill     albuterol (PROAIR HFA/PROVENTIL HFA/VENTOLIN HFA) 108 (90 Base) MCG/ACT inhaler Inhale 2 puffs into the lungs every 6 hours as needed for shortness of breath, wheezing or cough 18 g 3     albuterol (PROVENTIL) (2.5 MG/3ML) 0.083% neb solution Take 1 vial (2.5 mg) by nebulization every 6 hours as needed for shortness of breath, wheezing or cough (Patient not taking: Reported on 6/18/2024) 90 mL 0     buPROPion (WELLBUTRIN XL) 150 MG 24 hr tablet TAKE 1 TABLET(150 MG) BY MOUTH EVERY MORNING  "90 tablet 3     cephALEXin (KEFLEX) 500 MG capsule Take 1 capsule (500 mg) by mouth 4 times daily for 10 days. 40 capsule 0     citalopram (CELEXA) 40 MG tablet TAKE 1 TABLET(40 MG) BY MOUTH DAILY 90 tablet 3     labetalol (NORMODYNE) 200 MG tablet Take 200 mg by mouth 2 times daily.       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     ROS:   Change in vision No  Headaches: no  Respiratory: No shortness of breath, dyspnea on exertion, cough, or hemoptysis   Cardiovascular: negative   Gastrointestinal: negative Abdominal pain: no  Breast: breast pain   Musculoskeletal: negative Joint pain No Back pain: no  Psychiatric: negative  Hematologic/Lymphatic/Immunologic: negative  Endocrine: negative    EXAM  BP (!) 154/108 (BP Location: Right arm, Patient Position: Sitting, Cuff Size: Adult Large)   Pulse 68   Temp 98.2  F (36.8  C) (Oral)   Ht 1.727 m (5' 8\")   Wt 94.8 kg (209 lb)   LMP 09/09/2024 (Exact Date)   SpO2 100%   BMI 31.78 kg/m     PHYSICAL EXAM  Respiratory: breathing non labored.   Breasts: Examination was done in both the upright and supine positions.  Breasts are symmetrical . No masses noted. No skin or nipple changes. No nipple discharge.   Prominent bilateral axillary tissue consistent with accessory breast tissue, greater on the right.   No clavicular, cervical, or axillary lymphadenopathy.     ASSESSMENT/PLAN:    Vivi Mccall is a 39 year old woman with new right superior breast pain and axillary accessory breast tissue. She does have " family history of breast cancer and meets guidelines for high risks screening.     1) Right breast pain  - Will obtain right breast ultrasound for new focal pain. No imaging available today. Will scheduled.     Breast pain is common. Up to 70% of women will experience breast pain in their lifetime.   Breast pain is most often related to normal physiologic change (hormonal fluctuations) and is a rare symptom of breast cancer.   Management of breast pain  Evaluation with imaging is important to determine if the pain is due to normal physiologic changes related to hormonal fluctuations or to a pathologic process.   Physical support  Wear a supportive Bra that is professionally fit and sized. Be sure your bra is not overly compressive or restrictive.   Wear a sports bra when exercising.   Wear comfortable breast support while sleeping.   Improving omega 3 fatty acid intake may improve breast pain. Eat 1-2 tablespoons of flaxseed daily.   Plant-based diets may help with breast pain.  Breast massage. (see handout)   Warm compresses or ice packs can be used  Acupuncture may help with breast pain.   Consider eliminating or decrease caffeine (chocolate, tea, coffee, soda) for a two week period of time to see if pain improves/resolves  Evening Primrose Oil starting with 500 mg/day. May increase to 1000 mg 3 times daily for 6 months. Can be tried.   Chasteberry 400-500 mg/day can be tried.   If you are on hormone based medications adjusting or decreasing these medications may help.   If needed over the counter analgesics such as acetaminophen, ibuprofen, or topical diclofenac cream can intermittently be used.     2) Accessory axillary breast tissue  - Follow up with plastic surgery as schedule.d     3) Family history of breast cancer  She meets NCCN guidelines for high risk screening based on family history with lifetime risk for breast cancer of >20%. Screening recommendations based on NCCN guidelines. Be familiar with your  breast and promptly report any changes to your health care provider. Clinical encounter every 6-12 months. Annual mammogram with imani alternating with annual breast MRI.    - Breast MRI with clinic visit due: 2/2024  - Screening mammogram with clinic visit due: 8/10/25     4) Lifestyle Modifications were provided.   - Maintain your best healthy weight. Higher body fat and adult weight gain is associated with increased risk for breast cancer. This increase in risk has been attributed to increase in circulating endogenous estrogen levels from fat tissue.   - Any alcohol intake increases the risk for breast cancer. If you choose to drink alcohol limit alcohol consumption to less than 1 drink (1 ounce of liquor, 6 ounces of wine, or 8 ounces of beer) per day or less than 3 drinks per week.  - Be active daily and void being sedentary.   - Vitamin D may decrease the risk of developing breast cancer.     Leena Brunson PA-C    30 minutes spent on the date of the encounter doing chart review, review of test results, interpretation of tests, patient visit and documentation.        Again, thank you for allowing me to participate in the care of your patient.        Sincerely,        Leena Brunson PA-C

## 2024-09-30 ENCOUNTER — HOSPITAL ENCOUNTER (OUTPATIENT)
Dept: MAMMOGRAPHY | Facility: CLINIC | Age: 39
Discharge: HOME | End: 2024-09-30
Attending: PHYSICIAN ASSISTANT | Admitting: PHYSICIAN ASSISTANT
Payer: COMMERCIAL

## 2024-09-30 DIAGNOSIS — N64.4 BREAST PAIN: ICD-10-CM

## 2024-09-30 PROCEDURE — 76642 ULTRASOUND BREAST LIMITED: CPT | Mod: 50

## 2024-10-07 ENCOUNTER — OFFICE VISIT (OUTPATIENT)
Dept: FAMILY MEDICINE | Facility: CLINIC | Age: 39
End: 2024-10-07
Payer: COMMERCIAL

## 2024-10-07 VITALS
RESPIRATION RATE: 19 BRPM | WEIGHT: 210 LBS | TEMPERATURE: 97.1 F | OXYGEN SATURATION: 98 % | DIASTOLIC BLOOD PRESSURE: 95 MMHG | HEART RATE: 82 BPM | SYSTOLIC BLOOD PRESSURE: 150 MMHG | BODY MASS INDEX: 31.93 KG/M2

## 2024-10-07 DIAGNOSIS — I10 BENIGN ESSENTIAL HYPERTENSION: Primary | ICD-10-CM

## 2024-10-07 DIAGNOSIS — F07.81 POST CONCUSSION SYNDROME: ICD-10-CM

## 2024-10-07 DIAGNOSIS — E83.51 HYPOCALCEMIA: ICD-10-CM

## 2024-10-07 DIAGNOSIS — R79.89 LOW VITAMIN D LEVEL: ICD-10-CM

## 2024-10-07 DIAGNOSIS — R41.840 ATTENTION AND CONCENTRATION DEFICIT: ICD-10-CM

## 2024-10-07 DIAGNOSIS — I10 BENIGN ESSENTIAL HYPERTENSION: ICD-10-CM

## 2024-10-07 LAB
ERYTHROCYTE [DISTWIDTH] IN BLOOD BY AUTOMATED COUNT: 12.3 % (ref 10–15)
HCT VFR BLD AUTO: 38.4 % (ref 35–47)
HGB BLD-MCNC: 13.3 G/DL (ref 11.7–15.7)
MCH RBC QN AUTO: 30.9 PG (ref 26.5–33)
MCHC RBC AUTO-ENTMCNC: 34.6 G/DL (ref 31.5–36.5)
MCV RBC AUTO: 89 FL (ref 78–100)
PLATELET # BLD AUTO: 325 10E3/UL (ref 150–450)
RBC # BLD AUTO: 4.31 10E6/UL (ref 3.8–5.2)
WBC # BLD AUTO: 3.9 10E3/UL (ref 4–11)

## 2024-10-07 PROCEDURE — 36415 COLL VENOUS BLD VENIPUNCTURE: CPT | Performed by: FAMILY MEDICINE

## 2024-10-07 PROCEDURE — 99000 SPECIMEN HANDLING OFFICE-LAB: CPT | Performed by: FAMILY MEDICINE

## 2024-10-07 PROCEDURE — 82088 ASSAY OF ALDOSTERONE: CPT | Performed by: FAMILY MEDICINE

## 2024-10-07 PROCEDURE — 91320 SARSCV2 VAC 30MCG TRS-SUC IM: CPT | Performed by: FAMILY MEDICINE

## 2024-10-07 PROCEDURE — 99214 OFFICE O/P EST MOD 30 MIN: CPT | Mod: 25 | Performed by: FAMILY MEDICINE

## 2024-10-07 PROCEDURE — 90480 ADMN SARSCOV2 VAC 1/ONLY CMP: CPT | Performed by: FAMILY MEDICINE

## 2024-10-07 PROCEDURE — 84244 ASSAY OF RENIN: CPT | Mod: 90 | Performed by: FAMILY MEDICINE

## 2024-10-07 PROCEDURE — 82570 ASSAY OF URINE CREATININE: CPT | Performed by: FAMILY MEDICINE

## 2024-10-07 PROCEDURE — 85027 COMPLETE CBC AUTOMATED: CPT | Performed by: FAMILY MEDICINE

## 2024-10-07 PROCEDURE — 90656 IIV3 VACC NO PRSV 0.5 ML IM: CPT | Performed by: FAMILY MEDICINE

## 2024-10-07 PROCEDURE — 82043 UR ALBUMIN QUANTITATIVE: CPT | Performed by: FAMILY MEDICINE

## 2024-10-07 PROCEDURE — 90471 IMMUNIZATION ADMIN: CPT | Performed by: FAMILY MEDICINE

## 2024-10-07 PROCEDURE — 82306 VITAMIN D 25 HYDROXY: CPT | Performed by: FAMILY MEDICINE

## 2024-10-07 PROCEDURE — 80069 RENAL FUNCTION PANEL: CPT | Performed by: FAMILY MEDICINE

## 2024-10-07 RX ORDER — BUPROPION HYDROCHLORIDE 150 MG/1
150 TABLET ORAL EVERY MORNING
Qty: 90 TABLET | Refills: 3 | Status: SHIPPED | OUTPATIENT
Start: 2024-10-07

## 2024-10-07 RX ORDER — AMLODIPINE BESYLATE 5 MG/1
5 TABLET ORAL DAILY
Qty: 30 TABLET | Refills: 1 | Status: SHIPPED | OUTPATIENT
Start: 2024-10-07 | End: 2024-10-07

## 2024-10-07 RX ORDER — AMLODIPINE BESYLATE 5 MG/1
5 TABLET ORAL DAILY
Qty: 90 TABLET | Refills: 0 | Status: SHIPPED | OUTPATIENT
Start: 2024-10-07 | End: 2024-11-08

## 2024-10-07 ASSESSMENT — ASTHMA QUESTIONNAIRES
ACT_TOTALSCORE: 25
QUESTION_1 LAST FOUR WEEKS HOW MUCH OF THE TIME DID YOUR ASTHMA KEEP YOU FROM GETTING AS MUCH DONE AT WORK, SCHOOL OR AT HOME: NONE OF THE TIME
QUESTION_3 LAST FOUR WEEKS HOW OFTEN DID YOUR ASTHMA SYMPTOMS (WHEEZING, COUGHING, SHORTNESS OF BREATH, CHEST TIGHTNESS OR PAIN) WAKE YOU UP AT NIGHT OR EARLIER THAN USUAL IN THE MORNING: NOT AT ALL
QUESTION_2 LAST FOUR WEEKS HOW OFTEN HAVE YOU HAD SHORTNESS OF BREATH: NOT AT ALL
QUESTION_4 LAST FOUR WEEKS HOW OFTEN HAVE YOU USED YOUR RESCUE INHALER OR NEBULIZER MEDICATION (SUCH AS ALBUTEROL): NOT AT ALL
ACT_TOTALSCORE: 25
QUESTION_5 LAST FOUR WEEKS HOW WOULD YOU RATE YOUR ASTHMA CONTROL: COMPLETELY CONTROLLED

## 2024-10-07 ASSESSMENT — PAIN SCALES - GENERAL: PAINLEVEL: NO PAIN (0)

## 2024-10-07 NOTE — PROGRESS NOTES
"  Assessment & Plan     (I10) Benign essential hypertension  (primary encounter diagnosis)  Comment: trial of amlodipine and return in 1 month  Consider hyperaldo - if so then will start spironolactone    Plan: Albumin Random Urine Quantitative with Creat         Ratio, CBC with platelets, US Renal Complete         Non-Vascular, Adult Nephrology          Referral, Aldosterone, Renin activity,         Aldosterone Renin Ratio, Renal panel (Alb, BUN,        Ca, Cl, CO2, Creat, Gluc, Phos, K, Na), Vitamin        D Deficiency, DISCONTINUED: amLODIPine         (NORVASC) 5 MG tablet            (F07.81) Post concussion syndrome  Comment: has conpletely managed this well - good concentration and focus  Plan: buPROPion (WELLBUTRIN XL) 150 MG 24 hr tablet            (R41.840) Attention and concentration deficit  Comment:   Plan: buPROPion (WELLBUTRIN XL) 150 MG 24 hr tablet                  BMI  Estimated body mass index is 31.93 kg/m  as calculated from the following:    Height as of 9/26/24: 1.727 m (5' 8\").    Weight as of this encounter: 95.3 kg (210 lb).   Weight management plan: Discussed healthy diet and exercise guidelines      See Patient Instructions    Subjective   Vivi is a 39 year old, presenting for the following health issues:  Hypertension        10/7/2024    10:55 AM   Additional Questions   Roomed by kaye powell   Accompanied by rosy         10/7/2024    10:55 AM   Patient Reported Additional Medications   Patient reports taking the following new medications n/a     History of Present Illness       Hypertension: She presents for follow up of hypertension.  She does check blood pressure  regularly outside of the clinic. Outside blood pressures have been over 140/90. She follows a low salt diet.     She eats 2-3 servings of fruits and vegetables daily.She consumes 2 sweetened beverage(s) daily.She exercises with enough effort to increase her heart rate 30 to 60 minutes per day.  She exercises with " enough effort to increase her heart rate 6 days per week. She is missing 1 dose(s) of medications per week.     Lots of stress family is inflorida - affected by hurricanes  Worried   Ran out of labetalol yesterday  Has been on this 200 twice daily along with Lisinopril/hydrochlorothiazide 20/25  Tolerating these well  Still noting higher BPs at home  Strong fam his of HTN  Has been active - dancing working out  Feels great but BP still remains high  No headaches  No longer breast feeding                  Review of Systems  Constitutional, HEENT, cardiovascular, pulmonary, gi and gu systems are negative, except as otherwise noted.      Objective    BP (!) 150/95   Pulse 82   Temp 97.1  F (36.2  C) (Temporal)   Resp 19   Wt 95.3 kg (210 lb)   LMP 09/09/2024 (Exact Date)   SpO2 98%   BMI 31.93 kg/m    Body mass index is 31.93 kg/m .  Physical Exam   GENERAL: alert and no distress  CV: regular rate and rhythm, normal S1 S2, no S3 or S4, no murmur, click or rub, no peripheral edema     Office Visit on 07/24/2024   Component Date Value Ref Range Status    D-Dimer Quantitative 07/24/2024 <0.27  0.00 - 0.50 ug/mL FEU Final    Sodium 07/24/2024 139  135 - 145 mmol/L Final    Potassium 07/24/2024 4.3  3.4 - 5.3 mmol/L Final    Chloride 07/24/2024 101  98 - 107 mmol/L Final    Carbon Dioxide (CO2) 07/24/2024 32 (H)  22 - 29 mmol/L Final    Anion Gap 07/24/2024 6 (L)  7 - 15 mmol/L Final    Urea Nitrogen 07/24/2024 7.3  6.0 - 20.0 mg/dL Final    Creatinine 07/24/2024 0.80  0.51 - 0.95 mg/dL Final    GFR Estimate 07/24/2024 >90  >60 mL/min/1.73m2 Final    eGFR calculated using 2021 CKD-EPI equation.    Calcium 07/24/2024 9.0  8.8 - 10.4 mg/dL Final    Reference intervals for this test were updated on 7/16/2024 to reflect our healthy population more accurately. There may be differences in the flagging of prior results with similar values performed with this method. Those prior results can be interpreted in the context of  the updated reference intervals.    Glucose 07/24/2024 99  70 - 99 mg/dL Final           Signed Electronically by: Vi Baldwin MD

## 2024-10-08 LAB
ALBUMIN SERPL BCG-MCNC: 3.9 G/DL (ref 3.5–5.2)
ALDOST SERPL-MCNC: 11.9 NG/DL (ref 0–31)
ANION GAP SERPL CALCULATED.3IONS-SCNC: 12 MMOL/L (ref 7–15)
BUN SERPL-MCNC: 8.5 MG/DL (ref 6–20)
CALCIUM SERPL-MCNC: 8.6 MG/DL (ref 8.8–10.4)
CHLORIDE SERPL-SCNC: 103 MMOL/L (ref 98–107)
CREAT SERPL-MCNC: 0.75 MG/DL (ref 0.51–0.95)
CREAT UR-MCNC: 47.7 MG/DL
EGFRCR SERPLBLD CKD-EPI 2021: >90 ML/MIN/1.73M2
GLUCOSE SERPL-MCNC: 114 MG/DL (ref 70–99)
HCO3 SERPL-SCNC: 24 MMOL/L (ref 22–29)
MICROALBUMIN UR-MCNC: <12 MG/L
MICROALBUMIN/CREAT UR: NORMAL MG/G{CREAT}
PHOSPHATE SERPL-MCNC: 3.3 MG/DL (ref 2.5–4.5)
POTASSIUM SERPL-SCNC: 4.3 MMOL/L (ref 3.4–5.3)
SODIUM SERPL-SCNC: 139 MMOL/L (ref 135–145)
VIT D+METAB SERPL-MCNC: 18 NG/ML (ref 20–50)

## 2024-10-10 ENCOUNTER — ANCILLARY PROCEDURE (OUTPATIENT)
Dept: ULTRASOUND IMAGING | Facility: CLINIC | Age: 39
End: 2024-10-10
Attending: FAMILY MEDICINE
Payer: COMMERCIAL

## 2024-10-10 DIAGNOSIS — I10 BENIGN ESSENTIAL HYPERTENSION: ICD-10-CM

## 2024-10-10 LAB
ALDOST/RENIN PLAS-RTO: 29.8 {RATIO} (ref 0–25)
RENIN PLAS-CCNC: 0.4 NG/ML/HR

## 2024-10-10 PROCEDURE — 76770 US EXAM ABDO BACK WALL COMP: CPT

## 2024-10-19 DIAGNOSIS — I10 BENIGN ESSENTIAL HYPERTENSION: ICD-10-CM

## 2024-10-21 RX ORDER — ERGOCALCIFEROL 1.25 MG/1
50000 CAPSULE, LIQUID FILLED ORAL
Qty: 8 CAPSULE | Refills: 0 | Status: SHIPPED | OUTPATIENT
Start: 2024-10-21 | End: 2024-12-10

## 2024-10-21 NOTE — RESULT ENCOUNTER NOTE
Hello,    The labs show that the aldosterone renin ratio is elevated.  It is possible that being on the lisinopril could skew this and since it is just slightly elevated this may be the reason but I think it would be good to talk with cardiology about the best way to treat your hypertension.  I will place a referral for this.  You might have a condition of hypoaldosteronism and this could explain why the blood pressure is difficult to manage.    The calcium levels also looked low and calcium can be affected by vitamin D which is also low.  I would like you to take 50,000 units of vitamin D once weekly and then recheck labs in 2 months after you finish the course.    The complete blood count showed a slightly low white count but this looks minimally low and not of high concern.    Vi Baldwin MD

## 2024-10-23 RX ORDER — LISINOPRIL AND HYDROCHLOROTHIAZIDE 20; 25 MG/1; MG/1
1 TABLET ORAL DAILY
Qty: 90 TABLET | Refills: 0 | Status: SHIPPED | OUTPATIENT
Start: 2024-10-23

## 2024-10-31 NOTE — TELEPHONE ENCOUNTER
FUTURE VISIT INFORMATION      FUTURE VISIT INFORMATION:  Date: 1/24/24  Time: 10:00am  [place] [facility] [place]ERRAL INFORMATION:  Referring provider:  SARAH STEINBERG   Referring providers clinic:  [facility]  Reason for visit/diagnosis  Breast Reduction     RECORDS REQUESTED FROM:       Clinic name Comments Records Status Imaging Status   [facility] OV/referral 10/7/24 epic

## 2024-11-06 DIAGNOSIS — I10 BENIGN ESSENTIAL HYPERTENSION: ICD-10-CM

## 2024-11-08 RX ORDER — AMLODIPINE BESYLATE 5 MG/1
5 TABLET ORAL DAILY
Qty: 90 TABLET | Refills: 0 | Status: SHIPPED | OUTPATIENT
Start: 2024-11-08

## 2024-11-12 ENCOUNTER — PRE VISIT (OUTPATIENT)
Dept: PLASTIC SURGERY | Facility: CLINIC | Age: 39
End: 2024-11-12

## 2024-11-12 ENCOUNTER — OFFICE VISIT (OUTPATIENT)
Dept: PLASTIC SURGERY | Facility: CLINIC | Age: 39
End: 2024-11-12
Attending: FAMILY MEDICINE
Payer: COMMERCIAL

## 2024-11-12 VITALS
DIASTOLIC BLOOD PRESSURE: 91 MMHG | OXYGEN SATURATION: 99 % | BODY MASS INDEX: 31.99 KG/M2 | WEIGHT: 211.1 LBS | SYSTOLIC BLOOD PRESSURE: 145 MMHG | HEIGHT: 68 IN | HEART RATE: 71 BPM

## 2024-11-12 DIAGNOSIS — N62 LARGE BREASTS: Primary | ICD-10-CM

## 2024-11-12 ASSESSMENT — PAIN SCALES - GENERAL: PAINLEVEL_OUTOF10: MILD PAIN (3)

## 2024-11-12 NOTE — TELEPHONE ENCOUNTER
FUTURE VISIT INFORMATION      FUTURE VISIT INFORMATION:  Date: 11/28/24  Time: 10:00am  [place] [facility] [place]ERRAL INFORMATION:  Referring provider:  SARAH STEINBERG   Referring providers clinic:  [facility]  Reason for visit/diagnosis  Breast Reduction      RECORDS REQUESTED FROM:         Clinic name Comments Records Status Imaging Status   [facility] OV/referral 10/7/24 epic

## 2024-11-12 NOTE — PROGRESS NOTES
"PLASTIC SURGERY HISTORY AND PHYSICAL    Chief Complaint:Hypertrophy of breasts   Referring Provider: Vi Baldwin      HPI:  Vivi Mccall  is a 39 year old female who presents with symptomatic macromastia. She currently wears a DDD, would prefer to be smaller C/D. 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 She also had prominent axillary breast tissue develop during the pregnancy, had it ultrasounded 5/2023 with benign findings. Stopped breast feeding in June. When she breast fed she would have milk leaking from armpit- discussed with her PCP and OB and they just said \"oh that's weird\" and did have additional imaging, which was again benign.  She complains of back pain, neck pain, shoulder pain for 2-3 years. Complains of headaches. She has tried the following without successful resolution of pain symptoms: massage, chiropractor, PT, wears high impact sports bra, has trouble finding bras that fit. +family history of breast cancer- mother, maternal grandmother, great aunt.     Recent mammogram: 8/2024- BI-RADS 2, ultrasound 9/2024 benign  Rashes: yes, frequently, has been happening since she was 13yo. Has used prescribed nystatin.     Does have high BP- having trouble controlling, is seeing nephrology upcoming.   Had post partum preeclampsia.     PMH:   Past Medical History:   Diagnosis Date    Asthma     seasonal and environmental    C. difficile diarrhea 2013    Concussion 01/2022    Depression     Depressive disorder     Encounter for IUD insertion 10/16/2020    Jyoti inserted by Dr Park    Generalized anxiety disorder     Hypertension     IUD contraception 10/2020    Jyoti inserted by Dr Park    Lateral epicondylitis of right elbow 6/22/2023    Pain in both hands 02/20/2020       PSH:   Past Surgical History:   Procedure Laterality Date    GYN SURGERY  2003    ORTHOPEDIC SURGERY Left 07/21/2017    ORTHOPEDIC SURGERY Left     SOFT TISSUE SURGERY  1999    " SURGICAL HISTORY OF -       ZZHC RELEASE FOOT/TOE NERVE  2011   L Wrist cyst  Peroneal nerve release by ankle    FH:   Family History   Problem Relation Age of Onset    Diabetes Mother     Bipolar Disorder Mother     Hypertension Mother     Coronary Artery Disease Mother     Depression Mother     Anxiety Disorder Mother     Mental Illness Mother     Breast Cancer Mother 60        BRCA negative, neoadjuvant chemo, surgery pend    Unknown/Adopted Father     Hypertension Father     Breast Cancer Maternal Grandmother     Hypertension Maternal Grandmother     Depression Maternal Grandmother     Anxiety Disorder Maternal Grandmother     Diabetes Type 2  Maternal Grandfather     Glaucoma Maternal Grandfather     Hypertension Maternal Grandfather     Coronary Artery Disease Maternal Grandfather     Diabetes Maternal Grandfather     Depression Maternal Grandfather     Cerebrovascular Disease Maternal Grandfather     Prostate Cancer Maternal Grandfather     Other - See Comments Maternal Grandfather         passed from [facility] 9/2021    Hypertension Paternal Grandmother     Coronary Artery Disease Paternal Grandfather     Hypertension Maternal Aunt     Ovarian Cancer Other     Macular Degeneration No family hx of         SH:   Social History     Tobacco Use    Smoking status: Never     Passive exposure: Never    Smokeless tobacco: Never   Vaping Use    Vaping status: Never Used   Substance Use Topics    Alcohol use: Not Currently     Comment: currently pregnant    Drug use: No      Work/school: professional dancer and dance educator     MEDS:     Current Outpatient Medications:     albuterol (PROAIR HFA/PROVENTIL HFA/VENTOLIN HFA) 108 (90 Base) MCG/ACT inhaler, Inhale 2 puffs into the lungs every 6 hours as needed for shortness of breath, wheezing or cough, Disp: 18 g, Rfl: 3    albuterol (PROVENTIL) (2.5 MG/3ML) 0.083% neb solution, Take 1 vial (2.5 mg) by nebulization every 6 hours as needed for shortness of breath, wheezing  "or cough, Disp: 90 mL, Rfl: 0    amLODIPine (NORVASC) 5 MG tablet, TAKE 1 TABLET(5 MG) BY MOUTH DAILY, Disp: 90 tablet, Rfl: 0    buPROPion (WELLBUTRIN XL) 150 MG 24 hr tablet, Take 1 tablet (150 mg) by mouth every morning., Disp: 90 tablet, Rfl: 3    citalopram (CELEXA) 40 MG tablet, TAKE 1 TABLET(40 MG) BY MOUTH DAILY, Disp: 90 tablet, Rfl: 3    labetalol (NORMODYNE) 200 MG tablet, Take 200 mg by mouth 2 times daily., Disp: , Rfl:     lisinopril-hydrochlorothiazide (ZESTORETIC) 20-25 MG tablet, TAKE 1 TABLET BY MOUTH DAILY, Disp: 90 tablet, Rfl: 0    vitamin D2 (ERGOCALCIFEROL) 14107 units (1250 mcg) capsule, Take 1 capsule (50,000 Units) by mouth every 7 days for 8 doses., Disp: 8 capsule, Rfl: 0     Does not take labetalol regularly now      ALLERGIES:     Allergies   Allergen Reactions    Amoxicillin Other (See Comments)     Patient does not want to take since having c diff    Codeine Other (See Comments)     Hallucinations    Penicillins Other (See Comments)     Patient does not want to take since having c diff        ROS: Denies chest pain, shortness of breath, MI, CVA, DVT, PE, and bleeding disorders.     PHYSICAL EXAMINATION:   BP (!) 145/91 (BP Location: Left arm, Patient Position: Sitting, Cuff Size: Adult Large)   Pulse 71   Ht 1.727 m (5' 8\")   Wt 95.8 kg (211 lb 1.6 oz)   LMP 09/09/2024 (Exact Date)   SpO2 99%   BMI 32.10 kg/m     BMI: Body mass index is 32.1 kg/m .  Body surface area is 2.14 meters squared.   General: NAD,  Chest: bilateral mild macromastia with grade II ptosis on R, grade I L. Nipple to notch L24cm, R 25cm. R breast ~800g, L breast slightly smaller.  +shoulder grooving    800g     ASSESSMENT:  39 year old female PMH asthma, depression, anxiety, HTN who presents with symptomatic macromastia desiring breast reduction.     PLAN:   According to Schnur scale 800g will need to be removed, this will be approximately >90% reduction from her current size. I do not feel that this amount " can easily be removed at it would be most of her breast tissue and certainly would not leave her in her desired range of full C/D cup. I think ~300-400g removal would put her closer to her desired breast size. In regards to her axillary breast tissue, this tissue is generally not removed with a standard breast reduction. The option for surgical removal would be via direct excision, leaving a scar over that area. In the past we have not had good luck getting insurance to cover this and generally is an out of pocket cost. I would have her see Dr. Lanier for a quick consult prior to scheduling if she wanted to pursue this.     Risks and benefits of the procedure were discussed, including but not limited to bleeding, infection, scarring, wound healing complications, asymmetry, loss of nipple sensation, nipple necrosis, inability to breast feed and change in size of breasts in the future.     -Vivi is quite disappointed, which is very understandable. I will send her an out of pocket quote so she has an idea of what surgery would cost if not covered by insurance. She can let us know how she would like to proceed.    Ines Garza PA-C  Plastic and Reconstructive Surgery     45 minutes spent on the date of the encounter doing chart review, history and physical, documentation and further activity as noted above.

## 2024-11-12 NOTE — LETTER
"11/12/2024       RE: Vivi Mccall  [address]  [facility] [address]     Dear Colleague,    Thank you for referring your patient, Vivi Mccall, to the [facility] [place] at [facility]. Please see a copy of my visit note below.    PLASTIC SURGERY HISTORY AND PHYSICAL    Chief Complaint:Hypertrophy of breasts   Referring Provider: Vi Baldwin      HPI:  Vivi Mccall  is a 39 year old female who presents with symptomatic macromastia. She currently wears a DDD, would prefer to be smaller C/D. 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 She also had prominent axillary breast tissue develop during the pregnancy, had it ultrasounded 5/2023 with benign findings. Stopped breast feeding in June. When she breast fed she would have milk leaking from armpit- discussed with her PCP and OB and they just said \"oh that's weird\" and did have additional imaging, which was again benign.  She complains of back pain, neck pain, shoulder pain for 2-3 years. Complains of headaches. She has tried the following without successful resolution of pain symptoms: massage, chiropractor, PT, wears high impact sports bra, has trouble finding bras that fit. +family history of breast cancer- mother, maternal grandmother, great aunt.     Recent mammogram: 8/2024- BI-RADS 2, ultrasound 9/2024 benign  Rashes: yes, frequently, has been happening since she was 13yo. Has used prescribed nystatin.     Does have high BP- having trouble controlling, is seeing nephrology upcoming.   Had post partum preeclampsia.     PMH:   Past Medical History:   Diagnosis Date     Asthma     seasonal and environmental     C. difficile diarrhea 2013     Concussion 01/2022     Depression      Depressive disorder      Encounter for IUD insertion 10/16/2020    Jyoti inserted by Dr Park     Generalized " anxiety disorder      Hypertension      IUD contraception 10/2020    Jyoti inserted by Dr Park     Lateral epicondylitis of right elbow 6/22/2023     Pain in both hands 02/20/2020       PSH:   Past Surgical History:   Procedure Laterality Date     GYN SURGERY  2003     ORTHOPEDIC SURGERY Left 07/21/2017     ORTHOPEDIC SURGERY Left      SOFT TISSUE SURGERY  1999     SURGICAL HISTORY OF -        ZZHC RELEASE FOOT/TOE NERVE  2011   L Wrist cyst  Peroneal nerve release by ankle    FH:   Family History   Problem Relation Age of Onset     Diabetes Mother      Bipolar Disorder Mother      Hypertension Mother      Coronary Artery Disease Mother      Depression Mother      Anxiety Disorder Mother      Mental Illness Mother      Breast Cancer Mother 60        BRCA negative, neoadjuvant chemo, surgery pend     Unknown/Adopted Father      Hypertension Father      Breast Cancer Maternal Grandmother      Hypertension Maternal Grandmother      Depression Maternal Grandmother      Anxiety Disorder Maternal Grandmother      Diabetes Type 2  Maternal Grandfather      Glaucoma Maternal Grandfather      Hypertension Maternal Grandfather      Coronary Artery Disease Maternal Grandfather      Diabetes Maternal Grandfather      Depression Maternal Grandfather      Cerebrovascular Disease Maternal Grandfather      Prostate Cancer Maternal Grandfather      Other - See Comments Maternal Grandfather         passed from [employer] 9/2021     Hypertension Paternal Grandmother      Coronary Artery Disease Paternal Grandfather      Hypertension Maternal Aunt      Ovarian Cancer Other      Macular Degeneration No family hx of         SH:   Social History     Tobacco Use     Smoking status: Never     Passive exposure: Never     Smokeless tobacco: Never   Vaping Use     Vaping status: Never Used   Substance Use Topics     Alcohol use: Not Currently     Comment: currently pregnant     Drug use: No      Work/school: professional dancer and dance  "educator     MEDS:     Current Outpatient Medications:      albuterol (PROAIR HFA/PROVENTIL HFA/VENTOLIN HFA) 108 (90 Base) MCG/ACT inhaler, Inhale 2 puffs into the lungs every 6 hours as needed for shortness of breath, wheezing or cough, Disp: 18 g, Rfl: 3     albuterol (PROVENTIL) (2.5 MG/3ML) 0.083% neb solution, Take 1 vial (2.5 mg) by nebulization every 6 hours as needed for shortness of breath, wheezing or cough, Disp: 90 mL, Rfl: 0     amLODIPine (NORVASC) 5 MG tablet, TAKE 1 TABLET(5 MG) BY MOUTH DAILY, Disp: 90 tablet, Rfl: 0     buPROPion (WELLBUTRIN XL) 150 MG 24 hr tablet, Take 1 tablet (150 mg) by mouth every morning., Disp: 90 tablet, Rfl: 3     citalopram (CELEXA) 40 MG tablet, TAKE 1 TABLET(40 MG) BY MOUTH DAILY, Disp: 90 tablet, Rfl: 3     labetalol (NORMODYNE) 200 MG tablet, Take 200 mg by mouth 2 times daily., Disp: , Rfl:      lisinopril-hydrochlorothiazide (ZESTORETIC) 20-25 MG tablet, TAKE 1 TABLET BY MOUTH DAILY, Disp: 90 tablet, Rfl: 0     vitamin D2 (ERGOCALCIFEROL) 90358 units (1250 mcg) capsule, Take 1 capsule (50,000 Units) by mouth every 7 days for 8 doses., Disp: 8 capsule, Rfl: 0     Does not take labetalol regularly now      ALLERGIES:     Allergies   Allergen Reactions     Amoxicillin Other (See Comments)     Patient does not want to take since having c diff     Codeine Other (See Comments)     Hallucinations     Penicillins Other (See Comments)     Patient does not want to take since having c diff        ROS: Denies chest pain, shortness of breath, MI, CVA, DVT, PE, and bleeding disorders.     PHYSICAL EXAMINATION:   BP (!) 145/91 (BP Location: Left arm, Patient Position: Sitting, Cuff Size: Adult Large)   Pulse 71   Ht 1.727 m (5' 8\")   Wt 95.8 kg (211 lb 1.6 oz)   LMP 09/09/2024 (Exact Date)   SpO2 99%   BMI 32.10 kg/m     BMI: Body mass index is 32.1 kg/m .  Body surface area is 2.14 meters squared.   General: NAD,  Chest: bilateral mild macromastia with grade II ptosis on " R, grade I L. Nipple to notch L24cm, R 25cm. R breast ~800g, L breast slightly smaller.  +shoulder grooving    800g     ASSESSMENT:  39 year old female PMH asthma, depression, anxiety, HTN who presents with symptomatic macromastia desiring breast reduction.     PLAN:   According to Schnur scale 800g will need to be removed, this will be approximately >90% reduction from her current size. I do not feel that this amount can easily be removed at it would be most of her breast tissue and certainly would not leave her in her desired range of full C/D cup. I think ~300-400g removal would put her closer to her desired breast size. In regards to her axillary breast tissue, this tissue is generally not removed with a standard breast reduction. The option for surgical removal would be via direct excision, leaving a scar over that area. In the past we have not had good luck getting insurance to cover this and generally is an out of pocket cost. I would have her see Dr. Lanier for a quick consult prior to scheduling if she wanted to pursue this.     Risks and benefits of the procedure were discussed, including but not limited to bleeding, infection, scarring, wound healing complications, asymmetry, loss of nipple sensation, nipple necrosis, inability to breast feed and change in size of breasts in the future.     -Vivi is quite disappointed, which is very understandable. I will send her an out of pocket quote so she has an idea of what surgery would cost if not covered by insurance. She can let us know how she would like to proceed.    Ines Garza PA-C  Plastic and Reconstructive Surgery     45 minutes spent on the date of the encounter doing chart review, history and physical, documentation and further activity as noted above.      Again, thank you for allowing me to participate in the care of your patient.      Sincerely,    Ines Garza PA-C

## 2024-11-12 NOTE — NURSING NOTE
"Chief Complaint   Patient presents with    Consult     Breast reduction.       Vitals:    11/12/24 1012   BP: (!) 145/91   BP Location: Left arm   Patient Position: Sitting   Cuff Size: Adult Large   Pulse: 71   SpO2: 99%   Weight: 211 lb 1.6 oz   Height: 5' 8\"       Body mass index is 32.1 kg/m .      Dominic Jefferson, EMT    "

## 2024-11-18 ENCOUNTER — VIRTUAL VISIT (OUTPATIENT)
Dept: FAMILY MEDICINE | Facility: CLINIC | Age: 39
End: 2024-11-18
Payer: COMMERCIAL

## 2024-11-18 DIAGNOSIS — I10 BENIGN ESSENTIAL HYPERTENSION: ICD-10-CM

## 2024-11-18 DIAGNOSIS — R51.9 CHRONIC HEADACHE WITH NEW FEATURES: ICD-10-CM

## 2024-11-18 DIAGNOSIS — I10 BENIGN ESSENTIAL HYPERTENSION: Primary | ICD-10-CM

## 2024-11-18 DIAGNOSIS — G89.29 CHRONIC HEADACHE WITH NEW FEATURES: ICD-10-CM

## 2024-11-18 DIAGNOSIS — I1A.0 RESISTANT HYPERTENSION: ICD-10-CM

## 2024-11-18 DIAGNOSIS — H53.9 VISION CHANGES: ICD-10-CM

## 2024-11-18 PROCEDURE — 99214 OFFICE O/P EST MOD 30 MIN: CPT | Mod: 95 | Performed by: FAMILY MEDICINE

## 2024-11-18 RX ORDER — SPIRONOLACTONE 25 MG/1
TABLET ORAL
Qty: 69 TABLET | OUTPATIENT
Start: 2024-11-18

## 2024-11-18 RX ORDER — SPIRONOLACTONE 25 MG/1
12.5 TABLET ORAL DAILY
Qty: 30 TABLET | Refills: 1 | Status: SHIPPED | OUTPATIENT
Start: 2024-11-18

## 2024-11-18 ASSESSMENT — ANXIETY QUESTIONNAIRES
3. WORRYING TOO MUCH ABOUT DIFFERENT THINGS: NOT AT ALL
5. BEING SO RESTLESS THAT IT IS HARD TO SIT STILL: NOT AT ALL
2. NOT BEING ABLE TO STOP OR CONTROL WORRYING: NOT AT ALL
IF YOU CHECKED OFF ANY PROBLEMS ON THIS QUESTIONNAIRE, HOW DIFFICULT HAVE THESE PROBLEMS MADE IT FOR YOU TO DO YOUR WORK, TAKE CARE OF THINGS AT HOME, OR GET ALONG WITH OTHER PEOPLE: NOT DIFFICULT AT ALL
4. TROUBLE RELAXING: NOT AT ALL
GAD7 TOTAL SCORE: 3
8. IF YOU CHECKED OFF ANY PROBLEMS, HOW DIFFICULT HAVE THESE MADE IT FOR YOU TO DO YOUR WORK, TAKE CARE OF THINGS AT HOME, OR GET ALONG WITH OTHER PEOPLE?: NOT DIFFICULT AT ALL
GAD7 TOTAL SCORE: 3
1. FEELING NERVOUS, ANXIOUS, OR ON EDGE: SEVERAL DAYS
GAD7 TOTAL SCORE: 3
6. BECOMING EASILY ANNOYED OR IRRITABLE: SEVERAL DAYS
7. FEELING AFRAID AS IF SOMETHING AWFUL MIGHT HAPPEN: SEVERAL DAYS
7. FEELING AFRAID AS IF SOMETHING AWFUL MIGHT HAPPEN: SEVERAL DAYS

## 2024-11-18 ASSESSMENT — PATIENT HEALTH QUESTIONNAIRE - PHQ9
SUM OF ALL RESPONSES TO PHQ QUESTIONS 1-9: 4
SUM OF ALL RESPONSES TO PHQ QUESTIONS 1-9: 4
10. IF YOU CHECKED OFF ANY PROBLEMS, HOW DIFFICULT HAVE THESE PROBLEMS MADE IT FOR YOU TO DO YOUR WORK, TAKE CARE OF THINGS AT HOME, OR GET ALONG WITH OTHER PEOPLE: SOMEWHAT DIFFICULT

## 2024-11-18 NOTE — PROGRESS NOTES
Vivi is a 39 year old who is being evaluated via a billable video visit.    How would you like to obtain your AVS? MyChart  If the video visit is dropped, the invitation should be resent by: Send to e-mail at: [email or web address]  Will anyone else be joining your video visit? No      Assessment & Plan     (I10) Benign essential hypertension  (primary encounter diagnosis)  Comment: persistent and resistant HTN - will try spironolactone and send for imaging given ongoing headaches - evaluate for intracranial pressure signs/changes  Plan: spironolactone (ALDACTONE) 25 MG tablet, Basic         metabolic panel  (Ca, Cl, CO2, Creat, Gluc, K,         Na, BUN), MR Brain w/o Contrast            (I1A.0) Resistant hypertension  Comment:   Plan: MR Brain w/o Contrast            (R51.9,  G89.29) Chronic headache with new features  Comment:   Plan: MR Brain w/o Contrast            (H53.9) Vision changes  Comment:   Plan: MR Brain w/o Contrast                    See Patient Instructions    Subjective   Vivi is a 39 year old, presenting for the following health issues:  No chief complaint on file.    History of Present Illness       Hypertension: She presents for follow up of hypertension.  She does check blood pressure  regularly outside of the clinic. Outside blood pressures have been over 140/90. She follows a low salt diet.     She eats 2-3 servings of fruits and vegetables daily.She consumes 2 sweetened beverage(s) daily.She exercises with enough effort to increase her heart rate 30 to 60 minutes per day.  She exercises with enough effort to increase her heart rate 4 days per week. She is missing 1 dose(s) of medications per week.  She is not taking prescribed medications regularly due to remembering to take.     HTN:  Feels new meds have helped some  Still struggling with BP  Dizziness and chronic fatiue make her worried  Dizziness is worsening  Sees neurology friday  Bp 156/107  Dizziness started after having her  2nd child  Had lots of bp drops in pregnancy  Feels light headed room spinning    Sees cardiology 12/10            Review of Systems  Constitutional, neuro, ENT, endocrine, pulmonary, cardiac, gastrointestinal, genitourinary, musculoskeletal, integument and psychiatric systems are negative, except as otherwise noted.      Objective           Vitals:  No vitals were obtained today due to virtual visit.    Physical Exam   GENERAL: alert and fatigued  EYES: Eyes grossly normal to inspection.  No discharge or erythema, or obvious scleral/conjunctival abnormalities.  RESP: No audible wheeze, cough, or visible cyanosis.    SKIN: Visible skin clear. No significant rash, abnormal pigmentation or lesions.  NEURO: Cranial nerves grossly intact.  Mentation and speech appropriate for age.  PSYCH: Appropriate affect, tone, and pace of words          Video-Visit Details  Joined the call at 11/18/2024, 1:13:28 pm.  Left the call at 11/18/2024, 1:27:58 pm.  You were on the call for 14 minutes 29 seconds .  Type of service:  Video Visit   Originating Location (pt. Location): Home    Distant Location (provider location):  On-site  Platform used for Video Visit: Rambo  Signed Electronically by: Vi Baldwin MD

## 2024-11-22 ENCOUNTER — TRANSFERRED RECORDS (OUTPATIENT)
Dept: HEALTH INFORMATION MANAGEMENT | Facility: CLINIC | Age: 39
End: 2024-11-22
Payer: COMMERCIAL

## 2024-11-30 ENCOUNTER — HEALTH MAINTENANCE LETTER (OUTPATIENT)
Age: 39
End: 2024-11-30

## 2024-12-02 ENCOUNTER — MYC MEDICAL ADVICE (OUTPATIENT)
Dept: FAMILY MEDICINE | Facility: CLINIC | Age: 39
End: 2024-12-02
Payer: COMMERCIAL

## 2024-12-06 ENCOUNTER — ANCILLARY PROCEDURE (OUTPATIENT)
Dept: MRI IMAGING | Facility: CLINIC | Age: 39
End: 2024-12-06
Attending: FAMILY MEDICINE
Payer: COMMERCIAL

## 2024-12-06 DIAGNOSIS — I1A.0 RESISTANT HYPERTENSION: ICD-10-CM

## 2024-12-06 DIAGNOSIS — H53.9 VISION CHANGES: ICD-10-CM

## 2024-12-06 DIAGNOSIS — R51.9 CHRONIC HEADACHE WITH NEW FEATURES: ICD-10-CM

## 2024-12-06 DIAGNOSIS — I10 BENIGN ESSENTIAL HYPERTENSION: ICD-10-CM

## 2024-12-06 DIAGNOSIS — G89.29 CHRONIC HEADACHE WITH NEW FEATURES: ICD-10-CM

## 2024-12-06 PROCEDURE — 70551 MRI BRAIN STEM W/O DYE: CPT

## 2024-12-10 ENCOUNTER — HOSPITAL ENCOUNTER (OUTPATIENT)
Dept: CARDIOLOGY | Facility: CLINIC | Age: 39
Discharge: HOME | End: 2024-12-10
Attending: INTERNAL MEDICINE
Payer: COMMERCIAL

## 2024-12-10 ENCOUNTER — OFFICE VISIT (OUTPATIENT)
Dept: CARDIOLOGY | Facility: CLINIC | Age: 39
End: 2024-12-10
Attending: FAMILY MEDICINE
Payer: COMMERCIAL

## 2024-12-10 ENCOUNTER — ORDERS ONLY (AUTO-RELEASED) (OUTPATIENT)
Dept: CARDIOLOGY | Facility: CLINIC | Age: 39
End: 2024-12-10

## 2024-12-10 VITALS
SYSTOLIC BLOOD PRESSURE: 138 MMHG | HEIGHT: 68 IN | HEART RATE: 78 BPM | WEIGHT: 209.2 LBS | DIASTOLIC BLOOD PRESSURE: 91 MMHG | BODY MASS INDEX: 31.71 KG/M2

## 2024-12-10 DIAGNOSIS — R55 PRE-SYNCOPE: ICD-10-CM

## 2024-12-10 DIAGNOSIS — I10 BENIGN ESSENTIAL HYPERTENSION: ICD-10-CM

## 2024-12-10 DIAGNOSIS — R55 PRE-SYNCOPE: Primary | ICD-10-CM

## 2024-12-10 PROCEDURE — 93786 AMBL BP MNTR W/SW REC ONLY: CPT

## 2024-12-10 PROCEDURE — 93788 AMBL BP MNTR W/SW A/R: CPT

## 2024-12-10 RX ORDER — SPIRONOLACTONE 25 MG/1
25 TABLET ORAL DAILY
COMMUNITY

## 2024-12-10 NOTE — LETTER
"12/10/2024    Vi Baldwin MD  [address]  [facility] [address]    RE: Vivi Mccall       Dear Colleague,     I had the pleasure of seeing Vivi Mccall in the [facility].  CARDIOLOGY CLINIC CONSULTATION      REASON FOR CONSULT:   Hypertension    PRIMARY CARE PHYSICIAN:  Vi Baldwin        History of Present Illness  Vivi Mccall is an extremely pleasant 39 year old female here as a new patient for management of hypertension.  She has a history of high blood pressure stretching back to age 24 and has been on medication for many years.  She also has occasional migraines.  Her family history is notable for \"everyone\" in her family having high blood pressure.  Her grandfather sounds like he may have had some medically managed coronary disease, but otherwise no heart disease in the family.  Patient is a never smoker.  She has 3 or 4 drinks per week.  She does not use NSAIDs.  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    She recently saw nephrology to establish care and began management of her blood pressure, but also is referred to see cardiology.  Symptomatically, she has no chest pains or excessive shortness of breath while exercising.  However, she does deal with intermittent dizziness and lightheadedness.  This sometimes occurs when she is standing, but also can occur out of the blue when sitting.  No true syncopal episodes.  She is currently working with nephrology on a secondary hypertension workup, and a renal duplex ultrasound is pending.    I did review her most recent nephrology note, and also reviewed her labs from 10/2024, showing normal sodium and potassium, normal renal function, and normal hemoglobin and platelets.  Her ECG from 7/24/2024 was relatively unremarkable.  She had an exercise stress echo from 10/27/2021 which was negative with above average exercise capacity of 13.7 METS.  I did " personally review the images of this, and it does appear that she may have some LVH, though full investigation of this was not performed on the limited resting images.      Assessment & Plan    Hypertension, with difficult control  Periodic lightheadedness without syncope  FH of likely medically managed CAD in grandfather  Never smoker      It was a pleasure to speak with Vivi in clinic today.  Specifically related to her blood pressure, I discussed with her that the best approach is typically to have 1 person managing her blood pressure, so I will defer medication management to nephrology.  However, I do think that we should investigate for any downstream cardiac impact of her blood pressure, and for that I recommended a TTE to better investigate for LVH, etc.  In addition, given the periodic lightheadedness episodes that she is having, I think that a 24-hour BP cuff would be helpful to better assess her actual blood pressures, as perhaps her BP does spike during the process of checking the blood pressure, and she may be running lower and therefore leading to more symptoms.  Lastly, I think a 7-day Zio patch would be helpful to ruyle out arrhythmia as a cause of her lightheadedness.  Otherwise, if these tests are reassuring, I think that she can follow-up with her PCP and nephrologist going forward.  Of course, if any new cardiac issues arise would be more than happy to see her back in clinic.       -TTE  -24-hour BP cuff  -7-day Zio patch  --Defer HTN med management to nephrology; agree with renal Duplex US        Follow-up: With PCP and nephrology          Magdy Florez MD  Interventional Cardiology  December 10, 2024          Medications  Current Outpatient Medications   Medication Sig Dispense Refill     albuterol (PROAIR HFA/PROVENTIL HFA/VENTOLIN HFA) 108 (90 Base) MCG/ACT inhaler Inhale 2 puffs into the lungs every 6 hours as needed for shortness of breath, wheezing or cough 18 g 3     buPROPion  (WELLBUTRIN XL) 150 MG 24 hr tablet Take 1 tablet (150 mg) by mouth every morning. 90 tablet 3     citalopram (CELEXA) 40 MG tablet TAKE 1 TABLET(40 MG) BY MOUTH DAILY 90 tablet 3     lisinopril-hydrochlorothiazide (ZESTORETIC) 20-25 MG tablet TAKE 1 TABLET BY MOUTH DAILY 90 tablet 0     spironolactone (ALDACTONE) 25 MG tablet Take 25 mg by mouth daily.       albuterol (PROVENTIL) (2.5 MG/3ML) 0.083% neb solution Take 1 vial (2.5 mg) by nebulization every 6 hours as needed for shortness of breath, wheezing or cough (Patient not taking: Reported on 12/10/2024) 90 mL 0     vitamin D2 (ERGOCALCIFEROL) 55612 units (1250 mcg) capsule Take 1 capsule (50,000 Units) by mouth every 7 days for 8 doses. 8 capsule 0     No current facility-administered medications for this visit.     Allergies  Allergies   Allergen Reactions     Amoxicillin Other (See Comments)     Patient does not want to take since having c diff     Codeine Other (See Comments)     Hallucinations     Penicillins Other (See Comments)     Patient does not want to take since having c diff         Physical Exam      BP: (!) 138/91 Pulse: 78            Vital Signs with Ranges  Pulse:  [78] 78  BP: (138)/(91) 138/91  209 lbs 3.2 oz    Constitutional: Well-appearing, no acute distress  Respiratory: Normal respiratory effort, CTAB  Cardiovascular: RRR, no m/r/g.  JVP < 7 cm H2O.  There is no LE edema.  Normal carotid upstrokes, no carotid bruits.      Thank you for allowing me to participate in the care of your patient.      Sincerely,     Magdy Florez MD     [facility]  cc:   Vi Baldwin MD  [address] [address]

## 2024-12-10 NOTE — PROGRESS NOTES
"CARDIOLOGY CLINIC CONSULTATION      REASON FOR CONSULT:   Hypertension    PRIMARY CARE PHYSICIAN:  Vi Hamilton Ndyajunwoha        History of Present Illness   Vivi Mccall is an extremely pleasant 39 year old female here as a new patient for management of hypertension.  She has a history of high blood pressure stretching back to age 24 and has been on medication for many years.  She also has occasional migraines.  Her family history is notable for \"everyone\" in her family having high blood pressure.  Her grandfather sounds like he may have had some medically managed coronary disease, but otherwise no heart disease in the family.  Patient is a never smoker.  She has 3 or 4 drinks per week.  She does not use NSAIDs.  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    She recently saw nephrology to establish care and began management of her blood pressure, but also is referred to see cardiology.  Symptomatically, she has no chest pains or excessive shortness of breath while exercising.  However, she does deal with intermittent dizziness and lightheadedness.  This sometimes occurs when she is standing, but also can occur out of the blue when sitting.  No true syncopal episodes.  She is currently working with nephrology on a secondary hypertension workup, and a renal duplex ultrasound is pending.    I did review her most recent nephrology note, and also reviewed her labs from 10/2024, showing normal sodium and potassium, normal renal function, and normal hemoglobin and platelets.  Her ECG from 7/24/2024 was relatively unremarkable.  She had an exercise stress echo from 10/27/2021 which was negative with above average exercise capacity of 13.7 METS.  I did personally review the images of this, and it does appear that she may have some LVH, though full investigation of this was not performed on the limited resting images.      Assessment & Plan     Hypertension, with difficult " control  Periodic lightheadedness without syncope  FH of likely medically managed CAD in grandfather  Never smoker      It was a pleasure to speak with Vivi in clinic today.  Specifically related to her blood pressure, I discussed with her that the best approach is typically to have 1 person managing her blood pressure, so I will defer medication management to nephrology.  However, I do think that we should investigate for any downstream cardiac impact of her blood pressure, and for that I recommended a TTE to better investigate for LVH, etc.  In addition, given the periodic lightheadedness episodes that she is having, I think that a 24-hour BP cuff would be helpful to better assess her actual blood pressures, as perhaps her BP does spike during the process of checking the blood pressure, and she may be running lower and therefore leading to more symptoms.  Lastly, I think a 7-day Zio patch would be helpful to ruyle out arrhythmia as a cause of her lightheadedness.  Otherwise, if these tests are reassuring, I think that she can follow-up with her PCP and nephrologist going forward.  Of course, if any new cardiac issues arise would be more than happy to see her back in clinic.       -TTE  -24-hour BP cuff  -7-day Zio patch  --Defer HTN med management to nephrology; agree with renal Duplex US        Follow-up: With PCP and nephrology          Magdy Florez MD  Interventional Cardiology  December 10, 2024          Medications   Current Outpatient Medications   Medication Sig Dispense Refill    albuterol (PROAIR HFA/PROVENTIL HFA/VENTOLIN HFA) 108 (90 Base) MCG/ACT inhaler Inhale 2 puffs into the lungs every 6 hours as needed for shortness of breath, wheezing or cough 18 g 3    buPROPion (WELLBUTRIN XL) 150 MG 24 hr tablet Take 1 tablet (150 mg) by mouth every morning. 90 tablet 3    citalopram (CELEXA) 40 MG tablet TAKE 1 TABLET(40 MG) BY MOUTH DAILY 90 tablet 3    lisinopril-hydrochlorothiazide (ZESTORETIC)  20-25 MG tablet TAKE 1 TABLET BY MOUTH DAILY 90 tablet 0    spironolactone (ALDACTONE) 25 MG tablet Take 25 mg by mouth daily.      albuterol (PROVENTIL) (2.5 MG/3ML) 0.083% neb solution Take 1 vial (2.5 mg) by nebulization every 6 hours as needed for shortness of breath, wheezing or cough (Patient not taking: Reported on 12/10/2024) 90 mL 0    vitamin D2 (ERGOCALCIFEROL) 86920 units (1250 mcg) capsule Take 1 capsule (50,000 Units) by mouth every 7 days for 8 doses. 8 capsule 0     No current facility-administered medications for this visit.     Allergies   Allergies   Allergen Reactions    Amoxicillin Other (See Comments)     Patient does not want to take since having c diff    Codeine Other (See Comments)     Hallucinations    Penicillins Other (See Comments)     Patient does not want to take since having c diff         Physical Exam       BP: (!) 138/91 Pulse: 78            Vital Signs with Ranges  Pulse:  [78] 78  BP: (138)/(91) 138/91  209 lbs 3.2 oz    Constitutional: Well-appearing, no acute distress  Respiratory: Normal respiratory effort, CTAB  Cardiovascular: RRR, no m/r/g.  JVP < 7 cm H2O.  There is no LE edema.  Normal carotid upstrokes, no carotid bruits.

## 2024-12-13 ENCOUNTER — HOSPITAL ENCOUNTER (OUTPATIENT)
Dept: ULTRASOUND IMAGING | Facility: CLINIC | Age: 39
Discharge: HOME | End: 2024-12-13
Attending: INTERNAL MEDICINE | Admitting: INTERNAL MEDICINE
Payer: COMMERCIAL

## 2024-12-13 DIAGNOSIS — I10 BENIGN ESSENTIAL HYPERTENSION: ICD-10-CM

## 2024-12-13 PROCEDURE — 93975 VASCULAR STUDY: CPT

## 2024-12-13 PROCEDURE — 76770 US EXAM ABDO BACK WALL COMP: CPT

## 2024-12-19 ENCOUNTER — HOSPITAL ENCOUNTER (EMERGENCY)
Facility: CLINIC | Age: 39
Discharge: HOME | End: 2024-12-19
Admitting: EMERGENCY MEDICINE
Payer: COMMERCIAL

## 2024-12-19 VITALS
HEART RATE: 94 BPM | RESPIRATION RATE: 16 BRPM | WEIGHT: 209 LBS | BODY MASS INDEX: 31.67 KG/M2 | SYSTOLIC BLOOD PRESSURE: 155 MMHG | HEIGHT: 68 IN | DIASTOLIC BLOOD PRESSURE: 108 MMHG | TEMPERATURE: 98 F | OXYGEN SATURATION: 99 %

## 2024-12-19 LAB
ANION GAP SERPL CALCULATED.3IONS-SCNC: 9 MMOL/L (ref 7–15)
ATRIAL RATE - MUSE: 69 BPM
BASOPHILS # BLD AUTO: 0.1 10E3/UL (ref 0–0.2)
BASOPHILS NFR BLD AUTO: 1 %
BUN SERPL-MCNC: 7.8 MG/DL (ref 6–20)
CALCIUM SERPL-MCNC: 9 MG/DL (ref 8.8–10.4)
CHLORIDE SERPL-SCNC: 100 MMOL/L (ref 98–107)
CREAT SERPL-MCNC: 0.83 MG/DL (ref 0.51–0.95)
DIASTOLIC BLOOD PRESSURE - MUSE: NORMAL MMHG
EGFRCR SERPLBLD CKD-EPI 2021: >90 ML/MIN/1.73M2
EOSINOPHIL # BLD AUTO: 0.2 10E3/UL (ref 0–0.7)
EOSINOPHIL NFR BLD AUTO: 4 %
ERYTHROCYTE [DISTWIDTH] IN BLOOD BY AUTOMATED COUNT: 12.6 % (ref 10–15)
GLUCOSE SERPL-MCNC: 82 MG/DL (ref 70–99)
HCO3 SERPL-SCNC: 28 MMOL/L (ref 22–29)
HCT VFR BLD AUTO: 36.8 % (ref 35–47)
HGB BLD-MCNC: 12.6 G/DL (ref 11.7–15.7)
HOLD SPECIMEN: NORMAL
HOLD SPECIMEN: NORMAL
IMM GRANULOCYTES # BLD: 0 10E3/UL
IMM GRANULOCYTES NFR BLD: 0 %
INTERPRETATION ECG - MUSE: NORMAL
LYMPHOCYTES # BLD AUTO: 2 10E3/UL (ref 0.8–5.3)
LYMPHOCYTES NFR BLD AUTO: 42 %
MCH RBC QN AUTO: 31.3 PG (ref 26.5–33)
MCHC RBC AUTO-ENTMCNC: 34.2 G/DL (ref 31.5–36.5)
MCV RBC AUTO: 92 FL (ref 78–100)
MONOCYTES # BLD AUTO: 0.5 10E3/UL (ref 0–1.3)
MONOCYTES NFR BLD AUTO: 10 %
NEUTROPHILS # BLD AUTO: 2.1 10E3/UL (ref 1.6–8.3)
NEUTROPHILS NFR BLD AUTO: 43 %
NRBC # BLD AUTO: 0 10E3/UL
NRBC BLD AUTO-RTO: 0 /100
P AXIS - MUSE: 38 DEGREES
PLATELET # BLD AUTO: 381 10E3/UL (ref 150–450)
POTASSIUM SERPL-SCNC: 3.9 MMOL/L (ref 3.4–5.3)
PR INTERVAL - MUSE: 138 MS
QRS DURATION - MUSE: 82 MS
QT - MUSE: 384 MS
QTC - MUSE: 411 MS
R AXIS - MUSE: 18 DEGREES
RBC # BLD AUTO: 4.02 10E6/UL (ref 3.8–5.2)
SODIUM SERPL-SCNC: 137 MMOL/L (ref 135–145)
SYSTOLIC BLOOD PRESSURE - MUSE: NORMAL MMHG
T AXIS - MUSE: 23 DEGREES
TROPONIN T SERPL HS-MCNC: <6 NG/L
VENTRICULAR RATE- MUSE: 69 BPM
WBC # BLD AUTO: 4.9 10E3/UL (ref 4–11)

## 2024-12-19 PROCEDURE — 93005 ELECTROCARDIOGRAM TRACING: CPT

## 2024-12-19 PROCEDURE — 36415 COLL VENOUS BLD VENIPUNCTURE: CPT | Performed by: EMERGENCY MEDICINE

## 2024-12-19 PROCEDURE — 82565 ASSAY OF CREATININE: CPT | Performed by: EMERGENCY MEDICINE

## 2024-12-19 PROCEDURE — 84484 ASSAY OF TROPONIN QUANT: CPT | Performed by: EMERGENCY MEDICINE

## 2024-12-19 PROCEDURE — 85004 AUTOMATED DIFF WBC COUNT: CPT | Performed by: EMERGENCY MEDICINE

## 2024-12-19 PROCEDURE — 85041 AUTOMATED RBC COUNT: CPT | Performed by: EMERGENCY MEDICINE

## 2024-12-19 PROCEDURE — 80048 BASIC METABOLIC PNL TOTAL CA: CPT | Performed by: EMERGENCY MEDICINE

## 2024-12-19 PROCEDURE — 82374 ASSAY BLOOD CARBON DIOXIDE: CPT | Performed by: EMERGENCY MEDICINE

## 2024-12-19 PROCEDURE — 99281 EMR DPT VST MAYX REQ PHY/QHP: CPT

## 2024-12-19 ASSESSMENT — COLUMBIA-SUICIDE SEVERITY RATING SCALE - C-SSRS
2. HAVE YOU ACTUALLY HAD ANY THOUGHTS OF KILLING YOURSELF IN THE PAST MONTH?: NO
1. IN THE PAST MONTH, HAVE YOU WISHED YOU WERE DEAD OR WISHED YOU COULD GO TO SLEEP AND NOT WAKE UP?: NO
6. HAVE YOU EVER DONE ANYTHING, STARTED TO DO ANYTHING, OR PREPARED TO DO ANYTHING TO END YOUR LIFE?: NO

## 2024-12-19 ASSESSMENT — ACTIVITIES OF DAILY LIVING (ADL)
ADLS_ACUITY_SCORE: 52
ADLS_ACUITY_SCORE: 52

## 2024-12-23 ENCOUNTER — OFFICE VISIT (OUTPATIENT)
Dept: FAMILY MEDICINE | Facility: CLINIC | Age: 39
End: 2024-12-23
Payer: COMMERCIAL

## 2024-12-23 VITALS
HEART RATE: 77 BPM | SYSTOLIC BLOOD PRESSURE: 141 MMHG | WEIGHT: 210.8 LBS | BODY MASS INDEX: 31.95 KG/M2 | DIASTOLIC BLOOD PRESSURE: 86 MMHG | OXYGEN SATURATION: 100 % | HEIGHT: 68 IN | RESPIRATION RATE: 16 BRPM | TEMPERATURE: 97.7 F

## 2024-12-23 DIAGNOSIS — Z01.818 PREOP GENERAL PHYSICAL EXAM: Primary | ICD-10-CM

## 2024-12-23 DIAGNOSIS — I10 BENIGN ESSENTIAL HYPERTENSION: ICD-10-CM

## 2024-12-23 DIAGNOSIS — H02.9 EYELID DISORDER: ICD-10-CM

## 2024-12-23 PROCEDURE — 99214 OFFICE O/P EST MOD 30 MIN: CPT | Performed by: FAMILY MEDICINE

## 2024-12-23 RX ORDER — LISINOPRIL AND HYDROCHLOROTHIAZIDE 20; 25 MG/1; MG/1
1 TABLET ORAL DAILY
Qty: 90 TABLET | Refills: 3 | Status: SHIPPED | OUTPATIENT
Start: 2024-12-23

## 2024-12-23 RX ORDER — SPIRONOLACTONE 25 MG/1
25 TABLET ORAL DAILY
Qty: 30 TABLET | Refills: 3 | Status: SHIPPED | OUTPATIENT
Start: 2024-12-23

## 2024-12-23 RX ORDER — LISINOPRIL 20 MG/1
20 TABLET ORAL DAILY
Qty: 90 TABLET | Refills: 3 | Status: SHIPPED | OUTPATIENT
Start: 2024-12-23

## 2024-12-23 ASSESSMENT — PAIN SCALES - GENERAL: PAINLEVEL_OUTOF10: NO PAIN (0)

## 2024-12-23 NOTE — PROGRESS NOTES
Preoperative Evaluation  [facility]  [address], [address]  [facility] [address]  Phone: [phone]  Primary Provider: Vi Baldwin MD  Pre-op Performing Provider: Vi Baldwin MD  Dec 23, 2024             12/23/2024   Surgical Information   What procedure is being done? blepharoplasty   Facility or Hospital where procedure/surgery will be performed: [facility]     Who is doing the procedure / surgery? Dr.Merideth Conteh   Date of surgery / procedure: 1/14/2025   Time of surgery / procedure: 7am   Where do you plan to recover after surgery? at home with family     Fax number for surgical facility: [phone]    Assessment & Plan     The proposed surgical procedure is considered LOW risk.    (Z01.818) Preop general physical exam  (primary encounter diagnosis)  Comment: Plans for b blepharoplasty  Plan:     (I10) Benign essential hypertension  Comment: Blood pressure is improved however still requiring higher medications.  Patient has not tolerated amlodipine well we will therefore raise lisinopril dose to 40 mg total continue hydrochlorothiazide and continue low-dose spironolactone.  Did discuss with patient her potassium levels are potentially at risk for increasing however will repeat labs in 3 weeks to double check on this.  Plan: Lipid panel reflex to direct LDL Fasting,         lisinopril (ZESTRIL) 20 MG tablet,         lisinopril-hydrochlorothiazide (ZESTORETIC)         20-25 MG tablet, Comprehensive metabolic panel         (BMP + Alb, Alk Phos, ALT, AST, Total. Bili,         TP), spironolactone (ALDACTONE) 25 MG tablet            (H02.9) Eyelid disorder  Comment:   Plan:             - No identified additional risk factors other than previously addressed    Antiplatelet or Anticoagulation Medication Instructions   - Patient is on no antiplatelet or anticoagulation  medications.    Additional Medication Instructions  Take all scheduled medications on the day of surgery    Recommendation  Approval given to proceed with proposed procedure, without further diagnostic evaluation.    Juliette Trinidad is a 39 year old, presenting for the following:  Pre-Op Exam          12/23/2024     2:37 PM   Additional Questions   Roomed by Cindy SONG related to upcoming procedure: Blepharoplasty bilateral        12/23/2024   Pre-Op Questionnaire   Have you ever had a heart attack or stroke? No   Have you ever had surgery on your heart or blood vessels, such as a stent placement, a coronary artery bypass, or surgery on an artery in your head, neck, heart, or legs? No   Do you have chest pain with activity? No   Do you have a history of heart failure? No   Do you currently have a cold, bronchitis or symptoms of other infection? No   Do you have a cough, shortness of breath, or wheezing? No   Do you or anyone in your family have previous history of blood clots? No   Do you or does anyone in your family have a serious bleeding problem such as prolonged bleeding following surgeries or cuts? No   Have you ever had problems with anemia or been told to take iron pills? No   Have you had any abnormal blood loss such as black, tarry or bloody stools, or abnormal vaginal bleeding? No   Have you ever had a blood transfusion? No   Are you willing to have a blood transfusion if it is medically needed before, during, or after your surgery? Yes   Have you or any of your relatives ever had problems with anesthesia? No   Do you have sleep apnea, excessive snoring or daytime drowsiness? No   Do you have any artifical heart valves or other implanted medical devices like a pacemaker, defibrillator, or continuous glucose monitor? No   Do you have artificial joints? No   Are you allergic to latex? No     Health Care Directive  Patient does not have a Health Care Directive: Patient states has Advance Directive  and will bring in a copy to clinic.    Preoperative Review of    reviewed - no record of controlled substances prescribed.          Patient Active Problem List    Diagnosis Date Noted    Eyelid disorder 12/23/2024     Priority: Medium    Large breasts 07/29/2024     Priority: Medium    Upper back pain 07/29/2024     Priority: Medium    Polymastia 07/29/2024     Priority: Medium    Pelvic pain in female 03/14/2024     Priority: Medium    Lateral epicondylitis of right elbow 06/22/2023     Priority: Medium    Axillary accessory breast tissue 06/22/2023     Priority: Medium    Dizziness 05/10/2023     Priority: Medium    Chronic left shoulder pain 10/03/2022     Priority: Medium    Family history of malignant neoplasm of breast 05/09/2022     Priority: Medium     Her mother and grandmother have have breast cancer  Mom had negative genetic screens      Mild persistent asthma without complication 10/11/2021     Priority: Medium    Seasonal allergic rhinitis due to pollen 04/07/2021     Priority: Medium    Shoulder pain, left 10/27/2020     Priority: Medium    Benign essential hypertension 08/22/2017     Priority: Medium    ROCHELLE (generalized anxiety disorder) 08/22/2017     Priority: Medium     Seeing therapist Abdirahman Talamantes       Major depressive disorder, recurrent episode, moderate (H) 08/22/2017     Priority: Medium    Vitamin D deficiency 08/22/2017     Priority: Medium      Past Medical History:   Diagnosis Date    Asthma     seasonal and environmental    C. difficile diarrhea 2013    Concussion 01/2022    Depression     Depressive disorder     Encounter for IUD insertion 10/16/2020    Jyoti inserted by Dr Park    Generalized anxiety disorder     Hypertension     IUD contraception 10/2020    Jyoti inserted by Dr Park    Lateral epicondylitis of right elbow 6/22/2023    Pain in both hands 02/20/2020     Past Surgical History:   Procedure Laterality Date    GYN SURGERY  2003    Laser treatment of HPV     ORTHOPEDIC SURGERY Left 07/21/2017    ganglion cyst removal    ORTHOPEDIC SURGERY Left     nerve release on left leg/ankle     SOFT TISSUE SURGERY  1999    SURGICAL HISTORY OF -       Nerve entrapment release    ZZHC RELEASE FOOT/TOE NERVE  2011     Current Outpatient Medications   Medication Sig Dispense Refill    albuterol (PROAIR HFA/PROVENTIL HFA/VENTOLIN HFA) 108 (90 Base) MCG/ACT inhaler Inhale 2 puffs into the lungs every 6 hours as needed for shortness of breath, wheezing or cough 18 g 3    buPROPion (WELLBUTRIN XL) 150 MG 24 hr tablet Take 1 tablet (150 mg) by mouth every morning. 90 tablet 3    citalopram (CELEXA) 40 MG tablet TAKE 1 TABLET(40 MG) BY MOUTH DAILY 90 tablet 3    lisinopril (ZESTRIL) 20 MG tablet Take 1 tablet (20 mg) by mouth daily. Take in addition to Lisinopril/hydrochlorothiazide 20/25 daily total daily dose Lisinopril 40 mg/day 90 tablet 3    lisinopril-hydrochlorothiazide (ZESTORETIC) 20-25 MG tablet Take 1 tablet by mouth daily. Take in addition to Lisinopril 20 mg for total daily dose 40 mg 90 tablet 3    spironolactone (ALDACTONE) 25 MG tablet Take 1 tablet (25 mg) by mouth daily. 30 tablet 3       Allergies   Allergen Reactions    Amoxicillin Other (See Comments)     Patient does not want to take since having c diff    Codeine Other (See Comments)     Hallucinations    Penicillins Other (See Comments)     Patient does not want to take since having c diff        Social History     Tobacco Use    Smoking status: Never     Passive exposure: Never    Smokeless tobacco: Never   Substance Use Topics    Alcohol use: Yes     Comment: occ.     Family History   Problem Relation Age of Onset    Diabetes Mother     Bipolar Disorder Mother     Hypertension Mother     Coronary Artery Disease Mother     Depression Mother     Anxiety Disorder Mother     Mental Illness Mother     Breast Cancer Mother 60        BRCA negative, neoadjuvant chemo, surgery pend    Unknown/Adopted Father     Hypertension  "Father     Breast Cancer Maternal Grandmother     Hypertension Maternal Grandmother     Depression Maternal Grandmother     Anxiety Disorder Maternal Grandmother     Diabetes Type 2  Maternal Grandfather     Glaucoma Maternal Grandfather     Hypertension Maternal Grandfather     Coronary Artery Disease Maternal Grandfather     Diabetes Maternal Grandfather     Depression Maternal Grandfather     Cerebrovascular Disease Maternal Grandfather     Prostate Cancer Maternal Grandfather     Other - See Comments Maternal Grandfather         passed from Covid 9/2021    Hypertension Paternal Grandmother     Coronary Artery Disease Paternal Grandfather     Hypertension Maternal Aunt     Ovarian Cancer Other     Macular Degeneration No family hx of      History   Drug Use No             Review of Systems  Constitutional, HEENT, cardiovascular, pulmonary, gi and gu systems are negative, except as otherwise noted.    Objective    BP (!) 141/86 (BP Location: Left arm, Patient Position: Sitting, Cuff Size: Adult Large)   Pulse 77   Temp 97.7  F (36.5  C) (Skin)   Resp 16   Ht 1.727 m (5' 8\")   Wt 95.6 kg (210 lb 12.8 oz)   LMP 12/04/2024   SpO2 100%   Breastfeeding No   BMI 32.05 kg/m     Estimated body mass index is 32.05 kg/m  as calculated from the following:    Height as of this encounter: 1.727 m (5' 8\").    Weight as of this encounter: 95.6 kg (210 lb 12.8 oz).  Physical Exam  GENERAL: alert and no distress  EYES: Eyes grossly normal to inspection, PERRL and conjunctivae and sclerae normal  HENT: ear canals and TM's normal, nose and mouth without ulcers or lesions  NECK: no adenopathy, no asymmetry, masses, or scars  RESP: lungs clear to auscultation - no rales, rhonchi or wheezes  CV: regular rate and rhythm, normal S1 S2, no S3 or S4, no murmur, click or rub, no peripheral edema  ABDOMEN: soft, nontender, no hepatosplenomegaly, no masses and bowel sounds normal  MS: no gross musculoskeletal defects noted, no " edema  SKIN: no suspicious lesions or rashes  NEURO: Normal strength and tone, mentation intact and speech normal  PSYCH: mentation appears normal, affect normal/bright    Recent Labs   Lab Test 12/19/24  1334 10/07/24  1146   HGB 12.6 13.3    325    139   POTASSIUM 3.9 4.3   CR 0.83 0.75        Diagnostics  Labs pending at this time.  Results will be reviewed when available.   No EKG required for low risk surgery (cataract, skin procedure, breast biopsy, etc).    Revised Cardiac Risk Index (RCRI)  The patient has the following serious cardiovascular risks for perioperative complications:   - No serious cardiac risks = 0 points     RCRI Interpretation: 0 points: Class I (very low risk - 0.4% complication rate)         Signed Electronically by: Vi Baldwin MD  A copy of this evaluation report is provided to the requesting physician.

## 2024-12-23 NOTE — PATIENT INSTRUCTIONS
How to Take Your Medication Before Surgery  Preoperative Medication Instructions   Take all medications as instructed prior to surgery avoid any ibuprofen naproxen Aleve or NSAIDs       Patient Education   Preparing for Your Surgery  For Adults  Getting started  In most cases, a nurse will call to review your health history and instructions. They will give you an arrival time based on your scheduled surgery time. Please be ready to share:  Your doctor's clinic name and phone number  Your medical, surgical, and anesthesia history  A list of allergies and sensitivities  A list of medicines, including herbal treatments and over-the-counter drugs  Whether the patient has a legal guardian (ask how to send us the papers in advance)  Note: You may not receive a call if you were seen at our PAC (Preoperative Assessment Center).  Please tell us if you're pregnant--or if there's any chance you might be pregnant. Some surgeries may injure a fetus (unborn baby), so they require a pregnancy test. Surgeries that are safe for a fetus don't always need a test, and you can choose whether to have one.   Preparing for surgery  Within 10 to 30 days of surgery: Have a pre-op exam (sometimes called an H&P, or History and Physical). This can be done at a clinic or pre-operative center.  If you're having a , you may not need this exam. Talk to your care team.  At your pre-op exam, talk to your care team about all medicines you take. (This includes CBD oil and any drugs, such as THC, marijuana, and other forms of cannabis.) If you need to stop any medicine before surgery, ask when to start taking it again.  This is for your safety. Many medicines and drugs can make you bleed too much during surgery. Some change how well surgery (anesthesia) drugs work.  Call your insurance company to let them know you're having surgery. (If you don't have insurance, call [phone].)  Call your clinic if there's any change in your health. This  includes a scrape or scratch near the surgery site, or any signs of a cold (sore throat, runny nose, cough, rash, fever).  Eating and drinking guidelines  For your safety: Unless your surgeon tells you otherwise, follow the guidelines below.  Eat and drink as normal until 8 hours before you arrive for surgery. After that, no food or milk. You can spit out gum when you arrive.  Drink clear liquids until 2 hours before you arrive. These are liquids you can see through, like water, Gatorade, and Propel Water. They also include plain black coffee and tea (no cream or milk).  No alcohol for 24 hours before you arrive. The night before surgery, stop any drinks that contain THC.  If your care team tells you to take medicine on the morning of surgery, it's okay to take it with a sip of water. No other medicines or drugs are allowed (including CBD oil)--follow your care team's instructions.  If you have questions the day of surgery, call your hospital or surgery center.   Preventing infection  Shower or bathe the night before and the morning of surgery. Follow the instructions your clinic gave you. (If no instructions, use regular soap.)  Don't shave or clip hair near your surgery site. We'll remove the hair if needed.  Don't smoke or vape the morning of surgery. No chewing tobacco for 6 hours before you arrive. A nicotine patch is okay. You may spit out nicotine gum when you arrive.  For some surgeries, the surgeon will tell you to fully quit smoking and nicotine.  We will make every effort to keep you safe from infection. We will:  Clean our hands often with soap and water (or an alcohol-based hand rub).  Clean the skin at your surgery site with a special soap that kills germs.  Give you a special gown to keep you warm. (Cold raises the risk of infection.)  Wear hair covers, masks, gowns, and gloves during surgery.  Give antibiotic medicine, if prescribed. Not all surgeries need this medicine.  What to bring on the day of  surgery  Photo ID and insurance card  Copy of your health care directive, if you have one  Glasses and hearing aids (bring cases)  You can't wear contacts during surgery  Inhaler and eye drops, if you use them (tell us about these when you arrive)  CPAP machine or breathing device, if you use them  A few personal items, if spending the night  If you have . . .  A pacemaker, ICD (cardiac defibrillator), or other implant: Bring the ID card.  An implanted stimulator: Bring the remote control.  A legal guardian: Bring a copy of the certified (court-stamped) guardianship papers.  Please remove any jewelry, including body piercings. Leave jewelry and other valuables at home.  If you're going home the day of surgery  You must have a responsible adult drive you home. They should stay with you overnight as well.  If you don't have someone to stay with you, and you aren't safe to go home alone, we may keep you overnight. Insurance often won't pay for this.  After surgery  If it's hard to control your pain or you need more pain medicine, please call your surgeon's office.  Questions?   If you have any questions for your care team, list them here:   ____________________________________________________________________________________________________________________________________________________________________________________________________________________________________________________________  For informational purposes only. Not to replace the advice of your health care provider. Copyright   2003, 2019 [facility]. All rights reserved. Clinically reviewed by Amor Oconnor MD. [employer] [id number] - REV 08/24.

## 2024-12-31 LAB — CV ZIO PRELIM RESULTS: NORMAL

## 2025-01-06 ENCOUNTER — E-VISIT (OUTPATIENT)
Dept: FAMILY MEDICINE | Facility: CLINIC | Age: 40
End: 2025-01-06
Payer: COMMERCIAL

## 2025-01-06 ENCOUNTER — MYC MEDICAL ADVICE (OUTPATIENT)
Dept: FAMILY MEDICINE | Facility: CLINIC | Age: 40
End: 2025-01-06
Payer: COMMERCIAL

## 2025-01-06 DIAGNOSIS — B35.4 TINEA CORPORIS: Primary | ICD-10-CM

## 2025-01-07 ENCOUNTER — HOSPITAL ENCOUNTER (OUTPATIENT)
Dept: CARDIOLOGY | Facility: CLINIC | Age: 40
Discharge: HOME | End: 2025-01-07
Attending: INTERNAL MEDICINE
Payer: COMMERCIAL

## 2025-01-07 DIAGNOSIS — I10 BENIGN ESSENTIAL HYPERTENSION: ICD-10-CM

## 2025-01-07 LAB — LVEF ECHO: NORMAL

## 2025-01-07 PROCEDURE — 93306 TTE W/DOPPLER COMPLETE: CPT | Mod: 26 | Performed by: INTERNAL MEDICINE

## 2025-01-07 PROCEDURE — 93306 TTE W/DOPPLER COMPLETE: CPT

## 2025-01-07 RX ORDER — KETOCONAZOLE 20 MG/G
CREAM TOPICAL 2 TIMES DAILY
Qty: 30 G | Refills: 0 | Status: SHIPPED | OUTPATIENT
Start: 2025-01-07

## 2025-01-21 DIAGNOSIS — I10 HYPERTENSION, ESSENTIAL: Primary | ICD-10-CM

## 2025-01-24 ENCOUNTER — PRE VISIT (OUTPATIENT)
Dept: PLASTIC SURGERY | Facility: CLINIC | Age: 40
End: 2025-01-24

## 2025-02-18 ENCOUNTER — MYC MEDICAL ADVICE (OUTPATIENT)
Dept: FAMILY MEDICINE | Facility: CLINIC | Age: 40
End: 2025-02-18
Payer: COMMERCIAL

## 2025-02-24 ENCOUNTER — ANCILLARY PROCEDURE (OUTPATIENT)
Dept: MRI IMAGING | Facility: CLINIC | Age: 40
End: 2025-02-24
Attending: PHYSICIAN ASSISTANT
Payer: COMMERCIAL

## 2025-02-24 DIAGNOSIS — Z91.89 AT HIGH RISK FOR BREAST CANCER: ICD-10-CM

## 2025-02-24 PROCEDURE — A9585 GADOBUTROL INJECTION: HCPCS | Performed by: RADIOLOGY

## 2025-02-24 PROCEDURE — 77049 MRI BREAST C-+ W/CAD BI: CPT | Performed by: RADIOLOGY

## 2025-02-24 RX ORDER — GADOBUTROL 604.72 MG/ML
10 INJECTION INTRAVENOUS ONCE
Status: COMPLETED | OUTPATIENT
Start: 2025-02-24 | End: 2025-02-24

## 2025-02-24 RX ADMIN — GADOBUTROL 9.5 ML: 604.72 INJECTION INTRAVENOUS at 12:52

## 2025-02-24 NOTE — PROGRESS NOTES
FOLLOW UP  2025     Vivi Mccall is a 39 year old woman who presents with family history of breast cancer.    HPI:    Family history of mother, maternal grandmother, and maternal great aunt with breast cancer. Her mother had negative genetic testing.     When she was pregnant in  she developed prominent right axillary swelling and leakage of milk. Imaging was consistent with accessory axillary breast tissue. She stopped breast feeding in . She does the Depot shot for birth control. She has a history of Cdiff. She has met with plastic surgery.     Today she denies any breast changes or concerns including mass, skin change, nipple inversion or nipple discharge. She had her first breast MRI 25 that demonstrated left breast 5-7:00 nonmass enhancement BI-RADS 4, MRI guided biopsy recommended. She was uncomfortable and anxious with the MRI and would like to try valium.     BREAST-SPECIFIC HISTORY:    Previous breast imaging: Yes  - 22 Smammo BI-RADS 1  - 23  - 24 b/l Dmammo accessory axillary breast tissue. BI-RADS 2   - 24 bilateral axillary ultrasound for pain BI-RADS 2  - 25 breast MRI left breast 5-7:00 nonmass enhancement BI-RADS 4. MRI guided biopsy recommended.     Prior breast biopsies/surgeries: No    Prior history of breast cancer or DCIS: No  Prior radiation history: No  Breast density: scattered fibroglandular densities    GYN HISTORY:  . Age at 1st pregnancy: 34. Breastfeeding history: Yes.   Age at menarche: 12  Menopausal: premenopausal .   Depot   Menopausal hormone replacement therapy: No     RISK ASSESSMENT: < 3% 5 year risk by Aide, < 5% 10 year risk by BARTOLO, and > 20% lifetime risk by BARTOLO  Aide: 0.9% 5 year risk   BARTOLO/Hannah: 29.1% lifetime risk, 3.9% 10 year risk     FAMILY HISTORY:  Breast ca: Yes  - mother, 60, BRCA negative   - maternal grandmother, 40  - maternal great aunt, 70's   Ovarian ca: No  Pancreatic ca:  No  Prostate: Yes  - maternal grandfather, older  Gastric ca: No  Melanoma: yes  - unknown  Colon ca: No  Other cancer: Yes  - great grandmother with bile duct cancer  Other genetic, testing, syndromes, or clotting disorders: no     PAST MEDICAL HISTORY  Past Medical History:   Diagnosis Date    Asthma     seasonal and environmental    C. difficile diarrhea 2013    Concussion 01/2022    Depression     Depressive disorder     Encounter for IUD insertion 10/16/2020    Jyoti inserted by Dr Park    Generalized anxiety disorder     Hypertension     IUD contraception 10/2020    Jyoti inserted by Dr Park    Lateral epicondylitis of right elbow 6/22/2023    Pain in both hands 02/20/2020   Sees nephrology for BP     PAST SURGICAL HISTORY   Past Surgical History:   Procedure Laterality Date    GYN SURGERY  2003    Laser treatment of HPV    ORTHOPEDIC SURGERY Left 07/21/2017    ganglion cyst removal    ORTHOPEDIC SURGERY Left     nerve release on left leg/ankle     SOFT TISSUE SURGERY  1999    SURGICAL HISTORY OF -       Nerve entrapment release    ZZHC RELEASE FOOT/TOE NERVE  2011     MEDICATIONS  Current Outpatient Medications   Medication Sig Dispense Refill    albuterol (PROAIR HFA/PROVENTIL HFA/VENTOLIN HFA) 108 (90 Base) MCG/ACT inhaler Inhale 2 puffs into the lungs every 6 hours as needed for shortness of breath, wheezing or cough 18 g 3    buPROPion (WELLBUTRIN XL) 150 MG 24 hr tablet Take 1 tablet (150 mg) by mouth every morning. 90 tablet 3    citalopram (CELEXA) 40 MG tablet TAKE 1 TABLET(40 MG) BY MOUTH DAILY 90 tablet 3    ketoconazole (NIZORAL) 2 % external cream Apply topically 2 times daily. To rash for 10 to 14 days or until rash is completely resolved for 48 hours 30 g 0    lisinopril (ZESTRIL) 20 MG tablet Take 1 tablet (20 mg) by mouth daily. Take in addition to Lisinopril/hydrochlorothiazide 20/25 daily total daily dose Lisinopril 40 mg/day 90 tablet 3    lisinopril-hydrochlorothiazide (ZESTORETIC)  20-25 MG tablet Take 1 tablet by mouth daily. Take in addition to Lisinopril 20 mg for total daily dose 40 mg 90 tablet 3    miconazole (MICATIN) 2 % external powder Apply topically as needed for itching. 71 g 3    spironolactone (ALDACTONE) 25 MG tablet Take 1 tablet (25 mg) by mouth daily. 30 tablet 3   Wellbutrin      ALLERGIES  Allergies   Allergen Reactions    Amoxicillin Other (See Comments)     Patient does not want to take since having c diff    Codeine Other (See Comments)     Hallucinations    Penicillins Other (See Comments)     Patient does not want to take since having c diff      SOCIAL HISTORY:  Smokes: No  EtOH: Yes, every other day  Exercise: very active, gym. Teaches dance.   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      ROS:   Change in vision No  Headaches: no  Respiratory: No shortness of breath, dyspnea on exertion, cough, or hemoptysis   Cardiovascular: negative   Gastrointestinal: negative Abdominal pain: no  Breast: negative  Musculoskeletal: negative Joint pain No Back pain: no  Psychiatric: negative  Hematologic/Lymphatic/Immunologic: negative  Endocrine: negative    EXAM  /81 (BP Location: Right arm, Patient Position: Sitting, Cuff Size: Adult Regular)   Pulse 85   Resp 20   Wt 94.7 kg (208 lb 12.8 oz)   SpO2 100%   BMI 31.75 kg/m     PHYSICAL EXAM  Respiratory: breathing non labored.   Breasts: Examination was done in both the upright and supine positions.  Breasts are symmetrical . No masses noted. No skin or nipple changes. No nipple discharge.   Prominent bilateral axillary tissue consistent with accessory breast tissue, greater on the right.   No clavicular, cervical, or axillary lymphadenopathy.     INVESTIGATIONS       ASSESSMENT/PLAN:    Vivi Mccall is a 39 year old woman with family history of breast cancer and meets guidelines for high risks screening.     Recent breast MRI demonstrated left breast enhancement.     1) Left breast MRI  enhancement  - Left MRI guided biopsy recommended and will be scheduled. Valium prescribed.     2) Family history of breast cancer  She meets NCCN guidelines for high risk screening based on family history with lifetime risk for breast cancer of >20%. Screening recommendations based on NCCN guidelines. Be familiar with your breast and promptly report any changes to your health care provider. Clinical encounter every 6-12 months. Annual mammogram with imani alternating with annual breast MRI.    - Screening mammogram with clinic visit due: 8/10/25  - Breast MRI with clinic visit due: 2/25/26, may need valium.     3) Lifestyle Modifications were provided.   - Maintain your best healthy weight. Higher body fat and adult weight gain is associated with increased risk for breast cancer. This increase in risk has been attributed to increase in circulating endogenous estrogen levels from fat tissue.   - Any alcohol intake increases the risk for breast cancer. If you choose to drink alcohol limit alcohol consumption to less than 1 drink (1 ounce of liquor, 6 ounces of wine, or 8 ounces of beer) per day or less than 3 drinks per week.  - Be active daily and void being sedentary.   - Vitamin D may decrease the risk of developing breast cancer.     Leena Brunson PA-C    20 minutes spent on the date of the encounter doing chart review, review of test results, interpretation of tests, patient visit and documentation.

## 2025-02-24 NOTE — DISCHARGE INSTRUCTIONS
MRI Contrast Discharge Instructions    The IV contrast you received today will pass out of your body in your  urine. This will happen in the next 24 hours. You will not feel this process.  Your urine will not change color.    Drink at least 4 extra glasses of water or juice today (unless your doctor  has restricted your fluids). This reduces the stress on your kidneys.  You may take your regular medicines.    If you are on dialysis: It is best to have dialysis today.    If you have a reaction: Most reactions happen right away. If you have  any new symptoms after leaving the hospital (such as hives or swelling),  call your hospital at the correct number below. Or call your family doctor.  If you have breathing distress or wheezing, call 911.    Special instructions: ***    I have read and understand the above information.    Signature:______________________________________ Date:___________    Staff:__________________________________________ Date:___________     Time:__________    [facility] Departments:    ___[place]: [phone]  ___[facility]: [phone]  ___[place]: [phone] ___[facility]: [phone]  ___[facility]: [phone]  ___[facility]: [phone]  ___[phone]  ___[facility]: [phone]  ___[phone]

## 2025-02-26 ENCOUNTER — ONCOLOGY VISIT (OUTPATIENT)
Dept: SURGERY | Facility: CLINIC | Age: 40
End: 2025-02-26
Attending: PHYSICIAN ASSISTANT
Payer: COMMERCIAL

## 2025-02-26 VITALS
WEIGHT: 208.8 LBS | OXYGEN SATURATION: 100 % | SYSTOLIC BLOOD PRESSURE: 116 MMHG | DIASTOLIC BLOOD PRESSURE: 81 MMHG | HEART RATE: 85 BPM | BODY MASS INDEX: 31.75 KG/M2 | RESPIRATION RATE: 20 BRPM

## 2025-02-26 DIAGNOSIS — Z91.89 AT HIGH RISK FOR BREAST CANCER: Primary | ICD-10-CM

## 2025-02-26 PROCEDURE — 99213 OFFICE O/P EST LOW 20 MIN: CPT | Performed by: PHYSICIAN ASSISTANT

## 2025-02-26 RX ORDER — DIAZEPAM 5 MG/1
5 TABLET ORAL 2 TIMES DAILY PRN
Qty: 2 TABLET | Refills: 0 | Status: SHIPPED | OUTPATIENT
Start: 2025-02-26

## 2025-02-26 ASSESSMENT — PAIN SCALES - GENERAL: PAINLEVEL_OUTOF10: NO PAIN (0)

## 2025-02-26 NOTE — PATIENT INSTRUCTIONS
Vivi Mccall is a 39 year old woman with family history of breast cancer and meets guidelines for high risks screening.     Recent breast MRI demonstrated left breast enhancement.     1) Left breast MRI enhancement  - Left MRI guided biopsy recommended and will be scheduled. Valium prescribed.     2) Family history of breast cancer  She meets NCCN guidelines for high risk screening based on family history with lifetime risk for breast cancer of >20%. Screening recommendations based on NCCN guidelines. Be familiar with your breast and promptly report any changes to your health care provider. Clinical encounter every 6-12 months. Annual mammogram with imani alternating with annual breast MRI.    - Screening mammogram with clinic visit due: 8/10/25  - Breast MRI with clinic visit due: 2/25/26, may need valium.     3) Lifestyle Modifications were provided.   - Maintain your best healthy weight. Higher body fat and adult weight gain is associated with increased risk for breast cancer. This increase in risk has been attributed to increase in circulating endogenous estrogen levels from fat tissue.   - Any alcohol intake increases the risk for breast cancer. If you choose to drink alcohol limit alcohol consumption to less than 1 drink (1 ounce of liquor, 6 ounces of wine, or 8 ounces of beer) per day or less than 3 drinks per week.  - Be active daily and void being sedentary.   - Vitamin D may decrease the risk of developing breast cancer.

## 2025-02-26 NOTE — LETTER
2025      Vivi Mccall  [address]  [facility] [place] [address]      Dear Colleague,    Thank you for referring your patient, Vivi Mccall, to the [facility]. Please see a copy of my visit note below.    FOLLOW UP  2025     Vivi Mccall is a 39 year old woman who presents with family history of breast cancer.    HPI:    Family history of mother, maternal grandmother, and maternal great aunt with breast cancer. Her mother had negative genetic testing.     When she was pregnant in  she developed prominent right axillary swelling and leakage of milk. Imaging was consistent with accessory axillary breast tissue. She stopped breast feeding in . She does the Depot shot for birth control. She has a history of Cdiff. She has met with plastic surgery.     Today she denies any breast changes or concerns including mass, skin change, nipple inversion or nipple discharge. She had her first breast MRI 25 that demonstrated left breast 5-7:00 nonmass enhancement BI-RADS 4, MRI guided biopsy recommended. She was uncomfortable and anxious with the MRI and would like to try valium.     BREAST-SPECIFIC HISTORY:    Previous breast imaging: Yes  - 22 Smammo BI-RADS 1  - 23  - 24 b/l Dmammo accessory axillary breast tissue. BI-RADS 2   - 24 bilateral axillary ultrasound for pain BI-RADS 2  - 25 breast MRI left breast 5-7:00 nonmass enhancement BI-RADS 4. MRI guided biopsy recommended.     Prior breast biopsies/surgeries: No    Prior history of breast cancer or DCIS: No  Prior radiation history: No  Breast density: scattered fibroglandular densities    GYN HISTORY:  . Age at 1st pregnancy: 34. Breastfeeding history: Yes.   Age at menarche: 12  Menopausal: premenopausal .   Depot   Menopausal hormone replacement therapy: No     RISK ASSESSMENT: < 3% 5 year risk by Aide, < 5% 10 year risk by BARTOLO, and > 20% lifetime risk by  BARTOLO  Aide: 0.9% 5 year risk   BARTOLO/Annie-Zoeyck: 29.1% lifetime risk, 3.9% 10 year risk     FAMILY HISTORY:  Breast ca: Yes  - mother, 60, BRCA negative 2022  - maternal grandmother, 40  - maternal great aunt, 70's   Ovarian ca: No  Pancreatic ca: No  Prostate: Yes  - maternal grandfather, older  Gastric ca: No  Melanoma: yes  - unknown  Colon ca: No  Other cancer: Yes  - great grandmother with bile duct cancer  Other genetic, testing, syndromes, or clotting disorders: no     PAST MEDICAL HISTORY  Past Medical History:   Diagnosis Date     Asthma     seasonal and environmental     C. difficile diarrhea 2013     Concussion 01/2022     Depression      Depressive disorder      Encounter for IUD insertion 10/16/2020    Jyoti inserted by Dr Park     Generalized anxiety disorder      Hypertension      IUD contraception 10/2020    Jyoti inserted by Dr Park     Lateral epicondylitis of right elbow 6/22/2023     Pain in both hands 02/20/2020   Sees nephrology for BP     PAST SURGICAL HISTORY   Past Surgical History:   Procedure Laterality Date     GYN SURGERY  2003    Laser treatment of HPV     ORTHOPEDIC SURGERY Left 07/21/2017    ganglion cyst removal     ORTHOPEDIC SURGERY Left     nerve release on left leg/ankle      SOFT TISSUE SURGERY  1999     SURGICAL HISTORY OF -       Nerve entrapment release     ZZHC RELEASE FOOT/TOE NERVE  2011     MEDICATIONS  Current Outpatient Medications   Medication Sig Dispense Refill     albuterol (PROAIR HFA/PROVENTIL HFA/VENTOLIN HFA) 108 (90 Base) MCG/ACT inhaler Inhale 2 puffs into the lungs every 6 hours as needed for shortness of breath, wheezing or cough 18 g 3     buPROPion (WELLBUTRIN XL) 150 MG 24 hr tablet Take 1 tablet (150 mg) by mouth every morning. 90 tablet 3     citalopram (CELEXA) 40 MG tablet TAKE 1 TABLET(40 MG) BY MOUTH DAILY 90 tablet 3     ketoconazole (NIZORAL) 2 % external cream Apply topically 2 times daily. To rash for 10 to 14 days or until rash is  completely resolved for 48 hours 30 g 0     lisinopril (ZESTRIL) 20 MG tablet Take 1 tablet (20 mg) by mouth daily. Take in addition to Lisinopril/hydrochlorothiazide 20/25 daily total daily dose Lisinopril 40 mg/day 90 tablet 3     lisinopril-hydrochlorothiazide (ZESTORETIC) 20-25 MG tablet Take 1 tablet by mouth daily. Take in addition to Lisinopril 20 mg for total daily dose 40 mg 90 tablet 3     miconazole (MICATIN) 2 % external powder Apply topically as needed for itching. 71 g 3     spironolactone (ALDACTONE) 25 MG tablet Take 1 tablet (25 mg) by mouth daily. 30 tablet 3   Wellbutrin      ALLERGIES  Allergies   Allergen Reactions     Amoxicillin Other (See Comments)     Patient does not want to take since having c diff     Codeine Other (See Comments)     Hallucinations     Penicillins Other (See Comments)     Patient does not want to take since having c diff      SOCIAL HISTORY:  Smokes: No  EtOH: Yes, every other day  Exercise: very active, gym. [employer] dance.   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      ROS:   Change in vision No  Headaches: no  Respiratory: No shortness of breath, dyspnea on exertion, cough, or hemoptysis   Cardiovascular: negative   Gastrointestinal: negative Abdominal pain: no  Breast: negative  Musculoskeletal: negative Joint pain No Back pain: no  Psychiatric: negative  Hematologic/Lymphatic/Immunologic: negative  Endocrine: negative    EXAM  /81 (BP Location: Right arm, Patient Position: Sitting, Cuff Size: Adult Regular)   Pulse 85   Resp 20   Wt 94.7 kg (208 lb 12.8 oz)   SpO2 100%   BMI 31.75 kg/m     PHYSICAL EXAM  Respiratory: breathing non labored.   Breasts: Examination was done in both the upright and supine positions.  Breasts are symmetrical . No masses noted. No skin or nipple changes. No nipple discharge.   Prominent bilateral axillary tissue consistent with accessory breast tissue, greater on the right.   No clavicular, cervical, or axillary  lymphadenopathy.     INVESTIGATIONS       ASSESSMENT/PLAN:    Vivi Mccall is a 39 year old woman with family history of breast cancer and meets guidelines for high risks screening.     Recent breast MRI demonstrated left breast enhancement.     1) Left breast MRI enhancement  - Left MRI guided biopsy recommended and will be scheduled. Valium prescribed.     2) Family history of breast cancer  She meets NCCN guidelines for high risk screening based on family history with lifetime risk for breast cancer of >20%. Screening recommendations based on NCCN guidelines. Be familiar with your breast and promptly report any changes to your health care provider. Clinical encounter every 6-12 months. Annual mammogram with imnai alternating with annual breast MRI.    - Screening mammogram with clinic visit due: 8/10/25  - Breast MRI with clinic visit due: 2/25/26, may need valium.     3) Lifestyle Modifications were provided.   - Maintain your best healthy weight. Higher body fat and adult weight gain is associated with increased risk for breast cancer. This increase in risk has been attributed to increase in circulating endogenous estrogen levels from fat tissue.   - Any alcohol intake increases the risk for breast cancer. If you choose to drink alcohol limit alcohol consumption to less than 1 drink (1 ounce of liquor, 6 ounces of wine, or 8 ounces of beer) per day or less than 3 drinks per week.  - Be active daily and void being sedentary.   - Vitamin D may decrease the risk of developing breast cancer.     Leena Brunson PA-C    20 minutes spent on the date of the encounter doing chart review, review of test results, interpretation of tests, patient visit and documentation.        Again, thank you for allowing me to participate in the care of your patient.        Sincerely,        Leena Brunson PA-C    Electronically signed

## 2025-02-26 NOTE — NURSING NOTE
"Oncology Rooming Note    February 26, 2025 8:03 AM   Vivi Mccall is a 39 year old female who presents for:    Chief Complaint   Patient presents with    Oncology Clinic Visit     At high risk for breast cancer     Initial Vitals: /81 (BP Location: Right arm, Patient Position: Sitting, Cuff Size: Adult Regular)   Pulse 85   Resp 20   Wt 94.7 kg (208 lb 12.8 oz)   SpO2 100%   BMI 31.75 kg/m   Estimated body mass index is 31.75 kg/m  as calculated from the following:    Height as of 12/23/24: 1.727 m (5' 8\").    Weight as of this encounter: 94.7 kg (208 lb 12.8 oz). Body surface area is 2.13 meters squared.  No Pain (0) Comment: Data Unavailable   No LMP recorded.  Allergies reviewed: Yes  Medications reviewed: Yes    Medications: Medication refills not needed today.  Pharmacy name entered into [facility]: [id number] [place], [place] - [address]    Frailty Screening:   Is the patient here for a new oncology consult visit in cancer care? 2. No    PHQ9:  Did this patient require a PHQ9?: No      Clinical concerns: none.       Babar Kothari"

## 2025-03-13 ENCOUNTER — ANCILLARY PROCEDURE (OUTPATIENT)
Dept: MAMMOGRAPHY | Facility: CLINIC | Age: 40
End: 2025-03-13
Attending: PHYSICIAN ASSISTANT
Payer: COMMERCIAL

## 2025-03-13 ENCOUNTER — ANCILLARY PROCEDURE (OUTPATIENT)
Dept: MRI IMAGING | Facility: CLINIC | Age: 40
End: 2025-03-13
Attending: PHYSICIAN ASSISTANT
Payer: COMMERCIAL

## 2025-03-13 DIAGNOSIS — R92.8 ABNORMAL MRI, BREAST: ICD-10-CM

## 2025-03-13 PROCEDURE — 19085 BX BREAST 1ST LESION MR IMAG: CPT | Mod: LT | Performed by: RADIOLOGY

## 2025-03-13 PROCEDURE — 88360 TUMOR IMMUNOHISTOCHEM/MANUAL: CPT | Mod: 26 | Performed by: PATHOLOGY

## 2025-03-13 PROCEDURE — 88341 IMHCHEM/IMCYTCHM EA ADD ANTB: CPT | Mod: 26 | Performed by: PATHOLOGY

## 2025-03-13 PROCEDURE — 88305 TISSUE EXAM BY PATHOLOGIST: CPT | Mod: 26 | Performed by: PATHOLOGY

## 2025-03-13 PROCEDURE — 88342 IMHCHEM/IMCYTCHM 1ST ANTB: CPT | Mod: 26 | Performed by: PATHOLOGY

## 2025-03-13 PROCEDURE — 88342 IMHCHEM/IMCYTCHM 1ST ANTB: CPT | Mod: TC | Performed by: PHYSICIAN ASSISTANT

## 2025-03-13 PROCEDURE — 88341 IMHCHEM/IMCYTCHM EA ADD ANTB: CPT | Mod: TC | Performed by: PHYSICIAN ASSISTANT

## 2025-03-13 PROCEDURE — A9585 GADOBUTROL INJECTION: HCPCS | Performed by: RADIOLOGY

## 2025-03-13 RX ORDER — GADOBUTROL 604.72 MG/ML
10 INJECTION INTRAVENOUS ONCE
Status: COMPLETED | OUTPATIENT
Start: 2025-03-13 | End: 2025-03-13

## 2025-03-13 RX ORDER — LIDOCAINE HYDROCHLORIDE AND EPINEPHRINE 10; 10 MG/ML; UG/ML
10 INJECTION, SOLUTION INFILTRATION; PERINEURAL ONCE
Status: COMPLETED | OUTPATIENT
Start: 2025-03-13 | End: 2025-03-13

## 2025-03-13 RX ADMIN — GADOBUTROL 9.5 ML: 604.72 INJECTION INTRAVENOUS at 08:28

## 2025-03-13 RX ADMIN — LIDOCAINE HYDROCHLORIDE AND EPINEPHRINE 10 ML: 10; 10 INJECTION, SOLUTION INFILTRATION; PERINEURAL at 08:28

## 2025-03-13 NOTE — DISCHARGE INSTRUCTIONS
MRI Contrast Discharge Instructions    The IV contrast you received today will pass out of your body in your  urine. This will happen in the next 24 hours. You will not feel this process.  Your urine will not change color.    Drink at least 4 extra glasses of water or juice today (unless your doctor  has restricted your fluids). This reduces the stress on your kidneys.  You may take your regular medicines.    If you are on dialysis: It is best to have dialysis today.    If you have a reaction: Most reactions happen right away. If you have  any new symptoms after leaving the hospital (such as hives or swelling),  call your hospital at the correct number below. Or call your family doctor.  If you have breathing distress or wheezing, call 911.    Special instructions: ***    I have read and understand the above information.    Signature:______________________________________ Date:___________    Staff:__________________________________________ Date:___________     Time:__________    [facility] Departments:    ___[place]: [phone]  ___[facility]: [phone]  ___[place]: [phone] ___[facility]: [phone]  ___[facility]: [phone]  ___[facility]: [phone]  ___[phone]  ___[facility]: [phone]  ___[phone]

## 2025-03-17 ENCOUNTER — TELEPHONE (OUTPATIENT)
Dept: SURGERY | Facility: CLINIC | Age: 40
End: 2025-03-17
Payer: COMMERCIAL

## 2025-03-17 ENCOUNTER — PATIENT OUTREACH (OUTPATIENT)
Dept: ONCOLOGY | Facility: CLINIC | Age: 40
End: 2025-03-17
Payer: COMMERCIAL

## 2025-03-17 DIAGNOSIS — D05.12 DUCTAL CARCINOMA IN SITU (DCIS) OF LEFT BREAST: Primary | ICD-10-CM

## 2025-03-17 LAB
PATH REPORT.COMMENTS IMP SPEC: ABNORMAL
PATH REPORT.COMMENTS IMP SPEC: YES
PATH REPORT.FINAL DX SPEC: ABNORMAL
PATH REPORT.GROSS SPEC: ABNORMAL
PATH REPORT.MICROSCOPIC SPEC OTHER STN: ABNORMAL
PATH REPORT.RELEVANT HX SPEC: ABNORMAL
PATHOLOGY SYNOPTIC REPORT: ABNORMAL
PHOTO IMAGE: ABNORMAL

## 2025-03-17 NOTE — PROGRESS NOTES
New Patient Oncology Nurse Navigator Note     Referring provider: Leena Brunson PA-C     Referring Clinic/Organization: [facility]      Referred to (specialty:) Cancer Surgery      Date Referral Received: March 17, 2025     Evaluation for:  D05.12 (ICD-10-CM) - Ductal carcinoma in situ (DCIS) of left breast     Clinical History (per Nurse review of records provided):      Vivi Mccall is a 39 year old female with history of enlarged breasts and accessory axillary breast tissue on prior ultrasounds. She presented with axillary pain and has axillary mammary tissue. Patient recently weaned her youngest daughter from breast feeding in July 2024. She reports a little bit of breast tenderness on the right side, but both axillary areas are swollen, burning and tender. The right is worse than the left. She does have a history of mastitis.     On 8/9/24 she had with bilateral diagnostic mammograms to evaluate enlarged breasts and accessory axillary breast tissue on prior ultrasounds.  FINDINGS: Bilateral mammography with digital breast tomosynthesis was performed. Prominent tissue in both axillae is again seen. No concerning mammographic/tomographic findings on either side.  IMPRESSION: BI-RADS CATEGORY: 2 - Benign.  RECOMMENDED FOLLOW-UP: Routine yearly mammography beginning at age 40 or as discussed with your provider.    She saw Leena Brunson and on 9/30/24 had bilateral axillary ultrasound demonstrated normal axillary lymph nodes. Otherwise, no sonographic abnormalities are appreciated.    2/24/25 Bilateral breast MRI  Breast composition: Scattered fibroglandular tissue  Background parenchymal enhancement: Mild  No suspicious enhancement in the right breast. No lymphadenopathy.  In the left breast between 5-7 o'clock, there is segmental nonmass enhancement extending from the anterior to posterior portions of the breast, axial images 33 through 52. This area spans approximately 8.5 cm in the AP  direction visualized on MIP images.    3/13/25 - Case: [id number]                                   LEFT BREAST, 6:00, INFERIOR, MRI-GUIDED BIOPSY:  -DUCTAL CARCINOMA IN-SITU (DCIS), high nuclear grade, cribriform and solid type with central necrosis.  -Calcifications associated with DCIS.  -No definite evidence of invasive carcinoma identified in this biopsy.  -DCIS is positive for estrogen receptor (71-80% nuclei, moderate to strong staining) and positive for progesterone receptor (71-80% nuclei, strong staining).    3/17 Writer received referral, reviewed for appropriate plan, and referral transferred to New Patient Scheduling for completion.    Records Location: See Bookmarked material     Records Needed: Breast imaging for past 5 years.

## 2025-03-26 NOTE — TELEPHONE ENCOUNTER
RECORDS STATUS - BREAST    RECORDS REQUESTED FROM:    DIAGNOSIS:    NOTES DETAILS STATUS   OFFICE NOTE from referring provider Epic 2/26/25: Leena Brunson PA-C   OPERATIVE REPORT Epic 3/13/25: MRI Breast Bx   MEDICATION LIST [facility]    LABS     PATHOLOGY REPORTS  (Tissue diagnosis, Stage, ER/NJ percentage positive and intensity of staining, HER2 IHC, FISH, and all biopsies from breast and any distant metastasis)                 Report in Epic 3/13/25: [id number]    IMAGING (NEED IMAGES & REPORT)     MRI PACS 3/13/25: MR Breast Bx  2/24/25: MR Breast   MAMMO PACS 3/13/25-8/4/22    ULTRASOUND PACS 9/30/24: US Breast   BRAIN MRI PACS 12/6/24: MR Brain

## 2025-04-03 ENCOUNTER — ONCOLOGY VISIT (OUTPATIENT)
Dept: ONCOLOGY | Facility: CLINIC | Age: 40
End: 2025-04-03
Attending: PHYSICIAN ASSISTANT
Payer: COMMERCIAL

## 2025-04-03 ENCOUNTER — PATIENT OUTREACH (OUTPATIENT)
Dept: ONCOLOGY | Facility: CLINIC | Age: 40
End: 2025-04-03
Payer: COMMERCIAL

## 2025-04-03 ENCOUNTER — PRE VISIT (OUTPATIENT)
Dept: ONCOLOGY | Facility: CLINIC | Age: 40
End: 2025-04-03
Payer: COMMERCIAL

## 2025-04-03 VITALS
SYSTOLIC BLOOD PRESSURE: 157 MMHG | OXYGEN SATURATION: 100 % | HEIGHT: 68 IN | TEMPERATURE: 98.1 F | BODY MASS INDEX: 32.05 KG/M2 | RESPIRATION RATE: 16 BRPM | WEIGHT: 211.5 LBS | HEART RATE: 65 BPM | DIASTOLIC BLOOD PRESSURE: 105 MMHG

## 2025-04-03 DIAGNOSIS — D05.12 DUCTAL CARCINOMA IN SITU (DCIS) OF LEFT BREAST: ICD-10-CM

## 2025-04-03 PROCEDURE — 99212 OFFICE O/P EST SF 10 MIN: CPT | Performed by: SURGERY

## 2025-04-03 RX ORDER — CEFAZOLIN SODIUM 2 G/50ML
2 SOLUTION INTRAVENOUS
OUTPATIENT
Start: 2025-04-03

## 2025-04-03 RX ORDER — ACETAMINOPHEN 325 MG/1
975 TABLET ORAL ONCE
OUTPATIENT
Start: 2025-04-03 | End: 2025-04-03

## 2025-04-03 RX ORDER — ENOXAPARIN SODIUM 100 MG/ML
40 INJECTION SUBCUTANEOUS
OUTPATIENT
Start: 2025-04-03

## 2025-04-03 RX ORDER — CEFAZOLIN SODIUM 2 G/50ML
2 SOLUTION INTRAVENOUS SEE ADMIN INSTRUCTIONS
OUTPATIENT
Start: 2025-04-03

## 2025-04-03 ASSESSMENT — PAIN SCALES - GENERAL: PAINLEVEL_OUTOF10: NO PAIN (0)

## 2025-04-03 NOTE — NURSING NOTE
"Oncology Rooming Note    April 3, 2025 12:22 PM   Vivi Mccall is a 39 year old female who presents for:    No chief complaint on file.    Initial Vitals: There were no vitals taken for this visit. Estimated body mass index is 31.75 kg/m  as calculated from the following:    Height as of 12/23/24: 1.727 m (5' 8\").    Weight as of 2/26/25: 94.7 kg (208 lb 12.8 oz). There is no height or weight on file to calculate BSA.  No Pain (0) Comment: Data Unavailable   No LMP recorded.  Allergies reviewed: Yes  Medications reviewed: Yes    Medications: Medication refills not needed today.  Pharmacy name entered into [employer] [facility]: [id number] - [place], [place] - [address]    Frailty Screening:   Is the patient here for a new oncology consult visit in cancer care? 1. Yes. Over the past month, have you experienced difficulty or required a caregiver to assist with:   1. Balance, walking or general mobility (including any falls)? NO  2. Completion of self-care tasks such as bathing, dressing, toileting, grooming/hygiene?  NO  3. Concentration or memory that affects your daily life?  YES     PHQ9:  Did this patient require a PHQ9?: No      Clinical concerns: none       Keke Ochoa              "

## 2025-04-03 NOTE — PROGRESS NOTES
New Patient Oncology Nurse Navigator Note     Referring provider: Magalys Sorensen MD      Referring Clinic/Organization:     [facility]        Referred to (specialty:) Genetic Counseling and Cancer Risk Management     Requested provider (if applicable): NA     Date Referral Received: April 3, 2025     Evaluation for:  D05.12 (ICD-10-CM) - Ductal carcinoma in situ (DCIS) of left breast     Payor: [facility] / Plan: [facility] CHOICE / Product Type: Indemnity /     April 3, 2025  Referral received and reviewed.  Sent to NPS to schedule.     Keri OCHOAN, RN, OCN  Oncology Nurse Navigator   [facility]  Cancer Care Service Line   New Patient Hem/Onc Scheduling / Referrals: [phone] (fax: [phone] )

## 2025-04-03 NOTE — PROGRESS NOTES
[facility] [address] [address]  Phone: [phone]  Fax: [phone]    PATIENT NAME:  Vivi Mccall  PATIENT YOB: 1985    NEW SURGICAL ONCOLOGY CONSULTATION  Apr 3, 2025    Vivi Mccall is a 39 year old woman who presents with a left  breast complaint.  She was referred by Leena Brunson PA-C.    HPI:    She had bilateral axillary swelling when she was pregnant with her child (22 months ago) and lactated from the right axilla post-partum. She has pain bilaterally in the axilla at the accessory breast tissue.    She notes no masses in either breast, axilla, or neck. She denies any nipple discharge or nipple inversion.     Imaging showed nonmass enhancement at 5-7:00 in the LEFT breast, spanning 8.5 cm.    A biopsy was performed and a clip was placed.  It showed ductal carcinoma in situ, high grade, ER+.      BREAST-SPECIFIC HISTORY:  Prior breast surgeries: No  Prior radiation history: No  Bra size: 36 DDD  Dominant hand: Right    FAMILY HISTORY:  Breast ca: Yes mother (dx 60 negative genetic testing), MGM (dx 45 and 60), mat great aunt (dx 70s)  Ovarian ca: No  Pancreatic ca: No  Melanoma: No  Gastric ca: No  Colon ca: No  Other cancer: Yes MGF w/ prostate ca; great GM w/ bile duct cancer  Patient has not had genetic testing    Patient Active Problem List   Diagnosis    Benign essential hypertension    ROCHELLE (generalized anxiety disorder)    Major depressive disorder, recurrent episode, moderate (H)    Vitamin D deficiency    Shoulder pain, left    Seasonal allergic rhinitis due to pollen    Mild persistent asthma without complication    Family history of malignant neoplasm of breast    Chronic left shoulder pain    Dizziness    Lateral epicondylitis of right elbow    Axillary accessory breast tissue    Pelvic pain in female    Large breasts    Upper back pain    Polymastia    Eyelid disorder   No DM, MI, CVA      Past Medical History:   Diagnosis Date    Asthma     seasonal and environmental    C. difficile diarrhea 2013    Concussion 01/2022    Depression     Depressive disorder     Encounter for IUD insertion 10/16/2020    Jyoti inserted by Dr Park    Generalized anxiety disorder     Hypertension     IUD contraception 10/2020    Jyoti inserted by Dr Park    Lateral epicondylitis of right elbow 6/22/2023    Pain in both hands 02/20/2020       Past Surgical History:   Procedure Laterality Date    GYN SURGERY  2003    Laser treatment of HPV    ORTHOPEDIC SURGERY Left 07/21/2017    ganglion cyst removal    ORTHOPEDIC SURGERY Left     nerve release on left leg/ankle     SOFT TISSUE SURGERY  1999    SURGICAL HISTORY OF -       Nerve entrapment release    ZZHC RELEASE FOOT/TOE NERVE  2011   PONV    Current Outpatient Medications   Medication Sig Dispense Refill    albuterol (PROAIR HFA/PROVENTIL HFA/VENTOLIN HFA) 108 (90 Base) MCG/ACT inhaler Inhale 2 puffs into the lungs every 6 hours as needed for shortness of breath, wheezing or cough 18 g 3    buPROPion (WELLBUTRIN XL) 150 MG 24 hr tablet Take 1 tablet (150 mg) by mouth every morning. 90 tablet 3    citalopram (CELEXA) 40 MG tablet TAKE 1 TABLET(40 MG) BY MOUTH DAILY 90 tablet 3    diazepam (VALIUM) 5 MG tablet Take 1 tablet (5 mg) by mouth 2 times daily as needed for anxiety (take 5 mg 30-60 minutes prior to breast MRI. take a second 5 mg dose if directed by technician.). 2 tablet 0    ketoconazole (NIZORAL) 2 % external cream Apply topically 2 times daily. To rash for 10 to 14 days or until rash is completely resolved for 48 hours 30 g 0    lisinopril (ZESTRIL) 20 MG tablet Take 1 tablet (20 mg) by mouth daily. Take in addition to Lisinopril/hydrochlorothiazide 20/25 daily total daily dose Lisinopril 40 mg/day 90 tablet 3    lisinopril-hydrochlorothiazide (ZESTORETIC) 20-25 MG tablet Take 1 tablet by mouth daily. Take in addition to Lisinopril 20 mg for total  "daily dose 40 mg 90 tablet 3    miconazole (MICATIN) 2 % external powder Apply topically as needed for itching. 71 g 3    spironolactone (ALDACTONE) 25 MG tablet Take 1 tablet (25 mg) by mouth daily. 30 tablet 3           Allergies   Allergen Reactions    Amoxicillin Other (See Comments)     Patient does not want to take since having c diff    Codeine Other (See Comments)     Hallucinations    Penicillins Other (See Comments)     Patient does not want to take since having c diff        SOCIAL HISTORY:  Smokes: No  Occupation: [employer]    ROS:  Easy bruising/bleeding: No  History of DVT/PE: No    BP (!) 157/105 (BP Location: Right arm, Patient Position: Sitting, Cuff Size: Adult Regular)   Pulse 65   Temp 98.1  F (36.7  C) (Oral)   Resp 16   Ht 1.727 m (5' 8\")   Wt 95.9 kg (211 lb 8 oz)   SpO2 100%   BMI 32.16 kg/m     Physical Exam  Constitutional:       Appearance: She is well-developed.   Chest:   Breasts:     Breasts are symmetrical (R > L).      Right: No inverted nipple, mass, nipple discharge, skin change or tenderness.      Left: No inverted nipple, mass, nipple discharge, skin change or tenderness.      Comments: Patient was examined in both supine and upright positions.   Lymphadenopathy:      Cervical: No cervical adenopathy.      Right cervical: No superficial, deep or posterior cervical adenopathy.     Left cervical: No superficial, deep or posterior cervical adenopathy.      Upper Body:      Right upper body: No supraclavicular, axillary or pectoral adenopathy.      Left upper body: No supraclavicular, axillary or pectoral adenopathy.      Comments: No lymphedema in bilateral upper extremities.   Skin:     General: Skin is warm and dry.          INVESTIGATIONS:    Bilateral Breast MRI (2/24/2025) showed:  FINDINGS:  Breast composition: Scattered fibroglandular tissue  Background parenchymal enhancement: Mild  No suspicious enhancement in the right breast. No lymphadenopathy. In the left " "breast between 5-7 o'clock, there is segmental nonmass enhancement extending from the anterior to posterior portions of the breast, axial images 33 through 52. This area spans approximately 8.5 cm in the AP direction visualized on MIP images.  IMPRESSION: BI-RADS CATEGORY: 4 - Suspicious.    Biopsy (3/13/2025) showed:  Final Diagnosis   LEFT BREAST, 6:00, INFERIOR, MRI-GUIDED BIOPSY:  -DUCTAL CARCINOMA IN-SITU (DCIS), high nuclear grade, cribriform and solid type with central necrosis.  -Calcifications associated with DCIS.  -No definite evidence of invasive carcinoma identified in this biopsy.  -DCIS is positive for estrogen receptor (71-80% nuclei, moderate to strong staining) and positive for progesterone receptor (71-80% nuclei, strong staining).     ASSESSMENT:  Vivi Mccall is a 39 year old woman with DCIS of the LEFT breast    Her stage is 0 ([id number]).    I personally reviewed the imaging above.    The imaging, diagnosis and treatment of ductal carcinoma in situ (DCIS) was discussed with Vivi Mccall and her .  The mainstay of treatment for DCIS is surgical resection, in the form of either breast conservation (segmental mastectomy plus radiation) or mastectomy.  We reviewed that the two strategies are equivalent in terms of overall survival.  They are both same day surgeries. The advantages and disadvantages of each were discussed.   Vivi Mccall IS a borderline candidate for breast conservation therapy given the extent of nonmass enhancement.  We discussed that this involves two necessary components: the lumpectomy (or \"segmental mastectomy\"), and several weeks of whole breast radiation therapy.  We discussed that the overall survival after breast conservation therapy is identical to mastectomy and that local recurrence (either DCIS or invasive cancer) rates are significantly higher if segmental mastectomy was performed without subsequent radiation.  We also discussed the " significance of clear margins and that a subsequent procedure may be necessary to achieve this.  Finally, we reviewed that a small subset of patients who undergo surgery for DCIS will be found to have invasive cancer in the final surgical pathology.  Should this be the case, further surgery +/- adjuvant therapy would be recommended.     Vivi Mccall was not interested in breast conservation.    We discussed the various types of mastectomy, including simple, skin-sparing, and nipple-sparing mastectomy.  We reviewed that the nipple-sparing technique is cosmetic; sensation and contractility will likely be lost. I would not recommend a nipple-sparing approach given the extent of non-mass enhancement. The risks of a mastectomy were discussed with the patient and family, including the risks of bleeding, wound infection, wound dehiscence, skin flap/nipple necrosis, poor cosmetic outcomes with skin folds, decreased shoulder range of motion, chest wall numbness, and seroma formation.  A drain would be placed at the time of surgery. The option of having immediate versus delayed reconstruction was also discussed.   We reviewed that the advantages of immediate reconstruction includes superior cosmetics, as the skin is preserved.  However, the major disadvantage is increased postoperative risks, including skin flap ischemia and expander infection, which can potentially delay adjuvant oncologic treatments which may be needed post-surgically.  Vivi Mccall was interested in this; a Plastic Surgery consultation was offered and will be arranged. Depending on the margin and ja status post-mastectomy, radiation may be necessary.    Finally, we reviewed that a small subset of patients who undergo surgery for DCIS will be found to have invasive cancer in the final surgical pathology.  Should this be the case, a sentinel lymph node biopsy is recommended for ja staging of the axilla.  This surgical procedure is  indicated at the time of mastectomy for DCIS but not for segmental mastectomy. This is performed with the combination of the radioactive Tilmanocept and dye (lymphazurin or indocyanine green). The risks of a sentinel lymph node biopsy were discussed with the patient and family, including the risks of lymphedema (5%), bleeding, wound infection, wound dehiscence, seroma formation, and paresthesias. There is an approximately 10% false negative rate associated with sentinel lymph node biopsy as published in the literature.  The findings of the sentinel lymph node biopsy may result in the need for additional treatment.     We discussed that surgical pathology results will be reviewed at the postoperative visit to allow for careful discussion of next steps and for answering questions.    Finally, we reviewed that as part of team-based approach to breast cancer, medical oncology and radiation oncology referrals will also be made after surgery to discuss adjuvant endocrine therapy and radiation therapy.  We did review that if the final pathology remains DCIS and she is s/p bilateral mastectomy, adjuvant endocrine therapy will not be recommended.     We reviewed the genetic testing guidelines by the American Society of Breast Sur[employer] from February 2019.  I have recommended genetic testing and counseling given her family history and new diagnosis.    All of the above was discussed with Vivimireya Francisco Stefany and all questions were answered.  She elected to proceed with BILATERAL skin-sparing mastectomy and LEFT axillary sentinel lymph node mapping and biopsy.    Total time spent with the patient was 60 minutes, of which 75% was counseling.     PLAN:  BILATERAL skin-sparing mastectomy  LEFT axillary sentinel lymph node mapping and biopsy  Plastic surgery referral  Genetic counseling referral  Patient to report to her PCP for preoperative H&P and testing.    Magalys Sorensen MD MS [facility] FACS  Associate Professor of Surgery  Division  of Surgical Oncology  [facility]     70 minutes spent on the date of the encounter doing chart review, review of test result(s), interpretation of test(s), patient visit, documentation, care coordination, and discussion with family.

## 2025-04-03 NOTE — LETTER
4/3/2025      Vivi Mccall  [address]  [facility] [address]      Dear Colleague,    Thank you for referring your patient, Vivi Mccall, to the [facility]. Please see a copy of my visit note below.      [facility]  [address] [facility] [address]  Phone: [phone]  Fax: [phone]    PATIENT NAME:  Vivi Mccall  PATIENT YOB: 1985    NEW SURGICAL ONCOLOGY CONSULTATION  Apr 3, 2025    Vivi Mccall is a 39 year old woman who presents with a left  breast complaint.  She was referred by Leena Brunson PA-C.    HPI:    She had bilateral axillary swelling when she was pregnant with her child (22 months ago) and lactated from the right axilla post-partum. She has pain bilaterally in the axilla at the accessory breast tissue.    She notes no masses in either breast, axilla, or neck. She denies any nipple discharge or nipple inversion.     Imaging showed nonmass enhancement at 5-7:00 in the LEFT breast, spanning 8.5 cm.    A biopsy was performed and a clip was placed.  It showed ductal carcinoma in situ, high grade, ER+.      BREAST-SPECIFIC HISTORY:  Prior breast surgeries: No  Prior radiation history: No  Bra size: 36 DDD  Dominant hand: Right    FAMILY HISTORY:  Breast ca: Yes mother (dx 60 negative genetic testing), MGM (dx 45 and 60), mat great aunt (dx 70s)  Ovarian ca: No  Pancreatic ca: No  Melanoma: No  Gastric ca: No  Colon ca: No  Other cancer: Yes MGF w/ prostate ca; great GM w/ bile duct cancer  Patient has not had genetic testing    Patient Active Problem List   Diagnosis     Benign essential hypertension     ROCHELLE (generalized anxiety disorder)     Major depressive disorder, recurrent episode, moderate (H)     Vitamin D deficiency     Shoulder pain, left     Seasonal allergic rhinitis due to pollen     Mild persistent asthma without complication     Family history of  malignant neoplasm of breast     Chronic left shoulder pain     Dizziness     Lateral epicondylitis of right elbow     Axillary accessory breast tissue     Pelvic pain in female     Large breasts     Upper back pain     Polymastia     Eyelid disorder   No DM, MI, CVA     Past Medical History:   Diagnosis Date     Asthma     seasonal and environmental     C. difficile diarrhea 2013     Concussion 01/2022     Depression      Depressive disorder      Encounter for IUD insertion 10/16/2020    Jyoti inserted by Dr Park     Generalized anxiety disorder      Hypertension      IUD contraception 10/2020    Jyoti inserted by Dr Park     Lateral epicondylitis of right elbow 6/22/2023     Pain in both hands 02/20/2020       Past Surgical History:   Procedure Laterality Date     GYN SURGERY  2003    Laser treatment of HPV     ORTHOPEDIC SURGERY Left 07/21/2017    ganglion cyst removal     ORTHOPEDIC SURGERY Left     nerve release on left leg/ankle      SOFT TISSUE SURGERY  1999     SURGICAL HISTORY OF -       Nerve entrapment release     ZZHC RELEASE FOOT/TOE NERVE  2011   PONV    Current Outpatient Medications   Medication Sig Dispense Refill     albuterol (PROAIR HFA/PROVENTIL HFA/VENTOLIN HFA) 108 (90 Base) MCG/ACT inhaler Inhale 2 puffs into the lungs every 6 hours as needed for shortness of breath, wheezing or cough 18 g 3     buPROPion (WELLBUTRIN XL) 150 MG 24 hr tablet Take 1 tablet (150 mg) by mouth every morning. 90 tablet 3     citalopram (CELEXA) 40 MG tablet TAKE 1 TABLET(40 MG) BY MOUTH DAILY 90 tablet 3     diazepam (VALIUM) 5 MG tablet Take 1 tablet (5 mg) by mouth 2 times daily as needed for anxiety (take 5 mg 30-60 minutes prior to breast MRI. take a second 5 mg dose if directed by technician.). 2 tablet 0     ketoconazole (NIZORAL) 2 % external cream Apply topically 2 times daily. To rash for 10 to 14 days or until rash is completely resolved for 48 hours 30 g 0     lisinopril (ZESTRIL) 20 MG tablet Take  "1 tablet (20 mg) by mouth daily. Take in addition to Lisinopril/hydrochlorothiazide 20/25 daily total daily dose Lisinopril 40 mg/day 90 tablet 3     lisinopril-hydrochlorothiazide (ZESTORETIC) 20-25 MG tablet Take 1 tablet by mouth daily. Take in addition to Lisinopril 20 mg for total daily dose 40 mg 90 tablet 3     miconazole (MICATIN) 2 % external powder Apply topically as needed for itching. 71 g 3     spironolactone (ALDACTONE) 25 MG tablet Take 1 tablet (25 mg) by mouth daily. 30 tablet 3           Allergies   Allergen Reactions     Amoxicillin Other (See Comments)     Patient does not want to take since having c diff     Codeine Other (See Comments)     Hallucinations     Penicillins Other (See Comments)     Patient does not want to take since having c diff        SOCIAL HISTORY:  Smokes: No  Occupation: teaches dance    ROS:  Easy bruising/bleeding: No  History of DVT/PE: No    BP (!) 157/105 (BP Location: Right arm, Patient Position: Sitting, Cuff Size: Adult Regular)   Pulse 65   Temp 98.1  F (36.7  C) (Oral)   Resp 16   Ht 1.727 m (5' 8\")   Wt 95.9 kg (211 lb 8 oz)   SpO2 100%   BMI 32.16 kg/m     Physical Exam  Constitutional:       Appearance: She is well-developed.   Chest:   Breasts:     Breasts are symmetrical (R > L).      Right: No inverted nipple, mass, nipple discharge, skin change or tenderness.      Left: No inverted nipple, mass, nipple discharge, skin change or tenderness.      Comments: Patient was examined in both supine and upright positions.   Lymphadenopathy:      Cervical: No cervical adenopathy.      Right cervical: No superficial, deep or posterior cervical adenopathy.     Left cervical: No superficial, deep or posterior cervical adenopathy.      Upper Body:      Right upper body: No supraclavicular, axillary or pectoral adenopathy.      Left upper body: No supraclavicular, axillary or pectoral adenopathy.      Comments: No lymphedema in bilateral upper extremities.   Skin:    " " General: Skin is warm and dry.          INVESTIGATIONS:    Bilateral Breast MRI (2/24/2025) showed:  FINDINGS:  Breast composition: Scattered fibroglandular tissue  Background parenchymal enhancement: Mild  No suspicious enhancement in the right breast. No lymphadenopathy. In the left breast between 5-7 o'clock, there is segmental nonmass enhancement extending from the anterior to posterior portions of the breast, axial images 33 through 52. This area spans approximately 8.5 cm in the AP direction visualized on MIP images.  IMPRESSION: BI-RADS CATEGORY: 4 - Suspicious.    Biopsy (3/13/2025) showed:  Final Diagnosis   LEFT BREAST, 6:00, INFERIOR, MRI-GUIDED BIOPSY:  -DUCTAL CARCINOMA IN-SITU (DCIS), high nuclear grade, cribriform and solid type with central necrosis.  -Calcifications associated with DCIS.  -No definite evidence of invasive carcinoma identified in this biopsy.  -DCIS is positive for estrogen receptor (71-80% nuclei, moderate to strong staining) and positive for progesterone receptor (71-80% nuclei, strong staining).     ASSESSMENT:  Vivi Mccall is a 39 year old woman with DCIS of the LEFT breast    Her stage is 0 ([id number]).    I personally reviewed the imaging above.    The imaging, diagnosis and treatment of ductal carcinoma in situ (DCIS) was discussed with Vivi Mccall and her .  The mainstay of treatment for DCIS is surgical resection, in the form of either breast conservation (segmental mastectomy plus radiation) or mastectomy.  We reviewed that the two strategies are equivalent in terms of overall survival.  They are both same day surgeries. The advantages and disadvantages of each were discussed.   Vivi Mccall IS a borderline candidate for breast conservation therapy given the extent of nonmass enhancement.  We discussed that this involves two necessary components: the lumpectomy (or \"segmental mastectomy\"), and several weeks of whole breast radiation " therapy.  We discussed that the overall survival after breast conservation therapy is identical to mastectomy and that local recurrence (either DCIS or invasive cancer) rates are significantly higher if segmental mastectomy was performed without subsequent radiation.  We also discussed the significance of clear margins and that a subsequent procedure may be necessary to achieve this.  Finally, we reviewed that a small subset of patients who undergo surgery for DCIS will be found to have invasive cancer in the final surgical pathology.  Should this be the case, further surgery +/- adjuvant therapy would be recommended.     Vivi Mccall was not interested in breast conservation.    We discussed the various types of mastectomy, including simple, skin-sparing, and nipple-sparing mastectomy.  We reviewed that the nipple-sparing technique is cosmetic; sensation and contractility will likely be lost. I would not recommend a nipple-sparing approach given the extent of non-mass enhancement. The risks of a mastectomy were discussed with the patient and family, including the risks of bleeding, wound infection, wound dehiscence, skin flap/nipple necrosis, poor cosmetic outcomes with skin folds, decreased shoulder range of motion, chest wall numbness, and seroma formation.  A drain would be placed at the time of surgery. The option of having immediate versus delayed reconstruction was also discussed.   We reviewed that the advantages of immediate reconstruction includes superior cosmetics, as the skin is preserved.  However, the major disadvantage is increased postoperative risks, including skin flap ischemia and expander infection, which can potentially delay adjuvant oncologic treatments which may be needed post-surgically.  Vivi Mccall was interested in this; a Plastic Surgery consultation was offered and will be arranged. Depending on the margin and ja status post-mastectomy, radiation may be  necessary.    Finally, we reviewed that a small subset of patients who undergo surgery for DCIS will be found to have invasive cancer in the final surgical pathology.  Should this be the case, a sentinel lymph node biopsy is recommended for ja staging of the axilla.  This surgical procedure is indicated at the time of mastectomy for DCIS but not for segmental mastectomy. This is performed with the combination of the radioactive Tilmanocept and dye (lymphazurin or indocyanine green). The risks of a sentinel lymph node biopsy were discussed with the patient and family, including the risks of lymphedema (5%), bleeding, wound infection, wound dehiscence, seroma formation, and paresthesias. There is an approximately 10% false negative rate associated with sentinel lymph node biopsy as published in the literature.  The findings of the sentinel lymph node biopsy may result in the need for additional treatment.     We discussed that surgical pathology results will be reviewed at the postoperative visit to allow for careful discussion of next steps and for answering questions.    Finally, we reviewed that as part of team-based approach to breast cancer, medical oncology and radiation oncology referrals will also be made after surgery to discuss adjuvant endocrine therapy and radiation therapy.  We did review that if the final pathology remains DCIS and she is s/p bilateral mastectomy, adjuvant endocrine therapy will not be recommended.     We reviewed the genetic testing guidelines by the American Society of Breast Surgeons from February 2019.  I have recommended genetic testing and counseling given her family history and new diagnosis.    All of the above was discussed with Vivi Mccall and all questions were answered.  She elected to proceed with BILATERAL skin-sparing mastectomy and LEFT axillary sentinel lymph node mapping and biopsy.    Total time spent with the patient was 60 minutes, of which 75% was  counseling.     PLAN:  BILATERAL skin-sparing mastectomy  LEFT axillary sentinel lymph node mapping and biopsy  Plastic surgery referral  Genetic counseling referral  Patient to report to her PCP for preoperative H&P and testing.    Magalys Sorensen MD MS [facility] FACS  Associate Professor of Surgery  Division of Surgical Oncology  [facility]     70 minutes spent on the date of the encounter doing chart review, review of test result(s), interpretation of test(s), patient visit, documentation, care coordination, and discussion with family.      Again, thank you for allowing me to participate in the care of your patient.        Sincerely,        Magalys Sorensen MD    Electronically signed

## 2025-04-08 ENCOUNTER — OFFICE VISIT (OUTPATIENT)
Dept: PLASTIC SURGERY | Facility: CLINIC | Age: 40
End: 2025-04-08
Attending: SURGERY
Payer: COMMERCIAL

## 2025-04-08 VITALS
BODY MASS INDEX: 32.25 KG/M2 | WEIGHT: 212.8 LBS | HEIGHT: 68 IN | DIASTOLIC BLOOD PRESSURE: 92 MMHG | TEMPERATURE: 98.8 F | OXYGEN SATURATION: 98 % | HEART RATE: 77 BPM | SYSTOLIC BLOOD PRESSURE: 152 MMHG

## 2025-04-08 DIAGNOSIS — D05.12 DUCTAL CARCINOMA IN SITU (DCIS) OF LEFT BREAST: ICD-10-CM

## 2025-04-08 PROCEDURE — 99213 OFFICE O/P EST LOW 20 MIN: CPT | Performed by: PLASTIC SURGERY

## 2025-04-08 RX ORDER — CEFAZOLIN SODIUM 2 G/50ML
2 SOLUTION INTRAVENOUS SEE ADMIN INSTRUCTIONS
Status: CANCELLED | OUTPATIENT
Start: 2025-04-08

## 2025-04-08 RX ORDER — CETIRIZINE HYDROCHLORIDE 10 MG/1
10 TABLET ORAL PRN
COMMUNITY

## 2025-04-08 RX ORDER — SENNOSIDES 8.6 MG
650 CAPSULE ORAL PRN
COMMUNITY

## 2025-04-08 RX ORDER — CEFAZOLIN SODIUM 2 G/50ML
2 SOLUTION INTRAVENOUS
Status: CANCELLED | OUTPATIENT
Start: 2025-04-08

## 2025-04-08 ASSESSMENT — PAIN SCALES - GENERAL: PAINLEVEL_OUTOF10: NO PAIN (0)

## 2025-04-08 NOTE — PROGRESS NOTES
Referring Provider:  Magalys Sorensen MD  [address] [facility] [address]  [place] [address]     Primary Care Provider:  Vi Baldwin      RE: Vivi Mccall.  : 1985.  ADWOA: 2025.    Reason for visit: Bilateral breast reconstruction    HPI: Diagnosed with left-sided breast cancer.  Patient is undergoing a bilateral nipple non-sparing mastectomy and is interested in reconstruction.  Chances for radiation therapy are low.  No neoadjuvant therapies are planned.  Patient wears a double to triple D bra would like to be around a D cup.  The patient has never had any abdominal surgeries.    Medical history:  Past Medical History:   Diagnosis Date    Asthma     seasonal and environmental    C. difficile diarrhea 2013    Concussion 2022    Depression     Depressive disorder     Encounter for IUD insertion 10/16/2020    Jyoti inserted by Dr Park    Generalized anxiety disorder     Hypertension     IUD contraception 10/2020    Jyoti inserted by Dr Park    Lateral epicondylitis of right elbow 2023    Pain in both hands 2020       Surgical history:  Past Surgical History:   Procedure Laterality Date    GYN SURGERY  2003    Laser treatment of HPV    ORTHOPEDIC SURGERY Left 2017    ganglion cyst removal    ORTHOPEDIC SURGERY Left     nerve release on left leg/ankle     SOFT TISSUE SURGERY      SURGICAL HISTORY OF -       Nerve entrapment release    ZZHC RELEASE FOOT/TOE NERVE  2011       Family history:  Family History   Problem Relation Age of Onset    Diabetes Mother     Bipolar Disorder Mother     Hypertension Mother     Coronary Artery Disease Mother     Depression Mother     Anxiety Disorder Mother     Mental Illness Mother     Breast Cancer Mother 60        BRCA negative, neoadjuvant chemo, surgery pend    Unknown/Adopted Father     Hypertension Father     Breast Cancer Maternal Grandmother     Hypertension Maternal Grandmother     Depression Maternal  Grandmother     Anxiety Disorder Maternal Grandmother     Diabetes Type 2  Maternal Grandfather     Glaucoma Maternal Grandfather     Hypertension Maternal Grandfather     Coronary Artery Disease Maternal Grandfather     Diabetes Maternal Grandfather     Depression Maternal Grandfather     Cerebrovascular Disease Maternal Grandfather     Prostate Cancer Maternal Grandfather     Other - See Comments Maternal Grandfather         passed from Snipd 9/2021    Hypertension Paternal Grandmother     Coronary Artery Disease Paternal Grandfather     Hypertension Maternal Aunt     Ovarian Cancer Other     Macular Degeneration No family hx of        Medications:  Current Outpatient Medications   Medication Sig Dispense Refill    acetaminophen (TYLENOL 8 HOUR) 650 MG CR tablet Take 650 mg by mouth as needed.      albuterol (PROAIR HFA/PROVENTIL HFA/VENTOLIN HFA) 108 (90 Base) MCG/ACT inhaler Inhale 2 puffs into the lungs every 6 hours as needed for shortness of breath, wheezing or cough 18 g 3    buPROPion (WELLBUTRIN XL) 150 MG 24 hr tablet Take 1 tablet (150 mg) by mouth every morning. 90 tablet 3    cetirizine (ZYRTEC ALLERGY) 10 MG tablet Take 10 mg by mouth as needed.      citalopram (CELEXA) 40 MG tablet TAKE 1 TABLET(40 MG) BY MOUTH DAILY 90 tablet 3    diazepam (VALIUM) 5 MG tablet Take 1 tablet (5 mg) by mouth 2 times daily as needed for anxiety (take 5 mg 30-60 minutes prior to breast MRI. take a second 5 mg dose if directed by technician.). 2 tablet 0    ketoconazole (NIZORAL) 2 % external cream Apply topically 2 times daily. To rash for 10 to 14 days or until rash is completely resolved for 48 hours 30 g 0    lisinopril (ZESTRIL) 20 MG tablet Take 1 tablet (20 mg) by mouth daily. Take in addition to Lisinopril/hydrochlorothiazide 20/25 daily total daily dose Lisinopril 40 mg/day 90 tablet 3    lisinopril-hydrochlorothiazide (ZESTORETIC) 20-25 MG tablet Take 1 tablet by mouth daily. Take in addition to Lisinopril 20  "mg for total daily dose 40 mg 90 tablet 3    miconazole (MICATIN) 2 % external powder Apply topically as needed for itching. 71 g 3    spironolactone (ALDACTONE) 25 MG tablet Take 1 tablet (25 mg) by mouth daily. 30 tablet 3       Allergies:  Allergies   Allergen Reactions    Amoxicillin Other (See Comments)     Patient does not want to take since having c diff    Other Reaction(s): Cdiff    Codeine Other (See Comments)     Hallucinations    Other Reaction(s): Hallucinations    Penicillins Other (See Comments)     Patient does not want to take since having c diff       Social history:   Social History     Tobacco Use    Smoking status: Never     Passive exposure: Never    Smokeless tobacco: Never   Substance Use Topics    Alcohol use: Yes     Comment: occ.         Physical Examination:  BP (!) 152/92 (BP Location: Right arm, Patient Position: Sitting, Cuff Size: Adult Regular)   Pulse 77   Temp 98.8  F (37.1  C) (Oral)   Ht 1.727 m (5' 8\")   Wt 96.5 kg (212 lb 12.8 oz)   SpO2 98%   BMI 32.36 kg/m    Body mass index is 32.36 kg/m .    General: No acute distress.    Breasts: Bilateral grade 2 ptosis with right side larger than left side.  Sternal notch to nipple distance under 35 cm.    ABDOMEN: Enough abdominal tissue to give her the size she wants.  No scars and no hernias.    BACK: No scars        ASSESMENT and PLAN:     Based upon the above findings, a diagnosis of breast cancer, planning mastectomy, interested in breast reconstruction was made.  I had a prabha, detailed discussion with the patient, in the presence of my nurse, who was present from beginning to end.  I discussed with the patient the concept behind breast reconstruction, the elective nature of reconstruction, immediate versus delayed reconstruction, the staged nature of reconstruction, the different options of reconstruction, including implant based (expander-implant, versus direct to implant) versus autologous and the pros and cons of each. " Additionally, the potential need for radiation therapy/chemotherapy as adjuvant therapy and the impact on reconstruction in terms of timing, complications, and choice of reconstruction was all discussed.     In Implant based reconstruction, the use of implants was discussed. The specific nature of implants, the fact that they need to be replaced in time, that they have potential specific complications of implant rupture, contracture, rippling, firmness, palpability, visibility, rippling, and the association with ELIZA-ALCL/SCC were all discussed. Reviewed the FDA checklist for implant related risks.  Reiterated importance of implant surveillance, which includes MR/High Resolution US imaging study 5-6 years after the original operation, followed by MR/High Resolution US imaging every 3 years thereafter.     In Autologous based reconstruction, the use of tissue from different sites (DESMOND being most commonly used), the longevity of this form of reconstruction, the donor site complications, and the length and complexity of this form or reconstruction was explained.     The potential use of Liposuction and fat grafting was also discussed in detail.     Overview of the surgery and expectations of the surgery were explained. All risks, benefits and alternatives, including pain, infection, bleeding, scarring, asymmetry, seromas, hematomas, wound breakdown, wound dehiscence, loss of the implants/flaps, abdominal wall-healing issues, abdominal wall weakness, bulges, hernias, sensation loss, requirement of further staged procedures, Implant specific issues and complications as discussed above, removal of infected or exposed implants, pneumothoraces, contour abnormalities, cannula injuries to deeper structures, hernias, fat necrosis, lumps and bumps, loss of grafted material, DVT, PE, MI, CVA, pneumonia, renal failure and death, were explained. They were understood and agreed upon by the patient, they were acknowledged by the  patient, all the patient's questions were answered in detail to the patient's fullest understanding that they acknowledged, the team approach for treatment in the operating room was agreed upon by the patient, and proceeding with surgery was agreed upon by the patient.    After our discussion, the patient is inclined towards immediate reconstruction with expanders followed by staged to bilateral Monica flap followed by stage III touchup surgeries.    All her questions were answered. She was happy with the visit. I look forward to helping her out in the near future as indicated.       Total time spent in the encounter today including chart review, visit itself, and post-visit paperwork was 60 minutes.       Michi Lanier MD    Chief, Division of Plastic Surgery  Department of Surgery  [facility]      CC: Magalys Sorensen MD  [address] [place] [id number]  CC: Vi Baldwin

## 2025-04-08 NOTE — NURSING NOTE
"Oncology Rooming Note    April 8, 2025 8:10 AM   Vivi Mccall is a 39 year old female who presents for:    Chief Complaint   Patient presents with    Oncology Clinic Visit     Ductal carcinoma in situ (DCIS) of left breast     Initial Vitals: BP (!) 152/92 (BP Location: Right arm, Patient Position: Sitting, Cuff Size: Adult Regular)   Pulse 77   Temp 98.8  F (37.1  C) (Oral)   Ht 1.727 m (5' 8\")   Wt 96.5 kg (212 lb 12.8 oz)   SpO2 98%   BMI 32.36 kg/m   Estimated body mass index is 32.36 kg/m  as calculated from the following:    Height as of this encounter: 1.727 m (5' 8\").    Weight as of this encounter: 96.5 kg (212 lb 12.8 oz). Body surface area is 2.15 meters squared.  No Pain (0) Comment: Data Unavailable   No LMP recorded.  Allergies reviewed: Yes  Medications reviewed: Yes    Medications: Medication refills not needed today.  Pharmacy name entered into Triptease: [id number] [place], [place] - [address]    Frailty Screening:   Is the patient here for a new oncology consult visit in cancer care? 2. No    PHQ9:  Did this patient require a PHQ9?: No      Clinical concerns: none       Tera Jain              " Quality 130: Documentation Of Current Medications In The Medical Record: Current Medications Documented

## 2025-04-08 NOTE — LETTER
2025      Vivi Mccall  [address]  [facility] [address]      Dear Colleague,    Thank you for referring your patient, Vivi Mccall, to the [facility]. Please see a copy of my visit note below.    Referring Provider:  Magalys Sorensen MD  [address]  [place] [address]     Primary Care Provider:  Vi Baldwin      RE: Vivi Mccall.  : 1985.  ADWOA: 2025.    Reason for visit: Bilateral breast reconstruction    HPI: Diagnosed with left-sided breast cancer.  Patient is undergoing a bilateral nipple non-sparing mastectomy and is interested in reconstruction.  Chances for radiation therapy are low.  No neoadjuvant therapies are planned.  Patient wears a double to triple D bra would like to be around a D cup.  The patient has never had any abdominal surgeries.    Medical history:  Past Medical History:   Diagnosis Date     Asthma     seasonal and environmental     C. difficile diarrhea 2013     Concussion 2022     Depression      Depressive disorder      Encounter for IUD insertion 10/16/2020    Jyoti inserted by Dr Park     Generalized anxiety disorder      Hypertension      IUD contraception 10/2020    Jyoti inserted by Dr Park     Lateral epicondylitis of right elbow 2023     Pain in both hands 2020       Surgical history:  Past Surgical History:   Procedure Laterality Date     GYN SURGERY  2003    Laser treatment of HPV     ORTHOPEDIC SURGERY Left 2017    ganglion cyst removal     ORTHOPEDIC SURGERY Left     nerve release on left leg/ankle      SOFT TISSUE SURGERY       SURGICAL HISTORY OF -       Nerve entrapment release     ZZHC RELEASE FOOT/TOE NERVE  2011       Family history:  Family History   Problem Relation Age of Onset     Diabetes Mother      Bipolar Disorder Mother      Hypertension Mother      Coronary Artery Disease Mother      Depression Mother      Anxiety  Disorder Mother      Mental Illness Mother      Breast Cancer Mother 60        BRCA negative, neoadjuvant chemo, surgery pend     Unknown/Adopted Father      Hypertension Father      Breast Cancer Maternal Grandmother      Hypertension Maternal Grandmother      Depression Maternal Grandmother      Anxiety Disorder Maternal Grandmother      Diabetes Type 2  Maternal Grandfather      Glaucoma Maternal Grandfather      Hypertension Maternal Grandfather      Coronary Artery Disease Maternal Grandfather      Diabetes Maternal Grandfather      Depression Maternal Grandfather      Cerebrovascular Disease Maternal Grandfather      Prostate Cancer Maternal Grandfather      Other - See Comments Maternal Grandfather         passed from Covid 9/2021     Hypertension Paternal Grandmother      Coronary Artery Disease Paternal Grandfather      Hypertension Maternal Aunt      Ovarian Cancer Other      Macular Degeneration No family hx of        Medications:  Current Outpatient Medications   Medication Sig Dispense Refill     acetaminophen (TYLENOL 8 HOUR) 650 MG CR tablet Take 650 mg by mouth as needed.       albuterol (PROAIR HFA/PROVENTIL HFA/VENTOLIN HFA) 108 (90 Base) MCG/ACT inhaler Inhale 2 puffs into the lungs every 6 hours as needed for shortness of breath, wheezing or cough 18 g 3     buPROPion (WELLBUTRIN XL) 150 MG 24 hr tablet Take 1 tablet (150 mg) by mouth every morning. 90 tablet 3     cetirizine (ZYRTEC ALLERGY) 10 MG tablet Take 10 mg by mouth as needed.       citalopram (CELEXA) 40 MG tablet TAKE 1 TABLET(40 MG) BY MOUTH DAILY 90 tablet 3     diazepam (VALIUM) 5 MG tablet Take 1 tablet (5 mg) by mouth 2 times daily as needed for anxiety (take 5 mg 30-60 minutes prior to breast MRI. take a second 5 mg dose if directed by technician.). 2 tablet 0     ketoconazole (NIZORAL) 2 % external cream Apply topically 2 times daily. To rash for 10 to 14 days or until rash is completely resolved for 48 hours 30 g 0      "lisinopril (ZESTRIL) 20 MG tablet Take 1 tablet (20 mg) by mouth daily. Take in addition to Lisinopril/hydrochlorothiazide 20/25 daily total daily dose Lisinopril 40 mg/day 90 tablet 3     lisinopril-hydrochlorothiazide (ZESTORETIC) 20-25 MG tablet Take 1 tablet by mouth daily. Take in addition to Lisinopril 20 mg for total daily dose 40 mg 90 tablet 3     miconazole (MICATIN) 2 % external powder Apply topically as needed for itching. 71 g 3     spironolactone (ALDACTONE) 25 MG tablet Take 1 tablet (25 mg) by mouth daily. 30 tablet 3       Allergies:  Allergies   Allergen Reactions     Amoxicillin Other (See Comments)     Patient does not want to take since having c diff    Other Reaction(s): Cdiff     Codeine Other (See Comments)     Hallucinations    Other Reaction(s): Hallucinations     Penicillins Other (See Comments)     Patient does not want to take since having c diff       Social history:   Social History     Tobacco Use     Smoking status: Never     Passive exposure: Never     Smokeless tobacco: Never   Substance Use Topics     Alcohol use: Yes     Comment: occ.         Physical Examination:  BP (!) 152/92 (BP Location: Right arm, Patient Position: Sitting, Cuff Size: Adult Regular)   Pulse 77   Temp 98.8  F (37.1  C) (Oral)   Ht 1.727 m (5' 8\")   Wt 96.5 kg (212 lb 12.8 oz)   SpO2 98%   BMI 32.36 kg/m    Body mass index is 32.36 kg/m .    General: No acute distress.    Breasts: Bilateral grade 2 ptosis with right side larger than left side.  Sternal notch to nipple distance under 35 cm.    ABDOMEN: Enough abdominal tissue to give her the size she wants.  No scars and no hernias.    BACK: No scars        ASSESMENT and PLAN:     Based upon the above findings, a diagnosis of breast cancer, planning mastectomy, interested in breast reconstruction was made.  I had a prabha, detailed discussion with the patient, in the presence of my nurse, who was present from beginning to end.  I discussed with the " patient the concept behind breast reconstruction, the elective nature of reconstruction, immediate versus delayed reconstruction, the staged nature of reconstruction, the different options of reconstruction, including implant based (expander-implant, versus direct to implant) versus autologous and the pros and cons of each. Additionally, the potential need for radiation therapy/chemotherapy as adjuvant therapy and the impact on reconstruction in terms of timing, complications, and choice of reconstruction was all discussed.     In Implant based reconstruction, the use of implants was discussed. The specific nature of implants, the fact that they need to be replaced in time, that they have potential specific complications of implant rupture, contracture, rippling, firmness, palpability, visibility, rippling, and the association with ELIZA-ALCL/SCC were all discussed. Reviewed the FDA checklist for implant related risks.  Reiterated importance of implant surveillance, which includes MR/High Resolution US imaging study 5-6 years after the original operation, followed by MR/High Resolution US imaging every 3 years thereafter.     In Autologous based reconstruction, the use of tissue from different sites (DESMOND being most commonly used), the longevity of this form of reconstruction, the donor site complications, and the length and complexity of this form or reconstruction was explained.     The potential use of Liposuction and fat grafting was also discussed in detail.     Overview of the surgery and expectations of the surgery were explained. All risks, benefits and alternatives, including pain, infection, bleeding, scarring, asymmetry, seromas, hematomas, wound breakdown, wound dehiscence, loss of the implants/flaps, abdominal wall-healing issues, abdominal wall weakness, bulges, hernias, sensation loss, requirement of further staged procedures, Implant specific issues and complications as discussed above, removal of  infected or exposed implants, pneumothoraces, contour abnormalities, cannula injuries to deeper structures, hernias, fat necrosis, lumps and bumps, loss of grafted material, DVT, PE, MI, CVA, pneumonia, renal failure and death, were explained. They were understood and agreed upon by the patient, they were acknowledged by the patient, all the patient's questions were answered in detail to the patient's fullest understanding that they acknowledged, the team approach for treatment in the operating room was agreed upon by the patient, and proceeding with surgery was agreed upon by the patient.    After our discussion, the patient is inclined towards immediate reconstruction with expanders followed by staged to bilateral Monica flap followed by stage III touchup surgeries.    All her questions were answered. She was happy with the visit. I look forward to helping her out in the near future as indicated.       Total time spent in the encounter today including chart review, visit itself, and post-visit paperwork was 60 minutes.       Michi Lanier MD    Chief, Division of Plastic Surgery  Department of Surgery  [facility]      CC: Magalys Sorensen MD  [address] [place] [id number]  CC: Vi Baldwin      Again, thank you for allowing me to participate in the care of your patient.        Sincerely,        LISA Lanier MD    Electronically signed

## 2025-04-09 ENCOUNTER — PREP FOR PROCEDURE (OUTPATIENT)
Dept: PLASTIC SURGERY | Facility: CLINIC | Age: 40
End: 2025-04-09
Payer: COMMERCIAL

## 2025-04-09 ENCOUNTER — TELEPHONE (OUTPATIENT)
Dept: ONCOLOGY | Facility: CLINIC | Age: 40
End: 2025-04-09
Payer: COMMERCIAL

## 2025-04-09 NOTE — TELEPHONE ENCOUNTER
Called patient to schedule surgery with Dr. Sorensen & Dr. Lanier    Spoke with:  patient    Date of Surgery: 5/5/2025    Arrival time Discussed with Patient:  Yes, 5:30 AM    Location of surgery: [facility]     Pre-Op H&P: PCP, [facility]    Post-Op Appts:   Ines VILLA, 5/19/25, 1:40 PM, JUNAID Sorensen, 5/22/25, 9:45 AM, [facility]    Imaging:  No      Discussed with patient pre-op RN will call 2-3 days prior to surgery with arrival time and instructions:  Yes     Packet sent out: No  via Received in clinic by patient      Surgical Soap: Receiving via Received in clinic by patient       Informed patient that they will need an adult  to bring patient home from surgery: Yes  : patient confirmed understanding       Additional Comments:  Nuclear Medicine to be delivered to OR for surgeon to inject - scheduled by writer.    Expander email request sent 4/9/25.      All patients questions were answered and patient was instructed to review surgical packet and call back with any questions or concerns.       Matthew Diaz on 4/9/2025 at 2:46 PM

## 2025-04-15 ASSESSMENT — ASTHMA QUESTIONNAIRES
ACT_TOTALSCORE: 23
QUESTION_1 LAST FOUR WEEKS HOW MUCH OF THE TIME DID YOUR ASTHMA KEEP YOU FROM GETTING AS MUCH DONE AT WORK, SCHOOL OR AT HOME: NONE OF THE TIME
QUESTION_4 LAST FOUR WEEKS HOW OFTEN HAVE YOU USED YOUR RESCUE INHALER OR NEBULIZER MEDICATION (SUCH AS ALBUTEROL): ONCE A WEEK OR LESS
QUESTION_5 LAST FOUR WEEKS HOW WOULD YOU RATE YOUR ASTHMA CONTROL: COMPLETELY CONTROLLED
QUESTION_3 LAST FOUR WEEKS HOW OFTEN DID YOUR ASTHMA SYMPTOMS (WHEEZING, COUGHING, SHORTNESS OF BREATH, CHEST TIGHTNESS OR PAIN) WAKE YOU UP AT NIGHT OR EARLIER THAN USUAL IN THE MORNING: NOT AT ALL
QUESTION_2 LAST FOUR WEEKS HOW OFTEN HAVE YOU HAD SHORTNESS OF BREATH: ONCE OR TWICE A WEEK

## 2025-04-16 ENCOUNTER — OFFICE VISIT (OUTPATIENT)
Dept: FAMILY MEDICINE | Facility: CLINIC | Age: 40
End: 2025-04-16
Payer: COMMERCIAL

## 2025-04-16 VITALS
WEIGHT: 211.7 LBS | TEMPERATURE: 98.4 F | HEIGHT: 68 IN | DIASTOLIC BLOOD PRESSURE: 93 MMHG | BODY MASS INDEX: 32.09 KG/M2 | OXYGEN SATURATION: 99 % | HEART RATE: 88 BPM | RESPIRATION RATE: 16 BRPM | SYSTOLIC BLOOD PRESSURE: 145 MMHG

## 2025-04-16 DIAGNOSIS — Z01.818 PREOP GENERAL PHYSICAL EXAM: Primary | ICD-10-CM

## 2025-04-16 DIAGNOSIS — D05.12 DUCTAL CARCINOMA IN SITU (DCIS) OF LEFT BREAST: ICD-10-CM

## 2025-04-16 DIAGNOSIS — F33.1 MAJOR DEPRESSIVE DISORDER, RECURRENT EPISODE, MODERATE (H): ICD-10-CM

## 2025-04-16 DIAGNOSIS — I10 BENIGN ESSENTIAL HYPERTENSION: ICD-10-CM

## 2025-04-16 DIAGNOSIS — F41.1 GAD (GENERALIZED ANXIETY DISORDER): ICD-10-CM

## 2025-04-16 LAB — HGB BLD-MCNC: 13.6 G/DL (ref 11.7–15.7)

## 2025-04-16 RX ORDER — LORAZEPAM 1 MG/1
.5-1 TABLET ORAL EVERY 6 HOURS PRN
Qty: 15 TABLET | Refills: 0 | Status: SHIPPED | OUTPATIENT
Start: 2025-04-16

## 2025-04-16 RX ORDER — PROPRANOLOL HCL 20 MG
20-40 TABLET ORAL 3 TIMES DAILY PRN
Qty: 60 TABLET | Refills: 3 | Status: SHIPPED | OUTPATIENT
Start: 2025-04-16

## 2025-04-16 ASSESSMENT — PAIN SCALES - GENERAL: PAINLEVEL_OUTOF10: NO PAIN (0)

## 2025-04-16 NOTE — PROGRESS NOTES
Preoperative Evaluation  [facility]  [address], [address]  [facility] [address]  Phone: [phone]  Primary Provider: Vi Baldwin MD  Pre-op Performing Provider: Vi Baldwin MD  Apr 16, 2025             4/15/2025   Surgical Information   What procedure is being done? BILATERAL Skin-Sparing Mastectomy, LEFT Axillary [place] Lymph Node Biopsy, Bilateral breast reconstruction with expanders and spy   Facility or Hospital where procedure/surgery will be performed: [facility] [address] [facility] [address]   Who is doing the procedure / surgery? Magalys Sorensen and LISA Michi Lanier   Date of surgery / procedure: 5/5/2025   Time of surgery / procedure: 7:30am   Where do you plan to recover after surgery? at home with family     Fax number for surgical facility: Note does not need to be faxed, will be available electronically in Epic.    Assessment & Plan     The proposed surgical procedure is considered INTERMEDIATE risk.    Preop general physical exam    - Hemoglobin; Future  - Hemoglobin    Ductal carcinoma in situ (DCIS) of left breast      Benign essential hypertension  Elevated pressures noted today due to anxiety.  Pt usually has much more controlled pressures starting Propranolol  - Basic metabolic panel  (Ca, Cl, CO2, Creat, Gluc, K, Na, BUN); Future  - Basic metabolic panel  (Ca, Cl, CO2, Creat, Gluc, K, Na, BUN)    ROCHELLE (generalized anxiety disorder)   Enouraged scant use of Lorazepam for rising panic and anxiety  She has used this sparingly before without concerns  - LORazepam (ATIVAN) 1 MG tablet; Take 0.5-1 tablets (0.5-1 mg) by mouth every 6 hours as needed for anxiety.  - propranolol (INDERAL) 20 MG tablet; Take 1-2 tablets (20-40 mg) by mouth 3 times daily as needed (anxiety).            - No identified additional risk factors other than previously addressed    Preoperative Medication  Instructions  Antiplatelet or Anticoagulation Medication Instructions   - We reviewed the medication list and the patient is not on an antiplatelet or anticoagulation medications.    Additional Medication Instructions   - Diuretics (furosemide, hydrochlorothiazide, chlorothalidone): DO NOT TAKE on the day of surgery.  Will take Lisinopril 40mg instead the day of surgery      Recommendation  Approval given to proceed with proposed procedure, without further diagnostic evaluation.      Juliette Trinidad is a 39 year old, presenting for the following:  Pre-Op Exam    Tends to get nausea with anesthetics         4/16/2025     2:24 PM   Additional Questions   Roomed by Jeanette CALLAWAY   Accompanied by self         4/16/2025   Forms   Any forms needing to be completed Yes     HPI: left DCIS and plans for bilateral mastectomy          4/15/2025   Pre-Op Questionnaire   Have you ever had a heart attack or stroke? No   Have you ever had surgery on your heart or blood vessels, such as a stent placement, a coronary artery bypass, or surgery on an artery in your head, neck, heart, or legs? No   Do you have chest pain with activity? No   Do you have a history of heart failure? No   Do you currently have a cold, bronchitis or symptoms of other infection? No   Do you have a cough, shortness of breath, or wheezing? No   Do you or anyone in your family have previous history of blood clots? (!) UNKNOWN     Do you or does anyone in your family have a serious bleeding problem such as prolonged bleeding following surgeries or cuts? No   Have you ever had problems with anemia or been told to take iron pills? No   Have you had any abnormal blood loss such as black, tarry or bloody stools, or abnormal vaginal bleeding? No   Have you ever had a blood transfusion? No   Are you willing to have a blood transfusion if it is medically needed before, during, or after your surgery? Yes   Have you or any of your relatives ever had problems with  anesthesia? (!) YES  she gets nausea with some anesthetics has had hypotension   Do you have sleep apnea, excessive snoring or daytime drowsiness? No   Do you have any artifical heart valves or other implanted medical devices like a pacemaker, defibrillator, or continuous glucose monitor? No   Do you have artificial joints? No   Are you allergic to latex? No     Advance Care Planning    Patient states has Health Care Directive and will send to [employer].    Preoperative Review of    reviewed - no record of controlled substances prescribed.          Patient Active Problem List    Diagnosis Date Noted    Eyelid disorder 12/23/2024     Priority: Medium    Large breasts 07/29/2024     Priority: Medium    Upper back pain 07/29/2024     Priority: Medium    Polymastia 07/29/2024     Priority: Medium    Pelvic pain in female 03/14/2024     Priority: Medium    Lateral epicondylitis of right elbow 06/22/2023     Priority: Medium    Axillary accessory breast tissue 06/22/2023     Priority: Medium    Dizziness 05/10/2023     Priority: Medium    Chronic left shoulder pain 10/03/2022     Priority: Medium    Family history of malignant neoplasm of breast 05/09/2022     Priority: Medium     Her mother and grandmother have have breast cancer  Mom had negative genetic screens      Mild persistent asthma without complication 10/11/2021     Priority: Medium    Seasonal allergic rhinitis due to pollen 04/07/2021     Priority: Medium    Shoulder pain, left 10/27/2020     Priority: Medium    Benign essential hypertension 08/22/2017     Priority: Medium    ROCHELLE (generalized anxiety disorder) 08/22/2017     Priority: Medium     Seeing therapist Abdirahman Talamantes       Major depressive disorder, recurrent episode, moderate (H) 08/22/2017     Priority: Medium    Vitamin D deficiency 08/22/2017     Priority: Medium      Past Medical History:   Diagnosis Date    Asthma     seasonal and environmental    C. difficile diarrhea 2013     Concussion 01/2022    Depression     Depressive disorder     Encounter for IUD insertion 10/16/2020    Jyoti inserted by Dr Park    Generalized anxiety disorder     Hypertension     IUD contraception 10/2020    Jyoti inserted by Dr Park    Lateral epicondylitis of right elbow 6/22/2023    Pain in both hands 02/20/2020     Past Surgical History:   Procedure Laterality Date    GYN SURGERY  2003    Laser treatment of HPV    ORTHOPEDIC SURGERY Left 07/21/2017    ganglion cyst removal    ORTHOPEDIC SURGERY Left     nerve release on left leg/ankle     SOFT TISSUE SURGERY  1999    SURGICAL HISTORY OF -       Nerve entrapment release    ZZHC RELEASE FOOT/TOE NERVE  2011     Current Outpatient Medications   Medication Sig Dispense Refill    acetaminophen (TYLENOL 8 HOUR) 650 MG CR tablet Take 650 mg by mouth as needed.      albuterol (PROAIR HFA/PROVENTIL HFA/VENTOLIN HFA) 108 (90 Base) MCG/ACT inhaler Inhale 2 puffs into the lungs every 6 hours as needed for shortness of breath, wheezing or cough 18 g 3    buPROPion (WELLBUTRIN XL) 150 MG 24 hr tablet Take 1 tablet (150 mg) by mouth every morning. 90 tablet 3    cetirizine (ZYRTEC ALLERGY) 10 MG tablet Take 10 mg by mouth as needed.      citalopram (CELEXA) 40 MG tablet TAKE 1 TABLET(40 MG) BY MOUTH DAILY 90 tablet 3    ketoconazole (NIZORAL) 2 % external cream Apply topically 2 times daily. To rash for 10 to 14 days or until rash is completely resolved for 48 hours 30 g 0    lisinopril (ZESTRIL) 20 MG tablet Take 1 tablet (20 mg) by mouth daily. Take in addition to Lisinopril/hydrochlorothiazide 20/25 daily total daily dose Lisinopril 40 mg/day 90 tablet 3    lisinopril-hydrochlorothiazide (ZESTORETIC) 20-25 MG tablet Take 1 tablet by mouth daily. Take in addition to Lisinopril 20 mg for total daily dose 40 mg 90 tablet 3    miconazole (MICATIN) 2 % external powder Apply topically as needed for itching. 71 g 3    spironolactone (ALDACTONE) 25 MG tablet Take 1 tablet  (25 mg) by mouth daily. 30 tablet 3    diazepam (VALIUM) 5 MG tablet Take 1 tablet (5 mg) by mouth 2 times daily as needed for anxiety (take 5 mg 30-60 minutes prior to breast MRI. take a second 5 mg dose if directed by technician.). (Patient not taking: Reported on 4/16/2025) 2 tablet 0       Allergies   Allergen Reactions    Amoxicillin Other (See Comments)     Patient does not want to take since having c diff    Other Reaction(s): Cdiff    Codeine Other (See Comments)     Hallucinations    Other Reaction(s): Hallucinations    Penicillins Other (See Comments)     Patient does not want to take since having c diff        Social History     Tobacco Use    Smoking status: Never     Passive exposure: Never    Smokeless tobacco: Never   Substance Use Topics    Alcohol use: Not Currently     Comment: occ.     Family History   Problem Relation Age of Onset    Diabetes Mother     Bipolar Disorder Mother     Hypertension Mother     Coronary Artery Disease Mother     Depression Mother     Anxiety Disorder Mother     Mental Illness Mother     Breast Cancer Mother 60        BRCA negative, neoadjuvant chemo, surgery pend    Unknown/Adopted Father     Hypertension Father     Breast Cancer Maternal Grandmother     Hypertension Maternal Grandmother     Depression Maternal Grandmother     Anxiety Disorder Maternal Grandmother     Diabetes Type 2  Maternal Grandfather     Glaucoma Maternal Grandfather     Hypertension Maternal Grandfather     Coronary Artery Disease Maternal Grandfather     Diabetes Maternal Grandfather     Depression Maternal Grandfather     Cerebrovascular Disease Maternal Grandfather     Prostate Cancer Maternal Grandfather     Other - See Comments Maternal Grandfather         passed from I-DISPO 9/2021    Hypertension Paternal Grandmother     Coronary Artery Disease Paternal Grandfather     Hypertension Maternal Aunt     Ovarian Cancer Other     Macular Degeneration No family hx of      History   Drug Use No        "      Review of Systems  Constitutional, HEENT, cardiovascular, pulmonary, gi and gu systems are negative, except as otherwise noted.    Objective    BP (!) 145/93 (BP Location: Left arm, Patient Position: Sitting, Cuff Size: Adult Regular)   Pulse 88   Temp 98.4  F (36.9  C) (Temporal)   Resp 16   Ht 1.727 m (5' 8\")   Wt 96 kg (211 lb 11.2 oz)   LMP 04/02/2025 (Exact Date)   SpO2 99%   BMI 32.19 kg/m     Estimated body mass index is 32.19 kg/m  as calculated from the following:    Height as of this encounter: 1.727 m (5' 8\").    Weight as of this encounter: 96 kg (211 lb 11.2 oz).  Physical Exam  GENERAL: alert and no distress  EYES: Eyes grossly normal to inspection, PERRL and conjunctivae and sclerae normal  HENT: ear canals and TM's normal, nose and mouth without ulcers or lesions  NECK: no adenopathy, no asymmetry, masses, or scars  RESP: lungs clear to auscultation - no rales, rhonchi or wheezes  CV: regular rate and rhythm, normal S1 S2, no S3 or S4, no murmur, click or rub, no peripheral edema  ABDOMEN: soft, nontender, no hepatosplenomegaly, no masses and bowel sounds normal  MS: no gross musculoskeletal defects noted, no edema  SKIN: no suspicious lesions or rashes  NEURO: Normal strength and tone, mentation intact and speech normal  PSYCH: mentation appears normal, affect normal/bright    Recent Labs   Lab Test 12/19/24  1334 10/07/24  1146   HGB 12.6 13.3    325    139   POTASSIUM 3.9 4.3   CR 0.83 0.75        Diagnostics  Labs pending at this time.  Results will be reviewed when available.   No EKG required, no history of coronary heart disease, significant arrhythmia, peripheral arterial disease or other structural heart disease.    Revised Cardiac Risk Index (RCRI)  The patient has the following serious cardiovascular risks for perioperative complications:   - No serious cardiac risks = 0 points     RCRI Interpretation: 0 points: Class I (very low risk - 0.4% complication " rate)         Signed Electronically by: Vi Baldwin MD  A copy of this evaluation report is provided to the requesting physician.         Answers submitted by the patient for this visit:  Patient Health Questionnaire (Submitted on 4/16/2025)  If you checked off any problems, how difficult have these problems made it for you to do your work, take care of things at home, or get along with other people?: Not difficult at all  PHQ9 TOTAL SCORE: 3

## 2025-04-16 NOTE — PATIENT INSTRUCTIONS

## 2025-04-17 LAB
ANION GAP SERPL CALCULATED.3IONS-SCNC: 12 MMOL/L (ref 7–15)
BUN SERPL-MCNC: 8.4 MG/DL (ref 6–20)
CALCIUM SERPL-MCNC: 9.3 MG/DL (ref 8.8–10.4)
CHLORIDE SERPL-SCNC: 99 MMOL/L (ref 98–107)
CREAT SERPL-MCNC: 0.79 MG/DL (ref 0.51–0.95)
EGFRCR SERPLBLD CKD-EPI 2021: >90 ML/MIN/1.73M2
GLUCOSE SERPL-MCNC: 95 MG/DL (ref 70–99)
HCO3 SERPL-SCNC: 27 MMOL/L (ref 22–29)
POTASSIUM SERPL-SCNC: 3.8 MMOL/L (ref 3.4–5.3)
SODIUM SERPL-SCNC: 138 MMOL/L (ref 135–145)

## 2025-05-02 ENCOUNTER — ANESTHESIA EVENT (OUTPATIENT)
Dept: SURGERY | Facility: CLINIC | Age: 40
End: 2025-05-02
Payer: COMMERCIAL

## 2025-05-05 ENCOUNTER — HOSPITAL ENCOUNTER (OUTPATIENT)
Dept: NUCLEAR MEDICINE | Facility: CLINIC | Age: 40
Setting detail: NUCLEAR MEDICINE
Discharge: HOME | End: 2025-05-05
Attending: SURGERY | Admitting: SURGERY
Payer: COMMERCIAL

## 2025-05-05 ENCOUNTER — ANESTHESIA (OUTPATIENT)
Dept: SURGERY | Facility: CLINIC | Age: 40
End: 2025-05-05
Payer: COMMERCIAL

## 2025-05-05 ENCOUNTER — HOSPITAL ENCOUNTER (OUTPATIENT)
Facility: CLINIC | Age: 40
Discharge: HOME | End: 2025-05-05
Attending: SURGERY | Admitting: SURGERY
Payer: COMMERCIAL

## 2025-05-05 VITALS
OXYGEN SATURATION: 98 % | WEIGHT: 214.73 LBS | HEART RATE: 71 BPM | SYSTOLIC BLOOD PRESSURE: 125 MMHG | BODY MASS INDEX: 32.54 KG/M2 | DIASTOLIC BLOOD PRESSURE: 86 MMHG | TEMPERATURE: 98 F | HEIGHT: 68 IN | RESPIRATION RATE: 12 BRPM

## 2025-05-05 DIAGNOSIS — D05.12 DUCTAL CARCINOMA IN SITU (DCIS) OF LEFT BREAST: ICD-10-CM

## 2025-05-05 DIAGNOSIS — Z98.890 S/P BREAST RECONSTRUCTION, BILATERAL: Primary | ICD-10-CM

## 2025-05-05 PROCEDURE — 250N000009 HC RX 250: Performed by: NURSE ANESTHETIST, CERTIFIED REGISTERED

## 2025-05-05 PROCEDURE — 999N000141 HC STATISTIC PRE-PROCEDURE NURSING ASSESSMENT: Performed by: SURGERY

## 2025-05-05 PROCEDURE — 19303 MAST SIMPLE COMPLETE: CPT | Mod: 50 | Performed by: SURGERY

## 2025-05-05 PROCEDURE — 250N000011 HC RX IP 250 OP 636: Mod: JZ

## 2025-05-05 PROCEDURE — C1789 PROSTHESIS, BREAST, IMP: HCPCS | Performed by: SURGERY

## 2025-05-05 PROCEDURE — 250N000009 HC RX 250: Performed by: STUDENT IN AN ORGANIZED HEALTH CARE EDUCATION/TRAINING PROGRAM

## 2025-05-05 PROCEDURE — 19357 TISS XPNDR PLMT BRST RCNSTJ: CPT | Mod: 50 | Performed by: PLASTIC SURGERY

## 2025-05-05 PROCEDURE — 250N000011 HC RX IP 250 OP 636: Performed by: STUDENT IN AN ORGANIZED HEALTH CARE EDUCATION/TRAINING PROGRAM

## 2025-05-05 PROCEDURE — 360N000083 HC SURGERY LEVEL 3 W/ FLUORO, PER MIN: Performed by: SURGERY

## 2025-05-05 PROCEDURE — 88342 IMHCHEM/IMCYTCHM 1ST ANTB: CPT | Mod: 26 | Performed by: PATHOLOGY

## 2025-05-05 PROCEDURE — 250N000011 HC RX IP 250 OP 636: Performed by: PLASTIC SURGERY

## 2025-05-05 PROCEDURE — 250N000011 HC RX IP 250 OP 636: Performed by: SURGERY

## 2025-05-05 PROCEDURE — 250N000011 HC RX IP 250 OP 636: Mod: JZ | Performed by: INTERNAL MEDICINE

## 2025-05-05 PROCEDURE — 88341 IMHCHEM/IMCYTCHM EA ADD ANTB: CPT | Mod: TC | Performed by: SURGERY

## 2025-05-05 PROCEDURE — 343N000001 HC RX 343 MED OP 636: Performed by: SURGERY

## 2025-05-05 PROCEDURE — 38900 IO MAP OF SENT LYMPH NODE: CPT | Mod: LT | Performed by: SURGERY

## 2025-05-05 PROCEDURE — 99207 NM LYMPHOSCINTIGRAPHY INJECTION ONLY: CPT | Performed by: RADIOLOGY

## 2025-05-05 PROCEDURE — 250N000009 HC RX 250: Performed by: PLASTIC SURGERY

## 2025-05-05 PROCEDURE — 250N000011 HC RX IP 250 OP 636: Performed by: INTERNAL MEDICINE

## 2025-05-05 PROCEDURE — 250N000011 HC RX IP 250 OP 636: Mod: JZ | Performed by: NURSE ANESTHETIST, CERTIFIED REGISTERED

## 2025-05-05 PROCEDURE — 370N000017 HC ANESTHESIA TECHNICAL FEE, PER MIN: Performed by: SURGERY

## 2025-05-05 PROCEDURE — 250N000025 HC SEVOFLURANE, PER MIN: Performed by: SURGERY

## 2025-05-05 PROCEDURE — 250N000013 HC RX MED GY IP 250 OP 250 PS 637: Performed by: INTERNAL MEDICINE

## 2025-05-05 PROCEDURE — 258N000003 HC RX IP 258 OP 636: Performed by: NURSE ANESTHETIST, CERTIFIED REGISTERED

## 2025-05-05 PROCEDURE — 272N000001 HC OR GENERAL SUPPLY STERILE: Performed by: SURGERY

## 2025-05-05 PROCEDURE — 38792 RA TRACER ID OF SENTINL NODE: CPT

## 2025-05-05 PROCEDURE — 37799 UNLISTED PX VASCULAR SURGERY: CPT | Performed by: PLASTIC SURGERY

## 2025-05-05 PROCEDURE — 88307 TISSUE EXAM BY PATHOLOGIST: CPT | Mod: 26 | Performed by: PATHOLOGY

## 2025-05-05 PROCEDURE — 710N000010 HC RECOVERY PHASE 1, LEVEL 2, PER MIN: Performed by: SURGERY

## 2025-05-05 PROCEDURE — 38525 BIOPSY/REMOVAL LYMPH NODES: CPT | Mod: LT | Performed by: SURGERY

## 2025-05-05 PROCEDURE — 250N000009 HC RX 250: Performed by: SURGERY

## 2025-05-05 PROCEDURE — 250N000013 HC RX MED GY IP 250 OP 250 PS 637: Performed by: SURGERY

## 2025-05-05 PROCEDURE — 710N000012 HC RECOVERY PHASE 2, PER MINUTE: Performed by: SURGERY

## 2025-05-05 PROCEDURE — A9520 TC99 TILMANOCEPT DIAG 0.5MCI: HCPCS | Performed by: SURGERY

## 2025-05-05 PROCEDURE — 88341 IMHCHEM/IMCYTCHM EA ADD ANTB: CPT | Mod: 26 | Performed by: PATHOLOGY

## 2025-05-05 DEVICE — NATRELLE TE SMOOTH 133S-FX-15-T (US)
Type: IMPLANTABLE DEVICE | Site: BREAST | Status: FUNCTIONAL
Brand: NATRELLE 133S TISSUE EXPANDERS

## 2025-05-05 RX ORDER — INDOCYANINE GREEN AND WATER 25 MG
KIT INJECTION PRN
Status: DISCONTINUED | OUTPATIENT
Start: 2025-05-05 | End: 2025-05-05

## 2025-05-05 RX ORDER — HYDROMORPHONE HCL IN WATER/PF 6 MG/30 ML
0.2 PATIENT CONTROLLED ANALGESIA SYRINGE INTRAVENOUS EVERY 5 MIN PRN
Status: DISCONTINUED | OUTPATIENT
Start: 2025-05-05 | End: 2025-05-05 | Stop reason: HOSPADM

## 2025-05-05 RX ORDER — ONDANSETRON 4 MG/1
4 TABLET, ORALLY DISINTEGRATING ORAL EVERY 30 MIN PRN
Status: DISCONTINUED | OUTPATIENT
Start: 2025-05-05 | End: 2025-05-05 | Stop reason: HOSPADM

## 2025-05-05 RX ORDER — APREPITANT 40 MG/1
40 CAPSULE ORAL ONCE
Status: COMPLETED | OUTPATIENT
Start: 2025-05-05 | End: 2025-05-05

## 2025-05-05 RX ORDER — NALOXONE HYDROCHLORIDE 0.4 MG/ML
0.4 INJECTION, SOLUTION INTRAMUSCULAR; INTRAVENOUS; SUBCUTANEOUS
Status: DISCONTINUED | OUTPATIENT
Start: 2025-05-05 | End: 2025-05-05 | Stop reason: HOSPADM

## 2025-05-05 RX ORDER — NALOXONE HYDROCHLORIDE 0.4 MG/ML
0.1 INJECTION, SOLUTION INTRAMUSCULAR; INTRAVENOUS; SUBCUTANEOUS
Status: DISCONTINUED | OUTPATIENT
Start: 2025-05-05 | End: 2025-05-05 | Stop reason: HOSPADM

## 2025-05-05 RX ORDER — ACETAMINOPHEN 325 MG/1
975 TABLET ORAL ONCE
Status: COMPLETED | OUTPATIENT
Start: 2025-05-05 | End: 2025-05-05

## 2025-05-05 RX ORDER — SODIUM CHLORIDE, SODIUM LACTATE, POTASSIUM CHLORIDE, CALCIUM CHLORIDE 600; 310; 30; 20 MG/100ML; MG/100ML; MG/100ML; MG/100ML
INJECTION, SOLUTION INTRAVENOUS CONTINUOUS
Status: DISCONTINUED | OUTPATIENT
Start: 2025-05-05 | End: 2025-05-05 | Stop reason: HOSPADM

## 2025-05-05 RX ORDER — ACETAMINOPHEN 325 MG/1
650 TABLET ORAL
Status: DISCONTINUED | OUTPATIENT
Start: 2025-05-05 | End: 2025-05-05 | Stop reason: HOSPADM

## 2025-05-05 RX ORDER — NALOXONE HYDROCHLORIDE 0.4 MG/ML
0.2 INJECTION, SOLUTION INTRAMUSCULAR; INTRAVENOUS; SUBCUTANEOUS
Status: DISCONTINUED | OUTPATIENT
Start: 2025-05-05 | End: 2025-05-05 | Stop reason: HOSPADM

## 2025-05-05 RX ORDER — OXYCODONE HYDROCHLORIDE 5 MG/1
5 TABLET ORAL
Status: DISCONTINUED | OUTPATIENT
Start: 2025-05-05 | End: 2025-05-05 | Stop reason: HOSPADM

## 2025-05-05 RX ORDER — FENTANYL CITRATE 50 UG/ML
25 INJECTION, SOLUTION INTRAMUSCULAR; INTRAVENOUS EVERY 5 MIN PRN
Status: DISCONTINUED | OUTPATIENT
Start: 2025-05-05 | End: 2025-05-05 | Stop reason: HOSPADM

## 2025-05-05 RX ORDER — AMOXICILLIN 250 MG
1-2 CAPSULE ORAL 2 TIMES DAILY
Qty: 30 TABLET | Refills: 0 | Status: SHIPPED | OUTPATIENT
Start: 2025-05-05

## 2025-05-05 RX ORDER — DEXMEDETOMIDINE HYDROCHLORIDE 4 UG/ML
INJECTION, SOLUTION INTRAVENOUS
Status: COMPLETED | OUTPATIENT
Start: 2025-05-05 | End: 2025-05-05

## 2025-05-05 RX ORDER — SODIUM CHLORIDE, SODIUM LACTATE, POTASSIUM CHLORIDE, CALCIUM CHLORIDE 600; 310; 30; 20 MG/100ML; MG/100ML; MG/100ML; MG/100ML
INJECTION, SOLUTION INTRAVENOUS CONTINUOUS PRN
Status: DISCONTINUED | OUTPATIENT
Start: 2025-05-05 | End: 2025-05-05

## 2025-05-05 RX ORDER — OXYCODONE HYDROCHLORIDE 5 MG/1
5-10 TABLET ORAL EVERY 4 HOURS PRN
Qty: 15 TABLET | Refills: 0 | Status: SHIPPED | OUTPATIENT
Start: 2025-05-05

## 2025-05-05 RX ORDER — CEFAZOLIN SODIUM/WATER 2 G/20 ML
2 SYRINGE (ML) INTRAVENOUS
Status: COMPLETED | OUTPATIENT
Start: 2025-05-05 | End: 2025-05-05

## 2025-05-05 RX ORDER — OXYCODONE HYDROCHLORIDE 10 MG/1
10 TABLET ORAL
Status: DISCONTINUED | OUTPATIENT
Start: 2025-05-05 | End: 2025-05-05 | Stop reason: HOSPADM

## 2025-05-05 RX ORDER — FENTANYL CITRATE 50 UG/ML
INJECTION, SOLUTION INTRAMUSCULAR; INTRAVENOUS PRN
Status: DISCONTINUED | OUTPATIENT
Start: 2025-05-05 | End: 2025-05-05

## 2025-05-05 RX ORDER — DEXAMETHASONE SODIUM PHOSPHATE 4 MG/ML
4 INJECTION, SOLUTION INTRA-ARTICULAR; INTRALESIONAL; INTRAMUSCULAR; INTRAVENOUS; SOFT TISSUE
Status: DISCONTINUED | OUTPATIENT
Start: 2025-05-05 | End: 2025-05-05 | Stop reason: HOSPADM

## 2025-05-05 RX ORDER — ONDANSETRON 2 MG/ML
4 INJECTION INTRAMUSCULAR; INTRAVENOUS EVERY 30 MIN PRN
Status: DISCONTINUED | OUTPATIENT
Start: 2025-05-05 | End: 2025-05-05 | Stop reason: HOSPADM

## 2025-05-05 RX ORDER — LIDOCAINE HYDROCHLORIDE 20 MG/ML
INJECTION, SOLUTION INFILTRATION; PERINEURAL PRN
Status: DISCONTINUED | OUTPATIENT
Start: 2025-05-05 | End: 2025-05-05

## 2025-05-05 RX ORDER — FENTANYL CITRATE 50 UG/ML
50 INJECTION, SOLUTION INTRAMUSCULAR; INTRAVENOUS EVERY 5 MIN PRN
Status: DISCONTINUED | OUTPATIENT
Start: 2025-05-05 | End: 2025-05-05 | Stop reason: HOSPADM

## 2025-05-05 RX ORDER — DOXYCYCLINE HYCLATE 100 MG
100 TABLET ORAL 2 TIMES DAILY
Qty: 28 TABLET | Refills: 0 | Status: SHIPPED | OUTPATIENT
Start: 2025-05-05 | End: 2025-05-19

## 2025-05-05 RX ORDER — CEFAZOLIN SODIUM/WATER 2 G/20 ML
2 SYRINGE (ML) INTRAVENOUS SEE ADMIN INSTRUCTIONS
Status: DISCONTINUED | OUTPATIENT
Start: 2025-05-05 | End: 2025-05-05 | Stop reason: HOSPADM

## 2025-05-05 RX ORDER — FENTANYL CITRATE 50 UG/ML
25-50 INJECTION, SOLUTION INTRAMUSCULAR; INTRAVENOUS
Status: DISCONTINUED | OUTPATIENT
Start: 2025-05-05 | End: 2025-05-05 | Stop reason: HOSPADM

## 2025-05-05 RX ORDER — ONDANSETRON 2 MG/ML
INJECTION INTRAMUSCULAR; INTRAVENOUS PRN
Status: DISCONTINUED | OUTPATIENT
Start: 2025-05-05 | End: 2025-05-05

## 2025-05-05 RX ORDER — MAGNESIUM SULFATE HEPTAHYDRATE 40 MG/ML
INJECTION, SOLUTION INTRAVENOUS PRN
Status: DISCONTINUED | OUTPATIENT
Start: 2025-05-05 | End: 2025-05-05

## 2025-05-05 RX ORDER — FLUMAZENIL 0.1 MG/ML
0.2 INJECTION, SOLUTION INTRAVENOUS
Status: DISCONTINUED | OUTPATIENT
Start: 2025-05-05 | End: 2025-05-05 | Stop reason: HOSPADM

## 2025-05-05 RX ORDER — OXYCODONE HYDROCHLORIDE 5 MG/1
5 TABLET ORAL
Status: COMPLETED | OUTPATIENT
Start: 2025-05-05 | End: 2025-05-05

## 2025-05-05 RX ORDER — PROPOFOL 10 MG/ML
INJECTION, EMULSION INTRAVENOUS PRN
Status: DISCONTINUED | OUTPATIENT
Start: 2025-05-05 | End: 2025-05-05

## 2025-05-05 RX ORDER — ENOXAPARIN SODIUM 100 MG/ML
40 INJECTION SUBCUTANEOUS
Status: COMPLETED | OUTPATIENT
Start: 2025-05-05 | End: 2025-05-05

## 2025-05-05 RX ORDER — ISOSULFAN BLUE 50 MG/5ML
INJECTION, SOLUTION SUBCUTANEOUS PRN
Status: DISCONTINUED | OUTPATIENT
Start: 2025-05-05 | End: 2025-05-05 | Stop reason: HOSPADM

## 2025-05-05 RX ORDER — HYDROMORPHONE HCL IN WATER/PF 6 MG/30 ML
0.4 PATIENT CONTROLLED ANALGESIA SYRINGE INTRAVENOUS EVERY 5 MIN PRN
Status: DISCONTINUED | OUTPATIENT
Start: 2025-05-05 | End: 2025-05-05 | Stop reason: HOSPADM

## 2025-05-05 RX ORDER — PROPOFOL 10 MG/ML
INJECTION, EMULSION INTRAVENOUS CONTINUOUS PRN
Status: DISCONTINUED | OUTPATIENT
Start: 2025-05-05 | End: 2025-05-05

## 2025-05-05 RX ORDER — BUPIVACAINE HYDROCHLORIDE 2.5 MG/ML
INJECTION, SOLUTION EPIDURAL; INFILTRATION; INTRACAUDAL; PERINEURAL
Status: COMPLETED | OUTPATIENT
Start: 2025-05-05 | End: 2025-05-05

## 2025-05-05 RX ORDER — DEXAMETHASONE SODIUM PHOSPHATE 4 MG/ML
INJECTION, SOLUTION INTRA-ARTICULAR; INTRALESIONAL; INTRAMUSCULAR; INTRAVENOUS; SOFT TISSUE PRN
Status: DISCONTINUED | OUTPATIENT
Start: 2025-05-05 | End: 2025-05-05

## 2025-05-05 RX ORDER — DEXAMETHASONE SODIUM PHOSPHATE 10 MG/ML
INJECTION, SOLUTION INTRAMUSCULAR; INTRAVENOUS
Status: COMPLETED | OUTPATIENT
Start: 2025-05-05 | End: 2025-05-05

## 2025-05-05 RX ADMIN — Medication 30 MG: at 10:47

## 2025-05-05 RX ADMIN — INDOCYANINE GREEN AND WATER 12.5 MG: KIT at 11:40

## 2025-05-05 RX ADMIN — FENTANYL CITRATE 50 MCG: 50 INJECTION, SOLUTION INTRAMUSCULAR; INTRAVENOUS at 07:12

## 2025-05-05 RX ADMIN — DEXAMETHASONE SODIUM PHOSPHATE 2 MG: 10 INJECTION, SOLUTION INTRAMUSCULAR; INTRAVENOUS at 07:10

## 2025-05-05 RX ADMIN — DEXMEDETOMIDINE HYDROCHLORIDE 8 MCG: 100 INJECTION, SOLUTION INTRAVENOUS at 11:49

## 2025-05-05 RX ADMIN — MAGNESIUM SULFATE HEPTAHYDRATE 0.4 G: 2 INJECTION, SOLUTION INTRAVENOUS at 11:04

## 2025-05-05 RX ADMIN — ONDANSETRON 4 MG: 2 INJECTION INTRAMUSCULAR; INTRAVENOUS at 07:56

## 2025-05-05 RX ADMIN — MAGNESIUM SULFATE HEPTAHYDRATE 0.4 G: 2 INJECTION, SOLUTION INTRAVENOUS at 12:32

## 2025-05-05 RX ADMIN — MAGNESIUM SULFATE HEPTAHYDRATE 0.4 G: 2 INJECTION, SOLUTION INTRAVENOUS at 11:26

## 2025-05-05 RX ADMIN — MAGNESIUM SULFATE HEPTAHYDRATE 0.4 G: 2 INJECTION, SOLUTION INTRAVENOUS at 11:53

## 2025-05-05 RX ADMIN — OXYCODONE HYDROCHLORIDE 5 MG: 5 TABLET ORAL at 16:14

## 2025-05-05 RX ADMIN — Medication 2 G: at 07:46

## 2025-05-05 RX ADMIN — Medication 20 MG: at 10:05

## 2025-05-05 RX ADMIN — FENTANYL CITRATE 50 MCG: 50 INJECTION INTRAMUSCULAR; INTRAVENOUS at 09:55

## 2025-05-05 RX ADMIN — ENOXAPARIN SODIUM 40 MG: 40 INJECTION SUBCUTANEOUS at 07:13

## 2025-05-05 RX ADMIN — APREPITANT 40 MG: 40 CAPSULE ORAL at 07:46

## 2025-05-05 RX ADMIN — FENTANYL CITRATE 25 MCG: 50 INJECTION, SOLUTION INTRAMUSCULAR; INTRAVENOUS at 13:53

## 2025-05-05 RX ADMIN — DEXMEDETOMIDINE HYDROCHLORIDE 8 MCG: 100 INJECTION, SOLUTION INTRAVENOUS at 11:59

## 2025-05-05 RX ADMIN — Medication 30 MG: at 09:27

## 2025-05-05 RX ADMIN — PROPOFOL 150 MCG/KG/MIN: 10 INJECTION, EMULSION INTRAVENOUS at 07:39

## 2025-05-05 RX ADMIN — MIDAZOLAM 2 MG: 1 INJECTION INTRAMUSCULAR; INTRAVENOUS at 07:14

## 2025-05-05 RX ADMIN — SODIUM CHLORIDE, SODIUM LACTATE, POTASSIUM CHLORIDE, AND CALCIUM CHLORIDE: .6; .31; .03; .02 INJECTION, SOLUTION INTRAVENOUS at 10:47

## 2025-05-05 RX ADMIN — DEXAMETHASONE SODIUM PHOSPHATE 8 MG: 4 INJECTION, SOLUTION INTRAMUSCULAR; INTRAVENOUS at 07:56

## 2025-05-05 RX ADMIN — MAGNESIUM SULFATE HEPTAHYDRATE 0.4 G: 2 INJECTION, SOLUTION INTRAVENOUS at 11:15

## 2025-05-05 RX ADMIN — MIDAZOLAM 1 MG: 1 INJECTION INTRAMUSCULAR; INTRAVENOUS at 07:06

## 2025-05-05 RX ADMIN — FENTANYL CITRATE 50 MCG: 50 INJECTION INTRAMUSCULAR; INTRAVENOUS at 09:15

## 2025-05-05 RX ADMIN — Medication 2 G: at 11:46

## 2025-05-05 RX ADMIN — PROPOFOL 200 MG: 10 INJECTION, EMULSION INTRAVENOUS at 07:39

## 2025-05-05 RX ADMIN — Medication 30 MG: at 11:35

## 2025-05-05 RX ADMIN — SODIUM CHLORIDE, SODIUM LACTATE, POTASSIUM CHLORIDE, AND CALCIUM CHLORIDE: .6; .31; .03; .02 INJECTION, SOLUTION INTRAVENOUS at 07:31

## 2025-05-05 RX ADMIN — DEXMEDETOMIDINE HYDROCHLORIDE 40 MCG: 4 INJECTION, SOLUTION INTRAVENOUS at 07:10

## 2025-05-05 RX ADMIN — PROPOFOL 125 MCG/KG/MIN: 10 INJECTION, EMULSION INTRAVENOUS at 09:58

## 2025-05-05 RX ADMIN — FENTANYL CITRATE 25 MCG: 50 INJECTION, SOLUTION INTRAMUSCULAR; INTRAVENOUS at 14:40

## 2025-05-05 RX ADMIN — Medication 100 MG: at 07:40

## 2025-05-05 RX ADMIN — BUPIVACAINE HYDROCHLORIDE 60 ML: 2.5 INJECTION, SOLUTION EPIDURAL; INFILTRATION; INTRACAUDAL; PERINEURAL at 07:10

## 2025-05-05 RX ADMIN — ACETAMINOPHEN 975 MG: 325 TABLET ORAL at 06:34

## 2025-05-05 RX ADMIN — FENTANYL CITRATE 50 MCG: 50 INJECTION, SOLUTION INTRAMUSCULAR; INTRAVENOUS at 07:06

## 2025-05-05 RX ADMIN — LIDOCAINE HYDROCHLORIDE 100 MG: 20 INJECTION, SOLUTION INFILTRATION; PERINEURAL at 07:39

## 2025-05-05 RX ADMIN — Medication 20 MG: at 08:48

## 2025-05-05 RX ADMIN — PROPOFOL 125 MCG/KG/MIN: 10 INJECTION, EMULSION INTRAVENOUS at 08:47

## 2025-05-05 ASSESSMENT — ACTIVITIES OF DAILY LIVING (ADL)
ADLS_ACUITY_SCORE: 46

## 2025-05-05 NOTE — DISCHARGE INSTRUCTIONS
Tissue Expander Placement Post Op Instructions     General:  Have someone at home with you for the first 24 hours. You may need additional support for the first week.   Get plenty of rest and eat a balanced diet.   Drink plenty of fluids to help with healing and to help prevent constipation.    Avoid alcohol for a minimum of 3 weeks after surgery as it can cause fluid retention.   No smoking or any nicotine products.      Activity:  Start walking around your house as soon as possible, this will help reduce swelling and decrease risk of blood clots.  You may use your arms for activities of daily living but avoid raising over your head until 3 weeks post-op.   Avoid strenuous activities, no working out.  In general, you can drive around 3-4 weeks post op. You must be off all narcotic pain medications.   No heavy lifting, nothing greater than 10lbs until 6 weeks post op.     Medications:  You can take Ibuprofen 400-800 mg and Tylenol 650 mg for pain relief. Please take each every 6 hours, and for optimal pain relief - please stagger the medications so that you are taking one or the other every 3 hours. It is best to make a chart/schedule of this to stay organized. If you are taking additional pain medications, please do not exceed 4000 mg of Tylenol daily from all sources.   You have been prescribed additional pain meds such as narcotics and muscle relaxants, please take as instructed and as needed.   If you are taking narcotic medications, please do not operate heavy machinery or drive.    Please take any antibiotics as prescribed, generally this is for 2 weeks or until the drains are removed.     Incision Care:  Incisions have a tape and glue in place. Please leave in place for 2-3 weeks. If it starts to peel off on its own you can trim back the tape.   Ok to get incisions wet in the shower. No soaking in tubs or baths for 6 weeks or until all incisions/wounds have healed.   It is ok to shower with drains in place  but please cover the drain site with tegaderm dressing or saran wrap.    Please wear a light wrap or soft bra as directed. OK to remove wrap daily to observe the condition of the skin then re-wrap. Ok to re-wrap as needed throughout the day.    Take down ace wrap and monitor incisions daily for signs of infection such as spreading redness to skin.   Small amounts of drainage from incisions can be expected.     Drain Care:  Strip, empty and record OTIS drain output twice a day. Bring your drain log to your follow up appointment. This is how we will know if drains can be removed.     Follow up:  Follow up with your Surgeon or Physician Assistant will be 7-10 days after discharge   Initial follow up visit will be to assess drains and incisions. The first fill for the tissue expander is generally 3 weeks from surgery. This will be discussed at your follow up appointments.      When to call:  Please call the office and/or consider return to the ER if you experience worsening pain not relieved by medications, increased swelling, redness to skin or high fevers >101F or if there are unexpected problems like shortness of breath.   Contact us on [employer] Monday - Friday, 8 a.m. - 4:30 p.m. or call [phone] to speak with our nursing team   After hours and on weekends, call Hospital Paging at [phone], and ask for the Plastic Surgery Resident on call     Tissue Expansion General Instruction   Your first tissue expansion will likely be about 3 weeks post op but will depend on how things are healing.   You may experience minor discomfort for the following 12 to 24 hours after each tissue expansion. This discomfort usually subsides 2 to 3 days after each tissue expansion. You may take tylenol and ibuprofen as needed for this discomfort   You may experience discomfort more on one side or the other if you have bilateral tissue expanders   You may feel tightness or heaviness across the chest from tissue expansion, this usually  improves with time   Tissue expanders feel firm to the touch, this is temporary and after the expanders are removed your reconstruction with implants or your own tissue will feel softer     Please note:   You cannot have an MRI if you have tissue expanders in place.   The tissue expanders may be detected via the security scanners at the airport. You will need a note from your Doctor if you plan to travel indicating you have tissue expanders in place with metal. We can provide a letter if any travel planned with tissue expanders in place.       Contacting your Doctor -   To contact a doctor, call Dr[facility] @ [phone]  (option 5, then option 2 ) or [facility] at [phone]. Or Call Brooke [facility]AL[facility]--[phone]   or:  [phone] and ask for the resident on call for Plastic Surgery (answered 24 hours a day)   Emergency Department:  [place]: [phone]  [place]: [phone] 911 if you are in need of immediate or emergent help

## 2025-05-05 NOTE — PROGRESS NOTES
Neuraxial or Regional Block, Pectoralis muscle nerve block  block performed without complications.  VSS.  Pt tolerated well.  Will continue to monitor.

## 2025-05-05 NOTE — ANESTHESIA PROCEDURE NOTES
Pectoralis Procedure Note    Pre-Procedure   Staff -        Anesthesiologist:  Rachael Novak DO       Resident/Fellow: Sarthak Barragan MD       Performed By: resident       Location: pre-op       Pre-Anesthestic Checklist: patient identified, IV checked, site marked, risks and benefits discussed, informed consent, monitors and equipment checked, pre-op evaluation, at physician/surgeon's request and post-op pain management  Timeout:       Correct Patient: Yes        Correct Procedure: Yes        Correct Site: Yes        Correct Position: Yes        Correct Laterality: Yes        Site Marked: Yes  Procedure Documentation  Procedure: Pectoralis  Pectoralis I and Pectoralis II thoracic plane block         Laterality: bilateral       Patient Position: supine       Patient Prep/Sterile Barriers: sterile gloves, mask       Skin prep: Chloraprep       Needle Type: short bevel       Needle Gauge: 21.        Needle Length (millimeters): 110        Ultrasound guided       1. Ultrasound was used to identify targeted nerve, plexus, vascular marker, or fascial plane and place a needle adjacent to it in real-time.       2. Ultrasound was used to visualize the spread of anesthetic in close proximity to the above referenced structure.       4. The visualized anatomic structures appeared normal.       5. There were no apparent abnormal pathologic findings.    Assessment/Narrative         The placement was negative for: blood aspirated, painful injection and site bleeding       Paresthesias: No.       Bolus given via needle. no blood aspirated via catheter.        Secured via.        Insertion/Infusion Method: Single Shot       Complications: none    Medication(s) Administered   Bupivacaine 0.25% PF (Infiltration) - Infiltration   60 mL - 5/5/2025 7:10:00 AM  Dexmedetomidine 4 mcg/mL (Perineural) - Perineural   40 mcg - 5/5/2025 7:10:00 AM  Dexamethasone 10 mg/mL PF (Perineural) - Perineural   2 mg - 5/5/2025 7:10:00 AM    FOR [facility]  "([place]) ONLY:   Pain Team Contact information: please page the Pain Team Via [employer]. Search \"Pain\". During daytime hours, please page the attending first. At night please page the resident first.      "

## 2025-05-05 NOTE — OP NOTE
PATIENT NAME:  Vivi Mccall  PATIENT YOB: 1985    DATE OF SURGERY: May 5, 2025     SURGEON: Magalys Sorensen MD  ASSISTANT(S):   Raphael Guzman MD PGY-5  Pablo Rodriguez, MS-3    PREOPERATIVE DIAGNOSIS: Ductal carcinoma in situ, left breast 6:00 position  POSTOPERATIVE DIAGNOSIS: same    PROCEDURE(S):   Right risk-reducing skin-sparing mastectomy  Left skin-sparing mastectomy  Left axillary sentinel lymph node mapping and biopsy     ANESTHESIA: General + Block    CLINICAL NOTE:  Vivi Mccall is a 39 year old woman with ductal carcinoma in situ of the left breast.  The risks and benefits of a bilateral skin-sparing mastectomy and axillary ja staging were discussed with the patient and she elected to proceed with informed consent.    OPERATIVE FINDINGS:  1. No palpable mass in either breast.  2. One sentinel lymph node removed from the left axilla.    OPERATIVE PROCEDURE:  The patient was brought to the operating room and placed in the supine position with appropriate padding for all of the pressure points.  A general anesthetic was administered by the Anesthesia team.  A surgical safety checklist was performed to confirm the patient's identity, the site and laterality of the procedure. Perioperative antibiotics (Ancef) was provided.  VTE prophylaxis was provided with serial compression devices and preoperative subcutaneous lovenox. 0.5 ashley Curie of technetium-labelled Tilmanocept was injected into the left retroareolar space.  The bilateral  breast and axilla were then prepped and draped in the usual sterile fashion using Chloraprep.     We began on the right.  A circumareolar incision with a radial extension towards the upper outer quadrant was made.  Superficial skin flaps were raised circumferentially, heading superiorly towards the clavicle, medially towards the lateral border of the sternum, inferiorly towards the insertion of the rectus sheath, and laterally to the lateral border  of the pectoralis major muscle.  The breast was then dissected off of the pectoralis major muscle, taking its fascia en bloc with the specimen.   The specimen was then marked with a short suture for the 12:00 position at the areola and a long suture for the axillary tail and sent to pathology.      Next, 3.5 mL of lymphazurin was injected into the left  subareolar space and the left  breast was massaged for 5 minutes.  A circumareolar incision with a radial extension towards the upper outer quadrant was made.  Superficial skin flaps were raised circumferentially, heading superiorly towards the clavicle, medially towards the lateral border of the sternum, inferiorly towards the insertion of the rectus sheath, and laterally to the lateral border of the pectoralis major muscle.  The breast was then dissected off of the pectoralis major muscle, taking its fascia en bloc with the specimen.   The specimen was then marked with a short suture for the 12:00 position at the areola and a long suture for the axillary tail and sent to pathology.      Next, the Neoprobe was used to identify the sentinel lymph nodes.  [place] lymph node #1 was blue and had an ex vivo count of 1895.  The background count was 18. No other blue or palpable lymph nodes were found.  Thus no additional sentinel lymph nodes were sought.  All of the lymph nodes were sent to pathology individually. The wound was irrigated and hemostasis was achieved. The patient tolerated the procedure well thus far. The sponge, needle, instrument counts were correct thus far.  At this point, Dr Lanier and their team took over for the reconstruction portion of the case, which will be dictated separately.       I was present and scrubbed for the entire above procedure.    Dr. Guzman actively first assisted during this operation providing necessary retraction and exposure as well as maintaining hemostasis and clear operative field.  This was helpful in allowing the  operation to proceed in a safe and expeditious manner.  They were present for the entire duration of the critical portions of the operation.    EBL: 20 mL    SPECIMEN(S):  A. Right breast and nipple; short suture = 12:00 position at the areola, long suture = axillary tail   B. Left breast and nipple; short suture = 12:00 position at the areola, long suture = axillary tail   C. Left axillary sentinel lymph node # 1    COMMISSION ON CANCER SYNOPTIC REPORT:  [place] Node Biopsy for Breast Cancer - Left  Operation performed with curative intent Yes   Tracer(s) used to identify sentinel nodes in the upfront surgery (non-neoadjuvant) setting Dye and Radioactive tracer   Tracer(s) used to identify sentinel nodes in the neoadjuvant setting N/A   All nodes (colored or non-colored) present at the end of a dye-filled lymphatic channel were removed Yes   All significantly radioactive nodes were removed Yes   All palpably suspicious nodes were removed N/A   Biopsy-proven positive nodes marked with clips prior to chemotherapy were identified and removed N/A      Magalys Sorensen MD MSc Merged with [facility] FACS  Associate Professor of Surgery  Division of Surgical Oncology  [facility]

## 2025-05-05 NOTE — ANESTHESIA PROCEDURE NOTES
Airway       Patient location during procedure: OR       Procedure Start/Stop Times: 5/5/2025 7:46 AM and 5/5/2025 7:47 AM  Staff -        CRNA: Selvin Jacobson APRN CRNA       Performed By: CRNA  Consent for Airway        Urgency: elective  Indications and Patient Condition       Indications for airway management: rissa-procedural       Induction type:intravenous       Mask difficulty assessment: 0 - not attempted    Final Airway Details       Final airway type: endotracheal airway       Successful airway: ETT - single  Endotracheal Airway Details        ETT size (mm): 7.0       Cuffed: yes       Cuff volume (mL): 9       Successful intubation technique: direct laryngoscopy       DL Blade Type: MAC 3       Grade View of Cords: 3       Adjucts: stylet       Position: Right       Measured from: lips       Secured at (cm): 22       Bite block used: None    Post intubation assessment        Placement verified by: capnometry, equal breath sounds and chest rise        Number of attempts at approach: 1       Number of other approaches attempted: 0       Secured with: tape       Ease of procedure: easy       Dentition: Intact    Medication(s) Administered   Medication Administration Time: 5/5/2025 7:46 AM

## 2025-05-05 NOTE — ANESTHESIA PREPROCEDURE EVALUATION
Anesthesia Pre-Procedure Evaluation    Patient: Vivi Mccall   MRN: [id number] : 1985        Procedure : Procedure(s):  BILATERAL Skin-Sparing Mastectomy, LEFT Axillary [place] Lymph Node Biopsy  Bilateral breast reconstruction with expanders and spy          Past Medical History:   Diagnosis Date    Asthma     seasonal and environmental    C. difficile diarrhea 2013    Concussion 2022    Depression     Depressive disorder     Encounter for IUD insertion 10/16/2020    Jyoti inserted by Dr Park    Generalized anxiety disorder     Hypertension     IUD contraception 10/2020    Jyoti inserted by Dr Park    Lateral epicondylitis of right elbow 2023    Pain in both hands 2020      Past Surgical History:   Procedure Laterality Date    GYN SURGERY  2003    Laser treatment of HPV    ORTHOPEDIC SURGERY Left 2017    ganglion cyst removal    ORTHOPEDIC SURGERY Left     nerve release on left leg/ankle     SOFT TISSUE SURGERY      SURGICAL HISTORY OF -       Nerve entrapment release    ZZHC RELEASE FOOT/TOE NERVE  2011      Allergies   Allergen Reactions    Amoxicillin Other (See Comments)     Patient does not want to take since having c diff    Other Reaction(s): Cdiff    Codeine Other (See Comments)     Hallucinations    Other Reaction(s): Hallucinations    Penicillins Other (See Comments)     Patient does not want to take since having c diff      Social History     Tobacco Use    Smoking status: Never     Passive exposure: Never    Smokeless tobacco: Never   Substance Use Topics    Alcohol use: Not Currently     Comment: occ.      Wt Readings from Last 1 Encounters:   25 97.4 kg (214 lb 11.7 oz)        Anesthesia Evaluation   Pt has had prior anesthetic.     History of anesthetic complications  - PONV.      ROS/MED HX  ENT/Pulmonary:     (+)                      asthma                  Neurologic:       Cardiovascular: Comment: Chronic HTN    (+)  hypertension- -   -  - -   "                                 (-) murmur   METS/Exercise Tolerance:     Hematologic:    (-) anemia   Musculoskeletal:       GI/Hepatic:     (+) GERD (Daily, significant yesterday such that she vomited),                   Renal/Genitourinary:       Endo:    (-) Type II DM and obesity   Psychiatric/Substance Use:       Infectious Disease:       Malignancy:       Other:            Physical Exam    Airway        Mallampati: III   TM distance: > 3 FB   Neck ROM: full     Respiratory Devices and Support         Dental       (+) Minor Abnormalities - some fillings, tiny chips      Cardiovascular          Rhythm and rate: regular and normal (-) no murmur    Pulmonary           breath sounds clear to auscultation           OUTSIDE LABS:  CBC:   Lab Results   Component Value Date    WBC 4.9 12/19/2024    WBC 3.9 (L) 10/07/2024    HGB 13.6 04/16/2025    HGB 12.6 12/19/2024    HCT 36.8 12/19/2024    HCT 38.4 10/07/2024     12/19/2024     10/07/2024     BMP:   Lab Results   Component Value Date     04/16/2025     12/19/2024    POTASSIUM 3.8 04/16/2025    POTASSIUM 3.9 12/19/2024    CHLORIDE 99 04/16/2025    CHLORIDE 100 12/19/2024    CO2 27 04/16/2025    CO2 28 12/19/2024    BUN 8.4 04/16/2025    BUN 7.8 12/19/2024    CR 0.79 04/16/2025    CR 0.83 12/19/2024    GLC 95 04/16/2025    GLC 82 12/19/2024     COAGS: No results found for: \"PTT\", \"INR\", \"FIBR\"  POC:   Lab Results   Component Value Date    HCG Positive (A) 11/30/2022    HCGS Negative 10/22/2018     HEPATIC:   Lab Results   Component Value Date    ALBUMIN 3.9 10/07/2024    PROTTOTAL 7.7 02/21/2024    ALT 28 02/21/2024    AST 24 02/21/2024    ALKPHOS 92 02/21/2024    BILITOTAL 0.6 02/21/2024     OTHER:   Lab Results   Component Value Date    BARRY 9.3 04/16/2025    PHOS 3.3 10/07/2024    LIPASE 17 06/24/2023    TSH 1.07 02/21/2024       Anesthesia Plan    ASA Status:  3    NPO Status:  NPO Appropriate    Anesthesia Type: General.     - Airway: " "LMA   Induction: Intravenous, Propofol.   Maintenance: Balanced.        Consents    Anesthesia Plan(s) and associated risks, benefits, and realistic alternatives discussed. Questions answered and patient/representative(s) expressed understanding.     - Discussed: Risks, Benefits and Alternatives for BOTH SEDATION and the PROCEDURE were discussed     - Discussed with:  Patient      - Extended Intubation/Ventilatory Support Discussed: No.      - Patient is DNR/DNI Status: No     Use of blood products discussed: Yes.     - Discussed with: Patient.     Postoperative Care    Pain management: Oral pain medications, IV analgesics.   PONV prophylaxis: Ondansetron (or other 5HT-3), Dexamethasone or Solumedrol     Comments:               Ian Whitehead MD    Clinically Significant Risk Factors Present on Admission                   # Hypertension: Noted on problem list           # Obesity: Estimated body mass index is 32.65 kg/m  as calculated from the following:    Height as of this encounter: 1.727 m (5' 8\").    Weight as of this encounter: 97.4 kg (214 lb 11.7 oz).       # Asthma: noted on problem list          "

## 2025-05-05 NOTE — ANESTHESIA CARE TRANSFER NOTE
Patient: Vivi Mccall    Procedure: Procedure(s):  BILATERAL Skin-Sparing Mastectomy, LEFT Axillary [place] Lymph Node Biopsy  Bilateral breast reconstruction with expanders and spy       Diagnosis: Ductal carcinoma in situ (DCIS) of left breast [D05.12]  Diagnosis Additional Information: No value filed.    Anesthesia Type:   General     Note:    Oropharynx: oropharynx clear of all foreign objects  Level of Consciousness: drowsy  Oxygen Supplementation: face mask  Level of Supplemental Oxygen (L/min / FiO2): 8  Independent Airway: airway patency satisfactory and stable    Vital Signs Stable: post-procedure vital signs reviewed and stable  Report to RN Given: handoff report given  Patient transferred to: PACU    Handoff Report: Identifed the Patient, Identified the Reponsible Provider, Reviewed the pertinent medical history, Discussed the surgical course, Reviewed Intra-OP anesthesia mangement and issues during anesthesia, Set expectations for post-procedure period and Allowed opportunity for questions and acknowledgement of understanding      Vitals:  Vitals Value Taken Time   /83 05/05/25 1254   Temp     Pulse 81 05/05/25 1259   Resp 15 05/05/25 1259   SpO2 100 % 05/05/25 1259   Vitals shown include unfiled device data.    Electronically Signed By: RAYMOND Rene CRNA  May 5, 2025  12:59 PM

## 2025-05-05 NOTE — ANESTHESIA POSTPROCEDURE EVALUATION
Patient: Vivi Mccall    Procedure: Procedure(s):  BILATERAL Skin-Sparing Mastectomy, LEFT Axillary [place] Lymph Node Biopsy  Bilateral breast reconstruction with expanders and spy       Anesthesia Type:  General    Note:  Disposition: Outpatient   Postop Pain Control: Uneventful            Sign Out: Well controlled pain   PONV: No   Neuro/Psych: Uneventful            Sign Out: Acceptable/Baseline neuro status   Airway/Respiratory: Uneventful            Sign Out: Acceptable/Baseline resp. status   CV/Hemodynamics: Uneventful            Sign Out: Acceptable CV status; No obvious hypovolemia; No obvious fluid overload   Other NRE: NONE   DID A NON-ROUTINE EVENT OCCUR? No           Last vitals:  Vitals Value Taken Time   /83 05/05/25 1445   Temp 36.5  C (97.7  F) 05/05/25 1315   Pulse 72 05/05/25 1500   Resp 12 05/05/25 1458   SpO2 100 % 05/05/25 1500   Vitals shown include unfiled device data.    Electronically Signed By: Rodrigo Lynne MD  May 5, 2025  3:01 PM

## 2025-05-06 NOTE — OP NOTE
PREOPERATIVE DIAGNOSIS: Status post left breast cancer requiring bilateral nipple-non-sparing mastectomy and immediate reconstruction.     POSTOPERATIVE DIAGNOSIS: Status post left breast cancer requiring bilateral nipple-non-sparing mastectomy and immediate reconstruction.     PROCEDURES:   1. Bilateral immediate breast reconstruction using pre-pectoral expander (Allergan fracture 800 cc breast expander with a base diameter 15.0 cm filled with air without tension for about a 1100 gram mastectomy specimen. Medical Indication for Use in the Pre-pectoral plane: Prevent animation defect, decrease rissa-operative pain, decrease anatomical destruction/distortion of the pectoral muscle).   2. Intraoperative chemical angiography with injection of ICG dye and interpretation of the angiogram using the SPY Phi System (indication of use was the fact the patient had undergone a nipple-sparing mastectomy and we needed to evaluate the blood flow to the skin flaps and the nipple areolar complex to decide appropriate reconstruction as well as debridement).    IMPLANTS:     Implant Name Type Inv. Item Serial No.  Lot No. LRB No. Used Action   TISSUE EXPANDER NATRELLE 133S SMOOTH 800ML 133S-FX-15-T - [id number] Breast Implant/Tissue Expander TISSUE EXPANDER NATRELLE 133S SMOOTH 800ML 133S-FX-15-T [id number] [employer]  Right 1 Implanted   TISSUE EXPANDER NATRELLE 133S SMOOTH 800ML 133S-FX-15-T - [id number] Breast Implant/Tissue Expander TISSUE EXPANDER NATRELLE 133S SMOOTH 800ML 133S-FX-15-T [id number] [employer]  Left 1 Implanted         SURGEON: Michi Lanier MD     RESIDENT: None    ANESTHESIA: General anesthesia with LMA.    COMPLICATIONS: Nil.     DRAINS: One 15-[place] channel OTIS drain on each side.    SPECIMENS: Nil.     BLOOD LOSS: 5 mL      DESCRIPTION OF PROCEDURE: After informed consent was taken from the patient, the proper site and procedure was ascertained with her and she was appropriately marked and taken to  the operating room. She was placed in a supine position with her knees comfortably flexed, pillows underneath them and pneumoboots placed and running prior to induction of anesthesia. Preoperative antibiotics were given in the OR and appropriately redosed during the case. General anesthesia was administered without any complications. A Oates catheter was inserted. Her arms were abducted to about 50 degrees and appropriately padded. Surgical Oncology took over and began the procedure. The bilateral nipple-non sparing mastectomy was completed and I was called to the operating room. She was reprepped and draped and I began the procedure by first analyzing the skin flaps. Clinically everything looked good. I went ahead and carried out the preoperative chemical angiogram using the SPY Phi System and injection of ICG dye. A total of 5 mL was injected at this time and at 30 seconds we evaluated the blood flow to the surrounding skin flaps. Overall both skin flaps had some dark rissa-incisional areas but overall the flow was good. These were marked out for later excision. Given the clinical findings, we decided to proceed with the immediate pre-pectoral reconstruction. Identical steps were performed bilaterally.The dead-space in the lateral areas were closed off and the LMF and IMF were recreated with 0-vicryl sutures. Key 0-Vicryl sutures were placed in the MMF and IMF to sew the tabs of the expanders to. No Alloderm/ADM was used.  We measured the base width at about 15 cm. The surgical pocket was then irrigated with triple antibiotic solution and betadine. We changed our gloves. The expander was then removed from the package and were placed in the pockets.  The tabs of the expanders were sewn down to the chest wall circumferentially. A  15-Belizean channeled OTIS drain was then placed on each side and brought out through a separate stab incision and sewn into position. The edges of the wounds were refreshened per the SPY and  there was bleeding seen at the edges of the wounds and then the skin was closed using 2-0 Monocryl suture in a deep dermal layer.  Air was filled in both expanders to fill the skin without tension.  We went ahead and then placed a 3-0 Strattafix suture in a running intracuticular manner followed by Surgical Glue and Tape. Appropriate dressings were placed over the drain sites. The patient was wrapped not tightly. The patient tolerated the procedure well. All counts were correct at the end of the case. The patient was extubated and sent to recovery room in stable condition.

## 2025-05-10 ENCOUNTER — MYC MEDICAL ADVICE (OUTPATIENT)
Dept: PLASTIC SURGERY | Facility: CLINIC | Age: 40
End: 2025-05-10
Payer: COMMERCIAL

## 2025-05-10 DIAGNOSIS — Z98.890 S/P BREAST RECONSTRUCTION: Primary | ICD-10-CM

## 2025-05-12 RX ORDER — GABAPENTIN 100 MG/1
100 CAPSULE ORAL 3 TIMES DAILY
Qty: 42 CAPSULE | Refills: 0 | Status: SHIPPED | OUTPATIENT
Start: 2025-05-12 | End: 2025-05-26

## 2025-05-12 RX ORDER — METHOCARBAMOL 500 MG/1
500 TABLET, FILM COATED ORAL 4 TIMES DAILY PRN
Qty: 25 TABLET | Refills: 0 | Status: SHIPPED | OUTPATIENT
Start: 2025-05-12

## 2025-05-12 NOTE — TELEPHONE ENCOUNTER
Spoke with pt, she describes how her pain has been since surgery. She states that her pain was excruciating (rated 8-9/10) until yesterday when it started to become manageable. She states that she is on max doses of tylenol and ibuprofen, has not taken narcotics in several days. Does not have other pain modality medications on hand. Currently her pain is at 2-3/10. She has sharp pain, especially when bringing her hands together, has to wash her hands one at a time. Denies symptoms of infection, incisions look OK. She is showering but is using a shower chair and is not comfortable washing above her waist (discussed with her OK to shower with drain sites covered). Pain is especially bothersome at night.     Pharmacy is [facility] [place]. Pt wants me to call her with update once I speak with ALLEN Hays.    Discussed with her that it seems like her pain is going in the right direction, discussed possibility of adding something like gabapentin or robaxin, or both for what she is describing. The most bothersome thing at this point is her deep sharp, shooting muscle pain, more on the left, discussed that this could be where the expanders are sewn in.     Pt denies any chronic baseline pain, does not use pain medication in her normal life.

## 2025-05-18 LAB
PATH REPORT.COMMENTS IMP SPEC: ABNORMAL
PATH REPORT.COMMENTS IMP SPEC: ABNORMAL
PATH REPORT.COMMENTS IMP SPEC: YES
PATH REPORT.FINAL DX SPEC: ABNORMAL
PATH REPORT.GROSS SPEC: ABNORMAL
PATH REPORT.MICROSCOPIC SPEC OTHER STN: ABNORMAL
PATH REPORT.RELEVANT HX SPEC: ABNORMAL
PATHOLOGY SYNOPTIC REPORT: ABNORMAL
PHOTO IMAGE: ABNORMAL

## 2025-05-21 NOTE — PROGRESS NOTES
[facility] [address] [address]  Phone: [phone]  Fax: [phone]    PATIENT NAME:  Vivi Mccall  PATIENT YOB: 1985    FOLLOW-UP  May 22, 2025    Vivi Mccall is a 39 year old female who returns for her 1st post-operative follow-up visit.    Treatment to date:  Left skin-sparing mastectomy, left axillary sentinel lymph node mapping and biopsy, right risk-reducing skin-sparing mastectomy (5/5/2025)    HPI:    She underwent a bilateral skin-sparing mastectomy and left SLNB on 5/5/2025.  She is currently 2 week(s) post-op.  Final surgical pathology showed 7 cm of DCIS with uninvolved margins in the left breast and no invasive carcinoma. One lymph node was removed and was negative.    Since the procedure, she has met with the plastic surgery team and has had her drains removed on 5/16/2025.    /87 (BP Location: Right arm, Patient Position: Sitting, Cuff Size: Adult Regular)   Pulse 71   Temp 98.8  F (37.1  C) (Oral)   Resp 16   Wt 99.7 kg (219 lb 11.2 oz)   LMP 05/05/2025   SpO2 100%   BMI 33.41 kg/m     Physical Exam  Constitutional:       Appearance: She is well-developed.   Pulmonary:      Effort: No respiratory distress.   Chest:      Comments: Bilateral surgical absence of breasts. Skin flaps without ischemia or cellulitis bilaterally. No hematoma or seroma.  Skin:     General: Skin is warm and dry.         INVESTIGATIONS:    Surgical Pathology (5/5/2025):  Final Diagnosis   A. RIGHT breast, skin-sparing mastectomy:  -Benign breast tissue with duct ectasia  -Benign nipple and skin  -Negative for atypia or malignancy     B. LEFT breast, skin-sparing mastectomy:  -Ductal carcinoma in situ (DCIS), nuclear grade 3, cribriform, solid and micropapillary type(s), with lobular involvement and comedonecrosis, in the lower outer quadrant  -DCIS spans an estimated 70 mm  -No invasive carcinoma  identified  -Margins are uninvolved by DCIS  -DCIS is <1 mm (0.3 mm) from the closest margin (anterior inferior)  -DCIS is >5 mm from the anterior superior and posterior margins  -Benign nipple and skin  -Flat epithelial atypia  -Other findings: Duct ectasia  -Calcifications associated with DCIS  -Prior core biopsy site identified  -DCIS is estrogen receptor positive and progesterone receptor positive by immunohistochemistry (performed on prior core biopsy, see report [id number])  -See tumor synoptic below      C. Lymph node, LEFT axillary, sentinel #1, excision:  - One benign lymph node (0/1)       ASSESSMENT:    Vivi Mccall is a 39 year old female with ductal carcinoma in situ, s/p bilateral mastectomy.    She is healing very well. She will follow up with Dr Lanier regarding the next steps in reconstruction.    We reviewed the pathology today and a copy of the report was provided.     Her stage is 0.  No other oncologic surgery or therapy is indicated. I recommend surgical follow up with Leena Brunson PA-C in 6 months' time for regular chest wall exams. I will see her on an as needed basis.     All of the above was discussed with the patient and all questions were answered.     PLAN:  Follow up with Dr Lanier re: reconstruction  Follow up with Leena Brunson in 6 months  Follow up with me MARKOS Sorensen MD MS [facility] FACS  Associate Professor of Surgery  Division of Surgical Oncology  [facility]

## 2025-05-22 ENCOUNTER — ONCOLOGY VISIT (OUTPATIENT)
Dept: ONCOLOGY | Facility: CLINIC | Age: 40
End: 2025-05-22
Payer: COMMERCIAL

## 2025-05-22 VITALS
HEART RATE: 71 BPM | DIASTOLIC BLOOD PRESSURE: 87 MMHG | WEIGHT: 219.7 LBS | BODY MASS INDEX: 33.41 KG/M2 | SYSTOLIC BLOOD PRESSURE: 133 MMHG | TEMPERATURE: 98.8 F | RESPIRATION RATE: 16 BRPM | OXYGEN SATURATION: 100 %

## 2025-05-22 DIAGNOSIS — D05.12 DUCTAL CARCINOMA IN SITU (DCIS) OF LEFT BREAST: Primary | ICD-10-CM

## 2025-05-22 PROCEDURE — 99213 OFFICE O/P EST LOW 20 MIN: CPT | Performed by: SURGERY

## 2025-05-22 ASSESSMENT — PAIN SCALES - GENERAL: PAINLEVEL_OUTOF10: MILD PAIN (2)

## 2025-05-22 NOTE — LETTER
5/22/2025      Vivi Mccall  [address]  [place] [address]      Dear Colleague,    Thank you for referring your patient, Vivi Mccall, to the [facility]. Please see a copy of my visit note below.      [facility]  [address] [address]  Phone: [phone]  Fax: [phone]    PATIENT NAME:  Vivi Mccall  PATIENT YOB: 1985    FOLLOW-UP  May 22, 2025    Vivi Mccall is a 39 year old female who returns for her 1st post-operative follow-up visit.    Treatment to date:  Left skin-sparing mastectomy, left axillary sentinel lymph node mapping and biopsy, right risk-reducing skin-sparing mastectomy (5/5/2025)    HPI:    She underwent a bilateral skin-sparing mastectomy and left SLNB on 5/5/2025.  She is currently 2 week(s) post-op.  Final surgical pathology showed 7 cm of DCIS with uninvolved margins in the left breast and no invasive carcinoma. One lymph node was removed and was negative.    Since the procedure, she has met with the plastic surgery team and has had her drains removed on 5/16/2025.    /87 (BP Location: Right arm, Patient Position: Sitting, Cuff Size: Adult Regular)   Pulse 71   Temp 98.8  F (37.1  C) (Oral)   Resp 16   Wt 99.7 kg (219 lb 11.2 oz)   LMP 05/05/2025   SpO2 100%   BMI 33.41 kg/m     Physical Exam  Constitutional:       Appearance: She is well-developed.   Pulmonary:      Effort: No respiratory distress.   Chest:      Comments: Bilateral surgical absence of breasts. Skin flaps without ischemia or cellulitis bilaterally. No hematoma or seroma.  Skin:     General: Skin is warm and dry.         INVESTIGATIONS:    Surgical Pathology (5/5/2025):  Final Diagnosis   A. RIGHT breast, skin-sparing mastectomy:  -Benign breast tissue with duct ectasia  -Benign nipple and skin  -Negative for atypia or malignancy     B. LEFT breast,  skin-sparing mastectomy:  -Ductal carcinoma in situ (DCIS), nuclear grade 3, cribriform, solid and micropapillary type(s), with lobular involvement and comedonecrosis, in the lower outer quadrant  -DCIS spans an estimated 70 mm  -No invasive carcinoma identified  -Margins are uninvolved by DCIS  -DCIS is <1 mm (0.3 mm) from the closest margin (anterior inferior)  -DCIS is >5 mm from the anterior superior and posterior margins  -Benign nipple and skin  -Flat epithelial atypia  -Other findings: Duct ectasia  -Calcifications associated with DCIS  -Prior core biopsy site identified  -DCIS is estrogen receptor positive and progesterone receptor positive by immunohistochemistry (performed on prior core biopsy, see report [id number])  -See tumor synoptic below      C. Lymph node, LEFT axillary, sentinel #1, excision:  - One benign lymph node (0/1)       ASSESSMENT:    Vivi Mccall is a 39 year old female with ductal carcinoma in situ, s/p bilateral mastectomy.    She is healing very well. She will follow up with Dr Lanier regarding the next steps in reconstruction.    We reviewed the pathology today and a copy of the report was provided.     Her stage is 0.  No other oncologic surgery or therapy is indicated. I recommend surgical follow up with Leena Brunson PA-C in 6 months' time for regular chest wall exams. I will see her on an as needed basis.     All of the above was discussed with the patient and all questions were answered.     PLAN:  Follow up with Dr Lanier re: reconstruction  Follow up with Leena Brunson in 6 months  Follow up with me MARKOS Sorensen MD [facility] FACS  Associate Professor of Surgery  Division of Surgical Oncology  [facility]     Again, thank you for allowing me to participate in the care of your patient.        Sincerely,        Magalys Sorensen MD    Electronically signed

## 2025-05-22 NOTE — NURSING NOTE
"Oncology Rooming Note    May 22, 2025 9:47 AM   Vivi Mccall is a 39 year old female who presents for:    Chief Complaint   Patient presents with    Oncology Clinic Visit     Ductal carcinoma in situ of left breast     Initial Vitals: /87 (BP Location: Right arm, Patient Position: Sitting, Cuff Size: Adult Regular)   Pulse 71   Temp 98.8  F (37.1  C) (Oral)   Resp 16   Wt 99.7 kg (219 lb 11.2 oz)   LMP 05/05/2025   SpO2 100%   BMI 33.41 kg/m   Estimated body mass index is 33.41 kg/m  as calculated from the following:    Height as of 5/16/25: 1.727 m (5' 8\").    Weight as of this encounter: 99.7 kg (219 lb 11.2 oz). Body surface area is 2.19 meters squared.  Mild Pain (2) Comment: Data Unavailable   Patient's last menstrual period was 05/05/2025.  Allergies reviewed: Yes  Medications reviewed: Yes    Medications: Medication refills not needed today.  Pharmacy name entered into [employer]: [id number] [place], [place] - [address]    Frailty Screening:   Is the patient here for a new oncology consult visit in cancer care? 2. No    PHQ9:  Did this patient require a PHQ9?: No      Clinical concerns: none      Marium Smyth              "

## 2025-05-27 ENCOUNTER — OFFICE VISIT (OUTPATIENT)
Dept: PLASTIC SURGERY | Facility: CLINIC | Age: 40
End: 2025-05-27
Payer: COMMERCIAL

## 2025-05-27 VITALS
RESPIRATION RATE: 18 BRPM | TEMPERATURE: 98 F | BODY MASS INDEX: 32.89 KG/M2 | WEIGHT: 217 LBS | SYSTOLIC BLOOD PRESSURE: 125 MMHG | HEART RATE: 75 BPM | OXYGEN SATURATION: 97 % | DIASTOLIC BLOOD PRESSURE: 85 MMHG | HEIGHT: 68 IN

## 2025-05-27 DIAGNOSIS — Z98.890 S/P BREAST RECONSTRUCTION: Primary | ICD-10-CM

## 2025-05-27 PROCEDURE — 99213 OFFICE O/P EST LOW 20 MIN: CPT | Performed by: PLASTIC SURGERY

## 2025-05-27 RX ORDER — CEFAZOLIN SODIUM 2 G/50ML
2 SOLUTION INTRAVENOUS SEE ADMIN INSTRUCTIONS
OUTPATIENT
Start: 2025-05-27

## 2025-05-27 RX ORDER — ENOXAPARIN SODIUM 100 MG/ML
40 INJECTION SUBCUTANEOUS
OUTPATIENT
Start: 2025-05-27

## 2025-05-27 RX ORDER — CEFAZOLIN SODIUM 2 G/50ML
2 SOLUTION INTRAVENOUS
OUTPATIENT
Start: 2025-05-27

## 2025-05-27 ASSESSMENT — PAIN SCALES - GENERAL: PAINLEVEL_OUTOF10: NO PAIN (0)

## 2025-05-27 NOTE — NURSING NOTE
"Oncology Rooming Note    May 27, 2025 9:00 AM   Vivi Mccall is a 39 year old female who presents for:    Chief Complaint   Patient presents with    Oncology Clinic Visit     Post-op     Initial Vitals: /85   Pulse 75   Temp 98  F (36.7  C) (Tympanic)   Resp 18   Ht 1.727 m (5' 8\")   Wt 98.4 kg (217 lb)   LMP 05/05/2025   SpO2 97%   BMI 32.99 kg/m   Estimated body mass index is 32.99 kg/m  as calculated from the following:    Height as of this encounter: 1.727 m (5' 8\").    Weight as of this encounter: 98.4 kg (217 lb). Body surface area is 2.17 meters squared.  No Pain (0) Comment: Data Unavailable   Patient's last menstrual period was 05/05/2025.  Allergies reviewed: Yes  Medications reviewed: Yes    Medications: Medication refills not needed today.  Pharmacy name entered into [facility]: [facility] #[id number] [place], [place] - [address]    Frailty Screening:   Is the patient here for a new oncology consult visit in cancer care? 2. No    PHQ9:  Did this patient require a PHQ9?: No      Clinical concerns: none      Toney Reyes LPN              "

## 2025-06-12 ENCOUNTER — TELEPHONE (OUTPATIENT)
Dept: PLASTIC SURGERY | Facility: CLINIC | Age: 40
End: 2025-06-12
Payer: COMMERCIAL

## 2025-06-12 NOTE — TELEPHONE ENCOUNTER
Left voicemail for patient regarding scheduling surgery with Dr. Lanier    Provided direct contact line to discuss scheduling  P: [phone]    Beba Mckeon on 6/12/2025 at 9:16 AM

## 2025-06-30 ENCOUNTER — PATIENT OUTREACH (OUTPATIENT)
Dept: CARE COORDINATION | Facility: CLINIC | Age: 40
End: 2025-06-30
Payer: COMMERCIAL

## 2025-08-28 ENCOUNTER — TELEPHONE (OUTPATIENT)
Dept: PLASTIC SURGERY | Facility: CLINIC | Age: 40
End: 2025-08-28
Payer: COMMERCIAL

## (undated) DEVICE — LABEL MEDICATION SYSTEM 3303-P

## (undated) DEVICE — SYR 50ML LL W/O NDL 309653

## (undated) DEVICE — LINEN TOWEL PACK X30 5481

## (undated) DEVICE — CLIP HORIZON SM RED WIDE SLOT 001201

## (undated) DEVICE — ESU ELEC BLADE 2.75" COATED/INSULATED E1455

## (undated) DEVICE — PITCHER STERILE 1000ML  SSK9004A

## (undated) DEVICE — NDL 30GA 0.5" 305106

## (undated) DEVICE — ESU GROUND PAD ADULT W/CORD E7507

## (undated) DEVICE — COVER ULTRASOUND PROBE GAMMA WIRELESS TMPH-2002

## (undated) DEVICE — CLIP HORIZON MED BLUE 002200

## (undated) DEVICE — GLOVE BIOGEL PI MICRO SZ 6.0 48560

## (undated) DEVICE — SU DERMABOND PRINEO 22CM CLR222US

## (undated) DEVICE — GOWN IMPERVIOUS BREATHABLE SMART LG 89015

## (undated) DEVICE — DRSG PRIMAPORE 02X3" 7133

## (undated) DEVICE — PREP CHLORAPREP 26ML TINTED HI-LITE ORANGE 930815

## (undated) DEVICE — SU STRATAFIX MONOCRYL 3-0 SPIRAL PS-2 45CM SXMP1B107

## (undated) DEVICE — BNDG ELASTIC 6" DBL LENGTH UNSTERILE 6611-16

## (undated) DEVICE — ESU ELEC BLADE 6" COATED/INSULATED E1455-6

## (undated) DEVICE — GLOVE BIOGEL PI MICRO SZ 7.0 48570

## (undated) DEVICE — DRAPE POUCH INSTRUMENT 1018

## (undated) DEVICE — KIT PROCEDURE SPY-PHI SGL HH9006

## (undated) DEVICE — GLOVE BIOGEL PI MICRO INDICATOR UNDERGLOVE SZ 6.0 48960

## (undated) DEVICE — PACK THORACOTOMY UMMC LF SCV32THF13

## (undated) DEVICE — LINEN TOWEL PACK X6 WHITE 5487

## (undated) DEVICE — SU PROLENE 0 CTX 30" 8454H

## (undated) DEVICE — SUCTION SLEEVE NEPTUNE 2 125MM 0703-005-125

## (undated) DEVICE — SPONGE LAP 18X18" X8435

## (undated) DEVICE — GLOVE BIOGEL PI MICRO INDICATOR UNDERGLOVE SZ 7.5 48975

## (undated) DEVICE — DRAPE IOBAN INCISE 23X17" 6650EZ

## (undated) DEVICE — SURGICEL ABSORBABLE HEMOSTAT SNOW 4"X4" 2083

## (undated) DEVICE — DRAPE U SPLIT 74X120" 29440

## (undated) DEVICE — SU ETHILON 3-0 PS-1 18" 1663H

## (undated) DEVICE — SUCTION MANIFOLD NEPTUNE 2 SYS 4 PORT 0702-020-000

## (undated) DEVICE — DRAIN JACKSON PRATT CHANNEL 15FR ROUND HUBLESS SIL JP-2228

## (undated) DEVICE — SU MONOCRYL 2-0 SH 27" UND Y417H

## (undated) DEVICE — DRSG KERLIX 4 1/2"X4YDS ROLL 6715

## (undated) DEVICE — DRAIN RESERVOIR 100ML JP 0070740

## (undated) DEVICE — Device

## (undated) DEVICE — DRAPE SHEET REV FOLD 3/4 9349

## (undated) DEVICE — PAD CHUX UNDERPAD 23X36" 676105

## (undated) RX ORDER — PROPOFOL 10 MG/ML
INJECTION, EMULSION INTRAVENOUS
Status: DISPENSED
Start: 2025-05-05

## (undated) RX ORDER — FENTANYL CITRATE 50 UG/ML
INJECTION, SOLUTION INTRAMUSCULAR; INTRAVENOUS
Status: DISPENSED
Start: 2025-05-05

## (undated) RX ORDER — CEFAZOLIN SODIUM 1 G/3ML
INJECTION, POWDER, FOR SOLUTION INTRAMUSCULAR; INTRAVENOUS
Status: DISPENSED
Start: 2025-05-05

## (undated) RX ORDER — ENOXAPARIN SODIUM 100 MG/ML
INJECTION SUBCUTANEOUS
Status: DISPENSED
Start: 2025-05-05

## (undated) RX ORDER — OXYCODONE HYDROCHLORIDE 5 MG/1
TABLET ORAL
Status: DISPENSED
Start: 2025-05-05

## (undated) RX ORDER — ISOSULFAN BLUE 50 MG/5ML
INJECTION, SOLUTION SUBCUTANEOUS
Status: DISPENSED
Start: 2025-05-05

## (undated) RX ORDER — ACETAMINOPHEN 325 MG/1
TABLET ORAL
Status: DISPENSED
Start: 2025-05-05

## (undated) RX ORDER — FENTANYL CITRATE-0.9 % NACL/PF 10 MCG/ML
PLASTIC BAG, INJECTION (ML) INTRAVENOUS
Status: DISPENSED
Start: 2025-05-05

## (undated) RX ORDER — DEXAMETHASONE SODIUM PHOSPHATE 4 MG/ML
INJECTION, SOLUTION INTRA-ARTICULAR; INTRALESIONAL; INTRAMUSCULAR; INTRAVENOUS; SOFT TISSUE
Status: DISPENSED
Start: 2025-05-05

## (undated) RX ORDER — ONDANSETRON 2 MG/ML
INJECTION INTRAMUSCULAR; INTRAVENOUS
Status: DISPENSED
Start: 2025-05-05